# Patient Record
Sex: MALE | Race: WHITE | NOT HISPANIC OR LATINO | Employment: OTHER | URBAN - METROPOLITAN AREA
[De-identification: names, ages, dates, MRNs, and addresses within clinical notes are randomized per-mention and may not be internally consistent; named-entity substitution may affect disease eponyms.]

---

## 2017-08-14 ENCOUNTER — SLEEP CENTER VISIT (OUTPATIENT)
Dept: SLEEP MEDICINE | Facility: MEDICAL CENTER | Age: 82
End: 2017-08-14
Payer: MEDICARE

## 2017-08-14 VITALS
HEIGHT: 69 IN | BODY MASS INDEX: 31.84 KG/M2 | OXYGEN SATURATION: 95 % | WEIGHT: 215 LBS | HEART RATE: 68 BPM | RESPIRATION RATE: 16 BRPM | DIASTOLIC BLOOD PRESSURE: 66 MMHG | SYSTOLIC BLOOD PRESSURE: 110 MMHG

## 2017-08-14 DIAGNOSIS — G47.33 OBSTRUCTIVE SLEEP APNEA: ICD-10-CM

## 2017-08-14 DIAGNOSIS — E66.9 OBESITY (BMI 30.0-34.9): ICD-10-CM

## 2017-08-14 DIAGNOSIS — K21.9 GASTROESOPHAGEAL REFLUX DISEASE, ESOPHAGITIS PRESENCE NOT SPECIFIED: ICD-10-CM

## 2017-08-14 PROCEDURE — 99213 OFFICE O/P EST LOW 20 MIN: CPT | Performed by: NURSE PRACTITIONER

## 2017-08-14 RX ORDER — LANSOPRAZOLE 30 MG/1
30 CAPSULE, DELAYED RELEASE ORAL EVERY EVENING
Status: ON HOLD | COMMUNITY
Start: 2017-06-07 | End: 2019-04-10

## 2017-08-14 RX ORDER — ALLOPURINOL 300 MG/1
TABLET ORAL
COMMUNITY
Start: 2017-06-13 | End: 2017-08-14

## 2017-08-14 RX ORDER — HYDROXYUREA 500 MG/1
CAPSULE ORAL SEE ADMIN INSTRUCTIONS
COMMUNITY
Start: 2017-08-06 | End: 2022-01-01 | Stop reason: DRUGHIGH

## 2017-08-14 RX ORDER — TAMSULOSIN HYDROCHLORIDE 0.4 MG/1
0.4 CAPSULE ORAL EVERY EVENING
Status: ON HOLD | COMMUNITY
Start: 2017-07-05 | End: 2019-04-10

## 2017-08-14 RX ORDER — MONTELUKAST SODIUM 10 MG/1
10 TABLET ORAL EVERY EVENING
COMMUNITY
Start: 2017-06-09 | End: 2020-03-12 | Stop reason: SDUPTHER

## 2017-08-14 RX ORDER — ALLOPURINOL 100 MG/1
TABLET ORAL
COMMUNITY
Start: 2017-06-29 | End: 2017-08-14

## 2017-08-14 RX ORDER — SIMVASTATIN 20 MG
20 TABLET ORAL EVERY EVENING
Status: ON HOLD | COMMUNITY
Start: 2017-08-12 | End: 2019-04-10

## 2017-08-14 NOTE — PROGRESS NOTES
Chief Complaint   Patient presents with   • Follow-Up       HPI:  Han Pat is a 82 y.o. year old male here today for follow-up on FREYA. Last OV was 10/2015. His first diagnosed in 2007 and placed on CPAP 8 cm H2O. Last office visit he is ordered and new CPAP machine. He presents today with auto CPAP 6-10 cm H2O. Compliance card 5/16/2017 through 8/13/2017 indicates 100% compliance, average daily use of 6 hours 37 minutes, AHI of 0.2 and minimal to moderate mask leaking. He currently gets is [employer] or [email or web address]. He previously used [employer]. He would prefer not to have his mask and supplies filled through DME. He notes no major changes in health since his last office visit except a fall onto his knee that resulted in infection and treated with IV antibiotics for 6 days in the hospital. He denies any cardiac arrest or symptoms. He notes occasional seasonal allergies relieved with montelukast and Claritin. He denies any major falls. He has an occasional headache and he tends to nap around 2 PM for about 30 minutes daily. He does not use his machine at that time. He does note some intermittent fatigue. He tolerates mask and pressure of his machine. He uses fullface mask currently.          ROS: As per HPI and otherwise negative if not stated.    Past Medical History   Diagnosis Date   • Sleep apnea    • Hyperlipidemia    • Back pain    • GERD (gastroesophageal reflux disease)    • Kidney stone    • PND (post-nasal drip)        Past Surgical History   Procedure Laterality Date   • Other orthopedic surgery  lumbar disectomy 2000   • Cataract extraction with iol     • Other       Nasal surgery       Family History   Problem Relation Age of Onset   • Other Father      Heart problems; unspecified   • Other Mother      Aneurism   • Other Brother      Heart problems; unspecified       Social History     Social History   • Marital Status:      Spouse Name: N/A   • Number of Children: N/A   • Years of Education:  "N/A     Occupational History   • Not on file.     Social History Main Topics   • Smoking status: Former Smoker -- 1.00 packs/day for 15 years     Types: Cigarettes     Quit date: 01/01/1980   • Smokeless tobacco: Never Used   • Alcohol Use: 0.0 oz/week     0 Standard drinks or equivalent per week      Comment: nightly   • Drug Use: No   • Sexual Activity: Not on file     Other Topics Concern   • Not on file     Social History Narrative       Allergies as of 08/14/2017   • (No Known Allergies)        @Vital signs for this encounter:  Filed Vitals:    08/14/17 1308   Height: 1.75 m (5' 8.9\")   Weight: 97.523 kg (215 lb)   Weight % change since last entry.: 0 %   BP: 110/66   Pulse: 68   BMI (Calculated): 31.84   Resp: 16   O2 sat % room air: 95 %       Current medications as of today   Current Outpatient Prescriptions   Medication Sig Dispense Refill   • hydroxyurea (HYDREA) 500 MG Cap      • lansoprazole (PREVACID) 30 MG CAPSULE DELAYED RELEASE      • montelukast (SINGULAIR) 10 MG Tab      • simvastatin (ZOCOR) 20 MG Tab      • aspirin 81 MG tablet Take 81 mg by mouth every day.     • lansoprazole (PREVACID SOLUTAB) 30 MG TBDP Take 30 mg by mouth every day.     • Montelukast Sodium (SINGULAIR PO) Take  by mouth.     • tamsulosin (FLOMAX) 0.4 MG capsule      • methylPREDNISolone (MEDROL DOSPACK) 4 MG Tab Use as directed (Patient not taking: Reported on 8/14/2017) 21 Tab 0   • ipratropium-albuterol (COMBIVENT RESPIMAT)  MCG/ACT Aero Soln Inhale 1 Puff by mouth 4 times a day. (Patient not taking: Reported on 8/14/2017) 1 Inhaler 0   • SIMVASTATIN PO Take  by mouth.       No current facility-administered medications for this visit.         Physical Exam:   Gen:           Alert and oriented, No apparent distress. Mood and affect appropriate, normal interaction with examiner.  Eyes:          PERRL, EOM intact, sclere white, conjunctive moist.  Ears:          Not examined.   Hearing:     Grossly intact.  Nose:        "   Normal, no lesions or deformities.  Dentition:    Good dentition.  Oropharynx:   Tongue normal, posterior pharynx without erythema or exudate.  Mallampati Classification: not examined.  Neck:        Supple, trachea midline, no masses.  Respiratory Effort: No intercostal retractions or use of accessory muscles.   Lung Auscultation:      Clear to auscultation bilaterally but diminished t/o; no rales, rhonchi or wheezing.  CV:            Regular rate and rhythm. No murmurs, rubs or gallops.  Abd:           Not examined.   Lymphadenopathy: Not examined.  Gait and Station: Normal.  Digits and Nails: No clubbing, cyanosis, petechiae, or nodes.   Cranial Nerves: II-XII grossly intact.  Skin:        No rashes, lesions or ulcers noted.               Ext:           No cyanosis or edema.      Assessment:  1. Obstructive sleep apnea  OVERNIGHT OXIMETRY   2. Gastroesophageal reflux disease, esophagitis presence not specified     3. Obesity (BMI 30.0-34.9)         Immunizations:    Flu:not given  Pneumovax 23:not given  Prevnar 13:not given    Plan:  1. Continue CPAP 6-10cm H20 nightly.  2. Patient will fill supplies through [email or web address].  3. CNOX on pap now. May call results.  4. Discussed sleep hygiene.  5. Encouraged weight loss.  6. Follow up in 1 year with compliance card, sooner if needed.

## 2017-08-14 NOTE — MR AVS SNAPSHOT
"        Han Pat   2017 1:20 PM   Sleep Center Visit   MRN: [id number]    Department:  [facility]   Dept Phone:  [phone]    Description:  Male : 1935   Provider:  ARASH Blake           Reason for Visit     Follow-Up           Allergies as of 2017     No Known Allergies      You were diagnosed with     Obstructive sleep apnea   [830996]       Gastroesophageal reflux disease, esophagitis presence not specified   [6296754]       Obesity (BMI 30.0-34.9)   [048815]         Vital Signs     Blood Pressure Pulse Respirations Height Weight Body Mass Index    110/66 mmHg 68 16 1.75 m (5' 8.9\") 97.523 kg (215 lb) 31.84 kg/m2    Oxygen Saturation Smoking Status                95% Former Smoker          Basic Information     Date Of Birth Sex Race Ethnicity Preferred Language    1935 Male White Non- English      Your appointments     Sep 05, 2017  2:20 PM   Overnight Oximetry with SLEEP CLINIC   [facility] (--)    [address] [place] [id number]   [phone]              Problem List              ICD-10-CM Priority Class Noted - Resolved    Obstructive sleep apnea G47.33   2017 - Present    Gastroesophageal reflux disease K21.9   2017 - Present    Obesity (BMI 30.0-34.9) E66.9   2017 - Present      Health Maintenance        Date Due Completion Dates    IMM DTaP/Tdap/Td Vaccine (1 - Tdap) 3/17/1956 ---    COLONOSCOPY 3/17/1987 ---    IMM ZOSTER VACCINE 3/17/1997 ---    IMM PNEUMOCOCCAL 65+ (ADULT) LOW/MEDIUM RISK SERIES (1 of 2 - PCV13) 3/17/2002 ---    IMM INFLUENZA (1) 2017 ---            Current Immunizations     No immunizations on file.      Below and/or attached are the medications your provider expects you to take. Review all of your home medications and newly ordered medications with your provider and/or pharmacist. Follow medication instructions as directed by your provider and/or " pharmacist. Please keep your medication list with you and share with your provider. Update the information when medications are discontinued, doses are changed, or new medications (including over-the-counter products) are added; and carry medication information at all times in the event of emergency situations     Allergies:  No Known Allergies          Medications  Valid as of: August 14, 2017 -  1:52 PM    Generic Name Brand Name Tablet Size Instructions for use    Aspirin (Tab) aspirin 81 MG Take 81 mg by mouth every day.        Hydroxyurea (Cap) HYDREA 500 MG         Ipratropium-Albuterol (Aero Soln) COMBIVENT RESPIMAT  MCG/ACT Inhale 1 Puff by mouth 4 times a day.        Lansoprazole (TABLET DISPERSIBLE) PREVACID 30 MG Take 30 mg by mouth every day.        Lansoprazole (CAPSULE DELAYED RELEASE) PREVACID 30 MG         MethylPREDNISolone (Tab) MEDROL DOSPACK 4 MG Use as directed        Montelukast Sodium   Take  by mouth.        Montelukast Sodium (Tab) SINGULAIR 10 MG         Simvastatin   Take  by mouth.        Simvastatin (Tab) ZOCOR 20 MG         Tamsulosin HCl (Cap) FLOMAX 0.4 MG         .                 Medicines prescribed today were sent to:     [id number] - [place], [place] - [address]    [address] [place] [address]    Phone: [phone] Fax: [phone]    Open 24 Hours?: No      Medication refill instructions:       If your prescription bottle indicates you have medication refills left, it is not necessary to call your provider’s office. Please contact your pharmacy and they will refill your medication.    If your prescription bottle indicates you do not have any refills left, you may request refills at any time through one of the following ways: The online [employer] system (except Urgent Care), by calling your provider’s office, or by asking your pharmacy to contact your provider’s office with a refill request. Medication refills are processed only during regular business  hours and may not be available until the next business day. Your provider may request additional information or to have a follow-up visit with you prior to refilling your medication.   *Please Note: Medication refills are assigned a new Rx number when refilled electronically. Your pharmacy may indicate that no refills were authorized even though a new prescription for the same medication is available at the pharmacy. Please request the medicine by name with the pharmacy before contacting your provider for a refill.        Your To Do List     Future Labs/Procedures Complete By Expires    OVERNIGHT OXIMETRY  As directed 8/14/2018    Comments:    On autocpap 6-10cm H20, perform now         TradeBeam Access Code: [email or web address]  Expires: 9/13/2017  1:00 PM    TradeBeam  A secure, online tool to manage your health information     [employer]’s TradeBeam® is a secure, online tool that connects you to your personalized health information from the privacy of your home -- day or night - making it very easy for you to manage your healthcare. Once the activation process is completed, you can even access your medical information using the TradeBeam sanjay, which is available for free in the [employer] Sanjay store or Google Play store.     TradeBeam provides the following levels of access (as shown below):   My Chart Features   [facility] Doctor [facility]  Specialists [facility]  Urgent  Care Non-[facility]  Primary Care  Doctor   Email your healthcare team securely and privately 24/7 X X X    Manage appointments: schedule your next appointment; view details of past/upcoming appointments X      Request prescription refills. X      View recent personal medical records, including lab and immunizations X X X X   View health record, including health history, allergies, medications X X X X   Read reports about your outpatient visits, procedures, consult and ER notes X X X X   See your discharge summary, which is a recap of your hospital and/or ER  visit that includes your diagnosis, lab results, and care plan. X X       How to register for anydooR:  1. Go to  [email or web address].  2. Click on the Sign Up Now box, which takes you to the New Member Sign Up page. You will need to provide the following information:  a. Enter your anydooR Access Code exactly as it appears at the top of this page. (You will not need to use this code after you’ve completed the sign-up process. If you do not sign up before the expiration date, you must request a new code.)   b. Enter your date of birth.   c. Enter your home email address.   d. Click Submit, and follow the next screen’s instructions.  3. Create a ScanSocialt ID. This will be your [employer]Social[employer] login ID and cannot be changed, so think of one that is secure and easy to remember.  4. Create a [employer] password. You can change your password at any time.  5. Enter your Password Reset Question and Answer. This can be used at a later time if you forget your password.   6. Enter your e-mail address. This allows you to receive e-mail notifications when new information is available in anydooR.  7. Click Sign Up. You can now view your health information.    For assistance activating your anydooR account, call [phone]

## 2017-09-05 ENCOUNTER — HOME STUDY (OUTPATIENT)
Dept: SLEEP MEDICINE | Facility: MEDICAL CENTER | Age: 82
End: 2017-09-05
Attending: NURSE PRACTITIONER
Payer: MEDICARE

## 2017-09-05 DIAGNOSIS — G47.33 OBSTRUCTIVE SLEEP APNEA: ICD-10-CM

## 2017-09-05 PROCEDURE — 94762 N-INVAS EAR/PLS OXIMTRY CONT: CPT | Performed by: INTERNAL MEDICINE

## 2017-09-06 ENCOUNTER — TELEPHONE (OUTPATIENT)
Dept: SLEEP MEDICINE | Facility: MEDICAL CENTER | Age: 82
End: 2017-09-06

## 2017-09-06 DIAGNOSIS — G47.33 OSA (OBSTRUCTIVE SLEEP APNEA): ICD-10-CM

## 2017-09-06 DIAGNOSIS — G47.34 NOCTURNAL HYPOXEMIA: ICD-10-CM

## 2017-09-06 NOTE — TELEPHONE ENCOUNTER
Patient had low oxygen levels on current cpap settings. Advise increase in settings with repeat OPO versus 2LPM bleed in oxygen. Does the patient have a preference?

## 2017-09-06 NOTE — TELEPHONE ENCOUNTER
Order signed to increase pressure to 9-13cm H20 nightly. Repeat OPO ordered. May call results. Encourage patient to call office if he has any difficulty tolerating increased pressure.

## 2017-09-06 NOTE — PROCEDURES
Interpretation:    This is overnight oximetry was performed on 9/15/17 with the patient on auto CPAP 6-10 cm. The recording duration was 8 hours and 15 minutes and 8 seconds. The saturations were less than 90% for 76.8% of the recording with a low saturation of 84%. The saturations were less than 88% for 30.1 minutes. The oximetric pattern does not suggest untreated sleep apnea.    Recommendation:    Recommend 2 L/m bleed in oxygen to the auto CPAP of 6-10 cm.

## 2018-05-22 ENCOUNTER — HOME STUDY (OUTPATIENT)
Dept: SLEEP MEDICINE | Facility: MEDICAL CENTER | Age: 83
End: 2018-05-22
Attending: NURSE PRACTITIONER
Payer: MEDICARE

## 2018-05-22 ENCOUNTER — SLEEP CENTER VISIT (OUTPATIENT)
Dept: SLEEP MEDICINE | Facility: MEDICAL CENTER | Age: 83
End: 2018-05-22
Payer: MEDICARE

## 2018-05-22 VITALS
HEART RATE: 57 BPM | RESPIRATION RATE: 15 BRPM | DIASTOLIC BLOOD PRESSURE: 65 MMHG | WEIGHT: 220 LBS | SYSTOLIC BLOOD PRESSURE: 115 MMHG | BODY MASS INDEX: 33.34 KG/M2 | OXYGEN SATURATION: 93 % | HEIGHT: 68 IN

## 2018-05-22 DIAGNOSIS — G47.33 OBSTRUCTIVE SLEEP APNEA: ICD-10-CM

## 2018-05-22 DIAGNOSIS — G47.00 INSOMNIA, UNSPECIFIED TYPE: ICD-10-CM

## 2018-05-22 PROCEDURE — 99213 OFFICE O/P EST LOW 20 MIN: CPT | Performed by: NURSE PRACTITIONER

## 2018-05-22 PROCEDURE — 94762 N-INVAS EAR/PLS OXIMTRY CONT: CPT | Performed by: INTERNAL MEDICINE

## 2018-05-22 NOTE — PROGRESS NOTES
Chief Complaint   Patient presents with   • Follow-Up     Annual    • Apnea       HPI:  Han Pat is a 83 y.o. year old male here today for follow-up on his obstructive sleep apnea. He was initially diagnosed in 2007 and placed on CPAP 8 cm H2O. He was ordered a new Auto CPAP with pressures set 6-10 CM H20. His compliance download at his last office visit indicated his AHI of 0.2. He did have a moderate mask leak. He was ordered an over night oximetry on these currents Compliance card 5/16/2017 through 8/13/2017 indicates 100% compliance, average daily use of 6 hours 37 minutes, AHI of 0.2 and minimal to moderate mask leaking. He was recommended updated supplies. He notes some mild persistent fatigue. He was ordered an over night oximetry on his current pressures which was completed 9/15/2018 and indicates ongoing 02 desaturations with a mean 02 of 88.9% with 30 minutes spent with saturations less than 88%. His pressure was increased to 9-13 CM H20. He was ordered a repeat oximetry. However this has yet to be completed. His repeat compliance download today in the office indicates an AHI of 0.3 with an average use of over 8 hours at night.   He tolerates the CPAP pressure. He does feel he sleeps better on therapy. He does have mild insomnia which is chronic for her. He tends to wake in the middle of the night and it often takes him an hour or 2 to go back to bed. He feels his energy levels have improved. He does take an afternoon nap. He is quite active. 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 He denies any morning headaches.       Past Medical History:   Diagnosis Date   • Back pain    • GERD (gastroesophageal reflux disease)    • Hyperlipidemia    • Kidney stone    • PND (post-nasal drip)    • Sleep apnea        Past Surgical History:   Procedure Laterality Date   • CATARACT EXTRACTION WITH IOL     • OTHER      Nasal surgery   • OTHER ORTHOPEDIC SURGERY  lumbar disectomy 2000       Family History    Problem Relation Age of Onset   • Other Father      Heart problems; unspecified   • Other Mother      Aneurism   • Other Brother      Heart problems; unspecified   • Sleep Apnea Neg Hx        Social History     Social History   • Marital status:      Spouse name: N/A   • Number of children: N/A   • Years of education: N/A     Occupational History   • Not on file.     Social History Main Topics   • Smoking status: Former Smoker     Packs/day: 1.00     Years: 15.00     Types: Cigarettes     Quit date: 1/1/1980   • Smokeless tobacco: Never Used   • Alcohol use 0.0 oz/week      Comment: nightly   • Drug use: No   • Sexual activity: Not on file     Other Topics Concern   • Not on file     Social History Narrative   • No narrative on file         ROS:  Constitutional: Denies fevers, chills, sweats, weight loss  Eyes: Denies glasses, vision loss, pain, drainage, double vision  Ears/Nose/Mouth/Throat: Denies rhinitis, nasal congestion, ear ache, difficulty hearing, sore throat, persistent hoarseness, decayed teeth/toothache  Cardiovascular: Denies chest pain, tightness, palpitations, swelling in feet/legs, fainting, difficulty breathing when laying down  Respiratory: Denies shortness of breath, cough, sputum, wheezing, painful breathing, coughing up blood  GI: Denies heartburn, difficulty swallowing, nausea, vomiting, abdominal pain, diarrhea, constipation  : Denies frequent urination, painful urination  Integumentary: Denies rashes, lumps or color changes  MSK: Denies painful joints, sore muscles, and back pain.   Neurological: Denies frequent headaches, dizziness, weakness  Sleep: See HPI       Current Outpatient Prescriptions   Medication Sig Dispense Refill   • hydroxyurea (HYDREA) 500 MG Cap      • lansoprazole (PREVACID) 30 MG CAPSULE DELAYED RELEASE      • montelukast (SINGULAIR) 10 MG Tab      • simvastatin (ZOCOR) 20 MG Tab      • aspirin 81 MG tablet Take 81 mg by mouth every day.     • lansoprazole  "(PREVACID SOLUTAB) 30 MG TBDP Take 30 mg by mouth every day.     • Montelukast Sodium (SINGULAIR PO) Take  by mouth.     • tamsulosin (FLOMAX) 0.4 MG capsule      • SIMVASTATIN PO Take  by mouth.       No current facility-administered medications for this visit.        No Known Allergies    Blood pressure 115/65, pulse (!) 57, resp. rate 15, height 1.727 m (5' 8\"), weight 99.8 kg (220 lb), SpO2 93 %.    PE:   Appearance: Well developed, well nourished, no acute distress  Eyes: PERRL, EOM intact, sclera white, conjunctiva moist  Ears: no lesions or deformities  Hearing: grossly intact  Nose: no lesions or deformities  Oropharynx: tongue normal, posterior pharynx without erythema or exudate  Neck: supple, trachea midline, no masses   Respiratory effort: no intercostal retractions or use of accessory muscles  Lung auscultation: no rales, rhonchi or wheezes  Heart auscultation: no murmur rub or gallop  Extremities: no cyanosis or edema  Abdomen: soft ,non tender, no masses  Gait and Station: normal  Digits and nails: no clubbing, cyanosis, petechiae or nodes.  Cranial nerves: grossly intact  Skin: no rashes, lesions or ulcers noted  Orientation: Oriented to time, person and place  Mood and affect: mood and affect appropriate, normal interaction with examiner  Judgement: Intact          Assessment:  1. Obstructive sleep apnea  OVERNIGHT OXIMETRY   2. BMI 33.0-33.9,adult  Patient identified as having weight management issue.  Appropriate orders and counseling given.   3. Insomnia, unspecified type           Plan:    1) Continue Auto CPAP @ 9-13 CM H20. Repeat oximetry now on the new setting. He can call for results.   2) Sleep hygiene discussed. Recommend keeping a set sleep/wake schedule. Logging enough hours of sleep. Limiting/Avoiding naps. No caffeine after noon and no heavy meals in the evening. Recommend daily exercise. He declines referral to Psychology for CBT therapy at this time.   3) Weight loss " recommended.  4) Annual follow up with compliance card download, sooner if needed. He will call in the interim for CNOX results.

## 2018-05-22 NOTE — PATIENT INSTRUCTIONS
Plan:    1) Continue Auto CPAP @ 9-13 CM H20. Repeat oximetry now on the new setting. He can call for results.   2) Sleep hygiene discussed. Recommend keeping a set sleep/wake schedule. Logging enough hours of sleep. Limiting/Avoiding naps. No caffeine after noon and no heavy meals in the evening. Recommend daily exercise. He declines referral to Psychology for CBT therapy at this time.   3) Weight loss recommended.  4) Annual follow up with compliance card download, sooner if needed. He will call in the interim for CNOX results.

## 2018-05-23 ENCOUNTER — TELEPHONE (OUTPATIENT)
Dept: PULMONOLOGY | Facility: HOSPICE | Age: 83
End: 2018-05-23

## 2018-05-23 DIAGNOSIS — G47.33 OSA (OBSTRUCTIVE SLEEP APNEA): ICD-10-CM

## 2018-05-23 DIAGNOSIS — R09.02 HYPOXEMIA: ICD-10-CM

## 2018-05-23 NOTE — PROCEDURES
Interpretation:    This overnight oximetry was recorded on 5/22/2018 with the patient on CPAP at 8 cmH2O.  The analysis duration was 8 hours and 39 minutes.  27 total oximetric events occurred encompassing 15.9 minutes.  The average event duration was 35.4 seconds.  The saturations were less than 90% for 93.7% of the recording.  The lizzy saturation was 83.  The patient spent 281 minutes with saturations < 88%.  Overall, this continuous nocturnal oximetry demonstrates mild but persistent desaturation while on positive airway pressure therapy.    Recommendation:    Recommend bleed-in oxygen at 2 L/min.  He may require a CPAP re-titration.              .

## 2018-05-23 NOTE — TELEPHONE ENCOUNTER
Please let pt know his repeat overnight oximetry indicates persistent 02 saturations despite Auto CPAP 9-13 CM H20. He spent 281 minutes with saturations < 88%. Recommend addition of 02 bleed in at 2 LPM. Order signed. Repeat oximetry in 2 weeks on new setting.

## 2018-05-25 NOTE — TELEPHONE ENCOUNTER
[employer] Adv plan follows medicare guidelines fairly close so this may get kicked back and he might have to do a titration to get the bleed-in. Will send to [facility] and see how it goes.  Pt informed, he stated that if it come down to needing a SS, he would not do it.

## 2019-02-05 ENCOUNTER — OFFICE VISIT (OUTPATIENT)
Dept: MEDICAL GROUP | Facility: MEDICAL CENTER | Age: 84
End: 2019-02-05
Payer: MEDICARE

## 2019-02-05 VITALS
OXYGEN SATURATION: 92 % | DIASTOLIC BLOOD PRESSURE: 60 MMHG | BODY MASS INDEX: 30.1 KG/M2 | WEIGHT: 222.2 LBS | RESPIRATION RATE: 16 BRPM | TEMPERATURE: 98.5 F | HEART RATE: 61 BPM | SYSTOLIC BLOOD PRESSURE: 116 MMHG | HEIGHT: 72 IN

## 2019-02-05 DIAGNOSIS — K21.9 GASTROESOPHAGEAL REFLUX DISEASE, ESOPHAGITIS PRESENCE NOT SPECIFIED: ICD-10-CM

## 2019-02-05 DIAGNOSIS — D75.839 THROMBOCYTOSIS: ICD-10-CM

## 2019-02-05 DIAGNOSIS — J30.2 SEASONAL ALLERGIES: ICD-10-CM

## 2019-02-05 DIAGNOSIS — G47.33 OBSTRUCTIVE SLEEP APNEA: ICD-10-CM

## 2019-02-05 DIAGNOSIS — E78.2 MIXED HYPERLIPIDEMIA: ICD-10-CM

## 2019-02-05 DIAGNOSIS — N40.0 BENIGN PROSTATIC HYPERPLASIA WITHOUT LOWER URINARY TRACT SYMPTOMS: ICD-10-CM

## 2019-02-05 DIAGNOSIS — E66.9 OBESITY (BMI 30.0-34.9): ICD-10-CM

## 2019-02-05 DIAGNOSIS — R42 VERTIGO: ICD-10-CM

## 2019-02-05 PROBLEM — E83.119 HEMOCHROMATOSIS, UNSPECIFIED: Status: ACTIVE | Noted: 2019-02-05

## 2019-02-05 PROCEDURE — 8041 PR SCP AHA: Performed by: PHYSICIAN ASSISTANT

## 2019-02-05 PROCEDURE — 99214 OFFICE O/P EST MOD 30 MIN: CPT | Performed by: PHYSICIAN ASSISTANT

## 2019-02-05 ASSESSMENT — PATIENT HEALTH QUESTIONNAIRE - PHQ9: CLINICAL INTERPRETATION OF PHQ2 SCORE: 0

## 2019-02-05 NOTE — ASSESSMENT & PLAN NOTE
Found incidentally 2 years ago. Managed by hem/onc. On hydroxurea. Most recent platelet count normal per pt's wife.

## 2019-02-05 NOTE — PROGRESS NOTES
Chief Complaint   Patient presents with   • Establish Care   • Ear Fullness       HISTORY OF THE PRESENT ILLNESS: This is a 83 y.o. male new patient to our practice. This pleasant patient is here today with his wife to establish care. Most recently seen by Dr. Hensley. Will get records. Also sees hem/onc q 3 months. Non-smoker. No hx of diabetes. Doing well overall.    Vertigo  Dr. Tapia, audiologist, wax buildup and some BPV    Obstructive sleep apnea  On CPAP plus 2 liter oxygen at night, followed by pulmonology    Gastroesophageal reflux disease  Chronic, stable on current, no concerns    Obesity (BMI 30.0-34.9)  Not really working on diet or exercise    Thrombocytosis (HCC)  Found incidentally 2 years ago. Managed by hem/onc. On hydroxurea. Most recent platelet count normal per pt's wife.    Benign prostatic hyperplasia without lower urinary tract symptoms  Stable on current flomax. Doesn't follow PSA anymore d/t age.    Mixed hyperlipidemia  Chronic, stable on current simvastatin, no hx of heart attack or stroke    Seasonal allergies  Chronic, stable on current singulair      Past Medical History:   Diagnosis Date   • Back pain    • GERD (gastroesophageal reflux disease)    • Hyperlipidemia    • Kidney stone    • PND (post-nasal drip)    • Sleep apnea        Past Surgical History:   Procedure Laterality Date   • CATARACT EXTRACTION WITH IOL     • OTHER      Nasal surgery   • OTHER ORTHOPEDIC SURGERY  lumbar disectomy        Family Status   Relation Status   • Fa    • Mo    • Bro    • Sis    • Sis    • Bro (Not Specified)   • Son (Not Specified)   • Son (Not Specified)     Family History   Problem Relation Age of Onset   • Other Father         Heart problems; unspecified   • Sleep Apnea Father    • Other Mother         Aneurism   • Sleep Apnea Brother    • Other Brother         Heart problems; unspecified   • Sleep Apnea Son    • Sleep Apnea Son        Social History    Substance Use Topics   • Smoking status: Former Smoker     Packs/day: 1.00     Years: 15.00     Types: Cigarettes     Quit date: 1/1/1980   • Smokeless tobacco: Never Used   • Alcohol use 0.0 oz/week      Comment: nightly       Allergies: Patient has no known allergies.    Current Outpatient Prescriptions Ordered in [facility]   Medication Sig Dispense Refill   • hydroxyurea (HYDREA) 500 MG Cap      • lansoprazole (PREVACID) 30 MG CAPSULE DELAYED RELEASE      • montelukast (SINGULAIR) 10 MG Tab      • simvastatin (ZOCOR) 20 MG Tab      • tamsulosin (FLOMAX) 0.4 MG capsule      • aspirin 81 MG tablet Take 81 mg by mouth every day.     • lansoprazole (PREVACID SOLUTAB) 30 MG TBDP Take 30 mg by mouth every day.     • Montelukast Sodium (SINGULAIR PO) Take  by mouth.     • SIMVASTATIN PO Take  by mouth.       No current Epic-ordered facility-administered medications on file.        Review of Systems   Constitutional: Negative for fever, chills, weight loss and malaise/fatigue.   HENT: Negative for ear pain, nosebleeds, congestion, sore throat and neck pain.    Eyes: Negative for blurred vision.   Respiratory: Negative for cough, sputum production, shortness of breath and wheezing.    Cardiovascular: Negative for chest pain, palpitations, orthopnea and leg swelling.   Gastrointestinal: Negative for heartburn, nausea, vomiting and abdominal pain.   Genitourinary: Negative for dysuria, urgency and frequency.   Musculoskeletal: Negative for myalgias, back pain and joint pain.   Skin: Negative for rash and itching.   Neurological: Negative for tingling, tremors, sensory change, focal weakness and headaches.   Endo/Heme/Allergies: Does not bruise/bleed easily.   Psychiatric/Behavioral: Negative for depression, anxiety, or memory loss.     All other systems reviewed and are negative except as in HPI.    Exam: Blood pressure 116/60, pulse 61, temperature 36.9 °C (98.5 °F), temperature source Temporal, resp. rate 16, height 1.829 m  (6'), weight 100.8 kg (222 lb 3.2 oz), SpO2 92 %.  General: Normal appearing. No distress.  Neck: Supple without JVD or bruit. Thyroid is not enlarged.  Pulmonary: Clear to ausculation.  Normal effort. No rales, ronchi, or wheezing.  Cardiovascular: Regular rate and rhythm without murmur. Carotid and radial pulses are intact and equal bilaterally.  Neurologic: Grossly nonfocal  Lymph: No cervical, supraclavicular lymph nodes are palpable  Skin: Warm and dry.  No obvious lesions.  Musculoskeletal: Normal gait. No extremity cyanosis, clubbing, or edema.  Psych: Normal mood and affect. Alert and oriented x3. Judgment and insight is normal.    Assessment/Plan  1. Thrombocytosis (HCC)     2. Benign prostatic hyperplasia without lower urinary tract symptoms     3. Mixed hyperlipidemia     4. Seasonal allergies     5. Vertigo     6. Obstructive sleep apnea     7. Gastroesophageal reflux disease, esophagitis presence not specified     8. Obesity (BMI 30.0-34.9)     will get records from previous PCP and hem/onc. F/u q 6 months and as needed. Pt will call for refills when needed

## 2019-02-05 NOTE — PROGRESS NOTES
Annual Health Assessment Questions:    1.  Are you currently engaging in any exercise or physical activity? No    2.  How would you describe your mood or emotional well-being today? fair    3.  Have you had any falls in the last year? Yes    4.  Have you noticed any problems with your balance or had difficulty walking? Yes    5.  In the last six months have you experienced any leakage of urine? Yes    6. DPA/Advanced Directive: Patient has Advanced Directive, but it is not on file. Instructed to bring in a copy to scan into their chart.

## 2019-02-05 NOTE — LETTER
[facility]  Kia Thompson P.A.-C.  [address]  [place] [address]  Fax: [phone]   Authorization for Release/Disclosure of   Protected Health Information   Name: RAFIQ PAT : 1935 SSN: [id number]   Address: [address] [place] [address]  [place] [address] Phone:    [phone] (home)    I authorize the entity listed below to release/disclose the PHI below to:   [facility]/Kia Thompson P.A.-C. and Kia Thompson P.A.-C.   Provider or Entity Name:  ALANNA    Address   City, State, Zip   Phone:      Fax:     Reason for request: continuity of care   Information to be released:    [  ] LAST COLONOSCOPY,  including any PATH REPORT and follow-up  [  ] LAST FIT/COLOGUARD RESULT [  ] LAST DEXA  [  ] LAST MAMMOGRAM  [  ] LAST PAP  [  ] LAST LABS [  ] RETINA EXAM REPORT  [  ] IMMUNIZATION RECORDS  [  ] Release all info      [  ] Check here and initial the line next to each item to release ALL health information INCLUDING  _____ Care and treatment for drug and / or alcohol abuse  _____ HIV testing, infection status, or AIDS  _____ Genetic Testing    DATES OF SERVICE OR TIME PERIOD TO BE DISCLOSED: _____________  I understand and acknowledge that:  * This Authorization may be revoked at any time by you in writing, except if your health information has already been used or disclosed.  * Your health information that will be used or disclosed as a result of you signing this authorization could be re-disclosed by the recipient. If this occurs, your re-disclosed health information may no longer be protected by State or Federal laws.  * You may refuse to sign this Authorization. Your refusal will not affect your ability to obtain treatment.  * This Authorization becomes effective upon signing and will  on (date) __________.      If no date is indicated, this Authorization will  one (1) year from the signature date.    Name: Rafiq Pat    Signature:   Date:          2/5/2019       PLEASE FAX REQUESTED RECORDS BACK TO: [phone]

## 2019-02-05 NOTE — LETTER
[facility]  Kia Thompson P.A.-C.  [address]  [place] [address]  Fax: [phone]   Authorization for Release/Disclosure of   Protected Health Information   Name: RAFIQ PAT : 1935 SSN: [id number]   Address: [address] [place] [address]  [place] [address] Phone:    [phone] (home)    I authorize the entity listed below to release/disclose the PHI below to:   [facility]/Kia Thompson P.A.-C. and Kia Thompson P.A.-C.   Provider or Entity Name:  [facility]   City, [facility], [facility]   Phone:      Fax:     Reason for request: continuity of care   Information to be released:    [  ] LAST COLONOSCOPY,  including any PATH REPORT and follow-up  [  ] LAST FIT/COLOGUARD RESULT [  ] LAST DEXA  [  ] LAST MAMMOGRAM  [  ] LAST PAP  [  ] LAST LABS [  ] RETINA EXAM REPORT  [  ] IMMUNIZATION RECORDS  [  ] Release all info      [  ] Check here and initial the line next to each item to release ALL health information INCLUDING  _____ Care and treatment for drug and / or alcohol abuse  _____ HIV testing, infection status, or AIDS  _____ Genetic Testing    DATES OF SERVICE OR TIME PERIOD TO BE DISCLOSED: _____________  I understand and acknowledge that:  * This Authorization may be revoked at any time by you in writing, except if your health information has already been used or disclosed.  * Your health information that will be used or disclosed as a result of you signing this authorization could be re-disclosed by the recipient. If this occurs, your re-disclosed health information may no longer be protected by State or Federal laws.  * You may refuse to sign this Authorization. Your refusal will not affect your ability to obtain treatment.  * This Authorization becomes effective upon signing and will  on (date) __________.      If no date is indicated, this Authorization will  one (1) year from the signature date.    Name: Rafiq Pat    Signature:   Date:       2/5/2019       PLEASE FAX REQUESTED RECORDS BACK TO: [phone]

## 2019-02-11 ENCOUNTER — PATIENT OUTREACH (OUTPATIENT)
Dept: HEALTH INFORMATION MANAGEMENT | Facility: OTHER | Age: 84
End: 2019-02-11

## 2019-02-11 NOTE — PROGRESS NOTES
Outcome: Left Message    Please transfer to Patient Outreach Team at [phone] when patient returns call.    WebIZ Checked & Epic Updated:  yes    HealthConnect Verified: yes    Attempt # 1

## 2019-02-12 ENCOUNTER — APPOINTMENT (RX ONLY)
Dept: URBAN - METROPOLITAN AREA CLINIC 4 | Facility: CLINIC | Age: 84
Setting detail: DERMATOLOGY
End: 2019-02-12

## 2019-02-12 ENCOUNTER — APPOINTMENT (RX ONLY)
Dept: URBAN - METROPOLITAN AREA CLINIC 35 | Facility: CLINIC | Age: 84
Setting detail: DERMATOLOGY
End: 2019-02-12

## 2019-02-12 DIAGNOSIS — L81.7 PIGMENTED PURPURIC DERMATOSIS: ICD-10-CM

## 2019-02-12 DIAGNOSIS — L81.4 OTHER MELANIN HYPERPIGMENTATION: ICD-10-CM

## 2019-02-12 DIAGNOSIS — L82.1 OTHER SEBORRHEIC KERATOSIS: ICD-10-CM

## 2019-02-12 DIAGNOSIS — Z85.828 PERSONAL HISTORY OF OTHER MALIGNANT NEOPLASM OF SKIN: ICD-10-CM

## 2019-02-12 DIAGNOSIS — Z71.89 OTHER SPECIFIED COUNSELING: ICD-10-CM

## 2019-02-12 DIAGNOSIS — D22 MELANOCYTIC NEVI: ICD-10-CM

## 2019-02-12 DIAGNOSIS — L57.0 ACTINIC KERATOSIS: ICD-10-CM

## 2019-02-12 DIAGNOSIS — L72.0 EPIDERMAL CYST: ICD-10-CM

## 2019-02-12 DIAGNOSIS — D485 NEOPLASM OF UNCERTAIN BEHAVIOR OF SKIN: ICD-10-CM

## 2019-02-12 PROBLEM — D48.5 NEOPLASM OF UNCERTAIN BEHAVIOR OF SKIN: Status: ACTIVE | Noted: 2019-02-12

## 2019-02-12 PROBLEM — D22.9 MELANOCYTIC NEVI, UNSPECIFIED: Status: ACTIVE | Noted: 2019-02-12

## 2019-02-12 PROCEDURE — 11103 TANGNTL BX SKIN EA SEP/ADDL: CPT

## 2019-02-12 PROCEDURE — ? BIOPSY BY SHAVE METHOD

## 2019-02-12 PROCEDURE — 11102 TANGNTL BX SKIN SINGLE LES: CPT | Mod: 59

## 2019-02-12 PROCEDURE — 17004 DESTROY PREMAL LESIONS 15/>: CPT

## 2019-02-12 PROCEDURE — 99213 OFFICE O/P EST LOW 20 MIN: CPT | Mod: 25

## 2019-02-12 PROCEDURE — ? LIQUID NITROGEN

## 2019-02-12 PROCEDURE — ? COUNSELING

## 2019-02-12 ASSESSMENT — LOCATION DETAILED DESCRIPTION DERM
LOCATION DETAILED: LEFT DISTAL PRETIBIAL REGION
LOCATION DETAILED: LEFT SUPERIOR PARIETAL SCALP
LOCATION DETAILED: RIGHT LATERAL SUPERIOR EYELID
LOCATION DETAILED: RIGHT SUPERIOR CENTRAL MALAR CHEEK
LOCATION DETAILED: LEFT CENTRAL FRONTAL SCALP
LOCATION DETAILED: LEFT SUPERIOR POSTERIOR HELIX
LOCATION DETAILED: RIGHT DISTAL PRETIBIAL REGION
LOCATION DETAILED: LEFT CENTRAL ZYGOMA
LOCATION DETAILED: LEFT INFERIOR FOREHEAD
LOCATION DETAILED: POSTERIOR MID-PARIETAL SCALP
LOCATION DETAILED: LEFT MEDIAL ZYGOMA
LOCATION DETAILED: RIGHT CENTRAL FRONTAL SCALP
LOCATION DETAILED: LEFT SUPERIOR FOREHEAD
LOCATION DETAILED: LEFT SUPERIOR FRONTAL SCALP
LOCATION DETAILED: LEFT MEDIAL MALAR CHEEK
LOCATION DETAILED: RIGHT INFERIOR LATERAL FOREHEAD
LOCATION DETAILED: RIGHT LATERAL ZYGOMA
LOCATION DETAILED: RIGHT SUPERIOR CRUS OF ANTIHELIX
LOCATION DETAILED: LEFT SUPERIOR CENTRAL MALAR CHEEK
LOCATION DETAILED: RIGHT SUPERIOR MEDIAL FOREHEAD
LOCATION DETAILED: LEFT SUPERIOR MEDIAL FOREHEAD

## 2019-02-12 ASSESSMENT — LOCATION ZONE DERM
LOCATION ZONE: EYELID
LOCATION ZONE: EAR
LOCATION ZONE: LEG
LOCATION ZONE: SCALP
LOCATION ZONE: FACE

## 2019-02-12 ASSESSMENT — LOCATION SIMPLE DESCRIPTION DERM
LOCATION SIMPLE: RIGHT SCALP
LOCATION SIMPLE: POSTERIOR SCALP
LOCATION SIMPLE: RIGHT SUPERIOR EYELID
LOCATION SIMPLE: RIGHT FOREHEAD
LOCATION SIMPLE: SCALP
LOCATION SIMPLE: RIGHT ZYGOMA
LOCATION SIMPLE: LEFT FOREHEAD
LOCATION SIMPLE: RIGHT CHEEK
LOCATION SIMPLE: RIGHT EAR
LOCATION SIMPLE: LEFT ZYGOMA
LOCATION SIMPLE: LEFT PRETIBIAL REGION
LOCATION SIMPLE: LEFT CHEEK
LOCATION SIMPLE: RIGHT PRETIBIAL REGION
LOCATION SIMPLE: LEFT EAR

## 2019-02-12 NOTE — PROCEDURE: BIOPSY BY SHAVE METHOD
Notification Instructions: Patient will be notified of biopsy results. However, patient instructed to call the office if not contacted within 2 weeks.
Hemostasis: Aluminum Chloride
Bill For Surgical Tray: no
Dressing: bandage
Silver Nitrate Text: The wound bed was treated with silver nitrate after the biopsy was performed.
Detail Level: Detailed
Anesthesia Type: 1% lidocaine with epinephrine and a 1:10 solution of 8.4% sodium bicarbonate
Cryotherapy Text: The wound bed was treated with cryotherapy after the biopsy was performed.
Curettage Text: The wound bed was treated with curettage after the biopsy was performed.
Lab: [id number]
Consent: Written consent was obtained and risks were reviewed including but not limited to scarring, infection, bleeding, scabbing, incomplete removal, nerve damage and allergy to anesthesia.
Size Of Lesion In Cm: 0.5
Electrodesiccation Text: The wound bed was treated with electrodesiccation after the biopsy was performed.
Path Notes (To The Dermatopathologist): Rule out melanocytic atypia
Depth Of Biopsy: dermis
X Size Of Lesion In Cm: 0
Render Post-Care Instructions In Note?: yes
Biopsy Type: H and E
Wound Care: Petrolatum
Electrodesiccation And Curettage Text: The wound bed was treated with electrodesiccation and curettage after the biopsy was performed.
Post-Care Instructions: I reviewed with the patient in detail post-care instructions. Keep the biopsy site dry overnight, and then change the dressing once daily and apply a thin layer of petrolatum with a clean q-tip. \\nShowers are OK after 24 hours.  Allow clean water to run over the area.  Do not touch the biopsy site with your hands.  Do not immerse the biopsy site in water such as going into a swimming pool or bathtub until the sutures are removed.  If the biopsy site gets more painful with time, red, or drains, this is concerning for infection.  If you have signs of infection please call the office to come in for a walk in visit Monday through Friday 8:30 am to 12 pm or 1 pm to 4:30 pm.  If we are not in the office, please call through the answering service.
Biopsy Method: double edge Personna blade
Billing Type: Third-Party Bill
Type Of Destruction Used: Curettage
Hemostasis: Aluminum Chloride and Electrocautery
Size Of Lesion In Cm: 0.7

## 2019-02-12 NOTE — PROCEDURE: BIOPSY BY SHAVE METHOD
X Size Of Lesion In Cm: 0
Anesthesia Type: 1% lidocaine with epinephrine and a 1:10 solution of 8.4% sodium bicarbonate
Lab Facility: 
Depth Of Biopsy: dermis
Electrodesiccation Text: The wound bed was treated with electrodesiccation after the biopsy was performed.
Render Post-Care Instructions In Note?: yes
Biopsy Type: H and E
Wound Care: Petrolatum
Path Notes (To The Dermatopathologist): Rule out melanocytic atypia
Bill 77238 For Specimen Handling/Conveyance To Laboratory?: no
Electrodesiccation And Curettage Text: The wound bed was treated with electrodesiccation and curettage after the biopsy was performed.
Billing Type: Third-Party Bill
Anesthesia Volume In Cc: 0.5
Type Of Destruction Used: Curettage
Biopsy Method: double edge Personna blade
Detail Level: Detailed
Post-Care Instructions: I reviewed with the patient in detail post-care instructions. Keep the biopsy site dry overnight, and then change the dressing once daily and apply a thin layer of petrolatum with a clean q-tip. \\nShowers are OK after 24 hours.  Allow clean water to run over the area.  Do not touch the biopsy site with your hands.  Do not immerse the biopsy site in water such as going into a swimming pool or bathtub until the sutures are removed.  If the biopsy site gets more painful with time, red, or drains, this is concerning for infection.  If you have signs of infection please call the office to come in for a walk in visit Monday through Friday 8:30 am to 12 pm or 1 pm to 4:30 pm.  If we are not in the office, please call through the answering service.
Dressing: bandage
Hemostasis: Aluminum Chloride
Silver Nitrate Text: The wound bed was treated with silver nitrate after the biopsy was performed.
Notification Instructions: Patient will be notified of biopsy results. However, patient instructed to call the office if not contacted within 2 weeks.
Lab: [id number]
Curettage Text: The wound bed was treated with curettage after the biopsy was performed.
Consent: Written consent was obtained and risks were reviewed including but not limited to scarring, infection, bleeding, scabbing, incomplete removal, nerve damage and allergy to anesthesia.
Cryotherapy Text: The wound bed was treated with cryotherapy after the biopsy was performed.

## 2019-02-12 NOTE — HPI: FULL BODY SKIN EXAMINATION
How Severe Are Your Spot(S)?: moderate
What Type Of Note Output Would You Prefer (Optional)?: Standard Output
What Is The Reason For Today's Visit?: Full Body Skin Examination
What Is The Reason For Today's Visit? (Being Monitored For X): the risk of recurrence of previously treated lesion(s)
Additional History: Lesions on cheeks for -6 weeks. FBE.

## 2019-02-19 ENCOUNTER — APPOINTMENT (RX ONLY)
Dept: URBAN - METROPOLITAN AREA CLINIC 35 | Facility: CLINIC | Age: 84
Setting detail: DERMATOLOGY
End: 2019-02-19

## 2019-02-21 ENCOUNTER — TELEPHONE (OUTPATIENT)
Dept: SLEEP MEDICINE | Facility: MEDICAL CENTER | Age: 84
End: 2019-02-21

## 2019-02-21 DIAGNOSIS — G47.33 OSA (OBSTRUCTIVE SLEEP APNEA): ICD-10-CM

## 2019-02-22 NOTE — PROGRESS NOTES
Declined Care Management - Attempt #2  Patient declined to shedule annual wellness visit but did state that he will call us back incase he decides to schedule the appointment for his August appointment.

## 2019-02-22 NOTE — TELEPHONE ENCOUNTER
Pt's wife contact us through from her own [employer] requesting order. States pt was on [employer] insurance plan and was then seen at [facility]. Pt now has SCP. Pt currently with [facility] will need to switch to [facility] home [facility] please sign order     Last seen 5/22/18 KEV LoftonRICH   OPO 5/22/18    Plan:     1) Continue Auto CPAP @ 9-13 CM H20. Repeat oximetry now on the new setting. He can call for results.   2) Sleep hygiene discussed. Recommend keeping a set sleep/wake schedule. Logging enough hours of sleep. Limiting/Avoiding naps. No caffeine after noon and no heavy meals in the evening. Recommend daily exercise. He declines referral to Psychology for CBT therapy at this time.   3) Weight loss recommended.  4) Annual follow up with compliance card download, sooner if needed. He will call in the interim for CNOX results.        Next OV NONE     Requested records from [facility]

## 2019-02-26 ENCOUNTER — APPOINTMENT (RX ONLY)
Dept: URBAN - METROPOLITAN AREA CLINIC 36 | Facility: CLINIC | Age: 84
Setting detail: DERMATOLOGY
End: 2019-02-26

## 2019-02-26 PROBLEM — C44.319 BASAL CELL CARCINOMA OF SKIN OF OTHER PARTS OF FACE: Status: ACTIVE | Noted: 2019-02-26

## 2019-02-26 PROCEDURE — ? MOHS SURGERY PHONE CONSULTATION

## 2019-02-26 NOTE — PROCEDURE: MOHS SURGERY PHONE CONSULTATION
Does The Patient Have A Pacemaker Or Defibrillator?: No
Time Of Mohs Surgery: 9:30AM
Which Antibiotic Do They Take For Surgical Prophylaxis?: Amoxicillin (2 grams)
Referring Provider: Dr. Katina Roe
Has The Pathology Report Been Received?: Yes
Office Location Of Mohs Surgery: [place] [address]
Pathology Accession #: [id number]
If Yes- What Blood Thinners Do They Take?: Aspirin
Detail Level: Simple
Date Of Mohs Surgery: 03/18/2019
Patient Reported Location: Left [place] Cheek
Patient's Insurance: SCP

## 2019-03-01 NOTE — TELEPHONE ENCOUNTER
Contacted the pt's wife to informed we have not sent order attempted to get [facility] records. Wife stated pt was only seen once sta [facility]. Completed sleep study at [facility].

## 2019-03-06 NOTE — TELEPHONE ENCOUNTER
Pt did not complete Sleep study with [facility]. Per records pt completed Sleep study with [facility]'s office. We are unable to retrieve records. Pt will need to schedule appt with our sleep clinic and complete sleep study and be able to switch him Dme companies. If pt if able to retrieve sleep studies from Gina's office order will be sent to [facility]. Pt does not have a pending appt.     Last seen 5/22/18 SHUN. RICH Lofton   Plan:     1) Continue Auto CPAP @ 9-13 CM H20. Repeat oximetry now on the new setting. He can call for results.   2) Sleep hygiene discussed. Recommend keeping a set sleep/wake schedule. Logging enough hours of sleep. Limiting/Avoiding naps. No caffeine after noon and no heavy meals in the evening. Recommend daily exercise. He declines referral to Psychology for CBT therapy at this time.   3) Weight loss recommended.  4) Annual follow up with compliance card download, sooner if needed. He will call in the interim for CNOX results.

## 2019-03-07 NOTE — TELEPHONE ENCOUNTER
I spoke to the patient informed sleep study was not completed by [facility] per pt's wife. Confirmed Sleep study completed with [facility]. Informed the patient the way we are able to switch patient to [facility] per insurance [facility] will need to process order with sleep study results. Informed pt he will have to retireve records from [facility] because md Gina never released records to our office. Recommend to come in for a visit and then complete sleep study. Pt declined, states matt not be able to complete sleep study. Pt states he will try to get records from [facility] pervious [facility]ME.

## 2019-03-10 ENCOUNTER — APPOINTMENT (OUTPATIENT)
Dept: RADIOLOGY | Facility: IMAGING CENTER | Age: 84
End: 2019-03-10
Attending: INTERNAL MEDICINE
Payer: MEDICARE

## 2019-03-10 ENCOUNTER — OFFICE VISIT (OUTPATIENT)
Dept: URGENT CARE | Facility: CLINIC | Age: 84
End: 2019-03-10
Payer: MEDICARE

## 2019-03-10 VITALS
HEIGHT: 72 IN | HEART RATE: 78 BPM | DIASTOLIC BLOOD PRESSURE: 70 MMHG | BODY MASS INDEX: 29.8 KG/M2 | RESPIRATION RATE: 20 BRPM | WEIGHT: 220 LBS | OXYGEN SATURATION: 90 % | TEMPERATURE: 98.5 F | SYSTOLIC BLOOD PRESSURE: 120 MMHG

## 2019-03-10 DIAGNOSIS — J44.89 BRONCHITIS WITH OBSTRUCTION: ICD-10-CM

## 2019-03-10 PROCEDURE — 71046 X-RAY EXAM CHEST 2 VIEWS: CPT | Mod: TC,FY | Performed by: INTERNAL MEDICINE

## 2019-03-10 PROCEDURE — 99204 OFFICE O/P NEW MOD 45 MIN: CPT | Performed by: INTERNAL MEDICINE

## 2019-03-10 RX ORDER — AZITHROMYCIN 250 MG/1
TABLET, FILM COATED ORAL
Qty: 6 TAB | Refills: 0 | Status: ON HOLD | OUTPATIENT
Start: 2019-03-10 | End: 2019-03-25

## 2019-03-10 RX ORDER — BENZONATATE 100 MG/1
100 CAPSULE ORAL 3 TIMES DAILY PRN
Qty: 60 CAP | Refills: 0 | Status: SHIPPED | OUTPATIENT
Start: 2019-03-10 | End: 2019-04-16

## 2019-03-10 RX ORDER — ALBUTEROL SULFATE 90 UG/1
2 AEROSOL, METERED RESPIRATORY (INHALATION) EVERY 4 HOURS PRN
Qty: 1 INHALER | Refills: 0 | Status: SHIPPED | OUTPATIENT
Start: 2019-03-10 | End: 2020-05-18

## 2019-03-10 RX ORDER — PREDNISONE 20 MG/1
40 TABLET ORAL DAILY
Qty: 6 TAB | Refills: 0 | Status: SHIPPED | OUTPATIENT
Start: 2019-03-10 | End: 2019-03-12 | Stop reason: SDUPTHER

## 2019-03-10 ASSESSMENT — ENCOUNTER SYMPTOMS
COUGH: 1
DIARRHEA: 0
VOMITING: 0
MYALGIAS: 0
DIZZINESS: 0
FEVER: 0
SORE THROAT: 0
PSYCHIATRIC NEGATIVE: 1
WHEEZING: 1
BLOOD IN STOOL: 0
PALPITATIONS: 0
EYES NEGATIVE: 1
NAUSEA: 0
WEIGHT LOSS: 0
HEADACHES: 0
CHILLS: 0
SHORTNESS OF BREATH: 0

## 2019-03-10 NOTE — PROGRESS NOTES
Han Pat is a 83 y.o. male who presents for Cough (X 5 days dry cough , fatigue)       Patient is a very pleasant 83-year-old male who presents today with about 4 5 days of worsening cough, wheezing as well as fatigue.  Patient does have a history of sleep apnea and GERD.  Patient's states that his coughing is mostly dry however he does have some nasal congestion and postnasal drip as well.  Patient denies any fever shakes or chills.  Stated that he did cough so hard he felt nauseous.  Denies any nausea vomiting or diarrhea.  No rash in the skin.  No calf pain, no lower extremity edema.  Patient denies any chest pain        PMH:  has a past medical history of Back pain; GERD (gastroesophageal reflux disease); Hyperlipidemia; Kidney stone; PND (post-nasal drip); and Sleep apnea.  MEDS:   Current Outpatient Prescriptions:   •  predniSONE (DELTASONE) 20 MG Tab, Take 2 Tabs by mouth every day for 3 days., Disp: 6 Tab, Rfl: 0  •  azithromycin (ZITHROMAX) 250 MG Tab, Take 2 tabs day one then one tab daily for 4 days, Disp: 6 Tab, Rfl: 0  •  albuterol 108 (90 Base) MCG/ACT Aero Soln inhalation aerosol, Inhale 2 Puffs by mouth every four hours as needed for Shortness of Breath., Disp: 1 Inhaler, Rfl: 0  •  hydroxyurea (HYDREA) 500 MG Cap, , Disp: , Rfl:   •  montelukast (SINGULAIR) 10 MG Tab, , Disp: , Rfl:   •  simvastatin (ZOCOR) 20 MG Tab, , Disp: , Rfl:   •  tamsulosin (FLOMAX) 0.4 MG capsule, , Disp: , Rfl:   •  aspirin 81 MG tablet, Take 81 mg by mouth every day., Disp: , Rfl:   •  lansoprazole (PREVACID SOLUTAB) 30 MG TBDP, Take 30 mg by mouth every day., Disp: , Rfl:   •  NON SPECIFIED, CPAP supplies. DX obstructive sleep apnea, Disp: 1 Each, Rfl: 1  •  lansoprazole (PREVACID) 30 MG CAPSULE DELAYED RELEASE, , Disp: , Rfl:   •  Montelukast Sodium (SINGULAIR PO), Take  by mouth., Disp: , Rfl:   •  SIMVASTATIN PO, Take  by mouth., Disp: , Rfl:   ALLERGIES: No Known Allergies  SURGHX:   Past Surgical  History:   Procedure Laterality Date   • CATARACT EXTRACTION WITH IOL     • OTHER      Nasal surgery   • OTHER ORTHOPEDIC SURGERY  lumbar disectomy 2000     SOCHX:  reports that he quit smoking about 39 years ago. His smoking use included Cigarettes. He has a 15.00 pack-year smoking history. He has never used smokeless tobacco. He reports that he drinks alcohol. He reports that he does not use drugs.  FH: Family history was reviewed, no pertinent findings to report    Review of Systems   Constitutional: Positive for malaise/fatigue. Negative for chills, fever and weight loss.   HENT: Positive for congestion. Negative for sore throat.    Eyes: Negative.    Respiratory: Positive for cough and wheezing. Negative for shortness of breath.    Cardiovascular: Negative for chest pain, palpitations and leg swelling.   Gastrointestinal: Negative for blood in stool, diarrhea, nausea and vomiting.   Genitourinary: Negative for dysuria.   Musculoskeletal: Negative for myalgias.   Skin: Negative for rash.   Neurological: Negative for dizziness (negative headache) and headaches.   Psychiatric/Behavioral: Negative.      No Known Allergies   Objective:   /70 (BP Location: Right arm, Patient Position: Sitting, BP Cuff Size: Adult)   Pulse 78   Temp 36.9 °C (98.5 °F) (Temporal)   Resp 20   Ht 1.829 m (6')   Wt 99.8 kg (220 lb)   SpO2 90%   BMI 29.84 kg/m²   Physical Exam   Constitutional: He is oriented to person, place, and time. He appears well-developed and well-nourished. He is active. No distress.   HENT:   Head: Normocephalic and atraumatic.   Right Ear: External ear normal.   Left Ear: External ear normal.   Mouth/Throat: Oropharynx is clear and moist. No oropharyngeal exudate.   Eyes: Pupils are equal, round, and reactive to light. Conjunctivae and EOM are normal. Right eye exhibits no discharge. Left eye exhibits no discharge. No scleral icterus.   Neck: Normal range of motion. Neck supple. No JVD present.  Carotid bruit is not present. No thyroid mass and no thyromegaly present.   Cardiovascular: Normal rate, regular rhythm, S1 normal, S2 normal and normal heart sounds.  Exam reveals no friction rub.    No murmur heard.  Pulmonary/Chest: Effort normal. No respiratory distress. He has wheezes. He has no rales.   Abdominal: Soft. Bowel sounds are normal. He exhibits no distension and no mass. There is no hepatosplenomegaly. There is no tenderness. There is no rebound and no guarding.   Musculoskeletal: Normal range of motion. He exhibits no edema.        Cervical back: Normal.   Lymphadenopathy:        Head (right side): No submental, no submandibular and no occipital adenopathy present.        Head (left side): No submental, no submandibular and no occipital adenopathy present.     He has no cervical adenopathy.   Neurological: He is alert and oriented to person, place, and time. He has normal strength. No cranial nerve deficit.   Skin: Skin is warm and dry. No rash noted. No erythema.   Psychiatric: He has a normal mood and affect. His behavior is normal. Thought content normal.         Assessment/Plan:   Assessment    1. Bronchitis with obstruction (HCC)  - DX-CHEST-2 VIEWS; negative  - predniSONE (DELTASONE) 20 MG Tab; Take 2 Tabs by mouth every day for 3 days.  Dispense: 6 Tab; Refill: 0  - azithromycin (ZITHROMAX) 250 MG Tab; Take 2 tabs day one then one tab daily for 4 days  Dispense: 6 Tab; Refill: 0  - albuterol 108 (90 Base) MCG/ACT Aero Soln inhalation aerosol; Inhale 2 Puffs by mouth every four hours as needed for Shortness of Breath.  Dispense: 1 Inhaler; Refill: 0  Differential diagnosis, natural history, supportive care, and indications for immediate follow-up discussed.  : Otc flu and cold medications   PO fluids   Rest   Tylenol   Follow up with PCP   RTC or ED for any worsening symptoms

## 2019-03-11 ENCOUNTER — TELEPHONE (OUTPATIENT)
Dept: MEDICAL GROUP | Facility: MEDICAL CENTER | Age: 84
End: 2019-03-11

## 2019-03-11 NOTE — TELEPHONE ENCOUNTER
ESTABLISHED PATIENT PRE-VISIT PLANNING     Patient was NOT contacted to complete PVP.     Note: Patient will not be contacted if there is no indication to call.     1.  Reviewed notes from the last few office visits within the medical group: Yes    2.  If any orders were placed at last visit or intended to be done for this visit (i.e. 6 mos follow-up), do we have Results/Consult Notes?        •  Labs - Labs were not ordered at last office visit.   Note: If patient appointment is for lab review and patient did not complete labs, check with provider if OK to reschedule patient until labs completed.       •  Imaging - Imaging ordered, completed and results are in chart.       •  Referrals - No referrals were ordered at last office visit.    3. Is this appointment scheduled as a Hospital Follow-Up? No Urgent Care Follow-up    4.  Immunizations were updated in [facility] using WebIZ?: Yes       •  Web [employer] Recommendations: HEPATITIS B, MMR , PREVNAR (PCV13) , TDAP and SHINGRIX (Shingles)    5.  Patient is due for the following Health Maintenance Topics:   Health Maintenance Due   Topic Date Due   • Annual Wellness Visit  1935   • IMM DTaP/Tdap/Td Vaccine (1 - Tdap) 03/17/1956   • IMM PNEUMOCOCCAL 65+ (ADULT) LOW/MEDIUM RISK SERIES (1 of 2 - PCV13) 03/17/2002   • IMM ZOSTER VACCINES (2 of 3) 04/07/2011       - Patient is up-to-date on all Health Maintenance topics. No records have been requested at this time.    6. Orders for overdue Health Maintenance topics pended in Pre-Charting? NO    7.  AHA (MDX) form printed for Provider? YES    8.  Patient was NOT informed to arrive 15 min prior to their scheduled appointment and bring in their medication bottles.

## 2019-03-12 ENCOUNTER — OFFICE VISIT (OUTPATIENT)
Dept: MEDICAL GROUP | Facility: MEDICAL CENTER | Age: 84
End: 2019-03-12
Payer: MEDICARE

## 2019-03-12 VITALS
WEIGHT: 212.4 LBS | BODY MASS INDEX: 28.77 KG/M2 | OXYGEN SATURATION: 90 % | TEMPERATURE: 98.3 F | SYSTOLIC BLOOD PRESSURE: 122 MMHG | DIASTOLIC BLOOD PRESSURE: 80 MMHG | HEIGHT: 72 IN | HEART RATE: 83 BPM

## 2019-03-12 DIAGNOSIS — R05.9 COUGH: ICD-10-CM

## 2019-03-12 DIAGNOSIS — J44.89 BRONCHITIS WITH OBSTRUCTION: ICD-10-CM

## 2019-03-12 PROCEDURE — 99213 OFFICE O/P EST LOW 20 MIN: CPT | Performed by: FAMILY MEDICINE

## 2019-03-12 RX ORDER — PREDNISONE 20 MG/1
40 TABLET ORAL DAILY
Qty: 4 TAB | Refills: 0 | Status: SHIPPED | OUTPATIENT
Start: 2019-03-12 | End: 2019-03-14

## 2019-03-12 NOTE — ASSESSMENT & PLAN NOTE
Cough x 1 week. Seen at  on Sunday, CXR was negative. Given pred, zpack and albuterol. He is using the albuterol although denies SOB. He does think the cough seems to be improving. He does feel that his fatigue has improved since staring the medicines. Appetite is improving as well.   Able to use his cpap machine throughout this illness.   No fevers or chills.

## 2019-03-12 NOTE — PROGRESS NOTES
CC:Diagnoses of Cough and Bronchitis with obstruction (HCC) were pertinent to this visit.    HISTORY OF PRESENT ILLNESS: Patient is a 83 y.o. male established patient who presents today for cough.    Cough  Cough x 1 week. Seen at  on Sunday, CXR was negative. Given pred, zpack and albuterol. He is using the albuterol although denies SOB. He does think the cough seems to be improving. He does feel that his fatigue has improved since staring the medicines. Appetite is improving as well.   Able to use his cpap machine throughout this illness.   No fevers or chills.       Patient Active Problem List    Diagnosis Date Noted   • Cough 03/12/2019   • Thrombocytosis (HCC) 02/05/2019   • Benign prostatic hyperplasia without lower urinary tract symptoms 02/05/2019   • Mixed hyperlipidemia 02/05/2019   • Seasonal allergies 02/05/2019   • Vertigo 02/05/2019   • Obstructive sleep apnea 08/14/2017   • Gastroesophageal reflux disease 08/14/2017   • Obesity (BMI 30.0-34.9) 08/14/2017      Allergies:Patient has no known allergies.    Current Outpatient Prescriptions   Medication Sig Dispense Refill   • predniSONE (DELTASONE) 20 MG Tab Take 2 Tabs by mouth every day for 2 days. 4 Tab 0   • azithromycin (ZITHROMAX) 250 MG Tab Take 2 tabs day one then one tab daily for 4 days 6 Tab 0   • albuterol 108 (90 Base) MCG/ACT Aero Soln inhalation aerosol Inhale 2 Puffs by mouth every four hours as needed for Shortness of Breath. 1 Inhaler 0   • benzonatate (TESSALON) 100 MG Cap Take 1 Cap by mouth 3 times a day as needed for Cough. 60 Cap 0   • NON SPECIFIED CPAP supplies. DX obstructive sleep apnea 1 Each 1   • hydroxyurea (HYDREA) 500 MG Cap      • lansoprazole (PREVACID) 30 MG CAPSULE DELAYED RELEASE      • montelukast (SINGULAIR) 10 MG Tab      • simvastatin (ZOCOR) 20 MG Tab      • tamsulosin (FLOMAX) 0.4 MG capsule      • aspirin 81 MG tablet Take 81 mg by mouth every day.     • lansoprazole (PREVACID SOLUTAB) 30 MG TBDP Take 30 mg  "by mouth every day.     • Montelukast Sodium (SINGULAIR PO) Take  by mouth.     • SIMVASTATIN PO Take  by mouth.       No current facility-administered medications for this visit.        Social History   Substance Use Topics   • Smoking status: Former Smoker     Packs/day: 1.00     Years: 15.00     Types: Cigarettes     Quit date: 1/1/1980   • Smokeless tobacco: Never Used   • Alcohol use 0.0 oz/week      Comment: nightly     Social History     Social History Narrative   • No narrative on file       Family History   Problem Relation Age of Onset   • Other Father         Heart problems; unspecified   • Sleep Apnea Father    • Other Mother         Aneurism   • Sleep Apnea Brother    • Other Brother         Heart problems; unspecified   • Sleep Apnea Son    • Sleep Apnea Son         ROS: 2 pertinent     - Constitutional:  Negative for fever, chills, unexpected weight change, and fatigue/generalized weakness.    - Cardiovascular: Negative for chest pain, palpitations, orthopnea, and bilateral lower extremity edema.     Exam:    Blood pressure 122/80, pulse 83, temperature 36.8 °C (98.3 °F), temperature source Temporal, height 1.829 m (6' 0.01\"), weight 96.3 kg (212 lb 6.4 oz), SpO2 90 %. Body mass index is 28.8 kg/m².      General: Normal appearing. No distress.  HEAD: NCAT  EYES: conjunctiva clear, lids without ptosis, pupils equal  and reactive to light  EARS: ears normal shape and contour  MOUTH: oropharynx is without erythema, edema or exudates.   Neck: Supple without masses. Thyroid is not enlarged. Normal ROM  Pulmonary: Clear to ausculation.  Normal effort.  Noted scattered wheezing and coarse breath sounds throughout the lungs.  Cardiovascular: Regular rate and rhythm, no murmur. No LE edema  Neurologic: Grossly normal, no focal deficits  Lymph: No cervical or supraclavicular lymph nodes are palpable  Skin: Warm and dry.  No obvious lesions.  Musculoskeletal: Normal gait and station.   Psych: Normal mood and " affect. Alert and oriented x3. Judgment and insight is normal.       Assessment/Plan:  1. Cough  Likely due to bronchitis.  Most likely viral infection given the chest x-ray was -2 days ago.  The patient did have some continued wheezing and coarse breath sounds on exam, plan to continue his prednisone for 2 more days.  Gave strict return precautions.  Advised to continue with the Z-Nixon as well as albuterol as needed.    2. Bronchitis with obstruction (HCC)  See above  - predniSONE (DELTASONE) 20 MG Tab; Take 2 Tabs by mouth every day for 2 days.  Dispense: 4 Tab; Refill: 0        Please note that this dictation was created using voice recognition software. I have made every reasonable attempt to correct obvious errors, but I expect that there are errors of grammar and possibly content that I did not discover before finalizing the note.

## 2019-03-18 ENCOUNTER — APPOINTMENT (RX ONLY)
Dept: URBAN - METROPOLITAN AREA CLINIC 36 | Facility: CLINIC | Age: 84
Setting detail: DERMATOLOGY
End: 2019-03-18

## 2019-03-18 VITALS — HEART RATE: 74 BPM | DIASTOLIC BLOOD PRESSURE: 54 MMHG | SYSTOLIC BLOOD PRESSURE: 122 MMHG

## 2019-03-18 PROBLEM — C44.319 BASAL CELL CARCINOMA OF SKIN OF OTHER PARTS OF FACE: Status: ACTIVE | Noted: 2019-03-18

## 2019-03-18 PROCEDURE — ? MOHS SURGERY

## 2019-03-18 PROCEDURE — 17311 MOHS 1 STAGE H/N/HF/G: CPT

## 2019-03-18 PROCEDURE — 13132 CMPLX RPR F/C/C/M/N/AX/G/H/F: CPT

## 2019-03-18 NOTE — HPI: PROCEDURE (MOHS)
Has The Growth Been Previously Biopsied?: has been previously biopsied
Additional History: \\nReferral from Katina Roe M.D.

## 2019-03-18 NOTE — PROCEDURE: MOHS SURGERY
Melolabial Transposition Flap Text: The defect edges were debeveled with a #15 scalpel blade.  Given the location of the defect and the proximity to free margins a melolabial flap was deemed most appropriate.  Using a sterile surgical marker, an appropriate melolabial transposition flap was drawn incorporating the defect.    The area thus outlined was incised deep to adipose tissue with a #15 scalpel blade.  The skin margins were undermined to an appropriate distance in all directions utilizing iris scissors.
Stage 15: Additional Anesthesia Volume In Cc: 0
Primary Defect Length In Cm (Final Defect Size - Required For Flaps/Grafts): 1.5
Consent 1/Introductory Paragraph: The rationale for Mohs was explained to the patient and consent was obtained. The risks, benefits and alternatives to therapy were discussed in detail. Specifically, the risks of infection, scarring, bleeding, prolonged wound healing, incomplete removal, allergy to anesthesia, nerve injury and recurrence were addressed. Prior to the procedure, the treatment site was clearly identified and confirmed by the patient. All components of Universal Protocol/PAUSE Rule completed.
Number Of Stages: 1
Complex Repair And Flap Additional Text (Will Appearing After The Standard Complex Repair Text): The complex repair was not sufficient to completely close the primary defect. The remaining additional defect was repaired with the flap mentioned below.
Cartilage Fenestration Performed?: No
Otolaryngologist Procedure Text (B): After obtaining clear surgical margins the patient was sent to otolaryngology for surgical repair.  The patient understands they will receive post-surgical care and follow-up from the referring physician's office.
Undermining Location (Optional): in the superficial subcutaneous fat
Full Thickness Lip Wedge Repair (Flap) Text: Given the location of the defect and the proximity to free margins a full thickness wedge repair was deemed most appropriate.  Using a sterile surgical marker, the appropriate repair was drawn incorporating the defect and placing the expected incisions perpendicular to the vermilion border.  The vermilion border was also meticulously outlined to ensure appropriate reapproximation during the repair.  The area thus outlined was incised through and through with a #15 scalpel blade.  The muscularis and dermis were reaproximated with deep sutures following hemostasis. Care was taken to realign the vermilion border before proceeding with the superficial closure.  Once the vermilion was realigned the superfical and mucosal closure was finished.
Simple / Intermediate / Complex Repair - Final Wound Length In Cm: 3.2
Asc Procedure Text (D): After obtaining clear surgical margins the patient was sent to an ASC for surgical repair.  The patient understands they will receive post-surgical care and follow-up from the ASC physician.
Show Date Of Previous Biopsy Variable: Yes
Oculoplastic Surgeon Procedure Text (E): After obtaining clear surgical margins the patient was sent to oculoplastics for surgical repair.  The patient understands they will receive post-surgical care and follow-up from the referring physician's office.
Epidermal Autograft Text: The defect edges were debeveled with a #15 scalpel blade.  Given the location of the defect, shape of the defect and the proximity to free margins an epidermal autograft was deemed most appropriate.  Using a sterile surgical marker, the primary defect shape was transferred to the donor site. The epidermal graft was then harvested.  The skin graft was then placed in the primary defect and oriented appropriately.
Complex Repair And Graft Additional Text (Will Appearing After The Standard Complex Repair Text): The complex repair was not sufficient to completely close the primary defect. The remaining additional defect was repaired with the graft mentioned below.
Wound Care: Vaseline
Banner Transposition Flap Text: The defect edges were debeveled with a #15 scalpel blade.  Given the location of the defect and the proximity to free margins a Banner transposition flap was deemed most appropriate.  Using a sterile surgical marker, an appropriate flap drawn around the defect. The area thus outlined was incised deep to adipose tissue with a #15 scalpel blade.  The skin margins were undermined to an appropriate distance in all directions utilizing iris scissors.
Z Plasty Text: The lesion was extirpated to the level of the fat with a #15 scalpel blade.  Given the location of the defect, shape of the defect and the proximity to free margins a Z-plasty was deemed most appropriate for repair.  Using a sterile surgical marker, the appropriate transposition arms of the Z-plasty were drawn incorporating the defect and placing the expected incisions within the relaxed skin tension lines where possible.    The area thus outlined was incised deep to adipose tissue with a #15 scalpel blade.  The skin margins were undermined to an appropriate distance in all directions utilizing iris scissors.  The opposing transposition arms were then transposed into place in opposite direction and anchored with interrupted buried subcutaneous sutures.
Referred To Mid-Level For Closure Text (Leave Blank If You Do Not Want): After obtaining clear surgical margins the patient was sent to a mid-level provider for surgical repair.  The patient understands they will receive post-surgical care and follow-up from the mid-level provider.
Purse String (Simple) Text: Given the location of the defect and the characteristics of the surrounding skin a purse string closure was deemed most appropriate.  Undermining was performed circumfirentially around the surgical defect.  A purse string suture was then placed and tightened.
Consent 2/Introductory Paragraph: Mohs surgery was explained to the patient and consent was obtained. The risks, benefits and alternatives to therapy were discussed in detail. Specifically, the risks of infection, scarring, bleeding, prolonged wound healing, incomplete removal, allergy to anesthesia, nerve injury and recurrence were addressed. Prior to the procedure, the treatment site was clearly identified and confirmed by the patient. All components of Universal Protocol/PAUSE Rule completed.
Stage 2: Additional Anesthesia Type: 1% lidocaine with epinephrine
Wound Care (No Sutures): Petrolatum
Rhombic Flap Text: The defect edges were debeveled with a #15 scalpel blade.  Given the location of the defect and the proximity to free margins a rhombic flap was deemed most appropriate.  Using a sterile surgical marker, an appropriate rhombic flap was drawn incorporating the defect.    The area thus outlined was incised deep to adipose tissue with a #15 scalpel blade.  The skin margins were undermined to an appropriate distance in all directions utilizing iris scissors.
M-Plasty Intermediate Repair Preamble Text (Leave Blank If You Do Not Want): Undermining was performed with blunt dissection.
Secondary Intention Text (Leave Blank If You Do Not Want): The defect will heal with secondary intention.
Same Histology In Subsequent Stages Text: The pattern and morphology of the tumor is as described in the first stage.
Consent (Spinal Accessory)/Introductory Paragraph: The rationale for Mohs was explained to the patient and consent was obtained. The risks, benefits and alternatives to therapy were discussed in detail. Specifically, the risks of damage to the spinal accessory nerve, infection, scarring, bleeding, prolonged wound healing, incomplete removal, allergy to anesthesia, and recurrence were addressed. Prior to the procedure, the treatment site was clearly identified and confirmed by the patient. All components of Universal Protocol/PAUSE Rule completed.
Provider Procedure Text (D): After obtaining clear surgical margins the defect was repaired by another provider.
Complex Repair Preamble Text (Leave Blank If You Do Not Want): Extensive wide undermining was performed.
Cheek Interpolation Flap Text: A decision was made to reconstruct the defect utilizing an interpolation axial flap and a staged reconstruction.  A telfa template was made of the defect.  This telfa template was then used to outline the Cheek Interpolation flap.  The donor area for the pedicle flap was then injected with anesthesia.  The flap was excised through the skin and subcutaneous tissue down to the layer of the underlying musculature.  The interpolation flap was carefully excised within this deep plane to maintain its blood supply.  The edges of the donor site were undermined.   The donor site was closed in a primary fashion.  The pedicle was then rotated into position and sutured.  Once the tube was sutured into place, adequate blood supply was confirmed with blanching and refill.  The pedicle was then wrapped with xeroform gauze and dressed appropriately with a telfa and gauze bandage to ensure continued blood supply and protect the attached pedicle.
Repair Type: Complex Repair
Surgeon/Pathologist Verbiage (Will Incorporate Name Of Surgeon From Intro If Not Blank): operated in two distinct and integrated capacities as the surgeon and pathologist.
Additional Anesthesia Volume In Cc: 6
W Plasty Text: The lesion was extirpated to the level of the fat with a #15 scalpel blade.  Given the location of the defect, shape of the defect and the proximity to free margins a W-plasty was deemed most appropriate for repair.  Using a sterile surgical marker, the appropriate transposition arms of the W-plasty were drawn incorporating the defect and placing the expected incisions within the relaxed skin tension lines where possible.    The area thus outlined was incised deep to adipose tissue with a #15 scalpel blade.  The skin margins were undermined to an appropriate distance in all directions utilizing iris scissors.  The opposing transposition arms were then transposed into place in opposite direction and anchored with interrupted buried subcutaneous sutures.
Primary Defect Width In Cm (Final Defect Size - Required For Flaps/Grafts): 1.4
Advancement Flap (Single) Text: The defect edges were debeveled with a #15 scalpel blade.  Given the location of the defect and the proximity to free margins a single advancement flap was deemed most appropriate.  Using a sterile surgical marker, an appropriate advancement flap was drawn incorporating the defect and placing the expected incisions within the relaxed skin tension lines where possible.    The area thus outlined was incised deep to adipose tissue with a #15 scalpel blade.  The skin margins were undermined to an appropriate distance in all directions utilizing iris scissors.
Epidermal Closure: running cuticular
Alternatives Discussed Intro (Do Not Add Period): I discussed alternative treatments to Mohs surgery and specifically discussed the risks and benefits of
Detail Level: Detailed
Bcc Histology Text: There were numerous aggregates of basaloid cells.
Bilateral Helical Rim Advancement Flap Text: The defect edges were debeveled with a #15 blade scalpel.  Given the location of the defect and the proximity to free margins (helical rim) a bilateral helical rim advancement flap was deemed most appropriate.  Using a sterile surgical marker, the appropriate advancement flaps were drawn incorporating the defect and placing the expected incisions between the helical rim and antihelix where possible.  The area thus outlined was incised through and through with a #15 scalpel blade.  With a skin hook and iris scissors, the flaps were gently and sharply undermined and freed up.
Mohs Histo Method Verbiage: Each section was then chromacoded and processed in the Mohs lab using the Mohs protocol and submitted for frozen section.
Partial Purse String (Simple) Text: Given the location of the defect and the characteristics of the surrounding skin a simple purse string closure was deemed most appropriate.  Undermining was performed circumfirentially around the surgical defect.  A purse string suture was then placed and tightened. Wound tension only allowed a partial closure of the circular defect.
Plastic Surgeon Procedure Text (A): After obtaining clear surgical margins the patient was sent to plastics for surgical repair.  The patient understands they will receive post-surgical care and follow-up from the referring physician's office.
Graft Donor Site Bandage (Optional-Leave Blank If You Don't Want In Note): Aquaplast was fitted to the graft site and sewn into place. A pressure bandage were applied to the donor site and over the aquaplast bolster.
Dressing: pressure dressing with telfa
Epidermal Closure Graft Donor Site (Optional): running
Consent (Nose)/Introductory Paragraph: The rationale for Mohs was explained to the patient and consent was obtained. The risks, benefits and alternatives to therapy were discussed in detail. Specifically, the risks of nasal deformity, changes in the flow of air through the nose, infection, scarring, bleeding, prolonged wound healing, incomplete removal, allergy to anesthesia, nerve injury and recurrence were addressed. Prior to the procedure, the treatment site was clearly identified and confirmed by the patient. All components of Universal Protocol/PAUSE Rule completed.
Repair Anesthesia Method: local infiltration
Alar Island Pedicle Flap Text: The defect edges were debeveled with a #15 scalpel blade.  Given the location of the defect, shape of the defect and the proximity to the alar rim an island pedicle advancement flap was deemed most appropriate.  Using a sterile surgical marker, an appropriate advancement flap was drawn incorporating the defect, outlining the appropriate donor tissue and placing the expected incisions within the nasal ala running parallel to the alar rim. The area thus outlined was incised with a #15 scalpel blade.  The skin margins were undermined minimally to an appropriate distance in all directions around the primary defect and laterally outward around the island pedicle utilizing iris scissors.  There was minimal undermining beneath the pedicle flap.
Epidermal Sutures: 5-0 Surgipro
Keystone Flap Text: The defect edges were debeveled with a #15 scalpel blade.  Given the location of the defect, shape of the defect a keystone flap was deemed most appropriate.  Using a sterile surgical marker, an appropriate keystone flap was drawn incorporating the defect, outlining the appropriate donor tissue and placing the expected incisions within the relaxed skin tension lines where possible. The area thus outlined was incised deep to adipose tissue with a #15 scalpel blade.  The skin margins were undermined to an appropriate distance in all directions around the primary defect and laterally outward around the flap utilizing iris scissors.
Estimated Blood Loss (Cc): minimal
No Residual Tumor Seen Histology Text: There were no malignant cells seen in the sections examined.
Bilobed Transposition Flap Text: The defect edges were debeveled with a #15 scalpel blade.  Given the location of the defect and the proximity to free margins a bilobed transposition flap was deemed most appropriate.  Using a sterile surgical marker, an appropriate bilobe flap drawn around the defect.    The area thus outlined was incised deep to adipose tissue with a #15 scalpel blade.  The skin margins were undermined to an appropriate distance in all directions utilizing iris scissors.
Donor Site Anesthesia Type: same as repair anesthesia
O-Z Plasty Text: The defect edges were debeveled with a #15 scalpel blade.  Given the location of the defect, shape of the defect and the proximity to free margins an O-Z plasty (double transposition flap) was deemed most appropriate.  Using a sterile surgical marker, the appropriate transposition flaps were drawn incorporating the defect and placing the expected incisions within the relaxed skin tension lines where possible.    The area thus outlined was incised deep to adipose tissue with a #15 scalpel blade.  The skin margins were undermined to an appropriate distance in all directions utilizing iris scissors.  Hemostasis was achieved with electrocautery.  The flaps were then transposed into place, one clockwise and the other counterclockwise, and anchored with interrupted buried subcutaneous sutures.
Consent (Scalp)/Introductory Paragraph: The rationale for Mohs was explained to the patient and consent was obtained. The risks, benefits and alternatives to therapy were discussed in detail. Specifically, the risks of changes in hair growth pattern secondary to repair, infection, scarring, bleeding, prolonged wound healing, incomplete removal, allergy to anesthesia, nerve injury and recurrence were addressed. Prior to the procedure, the treatment site was clearly identified and confirmed by the patient. All components of Universal Protocol/PAUSE Rule completed.
Area L Indication Text: Tumors in this location are included in Area L (trunk and extremities).  Mohs surgery is indicated for larger tumors, or tumors with aggressive histologic features, in these anatomic locations.
Localized Dermabrasion With Wire Brush Text: The patient was draped in routine manner.  Localized dermabrasion using 3 x 17 mm wire brush was performed in routine manner to papillary dermis. This spot dermabrasion is being performed to complete skin cancer reconstruction. It also will eliminate the other sun damaged precancerous cells that are known to be part of the regional effect of a lifetime's worth of sun exposure. This localized dermabrasion is therapeutic and should not be considered cosmetic in any regard.
Unna Boot Text: An Unna boot was placed to help immobilize the limb and facilitate more rapid healing.
Area M Indication Text: Tumors in this location are included in Area M (cheek, forehead, scalp, neck, jawline and pretibial skin).  Mohs surgery is indicated for tumors in these anatomic locations.
Island Pedicle Flap Text: The defect edges were debeveled with a #15 scalpel blade.  Given the location of the defect, shape of the defect and the proximity to free margins an island pedicle advancement flap was deemed most appropriate.  Using a sterile surgical marker, an appropriate advancement flap was drawn incorporating the defect, outlining the appropriate donor tissue and placing the expected incisions within the relaxed skin tension lines where possible.    The area thus outlined was incised deep to adipose tissue with a #15 scalpel blade.  The skin margins were undermined to an appropriate distance in all directions around the primary defect and laterally outward around the island pedicle utilizing iris scissors.  There was minimal undermining beneath the pedicle flap.
A-T Advancement Flap Text: The defect edges were debeveled with a #15 scalpel blade.  Given the location of the defect, shape of the defect and the proximity to free margins an A-T advancement flap was deemed most appropriate.  Using a sterile surgical marker, an appropriate advancement flap was drawn incorporating the defect and placing the expected incisions within the relaxed skin tension lines where possible.    The area thus outlined was incised deep to adipose tissue with a #15 scalpel blade.  The skin margins were undermined to an appropriate distance in all directions utilizing iris scissors.
Repair Hemostasis (Optional): Pinpoint electrocautery
Closure 3 Information: This tab is for additional flaps and grafts above and beyond our usual structured repairs.  Please note if you enter information here it will not currently bill and you will need to add the billing information manually.
Wound Check: 6 weeks
Dorsal Nasal Flap Text: The defect edges were debeveled with a #15 scalpel blade.  Given the location of the defect and the proximity to free margins a dorsal nasal flap was deemed most appropriate.  Using a sterile surgical marker, an appropriate dorsal nasal flap was drawn around the defect.    The area thus outlined was incised deep to adipose tissue with a #15 scalpel blade.  The skin margins were undermined to an appropriate distance in all directions utilizing iris scissors.
O-L Flap Text: The defect edges were debeveled with a #15 scalpel blade.  Given the location of the defect, shape of the defect and the proximity to free margins an O-L flap was deemed most appropriate.  Using a sterile surgical marker, an appropriate advancement flap was drawn incorporating the defect and placing the expected incisions within the relaxed skin tension lines where possible.    The area thus outlined was incised deep to adipose tissue with a #15 scalpel blade.  The skin margins were undermined to an appropriate distance in all directions utilizing iris scissors.
Burow's Advancement Flap Text: The defect edges were debeveled with a #15 scalpel blade.  Given the location of the defect and the proximity to free margins a Burow's advancement flap was deemed most appropriate.  Using a sterile surgical marker, the appropriate advancement flap was drawn incorporating the defect and placing the expected incisions within the relaxed skin tension lines where possible.    The area thus outlined was incised deep to adipose tissue with a #15 scalpel blade.  The skin margins were undermined to an appropriate distance in all directions utilizing iris scissors.
Surgeon: Ayaka Whitman MD
O-T Advancement Flap Text: The defect edges were debeveled with a #15 scalpel blade.  Given the location of the defect, shape of the defect and the proximity to free margins an O-T advancement flap was deemed most appropriate.  Using a sterile surgical marker, an appropriate advancement flap was drawn incorporating the defect and placing the expected incisions within the relaxed skin tension lines where possible.    The area thus outlined was incised deep to adipose tissue with a #15 scalpel blade.  The skin margins were undermined to an appropriate distance in all directions utilizing iris scissors.
Skin Substitute Text: The defect edges were debeveled with a #15 scalpel blade.  Given the location of the defect, shape of the defect and the proximity to free margins a skin substitute graft was deemed most appropriate.  The graft material was trimmed to fit the size of the defect. The graft was then placed in the primary defect and oriented appropriately.
Graft Cartilage Fenestration Text: The cartilage was fenestrated with a 2mm punch biopsy to help facilitate graft survival and healing.
Purse String (Intermediate) Text: Given the location of the defect and the characteristics of the surrounding skin a purse string intermediate closure was deemed most appropriate.  Undermining was performed circumfirentially around the surgical defect.  A purse string suture was then placed and tightened.
Ftsg Text: The defect edges were debeveled with a #15 scalpel blade.  Given the location of the defect, shape of the defect and the proximity to free margins a full thickness skin graft was deemed most appropriate.  Using a sterile surgical marker, the primary defect shape was transferred to the donor site. The area thus outlined was incised deep to adipose tissue with a #15 scalpel blade.  The harvested graft was then trimmed of adipose tissue until only dermis and epidermis was left.  The skin margins of the secondary defect were undermined to an appropriate distance in all directions utilizing iris scissors.  The secondary defect was closed with interrupted buried subcutaneous sutures.  The skin edges were then re-apposed with running  sutures.  The skin graft was then placed in the primary defect and oriented appropriately.
Mohs Case Number: [id number]
Modified Advancement Flap Text: The defect edges were debeveled with a #15 scalpel blade.  Given the location of the defect, shape of the defect and the proximity to free margins a modified advancement flap was deemed most appropriate.  Using a sterile surgical marker, an appropriate advancement flap was drawn incorporating the defect and placing the expected incisions within the relaxed skin tension lines where possible.    The area thus outlined was incised deep to adipose tissue with a #15 scalpel blade.  The skin margins were undermined to an appropriate distance in all directions utilizing iris scissors.
Paramedian Forehead Flap Text: A decision was made to reconstruct the defect utilizing an interpolation axial flap and a staged reconstruction.  A telfa template was made of the defect.  This telfa template was then used to outline the paramedian forehead pedicle flap.  The donor area for the pedicle flap was then injected with anesthesia.  The flap was excised through the skin and subcutaneous tissue down to the layer of the underlying musculature.  The pedicle flap was carefully excised within this deep plane to maintain its blood supply.  The edges of the donor site were undermined.   The donor site was closed in a primary fashion.  The pedicle was then rotated into position and sutured.  Once the tube was sutured into place, adequate blood supply was confirmed with blanching and refill.  The pedicle was then wrapped with xeroform gauze and dressed appropriately with a telfa and gauze bandage to ensure continued blood supply and protect the attached pedicle.
Xenograft Text: The defect edges were debeveled with a #15 scalpel blade.  Given the location of the defect, shape of the defect and the proximity to free margins a xenograft was deemed most appropriate.  The graft was then trimmed to fit the size of the defect.  The graft was then placed in the primary defect and oriented appropriately.
Suture Removal: 7 days
O-T Plasty Text: The defect edges were debeveled with a #15 scalpel blade.  Given the location of the defect, shape of the defect and the proximity to free margins an O-T plasty was deemed most appropriate.  Using a sterile surgical marker, an appropriate O-T plasty was drawn incorporating the defect and placing the expected incisions within the relaxed skin tension lines where possible.    The area thus outlined was incised deep to adipose tissue with a #15 scalpel blade.  The skin margins were undermined to an appropriate distance in all directions utilizing iris scissors.
Location Indication Override (Is Already Calculated Based On Selected Body Location): Area M
Mucosal Advancement Flap Text: Given the location of the defect, shape of the defect and the proximity to free margins a mucosal advancement flap was deemed most appropriate. Incisions were made with a 15 blade scalpel in the appropriate fashion along the cutaneous vermilion border and the mucosal lip. The remaining actinically damaged mucosal tissue was excised.  The mucosal advancement flap was then elevated to the gingival sulcus with care taken to preserve the neurovascular structures and advanced into the primary defect. Care was taken to ensure that precise realignment of the vermilion border was achieved.
Inflammation Suggestive Of Cancer Camouflage Histology Text: There was a dense lymphocytic infiltrate which prevented adequate histologic evaluation of adjacent structures.
Non-Graft Cartilage Fenestration Text: The cartilage was fenestrated with a 2mm punch biopsy to help facilitate healing.
Manual Repair Warning Statement: We plan on removing the manually selected variable below in favor of our much easier automatic structured text blocks found in the previous tab. We decided to do this to help make the flow better and give you the full power of structured data. Manual selection is never going to be ideal in our platform and I would encourage you to avoid using manual selection from this point on, especially since I will be sunsetting this feature. It is important that you do one of two things with the customized text below. First, you can save all of the text in a word file so you can have it for future reference. Second, transfer the text to the appropriate area in the Library tab. Lastly, if there is a flap or graft type which we do not have you need to let us know right away so I can add it in before the variable is hidden. No need to panic, we plan to give you roughly 6 months to make the change.
Mauc Instructions: By selecting yes to the question below the MAUC number will be added into the note.  This will be calculated automatically based on the diagnosis chosen, the size entered, the body zone selected (H,M,L) and the specific indications you chose. You will also have the option to override the Mohs AUC if you disagree with the automatically calculated number and this option is found in the Case Summary tab.
Ear Star Wedge Flap Text: The defect edges were debeveled with a #15 blade scalpel.  Given the location of the defect and the proximity to free margins (helical rim) an ear star wedge flap was deemed most appropriate.  Using a sterile surgical marker, the appropriate flap was drawn incorporating the defect and placing the expected incisions between the helical rim and antihelix where possible.  The area thus outlined was incised through and through with a #15 scalpel blade.
Post-Care Instructions: I reviewed with the patient in detail post-care instructions. Patient is not to engage in any heavy lifting, exercise, or swimming for the next 14 days. Should the patient develop any fevers, chills, bleeding, severe pain patient will contact the office immediately.
Island Pedicle Flap-Requiring Vessel Identification Text: The defect edges were debeveled with a #15 scalpel blade.  Given the location of the defect, shape of the defect and the proximity to free margins an island pedicle advancement flap was deemed most appropriate.  Using a sterile surgical marker, an appropriate advancement flap was drawn, based on the axial vessel mentioned above, incorporating the defect, outlining the appropriate donor tissue and placing the expected incisions within the relaxed skin tension lines where possible.    The area thus outlined was incised deep to adipose tissue with a #15 scalpel blade.  The skin margins were undermined to an appropriate distance in all directions around the primary defect and laterally outward around the island pedicle utilizing iris scissors.  There was minimal undermining beneath the pedicle flap.
Mercedes Flap Text: The defect edges were debeveled with a #15 scalpel blade.  Given the location of the defect, shape of the defect and the proximity to free margins a Mercedes flap was deemed most appropriate.  Using a sterile surgical marker, an appropriate advancement flap was drawn incorporating the defect and placing the expected incisions within the relaxed skin tension lines where possible. The area thus outlined was incised deep to adipose tissue with a #15 scalpel blade.  The skin margins were undermined to an appropriate distance in all directions utilizing iris scissors.
Mohs Rapid Report Verbiage: The area of clinically evident tumor was marked with skin marking ink and appropriately hatched.  The initial incision was made following the Mohs approach through the skin.  The specimen was taken to the lab, divided into the necessary number of pieces, chromacoded and processed according to the Mohs protocol.  This was repeated in successive stages until a tumor free defect was achieved.
Deep Sutures: 5-0 Polysorb
Tissue Cultured Epidermal Autograft Text: The defect edges were debeveled with a #15 scalpel blade.  Given the location of the defect, shape of the defect and the proximity to free margins a tissue cultured epidermal autograft was deemed most appropriate.  The graft was then trimmed to fit the size of the defect.  The graft was then placed in the primary defect and oriented appropriately.
Previous Accession (Optional): [id number]
Consent (Lip)/Introductory Paragraph: The rationale for Mohs was explained to the patient and consent was obtained. The risks, benefits and alternatives to therapy were discussed in detail. Specifically, the risks of lip deformity, changes in the oral aperture, infection, scarring, bleeding, prolonged wound healing, incomplete removal, allergy to anesthesia, nerve injury and recurrence were addressed. Prior to the procedure, the treatment site was clearly identified and confirmed by the patient. All components of Universal Protocol/PAUSE Rule completed.
Helical Rim Advancement Flap Text: The defect edges were debeveled with a #15 blade scalpel.  Given the location of the defect and the proximity to free margins (helical rim) a double helical rim advancement flap was deemed most appropriate.  Using a sterile surgical marker, the appropriate advancement flaps were drawn incorporating the defect and placing the expected incisions between the helical rim and antihelix where possible.  The area thus outlined was incised through and through with a #15 scalpel blade.  With a skin hook and iris scissors, the flaps were gently and sharply undermined and freed up.
Eye Protection Verbiage: Before proceeding with the stage, a plastic scleral shield was inserted. The globe was anesthetized with a few drops of 1% lidocaine with 1:100,000 epinephrine. Then, an appropriate sized scleral shield was chosen and coated with lacrilube ointment. The shield was gently inserted and left in place for the duration of each stage. After the stage was completed, the shield was gently removed.
Hatchet Flap Text: The defect edges were debeveled with a #15 scalpel blade.  Given the location of the defect, shape of the defect and the proximity to free margins a hatchet flap was deemed most appropriate.  Using a sterile surgical marker, an appropriate hatchet flap was drawn incorporating the defect and placing the expected incisions within the relaxed skin tension lines where possible.    The area thus outlined was incised deep to adipose tissue with a #15 scalpel blade.  The skin margins were undermined to an appropriate distance in all directions utilizing iris scissors.
Dermal Autograft Text: The defect edges were debeveled with a #15 scalpel blade.  Given the location of the defect, shape of the defect and the proximity to free margins a dermal autograft was deemed most appropriate.  Using a sterile surgical marker, the primary defect shape was transferred to the donor site. The area thus outlined was incised deep to adipose tissue with a #15 scalpel blade.  The harvested graft was then trimmed of adipose and epidermal tissue until only dermis was left.  The skin graft was then placed in the primary defect and oriented appropriately.
Bi-Rhombic Flap Text: The defect edges were debeveled with a #15 scalpel blade.  Given the location of the defect and the proximity to free margins a bi-rhombic flap was deemed most appropriate.  Using a sterile surgical marker, an appropriate rhombic flap was drawn incorporating the defect. The area thus outlined was incised deep to adipose tissue with a #15 scalpel blade.  The skin margins were undermined to an appropriate distance in all directions utilizing iris scissors.
No Repair - Repaired With Adjacent Surgical Defect Text (Leave Blank If You Do Not Want): After obtaining clear surgical margins the defect was repaired concurrently with another surgical defect which was in close approximation.
Area H Indication Text: Tumors in this location are included in Area H (eyelids, eyebrows, nose, lips, chin, ear, pre-auricular, post-auricular, temple, genitalia, hands, feet, ankles and areola).  Tissue conservation is critical in these anatomic locations.
Postop Diagnosis: same
Crescentic Advancement Flap Text: The defect edges were debeveled with a #15 scalpel blade.  Given the location of the defect and the proximity to free margins a crescentic advancement flap was deemed most appropriate.  Using a sterile surgical marker, the appropriate advancement flap was drawn incorporating the defect and placing the expected incisions within the relaxed skin tension lines where possible.    The area thus outlined was incised deep to adipose tissue with a #15 scalpel blade.  The skin margins were undermined to an appropriate distance in all directions utilizing iris scissors.
Rotation Flap Text: The defect edges were debeveled with a #15 scalpel blade.  Given the location of the defect, shape of the defect and the proximity to free margins a rotation flap was deemed most appropriate.  Using a sterile surgical marker, an appropriate rotation flap was drawn incorporating the defect and placing the expected incisions within the relaxed skin tension lines where possible.    The area thus outlined was incised deep to adipose tissue with a #15 scalpel blade.  The skin margins were undermined to an appropriate distance in all directions utilizing iris scissors.
Anesthesia Volume In Cc: 9
Spiral Flap Text: The defect edges were debeveled with a #15 scalpel blade.  Given the location of the defect, shape of the defect and the proximity to free margins a spiral flap was deemed most appropriate.  Using a sterile surgical marker, an appropriate rotation flap was drawn incorporating the defect and placing the expected incisions within the relaxed skin tension lines where possible. The area thus outlined was incised deep to adipose tissue with a #15 scalpel blade.  The skin margins were undermined to an appropriate distance in all directions utilizing iris scissors.
Double Island Pedicle Flap Text: The defect edges were debeveled with a #15 scalpel blade.  Given the location of the defect, shape of the defect and the proximity to free margins a double island pedicle advancement flap was deemed most appropriate.  Using a sterile surgical marker, an appropriate advancement flap was drawn incorporating the defect, outlining the appropriate donor tissue and placing the expected incisions within the relaxed skin tension lines where possible.    The area thus outlined was incised deep to adipose tissue with a #15 scalpel blade.  The skin margins were undermined to an appropriate distance in all directions around the primary defect and laterally outward around the island pedicle utilizing iris scissors.  There was minimal undermining beneath the pedicle flap.
Cheek-To-Nose Interpolation Flap Text: A decision was made to reconstruct the defect utilizing an interpolation axial flap and a staged reconstruction.  A telfa template was made of the defect.  This telfa template was then used to outline the Cheek-To-Nose Interpolation flap.  The donor area for the pedicle flap was then injected with anesthesia.  The flap was excised through the skin and subcutaneous tissue down to the layer of the underlying musculature.  The interpolation flap was carefully excised within this deep plane to maintain its blood supply.  The edges of the donor site were undermined.   The donor site was closed in a primary fashion.  The pedicle was then rotated into position and sutured.  Once the tube was sutured into place, adequate blood supply was confirmed with blanching and refill.  The pedicle was then wrapped with xeroform gauze and dressed appropriately with a telfa and gauze bandage to ensure continued blood supply and protect the attached pedicle.
Bilobed Flap Text: The defect edges were debeveled with a #15 scalpel blade.  Given the location of the defect and the proximity to free margins a bilobe flap was deemed most appropriate.  Using a sterile surgical marker, an appropriate bilobe flap drawn around the defect.    The area thus outlined was incised deep to adipose tissue with a #15 scalpel blade.  The skin margins were undermined to an appropriate distance in all directions utilizing iris scissors.
Advancement-Rotation Flap Text: The defect edges were debeveled with a #15 scalpel blade.  Given the location of the defect, shape of the defect and the proximity to free margins an advancement-rotation flap was deemed most appropriate.  Using a sterile surgical marker, an appropriate flap was drawn incorporating the defect and placing the expected incisions within the relaxed skin tension lines where possible. The area thus outlined was incised deep to adipose tissue with a #15 scalpel blade.  The skin margins were undermined to an appropriate distance in all directions utilizing iris scissors.
Trilobed Flap Text: The defect edges were debeveled with a #15 scalpel blade.  Given the location of the defect and the proximity to free margins a trilobed flap was deemed most appropriate.  Using a sterile surgical marker, an appropriate trilobed flap drawn around the defect.    The area thus outlined was incised deep to adipose tissue with a #15 scalpel blade.  The skin margins were undermined to an appropriate distance in all directions utilizing iris scissors.
Information: Selecting Yes will display possible errors in your note based on the variables you have selected. This validation is only offered as a suggestion for you. PLEASE NOTE THAT THE VALIDATION TEXT WILL BE REMOVED WHEN YOU FINALIZE YOUR NOTE. IF YOU WANT TO FAX A PRELIMINARY NOTE YOU WILL NEED TO TOGGLE THIS TO 'NO' IF YOU DO NOT WANT IT IN YOUR FAXED NOTE.
Mastoid Interpolation Flap Text: A decision was made to reconstruct the defect utilizing an interpolation axial flap and a staged reconstruction.  A telfa template was made of the defect.  This telfa template was then used to outline the mastoid interpolation flap.  The donor area for the pedicle flap was then injected with anesthesia.  The flap was excised through the skin and subcutaneous tissue down to the layer of the underlying musculature.  The pedicle flap was carefully excised within this deep plane to maintain its blood supply.  The edges of the donor site were undermined.   The donor site was closed in a primary fashion.  The pedicle was then rotated into position and sutured.  Once the tube was sutured into place, adequate blood supply was confirmed with blanching and refill.  The pedicle was then wrapped with xeroform gauze and dressed appropriately with a telfa and gauze bandage to ensure continued blood supply and protect the attached pedicle.
Consent 3/Introductory Paragraph: I gave the patient a chance to ask questions they had about the procedure.  Following this I explained the Mohs procedure and consent was obtained. The risks, benefits and alternatives to therapy were discussed in detail. Specifically, the risks of infection, scarring, bleeding, prolonged wound healing, incomplete removal, allergy to anesthesia, nerve injury and recurrence were addressed. Prior to the procedure, the treatment site was clearly identified and confirmed by the patient. All components of Universal Protocol/PAUSE Rule completed.
Tarsorrhaphy Text: A tarsorrhaphy was performed using Frost sutures.
Mohs Method Verbiage: An incision at a 45 degree angle following the standard Mohs approach was done and the specimen was harvested as a microscopic controlled layer.
Cartilage Graft Text: The defect edges were debeveled with a #15 scalpel blade.  Given the location of the defect, shape of the defect, the fact the defect involved a full thickness cartilage defect a cartilage graft was deemed most appropriate.  An appropriate donor site was identified, cleansed, and anesthetized. The cartilage graft was then harvested and transferred to the recipient site, oriented appropriately and then sutured into place.  The secondary defect was then repaired using a primary closure.
Hemostasis: Electrocautery
Cheiloplasty (Less Than 50%) Text: A decision was made to reconstruct the defect with a  cheiloplasty.  The defect was undermined extensively.  Additional obicularis oris muscle was excised with a 15 blade scalpel.  The defect was converted into a full thickness wedge, of less than 50% of the vertical height of the lip, to facilite a better cosmetic result.  Small vessels were then tied off with 5-0 monocyrl. The obicularis oris, superficial fascia, adipose and dermis were then reapproximated.  After the deeper layers were approximated the epidermis was reapproximated with particular care given to realign the vermilion border.
V-Y Plasty Text: The defect edges were debeveled with a #15 scalpel blade.  Given the location of the defect, shape of the defect and the proximity to free margins an V-Y advancement flap was deemed most appropriate.  Using a sterile surgical marker, an appropriate advancement flap was drawn incorporating the defect and placing the expected incisions within the relaxed skin tension lines where possible.    The area thus outlined was incised deep to adipose tissue with a #15 scalpel blade.  The skin margins were undermined to an appropriate distance in all directions utilizing iris scissors.
Closure 2 Information: This tab is for additional flaps and grafts, including complex repair and grafts and complex repair and flaps. You can also specify a different location for the additional defect, if the location is the same you do not need to select a new one. We will insert the automated text for the repair you select below just as we do for solitary flaps and grafts. Please note that at this time if you select a location with a different insurance zone you will need to override the ICD10 and CPT if appropriate.
Medical Necessity Statement: Based on my medical judgement, Mohs surgery is the most appropriate treatment for this cancer compared to other treatments.
Consent Type: Consent 1 (Standard)
Referring Physician (Optional): Katina Roe M.D.
V-Y Flap Text: The defect edges were debeveled with a #15 scalpel blade.  Given the location of the defect, shape of the defect and the proximity to free margins a V-Y flap was deemed most appropriate.  Using a sterile surgical marker, an appropriate advancement flap was drawn incorporating the defect and placing the expected incisions within the relaxed skin tension lines where possible.    The area thus outlined was incised deep to adipose tissue with a #15 scalpel blade.  The skin margins were undermined to an appropriate distance in all directions utilizing iris scissors.
Consent (Ear)/Introductory Paragraph: The rationale for Mohs was explained to the patient and consent was obtained. The risks, benefits and alternatives to therapy were discussed in detail. Specifically, the risks of ear deformity, infection, scarring, bleeding, prolonged wound healing, incomplete removal, allergy to anesthesia, nerve injury and recurrence were addressed. Prior to the procedure, the treatment site was clearly identified and confirmed by the patient. All components of Universal Protocol/PAUSE Rule completed.
Melolabial Interpolation Flap Text: A decision was made to reconstruct the defect utilizing an interpolation axial flap and a staged reconstruction.  A telfa template was made of the defect.  This telfa template was then used to outline the melolabial interpolation flap.  The donor area for the pedicle flap was then injected with anesthesia.  The flap was excised through the skin and subcutaneous tissue down to the layer of the underlying musculature.  The pedicle flap was carefully excised within this deep plane to maintain its blood supply.  The edges of the donor site were undermined.   The donor site was closed in a primary fashion.  The pedicle was then rotated into position and sutured.  Once the tube was sutured into place, adequate blood supply was confirmed with blanching and refill.  The pedicle was then wrapped with xeroform gauze and dressed appropriately with a telfa and gauze bandage to ensure continued blood supply and protect the attached pedicle.
Composite Graft Text: The defect edges were debeveled with a #15 scalpel blade.  Given the location of the defect, shape of the defect, the proximity to free margins and the fact the defect was full thickness a composite graft was deemed most appropriate.  The defect was outline and then transferred to the donor site.  A full thickness graft was then excised from the donor site. The graft was then placed in the primary defect, oriented appropriately and then sutured into place.  The secondary defect was then repaired using a primary closure.
Transposition Flap Text: The defect edges were debeveled with a #15 scalpel blade.  Given the location of the defect and the proximity to free margins a transposition flap was deemed most appropriate.  Using a sterile surgical marker, an appropriate transposition flap was drawn incorporating the defect.    The area thus outlined was incised deep to adipose tissue with a #15 scalpel blade.  The skin margins were undermined to an appropriate distance in all directions utilizing iris scissors.
Consent (Marginal Mandibular)/Introductory Paragraph: The rationale for Mohs was explained to the patient and consent was obtained. The risks, benefits and alternatives to therapy were discussed in detail. Specifically, the risks of damage to the marginal mandibular branch of the facial nerve, infection, scarring, bleeding, prolonged wound healing, incomplete removal, allergy to anesthesia, and recurrence were addressed. Prior to the procedure, the treatment site was clearly identified and confirmed by the patient. All components of Universal Protocol/PAUSE Rule completed.
Subsequent Stages Histo Method Verbiage: Using a similar technique to that described above, a thin layer of tissue was removed from all areas where tumor was visible on the previous stage.  The tissue was again oriented, mapped, dyed, and processed as above.
Consent (Temporal Branch)/Introductory Paragraph: The rationale for Mohs was explained to the patient and consent was obtained. The risks, benefits and alternatives to therapy were discussed in detail. Specifically, the risks of damage to the temporal branch of the facial nerve, infection, scarring, bleeding, prolonged wound healing, incomplete removal, allergy to anesthesia, and recurrence were addressed. Prior to the procedure, the treatment site was clearly identified and confirmed by the patient. All components of Universal Protocol/PAUSE Rule completed.
Interpolation Flap Text: A decision was made to reconstruct the defect utilizing an interpolation axial flap and a staged reconstruction.  A telfa template was made of the defect.  This telfa template was then used to outline the interpolation flap.  The donor area for the pedicle flap was then injected with anesthesia.  The flap was excised through the skin and subcutaneous tissue down to the layer of the underlying musculature.  The interpolation flap was carefully excised within this deep plane to maintain its blood supply.  The edges of the donor site were undermined.   The donor site was closed in a primary fashion.  The pedicle was then rotated into position and sutured.  Once the tube was sutured into place, adequate blood supply was confirmed with blanching and refill.  The pedicle was then wrapped with xeroform gauze and dressed appropriately with a telfa and gauze bandage to ensure continued blood supply and protect the attached pedicle.
S Plasty Text: Given the location and shape of the defect, and the orientation of relaxed skin tension lines, an S-plasty was deemed most appropriate for repair.  Using a sterile surgical marker, the appropriate outline of the S-plasty was drawn, incorporating the defect and placing the expected incisions within the relaxed skin tension lines where possible.  The area thus outlined was incised deep to adipose tissue with a #15 scalpel blade.  The skin margins were undermined to an appropriate distance in all directions utilizing iris scissors. The skin flaps were advanced over the defect.  The opposing margins were then approximated with interrupted buried subcutaneous sutures.
Muscle Hinge Flap Text: The defect edges were debeveled with a #15 scalpel blade.  Given the size, depth and location of the defect and the proximity to free margins a muscle hinge flap was deemed most appropriate.  Using a sterile surgical marker, an appropriate hinge flap was drawn incorporating the defect. The area thus outlined was incised with a #15 scalpel blade.  The skin margins were undermined to an appropriate distance in all directions utilizing iris scissors.
Island Pedicle Flap With Canthal Suspension Text: The defect edges were debeveled with a #15 scalpel blade.  Given the location of the defect, shape of the defect and the proximity to free margins an island pedicle advancement flap was deemed most appropriate.  Using a sterile surgical marker, an appropriate advancement flap was drawn incorporating the defect, outlining the appropriate donor tissue and placing the expected incisions within the relaxed skin tension lines where possible. The area thus outlined was incised deep to adipose tissue with a #15 scalpel blade.  The skin margins were undermined to an appropriate distance in all directions around the primary defect and laterally outward around the island pedicle utilizing iris scissors.  There was minimal undermining beneath the pedicle flap. A suspension suture was placed in the canthal tendon to prevent tension and prevent ectropion.
Cheiloplasty (Complex) Text: A decision was made to reconstruct the defect with a  cheiloplasty.  The defect was undermined extensively.  Additional obicularis oris muscle was excised with a 15 blade scalpel.  The defect was converted into a full thickness wedge to facilite a better cosmetic result.  Small vessels were then tied off with 5-0 monocyrl. The obicularis oris, superficial fascia, adipose and dermis were then reapproximated.  After the deeper layers were approximated the epidermis was reapproximated with particular care given to realign the vermilion border.
Split-Thickness Skin Graft Text: The defect edges were debeveled with a #15 scalpel blade.  Given the location of the defect, shape of the defect and the proximity to free margins a split thickness skin graft was deemed most appropriate.  Using a sterile surgical marker, the primary defect shape was transferred to the donor site. The split thickness graft was then harvested.  The skin graft was then placed in the primary defect and oriented appropriately.
H Plasty Text: Given the location of the defect, shape of the defect and the proximity to free margins a H-plasty was deemed most appropriate for repair.  Using a sterile surgical marker, the appropriate advancement arms of the H-plasty were drawn incorporating the defect and placing the expected incisions within the relaxed skin tension lines where possible. The area thus outlined was incised deep to adipose tissue with a #15 scalpel blade. The skin margins were undermined to an appropriate distance in all directions utilizing iris scissors.  The opposing advancement arms were then advanced into place in opposite direction and anchored with interrupted buried subcutaneous sutures.
Posterior Auricular Interpolation Flap Text: A decision was made to reconstruct the defect utilizing an interpolation axial flap and a staged reconstruction.  A telfa template was made of the defect.  This telfa template was then used to outline the posterior auricular interpolation flap.  The donor area for the pedicle flap was then injected with anesthesia.  The flap was excised through the skin and subcutaneous tissue down to the layer of the underlying musculature.  The pedicle flap was carefully excised within this deep plane to maintain its blood supply.  The edges of the donor site were undermined.   The donor site was closed in a primary fashion.  The pedicle was then rotated into position and sutured.  Once the tube was sutured into place, adequate blood supply was confirmed with blanching and refill.  The pedicle was then wrapped with xeroform gauze and dressed appropriately with a telfa and gauze bandage to ensure continued blood supply and protect the attached pedicle.
Consent (Near Eyelid Margin)/Introductory Paragraph: The rationale for Mohs was explained to the patient and consent was obtained. The risks, benefits and alternatives to therapy were discussed in detail. Specifically, the risks of ectropion or eyelid deformity, infection, scarring, bleeding, prolonged wound healing, incomplete removal, allergy to anesthesia, nerve injury and recurrence were addressed. Prior to the procedure, the treatment site was clearly identified and confirmed by the patient. All components of Universal Protocol/PAUSE Rule completed.
Star Wedge Flap Text: The defect edges were debeveled with a #15 scalpel blade.  Given the location of the defect, shape of the defect and the proximity to free margins a star wedge flap was deemed most appropriate.  Using a sterile surgical marker, an appropriate rotation flap was drawn incorporating the defect and placing the expected incisions within the relaxed skin tension lines where possible. The area thus outlined was incised deep to adipose tissue with a #15 scalpel blade.  The skin margins were undermined to an appropriate distance in all directions utilizing iris scissors.
Advancement Flap (Double) Text: The defect edges were debeveled with a #15 scalpel blade.  Given the location of the defect and the proximity to free margins a double advancement flap was deemed most appropriate.  Using a sterile surgical marker, the appropriate advancement flaps were drawn incorporating the defect and placing the expected incisions within the relaxed skin tension lines where possible.    The area thus outlined was incised deep to adipose tissue with a #15 scalpel blade.  The skin margins were undermined to an appropriate distance in all directions utilizing iris scissors.
Partial Purse String (Intermediate) Text: Given the location of the defect and the characteristics of the surrounding skin an intermediate purse string closure was deemed most appropriate.  Undermining was performed circumfirentially around the surgical defect.  A purse string suture was then placed and tightened. Wound tension only allowed a partial closure of the circular defect.
Ear Wedge Repair Text: A wedge excision was completed by carrying down an excision through the full thickness of the ear and cartilage with an inward facing Burow's triangle. The wound was then closed in a layered fashion.
Home Suture Removal Text: Patient was provided instructions on removing sutures and will remove their sutures at home.  If they have any questions or difficulties they will call the office.
Bcc Infiltrative Histology Text: There were numerous aggregates of basaloid cells demonstrating an infiltrative pattern.
Double O-Z Plasty Text: The defect edges were debeveled with a #15 scalpel blade.  Given the location of the defect, shape of the defect and the proximity to free margins a Double O-Z plasty (double transposition flap) was deemed most appropriate.  Using a sterile surgical marker, the appropriate transposition flaps were drawn incorporating the defect and placing the expected incisions within the relaxed skin tension lines where possible. The area thus outlined was incised deep to adipose tissue with a #15 scalpel blade.  The skin margins were undermined to an appropriate distance in all directions utilizing iris scissors.  Hemostasis was achieved with electrocautery.  The flaps were then transposed into place, one clockwise and the other counterclockwise, and anchored with interrupted buried subcutaneous sutures.
Rhomboid Transposition Flap Text: The defect edges were debeveled with a #15 scalpel blade.  Given the location of the defect and the proximity to free margins a rhomboid transposition flap was deemed most appropriate.  Using a sterile surgical marker, an appropriate rhomboid flap was drawn incorporating the defect.    The area thus outlined was incised deep to adipose tissue with a #15 scalpel blade.  The skin margins were undermined to an appropriate distance in all directions utilizing iris scissors.

## 2019-03-24 ENCOUNTER — APPOINTMENT (OUTPATIENT)
Dept: RADIOLOGY | Facility: MEDICAL CENTER | Age: 84
DRG: 071 | End: 2019-03-24
Attending: EMERGENCY MEDICINE
Payer: MEDICARE

## 2019-03-24 ENCOUNTER — APPOINTMENT (OUTPATIENT)
Dept: RADIOLOGY | Facility: MEDICAL CENTER | Age: 84
DRG: 071 | End: 2019-03-24
Attending: INTERNAL MEDICINE
Payer: MEDICARE

## 2019-03-24 ENCOUNTER — HOSPITAL ENCOUNTER (INPATIENT)
Facility: MEDICAL CENTER | Age: 84
LOS: 4 days | DRG: 071 | End: 2019-03-29
Attending: EMERGENCY MEDICINE | Admitting: INTERNAL MEDICINE
Payer: MEDICARE

## 2019-03-24 DIAGNOSIS — R53.1 WEAKNESS: ICD-10-CM

## 2019-03-24 DIAGNOSIS — D72.829 LEUKOCYTOSIS, UNSPECIFIED TYPE: ICD-10-CM

## 2019-03-24 DIAGNOSIS — R53.81 PHYSICAL DECONDITIONING: ICD-10-CM

## 2019-03-24 PROBLEM — R17 SERUM TOTAL BILIRUBIN ELEVATED: Status: ACTIVE | Noted: 2019-03-24

## 2019-03-24 PROBLEM — M54.50 LOWER BACK PAIN: Status: ACTIVE | Noted: 2019-03-24

## 2019-03-24 PROBLEM — G93.40 ACUTE ENCEPHALOPATHY: Status: ACTIVE | Noted: 2019-03-24

## 2019-03-24 PROBLEM — E87.1 HYPONATREMIA: Status: ACTIVE | Noted: 2019-03-24

## 2019-03-24 LAB
ALBUMIN SERPL BCP-MCNC: 3.6 G/DL (ref 3.2–4.9)
ALBUMIN/GLOB SERPL: 1.1 G/DL
ALP SERPL-CCNC: 61 U/L (ref 30–99)
ALT SERPL-CCNC: 23 U/L (ref 2–50)
ANION GAP SERPL CALC-SCNC: 7 MMOL/L (ref 0–11.9)
ANISOCYTOSIS BLD QL SMEAR: ABNORMAL
APPEARANCE UR: CLEAR
AST SERPL-CCNC: 21 U/L (ref 12–45)
BASOPHILS # BLD AUTO: 0.3 % (ref 0–1.8)
BASOPHILS # BLD: 0.04 K/UL (ref 0–0.12)
BILIRUB SERPL-MCNC: 2.1 MG/DL (ref 0.1–1.5)
BILIRUB UR QL STRIP.AUTO: NEGATIVE
BUN SERPL-MCNC: 13 MG/DL (ref 8–22)
CALCIUM SERPL-MCNC: 8.9 MG/DL (ref 8.4–10.2)
CHLORIDE SERPL-SCNC: 104 MMOL/L (ref 96–112)
CO2 SERPL-SCNC: 22 MMOL/L (ref 20–33)
COLOR UR: YELLOW
COMMENT 1642: NORMAL
CREAT SERPL-MCNC: 0.92 MG/DL (ref 0.5–1.4)
EOSINOPHIL # BLD AUTO: 0.01 K/UL (ref 0–0.51)
EOSINOPHIL NFR BLD: 0.1 % (ref 0–6.9)
ERYTHROCYTE [DISTWIDTH] IN BLOOD BY AUTOMATED COUNT: 73.3 FL (ref 35.9–50)
GLOBULIN SER CALC-MCNC: 3.2 G/DL (ref 1.9–3.5)
GLUCOSE SERPL-MCNC: 157 MG/DL (ref 65–99)
GLUCOSE UR STRIP.AUTO-MCNC: NEGATIVE MG/DL
HCT VFR BLD AUTO: 38.2 % (ref 42–52)
HGB BLD-MCNC: 13 G/DL (ref 14–18)
IMM GRANULOCYTES # BLD AUTO: 0.08 K/UL (ref 0–0.11)
IMM GRANULOCYTES NFR BLD AUTO: 0.6 % (ref 0–0.9)
KETONES UR STRIP.AUTO-MCNC: ABNORMAL MG/DL
LACTATE BLD-SCNC: 1.5 MMOL/L (ref 0.5–2)
LEUKOCYTE ESTERASE UR QL STRIP.AUTO: NEGATIVE
LG PLATELETS BLD QL SMEAR: NORMAL
LYMPHOCYTES # BLD AUTO: 0.61 K/UL (ref 1–4.8)
LYMPHOCYTES NFR BLD: 4.6 % (ref 22–41)
MACROCYTES BLD QL SMEAR: ABNORMAL
MAGNESIUM SERPL-MCNC: 2.1 MG/DL (ref 1.5–2.5)
MCH RBC QN AUTO: 38.7 PG (ref 27–33)
MCHC RBC AUTO-ENTMCNC: 34 G/DL (ref 33.7–35.3)
MCV RBC AUTO: 113.7 FL (ref 81.4–97.8)
MICRO URNS: ABNORMAL
MONOCYTES # BLD AUTO: 1.48 K/UL (ref 0–0.85)
MONOCYTES NFR BLD AUTO: 11 % (ref 0–13.4)
NEUTROPHILS # BLD AUTO: 11.18 K/UL (ref 1.82–7.42)
NEUTROPHILS NFR BLD: 83.4 % (ref 44–72)
NITRITE UR QL STRIP.AUTO: NEGATIVE
NRBC # BLD AUTO: 0 K/UL
NRBC BLD-RTO: 0 /100 WBC
PH UR STRIP.AUTO: 7 [PH]
PLATELET # BLD AUTO: 340 K/UL (ref 164–446)
PLATELET BLD QL SMEAR: NORMAL
PMV BLD AUTO: 9.1 FL (ref 9–12.9)
POLYCHROMASIA BLD QL SMEAR: NORMAL
POTASSIUM SERPL-SCNC: 3.9 MMOL/L (ref 3.6–5.5)
PROT SERPL-MCNC: 6.8 G/DL (ref 6–8.2)
PROT UR QL STRIP: NEGATIVE MG/DL
RBC # BLD AUTO: 3.36 M/UL (ref 4.7–6.1)
RBC BLD AUTO: PRESENT
RBC UR QL AUTO: NEGATIVE
SODIUM SERPL-SCNC: 133 MMOL/L (ref 135–145)
SP GR UR STRIP.AUTO: 1.01
WBC # BLD AUTO: 13.4 K/UL (ref 4.8–10.8)

## 2019-03-24 PROCEDURE — 96365 THER/PROPH/DIAG IV INF INIT: CPT

## 2019-03-24 PROCEDURE — 83735 ASSAY OF MAGNESIUM: CPT

## 2019-03-24 PROCEDURE — 71045 X-RAY EXAM CHEST 1 VIEW: CPT

## 2019-03-24 PROCEDURE — 700105 HCHG RX REV CODE 258: Performed by: INTERNAL MEDICINE

## 2019-03-24 PROCEDURE — 99285 EMERGENCY DEPT VISIT HI MDM: CPT

## 2019-03-24 PROCEDURE — G0378 HOSPITAL OBSERVATION PER HR: HCPCS

## 2019-03-24 PROCEDURE — 700102 HCHG RX REV CODE 250 W/ 637 OVERRIDE(OP): Performed by: INTERNAL MEDICINE

## 2019-03-24 PROCEDURE — 87086 URINE CULTURE/COLONY COUNT: CPT

## 2019-03-24 PROCEDURE — 85025 COMPLETE CBC W/AUTO DIFF WBC: CPT

## 2019-03-24 PROCEDURE — 94660 CPAP INITIATION&MGMT: CPT

## 2019-03-24 PROCEDURE — 87040 BLOOD CULTURE FOR BACTERIA: CPT | Mod: 91

## 2019-03-24 PROCEDURE — 700111 HCHG RX REV CODE 636 W/ 250 OVERRIDE (IP): Performed by: EMERGENCY MEDICINE

## 2019-03-24 PROCEDURE — A9270 NON-COVERED ITEM OR SERVICE: HCPCS | Performed by: INTERNAL MEDICINE

## 2019-03-24 PROCEDURE — 83605 ASSAY OF LACTIC ACID: CPT

## 2019-03-24 PROCEDURE — 80053 COMPREHEN METABOLIC PANEL: CPT

## 2019-03-24 PROCEDURE — 81003 URINALYSIS AUTO W/O SCOPE: CPT

## 2019-03-24 PROCEDURE — 70450 CT HEAD/BRAIN W/O DYE: CPT

## 2019-03-24 PROCEDURE — 99220 PR INITIAL OBSERVATION CARE,LEVL III: CPT | Performed by: INTERNAL MEDICINE

## 2019-03-24 PROCEDURE — 700105 HCHG RX REV CODE 258: Performed by: EMERGENCY MEDICINE

## 2019-03-24 RX ORDER — HYDRALAZINE HYDROCHLORIDE 20 MG/ML
10 INJECTION INTRAMUSCULAR; INTRAVENOUS EVERY 4 HOURS PRN
Status: DISCONTINUED | OUTPATIENT
Start: 2019-03-24 | End: 2019-03-29 | Stop reason: HOSPADM

## 2019-03-24 RX ORDER — ONDANSETRON 4 MG/1
4 TABLET, ORALLY DISINTEGRATING ORAL EVERY 4 HOURS PRN
Status: DISCONTINUED | OUTPATIENT
Start: 2019-03-24 | End: 2019-03-29 | Stop reason: HOSPADM

## 2019-03-24 RX ORDER — HYDROXYUREA 500 MG/1
1000 CAPSULE ORAL
Status: DISCONTINUED | OUTPATIENT
Start: 2019-03-24 | End: 2019-03-29 | Stop reason: HOSPADM

## 2019-03-24 RX ORDER — AMOXICILLIN 250 MG
2 CAPSULE ORAL 2 TIMES DAILY
Status: DISCONTINUED | OUTPATIENT
Start: 2019-03-24 | End: 2019-03-29 | Stop reason: HOSPADM

## 2019-03-24 RX ORDER — SODIUM CHLORIDE 9 MG/ML
INJECTION, SOLUTION INTRAVENOUS ONCE
Status: COMPLETED | OUTPATIENT
Start: 2019-03-24 | End: 2019-03-24

## 2019-03-24 RX ORDER — SIMVASTATIN 20 MG
20 TABLET ORAL EVERY EVENING
Status: DISCONTINUED | OUTPATIENT
Start: 2019-03-24 | End: 2019-03-29 | Stop reason: HOSPADM

## 2019-03-24 RX ORDER — BISACODYL 10 MG
10 SUPPOSITORY, RECTAL RECTAL
Status: DISCONTINUED | OUTPATIENT
Start: 2019-03-24 | End: 2019-03-29 | Stop reason: HOSPADM

## 2019-03-24 RX ORDER — ONDANSETRON 2 MG/ML
4 INJECTION INTRAMUSCULAR; INTRAVENOUS EVERY 4 HOURS PRN
Status: DISCONTINUED | OUTPATIENT
Start: 2019-03-24 | End: 2019-03-29 | Stop reason: HOSPADM

## 2019-03-24 RX ORDER — TAMSULOSIN HYDROCHLORIDE 0.4 MG/1
0.4 CAPSULE ORAL
Status: DISCONTINUED | OUTPATIENT
Start: 2019-03-24 | End: 2019-03-29 | Stop reason: HOSPADM

## 2019-03-24 RX ORDER — ACETAMINOPHEN 325 MG/1
650 TABLET ORAL EVERY 6 HOURS PRN
Status: DISCONTINUED | OUTPATIENT
Start: 2019-03-24 | End: 2019-03-29 | Stop reason: HOSPADM

## 2019-03-24 RX ORDER — POLYETHYLENE GLYCOL 3350 17 G/17G
1 POWDER, FOR SOLUTION ORAL
Status: DISCONTINUED | OUTPATIENT
Start: 2019-03-24 | End: 2019-03-29 | Stop reason: HOSPADM

## 2019-03-24 RX ORDER — HYDROXYUREA 500 MG/1
1500 CAPSULE ORAL
Status: DISCONTINUED | OUTPATIENT
Start: 2019-03-25 | End: 2019-03-29 | Stop reason: HOSPADM

## 2019-03-24 RX ORDER — TRAMADOL HYDROCHLORIDE 50 MG/1
50 TABLET ORAL EVERY 6 HOURS PRN
Status: DISCONTINUED | OUTPATIENT
Start: 2019-03-24 | End: 2019-03-29 | Stop reason: HOSPADM

## 2019-03-24 RX ORDER — SODIUM CHLORIDE 9 MG/ML
INJECTION, SOLUTION INTRAVENOUS CONTINUOUS
Status: DISCONTINUED | OUTPATIENT
Start: 2019-03-24 | End: 2019-03-26

## 2019-03-24 RX ORDER — OMEPRAZOLE 20 MG/1
20 CAPSULE, DELAYED RELEASE ORAL
Status: DISCONTINUED | OUTPATIENT
Start: 2019-03-24 | End: 2019-03-29 | Stop reason: HOSPADM

## 2019-03-24 RX ADMIN — TRAMADOL HYDROCHLORIDE 50 MG: 50 TABLET, FILM COATED ORAL at 22:23

## 2019-03-24 RX ADMIN — CEFTRIAXONE SODIUM 2 G: 2 INJECTION, POWDER, FOR SOLUTION INTRAMUSCULAR; INTRAVENOUS at 19:32

## 2019-03-24 RX ADMIN — STANDARDIZED SENNA CONCENTRATE AND DOCUSATE SODIUM 2 TABLET: 8.6; 5 TABLET, FILM COATED ORAL at 22:24

## 2019-03-24 RX ADMIN — TAMSULOSIN HYDROCHLORIDE 0.4 MG: 0.4 CAPSULE ORAL at 22:24

## 2019-03-24 RX ADMIN — SODIUM CHLORIDE 1000 ML: 9 INJECTION, SOLUTION INTRAVENOUS at 18:39

## 2019-03-24 RX ADMIN — SIMVASTATIN 20 MG: 20 TABLET, FILM COATED ORAL at 22:24

## 2019-03-24 RX ADMIN — SODIUM CHLORIDE: 9 INJECTION, SOLUTION INTRAVENOUS at 22:23

## 2019-03-24 RX ADMIN — OMEPRAZOLE 20 MG: 20 CAPSULE, DELAYED RELEASE ORAL at 22:23

## 2019-03-24 ASSESSMENT — COPD QUESTIONNAIRES
HAVE YOU SMOKED AT LEAST 100 CIGARETTES IN YOUR ENTIRE LIFE: YES
COPD SCREENING SCORE: 4
DO YOU EVER COUGH UP ANY MUCUS OR PHLEGM?: NO/ONLY WITH OCCASIONAL COLDS OR INFECTIONS
DURING THE PAST 4 WEEKS HOW MUCH DID YOU FEEL SHORT OF BREATH: NONE/LITTLE OF THE TIME

## 2019-03-24 ASSESSMENT — ENCOUNTER SYMPTOMS
HEADACHES: 0
BACK PAIN: 1
BLURRED VISION: 0
DOUBLE VISION: 0

## 2019-03-24 ASSESSMENT — LIFESTYLE VARIABLES
EVER_SMOKED: YES
ALCOHOL_USE: NO
EVER_SMOKED: YES

## 2019-03-24 ASSESSMENT — COGNITIVE AND FUNCTIONAL STATUS - GENERAL
TOILETING: A LOT
SUGGESTED CMS G CODE MODIFIER DAILY ACTIVITY: CK
DRESSING REGULAR UPPER BODY CLOTHING: A LITTLE
DRESSING REGULAR LOWER BODY CLOTHING: A LOT
STANDING UP FROM CHAIR USING ARMS: A LOT
SUGGESTED CMS G CODE MODIFIER MOBILITY: CL
EATING MEALS: A LITTLE
PERSONAL GROOMING: A LITTLE
MOVING TO AND FROM BED TO CHAIR: A LITTLE
TURNING FROM BACK TO SIDE WHILE IN FLAT BAD: A LITTLE
MOVING FROM LYING ON BACK TO SITTING ON SIDE OF FLAT BED: A LITTLE
DAILY ACTIVITIY SCORE: 14
WALKING IN HOSPITAL ROOM: TOTAL
HELP NEEDED FOR BATHING: TOTAL
MOBILITY SCORE: 14
CLIMB 3 TO 5 STEPS WITH RAILING: A LOT

## 2019-03-24 ASSESSMENT — PATIENT HEALTH QUESTIONNAIRE - PHQ9
2. FEELING DOWN, DEPRESSED, IRRITABLE, OR HOPELESS: NOT AT ALL
1. LITTLE INTEREST OR PLEASURE IN DOING THINGS: NOT AT ALL
SUM OF ALL RESPONSES TO PHQ9 QUESTIONS 1 AND 2: 0

## 2019-03-25 ENCOUNTER — APPOINTMENT (OUTPATIENT)
Dept: RADIOLOGY | Facility: MEDICAL CENTER | Age: 84
DRG: 071 | End: 2019-03-25
Attending: HOSPITALIST
Payer: MEDICARE

## 2019-03-25 PROBLEM — D64.9 ANEMIA: Status: ACTIVE | Noted: 2019-03-25

## 2019-03-25 LAB
ALBUMIN SERPL BCP-MCNC: 2.9 G/DL (ref 3.2–4.9)
ALBUMIN/GLOB SERPL: 1.1 G/DL
ALP SERPL-CCNC: 53 U/L (ref 30–99)
ALT SERPL-CCNC: 18 U/L (ref 2–50)
ANION GAP SERPL CALC-SCNC: 9 MMOL/L (ref 0–11.9)
AST SERPL-CCNC: 17 U/L (ref 12–45)
BASOPHILS # BLD AUTO: 0.5 % (ref 0–1.8)
BASOPHILS # BLD: 0.05 K/UL (ref 0–0.12)
BILIRUB SERPL-MCNC: 1.8 MG/DL (ref 0.1–1.5)
BUN SERPL-MCNC: 13 MG/DL (ref 8–22)
CALCIUM SERPL-MCNC: 8.2 MG/DL (ref 8.4–10.2)
CHLORIDE SERPL-SCNC: 105 MMOL/L (ref 96–112)
CO2 SERPL-SCNC: 20 MMOL/L (ref 20–33)
CREAT SERPL-MCNC: 0.81 MG/DL (ref 0.5–1.4)
EOSINOPHIL # BLD AUTO: 0.03 K/UL (ref 0–0.51)
EOSINOPHIL NFR BLD: 0.3 % (ref 0–6.9)
ERYTHROCYTE [DISTWIDTH] IN BLOOD BY AUTOMATED COUNT: 73.7 FL (ref 35.9–50)
GLOBULIN SER CALC-MCNC: 2.7 G/DL (ref 1.9–3.5)
GLUCOSE SERPL-MCNC: 147 MG/DL (ref 65–99)
HCT VFR BLD AUTO: 34.5 % (ref 42–52)
HGB BLD-MCNC: 11.8 G/DL (ref 14–18)
IMM GRANULOCYTES # BLD AUTO: 0.06 K/UL (ref 0–0.11)
IMM GRANULOCYTES NFR BLD AUTO: 0.6 % (ref 0–0.9)
LYMPHOCYTES # BLD AUTO: 0.63 K/UL (ref 1–4.8)
LYMPHOCYTES NFR BLD: 6.3 % (ref 22–41)
MCH RBC QN AUTO: 38.9 PG (ref 27–33)
MCHC RBC AUTO-ENTMCNC: 34.2 G/DL (ref 33.7–35.3)
MCV RBC AUTO: 113.9 FL (ref 81.4–97.8)
MONOCYTES # BLD AUTO: 1.23 K/UL (ref 0–0.85)
MONOCYTES NFR BLD AUTO: 12.2 % (ref 0–13.4)
NEUTROPHILS # BLD AUTO: 8.05 K/UL (ref 1.82–7.42)
NEUTROPHILS NFR BLD: 80.1 % (ref 44–72)
NRBC # BLD AUTO: 0 K/UL
NRBC BLD-RTO: 0 /100 WBC
PLATELET # BLD AUTO: 303 K/UL (ref 164–446)
PMV BLD AUTO: 9.4 FL (ref 9–12.9)
POTASSIUM SERPL-SCNC: 3.9 MMOL/L (ref 3.6–5.5)
PROT SERPL-MCNC: 5.6 G/DL (ref 6–8.2)
RBC # BLD AUTO: 3.03 M/UL (ref 4.7–6.1)
SODIUM SERPL-SCNC: 134 MMOL/L (ref 135–145)
WBC # BLD AUTO: 10.1 K/UL (ref 4.8–10.8)

## 2019-03-25 PROCEDURE — 97535 SELF CARE MNGMENT TRAINING: CPT

## 2019-03-25 PROCEDURE — A9270 NON-COVERED ITEM OR SERVICE: HCPCS | Performed by: HOSPITALIST

## 2019-03-25 PROCEDURE — 95951 EEG: CPT | Mod: 26,52 | Performed by: PSYCHIATRY & NEUROLOGY

## 2019-03-25 PROCEDURE — 99233 SBSQ HOSP IP/OBS HIGH 50: CPT | Performed by: HOSPITALIST

## 2019-03-25 PROCEDURE — 85025 COMPLETE CBC W/AUTO DIFF WBC: CPT

## 2019-03-25 PROCEDURE — A9270 NON-COVERED ITEM OR SERVICE: HCPCS | Performed by: INTERNAL MEDICINE

## 2019-03-25 PROCEDURE — 700105 HCHG RX REV CODE 258: Performed by: INTERNAL MEDICINE

## 2019-03-25 PROCEDURE — 84425 ASSAY OF VITAMIN B-1: CPT

## 2019-03-25 PROCEDURE — 80053 COMPREHEN METABOLIC PANEL: CPT

## 2019-03-25 PROCEDURE — 83550 IRON BINDING TEST: CPT

## 2019-03-25 PROCEDURE — 700102 HCHG RX REV CODE 250 W/ 637 OVERRIDE(OP): Performed by: HOSPITALIST

## 2019-03-25 PROCEDURE — 82607 VITAMIN B-12: CPT

## 2019-03-25 PROCEDURE — 97162 PT EVAL MOD COMPLEX 30 MIN: CPT

## 2019-03-25 PROCEDURE — 700102 HCHG RX REV CODE 250 W/ 637 OVERRIDE(OP): Performed by: INTERNAL MEDICINE

## 2019-03-25 PROCEDURE — 82746 ASSAY OF FOLIC ACID SERUM: CPT

## 2019-03-25 PROCEDURE — 97166 OT EVAL MOD COMPLEX 45 MIN: CPT

## 2019-03-25 PROCEDURE — 94660 CPAP INITIATION&MGMT: CPT

## 2019-03-25 PROCEDURE — 94760 N-INVAS EAR/PLS OXIMETRY 1: CPT

## 2019-03-25 PROCEDURE — 770006 HCHG ROOM/CARE - MED/SURG/GYN SEMI*

## 2019-03-25 PROCEDURE — 83540 ASSAY OF IRON: CPT

## 2019-03-25 PROCEDURE — 95951 EEG: CPT | Mod: 52 | Performed by: PSYCHIATRY & NEUROLOGY

## 2019-03-25 PROCEDURE — 4A10X4Z MONITORING OF CENTRAL NERVOUS ELECTRICAL ACTIVITY, EXTERNAL APPROACH: ICD-10-PCS | Performed by: PSYCHIATRY & NEUROLOGY

## 2019-03-25 PROCEDURE — 70551 MRI BRAIN STEM W/O DYE: CPT

## 2019-03-25 RX ORDER — ACETAMINOPHEN 500 MG
1000 TABLET ORAL EVERY 6 HOURS PRN
Status: ON HOLD | COMMUNITY
End: 2019-04-10

## 2019-03-25 RX ORDER — AZITHROMYCIN 250 MG/1
250-500 TABLET, FILM COATED ORAL DAILY
Status: ON HOLD | COMMUNITY
Start: 2019-03-10 | End: 2019-03-28

## 2019-03-25 RX ORDER — HYDROXYUREA 500 MG/1
CAPSULE ORAL
Status: COMPLETED
Start: 2019-03-25 | End: 2019-03-25

## 2019-03-25 RX ORDER — PREDNISONE 20 MG/1
40 TABLET ORAL DAILY
Status: ON HOLD | COMMUNITY
Start: 2019-03-12 | End: 2019-03-28

## 2019-03-25 RX ORDER — LEVETIRACETAM 500 MG/1
500 TABLET ORAL EVERY 12 HOURS
Status: DISCONTINUED | OUTPATIENT
Start: 2019-03-25 | End: 2019-03-29 | Stop reason: HOSPADM

## 2019-03-25 RX ADMIN — HYDROXYUREA 1000 MG: 500 CAPSULE ORAL at 00:45

## 2019-03-25 RX ADMIN — OMEPRAZOLE 20 MG: 20 CAPSULE, DELAYED RELEASE ORAL at 20:56

## 2019-03-25 RX ADMIN — TRAMADOL HYDROCHLORIDE 50 MG: 50 TABLET, FILM COATED ORAL at 04:27

## 2019-03-25 RX ADMIN — HYDROXYUREA 1500 MG: 500 CAPSULE ORAL at 20:58

## 2019-03-25 RX ADMIN — SODIUM CHLORIDE: 9 INJECTION, SOLUTION INTRAVENOUS at 09:19

## 2019-03-25 RX ADMIN — TRAMADOL HYDROCHLORIDE 50 MG: 50 TABLET, FILM COATED ORAL at 17:41

## 2019-03-25 RX ADMIN — TAMSULOSIN HYDROCHLORIDE 0.4 MG: 0.4 CAPSULE ORAL at 20:57

## 2019-03-25 RX ADMIN — STANDARDIZED SENNA CONCENTRATE AND DOCUSATE SODIUM 2 TABLET: 8.6; 5 TABLET, FILM COATED ORAL at 05:44

## 2019-03-25 RX ADMIN — LEVETIRACETAM 500 MG: 500 TABLET ORAL at 17:36

## 2019-03-25 RX ADMIN — SIMVASTATIN 20 MG: 20 TABLET, FILM COATED ORAL at 17:36

## 2019-03-25 RX ADMIN — TRAMADOL HYDROCHLORIDE 50 MG: 50 TABLET, FILM COATED ORAL at 12:57

## 2019-03-25 ASSESSMENT — ENCOUNTER SYMPTOMS
FEVER: 0
NEUROLOGICAL NEGATIVE: 1
BACK PAIN: 0
NERVOUS/ANXIOUS: 0
PSYCHIATRIC NEGATIVE: 1
NAUSEA: 0
MYALGIAS: 0
DEPRESSION: 0
WEIGHT LOSS: 0
RESPIRATORY NEGATIVE: 1
GASTROINTESTINAL NEGATIVE: 1
CONSTITUTIONAL NEGATIVE: 1
BRUISES/BLEEDS EASILY: 0
CARDIOVASCULAR NEGATIVE: 1
WEAKNESS: 0
MUSCULOSKELETAL NEGATIVE: 1
SHORTNESS OF BREATH: 0
COUGH: 0
LOSS OF CONSCIOUSNESS: 0
DIZZINESS: 0
ABDOMINAL PAIN: 0
VOMITING: 0
CHILLS: 0
FLANK PAIN: 0

## 2019-03-25 ASSESSMENT — GAIT ASSESSMENTS
DEVIATION: BRADYKINETIC;SHUFFLED GAIT
ASSISTIVE DEVICE: FRONT WHEEL WALKER
GAIT LEVEL OF ASSIST: MINIMAL ASSIST
DISTANCE (FEET): 2

## 2019-03-25 ASSESSMENT — COGNITIVE AND FUNCTIONAL STATUS - GENERAL
CLIMB 3 TO 5 STEPS WITH RAILING: TOTAL
WALKING IN HOSPITAL ROOM: TOTAL
STANDING UP FROM CHAIR USING ARMS: A LOT
MOBILITY SCORE: 9
SUGGESTED CMS G CODE MODIFIER MOBILITY: CM
MOVING TO AND FROM BED TO CHAIR: UNABLE
TURNING FROM BACK TO SIDE WHILE IN FLAT BAD: A LITTLE
MOVING FROM LYING ON BACK TO SITTING ON SIDE OF FLAT BED: UNABLE

## 2019-03-25 ASSESSMENT — ACTIVITIES OF DAILY LIVING (ADL): TOILETING: INDEPENDENT

## 2019-03-25 NOTE — PROGRESS NOTES
· 2 RN skin check complete with CAROL Patel.  · Devices in place n/a.  · Skin assessed under devices n/a.  · Confirmed pressure ulcers found on n/a.  · New potential pressure ulcers noted on n/a. The following interventions in place Reassess skin, check for moisture, use pillows to support positioning.     Skin intact and WNL, except dressing on left side of face(c/d/i). Pt states he had a procedure done few days ago and reused the dressing to be removed. Wife states it needs to be changed until tomorrow.

## 2019-03-25 NOTE — ASSESSMENT & PLAN NOTE
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  Family has noted no bleeding, no melena.  Continue to monitor CBC, no evidence of bleeding  Iron low, will add ferritin  Thiamine, B12 and folic acid levels

## 2019-03-25 NOTE — PROCEDURES
EEG 03/25/19 2:07 PM    VIDEO ELECTROENCEPHALOGRAM / EPILEPSY MONITORING UNIT REPORT      Referring provider: DR. MONTENEGRO    DOS:   3/25/2019      INDICATION:  Han Pat 84 y.o. male presenting with Weakness and altered mental status  Possible seizure    CURRENT ANTIEPILEPTIC REGIMEN: NONE     DURATION: 24 minutes      TECHNIQUE: A 30-channel video electroencephalogram (VEEG) was performed in accordance with the international 10-20 system. This digital study was reviewed in bipolar and referential montages. The recording examined the patient during wakeful, drowsy and sleep states.         DESCRIPTION OF THE RECORD:  During the awake state, background shows symmetrical 8-9 Hz alpha activity posteriorly with amplitude of 70 mV.  There was reactivity with eye opening/closure.  Normal anterior-posterior gradient was noted with faster beta frequencies seen anteriorly.  During drowsiness, high-amplitude delta frequencies were seen. There are monomorphic delta 2 Hz bursts over frontal R>L as long as 5-10 seconds    During the sleep state, background shows diffuse high-amplitude 4-5 Hz delta activity.  Symmetrical high-amplitude sleep spindles and vertex sharp activities were seen in the central leads.    ACTIVATION PROCEDURES:   Hyperventilation was not performed  Intermittent Photic stimulation was performed in a stepwise fashion from 1 to 30 Hz and elicited a normal response (photic driving), most noticeable in the posterior leads.      ICTAL AND/OR INTERICTAL FINDINGS:   No focal or generalized epileptiform activity was noted.  There are monomorphic delta 2 Hz bursts over frontal R>L as long as 5-10 secondsNo seizures were recorded during the study.     EVENT(S):  NONE    EKG: sampling review of EKG recording demonstrated regular rhythm      INTERPRETATION:       ________________________________________________________________________    This is abnormal routine video EEG recording in the awake and  drowsy/sleep state(s).    This scalp EEG denotes      1. Diffuse nonspecific cortical dysfunction - mild degree      2. Mild focal cortical irritability over frontal R>L --this patten could be interictal - advise clinical correlation regarding further management. This does not necessary mean the patient needs seizure medication     There are no electrographic seizures in this record    Of note, unremarkable EEG does not completely exclude the diagnosis  of seizures since seizure is an episodic phenomena.  Clinical correlation may help     If clinical suspicion of seizure remains high.  Prolonged EEG   monitoring may be of help.    EEG 03/25/19 2:11 PM    ________________________________________________________________________  ________________________________________________________________________      ________________________________________________________________________

## 2019-03-25 NOTE — ED PROVIDER NOTES
"ED Provider Note    CHIEF COMPLAINT  Chief Complaint   Patient presents with   • Weakness     [facility] called for pt slipping off the bed and weakness.  Wife says that he has been \"off all day\"   • Incontinence     +stool and urine       HPI  Han Pat is a 84 y.o. male who presents with a chief complaint of weakness and urinary incontinence.  History is provided by the wife.  Very pleasant gentleman who is not the best historian.  History is provided by the wife.  She states that he has been confused lately has had some baseline confusion but this has worsened over the last 24 hours.  She is here primarily because he felt he fell off his bed.  He did not hit his head he did not lose consciousness.  Wife heard the fall and he is only on aspirin.  She states that over the last 12 hours he has had some general increased confusion he had urinary incontinence twice with some stool.  No diarrhea.  He is also had an difference to this.  No fever.  No chills.  He does have a history of BPH on Flomax.  He was recently treated earlier this month for bronchitis with Zithromax.  Patient has no complaints at this time.  He states he is just tired not feeling right.    REVIEW OF SYSTEMS this is obtained by the wife.  General: No fever as per wife  Eyes: No eye discharge.   Ear nose throat: No sore throat or  trouble swallowing.  Pulmonary: No witnessed coughing  Cardiovascular: Patient denies chest pain or chest pressure.  GI: No abdominal pain nausea or vomiting.  : No dysuria or hematuria  Dermatologic: He has incision from surgery on his left cheek wife states that is dry and intact.  Neurologic: No one-sided weakness noted by wife      All other systems are negative      PAST MEDICAL HISTORY  Past Medical History:   Diagnosis Date   • Back pain    • GERD (gastroesophageal reflux disease)    • Hyperlipidemia    • Kidney stone    • PND (post-nasal drip)    • Sleep apnea        FAMILY HISTORY  Family History   Problem " Relation Age of Onset   • Other Father         Heart problems; unspecified   • Sleep Apnea Father    • Other Mother         Aneurism   • Sleep Apnea Brother    • Other Brother         Heart problems; unspecified   • Sleep Apnea Son    • Sleep Apnea Son        SOCIAL HISTORY  Social History     Social History   • Marital status:      Spouse name: N/A   • Number of children: N/A   • Years of education: N/A     Social History Main Topics   • Smoking status: Former Smoker     Packs/day: 1.00     Years: 15.00     Types: Cigarettes     Quit date: 1/1/1980   • Smokeless tobacco: Never Used   • Alcohol use 0.0 oz/week      Comment: nightly   • Drug use: No   • Sexual activity: Not on file     Other Topics Concern   • Not on file     Social History Narrative   • No narrative on file       SURGICAL HISTORY  Past Surgical History:   Procedure Laterality Date   • CATARACT EXTRACTION WITH IOL     • OTHER      Nasal surgery   • OTHER ORTHOPEDIC SURGERY  lumbar disectomy 2000       CURRENT MEDICATIONS  Home Medications    **Home medications have not yet been reviewed for this encounter**          ALLERGIES  No Known Allergies    PHYSICAL EXAM  VITAL SIGNS: /56   Pulse 77   Temp 37.4 °C (99.3 °F) (Oral)   Resp 16   Ht 1.829 m (6')   Wt 96 kg (211 lb 10.3 oz)   SpO2 96%   BMI 28.70 kg/m²   Constitutional: Well developed, Well nourished, No acute distress, Non-toxic appearance.   HENT: Normocephalic, Atraumatic, Bilateral external ears normal, Oropharynx moist, No oral exudates, Nose normal.   Eyes: PERRLA, EOMI, Conjunctiva normal, No discharge.   Musculoskeletal: Neck has normal range of motion, No tenderness, Supple.   Lymphatic: No cervical lymphadenopathy noted.   Cardiovascular: Normal heart rate, Normal rhythm, No murmurs, No rubs, No gallops.   Thorax & Lungs: Normal breath sounds, No respiratory distress, No wheezing, No chest tenderness.   Abdomen: Nondistended nontender  Skin: Warm, Dry, No erythema, No  rash.   : No CVA tenderness.   Psychiatric: Calm, not anxious  Neurologic: Alert & oriented, moves all extremities equally    RADIOLOGY/PROCEDURES  Results for orders placed or performed during the hospital encounter of 03/24/19   LACTIC ACID   Result Value Ref Range    Lactic Acid 1.5 0.5 - 2.0 mmol/L   CBC WITH DIFFERENTIAL   Result Value Ref Range    WBC 13.4 (H) 4.8 - 10.8 K/uL    RBC 3.36 (L) 4.70 - 6.10 M/uL    Hemoglobin 13.0 (L) 14.0 - 18.0 g/dL    Hematocrit 38.2 (L) 42.0 - 52.0 %    .7 (H) 81.4 - 97.8 fL    MCH 38.7 (H) 27.0 - 33.0 pg    MCHC 34.0 33.7 - 35.3 g/dL    RDW 73.3 (H) 35.9 - 50.0 fL    Platelet Count 340 164 - 446 K/uL    MPV 9.1 9.0 - 12.9 fL    Neutrophils-Polys 83.40 (H) 44.00 - 72.00 %    Lymphocytes 4.60 (L) 22.00 - 41.00 %    Monocytes 11.00 0.00 - 13.40 %    Eosinophils 0.10 0.00 - 6.90 %    Basophils 0.30 0.00 - 1.80 %    Immature Granulocytes 0.60 0.00 - 0.90 %    Nucleated RBC 0.00 /100 WBC    Neutrophils (Absolute) 11.18 (H) 1.82 - 7.42 K/uL    Lymphs (Absolute) 0.61 (L) 1.00 - 4.80 K/uL    Monos (Absolute) 1.48 (H) 0.00 - 0.85 K/uL    Eos (Absolute) 0.01 0.00 - 0.51 K/uL    Baso (Absolute) 0.04 0.00 - 0.12 K/uL    Immature Granulocytes (abs) 0.08 0.00 - 0.11 K/uL    NRBC (Absolute) 0.00 K/uL    Anisocytosis 1+     Macrocytosis 1+    COMP METABOLIC PANEL   Result Value Ref Range    Sodium 133 (L) 135 - 145 mmol/L    Potassium 3.9 3.6 - 5.5 mmol/L    Chloride 104 96 - 112 mmol/L    Co2 22 20 - 33 mmol/L    Anion Gap 7.0 0.0 - 11.9    Glucose 157 (H) 65 - 99 mg/dL    Bun 13 8 - 22 mg/dL    Creatinine 0.92 0.50 - 1.40 mg/dL    Calcium 8.9 8.4 - 10.2 mg/dL    AST(SGOT) 21 12 - 45 U/L    ALT(SGPT) 23 2 - 50 U/L    Alkaline Phosphatase 61 30 - 99 U/L    Total Bilirubin 2.1 (H) 0.1 - 1.5 mg/dL    Albumin 3.6 3.2 - 4.9 g/dL    Total Protein 6.8 6.0 - 8.2 g/dL    Globulin 3.2 1.9 - 3.5 g/dL    A-G Ratio 1.1 g/dL   URINALYSIS   Result Value Ref Range    Color Yellow     Character  Clear     Specific Gravity 1.015 <1.035    Ph 7.0 5.0 - 8.0    Glucose Negative Negative mg/dL    Ketones Trace (A) Negative mg/dL    Protein Negative Negative mg/dL    Bilirubin Negative Negative    Nitrite Negative Negative    Leukocyte Esterase Negative Negative    Occult Blood Negative Negative    Micro Urine Req see below    ESTIMATED GFR   Result Value Ref Range    GFR If African American >60 >60 mL/min/1.73 m 2    GFR If Non African American >60 >60 mL/min/1.73 m 2   PLATELET ESTIMATE   Result Value Ref Range    Plt Estimation Normal    MORPHOLOGY   Result Value Ref Range    RBC Morphology Present     Large Platelets 1+     Polychromia 1+    DIFFERENTIAL COMMENT   Result Value Ref Range    Comments-Diff see below       DX-CHEST-PORTABLE (1 VIEW)   Final Result      Hypoinflation without other evidence for acute cardiopulmonary disease.            COURSE & MEDICAL DECISION MAKING  Pertinent Labs & Imaging studies reviewed. (See chart for details)  This patient appears to have some altered mental status and delirium.  His temperature here is 99.4 he feels slightly warmer with the back of his head and neck feeling much warmer than that.  Patient at this point I suspect he may have a febrile illness and some confusion.  The urinary incontinence am concerned about possible UTI recent pulmonary issue suspect possible pneumonia.  Regardless, the patient will get IV fluids admitted to the hospital.    The patient at this point appears stable.  There is no signs of tachycardia, hypotension that suggest sepsis at this time.    With some generalized confusion, urinary incontinence, tactile warmth I suspect the patient probably has a UTI.  The patient at this point may have a pneumonia but recently was treated with antibiotics    X-ray and blood work do show an elevated white cell count but there is no generalized source.  This patient is not significantly improved.  He is dehydrated and that after 500 cc of fluids he  still had difficulty urinating.    At this point the patient will get come in for IV hydration.  Will observe for any infection that may occur.  Patient ready had cultures done was given ceftriaxone for suspected UTI.  Patient be admitted in guarded condition.    FINAL IMPRESSION  1.  Dehydration  2.  Confusion  3.      Electronically signed by: Matt Lozoya, 3/24/2019 5:27 PM

## 2019-03-25 NOTE — THERAPY
"Pt is an 83 y/o male admitted with weakness and AMS. Pt presents to physical therapy with impairments in functional mobility, balance, gait, strength and activity tolerance all impacting pt's ability to return home safely at this time. Pt will benefit from acute PT interventions to address present impairments.     Physical Therapy Evaluation completed.   Bed Mobility:  Supine to Sit:  (already EOB)  Transfers: Sit to Stand: Contact Guard Assist (on 3rd try, initially required more)  Gait: Level Of Assist: Minimal Assist with Front-Wheel Walker       Plan of Care: Will benefit from Physical Therapy 4 times per week  Discharge Recommendations: Equipment: Will Continue to Assess for Equipment Needs. Post-acute therapy: Recommend post acute placement to optimize functional return and safety prior to DC home.     See \"Rehab Therapy-Acute\" Patient Summary Report for complete documentation.     "

## 2019-03-25 NOTE — CARE PLAN
Problem: Safety  Goal: Will remain free from injury  Outcome: PROGRESSING SLOWER THAN EXPECTED  Pt placed on bed alarm to prevent falls.    Problem: Infection  Goal: Will remain free from infection  Outcome: PROGRESSING AS EXPECTED  Pts IV hub scrubbed for 15 seconds prior to access.

## 2019-03-25 NOTE — PROGRESS NOTES
[facility] Medicine Daily Progress Note    Date of Service  3/25/2019    Chief Complaint  84 y.o. male admitted 3/24/2019 with weakness and altered mental status.    Hospital Course    84 y.o. male who presented 3/24/2019 with weakness and altered mental status.  Patient is a bit confused, he is not very verbal and gets off track so a lot of the information was obtained from the wife at bedside.  He recently had bronchitis around 2 weeks ago, finished azithromycin, is much better, was very fatigued during this but did seem to improve.  He had improved until last night when he began complaining of right lower back/buttock pain.  He was up most of the night trying to get comfortable, still uncomfortable.  This morning he seemed more confused per the wife, initially he was just trying to figure out how to get out of the bed, took him quite some time, then he was incontinent of urine and did not seem to care which is very abnormal for him.  He kept putting his leg off to the side of the bed, at one point when she stepped away he slid out of the bed onto the floor, she did not see it but does not think there was any trauma.  She states he has not been eating or drinking much which is also very abnormal for him.  During exam, to very difficult and information from him, he is able to speak just cannot describe how he feels.      Interval Problem Update  Denies back pain currently.  Still somewhat confused, very weak and did not passed PT evaluation.  Daughter and wife are at the bedside to discuss care.    Consultants/Specialty  Neurology read his EEG, no formal consult.    Code Status  DO NOT RESUSCITATE    Disposition  To be determined    Review of Systems  Review of Systems   Constitutional: Negative.  Negative for chills, fever, malaise/fatigue and weight loss.   HENT: Negative.    Respiratory: Negative.  Negative for cough and shortness of breath.    Cardiovascular: Negative.  Negative for chest pain and leg swelling.    Gastrointestinal: Negative.  Negative for abdominal pain, nausea and vomiting.   Genitourinary: Negative.  Negative for dysuria and flank pain.   Musculoskeletal: Negative.  Negative for back pain and myalgias.   Neurological: Negative.  Negative for dizziness, loss of consciousness and weakness.   Endo/Heme/Allergies: Negative.  Does not bruise/bleed easily.   Psychiatric/Behavioral: Negative.  Negative for depression. The patient is not nervous/anxious.    All other systems reviewed and are negative.       Physical Exam  Temp:  [36.8 °C (98.3 °F)-37.4 °C (99.3 °F)] 37.1 °C (98.8 °F)  Pulse:  [63-78] 63  Resp:  [16-18] 18  BP: (118-138)/(49-65) 118/49  SpO2:  [90 %-96 %] 90 %    Physical Exam   Constitutional: He appears well-developed and well-nourished. No distress.   Plan of care discussed with bedside RN.   HENT:   Nose: Nose normal.   Mouth/Throat: Oropharynx is clear and moist. No oropharyngeal exudate.   Eyes: Conjunctivae are normal. Right eye exhibits no discharge. Left eye exhibits no discharge. No scleral icterus.   Neck: No JVD present. No tracheal deviation present.   Cardiovascular: Normal rate, regular rhythm and normal heart sounds.    Pulmonary/Chest: Effort normal and breath sounds normal. No stridor. No respiratory distress. He has no wheezes. He has no rales. He exhibits no tenderness.   Abdominal: Soft. Bowel sounds are normal. He exhibits no distension. There is no tenderness.   Musculoskeletal: He exhibits no edema or tenderness.   Neurological: He is alert. He has normal strength and normal reflexes. He is disoriented. He displays no atrophy, no tremor and normal reflexes. No cranial nerve deficit or sensory deficit. He exhibits normal muscle tone. He displays no seizure activity. Coordination and gait abnormal.   Oriented to self, place but forgets where his room is at, correct month and year   Skin: Skin is warm and dry. He is not diaphoretic. No pallor.   Psychiatric: He has a normal  mood and affect. His speech is normal and behavior is normal. Judgment normal. He exhibits abnormal recent memory.   Nursing note and vitals reviewed.      Fluids    Intake/Output Summary (Last 24 hours) at 03/25/19 1450  Last data filed at 03/25/19 0600   Gross per 24 hour   Intake             1180 ml   Output                0 ml   Net             1180 ml       Laboratory  Recent Labs      03/24/19 1757 03/25/19   0440   WBC  13.4*  10.1   RBC  3.36*  3.03*   HEMOGLOBIN  13.0*  11.8*   HEMATOCRIT  38.2*  34.5*   MCV  113.7*  113.9*   MCH  38.7*  38.9*   MCHC  34.0  34.2   RDW  73.3*  73.7*   PLATELETCT  340  303   MPV  9.1  9.4     Recent Labs      03/24/19 1757 03/25/19   0440   SODIUM  133*  134*   POTASSIUM  3.9  3.9   CHLORIDE  104  105   CO2  22  20   GLUCOSE  157*  147*   BUN  13  13   CREATININE  0.92  0.81   CALCIUM  8.9  8.2*                   Imaging  CT-HEAD W/O   Final Result      1.  No acute intracranial abnormality.   2.  Age-consistent atrophy and chronic white matter changes.   3.  Paranasal sinus disease.               INTERPRETING LOCATION:  [address] [place] [address], [facility], [id number]      DX-CHEST-PORTABLE (1 VIEW)   Final Result      Hypoinflation without other evidence for acute cardiopulmonary disease.      MR-BRAIN-W/O    (Results Pending)        Assessment/Plan  * Weakness   Assessment & Plan    -Profoundly weak on bilateral lower extremities, more or less equal  -Obtain PT/OT and await recommendations  -Unknown what is suddenly causes weakness however a CT     Acute encephalopathy   Assessment & Plan    Consider possibly seizure, he was incontinent during the episode. EEG shows possible right frontal process, cannot rule out seizure activity. Needs MRI of brain, I have ordered this.  CT of head negative for any pathology and no signs of infection, initial mild leukocytosis resolved.  PT and OT note weakness and confusion, family prefer acute rehab if he needs transitional care.  Start keppra 500mg  po BID for potential of seizure, discussed with neurology, will need neurology follow up.     Anemia   Assessment & Plan    With macrocytosis on chronic hydroxyurea for treatment of thrombocytosis.  Family has noted no bleeding, no melena.  Continue to monitor CBC, no evidence of bleeding  Check iron studies, thiamine and folic acid level     Lower back pain   Assessment & Plan    Improved.     Hyponatremia   Assessment & Plan    -Due to dehydration  Improving on iv fluids continue to monitor     Serum total bilirubin elevated   Assessment & Plan    -Asymptomatic, repeat CMP shows it improving  No abdominal pain continue to monitor     Mixed hyperlipidemia- (present on admission)   Assessment & Plan    Continue simvastatin     Thrombocytosis (HCC)- (present on admission)   Assessment & Plan    Levels of platelets currently normal; continue hydroxyurea he takes at home.  Macrocytosis is present which is expected on this medication     Gastroesophageal reflux disease- (present on admission)   Assessment & Plan    -Continue PPI     Obstructive sleep apnea- (present on admission)   Assessment & Plan    Use home cpap machine          VTE prophylaxis: SCD

## 2019-03-25 NOTE — THERAPY
"Occupational Therapy Evaluation completed.   Functional Status:  83 yo male admit for AMS, weakness.  Pt reports he was indep at home, spouse reports he has been declining recently.  Pt appears alert, but then as therapist is leaving, he asks, \"Now who's room is this?\"  Pt encountered sitting EOB with PT, had just finished sit to stand a couple times and urinating in standing with FWW and urinal.  ModA to don underwear.  Soniya with FWW to pivot to chair, needed assist for wt shifting, and max cues to step with feet and turn appropriately to chair.  He stated, \"wow, why is something so simple so hard now?\"  Overall pt demonstrates impaired balance, activity tolerance and safety with functional mobility and ADL's.   He is not safe to return home at this time, and will benefit from further inpt post acute therapy prior to home.  Will follow while in house.    Plan of Care: Will benefit from Occupational Therapy 3 times per week  Discharge Recommendations:  Equipment: Will Continue to Assess for Equipment Needs. Post-acute therapy Discharge to a transitional care facility for continued skilled therapy services.    See \"Rehab Therapy-Acute\" Patient Summary Report for complete documentation.    "

## 2019-03-25 NOTE — PROGRESS NOTES
Assumed care of pt at 0720, received report from Reina MEDINA. A/Ox4 with delayed responses to questions, discussed plan of care. Pt on room air, pt attempting to eat breakfast but stating not much of an appetite, up with 2 person assist. All needs met at this time. Bed in lowest position and call light within reach, instructed to call for any assistance.

## 2019-03-25 NOTE — ED NOTES
Rounded on patient.  Still unable to urinate, ERP verbal okay to obtain for straight cath.  Patient straight cath and sample taken to lab.

## 2019-03-25 NOTE — ASSESSMENT & PLAN NOTE
Consider possibly seizure, he was incontinent during the episode. EEG shows possible right frontal process, cannot rule out seizure activity.  Ramesh CT and MRI negative  PT and OT note weakness and confusion, he has been accepted to rehab  Continue Keppra 500mg po BID for seizure prophylaxis  Case was discussed with neurology, will need neurology follow up.  TSH ok

## 2019-03-25 NOTE — ASSESSMENT & PLAN NOTE
Levels of platelets currently normal; continue hydroxyurea he takes at home.  Macrocytosis is present which is expected on this medication

## 2019-03-25 NOTE — FLOWSHEET NOTE
03/24/19 2320   Events/Summary/Plan   Events/Summary/Plan CPAP s/u with home cpap 2 LPM   General Vent Information   Pulse Oximetry 95 %   CPAP/BiPAP [facility]   Nocturnal CPAP or BiPAP CPAP   #System Evaluation Yes   Home Unit Used? Yes   Equipment Inspected for Cleanliness, Operation, and Safety? Yes   Settings (If Known) auto-pap 9-13   FiO2 or LPM 2   Home Mask Used? Yes

## 2019-03-25 NOTE — ASSESSMENT & PLAN NOTE
-Profoundly weak on bilateral lower extremities, more or less equal  -PT OT rec SNF, family wants rehab > SNF  -Head CT negative  -Head MRI negative aside from chronic changes

## 2019-03-25 NOTE — DISCHARGE PLANNING
Pt independent with ADLs prior to admit, no DME or 02. Patient does use a CPAP. Patient lives with spouse in [place]. No reported needs at this time. CM will continue to follow for d/c needs.     Care Transition Team Assessment    Information Source  Orientation : Oriented x 4  Information Given By: Patient    Readmission Evaluation  Is this a readmission?: No    Elopement Risk  Legal Hold: No  Ambulatory or Self Mobile in Wheelchair: Yes  Disoriented: No  Psychiatric Symptoms: None  History of Wandering: No  Elopement this Admit: No  Vocalizing Wanting to Leave: No  Displays Behaviors, Body Language Wanting to Leave: No-Not at Risk for Elopement    Interdisciplinary Discharge Planning  Does Admitting Nurse Feel This Could be a Complex Discharge?: No  Lives with - Patient's Self Care Capacity: Spouse  Patient or legal guardian wants to designate a caregiver (see row info): Yes  Caregiver name: Oma Pat  Caregiver relationship to patient: Wife  Caregiver contact info: [phone]  ([facility] [place]) Authorization for Release of Health Information has been completed: Yes  Support Systems: Friends / Neighbors, Spouse / Significant Other  Housing / Facility: [address]  Do You Take your Prescribed Medications Regularly: Yes  Able to Return to Previous ADL's:  (Unable to know at this time)  Mobility Issues: Yes  Prior Services: Intermittent Physical Support for ADL Per Family  Patient Expects to be Discharged to:: Home  Durable Medical Equipment: Home Oxygen, Walker    Discharge Preparedness  What is your plan after discharge?: Home with help  What are your discharge supports?: Spouse  Prior Functional Level: Independent with Activities of Daily Living    Functional Assesment  Prior Functional Level: Independent with Activities of Daily Living    Finances  Financial Barriers to Discharge: No  Prescription Coverage: Yes    Vision / Hearing Impairment  Vision Impairment : Yes  Right Eye Vision: Impaired, Wears Glasses  Left Eye  Vision: Impaired, Wears Glasses  Hearing Impairment : No  Hearing Impairment: Both Ears, Patient Declines to Wear Hearing Device(s)  Does Pt Need Special Equipment for the Hearing Impaired?: No         Advance Directive  Advance Directive?: None    Domestic Abuse  Have you ever been the victim of abuse or violence?: No  Physical Abuse or Sexual Abuse: No  Verbal Abuse or Emotional Abuse: No  Possible Abuse Reported to:: Not Applicable    Psychological Assessment  History of Substance Abuse: None  History of Psychiatric Problems: No  Non-compliant with Treatment: No    Discharge Risks or Barriers  Discharge risks or barriers?: No  Patient risk factors: Vulnerable adult    Anticipated Discharge Information  Anticipated discharge disposition: Discharge needs currently unknown, Home  Discharge Address:  ([address] [place] [id number])  Discharge Contact Phone Number:  ([phone], [phone])

## 2019-03-25 NOTE — H&P
Hospital Medicine History & Physical Note    Date of Service  3/24/2019    Primary Care Physician  Kia Thompson P.A.-C.    Consultants  None    Code Status  DNR    Chief Complaint  Weakness and altered mental status    History of Presenting Illness  84 y.o. male who presented 3/24/2019 with weakness and altered mental status.  Patient is a bit confused, he is not very verbal and gets off track so a lot of the information was obtained from the wife at bedside.  He recently had bronchitis around 2 weeks ago, finished azithromycin, is much better, was very fatigued during this but did seem to improve.  He had improved until last night when he began complaining of right lower back/buttock pain.  He was up most of the night trying to get comfortable, still uncomfortable.  This morning he seemed more confused per the wife, initially he was just trying to figure out how to get out of the bed, took him quite some time, then he was incontinent of urine and did not seem to care which is very abnormal for him.  He kept putting his leg off to the side of the bed, at one point when she stepped away he slid out of the bed onto the floor, she did not see it but does not think there was any trauma.  She states he has not been eating or drinking much which is also very abnormal for him.  During exam, to very difficult and information from him, he is able to speak just cannot describe how he feels.    Review of Systems  Review of Systems   Unable to perform ROS: Acuity of condition   Eyes: Negative for blurred vision and double vision.   Musculoskeletal: Positive for back pain (r lower back/buttock).   Neurological: Negative for headaches.       Past Medical History   has a past medical history of Back pain; GERD (gastroesophageal reflux disease); Hyperlipidemia; Kidney stone; PND (post-nasal drip); and Sleep apnea.    Surgical History   has a past surgical history that includes other orthopedic surgery (lumbar disectomy 2000);  cataract extraction with iol; and other.     Family History  family history includes Other in his brother, father, and mother; Sleep Apnea in his brother, father, son, and son.     Social History   reports that he quit smoking about 39 years ago. His smoking use included Cigarettes. He has a 15.00 pack-year smoking history. He has never used smokeless tobacco. He reports that he drinks alcohol. He reports that he does not use drugs.    Allergies  No Known Allergies    Medications  Prior to Admission Medications   Prescriptions Last Dose Informant Patient Reported? Taking?   Montelukast Sodium (SINGULAIR PO)   Yes No   Sig: Take  by mouth.   NON SPECIFIED   No No   Sig: CPAP supplies. DX obstructive sleep apnea   SIMVASTATIN PO   Yes No   Sig: Take  by mouth.   albuterol 108 (90 Base) MCG/ACT Aero Soln inhalation aerosol   No No   Sig: Inhale 2 Puffs by mouth every four hours as needed for Shortness of Breath.   aspirin 81 MG tablet 3/23/2019 at Unknown time  Yes No   Sig: Take 81 mg by mouth every day.   azithromycin (ZITHROMAX) 250 MG Tab   No No   Sig: Take 2 tabs day one then one tab daily for 4 days   benzonatate (TESSALON) 100 MG Cap   No No   Sig: Take 1 Cap by mouth 3 times a day as needed for Cough.   hydroxyurea (HYDREA) 500 MG Cap 3/23/2019 at Unknown time  Yes No   lansoprazole (PREVACID SOLUTAB) 30 MG TBDP 3/23/2019 at Unknown time  Yes No   Sig: Take 30 mg by mouth every day.   lansoprazole (PREVACID) 30 MG CAPSULE DELAYED RELEASE   Yes No   montelukast (SINGULAIR) 10 MG Tab 3/23/2019 at pm  Yes No   simvastatin (ZOCOR) 20 MG Tab 3/23/2019 at pm  Yes No   tamsulosin (FLOMAX) 0.4 MG capsule 3/23/2019 at pm  Yes No      Facility-Administered Medications: None       Physical Exam  Temp:  [37.4 °C (99.3 °F)] 37.4 °C (99.3 °F)  Pulse:  [77] 77  Resp:  [16] 16  BP: (138)/(56) 138/56  SpO2:  [96 %] 96 %    Physical Exam   Constitutional: He appears well-developed and well-nourished.  Non-toxic appearance. No  distress.   HENT:   Head: Normocephalic and atraumatic. Not macrocephalic and not microcephalic. Head is without raccoon's eyes and without Tabor's sign.   Right Ear: External ear normal.   Left Ear: External ear normal.   Mouth/Throat: Mucous membranes are dry. No oropharyngeal exudate.   Eyes: Conjunctivae are normal. Right eye exhibits no discharge. Left eye exhibits no discharge. No scleral icterus.   Neck: Normal range of motion. Neck supple. No tracheal deviation, no edema and no erythema present.   Cardiovascular: Normal rate, regular rhythm, normal heart sounds and intact distal pulses.  Exam reveals no gallop, no friction rub and no decreased pulses.    No murmur heard.  Pulmonary/Chest: Effort normal and breath sounds normal. No stridor. No respiratory distress. He has no decreased breath sounds. He has no wheezes. He has no rhonchi. He has no rales. He exhibits no tenderness.   Abdominal: Soft. Bowel sounds are normal. He exhibits no distension. There is no splenomegaly or hepatomegaly. There is no tenderness. There is no rebound and no guarding.   Musculoskeletal: He exhibits no edema, tenderness or deformity.   Lymphadenopathy:     He has no cervical adenopathy.   Neurological:   Bilateral LE weakness   Skin: Skin is warm and dry. No rash noted. He is not diaphoretic. No cyanosis or erythema. No pallor. Nails show no clubbing.   Psychiatric: His speech is delayed. He is slowed. Cognition and memory are impaired.   Nursing note and vitals reviewed.      Laboratory:  Recent Labs      03/24/19   1757   WBC  13.4*   RBC  3.36*   HEMOGLOBIN  13.0*   HEMATOCRIT  38.2*   MCV  113.7*   MCH  38.7*   MCHC  34.0   RDW  73.3*   PLATELETCT  340   MPV  9.1     Recent Labs      03/24/19   1757   SODIUM  133*   POTASSIUM  3.9   CHLORIDE  104   CO2  22   GLUCOSE  157*   BUN  13   CREATININE  0.92   CALCIUM  8.9     Recent Labs      03/24/19   1757   ALTSGPT  23   ASTSGOT  21   ALKPHOSPHAT  61   TBILIRUBIN  2.1*    GLUCOSE  157*                 No results for input(s): TROPONINI in the last 72 hours.    Urinalysis:    Recent Labs      03/24/19 2026   SPECGRAVITY  1.015   GLUCOSEUR  Negative   KETONES  Trace*   NITRITE  Negative   LEUKESTERAS  Negative        Imaging:  DX-CHEST-PORTABLE (1 VIEW)   Final Result      Hypoinflation without other evidence for acute cardiopulmonary disease.      CT-HEAD W/O    (Results Pending)         Assessment/Plan:  I anticipate this patient is appropriate for observation status at this time.    * Weakness   Assessment & Plan    -Profoundly weak on bilateral lower extremities, more or less equal  -Obtain PT/OT and await recommendations  -Unknown what is suddenly causes weakness however a CT     Acute encephalopathy   Assessment & Plan    -Unknown cause, initially since he was incontinent I felt that it was going to be a urinary tract infection however his urine is normal  -His chest x-ray does not show pneumonia nor does he have signs of  -No evidence of rash or additional infection  -Patient is not very verbal but is not slurring his speech, does not have any unilateral deficits however I am still going to obtain a CT of the head without contrast for further evaluation specially considering he did have a fall that was not witnessed  -If nothing comes up he may need an MRI at some point but I do not feel that is necessary at this time  -He does have a leukocytosis but again no additional sign of infection, repeat a CBC in the morning     Lower back pain   Assessment & Plan    -Lower back/buttock, on the right  -Not exquisitely tender, no pain whenever he moved his legs or whenever I move his legs, I do not feel there is an abscess or discitis, no need for MRI of the lumbar spine at  -He does have good sensation of his lower extremities  -He occasionally does have pain, this was just a little worse  -Did occur prior to his fall     Hyponatremia   Assessment & Plan    -Due to  dehydration  -Start IV fluids  -Repeat CMP in the morning     Serum total bilirubin elevated   Assessment & Plan    -Asymptomatic, repeat CMP in the morning  -He does have right lower back pain but there is no right upper quadrant pain at all, I do not think these are related     Mixed hyperlipidemia- (present on admission)   Assessment & Plan    -Continue statin     Gastroesophageal reflux disease- (present on admission)   Assessment & Plan    -Continue PPI     Obstructive sleep apnea- (present on admission)   Assessment & Plan    -Wife did bring in his CPAP, have ordered respiratory care         VTE prophylaxis: SCDs

## 2019-03-25 NOTE — PROGRESS NOTES
Med rec updated and complete  Allergies reviewed  Pt asked for me to call his wife to verify all prescription medications.  Called pts wife (Sally) @ [phone] to verify all pts medications.

## 2019-03-25 NOTE — PROGRESS NOTES
Pts bedside rails padded and suction placed. EEG shows possible seizure activity per MDs note.    MRI screening sheet completed with pts wife and pt, faxed to MRI.

## 2019-03-25 NOTE — ED TRIAGE NOTES
"Chief Complaint   Patient presents with   • Weakness     [facility] called for pt slipping off the bed and weakness.  Wife says that he has been \"off all day\"   • Incontinence     +stool and urine     /56   Pulse 77   Temp 37.4 °C (99.3 °F) (Oral)   Resp 16   Ht 1.829 m (6')   Wt 96 kg (211 lb 10.3 oz)   SpO2 96%   BMI 28.70 kg/m²     "

## 2019-03-25 NOTE — FLOWSHEET NOTE
03/25/19 0710   Events/Summary/Plan   Events/Summary/Plan oFF cpap, ON ra   Non-Invasive Resp Device Site Inspection Completed Intact   Chest Exam   Respiration 18   Heart Rate (Monitored) 89   Oximetry   #Pulse Oximetry (Single Determination) Yes   Oxygen   Pulse Oximetry 90 %   O2 (LPM) 0   O2 Daily Delivery Respiratory  Room Air with O2 Available

## 2019-03-25 NOTE — CARE PLAN
Problem: Knowledge Deficit  Goal: Knowledge of disease process/condition, treatment plan, diagnostic tests, and medications will improve  Outcome: PROGRESSING AS EXPECTED  Discuss POC with Pt. Assess Pt's knowledge of disease process, treatment plan, diagnostic test, labs, and medications; and explain and give information as needed.     Problem: Pain Management  Goal: Pain level will decrease to patient's comfort goal  Outcome: PROGRESSING AS EXPECTED  Teach Pt pain scale. Assess pain frequently. Encouraged Pt to report any pain or discomfort. Apply comfort measures. Administer pain medications as appropriate.

## 2019-03-26 LAB
ANION GAP SERPL CALC-SCNC: 6 MMOL/L (ref 0–11.9)
BASOPHILS # BLD AUTO: 0.4 % (ref 0–1.8)
BASOPHILS # BLD: 0.03 K/UL (ref 0–0.12)
BUN SERPL-MCNC: 14 MG/DL (ref 8–22)
CALCIUM SERPL-MCNC: 8.2 MG/DL (ref 8.4–10.2)
CHLORIDE SERPL-SCNC: 107 MMOL/L (ref 96–112)
CO2 SERPL-SCNC: 21 MMOL/L (ref 20–33)
CREAT SERPL-MCNC: 0.81 MG/DL (ref 0.5–1.4)
EOSINOPHIL # BLD AUTO: 0.11 K/UL (ref 0–0.51)
EOSINOPHIL NFR BLD: 1.3 % (ref 0–6.9)
ERYTHROCYTE [DISTWIDTH] IN BLOOD BY AUTOMATED COUNT: 74.9 FL (ref 35.9–50)
FERRITIN SERPL-MCNC: 719.4 NG/ML (ref 22–322)
FOLATE SERPL-MCNC: 18.6 NG/ML
GLUCOSE SERPL-MCNC: 128 MG/DL (ref 65–99)
HCT VFR BLD AUTO: 33.7 % (ref 42–52)
HGB BLD-MCNC: 11.4 G/DL (ref 14–18)
IMM GRANULOCYTES # BLD AUTO: 0.04 K/UL (ref 0–0.11)
IMM GRANULOCYTES NFR BLD AUTO: 0.5 % (ref 0–0.9)
IRON SATN MFR SERPL: 15 % (ref 15–55)
IRON SERPL-MCNC: 28 UG/DL (ref 50–180)
LYMPHOCYTES # BLD AUTO: 0.71 K/UL (ref 1–4.8)
LYMPHOCYTES NFR BLD: 8.5 % (ref 22–41)
MCH RBC QN AUTO: 38.9 PG (ref 27–33)
MCHC RBC AUTO-ENTMCNC: 33.8 G/DL (ref 33.7–35.3)
MCV RBC AUTO: 115 FL (ref 81.4–97.8)
MONOCYTES # BLD AUTO: 0.81 K/UL (ref 0–0.85)
MONOCYTES NFR BLD AUTO: 9.7 % (ref 0–13.4)
NEUTROPHILS # BLD AUTO: 6.66 K/UL (ref 1.82–7.42)
NEUTROPHILS NFR BLD: 79.6 % (ref 44–72)
NRBC # BLD AUTO: 0 K/UL
NRBC BLD-RTO: 0 /100 WBC
PLATELET # BLD AUTO: 283 K/UL (ref 164–446)
PMV BLD AUTO: 9.3 FL (ref 9–12.9)
POTASSIUM SERPL-SCNC: 3.8 MMOL/L (ref 3.6–5.5)
RBC # BLD AUTO: 2.93 M/UL (ref 4.7–6.1)
SODIUM SERPL-SCNC: 134 MMOL/L (ref 135–145)
TIBC SERPL-MCNC: 193 UG/DL (ref 250–450)
TSH SERPL DL<=0.005 MIU/L-ACNC: 2.04 UIU/ML (ref 0.38–5.33)
VIT B12 SERPL-MCNC: 448 PG/ML (ref 211–911)
WBC # BLD AUTO: 8.4 K/UL (ref 4.8–10.8)

## 2019-03-26 PROCEDURE — 700102 HCHG RX REV CODE 250 W/ 637 OVERRIDE(OP): Performed by: INTERNAL MEDICINE

## 2019-03-26 PROCEDURE — 84443 ASSAY THYROID STIM HORMONE: CPT

## 2019-03-26 PROCEDURE — 85025 COMPLETE CBC W/AUTO DIFF WBC: CPT

## 2019-03-26 PROCEDURE — 770006 HCHG ROOM/CARE - MED/SURG/GYN SEMI*

## 2019-03-26 PROCEDURE — 94660 CPAP INITIATION&MGMT: CPT

## 2019-03-26 PROCEDURE — 99232 SBSQ HOSP IP/OBS MODERATE 35: CPT | Performed by: INTERNAL MEDICINE

## 2019-03-26 PROCEDURE — A9270 NON-COVERED ITEM OR SERVICE: HCPCS | Performed by: HOSPITALIST

## 2019-03-26 PROCEDURE — A9270 NON-COVERED ITEM OR SERVICE: HCPCS | Performed by: INTERNAL MEDICINE

## 2019-03-26 PROCEDURE — 80048 BASIC METABOLIC PNL TOTAL CA: CPT

## 2019-03-26 PROCEDURE — 700102 HCHG RX REV CODE 250 W/ 637 OVERRIDE(OP): Performed by: HOSPITALIST

## 2019-03-26 PROCEDURE — 700105 HCHG RX REV CODE 258: Performed by: INTERNAL MEDICINE

## 2019-03-26 PROCEDURE — 82728 ASSAY OF FERRITIN: CPT

## 2019-03-26 RX ADMIN — OMEPRAZOLE 20 MG: 20 CAPSULE, DELAYED RELEASE ORAL at 22:18

## 2019-03-26 RX ADMIN — SIMVASTATIN 20 MG: 20 TABLET, FILM COATED ORAL at 16:55

## 2019-03-26 RX ADMIN — SODIUM CHLORIDE: 9 INJECTION, SOLUTION INTRAVENOUS at 05:49

## 2019-03-26 RX ADMIN — LEVETIRACETAM 500 MG: 500 TABLET ORAL at 16:56

## 2019-03-26 RX ADMIN — TRAMADOL HYDROCHLORIDE 50 MG: 50 TABLET, FILM COATED ORAL at 22:22

## 2019-03-26 RX ADMIN — TAMSULOSIN HYDROCHLORIDE 0.4 MG: 0.4 CAPSULE ORAL at 22:18

## 2019-03-26 RX ADMIN — TRAMADOL HYDROCHLORIDE 50 MG: 50 TABLET, FILM COATED ORAL at 05:47

## 2019-03-26 RX ADMIN — TRAMADOL HYDROCHLORIDE 50 MG: 50 TABLET, FILM COATED ORAL at 11:53

## 2019-03-26 RX ADMIN — STANDARDIZED SENNA CONCENTRATE AND DOCUSATE SODIUM 2 TABLET: 8.6; 5 TABLET, FILM COATED ORAL at 16:56

## 2019-03-26 RX ADMIN — HYDROXYUREA 1500 MG: 500 CAPSULE ORAL at 22:18

## 2019-03-26 RX ADMIN — LEVETIRACETAM 500 MG: 500 TABLET ORAL at 05:47

## 2019-03-26 ASSESSMENT — ENCOUNTER SYMPTOMS
RESPIRATORY NEGATIVE: 1
BACK PAIN: 0
ABDOMINAL PAIN: 0
NEUROLOGICAL NEGATIVE: 1
CARDIOVASCULAR NEGATIVE: 1
SHORTNESS OF BREATH: 0
WEIGHT LOSS: 0
WEAKNESS: 0
PSYCHIATRIC NEGATIVE: 1
NAUSEA: 0
DEPRESSION: 0
BRUISES/BLEEDS EASILY: 0
COUGH: 0
MYALGIAS: 0
LOSS OF CONSCIOUSNESS: 0
FEVER: 0
CHILLS: 0
MUSCULOSKELETAL NEGATIVE: 1
GASTROINTESTINAL NEGATIVE: 1
DIZZINESS: 0
FLANK PAIN: 0
VOMITING: 0
NERVOUS/ANXIOUS: 0

## 2019-03-26 NOTE — PROGRESS NOTES
Report received from CAROL Cooley. Pt denies needs at this time. Safety precautions in place. Call light within reach.

## 2019-03-26 NOTE — PROGRESS NOTES
[facility] Medicine Daily Progress Note    Date of Service  3/26/2019    Chief Complaint  84 y.o. male admitted 3/24/2019 with weakness and altered mental status.    Hospital Course    84 y.o. male who presented 3/24/2019 with weakness and altered mental status.  Patient is a bit confused, he is not very verbal and gets off track so a lot of the information was obtained from the wife at bedside.  He recently had bronchitis around 2 weeks ago, finished azithromycin, is much better, was very fatigued during this but did seem to improve.  He had improved until last night when he began complaining of right lower back/buttock pain.  He was up most of the night trying to get comfortable, still uncomfortable.  This morning he seemed more confused per the wife, initially he was just trying to figure out how to get out of the bed, took him quite some time, then he was incontinent of urine and did not seem to care which is very abnormal for him.  He kept putting his leg off to the side of the bed, at one point when she stepped away he slid out of the bed onto the floor, she did not see it but does not think there was any trauma.  She states he has not been eating or drinking much which is also very abnormal for him.  During exam, to very difficult and information from him, he is able to speak just cannot describe how he feels.      Interval Problem Update  3/26: Confusion has improved significantly.  MRI head negative.    Consultants/Specialty  Neurology read his EEG, no formal consult.    Code Status  DO NOT RESUSCITATE    Disposition  Poss Rehab vs SNF    Review of Systems  Review of Systems   Constitutional: Positive for malaise/fatigue. Negative for chills, fever and weight loss.   HENT: Negative.    Respiratory: Negative.  Negative for cough and shortness of breath.    Cardiovascular: Negative.  Negative for chest pain and leg swelling.   Gastrointestinal: Negative.  Negative for abdominal pain, nausea and vomiting.    Genitourinary: Negative.  Negative for dysuria and flank pain.   Musculoskeletal: Negative.  Negative for back pain and myalgias.   Neurological: Negative.  Negative for dizziness, loss of consciousness and weakness.        Drowsy   Endo/Heme/Allergies: Negative.  Does not bruise/bleed easily.   Psychiatric/Behavioral: Negative.  Negative for depression. The patient is not nervous/anxious.    All other systems reviewed and are negative.       Physical Exam  Temp:  [36.6 °C (97.8 °F)-37.2 °C (99 °F)] 36.7 °C (98 °F)  Pulse:  [64-76] 66  Resp:  [18] 18  BP: (111-131)/(44-57) 131/54  SpO2:  [90 %-93 %] 93 %    Physical Exam   Constitutional: He appears well-developed and well-nourished. He appears lethargic. No distress.   HENT:   Nose: Nose normal.   Mouth/Throat: Oropharynx is clear and moist. No oropharyngeal exudate.   Eyes: Conjunctivae are normal. Right eye exhibits no discharge. Left eye exhibits no discharge. No scleral icterus.   Neck: No JVD present. No tracheal deviation present.   Cardiovascular: Normal rate, regular rhythm and normal heart sounds.    Pulmonary/Chest: Effort normal and breath sounds normal. No stridor. No respiratory distress. He has no wheezes. He has no rales. He exhibits no tenderness.   Abdominal: Soft. Bowel sounds are normal. He exhibits no distension. There is no tenderness.   Genitourinary: No penile tenderness.   Musculoskeletal: He exhibits no edema or tenderness.   Neurological: He has normal strength and normal reflexes. He appears lethargic. He is not disoriented. He displays no atrophy and no tremor. No cranial nerve deficit or sensory deficit. He exhibits normal muscle tone. He displays no seizure activity. Coordination and gait normal.   Oriented to self, place and family  Drowsy   Skin: Skin is warm and dry. He is not diaphoretic. No pallor.   Psychiatric: He has a normal mood and affect. His speech is normal and behavior is normal. Judgment normal. He exhibits abnormal  recent memory.   Nursing note and vitals reviewed.      Fluids    Intake/Output Summary (Last 24 hours) at 03/26/19 1622  Last data filed at 03/26/19 0959   Gross per 24 hour   Intake              120 ml   Output              375 ml   Net             -255 ml       Laboratory  Recent Labs      03/24/19 1757 03/25/19 0440  03/26/19   0441   WBC  13.4*  10.1  8.4   RBC  3.36*  3.03*  2.93*   HEMOGLOBIN  13.0*  11.8*  11.4*   HEMATOCRIT  38.2*  34.5*  33.7*   MCV  113.7*  113.9*  115.0*   MCH  38.7*  38.9*  38.9*   MCHC  34.0  34.2  33.8   RDW  73.3*  73.7*  74.9*   PLATELETCT  340  303  283   MPV  9.1  9.4  9.3     Recent Labs      03/24/19 1757 03/25/19 0440  03/26/19   0441   SODIUM  133*  134*  134*   POTASSIUM  3.9  3.9  3.8   CHLORIDE  104  105  107   CO2  22  20  21   GLUCOSE  157*  147*  128*   BUN  13  13  14   CREATININE  0.92  0.81  0.81   CALCIUM  8.9  8.2*  8.2*                   Imaging  MR-BRAIN-W/O   Final Result      1.  Study is significantly limited by motion artifact.   2.  Diffuse primarily central atrophy.   3.  Nonspecific foci of abnormal signal in the white matter bilaterally.  Microvascular ischemic change versus demyelination or gliosis.   4.  No acute stroke or evidence for hemorrhage.   5.  Acute and chronic paranasal sinus disease.      CT-HEAD W/O   Final Result      1.  No acute intracranial abnormality.   2.  Age-consistent atrophy and chronic white matter changes.   3.  Paranasal sinus disease.               INTERPRETING LOCATION:  [address] [place] [address], [address]      DX-CHEST-PORTABLE (1 VIEW)   Final Result      Hypoinflation without other evidence for acute cardiopulmonary disease.           Assessment/Plan  * Weakness   Assessment & Plan    -Profoundly weak on bilateral lower extremities, more or less equal  -PT OT rec SNF, family wants rehab > SNF  -Head CT negative  -Head MRI negative aside from chronic changes     Acute encephalopathy   Assessment & Plan    Consider  possibly seizure, he was incontinent during the episode. EEG shows possible right frontal process, cannot rule out seizure activity.  Ramesh CT and MRI negative  PT and OT note weakness and confusion, family prefer acute rehab if he needs transitional care.  Continue Keppra 500mg po BID for potential of seizure  Case was discussed with neurology, will need neurology follow up.  Check TSH     Anemia   Assessment & Plan    With macrocytosis on chronic hydroxyurea for treatment of thrombocytosis.  Family has noted no bleeding, no melena.  Continue to monitor CBC, no evidence of bleeding  Iron low, will add ferritin  Thiamine, B12 and folic acid levels     Lower back pain   Assessment & Plan    Improved.     Hyponatremia   Assessment & Plan    -Due to dehydration  Improving on iv fluids continue to monitor     Serum total bilirubin elevated   Assessment & Plan    -Asymptomatic, repeat CMP shows it improving  No abdominal pain continue to monitor     Mixed hyperlipidemia- (present on admission)   Assessment & Plan    Continue simvastatin     Thrombocytosis (HCC)- (present on admission)   Assessment & Plan    Levels of platelets currently normal; continue hydroxyurea he takes at home.  Macrocytosis is present which is expected on this medication     Gastroesophageal reflux disease- (present on admission)   Assessment & Plan    -Continue PPI     Obstructive sleep apnea- (present on admission)   Assessment & Plan    Use home cpap machine          VTE prophylaxis: SCD, due to anemia

## 2019-03-26 NOTE — PROGRESS NOTES
Received report from Billie MEDINA. Discussed plan of care and safety measures in place. No further needs at this time.

## 2019-03-26 NOTE — CARE PLAN
Problem: Venous Thromboembolism (VTW)/Deep Vein Thrombosis (DVT) Prevention:  Goal: Patient will participate in Venous Thrombosis (VTE)/Deep Vein Thrombosis (DVT)Prevention Measures  Outcome: PROGRESSING AS EXPECTED  Discussed with patient about importance of SCDs. Pt is ok with wearing them. Pt is aware of DVT prevention.     Problem: Pain Management  Goal: Pain level will decrease to patient's comfort goal  Outcome: PROGRESSING AS EXPECTED  Patient pain level 8/10. Medication given per MAR. Patient pain level decreased to a comfortable level.

## 2019-03-26 NOTE — DISCHARGE PLANNING
Transitional Care Navigator:    Per chart review patient has been identified as a candidate for SNF based on his medical history and  therapy notes. Please consider a referral to SNF prior to a DC of home.

## 2019-03-26 NOTE — RESPIRATORY CARE
COPD EDUCATION by COPD CLINICAL EDUCATOR  3/26/2019 at 9:48 AM by Emilie Mckinnon     Patient reviewed by COPD education team. Patient does not qualify for COPD program.

## 2019-03-26 NOTE — CARE PLAN
Problem: Safety  Goal: Will remain free from falls  Outcome: PROGRESSING AS EXPECTED  Bed is in lowest position, locked, bed alarm engaged.  socks on. Pt near nurses station.     Problem: Bowel/Gastric:  Goal: Normal bowel function is maintained or improved  Pt has stool softeners ordered.

## 2019-03-27 PROBLEM — R41.89 COGNITIVE IMPAIRMENT: Status: ACTIVE | Noted: 2019-03-27

## 2019-03-27 LAB
ALBUMIN SERPL BCP-MCNC: 2.8 G/DL (ref 3.2–4.9)
ALBUMIN/GLOB SERPL: 1.1 G/DL
ALP SERPL-CCNC: 62 U/L (ref 30–99)
ALT SERPL-CCNC: 24 U/L (ref 2–50)
ANION GAP SERPL CALC-SCNC: 5 MMOL/L (ref 0–11.9)
AST SERPL-CCNC: 22 U/L (ref 12–45)
BACTERIA UR CULT: NORMAL
BASOPHILS # BLD AUTO: 0.6 % (ref 0–1.8)
BASOPHILS # BLD: 0.04 K/UL (ref 0–0.12)
BILIRUB SERPL-MCNC: 1.1 MG/DL (ref 0.1–1.5)
BUN SERPL-MCNC: 15 MG/DL (ref 8–22)
CALCIUM SERPL-MCNC: 8 MG/DL (ref 8.4–10.2)
CHLORIDE SERPL-SCNC: 107 MMOL/L (ref 96–112)
CO2 SERPL-SCNC: 22 MMOL/L (ref 20–33)
CREAT SERPL-MCNC: 0.72 MG/DL (ref 0.5–1.4)
EOSINOPHIL # BLD AUTO: 0.2 K/UL (ref 0–0.51)
EOSINOPHIL NFR BLD: 3.1 % (ref 0–6.9)
ERYTHROCYTE [DISTWIDTH] IN BLOOD BY AUTOMATED COUNT: 74.1 FL (ref 35.9–50)
GLOBULIN SER CALC-MCNC: 2.6 G/DL (ref 1.9–3.5)
GLUCOSE SERPL-MCNC: 127 MG/DL (ref 65–99)
HCT VFR BLD AUTO: 30.7 % (ref 42–52)
HGB BLD-MCNC: 10.3 G/DL (ref 14–18)
IMM GRANULOCYTES # BLD AUTO: 0.05 K/UL (ref 0–0.11)
IMM GRANULOCYTES NFR BLD AUTO: 0.8 % (ref 0–0.9)
LYMPHOCYTES # BLD AUTO: 0.87 K/UL (ref 1–4.8)
LYMPHOCYTES NFR BLD: 13.4 % (ref 22–41)
MAGNESIUM SERPL-MCNC: 1.9 MG/DL (ref 1.5–2.5)
MCH RBC QN AUTO: 38 PG (ref 27–33)
MCHC RBC AUTO-ENTMCNC: 33.6 G/DL (ref 33.7–35.3)
MCV RBC AUTO: 113.3 FL (ref 81.4–97.8)
MONOCYTES # BLD AUTO: 0.68 K/UL (ref 0–0.85)
MONOCYTES NFR BLD AUTO: 10.5 % (ref 0–13.4)
NEUTROPHILS # BLD AUTO: 4.66 K/UL (ref 1.82–7.42)
NEUTROPHILS NFR BLD: 71.6 % (ref 44–72)
NRBC # BLD AUTO: 0 K/UL
NRBC BLD-RTO: 0 /100 WBC
PLATELET # BLD AUTO: 264 K/UL (ref 164–446)
PMV BLD AUTO: 9 FL (ref 9–12.9)
POTASSIUM SERPL-SCNC: 3.8 MMOL/L (ref 3.6–5.5)
PROT SERPL-MCNC: 5.4 G/DL (ref 6–8.2)
RBC # BLD AUTO: 2.71 M/UL (ref 4.7–6.1)
SIGNIFICANT IND 70042: NORMAL
SITE SITE: NORMAL
SODIUM SERPL-SCNC: 134 MMOL/L (ref 135–145)
SOURCE SOURCE: NORMAL
WBC # BLD AUTO: 6.5 K/UL (ref 4.8–10.8)

## 2019-03-27 PROCEDURE — 97112 NEUROMUSCULAR REEDUCATION: CPT

## 2019-03-27 PROCEDURE — 700102 HCHG RX REV CODE 250 W/ 637 OVERRIDE(OP): Performed by: INTERNAL MEDICINE

## 2019-03-27 PROCEDURE — 99232 SBSQ HOSP IP/OBS MODERATE 35: CPT | Performed by: INTERNAL MEDICINE

## 2019-03-27 PROCEDURE — 80053 COMPREHEN METABOLIC PANEL: CPT

## 2019-03-27 PROCEDURE — 770006 HCHG ROOM/CARE - MED/SURG/GYN SEMI*

## 2019-03-27 PROCEDURE — 97530 THERAPEUTIC ACTIVITIES: CPT

## 2019-03-27 PROCEDURE — A9270 NON-COVERED ITEM OR SERVICE: HCPCS | Performed by: INTERNAL MEDICINE

## 2019-03-27 PROCEDURE — 85025 COMPLETE CBC W/AUTO DIFF WBC: CPT

## 2019-03-27 PROCEDURE — 700102 HCHG RX REV CODE 250 W/ 637 OVERRIDE(OP): Performed by: HOSPITALIST

## 2019-03-27 PROCEDURE — 83735 ASSAY OF MAGNESIUM: CPT

## 2019-03-27 PROCEDURE — A9270 NON-COVERED ITEM OR SERVICE: HCPCS | Performed by: HOSPITALIST

## 2019-03-27 PROCEDURE — 94660 CPAP INITIATION&MGMT: CPT

## 2019-03-27 PROCEDURE — 97535 SELF CARE MNGMENT TRAINING: CPT

## 2019-03-27 RX ADMIN — OMEPRAZOLE 20 MG: 20 CAPSULE, DELAYED RELEASE ORAL at 20:48

## 2019-03-27 RX ADMIN — HYDROXYUREA 1500 MG: 500 CAPSULE ORAL at 20:48

## 2019-03-27 RX ADMIN — TRAMADOL HYDROCHLORIDE 50 MG: 50 TABLET, FILM COATED ORAL at 20:54

## 2019-03-27 RX ADMIN — LEVETIRACETAM 500 MG: 500 TABLET ORAL at 05:55

## 2019-03-27 RX ADMIN — STANDARDIZED SENNA CONCENTRATE AND DOCUSATE SODIUM 2 TABLET: 8.6; 5 TABLET, FILM COATED ORAL at 18:08

## 2019-03-27 RX ADMIN — TRAMADOL HYDROCHLORIDE 50 MG: 50 TABLET, FILM COATED ORAL at 05:55

## 2019-03-27 RX ADMIN — TAMSULOSIN HYDROCHLORIDE 0.4 MG: 0.4 CAPSULE ORAL at 20:48

## 2019-03-27 RX ADMIN — SIMVASTATIN 20 MG: 20 TABLET, FILM COATED ORAL at 18:08

## 2019-03-27 RX ADMIN — LEVETIRACETAM 500 MG: 500 TABLET ORAL at 18:08

## 2019-03-27 ASSESSMENT — ENCOUNTER SYMPTOMS
RESPIRATORY NEGATIVE: 1
COUGH: 0
ABDOMINAL PAIN: 0
BACK PAIN: 1
SHORTNESS OF BREATH: 0
BRUISES/BLEEDS EASILY: 0
CHILLS: 0
WEIGHT LOSS: 0
VOMITING: 0
FEVER: 0
DIZZINESS: 0
NAUSEA: 0
NEUROLOGICAL NEGATIVE: 1
MYALGIAS: 0
PSYCHIATRIC NEGATIVE: 1
CARDIOVASCULAR NEGATIVE: 1
WEAKNESS: 0
NERVOUS/ANXIOUS: 0
DEPRESSION: 0
LOSS OF CONSCIOUSNESS: 0
FLANK PAIN: 0
GASTROINTESTINAL NEGATIVE: 1

## 2019-03-27 ASSESSMENT — PATIENT HEALTH QUESTIONNAIRE - PHQ9
2. FEELING DOWN, DEPRESSED, IRRITABLE, OR HOPELESS: NOT AT ALL
SUM OF ALL RESPONSES TO PHQ9 QUESTIONS 1 AND 2: 0
1. LITTLE INTEREST OR PLEASURE IN DOING THINGS: NOT AT ALL

## 2019-03-27 NOTE — THERAPY
"Occupational Therapy Treatment completed with focus on ADLs, ADL transfers, patient education, caregiver training, cognition and upper extremity function.  Functional Status: A&Ox3. Mild confusion. Performs sup to sit with SBA. EOB activity ~20\". STS with CGA, FWW, v/c's. Stands 2\", posterior lean. Side-steps to chair with CGA,FWW. Seated grooming with set-up, SBA. Tolerates UIC post session.     Plan of Care: Will benefit from Occupational Therapy 3 times per week  Discharge Recommendations:  Equipment Will Continue to Assess for Equipment Needs. Post-acute therapy Discharge to a transitional care facility for continued skilled therapy services.  See \"Rehab Therapy-Acute\" Patient Summary Report for complete documentation.   "

## 2019-03-27 NOTE — DISCHARGE PLANNING
LSW spoke with patient, daughter, and spouse at bedside. LSW discussed differences in acute rehab and SNF. Family believes pt would be best at SNF. Patient's spouse Sally would like SNF referrals to be sent to [facility].   LSW faxed choice to [facility]  LSW and [facility] will f/u on referral and bed availability

## 2019-03-27 NOTE — FLOWSHEET NOTE
03/27/19 0158   CPAP/BiPAP [facility]   Nocturnal CPAP or BiPAP CPAP   #System Evaluation Yes   Home Unit Used? Yes   Equipment Inspected for Cleanliness, Operation, and Safety? Yes   Settings (If Known) auto pap   FiO2 or LPM 2   Home Mask Used? Yes

## 2019-03-27 NOTE — DISCHARGE PLANNING
Dr. Rodrigues is recommending [facility].  Submitted to insurance with Ny[facility] @ [facility].  [facility] left for CAREY Rose [phone].

## 2019-03-27 NOTE — PROGRESS NOTES
Removed 2 interrupted and one continuous suture from pt's R cheek.  One interrupted suture left in place due to not being able to get scissors under knot.

## 2019-03-27 NOTE — DISCHARGE PLANNING
PMR order received from Dr. Solis.  SCP is shown for his medical provider.  Presented with a chief complaint of weakness and urinary incontinence.  Brain MRI Negative for an acute infarct.  Seizure?  Neuro consult?  Need a definitive DX.  Msg placed to Dr. Solis and Rose,  0096.  This referral will not be forwarded to Physiatry @ this time.  TCC remains monitoring.  I do appreciate the referral.

## 2019-03-27 NOTE — FLOWSHEET NOTE
03/26/19 2125   CPAP/BiPAP [facility] [employer]   Nocturnal CPAP or BiPAP CPAP   #System Evaluation Yes   Home Unit Used? Yes   Equipment Inspected for Cleanliness, Operation, and Safety? Yes   Settings (If Known) AUTO PAP   FiO2 or LPM 2   Pt is currently not wearing CPAP/BiPap unit Yes   Home Mask Used? Yes

## 2019-03-27 NOTE — CONSULTS
Medical chart review completed.     Patient is a 84 y.o. male  with a past medical history of BPH, GERD, FREYA, chronic thrombocytosis, admitted to [facility] on 3/24, with weakness, bowel/bladder incontinence. Head CT and Brain MRI negative. No imaging of spine. Weakness has improved, notes indicate is mostly generalized. He had an EEG that showed mild focal irritability over R.L frontal lobes, no seizures, but patient started on Keppra. Patient initially with leukocytosis which resolved.    Patient with multiple co-morbidities(including but not limited to thrombocytosis, iron deficiency anemia, chronic back pain); with cognitive deficits and functional deficits in mobility/self-cares, and Severe de-conditioning.     Pre-morbidly, this patient lived in a two level home with unknown steps to enter, with spouse. The patient was evaluated by acute care Physical Therapy and Occupational Therapy; currently requiring moderate assistance for mobility and moderate assistance for ADLs, also with ongoing cognitive deficits.     6 clicks score 9 mobility, 14 ADLs    The patient is a very good candidate for an acute inpatient rehabilitation program with a coordinated program of care at an intensity and frequency not available at a lower level of care.     Note: if the patient continues to progress while waiting for medical clearance, and no longer requires 2 out of 3 therapy services (PT/OT/SLP), then the patient would no longer meet the criteria for acute inpatient rehabilitation.    This recommendation is substantiated by the patient's current medical condition with intervention and assessment of medical issues requiring an acute level of care for patient's safety and maximum outcome. A coordinated program of care will be provided by an interdisciplinary team including physical therapy, occupational therapy, speech language pathology, hospitalist, physiatry, rehab nursing and rehab psychology. Rehab goals include  improved cognition, mobility, self-care management, strength and conditioning/endurance, pain management, bowel and bladder management, mood and affect, and safety with independent home management including caregiver training. Estimated length of stay is approximately 14 days. Rehab potential: Very Good. Disposition: to pre-morbid independent living setting with supportive care of patient's spouse. We will continue to follow with you in anticipation of discharge to acute inpatient rehabilitation when medically stable to do so at the discretion of his attending physician.     Thank you for allowing us to participate in his care.    Vonda Rodrigues M.D.  Physical Medicine and Rehabilitation

## 2019-03-27 NOTE — PROGRESS NOTES
Received bedside report from CAROL Mendoza. Plan of care discussed. Safety precautions in place. Call light and personal belongings within reach. Patient has no needs at this time.

## 2019-03-27 NOTE — PROGRESS NOTES
Gave bedside report to CAROL Jones and CAROL Mendoza. Plan of care discussed. Safety precautions in place. Personal belongings and call light are with in reach. Patient has no additional needs at this time.

## 2019-03-27 NOTE — CARE PLAN
Problem: Infection  Goal: Will remain free from infection  Outcome: PROGRESSING AS EXPECTED  Discussed infection prevention with pt including hand hygiene, oral care, and bathing.  Pt has been keeping up with hygiene and expressed understanding in its role in infection prevention.      Problem: Bowel/Gastric:  Goal: Normal bowel function is maintained or improved  Outcome: PROGRESSING AS EXPECTED  Discussed with pt how medication, ambulation and eating and staying hydrated with help improve bowel function and motility.

## 2019-03-27 NOTE — CARE PLAN
Problem: Safety  Goal: Will remain free from injury  Outcome: PROGRESSING AS EXPECTED  Remind patient to use call light and provide assistance. Bed in low position, bed locked, and appropriate alarms set. Patient wearing non-slip socks. Call light and personal belongings are within reach.     Problem: Knowledge Deficit  Goal: Knowledge of the prescribed therapeutic regimen will improve  Outcome: PROGRESSING AS EXPECTED  Reorient patient to surroundings as needed. Encourage patient and family to ask questions and be involved in plan of care. Provide education on treatment plan, diagnostic testing, and medications; have patient and family verbalize understanding.

## 2019-03-27 NOTE — DISCHARGE PLANNING
Received Choice form at 6940  Agency/Facility Name[facility]  Referral sent per Choice form @ [id number]

## 2019-03-27 NOTE — THERAPY
3/27/19  Attempted PT treatment.  Pt refused secondary to fatigue.  Pt stated he had just finished a shower, and he did not have the energy.  Pt indicated tomorrow would be better.  Nursing informed.   sp

## 2019-03-27 NOTE — PROGRESS NOTES
Report received from Temo MEDINA. Plan of care discussed. Patient resting comfortably in bed, declines any further needs at this time. Safety precautions in place.

## 2019-03-28 LAB
BASOPHILS # BLD AUTO: 0.8 % (ref 0–1.8)
BASOPHILS # BLD: 0.04 K/UL (ref 0–0.12)
EOSINOPHIL # BLD AUTO: 0.23 K/UL (ref 0–0.51)
EOSINOPHIL NFR BLD: 4.4 % (ref 0–6.9)
ERYTHROCYTE [DISTWIDTH] IN BLOOD BY AUTOMATED COUNT: 71.7 FL (ref 35.9–50)
HCT VFR BLD AUTO: 33 % (ref 42–52)
HGB BLD-MCNC: 11.3 G/DL (ref 14–18)
IMM GRANULOCYTES # BLD AUTO: 0.03 K/UL (ref 0–0.11)
IMM GRANULOCYTES NFR BLD AUTO: 0.6 % (ref 0–0.9)
LYMPHOCYTES # BLD AUTO: 0.86 K/UL (ref 1–4.8)
LYMPHOCYTES NFR BLD: 16.3 % (ref 22–41)
MCH RBC QN AUTO: 38.4 PG (ref 27–33)
MCHC RBC AUTO-ENTMCNC: 34.2 G/DL (ref 33.7–35.3)
MCV RBC AUTO: 112.2 FL (ref 81.4–97.8)
MONOCYTES # BLD AUTO: 0.54 K/UL (ref 0–0.85)
MONOCYTES NFR BLD AUTO: 10.2 % (ref 0–13.4)
NEUTROPHILS # BLD AUTO: 3.58 K/UL (ref 1.82–7.42)
NEUTROPHILS NFR BLD: 67.7 % (ref 44–72)
NRBC # BLD AUTO: 0 K/UL
NRBC BLD-RTO: 0 /100 WBC
PLATELET # BLD AUTO: 290 K/UL (ref 164–446)
PMV BLD AUTO: 8.6 FL (ref 9–12.9)
RBC # BLD AUTO: 2.94 M/UL (ref 4.7–6.1)
VIT B1 BLD-MCNC: 106 NMOL/L (ref 70–180)
WBC # BLD AUTO: 5.3 K/UL (ref 4.8–10.8)

## 2019-03-28 PROCEDURE — 97530 THERAPEUTIC ACTIVITIES: CPT

## 2019-03-28 PROCEDURE — 700102 HCHG RX REV CODE 250 W/ 637 OVERRIDE(OP): Performed by: HOSPITALIST

## 2019-03-28 PROCEDURE — A9270 NON-COVERED ITEM OR SERVICE: HCPCS | Performed by: INTERNAL MEDICINE

## 2019-03-28 PROCEDURE — A9270 NON-COVERED ITEM OR SERVICE: HCPCS | Performed by: HOSPITALIST

## 2019-03-28 PROCEDURE — 85025 COMPLETE CBC W/AUTO DIFF WBC: CPT

## 2019-03-28 PROCEDURE — 770006 HCHG ROOM/CARE - MED/SURG/GYN SEMI*

## 2019-03-28 PROCEDURE — 94760 N-INVAS EAR/PLS OXIMETRY 1: CPT

## 2019-03-28 PROCEDURE — 97110 THERAPEUTIC EXERCISES: CPT

## 2019-03-28 PROCEDURE — 97535 SELF CARE MNGMENT TRAINING: CPT

## 2019-03-28 PROCEDURE — 99232 SBSQ HOSP IP/OBS MODERATE 35: CPT | Performed by: INTERNAL MEDICINE

## 2019-03-28 PROCEDURE — 700102 HCHG RX REV CODE 250 W/ 637 OVERRIDE(OP): Performed by: INTERNAL MEDICINE

## 2019-03-28 RX ORDER — LEVETIRACETAM 500 MG/1
500 TABLET ORAL EVERY 12 HOURS
Qty: 60 TAB | Refills: 2 | Status: ON HOLD | OUTPATIENT
Start: 2019-03-28 | End: 2019-04-10

## 2019-03-28 RX ADMIN — TRAMADOL HYDROCHLORIDE 50 MG: 50 TABLET, FILM COATED ORAL at 17:13

## 2019-03-28 RX ADMIN — LEVETIRACETAM 500 MG: 500 TABLET ORAL at 05:21

## 2019-03-28 RX ADMIN — LEVETIRACETAM 500 MG: 500 TABLET ORAL at 17:13

## 2019-03-28 RX ADMIN — OMEPRAZOLE 20 MG: 20 CAPSULE, DELAYED RELEASE ORAL at 21:02

## 2019-03-28 RX ADMIN — TAMSULOSIN HYDROCHLORIDE 0.4 MG: 0.4 CAPSULE ORAL at 21:02

## 2019-03-28 RX ADMIN — HYDROXYUREA 1500 MG: 500 CAPSULE ORAL at 21:04

## 2019-03-28 RX ADMIN — SIMVASTATIN 20 MG: 20 TABLET, FILM COATED ORAL at 17:13

## 2019-03-28 ASSESSMENT — ENCOUNTER SYMPTOMS
FEVER: 0
NEUROLOGICAL NEGATIVE: 1
BACK PAIN: 1
WEIGHT LOSS: 0
RESPIRATORY NEGATIVE: 1
SHORTNESS OF BREATH: 0
LOSS OF CONSCIOUSNESS: 0
ABDOMINAL PAIN: 0
DEPRESSION: 0
CHILLS: 0
NAUSEA: 0
PSYCHIATRIC NEGATIVE: 1
WEAKNESS: 0
MYALGIAS: 0
FLANK PAIN: 0
COUGH: 0
DIZZINESS: 0
CARDIOVASCULAR NEGATIVE: 1
VOMITING: 0
BRUISES/BLEEDS EASILY: 0
NERVOUS/ANXIOUS: 0
GASTROINTESTINAL NEGATIVE: 1

## 2019-03-28 ASSESSMENT — PATIENT HEALTH QUESTIONNAIRE - PHQ9
1. LITTLE INTEREST OR PLEASURE IN DOING THINGS: NOT AT ALL
SUM OF ALL RESPONSES TO PHQ9 QUESTIONS 1 AND 2: 0
2. FEELING DOWN, DEPRESSED, IRRITABLE, OR HOPELESS: NOT AT ALL

## 2019-03-28 ASSESSMENT — GAIT ASSESSMENTS
GAIT LEVEL OF ASSIST: MINIMAL ASSIST
DEVIATION: SHUFFLED GAIT;DECREASED HEEL STRIKE;DECREASED TOE OFF
DISTANCE (FEET): 150
ASSISTIVE DEVICE: FRONT WHEEL WALKER

## 2019-03-28 NOTE — CARE PLAN
Problem: Knowledge Deficit  Goal: Knowledge of disease process/condition, treatment plan, diagnostic tests, and medications will improve  Outcome: PROGRESSING AS EXPECTED  Discuss plan of care with patient including pain medication and scheduled medication administered.

## 2019-03-28 NOTE — PROGRESS NOTES
Received report from José MEDINA. Discussed plan of care and safety measures in place. No further needs at this time.

## 2019-03-28 NOTE — PROGRESS NOTES
Medicated pt per MAR, pt declines any pain of discomfort at this time. Safety precautions are in place, will continue to monitor.     Report given to Kelly MEDINA. Plan of care discussed. Patient resting comfortably in bed. Safety precautions in place.

## 2019-03-28 NOTE — PROGRESS NOTES
1900 Report received from Robert MEDINA and Kelly MEDINA. Plan of care discussed. Patient resting comfortably in bed. Safety precautions in place.     2100 Assessment completed, pt is a&o x4, stated his pain is 5/10 on his back and requested PRN tramadol. Medicated per MAR, pt took medications one at a time and tolerated well. Pt declines any further needs at this time. Pt stated he is able to place CPAP on by himself. Safety precautions are in place, will continue to monitor.     0000 Patient is sleeping in bed comfortably, respirations are even and unlabored. CPAP in place. Safety precautions in place, will continue to monitor.

## 2019-03-28 NOTE — THERAPY
"Occupational Therapy Evaluation completed.   Functional Status:  Pt was in BR at start of session, agreed to work with therapy. Pt required assist to complete hygiene after toileting, NSG assisted. Pt was able to stand at the sink for hygiene and grooming with close SBA. Requires assist for walker placement and posture. Will continue to benefit from acute OT services.  Plan of Care: Will benefit from Occupational Therapy 3 times per week  Discharge Recommendations:  Equipment: Will Continue to Assess for Equipment Needs. Post-acute therapy Discharge to a transitional care facility for continued skilled therapy services.    See \"Rehab Therapy-Acute\" Patient Summary Report for complete documentation.    "

## 2019-03-28 NOTE — THERAPY
"Physical Therapy Treatment completed.   Bed Mobility:  Supine to Sit: Stand by Assist  Transfers: Sit to Stand: Stand by Assist  Gait: Level Of Assist: Minimal Assist with Front-Wheel Walker    X 150 ft   Plan of Care: Will benefit from Physical Therapy 4 times per week  Discharge Recommendations: Equipment: Will Continue to Assess for Equipment Needs. Post-acute therapy Discharge to a transitional care facility for continued skilled therapy services.     See \"Rehab Therapy-Acute\" Patient Summary Report for complete documentation.     Pt pleasant and cooperative and motivated to participate in PT.  Pt demonstrating improved activity tolerance in stance.  Pt demonstrating impaired gait pattern with shuffling gait and risk for falls.  Pt requires repeated cues to improve gait pattern.  PT provided verbal and visual cues for proper gait.  Pt demonstrated impaired safety judgement with all mobility.  Pt easily distracted and requires frequent redirection.  The pt is making gains in activity tolerance.  Pt cont to be high fall risk.  Pt fatiques quickly with LE exercises and demonstrates bilat LE weakness.  Pt is not safe to d/c home and would benefit from cont skilled PT in acute care setting to improve strength, balance, gait and mobility.    "

## 2019-03-28 NOTE — CARE PLAN
Problem: Bowel/Gastric:  Goal: Normal bowel function is maintained or improved  Outcome: PROGRESSING AS EXPECTED   03/27/19 2100   OTHER   Last BM 03/27/19

## 2019-03-28 NOTE — CARE PLAN
Problem: Discharge Barriers/Planning  Goal: Patient's continuum of care needs will be met  Outcome: PROGRESSING AS EXPECTED  Discussed discharge planning with patient and family members. All questions answered.     Problem: Skin Integrity  Goal: Risk for impaired skin integrity will decrease  Outcome: PROGRESSING AS EXPECTED  Patient is ambulating more often and sitting in cardiac chair. Assessing skin and making sure patient moves in bed.

## 2019-03-28 NOTE — PROGRESS NOTES
Report received from Kelly MEDINA. Plan of care discussed. Patient resting comfortably in bed, declines any further needs at this time. Safety precautions in place.

## 2019-03-28 NOTE — PROGRESS NOTES
[facility] Daily Progress Note    Date of Service  3/27/2019    Chief Complaint  84 y.o. male admitted 3/24/2019 with weakness and altered mental status.    Hospital Course    84 y.o. male who presented 3/24/2019 with weakness and altered mental status.  Patient is a bit confused, he is not very verbal and gets off track so a lot of the information was obtained from the wife at bedside.  He recently had bronchitis around 2 weeks ago, finished azithromycin, is much better, was very fatigued during this but did seem to improve.  He had improved until last night when he began complaining of right lower back/buttock pain.  He was up most of the night trying to get comfortable, still uncomfortable.  This morning he seemed more confused per the wife, initially he was just trying to figure out how to get out of the bed, took him quite some time, then he was incontinent of urine and did not seem to care which is very abnormal for him.  He kept putting his leg off to the side of the bed, at one point when she stepped away he slid out of the bed onto the floor, she did not see it but does not think there was any trauma.  She states he has not been eating or drinking much which is also very abnormal for him.  During exam, to very difficult and information from him, he is able to speak just cannot describe how he feels.      Interval Problem Update  3/26: Confusion has improved significantly.  MRI head negative.  3/27: Worked with pt/ot, accepted to rehab.  No e/o seizure or LOC    Consultants/Specialty  Neurology read his EEG, no formal consult.    Code Status  DO NOT RESUSCITATE    Disposition  Poss Rehab vs SNF    Review of Systems  Review of Systems   Constitutional: Positive for malaise/fatigue. Negative for chills, fever and weight loss.   HENT: Negative.    Respiratory: Negative.  Negative for cough and shortness of breath.    Cardiovascular: Negative.  Negative for chest pain and leg swelling.   Gastrointestinal:  Negative.  Negative for abdominal pain, nausea and vomiting.   Genitourinary: Negative.  Negative for dysuria and flank pain.   Musculoskeletal: Positive for back pain (Improved (right sided)). Negative for myalgias.   Neurological: Negative.  Negative for dizziness, loss of consciousness and weakness.        Drowsy   Endo/Heme/Allergies: Negative.  Does not bruise/bleed easily.   Psychiatric/Behavioral: Negative.  Negative for depression. The patient is not nervous/anxious.    All other systems reviewed and are negative.       Physical Exam  Temp:  [36.2 °C (97.2 °F)-36.8 °C (98.2 °F)] 36.2 °C (97.2 °F)  Pulse:  [62-81] 81  Resp:  [19-20] 19  BP: (109-129)/(53-60) 109/60  SpO2:  [91 %-94 %] 94 %    Physical Exam   Constitutional: He appears well-developed and well-nourished. No distress.   Sitting up at edge of bed   HENT:   Nose: Nose normal.   Mouth/Throat: Oropharynx is clear and moist. No oropharyngeal exudate.   Eyes: Conjunctivae are normal. Right eye exhibits no discharge. Left eye exhibits no discharge. No scleral icterus.   Neck: No JVD present. No tracheal deviation present.   Cardiovascular: Normal rate, regular rhythm and normal heart sounds.    Pulmonary/Chest: Effort normal and breath sounds normal. No stridor. No respiratory distress. He has no wheezes. He has no rales. He exhibits no tenderness.   Abdominal: Soft. Bowel sounds are normal. He exhibits no distension. There is no tenderness.   Genitourinary: No penile tenderness.   Musculoskeletal: He exhibits tenderness (Mild R SI joint). He exhibits no edema.   Neurological: He is alert. He has normal strength and normal reflexes. He is not disoriented. He displays no atrophy and no tremor. No cranial nerve deficit or sensory deficit. He exhibits normal muscle tone. He displays no seizure activity. Coordination and gait normal.   Oriented to self, place and family   Skin: Skin is warm and dry. He is not diaphoretic. No pallor.   Psychiatric: He has a  normal mood and affect. His speech is normal and behavior is normal. Judgment normal. He exhibits abnormal recent memory.   Nursing note and vitals reviewed.      Fluids    Intake/Output Summary (Last 24 hours) at 03/27/19 1804  Last data filed at 03/27/19 0845   Gross per 24 hour   Intake              360 ml   Output              125 ml   Net              235 ml       Laboratory  Recent Labs      03/25/19   0440  03/26/19   0441  03/27/19   0439   WBC  10.1  8.4  6.5   RBC  3.03*  2.93*  2.71*   HEMOGLOBIN  11.8*  11.4*  10.3*   HEMATOCRIT  34.5*  33.7*  30.7*   MCV  113.9*  115.0*  113.3*   MCH  38.9*  38.9*  38.0*   MCHC  34.2  33.8  33.6*   RDW  73.7*  74.9*  74.1*   PLATELETCT  303  283  264   MPV  9.4  9.3  9.0     Recent Labs      03/25/19 0440 03/26/19 0441 03/27/19   0439   SODIUM  134*  134*  134*   POTASSIUM  3.9  3.8  3.8   CHLORIDE  105  107  107   CO2  20  21  22   GLUCOSE  147*  128*  127*   BUN  13  14  15   CREATININE  0.81  0.81  0.72   CALCIUM  8.2*  8.2*  8.0*                   Imaging  MR-BRAIN-W/O   Final Result      1.  Study is significantly limited by motion artifact.   2.  Diffuse primarily central atrophy.   3.  Nonspecific foci of abnormal signal in the white matter bilaterally.  Microvascular ischemic change versus demyelination or gliosis.   4.  No acute stroke or evidence for hemorrhage.   5.  Acute and chronic paranasal sinus disease.      CT-HEAD W/O   Final Result      1.  No acute intracranial abnormality.   2.  Age-consistent atrophy and chronic white matter changes.   3.  Paranasal sinus disease.               INTERPRETING LOCATION:  [address] [place] [address], [id number]      DX-CHEST-PORTABLE (1 VIEW)   Final Result      Hypoinflation without other evidence for acute cardiopulmonary disease.           Assessment/Plan  * Weakness   Assessment & Plan    -Profoundly weak on bilateral lower extremities, more or less equal  -PT OT rec SNF, family wants rehab > SNF  -Head CT  negative  -Head MRI negative aside from chronic changes     Acute encephalopathy   Assessment & Plan    Consider possibly seizure, he was incontinent during the episode. EEG shows possible right frontal process, cannot rule out seizure activity.  Ramesh CT and MRI negative  PT and OT note weakness and confusion, he has been accepted to rehab  Continue Keppra 500mg po BID for seizure prophylaxis  Case was discussed with neurology, will need neurology follow up.  TSH ok     Anemia   Assessment & Plan    With macrocytosis on chronic hydroxyurea for treatment of thrombocytosis.  Family has noted no bleeding, no melena.  Continue to monitor CBC, no evidence of bleeding  Iron low, will add ferritin  Thiamine, B12 and folic acid levels     Lower back pain   Assessment & Plan    Improved.     Hyponatremia   Assessment & Plan    -Due to dehydration  Improving on iv fluids continue to monitor     Serum total bilirubin elevated   Assessment & Plan    -Asymptomatic, repeat CMP shows it improving  No abdominal pain continue to monitor     Mixed hyperlipidemia- (present on admission)   Assessment & Plan    Continue simvastatin     Thrombocytosis (HCC)- (present on admission)   Assessment & Plan    Levels of platelets currently normal; continue hydroxyurea he takes at home.  Macrocytosis is present which is expected on this medication     Gastroesophageal reflux disease- (present on admission)   Assessment & Plan    -Continue PPI     Obstructive sleep apnea- (present on admission)   Assessment & Plan    Use home cpap machine          VTE prophylaxis: SCD, due to anemia

## 2019-03-29 ENCOUNTER — HOSPITAL ENCOUNTER (INPATIENT)
Facility: REHABILITATION | Age: 84
LOS: 13 days | DRG: 071 | End: 2019-04-11
Attending: PHYSICAL MEDICINE & REHABILITATION | Admitting: PHYSICAL MEDICINE & REHABILITATION
Payer: MEDICARE

## 2019-03-29 VITALS
RESPIRATION RATE: 18 BRPM | SYSTOLIC BLOOD PRESSURE: 121 MMHG | WEIGHT: 211.42 LBS | DIASTOLIC BLOOD PRESSURE: 58 MMHG | TEMPERATURE: 98.3 F | HEART RATE: 93 BPM | HEIGHT: 72 IN | BODY MASS INDEX: 28.64 KG/M2 | OXYGEN SATURATION: 93 %

## 2019-03-29 LAB
BACTERIA BLD CULT: NORMAL
BACTERIA BLD CULT: NORMAL
BASOPHILS # BLD AUTO: 0.4 % (ref 0–1.8)
BASOPHILS # BLD: 0.03 K/UL (ref 0–0.12)
EOSINOPHIL # BLD AUTO: 0.13 K/UL (ref 0–0.51)
EOSINOPHIL NFR BLD: 1.9 % (ref 0–6.9)
ERYTHROCYTE [DISTWIDTH] IN BLOOD BY AUTOMATED COUNT: 72.2 FL (ref 35.9–50)
HCT VFR BLD AUTO: 34.7 % (ref 42–52)
HGB BLD-MCNC: 11.9 G/DL (ref 14–18)
IMM GRANULOCYTES # BLD AUTO: 0.05 K/UL (ref 0–0.11)
IMM GRANULOCYTES NFR BLD AUTO: 0.7 % (ref 0–0.9)
LYMPHOCYTES # BLD AUTO: 0.49 K/UL (ref 1–4.8)
LYMPHOCYTES NFR BLD: 7.3 % (ref 22–41)
MCH RBC QN AUTO: 38.4 PG (ref 27–33)
MCHC RBC AUTO-ENTMCNC: 34.3 G/DL (ref 33.7–35.3)
MCV RBC AUTO: 111.9 FL (ref 81.4–97.8)
MONOCYTES # BLD AUTO: 0.74 K/UL (ref 0–0.85)
MONOCYTES NFR BLD AUTO: 11 % (ref 0–13.4)
NEUTROPHILS # BLD AUTO: 5.29 K/UL (ref 1.82–7.42)
NEUTROPHILS NFR BLD: 78.7 % (ref 44–72)
NRBC # BLD AUTO: 0 K/UL
NRBC BLD-RTO: 0 /100 WBC
PLATELET # BLD AUTO: 309 K/UL (ref 164–446)
PMV BLD AUTO: 8.6 FL (ref 9–12.9)
RBC # BLD AUTO: 3.1 M/UL (ref 4.7–6.1)
SIGNIFICANT IND 70042: NORMAL
SIGNIFICANT IND 70042: NORMAL
SITE SITE: NORMAL
SITE SITE: NORMAL
SOURCE SOURCE: NORMAL
SOURCE SOURCE: NORMAL
WBC # BLD AUTO: 6.7 K/UL (ref 4.8–10.8)

## 2019-03-29 PROCEDURE — 770010 HCHG ROOM/CARE - REHAB SEMI PRIVAT*

## 2019-03-29 PROCEDURE — 99223 1ST HOSP IP/OBS HIGH 75: CPT | Mod: AI | Performed by: PHYSICAL MEDICINE & REHABILITATION

## 2019-03-29 PROCEDURE — 700111 HCHG RX REV CODE 636 W/ 250 OVERRIDE (IP): Performed by: PHYSICAL MEDICINE & REHABILITATION

## 2019-03-29 PROCEDURE — 700102 HCHG RX REV CODE 250 W/ 637 OVERRIDE(OP): Performed by: HOSPITALIST

## 2019-03-29 PROCEDURE — 94760 N-INVAS EAR/PLS OXIMETRY 1: CPT

## 2019-03-29 PROCEDURE — A9270 NON-COVERED ITEM OR SERVICE: HCPCS | Performed by: PHYSICAL MEDICINE & REHABILITATION

## 2019-03-29 PROCEDURE — 85025 COMPLETE CBC W/AUTO DIFF WBC: CPT

## 2019-03-29 PROCEDURE — 99239 HOSP IP/OBS DSCHRG MGMT >30: CPT | Performed by: INTERNAL MEDICINE

## 2019-03-29 PROCEDURE — 700102 HCHG RX REV CODE 250 W/ 637 OVERRIDE(OP): Performed by: PHYSICAL MEDICINE & REHABILITATION

## 2019-03-29 PROCEDURE — 700102 HCHG RX REV CODE 250 W/ 637 OVERRIDE(OP): Performed by: INTERNAL MEDICINE

## 2019-03-29 PROCEDURE — A9270 NON-COVERED ITEM OR SERVICE: HCPCS | Performed by: HOSPITALIST

## 2019-03-29 PROCEDURE — A9270 NON-COVERED ITEM OR SERVICE: HCPCS | Performed by: INTERNAL MEDICINE

## 2019-03-29 PROCEDURE — 94660 CPAP INITIATION&MGMT: CPT

## 2019-03-29 RX ORDER — AMOXICILLIN 250 MG
2 CAPSULE ORAL 2 TIMES DAILY
Status: DISCONTINUED | OUTPATIENT
Start: 2019-03-29 | End: 2019-03-29

## 2019-03-29 RX ORDER — OXYCODONE HYDROCHLORIDE 5 MG/1
5 TABLET ORAL
Status: DISCONTINUED | OUTPATIENT
Start: 2019-03-29 | End: 2019-04-11 | Stop reason: HOSPADM

## 2019-03-29 RX ORDER — SIMVASTATIN 20 MG
20 TABLET ORAL EVERY EVENING
Status: DISCONTINUED | OUTPATIENT
Start: 2019-03-29 | End: 2019-04-11 | Stop reason: HOSPADM

## 2019-03-29 RX ORDER — OMEPRAZOLE 20 MG/1
20 CAPSULE, DELAYED RELEASE ORAL
Status: CANCELLED | OUTPATIENT
Start: 2019-03-29

## 2019-03-29 RX ORDER — BISACODYL 10 MG
10 SUPPOSITORY, RECTAL RECTAL
Status: DISCONTINUED | OUTPATIENT
Start: 2019-03-29 | End: 2019-04-11 | Stop reason: HOSPADM

## 2019-03-29 RX ORDER — HYDROXYUREA 500 MG/1
1500 CAPSULE ORAL
Status: DISCONTINUED | OUTPATIENT
Start: 2019-03-29 | End: 2019-04-11 | Stop reason: HOSPADM

## 2019-03-29 RX ORDER — HYDROXYUREA 500 MG/1
1000 CAPSULE ORAL
Status: DISCONTINUED | OUTPATIENT
Start: 2019-03-30 | End: 2019-04-11 | Stop reason: HOSPADM

## 2019-03-29 RX ORDER — ASCORBIC ACID 500 MG
500 TABLET ORAL 2 TIMES DAILY WITH MEALS
Status: DISCONTINUED | OUTPATIENT
Start: 2019-03-29 | End: 2019-04-11 | Stop reason: HOSPADM

## 2019-03-29 RX ORDER — LEVETIRACETAM 500 MG/1
500 TABLET ORAL EVERY 12 HOURS
Status: DISCONTINUED | OUTPATIENT
Start: 2019-03-29 | End: 2019-04-05

## 2019-03-29 RX ORDER — ACETAMINOPHEN 325 MG/1
650 TABLET ORAL EVERY 4 HOURS PRN
Status: DISCONTINUED | OUTPATIENT
Start: 2019-03-29 | End: 2019-04-11 | Stop reason: HOSPADM

## 2019-03-29 RX ORDER — M-VIT,TX,IRON,MINS/CALC/FOLIC 27MG-0.4MG
1 TABLET ORAL
Status: DISCONTINUED | OUTPATIENT
Start: 2019-03-30 | End: 2019-04-11 | Stop reason: HOSPADM

## 2019-03-29 RX ORDER — POLYETHYLENE GLYCOL 3350 17 G/17G
1 POWDER, FOR SOLUTION ORAL
Status: CANCELLED | OUTPATIENT
Start: 2019-03-29

## 2019-03-29 RX ORDER — OMEPRAZOLE 20 MG/1
20 CAPSULE, DELAYED RELEASE ORAL
Status: DISCONTINUED | OUTPATIENT
Start: 2019-03-29 | End: 2019-04-11 | Stop reason: HOSPADM

## 2019-03-29 RX ORDER — LEVETIRACETAM 500 MG/1
500 TABLET ORAL EVERY 12 HOURS
Status: CANCELLED | OUTPATIENT
Start: 2019-03-29

## 2019-03-29 RX ORDER — HYDROXYUREA 500 MG/1
1500 CAPSULE ORAL
Status: CANCELLED | OUTPATIENT
Start: 2019-03-29

## 2019-03-29 RX ORDER — BISACODYL 10 MG
10 SUPPOSITORY, RECTAL RECTAL
Status: CANCELLED | OUTPATIENT
Start: 2019-03-29

## 2019-03-29 RX ORDER — TAMSULOSIN HYDROCHLORIDE 0.4 MG/1
0.4 CAPSULE ORAL
Status: DISCONTINUED | OUTPATIENT
Start: 2019-03-29 | End: 2019-04-11 | Stop reason: HOSPADM

## 2019-03-29 RX ORDER — POLYETHYLENE GLYCOL 3350 17 G/17G
1 POWDER, FOR SOLUTION ORAL
Status: DISCONTINUED | OUTPATIENT
Start: 2019-03-29 | End: 2019-03-29

## 2019-03-29 RX ORDER — ACETAMINOPHEN 325 MG/1
650 TABLET ORAL EVERY 6 HOURS PRN
Status: DISCONTINUED | OUTPATIENT
Start: 2019-03-29 | End: 2019-04-11 | Stop reason: HOSPADM

## 2019-03-29 RX ORDER — ACETAMINOPHEN 325 MG/1
650 TABLET ORAL EVERY 6 HOURS PRN
Status: CANCELLED | OUTPATIENT
Start: 2019-03-29

## 2019-03-29 RX ORDER — TRAMADOL HYDROCHLORIDE 50 MG/1
50 TABLET ORAL EVERY 6 HOURS PRN
Status: DISCONTINUED | OUTPATIENT
Start: 2019-03-29 | End: 2019-04-11 | Stop reason: HOSPADM

## 2019-03-29 RX ORDER — AMOXICILLIN 250 MG
2 CAPSULE ORAL 2 TIMES DAILY
Status: DISCONTINUED | OUTPATIENT
Start: 2019-03-29 | End: 2019-04-11 | Stop reason: HOSPADM

## 2019-03-29 RX ORDER — TRAMADOL HYDROCHLORIDE 50 MG/1
50 TABLET ORAL EVERY 6 HOURS PRN
Status: CANCELLED | OUTPATIENT
Start: 2019-03-29

## 2019-03-29 RX ORDER — ONDANSETRON 4 MG/1
4 TABLET, ORALLY DISINTEGRATING ORAL EVERY 4 HOURS PRN
Status: CANCELLED | OUTPATIENT
Start: 2019-03-29

## 2019-03-29 RX ORDER — OXYCODONE HYDROCHLORIDE 10 MG/1
10 TABLET ORAL
Status: DISCONTINUED | OUTPATIENT
Start: 2019-03-29 | End: 2019-04-04

## 2019-03-29 RX ORDER — POLYETHYLENE GLYCOL 3350 17 G/17G
1 POWDER, FOR SOLUTION ORAL
Status: DISCONTINUED | OUTPATIENT
Start: 2019-03-29 | End: 2019-04-11 | Stop reason: HOSPADM

## 2019-03-29 RX ORDER — SIMVASTATIN 20 MG
20 TABLET ORAL EVERY EVENING
Status: CANCELLED | OUTPATIENT
Start: 2019-03-29

## 2019-03-29 RX ORDER — AMOXICILLIN 250 MG
2 CAPSULE ORAL 2 TIMES DAILY
Status: CANCELLED | OUTPATIENT
Start: 2019-03-29

## 2019-03-29 RX ORDER — BISACODYL 10 MG
10 SUPPOSITORY, RECTAL RECTAL
Status: DISCONTINUED | OUTPATIENT
Start: 2019-03-29 | End: 2019-03-29

## 2019-03-29 RX ORDER — HYDROXYUREA 500 MG/1
1000 CAPSULE ORAL
Status: CANCELLED | OUTPATIENT
Start: 2019-03-30

## 2019-03-29 RX ORDER — ONDANSETRON 4 MG/1
4 TABLET, ORALLY DISINTEGRATING ORAL EVERY 4 HOURS PRN
Status: DISCONTINUED | OUTPATIENT
Start: 2019-03-29 | End: 2019-04-11 | Stop reason: HOSPADM

## 2019-03-29 RX ORDER — TAMSULOSIN HYDROCHLORIDE 0.4 MG/1
0.4 CAPSULE ORAL
Status: CANCELLED | OUTPATIENT
Start: 2019-03-29

## 2019-03-29 RX ADMIN — OMEPRAZOLE 20 MG: 20 CAPSULE, DELAYED RELEASE ORAL at 21:16

## 2019-03-29 RX ADMIN — LEVETIRACETAM 500 MG: 500 TABLET ORAL at 21:16

## 2019-03-29 RX ADMIN — OXYCODONE HYDROCHLORIDE AND ACETAMINOPHEN 500 MG: 500 TABLET ORAL at 17:09

## 2019-03-29 RX ADMIN — HYDROXYUREA 1500 MG: 500 CAPSULE ORAL at 21:17

## 2019-03-29 RX ADMIN — TRAMADOL HYDROCHLORIDE 50 MG: 50 TABLET, FILM COATED ORAL at 08:00

## 2019-03-29 RX ADMIN — ENOXAPARIN SODIUM 40 MG: 100 INJECTION SUBCUTANEOUS at 17:09

## 2019-03-29 RX ADMIN — SENNOSIDES AND DOCUSATE SODIUM 2 TABLET: 8.6; 5 TABLET ORAL at 21:16

## 2019-03-29 RX ADMIN — TRAMADOL HYDROCHLORIDE 50 MG: 50 TABLET, COATED ORAL at 17:14

## 2019-03-29 RX ADMIN — SIMVASTATIN 20 MG: 20 TABLET, FILM COATED ORAL at 21:16

## 2019-03-29 RX ADMIN — LEVETIRACETAM 500 MG: 500 TABLET ORAL at 05:40

## 2019-03-29 RX ADMIN — TAMSULOSIN HYDROCHLORIDE 0.4 MG: 0.4 CAPSULE ORAL at 21:15

## 2019-03-29 ASSESSMENT — LIFESTYLE VARIABLES: EVER_SMOKED: YES

## 2019-03-29 NOTE — PROGRESS NOTES
Received discharge orders, pt going to [facility]. Report given to Marci. Went over eduction with pt and pts wife.

## 2019-03-29 NOTE — CARE PLAN
Problem: Safety  Goal: Will remain free from injury  Outcome: PROGRESSING AS EXPECTED  Pt sitting up at 90 degrees for breakfast to prevent aspiration, pt educated on ways to prevent aspiration.     Problem: Pain Management  Goal: Pain level will decrease to patient's comfort goal  Outcome: PROGRESSING AS EXPECTED  Pt stating pain in back and knees, PRN pain medication given.

## 2019-03-29 NOTE — PROGRESS NOTES
[facility] Medicine Daily Progress Note    Date of Service  3/28/2019    Chief Complaint  84 y.o. male admitted 3/24/2019 with weakness and altered mental status.    Hospital Course    84 y.o. male who presented 3/24/2019 with weakness and altered mental status.  Patient is a bit confused, he is not very verbal and gets off track so a lot of the information was obtained from the wife at bedside.  He recently had bronchitis around 2 weeks ago, finished azithromycin, is much better, was very fatigued during this but did seem to improve.  He had improved until last night when he began complaining of right lower back/buttock pain.  He was up most of the night trying to get comfortable, still uncomfortable.  This morning he seemed more confused per the wife, initially he was just trying to figure out how to get out of the bed, took him quite some time, then he was incontinent of urine and did not seem to care which is very abnormal for him.  He kept putting his leg off to the side of the bed, at one point when she stepped away he slid out of the bed onto the floor, she did not see it but does not think there was any trauma.  She states he has not been eating or drinking much which is also very abnormal for him.  During exam, to very difficult and information from him, he is able to speak just cannot describe how he feels.      Interval Problem Update  3/26: Confusion has improved significantly.  MRI head negative.  3/27: Worked with pt/ot, accepted to rehab.  No e/o seizure or LOC  3/28: Waiting for bed at rehab, Likely in AM    Consultants/Specialty  Neurology read his EEG, no formal consult.    Code Status  DO NOT RESUSCITATE    Disposition  Poss Rehab vs SNF    Review of Systems  Review of Systems   Constitutional: Positive for malaise/fatigue. Negative for chills, fever and weight loss.   HENT: Negative.    Respiratory: Negative.  Negative for cough and shortness of breath.    Cardiovascular: Negative.  Negative for  chest pain and leg swelling.   Gastrointestinal: Negative.  Negative for abdominal pain, nausea and vomiting.   Genitourinary: Negative.  Negative for dysuria and flank pain.   Musculoskeletal: Positive for back pain (currently rates 3/10). Negative for myalgias.   Neurological: Negative.  Negative for dizziness, loss of consciousness and weakness.        Drowsy   Endo/Heme/Allergies: Negative.  Does not bruise/bleed easily.   Psychiatric/Behavioral: Negative.  Negative for depression. The patient is not nervous/anxious.    All other systems reviewed and are negative.       Physical Exam  Temp:  [36.6 °C (97.9 °F)-36.9 °C (98.5 °F)] 36.9 °C (98.5 °F)  Pulse:  [65-88] 88  Resp:  [18-19] 18  BP: (103-123)/(53-62) 116/62  SpO2:  [93 %-96 %] 96 %    Physical Exam   Constitutional: He appears well-developed and well-nourished. No distress.   HENT:   Nose: Nose normal.   Mouth/Throat: Oropharynx is clear and moist. No oropharyngeal exudate.   Eyes: Conjunctivae are normal. Right eye exhibits no discharge. Left eye exhibits no discharge. No scleral icterus.   Neck: No JVD present. No tracheal deviation present.   Cardiovascular: Normal rate, regular rhythm and normal heart sounds.    Pulmonary/Chest: Effort normal and breath sounds normal. No stridor. He has no rales. He exhibits no tenderness.   Abdominal: Soft. Bowel sounds are normal. There is no tenderness. There is no rebound.   Genitourinary: No penile tenderness.   Musculoskeletal: He exhibits no edema or tenderness.   Neurological: He is alert. He has normal strength and normal reflexes. He is not disoriented. He displays no atrophy and no tremor. No cranial nerve deficit or sensory deficit. He exhibits normal muscle tone. He displays no seizure activity. Coordination and gait normal.   Oriented to self, place and family   Skin: Skin is warm and dry. He is not diaphoretic. No pallor.   Psychiatric: He has a normal mood and affect. His speech is normal and behavior  is normal. He exhibits abnormal recent memory.   Pleasant   Nursing note and vitals reviewed.      Fluids    Intake/Output Summary (Last 24 hours) at 03/28/19 1759  Last data filed at 03/28/19 1400   Gross per 24 hour   Intake              360 ml   Output              600 ml   Net             -240 ml       Laboratory  Recent Labs      03/26/19   0441  03/27/19 0439 03/28/19 0439   WBC  8.4  6.5  5.3   RBC  2.93*  2.71*  2.94*   HEMOGLOBIN  11.4*  10.3*  11.3*   HEMATOCRIT  33.7*  30.7*  33.0*   MCV  115.0*  113.3*  112.2*   MCH  38.9*  38.0*  38.4*   MCHC  33.8  33.6*  34.2   RDW  74.9*  74.1*  71.7*   PLATELETCT  283  264  290   MPV  9.3  9.0  8.6*     Recent Labs      03/26/19 0441 03/27/19 0439   SODIUM  134*  134*   POTASSIUM  3.8  3.8   CHLORIDE  107  107   CO2  21  22   GLUCOSE  128*  127*   BUN  14  15   CREATININE  0.81  0.72   CALCIUM  8.2*  8.0*                   Imaging  MR-BRAIN-W/O   Final Result      1.  Study is significantly limited by motion artifact.   2.  Diffuse primarily central atrophy.   3.  Nonspecific foci of abnormal signal in the white matter bilaterally.  Microvascular ischemic change versus demyelination or gliosis.   4.  No acute stroke or evidence for hemorrhage.   5.  Acute and chronic paranasal sinus disease.      CT-HEAD W/O   Final Result      1.  No acute intracranial abnormality.   2.  Age-consistent atrophy and chronic white matter changes.   3.  Paranasal sinus disease.               INTERPRETING LOCATION:  [address] [place] [address], [id number]      DX-CHEST-PORTABLE (1 VIEW)   Final Result      Hypoinflation without other evidence for acute cardiopulmonary disease.           Assessment/Plan  * Weakness   Assessment & Plan    -Profoundly weak on bilateral lower extremities, more or less equal  -PT OT rec SNF, family wants rehab > SNF  -Head CT negative  -Head MRI negative aside from chronic changes     Acute encephalopathy   Assessment & Plan    Consider possibly seizure, he  was incontinent during the episode. EEG shows possible right frontal process, cannot rule out seizure activity.  Ramesh CT and MRI negative  PT and OT note weakness and confusion, he has been accepted to rehab  Continue Keppra 500mg po BID for seizure prophylaxis  Case was discussed with neurology, will need neurology follow up.  TSH ok     Anemia   Assessment & Plan    With macrocytosis on chronic hydroxyurea for treatment of thrombocytosis.  Family has noted no bleeding, no melena.  Continue to monitor CBC, no evidence of bleeding  Iron low, will add ferritin  Thiamine, B12 and folic acid levels     Lower back pain   Assessment & Plan    Improved.     Hyponatremia   Assessment & Plan    -Due to dehydration  Improving on iv fluids continue to monitor     Serum total bilirubin elevated   Assessment & Plan    -Asymptomatic, repeat CMP shows it improving  No abdominal pain continue to monitor     Mixed hyperlipidemia- (present on admission)   Assessment & Plan    Continue simvastatin     Thrombocytosis (HCC)- (present on admission)   Assessment & Plan    Levels of platelets currently normal; continue hydroxyurea he takes at home.  Macrocytosis is present which is expected on this medication     Gastroesophageal reflux disease- (present on admission)   Assessment & Plan    -Continue PPI     Obstructive sleep apnea- (present on admission)   Assessment & Plan    Use home cpap machine          VTE prophylaxis: SCD, due to anemia

## 2019-03-29 NOTE — CARE PLAN
Problem: Communication  Goal: The ability to communicate needs accurately and effectively will improve  Outcome: PROGRESSING AS EXPECTED  Reoriented patient to room and call light functions. Encouraged patient to use call light when needing assistance. Pt properly demonstrated use of call light. Safety precautions are in place, will continue to monitor.     Problem: Knowledge Deficit  Goal: Knowledge of disease process/condition, treatment plan, diagnostic tests, and medications will improve  Outcome: PROGRESSING AS EXPECTED  Discussed plan of care with patient including medications administered, pain assessment, and safety. Encouraged patient to use call light when needing assistance to ambulated. Pt demonstrated proper use of call light after educating him. Pt agreed and verbalized understanding. Safety precautions are in place, will continue to monitor.

## 2019-03-29 NOTE — DISCHARGE PLANNING
Dr. Solis has medically cleared.  Dr. Adler has accepted.  Transport has been arranged for 1130.  Msg placed to Dr. Solis and [place], [place] [id number].  Lisa Cooley is aware.

## 2019-03-29 NOTE — DISCHARGE INSTRUCTIONS
Discharge Instructions    Discharged to other by medical transportation with escort. Discharged via wheelchair, hospital escort: Yes.  Special equipment needed: Not Applicable    Be sure to schedule a follow-up appointment with your primary care doctor or any specialists as instructed.     Discharge Plan:   Diet Plan: Discussed  Activity Level: Discussed  Confirmed Follow up Appointment: Appointment Scheduled  Confirmed Symptoms Management: Discussed  Medication Reconciliation Updated: Yes  Influenza Vaccine Indication: Not indicated: Previously immunized this influenza season and > 8 years of age    I understand that a diet low in cholesterol, fat, and sodium is recommended for good health. Unless I have been given specific instructions below for another diet, I accept this instruction as my diet prescription.   Other diet: Regular    Special Instructions: None    · Is patient discharged on Warfarin / Coumadin?   No     Depression / Suicide Risk    As you are discharged from this [facility], it is important to learn how to keep safe from harming yourself.    Recognize the warning signs:  · Abrupt changes in personality, positive or negative- including increase in energy   · Giving away possessions  · Change in eating patterns- significant weight changes-  positive or negative  · Change in sleeping patterns- unable to sleep or sleeping all the time   · Unwillingness or inability to communicate  · Depression  · Unusual sadness, discouragement and loneliness  · Talk of wanting to die  · Neglect of personal appearance   · Rebelliousness- reckless behavior  · Withdrawal from people/activities they love  · Confusion- inability to concentrate     If you or a loved one observes any of these behaviors or has concerns about self-harm, here's what you can do:  · Talk about it- your feelings and reasons for harming yourself  · Remove any means that you might use to hurt yourself (examples: pills, rope, extension  cords, firearm)  · Get professional help from the community (Mental Health, Substance Abuse, psychological counseling)  · Do not be alone:Call your Safe Contact- someone whom you trust who will be there for you.  · Call your local CRISIS HOTLINE 439-8715 or 207-747-3921  · Call your local Children's Mobile Crisis Response Team [place] (608) 981-2727 or www.[employer]  · Call the toll free National Suicide Prevention Hotlines   · National Suicide Prevention Lifeline 788-558-ITJH (1540)  · [employer] 800-SUICIDE (390-4522)        Weakness  Weakness is a lack of strength. It may be felt all over the body (generalized) or in one specific part of the body (focal). Some causes of weakness can be serious. You may need further medical evaluation, especially if you are elderly or you have a history of immunosuppression (such as chemotherapy or HIV), kidney disease, heart disease, or diabetes.  CAUSES   Weakness can be caused by many different things, including:  · Infection.  · Physical exhaustion.  · Internal bleeding or other blood loss that results in a lack of red blood cells (anemia).  · Dehydration. This cause is more common in elderly people.  · Side effects or electrolyte abnormalities from medicines, such as pain medicines or sedatives.  · Emotional distress, anxiety, or depression.  · Circulation problems, especially severe peripheral arterial disease.  · Heart disease, such as rapid atrial fibrillation, bradycardia, or heart failure.  · Nervous system disorders, such as Guillain-Barré syndrome, multiple sclerosis, or stroke.  DIAGNOSIS   To find the cause of your weakness, your caregiver will take your history and perform a physical exam. Lab tests or X-rays may also be ordered, if needed.  TREATMENT   Treatment of weakness depends on the cause of your symptoms and can vary greatly.  HOME CARE INSTRUCTIONS   · Rest as needed.  · Eat a well-balanced diet.  · Try to get some exercise  every day.  · Only take over-the-counter or prescription medicines as directed by your caregiver.  SEEK MEDICAL CARE IF:   · Your weakness seems to be getting worse or spreads to other parts of your body.  · You develop new aches or pains.  SEEK IMMEDIATE MEDICAL CARE IF:   · You cannot perform your normal daily activities, such as getting dressed and feeding yourself.  · You cannot walk up and down stairs, or you feel exhausted when you do so.  · You have shortness of breath or chest pain.  · You have difficulty moving parts of your body.  · You have weakness in only one area of the body or on only one side of the body.  · You have a fever.  · You have trouble speaking or swallowing.  · You cannot control your bladder or bowel movements.  · You have black or bloody vomit or stools.  MAKE SURE YOU:  · Understand these instructions.  · Will watch your condition.  · Will get help right away if you are not doing well or get worse.  This information is not intended to replace advice given to you by your health care provider. Make sure you discuss any questions you have with your health care provider.  Document Released: 12/18/2006 Document Revised: 06/18/2013 Document Reviewed: 10/07/2016  [employer] Interactive Patient Education © 2017 [employer]      Confusion  Introduction  Confusion is the inability to think with your usual speed or clarity. Confusion may come on quickly or slowly over time. How quickly the confusion comes on depends on the cause. Confusion can be due to any number of causes.  What are the causes?  · Concussion, head injury, or head trauma.  · Seizures.  · Stroke.  · Fever.  · Brain tumor.  · Age related decreased brain function (dementia).  · Heightened emotional states like rage or terror.  · Mental illness in which the person loses the ability to determine what is real and what is not (hallucinations).  · Infections such as a urinary tract infection (UTI).  · Toxic effects from alcohol, drugs,  or prescription medicines.  · Dehydration and an imbalance of salts in the body (electrolytes).  · Lack of sleep.  · Low blood sugar (diabetes).  · Low levels of oxygen from conditions such as chronic lung disorders.  · Drug interactions or other medicine side effects.  · Nutritional deficiencies, especially niacin, thiamine, vitamin C, or vitamin B.  · Sudden drop in body temperature (hypothermia).  · Change in routine, such as when traveling or hospitalized.  What are the signs or symptoms?  People often describe their thinking as cloudy or unclear when they are confused. Confusion can also include feeling disoriented. That means you are unaware of where or who you are. You may also not know what the date or time is. If confused, you may also have difficulty paying attention, remembering, and making decisions. Some people also act aggressively when they are confused.  How is this diagnosed?  The medical evaluation of confusion may include:  · Blood and urine tests.  · X-rays.  · Brain and nervous system tests.  · Analyzing your brain waves (electroencephalogram or EEG).  · Magnetic resonance imaging (MRI) of your head.  · Computed tomography (CT) scan of your head.  · Mental status tests in which your health care provider may ask many questions. Some of these questions may seem silly or strange, but they are a very important test to help diagnose and treat confusion.  How is this treated?  An admission to the hospital may not be needed, but a person with confusion should not be left alone. Stay with a family member or friend until the confusion clears. Avoid alcohol, pain relievers, or sedative drugs until you have fully recovered. Do not drive until directed by your health care provider.  Follow these instructions at home:  What family and friends can do:  · To find out if someone is confused, ask the person to state his or her name, age, and the date. If the person is unsure or answers incorrectly, he or she is  confused.  · Always introduce yourself, no matter how well the person knows you.  · Often remind the person of his or her location.  · Place a calendar and clock near the confused person.  · Help the person with his or her medicines. You may want to use a pill box, an alarm as a reminder, or give the person each dose as prescribed.  · Talk about current events and plans for the day.  · Try to keep the environment calm, quiet, and peaceful.  · Make sure the person keeps follow-up visits with his or her health care provider.  How is this prevented?  Ways to prevent confusion:  · Avoid alcohol.  · Eat a balanced diet.  · Get enough sleep.  · Take medicine only as directed by your health care provider.  · Do not become isolated. Spend time with other people and make plans for your days.  · Keep careful watch on your blood sugar levels if you are diabetic.  Get help right away if:  · You develop severe headaches, repeated vomiting, seizures, blackouts, or slurred speech.  · There is increasing confusion, weakness, numbness, restlessness, or personality changes.  · You develop a loss of balance, have marked dizziness, feel uncoordinated, or fall.  · You have delusions, hallucinations, or develop severe anxiety.  · Your family members think you need to be rechecked.  This information is not intended to replace advice given to you by your health care provider. Make sure you discuss any questions you have with your health care provider.  Document Released: 01/25/2006 Document Revised: 07/07/2017 Document Reviewed: 01/23/2015  © 2017 [employer]    Levetiracetam extended-release tablets  What is this medicine?  LEVETIRACETAM (mitzy granda) is an antiepileptic drug. It is used with other medicines to treat certain types of seizures.  This medicine may be used for other purposes; ask your health care provider or pharmacist if you have questions.  COMMON BRAND NAME(S): Keppra XR  What should I tell my health care provider  before I take this medicine?  They need to know if you have any of these conditions:  -kidney disease  -suicidal thoughts, plans, or attempt; a previous suicide attempt by you or a family member  -an unusual or allergic reaction to levetiracetam, other medicines, foods, dyes, or preservatives  -pregnant or trying to get pregnant  -breast-feeding  How should I use this medicine?  Take this medicine by mouth with a glass of water. Follow the directions on the prescription label. Do not cut, crush or chew this medicine. You may take this medicine with or without food. Take your doses at regular intervals. Do not take your medicine more often than directed. Do not stop taking this medicine or any of your seizure medicines unless instructed by your doctor or health care professional. Stopping your medicine suddenly can increase your seizures or their severity.  A special MedGuide will be given to you by the pharmacist with each prescription and refill. Be sure to read this information carefully each time.  Contact your pediatrician or health care professional regarding the use of this medication in children. While this drug may be prescribed for children as young as 12 years of age for selected conditions, precautions do apply.  Overdosage: If you think you have taken too much of this medicine contact a poison control center or emergency room at once.  NOTE: This medicine is only for you. Do not share this medicine with others.  What if I miss a dose?  If you miss a dose and it has only been a few hours, take it as soon as you can. If it is almost time for your next dose, take only that dose. Do not take double or extra doses.  What may interact with this medicine?  This medicine may interact with the following medications:  -carbamazepine  -colesevelam  -probenecid  -sevelamer  This list may not describe all possible interactions. Give your health care provider a list of all the medicines, herbs, non-prescription drugs,  or dietary supplements you use. Also tell them if you smoke, drink alcohol, or use illegal drugs. Some items may interact with your medicine.  What should I watch for while using this medicine?  Visit your doctor or health care professional for a regular check on your progress. Wear a medical identification bracelet or chain to say you have epilepsy, and carry a card that lists all your medications.  It is important to take this medicine exactly as instructed by your health care professional. When first starting treatment, your dose may need to be adjusted. It may take weeks or months before your dose is stable. You should contact your doctor or health care professional if your seizures get worse or if you have any new types of seizures.  You may get drowsy or dizzy. Do not drive, use machinery, or do anything that needs mental alertness until you know how this medicine affects you. Do not stand or sit up quickly, especially if you are an older patient. This reduces the risk of dizzy or fainting spells. Alcohol may interfere with the effect of this medicine. Avoid alcoholic drinks.  The use of this medicine may increase the chance of suicidal thoughts or actions. Pay special attention to how you are responding while on this medicine. Any worsening of mood, or thoughts of suicide or dying should be reported to your health care professional right away.  The tablet shell for some brands of this medicine does not dissolve. This is normal. The tablet shell may appear in the stool. This is not cause for concern.  Women who become pregnant while using this medicine may enroll in the [place] Antiepileptic Drug Pregnancy Registry by calling 1-446.824.2119. This registry collects information about the safety of antiepileptic drug use during pregnancy.  What side effects may I notice from receiving this medicine?  Side effects that you should report to your doctor or health care professional as soon as  possible:  -allergic reactions like skin rash, itching or hives, swelling of the face, lips, or tongue  -breathing problems  -changes in emotions or moods  -dark urine  -general ill feeling or flu-like symptoms  -problems with balance, talking, walking  -suicidal thoughts or actions  -unusually weak or tired  -yellowing of the eyes or skin  Side effects that usually do not require medical attention (report to your doctor or health care professional if they continue or are bothersome):  -diarrhea  -dizzy, drowsy  -headache  -loss of appetite  This list may not describe all possible side effects. Call your doctor for medical advice about side effects. You may report side effects to FDA at 5-183-CUF-2831.  Where should I keep my medicine?  Keep out of reach of children.  Store at room temperature between 15 and 30 degrees C (59 and 86 degrees F). Throw away any unused medicine after the expiration date.  NOTE: This sheet is a summary. It may not cover all possible information. If you have questions about this medicine, talk to your doctor, pharmacist, or health care provider.  © 2018 [employer] (2017-01-19 09:48:52)

## 2019-03-29 NOTE — PROGRESS NOTES
Medicated pt per MAR with no problems. Pt is a&o x 4 this morning. Pt's sheets were changed and urinal was emptied. Pt declined any further needs at this time. Safety precautions are in place, will continue to monitor.

## 2019-03-29 NOTE — FLOWSHEET NOTE
03/29/19 0225   CPAP/BiPAP [facility]   Nocturnal CPAP or BiPAP CPAP   #System Evaluation Yes   Home Unit Used? Yes   Equipment Inspected for Cleanliness, Operation, and Safety? Yes   Pt is currently not wearing CPAP/BiPap unit Yes

## 2019-03-29 NOTE — PREADMISSION SCREENING NOTE
Pre-Admission Screening Form    Patient Information:   Name: Han Pat     MRN: [id number]       : 1935      Age: 84 y.o.   Gender: male      Race: White [7]       Marital Status:  [2]  Family Contact: Sally Pat        Relationship: Spouse [17]  Home Phone: [phone]           Cell Phone: [phone]  Advanced Directives: None  Code Status:  DNAR/DNI  Current Attending Provider: Renee Solis D.O.  Referring Physician: Dr. Solis   Physiatrist Consult: Dr. Rodrigues    Referral Date: 19  Primary Payor Source:  [facility] [employer]  Secondary Payor Source:      Medical Information:   Date of Admission to Acute Care Setting:3/24/2019  Room Number: 3308/00  Rehabilitation Diagnosis: 03.9 Other Neurologic  Immunization History   Administered Date(s) Administered   • Hepatitis A Vaccine, Adult 1999, 2000   • Influenza (IM) Preservative Free 10/01/2011, 10/18/2012   • Influenza Vaccine Adult HD 2013, 10/26/2016, 10/16/2017, 2018   • Zoster Vaccine Live (ZVL) (Zostavax) 02/10/2011     No Known Allergies  Past Medical History:   Diagnosis Date   • Back pain    • GERD (gastroesophageal reflux disease)    • Hyperlipidemia    • Kidney stone    • PND (post-nasal drip)    • Sleep apnea      Past Surgical History:   Procedure Laterality Date   • CATARACT EXTRACTION WITH IOL     • OTHER      Nasal surgery   • OTHER ORTHOPEDIC SURGERY  lumbar disectomy        History Leading to Admission, Conditions that Caused the Need for Rehab (CMS):     Matt Fritz D.O. Physician Signed Hospital Medicine  H&P Date of Service: 3/24/2019  9:03 PM         []Hide copied text  []Hover for attribution information  Hospital Medicine History & Physical Note     Date of Service  3/24/2019     Primary Care Physician  Kia Thompson P.A.-C.     Consultants  None     Code Status  DNR     Chief Complaint  Weakness and altered mental status     History of Presenting Illness  84 y.o. male  who presented 3/24/2019 with weakness and altered mental status.  Patient is a bit confused, he is not very verbal and gets off track so a lot of the information was obtained from the wife at bedside.  He recently had bronchitis around 2 weeks ago, finished azithromycin, is much better, was very fatigued during this but did seem to improve.  He had improved until last night when he began complaining of right lower back/buttock pain.  He was up most of the night trying to get comfortable, still uncomfortable.  This morning he seemed more confused per the wife, initially he was just trying to figure out how to get out of the bed, took him quite some time, then he was incontinent of urine and did not seem to care which is very abnormal for him.  He kept putting his leg off to the side of the bed, at one point when she stepped away he slid out of the bed onto the floor, she did not see it but does not think there was any trauma.  She states he has not been eating or drinking much which is also very abnormal for him.  During exam, to very difficult and information from him, he is able to speak just cannot describe how he feels.     Review of Systems  Review of Systems   Unable to perform ROS: Acuity of condition   Eyes: Negative for blurred vision and double vision.   Musculoskeletal: Positive for back pain (r lower back/buttock).   Neurological: Negative for headaches.         Past Medical History   has a past medical history of Back pain; GERD (gastroesophageal reflux disease); Hyperlipidemia; Kidney stone; PND (post-nasal drip); and Sleep apnea.     Surgical History   has a past surgical history that includes other orthopedic surgery (lumbar disectomy 2000); cataract extraction with iol; and other.      Family History  family history includes Other in his brother, father, and mother; Sleep Apnea in his brother, father, son, and son.      Social History   reports that he quit smoking about 39 years ago. His smoking use  included Cigarettes. He has a 15.00 pack-year smoking history. He has never used smokeless tobacco. He reports that he drinks alcohol. He reports that he does not use drugs.     Allergies  No Known Allergies     Medications  Prior to Admission Medications   Prescriptions Last Dose Informant Patient Reported? Taking?   Montelukast Sodium (SINGULAIR PO)     Yes No   Sig: Take  by mouth.   NON SPECIFIED     No No   Sig: CPAP supplies. DX obstructive sleep apnea   SIMVASTATIN PO     Yes No   Sig: Take  by mouth.   albuterol 108 (90 Base) MCG/ACT Aero Soln inhalation aerosol     No No   Sig: Inhale 2 Puffs by mouth every four hours as needed for Shortness of Breath.   aspirin 81 MG tablet 3/23/2019 at Unknown time   Yes No   Sig: Take 81 mg by mouth every day.   azithromycin (ZITHROMAX) 250 MG Tab     No No   Sig: Take 2 tabs day one then one tab daily for 4 days   benzonatate (TESSALON) 100 MG Cap     No No   Sig: Take 1 Cap by mouth 3 times a day as needed for Cough.   hydroxyurea (HYDREA) 500 MG Cap 3/23/2019 at Unknown time   Yes No   lansoprazole (PREVACID SOLUTAB) 30 MG TBDP 3/23/2019 at Unknown time   Yes No   Sig: Take 30 mg by mouth every day.   lansoprazole (PREVACID) 30 MG CAPSULE DELAYED RELEASE     Yes No   montelukast (SINGULAIR) 10 MG Tab 3/23/2019 at pm   Yes No   simvastatin (ZOCOR) 20 MG Tab 3/23/2019 at pm   Yes No   tamsulosin (FLOMAX) 0.4 MG capsule 3/23/2019 at pm   Yes No      Facility-Administered Medications: None         Physical Exam  Temp:  [37.4 °C (99.3 °F)] 37.4 °C (99.3 °F)  Pulse:  [77] 77  Resp:  [16] 16  BP: (138)/(56) 138/56  SpO2:  [96 %] 96 %     Physical Exam   Constitutional: He appears well-developed and well-nourished.  Non-toxic appearance. No distress.   HENT:   Head: Normocephalic and atraumatic. Not macrocephalic and not microcephalic. Head is without raccoon's eyes and without Tabor's sign.   Right Ear: External ear normal.   Left Ear: External ear normal.   Mouth/Throat:  Mucous membranes are dry. No oropharyngeal exudate.   Eyes: Conjunctivae are normal. Right eye exhibits no discharge. Left eye exhibits no discharge. No scleral icterus.   Neck: Normal range of motion. Neck supple. No tracheal deviation, no edema and no erythema present.   Cardiovascular: Normal rate, regular rhythm, normal heart sounds and intact distal pulses.  Exam reveals no gallop, no friction rub and no decreased pulses.    No murmur heard.  Pulmonary/Chest: Effort normal and breath sounds normal. No stridor. No respiratory distress. He has no decreased breath sounds. He has no wheezes. He has no rhonchi. He has no rales. He exhibits no tenderness.   Abdominal: Soft. Bowel sounds are normal. He exhibits no distension. There is no splenomegaly or hepatomegaly. There is no tenderness. There is no rebound and no guarding.   Musculoskeletal: He exhibits no edema, tenderness or deformity.   Lymphadenopathy:     He has no cervical adenopathy.   Neurological:   Bilateral LE weakness   Skin: Skin is warm and dry. No rash noted. He is not diaphoretic. No cyanosis or erythema. No pallor. Nails show no clubbing.   Psychiatric: His speech is delayed. He is slowed. Cognition and memory are impaired.   Nursing note and vitals reviewed.        Laboratory:      Recent Labs      03/24/19   1757   WBC  13.4*   RBC  3.36*   HEMOGLOBIN  13.0*   HEMATOCRIT  38.2*   MCV  113.7*   MCH  38.7*   MCHC  34.0   RDW  73.3*   PLATELETCT  340   MPV  9.1          Recent Labs      03/24/19   1757   SODIUM  133*   POTASSIUM  3.9   CHLORIDE  104   CO2  22   GLUCOSE  157*   BUN  13   CREATININE  0.92   CALCIUM  8.9          Recent Labs      03/24/19   1757   ALTSGPT  23   ASTSGOT  21   ALKPHOSPHAT  61   TBILIRUBIN  2.1*   GLUCOSE  157*                  No results for input(s): TROPONINI in the last 72 hours.     Urinalysis:        Recent Labs      03/24/19 2026   SPECGRAVITY  1.015   GLUCOSEUR  Negative   KETONES  Trace*   NITRITE  Negative    LEUKESTERAS  Negative         Imaging:  DX-CHEST-PORTABLE (1 VIEW)   Final Result       Hypoinflation without other evidence for acute cardiopulmonary disease.       CT-HEAD W/O    (Results Pending)            Assessment/Plan:  I anticipate this patient is appropriate for observation status at this time.         * Weakness   Assessment & Plan     -Profoundly weak on bilateral lower extremities, more or less equal  -Obtain PT/OT and await recommendations  -Unknown what is suddenly causes weakness however a CT      Acute encephalopathy   Assessment & Plan     -Unknown cause, initially since he was incontinent I felt that it was going to be a urinary tract infection however his urine is normal  -His chest x-ray does not show pneumonia nor does he have signs of  -No evidence of rash or additional infection  -Patient is not very verbal but is not slurring his speech, does not have any unilateral deficits however I am still going to obtain a CT of the head without contrast for further evaluation specially considering he did have a fall that was not witnessed  -If nothing comes up he may need an MRI at some point but I do not feel that is necessary at this time  -He does have a leukocytosis but again no additional sign of infection, repeat a CBC in the morning      Lower back pain   Assessment & Plan     -Lower back/buttock, on the right  -Not exquisitely tender, no pain whenever he moved his legs or whenever I move his legs, I do not feel there is an abscess or discitis, no need for MRI of the lumbar spine at  -He does have good sensation of his lower extremities  -He occasionally does have pain, this was just a little worse  -Did occur prior to his fall      Hyponatremia   Assessment & Plan     -Due to dehydration  -Start IV fluids  -Repeat CMP in the morning      Serum total bilirubin elevated   Assessment & Plan     -Asymptomatic, repeat CMP in the morning  -He does have right lower back pain but there is no right  upper quadrant pain at all, I do not think these are related      Mixed hyperlipidemia- (present on admission)   Assessment & Plan     -Continue statin      Gastroesophageal reflux disease- (present on admission)   Assessment & Plan     -Continue PPI      Obstructive sleep apnea- (present on admission)   Assessment & Plan     -Wife did bring in his CPAP, have ordered respiratory care            VTE prophylaxis: SCDs        Vonda Rodrigues M.D. Physician Signed Physical Medicine & Rehab  Consults Date of Service: 3/27/2019  1:12 PM   Consult Orders:   IP CONSULT FOR PHYSIATRY [[id number]] ordered by Renee Solis D.O. at 03/27/19 1157         []Hide copied text  []Hover for attribution information  Medical chart review completed.      Patient is a 84 y.o. male  with a past medical history of BPH, GERD, FREYA, chronic thrombocytosis, admitted to [facility] on 3/24, with weakness, bowel/bladder incontinence. Head CT and Brain MRI negative. No imaging of spine. Weakness has improved, notes indicate is mostly generalized. He had an EEG that showed mild focal irritability over R.L frontal lobes, no seizures, but patient started on Keppra. Patient initially with leukocytosis which resolved.     Patient with multiple co-morbidities(including but not limited to thrombocytosis, iron deficiency anemia, chronic back pain); with cognitive deficits and functional deficits in mobility/self-cares, and Severe de-conditioning.      Pre-morbidly, this patient lived in a two level home with unknown steps to enter, with spouse. The patient was evaluated by acute care Physical Therapy and Occupational Therapy; currently requiring moderate assistance for mobility and moderate assistance for ADLs, also with ongoing cognitive deficits.      6 clicks score 9 mobility, 14 ADLs     The patient is a very good candidate for an acute inpatient rehabilitation program with a coordinated program of care at an intensity and frequency  not available at a lower level of care.      Note: if the patient continues to progress while waiting for medical clearance, and no longer requires 2 out of 3 therapy services (PT/OT/SLP), then the patient would no longer meet the criteria for acute inpatient rehabilitation.     This recommendation is substantiated by the patient's current medical condition with intervention and assessment of medical issues requiring an acute level of care for patient's safety and maximum outcome. A coordinated program of care will be provided by an interdisciplinary team including physical therapy, occupational therapy, speech language pathology, hospitalist, physiatry, rehab nursing and rehab psychology. Rehab goals include improved cognition, mobility, self-care management, strength and conditioning/endurance, pain management, bowel and bladder management, mood and affect, and safety with independent home management including caregiver training. Estimated length of stay is approximately 14 days. Rehab potential: Very Good. Disposition: to pre-morbid independent living setting with supportive care of patient's spouse. We will continue to follow with you in anticipation of discharge to acute inpatient rehabilitation when medically stable to do so at the discretion of his attending physician.      Thank you for allowing us to participate in his care.     Vnoda Rodrigues M.D.  Physical Medicine and Rehabilitation                      Carlos Toney M.D. Physician Signed Neurology  Procedures Date of Service: 3/25/2019 10:23 AM   Procedure Orders:   EEG [[id number]] ordered by Nadja Rucker M.D. at 03/25/19 0803   Procedures:   EEG VIDEO 24 HR REDUCED [[id number] (Custom)]         []Hide copied text  EEG 03/25/19 2:07 PM     VIDEO ELECTROENCEPHALOGRAM / EPILEPSY MONITORING UNIT REPORT        Referring provider: DR. RUCKER     DOS:   3/25/2019        INDICATION:  Han Pat 84 y.o. male presenting with Weakness and altered  mental status  Possible seizure     CURRENT ANTIEPILEPTIC REGIMEN: NONE     DURATION: 24 minutes        TECHNIQUE: A 30-channel video electroencephalogram (VEEG) was performed in accordance with the international 10-20 system. This digital study was reviewed in bipolar and referential montages. The recording examined the patient during wakeful, drowsy and sleep states.            DESCRIPTION OF THE RECORD:  During the awake state, background shows symmetrical 8-9 Hz alpha activity posteriorly with amplitude of 70 mV.  There was reactivity with eye opening/closure.  Normal anterior-posterior gradient was noted with faster beta frequencies seen anteriorly.  During drowsiness, high-amplitude delta frequencies were seen. There are monomorphic delta 2 Hz bursts over frontal R>L as long as 5-10 seconds     During the sleep state, background shows diffuse high-amplitude 4-5 Hz delta activity.  Symmetrical high-amplitude sleep spindles and vertex sharp activities were seen in the central leads.     ACTIVATION PROCEDURES:   Hyperventilation was not performed  Intermittent Photic stimulation was performed in a stepwise fashion from 1 to 30 Hz and elicited a normal response (photic driving), most noticeable in the posterior leads.        ICTAL AND/OR INTERICTAL FINDINGS:   No focal or generalized epileptiform activity was noted.  There are monomorphic delta 2 Hz bursts over frontal R>L as long as 5-10 secondsNo seizures were recorded during the study.      EVENT(S):  NONE     EKG: sampling review of EKG recording demonstrated regular rhythm        INTERPRETATION:         ________________________________________________________________________     This is abnormal routine video EEG recording in the awake and drowsy/sleep state(s).     This scalp EEG denotes                  1. Diffuse nonspecific cortical dysfunction - mild degree                  2. Mild focal cortical irritability over frontal R>L --this patten could be  interictal - advise clinical correlation regarding further management. This does not necessary mean the patient needs seizure medication      There are no electrographic seizures in this record     Of note, unremarkable EEG does not completely exclude the diagnosis  of seizures since seizure is an episodic phenomena.  Clinical correlation may help     If clinical suspicion of seizure remains high.  Prolonged EEG   monitoring may be of help.     EEG 03/25/19 2:11 PM     ________________________________________________________________________  ________________________________________________________________________        ________________________________________________________________________                     Co-morbidities: See PMH  Potential Risk - Complications: Cognitive Impairment, Contractures, Deep Vein Thrombosis, Incontinence, Malnutrition, Pain, Perceptual Impairment, Pneumonia, Pressure Ulcer and Urinary Tract Infection  Level of Risk: High    Ongoing Medical Management Needed (Medical/Nursing Needs):   Patient Active Problem List    Diagnosis Date Noted   • Weakness 03/24/2019     Priority: High   • Acute encephalopathy 03/24/2019     Priority: High   • Cognitive impairment 03/27/2019   • Anemia 03/25/2019   • Serum total bilirubin elevated 03/24/2019   • Hyponatremia 03/24/2019   • Lower back pain 03/24/2019   • Cough 03/12/2019   • Thrombocytosis (HCC) 02/05/2019   • Benign prostatic hyperplasia without lower urinary tract symptoms 02/05/2019   • Mixed hyperlipidemia 02/05/2019   • Seasonal allergies 02/05/2019   • Vertigo 02/05/2019   • Obstructive sleep apnea 08/14/2017   • Gastroesophageal reflux disease 08/14/2017   • Obesity (BMI 30.0-34.9) 08/14/2017     A & O with periods of confusion.    Current Vital Signs:   Temperature: 36.8 °C (98.3 °F) Pulse: 73 Respiration: 18 Blood Pressure : 120/60  Weight: 95.9 kg (211 lb 6.7 oz) Height: 182.9 cm (6')  Pulse Oximetry: 97 % O2 (LPM): 2      Completed  Laboratory Reports:  Recent Labs      03/27/19   0439 03/28/19   0439 03/29/19   0450   WBC  6.5  5.3  6.7   HEMOGLOBIN  10.3*  11.3*  11.9*   HEMATOCRIT  30.7*  33.0*  34.7*   PLATELETCT  264  290  309   SODIUM  134*   --    --    POTASSIUM  3.8   --    --    BUN  15   --    --    CREATININE  0.72   --    --    ALBUMIN  2.8*   --    --    GLUCOSE  127*   --    --      Additional Labs: Not Applicable    Prior Living Situation:   Housing / Facility: 2 Story House  Steps Into Home: 15 (has stairs all over home, no less than 15 to get in)  Steps In Home:  (multiple flights throughout home)  Lives with - Patient's Self Care Capacity: Spouse  Equipment Owned: Front-Wheel Walker, Tub / Shower Seat, Grab Bar(s) In Tub / Shower    Prior Level of Function / Living Situation:   Physical Therapy: Prior Services: Intermittent Physical Support for ADL Per Family  Housing / Facility: 2 Story House  Steps Into Home: 15 (has stairs all over home, no less than 15 to get in)  Steps In Home:  (multiple flights throughout home)  Rail: Both Rail (Steps into Home)  Bathroom Set up: Walk In Shower, Grab Bars, Shower Chair  Equipment Owned: Front-Wheel Walker, Tub / Shower Seat, Grab Bar(s) In Tub / Shower  Lives with - Patient's Self Care Capacity: Spouse  Bed Mobility: Independent  Transfer Status: Independent  Ambulation: Independent  Distance Ambulation (Feet):  (community)  Assistive Devices Used: None  Stairs: Independent  Current Level of Function:   Level Of Assist: Minimal Assist  Assistive Device: Front Wheel Walker  Distance (Feet): 150  Deviation: Shuffled Gait, Decreased Heel Strike, Decreased Toe Off (pushes walker too far in front)  # of Stairs Climbed: 0  Weight Bearing Status: full  Skilled Intervention: Verbal Cuing (visual cues)  Comments: Pt pushes walker too far in front.  Pt needs repeated cues to stand erect and stay close to fww.  Pt lacks heelstrike toe off gait pattern.  pt requires repeated verbal and visual  cues for proper gait pattern.   Supine to Sit: Stand by Assist  Sit to Supine:  (seated in chair at end of session)  Scooting: Stand by Assist  Sit to Stand: Stand by Assist  Bed, Chair, Wheelchair Transfer: Contact Guard Assist  Toilet Transfers: Stand by Assist  Transfer Method: Stand Pivot  Skilled Intervention: Postural Facilitation, Verbal Cuing  Comments: with fww  Sitting in Chair: Pt left up in chair following PT  Sitting Edge of Bed: 20  Standing: 10 min, 5 min  Occupational Therapy:   Self Feeding: Independent  Grooming / Hygiene: Independent  Bathing: Independent  Dressing: Independent  Toileting: Independent  Medication Management: Independent  Laundry: Requires Assist  Kitchen Mobility: Requires Assist  Finances: Requires Assist  Home Management: Requires Assist  Shopping: Requires Assist  Prior Level Of Mobility: Independent With Device in Home (has been using walker recently per spouse, pt denied)  Driving / Transportation: Unable To Determine At This Time  Prior Services: Intermittent Physical Support for ADL Per Family  Housing / Facility: [address]  Occupation (Pre-Hospital Vocational): Retired Due To Age  Leisure Interests: Unable To Determine At This Time  Current Level of Function:   Eating: Independent  Bathing: Not Tested  Upper Body Dressing: Supervision  Lower Body Dressing: Supervision  Toileting: Minimal Assist  Skilled Intervention: Verbal Cuing  Speech Language Pathology:      Rehabilitation Prognosis/Potential: Good  Estimated Length of Stay: 14 days    Nursing:   Orientation : Disoriented to Event (at times)  Continence/Incontinence    Scope/Intensity of Services Recommended:  Physical Therapy: 1 hr / day  5 days / week. Therapeutic Interventions Required: Maximize Endurance, Mobility, Strength and Safety  Occupational Therapy: 1 hr / day 5 days / week. Therapeutic Interventions Required: Maximize Self Care, ADLs, IADLs and Energy Conservation  Speech & Language Pathology: 1 hr /  day 5 days / week. Therapeutic Interventions Required: Maximize Cognition and Safety  Rehabilitation Nursin/7. Therapeutic Interventions Required: Monitor Pain, Skin, Vital Signs, Intake and Output, Labs, Safety and Family Training  Rehabilitation Physician: 3 - 5 days / week. Therapeutic Interventions Required: Medical Management  Respiratory Care: Pulmonary Toileting. Therapeutic Interventions Required: Pulmonary Toileting and O2 Weaning    Rehabilitation Goals and Plan (Expected frequency & duration of treatment in the IRF):   Return to the Community, Modified Independent Level of Care and Family Able to Provide  Assistance  Anticipated Date of Rehabilitation Admission: 19  Patient/Family oriented IRF level of care/facility/plan: Yes  Patient/Family willing to participate in IRF care/facility/plan: Yes  Patient able to tolerate IRF level of care proposed: Yes  Patient has potential to benefit IRF level of care proposed: Yes  Comments: Not Applicable    Special Needs or Precautions - Medical Necessity:  Safety Concerns/Precautions:  Fall Risk / High Risk for Falls, Balance, Cognition and Bed / Chair Alarm  Pain Management  Special Equipment: Personal CPAP  IV Site: Peripheral  Requires Oxygen  Current Medications:    Current Facility-Administered Medications Ordered in Epic   Medication Dose Route Frequency Provider Last Rate Last Dose   • levETIRAcetam (KEPPRA) tablet 500 mg  500 mg Oral Q12HRS Nadja Rucker M.D.   500 mg at 19 0540   • hydroxyurea (HYDREA) capsule 1,500 mg  1,500 mg Oral Once per day on  Matt Fritz D.O.   1,500 mg at 19   • simvastatin (ZOCOR) tablet 20 mg  20 mg Oral Q EVENING CISCO Olvera.O.   20 mg at 19 1713   • tamsulosin (FLOMAX) capsule 0.4 mg  0.4 mg Oral QHS CISCO Olvera.O.   0.4 mg at 19   • senna-docusate (PERICOLACE or SENOKOT S) 8.6-50 MG per tablet 2 Tab  2 Tab Oral BID CISCO Olvera.O.   2  Tab at 03/27/19 1808    And   • polyethylene glycol/lytes (MIRALAX) PACKET 1 Packet  1 Packet Oral QDAY PRN Matt Fritz D.O.        And   • magnesium hydroxide (MILK OF MAGNESIA) suspension 30 mL  30 mL Oral QDAY PRN CISCO Olvera.O.        And   • bisacodyl (DULCOLAX) suppository 10 mg  10 mg Rectal QDAY PRN CISCO Olvera.O.       • Respiratory Care per Protocol   Nebulization Continuous RT Matt Fritz D.O.       • acetaminophen (TYLENOL) tablet 650 mg  650 mg Oral Q6HRS PRN CISCO Olvera.O.       • hydrALAZINE (APRESOLINE) injection 10 mg  10 mg Intravenous Q4HRS PRN CISCO Olvera.O.       • ondansetron (ZOFRAN) syringe/vial injection 4 mg  4 mg Intravenous Q4HRS PRN CISCO Olvera.O.       • ondansetron (ZOFRAN ODT) dispertab 4 mg  4 mg Oral Q4HRS PRN CISCO Olvera.O.       • tramadol (ULTRAM) 50 MG tablet 50 mg  50 mg Oral Q6HRS PRN CISCO Olvera.O.   50 mg at 03/28/19 1713   • hydroxyurea (HYDREA) capsule 1,000 mg  1,000 mg Oral Once per day on Sun Sat Matt Fritz D.O.   1,000 mg at 03/25/19 0045   • omeprazole (PRILOSEC) capsule 20 mg  20 mg Oral QHS CISCO Olvera.O.   20 mg at 03/28/19 2102     Current Outpatient Prescriptions Ordered in [facility]   Medication Sig Dispense Refill   • levETIRAcetam (KEPPRA) 500 MG Tab Take 1 Tab by mouth every 12 hours. 60 Tab 2     Diet:   DIET ORDERS (Through next 24h)    Start     Ordered    03/24/19 2102  Diet Order Regular  ALL MEALS     Question:  Diet:  Answer:  Regular    03/24/19 2103          Anticipated Discharge Destination / Patient/Family Goal:  Destination: Home with Assistance Support System: Spouse  Anticipated home health services: OT and PT  Previously used HH service/ provider: Not Applicable  Anticipated DME Needs: Oxygen, Walker and Life Line  Outpatient Services: OT and PT  Alternative resources to address additional identified needs:     Pre-Screen Completed: 3/29/2019 7:29 AM José Luis Serrato L.P.N.

## 2019-03-29 NOTE — RESPIRATORY CARE
Respiratory Therapy Update          02 LPM): 2 (03/28/19 2100)  O2 Daily Delivery Respiratory : Room Air with O2 Available (03/25/19 0710)    Breath Sounds  RUL Breath Sounds: Clear (03/28/19 0900)  RML Breath Sounds: Clear (03/28/19 0900)  RLL Breath Sounds: Diminished (03/28/19 0900)  ELY Breath Sounds: Clear (03/28/19 0900)  LLL Breath Sounds: Diminished (03/28/19 0900)    Events/Summary/Plan: AUTO-PAP on patient (he c/o unit not putting out any air) unit checked and pt on cont monitor  to observe unit, machine is functioning and pt reassured (03/28/19 2100)

## 2019-03-29 NOTE — H&P
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 male with a past medical history of dyslipidemia, GERD, BPH, chronic thrombocytosis, FREYA, low back pain who presented to [facility] on 3/24 with weakness and altered mental status, bowel and bladder incontinence.  He recently had bronchitis approximately 2 weeks prior to admission finished azithromycin with significant improvement in symptoms.   He reportedly had profound weakness in bilateral lower extremities, no imaging of the spine was done.    His head CT was negative, EEG did not show any seizure activity.  Even though EEG was negative neurology recommended starting antiepileptics secondary to possible seizure activity prior to EEG.  He had no reported seizure activity during acute hospitalization.    His hospitalization was complicated by hyponatremia presumed to be from poor intake.  He reportedly was not eating or drinking very much which is very abnormal for him.    He is now admitted for acute inpatient rehabilitation with severe functional debility from acute encephalopathy complicated by possible seizure.      Patient is a poor historian majority of HPI comes from discussions with the wife and chart review.    Patient was evaluated by Rehab Medicine physician and Physical Therapy and Occupational Therapy and determined to be appropriate for acute inpatient rehab and was transferred to [facility] on 3/29    Pre-mobidly, the patient lived in a single level home with spouse.  With this acute therapeutic intervention, this patient hopes to improve his functional status, and return to independent living with the supportive care of spouse.      REVIEW OF SYSTEMS:      unable to be obtained  due to cognitive status.      PMH:  Past Medical History:   Diagnosis Date   • Back pain    • GERD (gastroesophageal reflux disease)    • Hyperlipidemia    • Kidney stone    • PND (post-nasal drip)    • Sleep apnea        PSH:  Past Surgical History:   Procedure Laterality Date   • CATARACT EXTRACTION WITH IOL     • OTHER      Nasal surgery   • OTHER ORTHOPEDIC SURGERY  lumbar disectomy 2000       FAMILY HISTORY:  Sleep apnea in his brother father and son    MEDICATIONS:  Current Facility-Administered Medications   Medication Dose   • Respiratory Care per Protocol     • Pharmacy Consult Request ...Pain Management Review 1 Each  1 Each   • oxyCODONE immediate-release (ROXICODONE) tablet 5 mg  5 mg   • oxyCODONE immediate release (ROXICODONE) tablet 10 mg  10 mg   • acetaminophen (TYLENOL) tablet 650 mg  650 mg   • senna-docusate (PERICOLACE or SENOKOT S) 8.6-50 MG per tablet 2 Tab  2 Tab    And   • polyethylene glycol/lytes (MIRALAX) PACKET 1 Packet  1 Packet    And   • magnesium hydroxide (MILK OF MAGNESIA) suspension 30 mL  30 mL    And   • bisacodyl (DULCOLAX) suppository 10 mg  10 mg   • ascorbic acid tablet 500 mg  500 mg   • [START ON 3/30/2019] therapeutic multivitamin-minerals (THERAGRAN-M) tablet 1 Tab  1 Tab   • ondansetron (ZOFRAN ODT) dispertab 4 mg  4 mg   • acetaminophen (TYLENOL) tablet 650 mg  650 mg   • [START ON 3/30/2019] hydroxyurea (HYDREA) capsule 1,000 mg  1,000 mg   • hydroxyurea (HYDREA) capsule 1,500 mg  1,500 mg   • levETIRAcetam (KEPPRA) tablet 500 mg  500 mg   • omeprazole (PRILOSEC) capsule 20 mg  20 mg   • simvastatin (ZOCOR) tablet 20 mg  20 mg   • tamsulosin (FLOMAX) capsule 0.4 mg  0.4 mg   • tramadol (ULTRAM) 50 MG tablet 50 mg  50 mg       ALLERGIES:  Patient has no known allergies.    PSYCHOSOCIAL HISTORY:  Living Site:  Home  Living With:  spouse  Caregiver's availability:  wife  Number of stairs:  unknown  Substance use history:  none    LEVEL OF FUNCTION PRIOR TO  DISABILTY:  Independent     LEVEL OF FUNCTION PRIOR TO ADMISSION to [facility]:  currently requiring moderate assistance for mobility and moderate assistance for ADLs, also with ongoing cognitive deficits.     CURRENT LEVEL OF FUNCTION:   Same as level of function prior to admission to [facility]    PHYSICAL EXAM:     VITAL SIGNS:   height is 1.829 m (6') and weight is 91.5 kg (201 lb 11.5 oz). His oral temperature is 36.3 °C (97.3 °F). His blood pressure is 136/72 and his pulse is 85. His respiration is 18 and oxygen saturation is 94%.     GENERAL: No apparent distress  HEENT: Normocephalic/atraumatic, EOMI and PERRL  CARDIAC: Regular rate and rhythm, normal S1, S2   LUNGS: Clear to auscultation   ABDOMINAL: bowel sounds present, soft, nontender and nondistended    EXTREMITIES: no contractures, spasticity, or edema.  No calf tenderness bilaterally, 2+ bilateral DP/PT pulses.    NEURO:    Mental status: Alert and oriented x1, unaware of the date, year, month, difficulty responding to questions, looks to his wife for answers.  Speech: fluent, no aphasia or dysarthria    CRANIAL NERVES:  2,3: visual acuity grossly intact, PERRL  3,4,6: EOMI bilaterally, no nystagmus or diplopia  7: no facial asymmetry  8: hearing grossly intact  9,10: symmetric palate elevation  11: SCM/Trapezius strength 5/5 bilaterally  12: tongue protrudes midline    Motor:  Patient has difficulty following commands, major myotomes are 4-5/5, no significant pain associated with exam    Sensory: intact to light touch through out    DTRs: 2+ in bilateral biceps and patellar tendons  Negative babinski b/l  Negative Swanson b/l       RADIOLOGY:  MR-BRAIN-W/O   Final Result       1.  Study is significantly limited by motion artifact.   2.  Diffuse primarily central atrophy.   3.  Nonspecific foci of abnormal signal in the white matter bilaterally.  Microvascular ischemic change versus demyelination or gliosis.    4.  No acute stroke or evidence for hemorrhage.   5.  Acute and chronic paranasal sinus disease.       CT-HEAD W/O   Final Result       1.  No acute intracranial abnormality.   2.  Age-consistent atrophy and chronic white matter changes.   3.  Paranasal sinus disease.                   INTERPRETING LOCATION:  [address], [place], [address]       DX-CHEST-PORTABLE (1 VIEW)   Final Result       Hypoinflation without other evidence for acute cardiopulmonary disease.       EEG: 3/25/19:                1. Diffuse nonspecific cortical dysfunction - mild degree                  2. Mild focal cortical irritability over frontal R>L --this patten could be interictal - advise clinical correlation regarding further management. This does not necessary mean the patient needs seizure medication        LABS:  Recent Labs      03/27/19   0439   SODIUM  134*   POTASSIUM  3.8   CHLORIDE  107   CO2  22   GLUCOSE  127*   BUN  15   CREATININE  0.72   CALCIUM  8.0*     Recent Labs      03/27/19 0439 03/28/19   0439 03/29/19   0450   WBC  6.5  5.3  6.7   RBC  2.71*  2.94*  3.10*   HEMOGLOBIN  10.3*  11.3*  11.9*   HEMATOCRIT  30.7*  33.0*  34.7*   MCV  113.3*  112.2*  111.9*   MCH  38.0*  38.4*  38.4*   MCHC  33.6*  34.2  34.3   RDW  74.1*  71.7*  72.2*   PLATELETCT  264  290  309   MPV  9.0  8.6*  8.6*             PRIMARY REHAB DIAGNOSIS:    This patient is a 84 y.o. male admitted for acute inpatient rehabilitation with Acute encephalopathy.    IMPAIRMENTS:   Cognitive  ADLs/IADLs  Mobility  Speech    SECONDARY DIAGNOSIS/MEDICAL CO-MORBIDITIES AFFECTING FUNCTION:  Acute encephalopathy, low back pain, bowel and bladder incontinence, seizure, anemia, obstructive sleep apnea, hypertension, dyslipidemia    RELEVANT CHANGES SINCE PREADMISSION EVALUATION:    Status unchanged    The patient's rehabilitation potential is Very Good  The patient's medical prognosis is good    PLAN:   Discussion and Recommendations:   1. The patient requires an  acute inpatient rehabilitation program with a coordinated program of care at an intensity and frequency not available at a lower level of care. This recommendation is substantiated by the patient's medical physicians who recommend that the patient's intervention and assessment of medical issues needs to be done at an acute level of care for patient's safety and maximum outcome.   2. A coordinated program of care will be supplied by an interdisciplinary team of physical therapy, occupational therapy, rehab physician, rehab nursing, and, if needed, speech therapy and rehab psychology. Rehab team presents a patient-specific rehabilitation and education program concentrating on prevention of future problems related to accessibility, mobility, skin, bowel, bladder, sexuality, and psychosocial and medical/surgical problems.   3. Need for Rehabilitation Physician: The rehab physician will be evaluating the patient on a multi-weekly basis to help coordinate the program of care. The rehab physician communicates between medical physicians, therapists, and nurses to maximize the patient's potential outcome. Specific areas in which the rehab physician will be providing daily assessment include the following:   A. Assessing the patient's heart rate and blood pressure response (vitals monitoring) to activity and making adjustments in medications or conservative measures as needed.   B. The rehab physician will be assessing the frequency at which the program can be increased to allow the patient to reach optimal functional outcome.   C. The rehab physician will also provide assessments in daily skin care, especially in light of patient's impairments in mobility.   D. The rehab physician will provide special expertise in understanding how to work with functional impairment and recommend appropriate interventions, compensatory techniques, and education that will facilitate the patient's outcome.   4. Rehab RADHA.   The rehab RN will  be working with patient to carry over in room mobility and activities of daily living when the patient is not in 3 hours of skilled therapy. Rehab nursing will be working in conjunction with rehab physician to address all the medical issues above and continue to assess laboratory work and discuss abnormalities with the treating physicians, assess vitals, and response to activity, and discuss and report abnormalities with the rehab physician. Rehab RN will also continue daily skin care, supervise bladder/bowel program, instruct in medication administration, and ensure patient safety.   5. Rehab Therapy: Therapies to treat at intensity and frequency of (may change after completion of evaluation by all therapeutic disciplines):       PT:  Physical therapy to address mobility, transfer, gait training and evaluation for adaptive equipment needs 1 hour/day at least 5 days/week for the duration of the ELOS (see below)       OT:  Occupational therapy to address ADLs, self-care, home management training, functional mobility/transfers and assistive device evaluation, and community re-integration 1  hour/day at least 5 days/week for the duration of the ELOS (see below).        ST/Dysphagia:  Speech therapy to address speech, language, and cognitive deficits as well as swallowing difficulties with retraining/dysphagia management and community re-integration with comprehension, expression, cognitive training 1  hour/day at least 5 days/week for the duration of the ELOS (see below).     Medical management / Rehabilitation Issues/ Adverse Potential as part of rehabilitation plan     REHABILITATION ISSUES/ADVERSE POTENTIAL:  Acute encephalopathy: Continued altered mental status, question secondary to seizure  - Patient demonstrates functional deficits in strength, balance, coordination, and ADL's. Patient is admitted to [facility] for comprehensive rehabilitation therapy as described below.   Rehabilitation  nursing monitors bowel and bladder control, educates on medication administration, co-morbidities and monitors patient safety.  -Consult speech therapy     DVT prophylaxis:  Patient is on  Nothing for anticoagulation upon transfer. Encourage OOB. Monitor daily for signs and symptoms of DVT including but not limited to swelling and pain to prevent the development of DVT that may interfere with therapies.  - start lovenox 40mg daily    4GI prophylaxis:  On prilosec to prevent gastritis/dyspepsia which may interfere with therapies.    Nutrition/Dysphagia: Dietician monitors nutrient intake, recommend supplements prn and provide nutrition education to pt/family to promote optimal nutrition for wound healing/recovery.      Skin/dermal ulcer prophylaxis: Monitor for new skin conditions with q.2 h. turns as required to prevent the development of skin breakdown.     Cognition/Behavior:  Psychologist Dr. Wilde provides adjustment counseling to illness and psychosocial barriers that may be potential barriers to rehabilitation.     MEDICAL CO-MORBIDITIES/ADVERSE POTENTIAL AFFECTING FUNCTION:    Lower extremity weakness: Differential diagnosis includes Demetrius's paralysis secondary to seizure versus spinal stenosis/myelopathy  -Improved lower extremity strength, no clear sensory level  -Continue with physical and occupational therapy for mobility and ADLs  -If weakness progresses consider getting MRI of lumbar spine    Seizure:  -Keppra 500 mg twice daily  -We will follow-up with neurology as outpatient    Neurogenic bladder: Complicated by BPH, alden urinary incontinence prior to admission  - Timed voids every 3 hours during the day every 6 hours at night, check PVRs if greater than 200 cc then ICP  -Continue Flomax 0.4 mg nightly, monitor for orthostatic hypotension    Neurogenic bowel: Alden bowel incontinence  -Start senna 2 tablets q. noon and Colace 200 mg twice daily    FREYA: Previously on home CPAP  -Continue with home  CPAP  -Consult respiratory therapy    Dyslipidemia:  -Check lipids in a.m.  -Continue simvastatin 20 mg nightly    Anemia: Likely iron deficiency anemia    Chronic thrombocytosis:  On hydroxyurea for management of chronic thrombocytosis  -Check CBC/platelet level in the morning    Low back pain: History of decompression of the lumbar spine back in late 2000  - Low back pain has been present for some time and  -Not limiting his participation with therapy    Hyponatremia:  Presumed from poor intake  -Check CMP in a.m.  -Monitor I's and O's      I personally performed a complete drug regimen review and no potential clinically significant medication issues were identified.     Pt was seen today for 75 min, and entire time spent in face-to-face contact was >50% in counseling and coordination of care as detailed in A/P above.        GOALS/EXPECTED LEVEL OF FUNCTION BASED ON CURRENT MEDICAL AND FUNCTIONAL STATUS (may change based on patient's medical status and rate of impairment recovery):  Transfers:   Supervision  Mobility/Gait:   Supervision  ADL's:   Supervision  Cognition: Least amount of verbal cues  Swallowing: Least restrictive diet    DISPOSITION: Discharge to pre-morbid independent living setting with the supportive care of patient's spouse.      ELOS: 10-14 days

## 2019-03-29 NOTE — PROGRESS NOTES
Report given to Lenora MEDINA. Plan of care discussed. Patient resting comfortably in bed. Safety precautions in place.

## 2019-03-29 NOTE — PROGRESS NOTES
Patient is sleeping in bed comfortably, respirations are even and unlabored. CPAP is off, continuous pulse ox and call light on the floor. Pulse ox placed on pt O2 level is 90% encouraged pt to place CPAP back on but pt refused stating O2 levels will increase when sleeping. O2 saturation is now 94% on RA. Educated pt that continuous pulse would be on to monitor O2 and if O2 saturation will decrease this RN encourages pt to wear CPAP. Safety precautions in place, bed alarm is on, will continue to monitor.     2330 Pt set off bed alarm, pt sated he needed to use urinal. Assisted pt to edge of the bed. Pt encouraged to wear CPAP, pt stated he wears it is every night and placed it back on. Bed alarm is on, call light within reach and clipped to patient's pillow. Will continue to monitor.

## 2019-03-29 NOTE — PROGRESS NOTES
1900 Report received from Robert MEDINA. Plan of care discussed. Patient resting comfortably in bed. Safety precautions in place.     2030 Assessment completed, pt is alert and oriented x3-4, slightly confused on event but is aware he is in the hospital. Patient began trying to get up and set off bed alarm. Pt used urinal with the help of Mireille Carr RN. Medicated per MAR. Pt insisted home CPAP was not working and that it was not his. RT was called and is at bedside assisting patient on CPAP and explained to pt that it was home CPAP, pt agreed and verbalized understanding. Safety precautions are in place, bed alarm is on, call light is within reach. Pt to demonstrate proper use of call light. Will continue to monitor.

## 2019-03-29 NOTE — PROGRESS NOTES
Assumed care of pt at 0700, received report from Toya MEDINA. A/Ox4. Pt on room air, pt has regular diet with breakfast at bedside, pt stating pain in knees and back, PRN pain medication given, pt received discharge orders, pt leaving at 1130 to go to [facility]. Pt updated. All needs met at this time. Bed in lowest position and call light within reach, instructed to call for any assistance.

## 2019-03-29 NOTE — DISCHARGE SUMMARY
"Discharge Summary    CHIEF COMPLAINT ON ADMISSION  Chief Complaint   Patient presents with   • Weakness     [facility] called for pt slipping off the bed and weakness.  Wife says that he has been \"off all day\"   • Incontinence     +stool and urine       Reason for Admission  Weakness     Admission Date  3/24/2019    CODE STATUS  DNAR/DNI    HPI & HOSPITAL COURSE  This is a 84 y.o. male who presented 3/24/2019 with weakness and altered mental status. He recently had bronchitis around 2 weeks ago, finished azithromycin and was much better until the night prior to admission when he began complaining of right lower back/buttock pain.  He had altered mental status and had a fall when he couldn't get up.   He was admitted to evaluate for TIA/CVA.  Head CT was negative.  He had an EEG that did not show any evidence of seizure but did have mild nonspecific cortical dysfunction.  The case was discussed with neurology who recommended initiation of an antiepileptic as this may have been related to a seizure.  They would like to follow-up with the patient on an outpatient basis.  Once started on Keppra he had no further episodes of confusion.  He was provided gentle hydration.  He was evaluated for infection however did not have a evidence of this.  Regarding his right low back pain, this improved with time.  He worked with physical therapy recommended that he be discharged to a facility for further rehabilitation with PT and OT.  The patient was accepted to [facility] and was discharged there in stable condition.    The patient will remain on Keppra therapy until he follows up with neurology.  He had no observed seizure activity however it was thought that this may have been related to his seizure therefore, he will remain on prophylaxis until reevaluated by neurology.    Therefore, he is discharged in good and stable condition to an inpatient rehabilitation hospital.    The patient met 2-midnight criteria for an inpatient stay " at the time of discharge.    Discharge Date  3/29/2019    FOLLOW UP ITEMS POST DISCHARGE  Follow-up with neurology to discuss possible seizure activity.  If seizure is thought unlikely, discontinue Keppra    DISCHARGE DIAGNOSES  Principal Problem:    Weakness POA: Unknown  Active Problems:    Acute encephalopathy POA: Unknown    Obstructive sleep apnea POA: Yes    Gastroesophageal reflux disease POA: Yes    Thrombocytosis (HCC) POA: Yes    Mixed hyperlipidemia POA: Yes    Serum total bilirubin elevated POA: Unknown    Hyponatremia POA: Unknown    Lower back pain POA: Unknown    Anemia POA: Unknown    Cognitive impairment POA: Unknown  Resolved Problems:    * No resolved hospital problems. *      FOLLOW UP  Future Appointments  Date Time Provider Department Center   8/7/2019 10:00 AM [facility]     No follow-up provider specified.    MEDICATIONS ON DISCHARGE     Medication List      START taking these medications      Instructions   levETIRAcetam 500 MG Tabs  Commonly known as:  KEPPRA   Take 1 Tab by mouth every 12 hours.  Dose:  500 mg        CONTINUE taking these medications      Instructions   acetaminophen 500 MG Tabs  Commonly known as:  TYLENOL   Take 1,000 mg by mouth every 6 hours as needed for Moderate Pain.  Dose:  1000 mg     albuterol 108 (90 Base) MCG/ACT Aers inhalation aerosol   Inhale 2 Puffs by mouth every four hours as needed for Shortness of Breath.  Dose:  2 Puff     aspirin 81 MG tablet   Take 81 mg by mouth every evening.  Dose:  81 mg     benzonatate 100 MG Caps  Commonly known as:  TESSALON   Take 1 Cap by mouth 3 times a day as needed for Cough.  Dose:  100 mg     DELSYM PO   Take 20 mL by mouth as needed (For cough).  Dose:  20 mL     hydroxyurea 500 MG Caps  Commonly known as:  HYDREA   Take 1,000-1,500 mg by mouth See Admin Instructions. Pt takes 1000MG on Sat and Sun 1500MG on Mon, Tue, Wed, Thur, and Fri  Dose:  0782-4596 mg     lansoprazole 30 MG  Cpdr  Commonly known as:  PREVACID   Take 30 mg by mouth every evening.  Dose:  30 mg     montelukast 10 MG Tabs  Commonly known as:  SINGULAIR   Take 10 mg by mouth every evening.  Dose:  10 mg     OCUVITE PO   Take 2 Caps by mouth every evening.  Dose:  2 Cap     simvastatin 20 MG Tabs  Commonly known as:  ZOCOR   Take 20 mg by mouth every evening.  Dose:  20 mg     tamsulosin 0.4 MG capsule  Commonly known as:  FLOMAX   Take 0.4 mg by mouth every evening.  Dose:  0.4 mg        STOP taking these medications    azithromycin 250 MG Tabs  Commonly known as:  ZITHROMAX     predniSONE 20 MG Tabs  Commonly known as:  DELTASONE            Allergies  No Known Allergies    DIET  Orders Placed This Encounter   Procedures   • Diet Order Regular     Standing Status:   Standing     Number of Occurrences:   1     Order Specific Question:   Diet:     Answer:   Regular [1]       ACTIVITY  As tolerated.  Weight bearing as tolerated    CONSULTATIONS  Neurology over the phone, they do not consult in person at this facility    PROCEDURES  EEG: 3/25/19:               1. Diffuse nonspecific cortical dysfunction - mild degree                  2. Mild focal cortical irritability over frontal R>L --this patten could be interictal - advise clinical correlation regarding further management. This does not necessary mean the patient needs seizure medication     LABORATORY  Lab Results   Component Value Date    SODIUM 134 (L) 03/27/2019    POTASSIUM 3.8 03/27/2019    CHLORIDE 107 03/27/2019    CO2 22 03/27/2019    GLUCOSE 127 (H) 03/27/2019    BUN 15 03/27/2019    CREATININE 0.72 03/27/2019        Lab Results   Component Value Date    WBC 6.7 03/29/2019    HEMOGLOBIN 11.9 (L) 03/29/2019    HEMATOCRIT 34.7 (L) 03/29/2019    PLATELETCT 309 03/29/2019        Total time of the discharge process exceeds 40 minutes.

## 2019-03-30 LAB
25(OH)D3 SERPL-MCNC: 10 NG/ML (ref 30–100)
ALBUMIN SERPL BCP-MCNC: 3 G/DL (ref 3.2–4.9)
ALBUMIN/GLOB SERPL: 1.1 G/DL
ALP SERPL-CCNC: 67 U/L (ref 30–99)
ALT SERPL-CCNC: 23 U/L (ref 2–50)
ANION GAP SERPL CALC-SCNC: 7 MMOL/L (ref 0–11.9)
AST SERPL-CCNC: 17 U/L (ref 12–45)
BASOPHILS # BLD AUTO: 0.4 % (ref 0–1.8)
BASOPHILS # BLD: 0.03 K/UL (ref 0–0.12)
BILIRUB SERPL-MCNC: 1.8 MG/DL (ref 0.1–1.5)
BUN SERPL-MCNC: 16 MG/DL (ref 8–22)
CALCIUM SERPL-MCNC: 8.7 MG/DL (ref 8.5–10.5)
CHLORIDE SERPL-SCNC: 105 MMOL/L (ref 96–112)
CHOLEST SERPL-MCNC: 112 MG/DL (ref 100–199)
CO2 SERPL-SCNC: 23 MMOL/L (ref 20–33)
CREAT SERPL-MCNC: 0.73 MG/DL (ref 0.5–1.4)
EOSINOPHIL # BLD AUTO: 0.11 K/UL (ref 0–0.51)
EOSINOPHIL NFR BLD: 1.4 % (ref 0–6.9)
ERYTHROCYTE [DISTWIDTH] IN BLOOD BY AUTOMATED COUNT: 73.9 FL (ref 35.9–50)
EST. AVERAGE GLUCOSE BLD GHB EST-MCNC: 123 MG/DL
GLOBULIN SER CALC-MCNC: 2.7 G/DL (ref 1.9–3.5)
GLUCOSE SERPL-MCNC: 135 MG/DL (ref 65–99)
HBA1C MFR BLD: 5.9 % (ref 0–5.6)
HCT VFR BLD AUTO: 33 % (ref 42–52)
HDLC SERPL-MCNC: 46 MG/DL
HGB BLD-MCNC: 11.2 G/DL (ref 14–18)
IMM GRANULOCYTES # BLD AUTO: 0.06 K/UL (ref 0–0.11)
IMM GRANULOCYTES NFR BLD AUTO: 0.8 % (ref 0–0.9)
INR PPP: 1.39 (ref 0.87–1.13)
LDLC SERPL CALC-MCNC: 52 MG/DL
LYMPHOCYTES # BLD AUTO: 0.59 K/UL (ref 1–4.8)
LYMPHOCYTES NFR BLD: 7.5 % (ref 22–41)
MCH RBC QN AUTO: 39.2 PG (ref 27–33)
MCHC RBC AUTO-ENTMCNC: 33.9 G/DL (ref 33.7–35.3)
MCV RBC AUTO: 115.4 FL (ref 81.4–97.8)
MONOCYTES # BLD AUTO: 0.8 K/UL (ref 0–0.85)
MONOCYTES NFR BLD AUTO: 10.1 % (ref 0–13.4)
NEUTROPHILS # BLD AUTO: 6.31 K/UL (ref 1.82–7.42)
NEUTROPHILS NFR BLD: 79.8 % (ref 44–72)
NRBC # BLD AUTO: 0 K/UL
NRBC BLD-RTO: 0 /100 WBC
PLATELET # BLD AUTO: 332 K/UL (ref 164–446)
PMV BLD AUTO: 8.7 FL (ref 9–12.9)
POTASSIUM SERPL-SCNC: 3.9 MMOL/L (ref 3.6–5.5)
PROT SERPL-MCNC: 5.7 G/DL (ref 6–8.2)
PROTHROMBIN TIME: 17.2 SEC (ref 12–14.6)
RBC # BLD AUTO: 2.86 M/UL (ref 4.7–6.1)
SODIUM SERPL-SCNC: 135 MMOL/L (ref 135–145)
TRIGL SERPL-MCNC: 69 MG/DL (ref 0–149)
TSH SERPL DL<=0.005 MIU/L-ACNC: 1.05 UIU/ML (ref 0.38–5.33)
WBC # BLD AUTO: 7.9 K/UL (ref 4.8–10.8)

## 2019-03-30 PROCEDURE — 92523 SPEECH SOUND LANG COMPREHEN: CPT

## 2019-03-30 PROCEDURE — 36415 COLL VENOUS BLD VENIPUNCTURE: CPT

## 2019-03-30 PROCEDURE — 770010 HCHG ROOM/CARE - REHAB SEMI PRIVAT*

## 2019-03-30 PROCEDURE — 84443 ASSAY THYROID STIM HORMONE: CPT

## 2019-03-30 PROCEDURE — 80061 LIPID PANEL: CPT

## 2019-03-30 PROCEDURE — A9270 NON-COVERED ITEM OR SERVICE: HCPCS | Performed by: PHYSICAL MEDICINE & REHABILITATION

## 2019-03-30 PROCEDURE — 97163 PT EVAL HIGH COMPLEX 45 MIN: CPT

## 2019-03-30 PROCEDURE — 85025 COMPLETE CBC W/AUTO DIFF WBC: CPT

## 2019-03-30 PROCEDURE — 85610 PROTHROMBIN TIME: CPT

## 2019-03-30 PROCEDURE — 700111 HCHG RX REV CODE 636 W/ 250 OVERRIDE (IP): Performed by: PHYSICAL MEDICINE & REHABILITATION

## 2019-03-30 PROCEDURE — 99232 SBSQ HOSP IP/OBS MODERATE 35: CPT | Performed by: PHYSICAL MEDICINE & REHABILITATION

## 2019-03-30 PROCEDURE — 82306 VITAMIN D 25 HYDROXY: CPT

## 2019-03-30 PROCEDURE — 97535 SELF CARE MNGMENT TRAINING: CPT

## 2019-03-30 PROCEDURE — 83036 HEMOGLOBIN GLYCOSYLATED A1C: CPT

## 2019-03-30 PROCEDURE — 700102 HCHG RX REV CODE 250 W/ 637 OVERRIDE(OP): Performed by: PHYSICAL MEDICINE & REHABILITATION

## 2019-03-30 PROCEDURE — 92610 EVALUATE SWALLOWING FUNCTION: CPT

## 2019-03-30 PROCEDURE — 97166 OT EVAL MOD COMPLEX 45 MIN: CPT

## 2019-03-30 PROCEDURE — 80053 COMPREHEN METABOLIC PANEL: CPT

## 2019-03-30 RX ADMIN — LEVETIRACETAM 500 MG: 500 TABLET ORAL at 09:44

## 2019-03-30 RX ADMIN — SENNOSIDES AND DOCUSATE SODIUM 2 TABLET: 8.6; 5 TABLET ORAL at 09:44

## 2019-03-30 RX ADMIN — OXYCODONE HYDROCHLORIDE AND ACETAMINOPHEN 500 MG: 500 TABLET ORAL at 09:44

## 2019-03-30 RX ADMIN — OXYCODONE HYDROCHLORIDE AND ACETAMINOPHEN 500 MG: 500 TABLET ORAL at 17:38

## 2019-03-30 RX ADMIN — ENOXAPARIN SODIUM 40 MG: 100 INJECTION SUBCUTANEOUS at 09:44

## 2019-03-30 RX ADMIN — SIMVASTATIN 20 MG: 20 TABLET, FILM COATED ORAL at 20:00

## 2019-03-30 RX ADMIN — MULTIPLE VITAMINS W/ MINERALS TAB 1 TABLET: TAB at 13:11

## 2019-03-30 RX ADMIN — VITAMIN D, TAB 1000IU (100/BT) 2000 UNITS: 25 TAB at 10:11

## 2019-03-30 RX ADMIN — OMEPRAZOLE 20 MG: 20 CAPSULE, DELAYED RELEASE ORAL at 20:00

## 2019-03-30 RX ADMIN — TAMSULOSIN HYDROCHLORIDE 0.4 MG: 0.4 CAPSULE ORAL at 20:00

## 2019-03-30 RX ADMIN — HYDROXYUREA 1000 MG: 500 CAPSULE ORAL at 19:59

## 2019-03-30 RX ADMIN — SENNOSIDES AND DOCUSATE SODIUM 2 TABLET: 8.6; 5 TABLET ORAL at 19:59

## 2019-03-30 RX ADMIN — LEVETIRACETAM 500 MG: 500 TABLET ORAL at 20:00

## 2019-03-30 RX ADMIN — TRAMADOL HYDROCHLORIDE 50 MG: 50 TABLET, COATED ORAL at 15:18

## 2019-03-30 ASSESSMENT — 10 METER WALK TEST (10METWT)
TRIAL 3: TIME TO WALK 10 METERS: 28
TRIAL 1: TIME TO WALK 10 METERS: 23
TRIAL 2: TIME TO WALK 10 METERS: 27
AVERAGE VELOCITY - METERS PER SECOND: .2
TRIAL 1: TIME TO WALK 10 METERS: 30
AVERAGE VELOCITY - METERS PER SECOND: .21
AVERAGE TIME - SECONDS: 28.33
TRIAL 2: TIME TO WALK 10 METERS: 40
TRIAL 3: TIME TO WALK 10 METERS: 26

## 2019-03-30 ASSESSMENT — MONTREAL COGNITIVE ASSESSMENT (MOCA)
12. MEMORY INDEX SCORE: 0
7. [VIGILENCE] TAP WHEN HEARING DESIGNATED LETTER: 0
4. NAME EACH OF THE THREE ANIMALS SHOWN: 3
ORIENTATION SUBSCORE: 3
WHAT IS THE TOTAL SCORE (OUT OF 30): 11
11. FOR EACH PAIR OF WORDS, WHAT CATEGORY DO THEY BELONG TO (OUT OF 2): 0
6. READ LIST OF DIGITS [FORWARD/BACKWARD]: 1
LEVEL OF SEVERITY: MODERATE COGNITIVE IMPAIRMENT
10. [FLUENCY] NAME WORDS STARTING WITH DESIGNATED LETTER: 0
WHAT LEVEL OF EDUCATION WAS ATTAINED: HIGH SCHOOL EDUCATION
VISUOSPATIAL/EXECUTIVE SUBSCORE: 1
9. REPEAT EACH SENTENCE: 2
8. SERIAL SUBTRACTION OF 7S: 1

## 2019-03-30 ASSESSMENT — BALANCE ASSESSMENTS
SITTING TO STANDING: 1
STANDING UNSUPPORTED: 0
LONG VERSION TOTAL SCORE (MAX 56): 8
LONG VERSION TOTAL SCORE (MAX 56): 8
STANDING UNSUPPORTED WITH FEET TOGETHER: 0
PICK UP OBJECT FROM THE FLOOR FROM A STANDING POSITION: 0
LOOK OVER LEFT AND RIGHT SHOULDERS WHILE STANDING: 0
TURN 360 DEGREES: 0
STANDING UNSUPPORTED WITH EYES CLOSED: 0
REACHING FORWARD WITH OUTSTRETCHED ARM WHILE STANDING: 0
SITTING UNSUPPORTED: 4
TRANSFERS: 1
STANDING TO SITTING: 2
STANDING ON ONE LEG: 0
STANDING UNSUPPORTED ONE FOOT IN FRONT: 0
PLACE ALTERNATE FOOT ON STEP OR STOOL WHILE STANDING UNSUPPORTED: 0

## 2019-03-30 ASSESSMENT — BRIEF INTERVIEW FOR MENTAL STATUS (BIMS)
ASKED TO RECALL BLUE: NO, COULD NOT RECALL
WHAT MONTH IS IT: MISSED BY 6 DAYS TO 1 MONTH
BIMS SUMMARY SCORE: 7
ASKED TO RECALL BLUE: NO, COULD NOT RECALL
ASKED TO RECALL BED: NO, COULD NOT RECALL
WHAT YEAR IS IT: CORRECT
WHAT DAY OF THE WEEK IS IT: INCORRECT
INITIAL REPETITION OF BED BLUE SOCK - FIRST ATTEMPT: 3
INITIAL REPETITION OF BED BLUE SOCK - FIRST ATTEMPT: 3
ASKED TO RECALL SOCK: NO, COULD NOT RECALL
WHAT YEAR IS IT: CORRECT
WHAT DAY OF THE WEEK IS IT: INCORRECT
WHAT MONTH IS IT: MISSED BY 6 DAYS TO 1 MONTH
ASKED TO RECALL SOCK: NO, COULD NOT RECALL
BIMS SUMMARY SCORE: 7
ASKED TO RECALL BED: NO, COULD NOT RECALL

## 2019-03-30 ASSESSMENT — GAIT ASSESSMENTS: ASSISTIVE DEVICE: FRONT WHEEL WALKER

## 2019-03-30 ASSESSMENT — ACTIVITIES OF DAILY LIVING (ADL): TOILETING: INDEPENDENT

## 2019-03-30 NOTE — CARE PLAN
Problem: Safety  Goal: Will remain free from injury  Patient alert and oriented x3-4, occasionally confused.Patient educated on the importance of using call light for assistance, patient verbalized understanding. Call light and belongings within reach, bed in lowest and locked position, bed rails up x2, alarms in place, and non skid socks on. Hourly rounding in place.    Problem: Respiratory:  Goal: Respiratory status will improve    Intervention: Administer and titrate oxygen therapy  Patient uses CPAP at night with 2 L O2 bleed in. Respirations are even and unlabored, no SOB noted.      Problem: Urinary Elimination:  Goal: Ability to reestablish a normal urinary elimination pattern will improve  Patient continent/incontinent of bladder. Patient had 1 bladder accident this shift.

## 2019-03-30 NOTE — THERAPY
Speech Language Pathology   Initial Assessment     Patient Name: Han Pat  AGE:  84 y.o., SEX:  male  Medical Record #: [id number]  Today's Date: 3/30/2019     Subjective    Patient seen in [facility] room for swallow evaluation.  Pleasant and cooperative for duration of session.       Objective     03/30/19 0801   Prior Living Situation   Prior Services Home-Independent   Housing / Facility 1 Story House   Lives with - Patient's Self Care Capacity Spouse  (able to care for self)   Prior Level Of Function   Communication Within Functional Limits   Swallow Within Functional Limits   Dentition Intact   Dentures None   Hearing Within Functional Limits for Evaluation;Impaired Left Ear;Impaired Right Ear   Hearing Aid None   Vision Wears Corrective Lenses;Within Functional Limits for Evaluation   Patient's Primary Language English   Occupation (Pre-Hospital Vocational) Retired Due To Age  (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)   IRF-XAVIER:  Prior Functioning: Everyday Activities   Self Care Independent   Indoor Mobility (Ambulation) Independent   Stairs Independent   Functional Cognition Independent   Receptive Language / Auditory Comprehension   Answers Yes / No Personal Questions Supervision (5)  (extended processing time)   Follows One Unit Commands Within Functional Limits (6-7)   Follows Two Unit Commands Within Functional Limits (6-7)   Understands Simple, Structured Conversation  Within Functional Limits (6-7)   Understands Complex Conversation Supervision (5)  (extended processing time)   Expressive Language   Naming Within Functional Limits (6-7)   Repeats Speech Accurately Within Functional Limits (6-7)   Verbalizes Wants / Needs Within Functional Limits (6-7)   Word Finding Deficits Within Functional Limits (6-7)   Reading Comprehension    Reading Comprehension (WDL) X   Reading Sentences Within Functional Limits (6-7)   Following Written Direction Not Tested   Functional Reading Materials Within  "Functional Limits (6-7)   Written Language Expression   Written Language Expression (WDL) X  (To be tested)   Cognition   Cognitive-Linguistic (WDL) X   Simple Attention Moderate (3)   Orientation  Moderate (3)   Verbal Short Term Memory 5 Minutes   Long Term Memory / Reminiscing Minimal (4)   IRF-XAVIER:  Cognitive Pattern Assessment   Cognitive Pattern Assessment Used BIMS   IRF-XAVIER:  Brief Interview for Mental Status (BIMS)   Repetition of Three Words (First Attempt) 3   Temporal Orientation: Able to Report the Correct Year Correct   Temporal Orientation: Able to Report the Correct Month Missed by 6 days to 1 month   Temporal Orientation: Able to Report the Correct Day of the Week Incorrect   Able to Recall \"Sock\" No, could not recall   Able to Recall \"Blue\" No, could not recall   Able to Recall \"Bed\" No, could not recall   BIMS Summary Score 7   Social / Pragmatic Communication   Eye Contact Within Functional Limits (6-7)   Appropriate Language Within Functional Limits (6-7)   Tracheostomy   Tracheostomy No   Speech Mechanisms / Voice Production   Speech Mechanisms / Voice Production (WDL) WDL   Labial Function   Labial Function (WDL) WDL   Lingual Function   Lingual Function (WDL) WDL   Jaw Function   Jaw Function (WDL) WDL   Velar Function   Velar Function (WDL) WDL   Laryngeal Function   Laryngeal Function (WDL) WDL   Swallowing   Swallowing (WDL) WDL   Dysphagia Strategies / Recommendations   Strategies / Interventions Recommended (Yes / No) No   Barriers To Oral Feeding   Barriers To Oral Feeding None   Cervical Ausculatation Not Tested   Pre-Feeding Oral Stimulation Trial Not Tested   IRF-XAVIER:  Swallowing/Nutritional Status   Swallowing/Nutritional Status Regular food   IRF-XAVIER:  Eating   Assistance Needed Independent   [place] Physical Assistance Level No physical assistance or only touching/steadying assist   CARE Score 6   Discharge Goal:  Assistance Needed Independent   Discharge Goal:  Physical Assistance " Level No physical assistance or only touching/steadying assist   Discharge Goal:  Score 6   Outcome Measures   Outcome Measures Utilized MoCA   MoCA ([place] Cognitive Assessment)   Visuospatial/Executive 1   Naming 3   Attention - Digits 1   Attention - Letters 0   Attention - Serial 7 Subtraction 1   Language - Repeat 2   Language - Fluency 0   Language - Abstraction 0   Delayed Recall 0   Orientation 3   Level of Education 0   Total 11   Level of Severity Moderate cognitive impairment   Problem List   Problem List Cognitive-Linguistic Deficits;Attention Deficit;Memory Deficit;Impaired Safety;Impaired Judgement;Executive Function Deficit   Current Discharge Plan   Current Discharge Plan Return to Prior Living Situation   Benefit   Therapy Benefit Patient would benefit from Inpatient Rehab Speech-Language Pathology to address above identified deficits.   Interdisciplinary Plan of Care Collaboration   IDT Collaboration with  Nursing;Certified Nursing Assistant   Patient Position at End of Therapy Seated  (in wc.  Handed off to CNA (Andre) who transported pt to room)   Collaboration Comments Safety precautions, SLP findings   Speech Language Pathologist Assigned   Assigned SLP / Pager # [phone], cog   SLP Total Time Spent   SLP Individual Total Time Spent (Mins) 60   SLP Charge Group   SLP Speech Language Evaluation Speech Sound Language Comprehension   SLP Oral Pharyngeal Evaluation Oral Pharyngeal Evaluation       FIM Eating Score:  7 - Independent  Eating Description:       FIM Comprehension Score:  4 - Minimal Assistance  Comprehension Description:  Verbal cues, Glasses    FIM Expression Score:  5 - Stand-by Prompting/Supervision or Set-up  Expression Description:  Verbal cueing    FIM Social Interaction Score:  5 - Stand-by Prompting/Supervision or Set-up  Social Interaction Description:  Verbal cues, Schedule, Increased time    FIM Problem Solving Score:  2 - Max Assistance  Problem Solving Description:  Verbal  cueing, Bed/chair alarm, Increased time, Therapy schedule    FIM Memory Score:  4 - Minimal Assistance  Memory Description:  Verbal cueing, Supervision, Bed/chair alarm, Therapy schedule    Assessment    Patient is 84 y.o. male with a diagnosis of severe function debility from acute encephalopathy complicated by questionable seizure activity.  Additional factors influencing patient status/progress (ie: cognitive factors, co-morbidities, social support, etc): significant functional decline, high prior level of function, supportive spouse.      Clinical bedside swallow evaluation completed.  Patient admitted on regular textures and thin liquids. Oral motor mechanism exam and cranial nerve testing is WFL.  Jojo 3 oz water swallow completed with PASS response.  Consumed regular textures and thin liquids with no overt coughing, no reported globus sensation, and no post-swallow oral residue.  Patient fed self independence; no further dysphagia intervention recommended at this time.  Recommend general dining room for meals in light of cognitive status.    Cognitive-linguistic: No evidence of dysarthria, aphasia, or apraxia. Dougherty administered with moderate deficits.  Demonstrated marked impairments related to information processing requiring multiple repetitions and extended time to complete simple tasks.  Also demonstrated marked short term memory deficits.  Patient demonstrated relative strengths related to orientation to self, year, and location. Patient was independent for ADLs and IADLs (including driving, finances, and medication management) prior to referring incident.  SLP to address cognitive-linguistic skills with emphasis on functional memory, compensatory strategies to optimize current level of function, medication management, simple attention, and reasoning/problem solving.     Plan  Recommend Speech Therapy 30-60 minutes per day 5-6 days per week for 2-3 weeks for the following treatments:  SLP Self Care / ADL  Training , SLP Cognitive Skill Development and SLP Group Treatment.    Goals:  Long term and short term goals have been discussed with patient and they are in agreement.         Speech Therapy Problems (Active)            There are no active problems.

## 2019-03-30 NOTE — THERAPY
Physical Therapy   Initial Evaluation     Patient Name: Han Pat  Age:  84 y.o., Sex:  male  Medical Record #: [id number]  Today's Date: 3/30/2019     Subjective    Pt received in room, in bed agreeable to therapy.      Objective     03/30/19 1031   Prior Living Situation   Prior Services Home-Independent   Housing / Facility 3 Story House   Steps Into Home 17   Steps In Home 10   Rail Left Rail (Steps in Home);Right Rail  (Steps in Home)  (railing switches from right side to left side )   Elevator No   Equipment Owned None   Lives with - Patient's Self Care Capacity Spouse   Prior Level of Functional Mobility   Bed Mobility Independent   Transfer Status Independent   Ambulation Independent   Distance Ambulation (Feet) 400   Assistive Devices Used None   Stairs Independent   Comments prior to episode of bronchitis pt was sedentary but independent for functional mobility in the home and exititng the home for limited commuity distances.    IRF-XAVIER:  Prior Functioning: Everyday Activities   Self Care Independent   Indoor Mobility (Ambulation) Independent   Stairs Independent   Functional Cognition Independent   Prior Device Use None of the given options   Pain 0 - 10 Group   Therapist Pain Assessment 0;Prior to Activity;During Activity;Post Activity   Cognition    Level of Consciousness Alert   Comments pt demonstrates minimal confusion, memory loss, evidenced by poor recall of family members and life events    Passive ROM Lower Body   Passive ROM Lower Body WDL   Active ROM Lower Body    Active ROM Lower Body  WDL   Strength Lower Body   Lower Body Strength  X   Rt Hip Flexion Strength 4 (G)   Rt Hip Abduction Strength 2+ (P+)   Rt Hip Adduction Strength 3 (F)   Rt Knee Flexion Strength 5 (N)   Rt Knee Extension Strength 5 (N)   Rt Ankle Dorsiflexion Strength 5 (N)   Lt Hip Flexion Strength 4 (G)   Lt Hip Abduction Strength 2+ (P+)   Lt Hip Adduction Strength 3 (F)   Lt Knee Flexion Strength 5 (N)   Lt Knee  Extension Strength 5 (N)   Lt Ankle Dorsiflexion Strength 5 (N)   Sensation Lower Body   Lower Extremity Sensation   WDL   Lower Body Muscle Tone   Lower Body Muscle Tone  WDL   Balance Assessment   Sitting Balance (Static) Good   Sitting Balance (Dynamic) Good   Standing Balance (Static) Poor   Standing Balance (Dynamic) Poor -   Weight Shift Sitting Good   Weight Shift Standing Poor   Comments see RUBIO. Pt has posterior LOB in standing with transitional movements to stand and is unable to stand unsupport static without assistance.    Bed Mobility    Supine to Sit Moderate Assist   Sit to Supine Minimal Assist   Sit to Stand Minimal Assist   Scooting Minimal Assist   Rolling Supervised   Comments scooting in hospital bed was difficult required increased assistance compared to scooting in w/c. Sit to stand impaired anterior weight shift has posterior LOB requires two hands to assist to stand.    Neurological Concerns   Neurological Concerns No   Coordination Lower Body    Coordination Lower Body  Not Tested   IRF-XAVIER:  Roll Left and Right   Assistance Needed Supervision   Physical Assistance Level No physical assistance or only touching/steadying assist   CARE Score 4   Discharge Goal:  Assistance Needed Independent   Discharge Goal:  Physical Assistance Level No physical assistance or only touching/steadying assist   Discharge Goal:  Score 6   IRF-XAVIER:  Sit to Lying   Assistance Needed Physical assistance   Physical Assistance Level Less than 25%   CARE Score 3   Discharge Goal:  Assistance Needed Independent   Discharge Goal:  Physical Assistance Level No physical assistance or only touching/steadying assist   Discharge Goal:  Score 6   IRF-XAVIER:  Lying to Sitting on Side of Bed   Assistance Needed Physical assistance   Physical Assistance Level 50%-74%   CARE Score 2   Discharge Goal:  Assistance Needed Independent   Discharge Goal:  Physical Assistance Level No physical assistance or only touching/steadying  assist   Discharge Goal:  Score 6   IRF-XAVIER:  Sit to Stand   Assistance Needed Physical assistance   Physical Assistance Level 25%-49%   CARE Score 3   Discharge Goal:  Assistance Needed Independent   Discharge Goal:  Physical Assistance Level No physical assistance or only touching/steadying assist   Discahrge Goal:  Score 6   IRF-XAVIER:  Chair/Bed-to-Chair Transfer   Assistance Needed Physical assistance   Physical Assistance Level 25%-49%   CARE Score 3   Discharge Goal:  Assistance Needed Independent;Adaptive equipment   Discharge Goal:  Physical Assistance Level No physical assistance or only touching/steadying assist   Discharge Goal:  Score 6   IRF-XAVIER:  Car Transfer   Reason if not Attempted Safety concerns   CARE Score 88   IRF XAVIER:  Walking   Does the Patient Walk? Yes   IRF XAVIER:  Walk 10 Feet   Assistance Needed Adaptive equipment;Incidental touching;Physical assistance   Physical Assistance Level Less than 25%   CARE Score 3   Discharge Goal:  Assistance Needed Adaptive equipment;Independent   Discharge Goal:  Physical Assistance Level No physical assistance or only touching/steadying assist   Discharge Goal:  Score 6   IRF-XAVIER:  Walk 50 Feet with Two Turns   Assistance Needed Adaptive equipment;Physical assistance   Physical Assistance Level Less than 25%   CARE Score 3   Discharge Goal:  Assistance Needed Independent;Adaptive equipment   Discharge Goal:  Physical Assistance Level No physical assistance or only touching/steadying assist   Discharge Goal:  Score 6   IRF-XAVIER:  Walk 150 Feet   Reason if not Attempted Safety concerns   CARE Score 88   IRF XAVIER:  Walking 10 Feet on Uneven Surfaces   Reason if not Attempted Safety concerns   CARE Score 88   IRF XAVIER:  1 Step (Curb)   Assistance Needed Adaptive equipment;Physical assistance   Physical Assistance Level Less than 25%   CARE Score 3   Discharge Goal:  Assistance Needed Adaptive equipment;Supervision   Discharge Goal:  Physical Assistance Level No  physical assistance or only touching/steadying assist   Discharge Goal:  Score 4   IRF-XAVIER:  4 Steps   Assistance Needed Physical assistance;Adaptive equipment   Physical Assistance Level Less than 25%   CARE Score 3   Discharge Goal:  Assistance Needed Independent;Adaptive equipment   Discharge Goal:  Physical Assistance Level No physical assistance or only touching/steadying assist   Discharge Goal:  Score 6   IRF XAVIER:  12 Steps   Reason if not Attempted Safety concerns   CARE Score 88   IRF XAVIER:  Picking Up Object   Assistance Needed Physical assistance   Physical Assistance Level Total assistance   CARE Score 1   Discharge Goal:  Assistance Needed Supervision;Adaptive equipment   Discharge Goal:  Physical Assistance Level No physical assistance or only touching/steadying assist   Discharge Goal:  Score 4   IRF-XAVIER:  Wheel 50 Feet with Two Turns   Indicate the Type of Wheelchair or Scooter Used Manual   Assistance Needed Supervision   Physical Assistance Level No physical assistance or only touching/steadying assist   CARE Score 4   Discharge Goal:  Assistance Needed Independent;Adaptive equipment   Discharge Goal:  Physical Assistance Level No physical assistance or only touching/steadying assist   Discharge Goal:  Score 6   IRF-XAVIER:  Wheel 150 Feet   Indicate the Type of Wheelchair or Scooter Used Manual   Assistance Needed Supervision;Adaptive equipment   Physical Assistance Level No physical assistance or only touching/steadying assist   CARE Score 4   Discharge Goal:  Assistance Needed Independent;Adaptive equipment   Discharge Goal:  Physical Assistance Level No physical assistance or only touching/steadying assist   Discharge Goal:  Score 6   Problem List    Problems Impaired Bed Mobility;Impaired Transfers;Impaired Ambulation;Impaired Balance;Decreased Activity Tolerance;Functional Strength Deficit   Precautions   Precautions Fall Risk   Comments seizure   Current Discharge Plan   Current Discharge Plan  Return to Prior Living Situation   Interdisciplinary Plan of Care Collaboration   IDT Collaboration with  Occupational Therapist   Patient Position at End of Therapy Seated   Collaboration Comments PT eval   Benefit   Therapy Benefit Patient Would Benefit from Inpatient Rehabilitation Physical Therapy to Maximize Functional [place] with ADLs, IADLs and Mobility.   PT Total Time Spent   PT Individual Total Time Spent (Mins) 60   PT Charge Group   PT Evaluation PT Evaluation High     OUTCOME MEASURES    03/30/19 1031   Precautions   Precautions Fall Risk   Comments seizure   Lower Extremity Outcome Measures   Lower Extremity Outcome Measures Utilized 10 Meter Walk Test;Escobar Balance Scale;5x STS   Assistive Device Front Wheel Walker   5x STS (Five Times Sit to Stand Test)   Height of Sitting Surface (inches) 20   5x Sit to STand (seconds) 196   Sit to Stand Comments use of bilateral UEs one to push from w/c arm rest and one to stabilize on walker, min A needed to stabilize walker due to poor anterior weight shift, LOB posterior, and pulling on walker. Pt fatiuges and rests between reps 3 and 5.    10 Meter Walk Test   Normal - Trial 1 30 seconds   Normal - Trial 2 27 seconds   Normal - Trial 3 28 seconds   Fast - Trial 1 23 seconds   Fast - Trial 2 40 seconds   Fast - Trial 3 26 seconds   Normal Average Time 28.33 seconds   Normal Average Velocity (m/s) 0.21   Fast Average Time 29.67 seconds   Fast Average Velocity (m/s) 0.2   Escobar Balance Scale   Sitting Unsupported (Score 0-4) 4   Change Of Positon: Sitting To Standing (Score 0-4) 1   Change Of Positon: Standing To Sitting (Score 0-4) 2   Transfers (Score 0-4) 1   Standing Unsupported (Score 0-4) 0   Standing With Eyes Closed (Score 0-4) 0   Standing With Feet Together (Score 0-4) 0   Tandem Standing (Score 0-4) 0   Standing On One Leg (Score 0-4) 0   Turning Trunk (Feet Fixed) (Score 0-4) 0   Retrieving Objects From Floor (Score 0-4) 0   Turning 360 Degrees  (Score 0-4) 0   Stool Stepping (Score 0-4) 0   Reaching Forward While Standing (Score 0-4) 0   Rubio Balance Total Score (0-56) 8     FIM Bed/Chair/Wheelchair Transfers Score: 3 - Moderate AssistanceBed/Chair/Wheelchair Transfers Description:   . Supine to sit greater than 5 minutes with mod A to sit up. Scooting in hospital bed mod A to edge of bed. Sit to stand min to CGA inconsistent, fatigues quickly.     FIM Walking Score:  2 - Max Assistance  Walking Description:  Assist device/equipment (FWW 60 ft x 4 CGA to min A for LOB, vc for proper positioning and use of FWW)    FIM Wheelchair Score:  5 - Standby Prompting/Supervision or Set-up  Wheelchair Description:   (verbal cues for improving efficency and mechanics of propelling w/c, extra time needed use of bilateral UEs )    FIM Stairs Score:  2 - Max Assistance  Stairs Description:   (5 steps bilateral hand rails CGA, step through pattern, extra time to complete, pt unable to perform more due to extreme fatigue )    Assessment  Patient is 84 y.o. male with a diagnosis of acute encephalopathy. Past medical history significant for dyslipidemia, GERD, BPH, chornic thromboyctosis, FREYA, low back pain with history of microdiscectomy in 2000, possible seizures. Pt has been sedentary for the past few years. P t then had episode of bronchitis which greatly debilitated him. Pt was having difficulty with all functional mobility demonstrating increased need for assistance, inability to perform the stairs, and increased shuffled and slowed gait all piror to hospitalization. Pt lives with able bodied spouse who is willing to provide assistance but not able to provide extensive physical assistance. Pt presents today with significant deficits in muscular endurance and power. Impaired balance and functional mobility safety. Pt is a high fall risk evidenced by RUBIO, 5 time sit to stand, 10 MWT and gait speed. Pt will benefit from skilled therapy in order to return home at  "independent functional mobility level and safety.     Plan  Recommend Physical Therapy  minutes per day 5-6 days per week for 3 weeks for the following treatments:  PT Group Therapy, PT Gait Training, PT Therapeutic Exercises, PT Neuro Re-Ed/Balance, PT Therapeutic Activity and PT Evaluation.    Goals:  Long term and short term goals have been discussed with patient and spouse and they are in agreement.         Physical Therapy Problems           Problem: Balance     Dates: Start: 03/30/19       Goal: STG-Within one week, patient will     Dates: Start: 03/30/19       Description: Improving balance demonstrated by 8 point increase in RUBIO score from 8/56 to 16 or greater.              Problem: Mobility     Dates: Start: 03/30/19       Goal: STG-Within one week, patient will ambulate household distance     Dates: Start: 03/30/19       Description: Pt will ambulate household distance 150ft level surface with FWW no more than SBA consistently at .3 m/s demonstrating improving gait speed, endurance,  and independence.             Goal: STG-Within one week, patient will ambulate up/down flight of stairs     Dates: Start: 03/30/19       Description: Pt will ascends and descend 10 6 \" steps with bilateral hand rails with no more than CGA demonstrating improved endurance and strength tolerating higher step and amount of steps, progressing to long term goals.              Goal: STG-Within one week, patient will     Dates: Start: 03/30/19               Problem: Mobility Transfers     Dates: Start: 03/30/19       Goal: STG-Within one week, patient will perform bed mobility     Dates: Start: 03/30/19       Description: Supine to edge of bed SBA no verbal cues and less than 2 minutes to transition once he has initiated transition, demonstrating improved efficiency and independence. LTG= sit to and from supine mod I less than 1 minute              Goal: STG-Within one week, patient will sit to stand     Dates: Start: " "03/30/19       Description: Sit to stand from 20 \" w/c x 5 with use of bilateral UEs and no more than SBA in less than 1 min 30 seconds, demonstrating improved efficiency, LE power, and improved balance and independence            Goal: STG-Within one week, patient will transfer bed to chair     Dates: Start: 03/30/19       Description: Stand pivot transfer bed <> w/c with use of bilateral UE for balance no more than SBA              Problem: PT-Long Term Goals     Dates: Start: 03/30/19       Goal: LTG-By discharge, patient will maintain balance     Dates: Start: 03/30/19       Description: RUBIO increasing to 42/56 demonstrating low fall risk upon return home              Goal: LTG-By discharge, patient will tolerate standing     Dates: Start: 03/30/19       Description: Sit to stand from 20\" chair use of bilateral UEs to assist x 5 in 30 seconds, mod I, indicating improved strength, power, balance, and endurance necessary for decreased fall risk and independence with functional mobility.            Goal: LTG-By discharge, patient will ambulate     Dates: Start: 03/30/19       Description: Pt will ambulate household distances of 150ft at .4m/s gait speed with FWW mod I, indicating efficiency and independence for ambulation upon return home.            Goal: LTG-By discharge, patient will transfer one surface to another     Dates: Start: 03/30/19       Description: Stand with FWW bilateral UEs to assist, step turn transfer with FWW, to various surfaces mod I            Goal: LTG-By discharge, patient will ambulate up/down flight of stairs     Dates: Start: 03/30/19       Description: Ascend/descend 17 steps with single railing no more than SUPV from wife, indicating safe entry and exiting of his home              Goal: LTG-By discharge, patient will     Dates: Start: 03/30/19       Description: Pt's goals is to be able to ambulate outdoors with 4WW limited community distances simulating walking around neighborhood " at home, mod I, for long term health and independence            Goal: LTG-By discharge, patient will     Dates: Start: 03/30/19       Description: 10 MWT average gait speed .4m/s            Goal: LTG-By discharge, patient will perform home exercise program     Dates: Start: 03/30/19

## 2019-03-30 NOTE — THERAPY
03/30/19 1031   Precautions   Precautions Fall Risk   Comments seizure   Lower Extremity Outcome Measures   Lower Extremity Outcome Measures Utilized 10 Meter Walk Test;Escobar Balance Scale;5x STS   Assistive Device Front Wheel Walker   5x STS (Five Times Sit to Stand Test)   Height of Sitting Surface (inches) 20   5x Sit to STand (seconds) 196   Sit to Stand Comments use of bilateral UEs one to push from w/c arm rest and one to stabilize on walker, min A needed to stabilize walker due to poor anterior weight shift, LOB posterior, and pulling on walker. Pt fatiuges and rests between reps 3 and 5.    10 Meter Walk Test   Normal - Trial 1 30 seconds   Normal - Trial 2 27 seconds   Normal - Trial 3 28 seconds   Fast - Trial 1 23 seconds   Fast - Trial 2 40 seconds   Fast - Trial 3 26 seconds   Normal Average Time 28.33 seconds   Normal Average Velocity (m/s) 0.21   Fast Average Time 29.67 seconds   Fast Average Velocity (m/s) 0.2   Escobar Balance Scale   Sitting Unsupported (Score 0-4) 4   Change Of Positon: Sitting To Standing (Score 0-4) 1   Change Of Positon: Standing To Sitting (Score 0-4) 2   Transfers (Score 0-4) 1   Standing Unsupported (Score 0-4) 0   Standing With Eyes Closed (Score 0-4) 0   Standing With Feet Together (Score 0-4) 0   Tandem Standing (Score 0-4) 0   Standing On One Leg (Score 0-4) 0   Turning Trunk (Feet Fixed) (Score 0-4) 0   Retrieving Objects From Floor (Score 0-4) 0   Turning 360 Degrees (Score 0-4) 0   Stool Stepping (Score 0-4) 0   Reaching Forward While Standing (Score 0-4) 0   Escobar Balance Total Score (0-56) 8

## 2019-03-30 NOTE — THERAPY
Occupational Therapy   Initial Evaluation     Patient Name: Han Pat  Age:  84 y.o., Sex:  male  Medical Record #: [id number]  Today's Date: 3/30/2019     Subjective    Pt awake laying in bed, agreeable to participate in therapy.      Objective     03/30/19 0701   Prior Living Situation   Prior Services Home-Independent   Housing / Facility 3 Story House   Steps Into Home 17   Steps In Home 10   Rail Left Rail (Steps in Home);Right Rail  (Steps in Home)   Elevator No   Bathroom Set up Walk In Shower;Grab Bars  (per pt report, may be poor historian)   Equipment Owned Grab Bar(s) In Tub / Shower   Lives with - Patient's Self Care Capacity Spouse   Comments spouse is retired nurse   Prior Level of ADL Function   Self Feeding Independent   Grooming / Hygiene Independent   Bathing Independent   Dressing Independent   Toileting Independent   Prior Level of IADL Function   Medication Management Independent   Laundry Independent   Kitchen Mobility Independent   Finances Independent   Home Management Independent   Shopping Independent   Prior Level Of Mobility Independent Without Device in Community;Independent Without Device in Home   Driving / Transportation Driving Independent   Occupation (Pre-Hospital Vocational) Retired Due To Age  (Retired from [employer])   Leisure Interests Other (Comments)  (bird hunting, dogs)   Comments PLOF obtained through pt report; pt may be poor historian as he contradicted himself multiple times during evaluation   IRF-XAVIER:  Prior Functioning: Everyday Activities   Self Care Independent   Indoor Mobility (Ambulation) Independent   Stairs Independent   Functional Cognition Independent   Prior Device Use None of the given options   Pain   Intervention Shower;Rest;Repositioned   Pain 0 - 10 Group   Location Back   Location Orientation Lower   Pain Rating Scale (NPRS) (did not report pain level)   Description Aching   Therapist Pain Assessment Post Activity Pain Same as Prior to  "Activity   Cognition    Cognition / Consciousness X   Orientation Level Not Oriented to Place  (pt aware in hospital but not aware of location of room)   Level of Consciousness Alert   Safety Awareness Impaired   Comments pt demo mild confusion and impaired memory; difficulty with obtaining PLOF   IRF-XAVIER:  Cognitive Pattern Assessment   Cognitive Pattern Assessment Used BIMS   IRF-XAVIER:  Brief Interview for Mental Status (BIMS)   Repetition of Three Words (First Attempt) 3   Temporal Orientation: Able to Report the Correct Year Correct   Temporal Orientation: Able to Report the Correct Month Missed by 6 days to 1 month   Temporal Orientation: Able to Report the Correct Day of the Week Incorrect   Able to Recall \"Sock\" No, could not recall   Able to Recall \"Blue\" No, could not recall   Able to Recall \"Bed\" No, could not recall   BIMS Summary Score 7   Vision Screen   Vision Not tested  (wears glasses; reports no changes/difficulty with vision)   Passive ROM Upper Body   Passive ROM Upper Body WDL   Active ROM Upper Body   Active ROM Upper Body  WDL   Dominant Hand Right   Strength Upper Body   Upper Body Strength  X   Comments generalized weakness, equal bilaterally   Sensation Upper Body   Upper Extremity Sensation  WDL   Upper Body Muscle Tone   Upper Body Muscle Tone  WDL   Balance Assessment   Sitting Balance (Static) Fair +   Sitting Balance (Dynamic) Fair   Standing Balance (Static) Poor   Standing Balance (Dynamic) Poor -   Weight Shift Sitting Fair   Weight Shift Standing Poor   Comments during ADLs and bathroom transfers. Posterior LOB observed during sit<>stand transfers.    Bed Mobility    Supine to Sit Moderate Assist   Sit to Stand Minimal Assist   Scooting Minimal Assist   Rolling Supervised   Coordination Upper Body   Coordination WDL   IRF-XAVIER:  Eating   Assistance Needed Set-up / clean-up;Supervision   [place] Physical Assistance Level No physical assistance or only touching/steadying assist   CARE " Score 4   Discharge Goal:  Assistance Needed Independent   Discharge Goal:  Physical Assistance Level No physical assistance or only touching/steadying assist   Discharge Goal:  Score 6   IRF-XAVIER:  Oral Hygiene   Assistance Needed Set-up / clean-up;Supervision   Physical Assistance Level No physical assistance   CARE Score 4   Discharge Goal:  Assistance Needed Independent   Discharge Goal:  Physical Assistance Level No physical assistance or only touching/steadying assist   Discharge Goal:  Score 6   IRF-XAVIER:  Shower/Bathe Self   Assistance Needed Supervision;Set-up / clean-up;Verbal cues;Physical assistance   Physical Assistance Level 25%-49%   CARE Score 3   Discharge Goal:  Assistance Needed Adaptive equipment;Supervision  (distant spv to mod I)   Discharge Goal:  Physical Assistance Level No physical assistance or only touching/steadying assist   Discharge Goal Score 4   IRF-XAVIER:  Upper Body Dressing   Assistance Needed Set-up / clean-up;Supervision;Incidental touching   Physical Assistance Level Less than 25%   CARE Score 3   Discharge Goal:  Assistance Needed Independent   Discharge Goal:  Physical Assistance Level No physical assistance or only touching/steadying assist   Dischage Goal:  Score 6   IRF-XAVIER:  Lower Body Dressing   Assistance Needed Set-up / clean-up;Supervision;Verbal cues;Physical assistance   Physical Assistance Level 50%-74%   CARE Score 2   Discharge Goal:  Assistance Needed Supervision;Adaptive equipment  (distant spv to mod I)   Discharge Goal:  Physical Assistance Level No physical assistance or only touching/steadying assist   Discharge Goal:  Score 4   IRF XAVIER:  Putting On/Taking Off Footwear   Assistance Needed Physical assistance   Physical Assistance Level Total assistance   CARE Score 1   Discharge Goal:  Assistance Needed Independent;Adaptive equipment   Discharge Goal:  Physical Assistance Level No physical assistance or only touching/steadying assist   Discharge Goal:  Score 6    IRF-XAVIER:  Toileting Hygiene   Assistance Needed Set-up / clean-up;Supervision;Verbal cues;Physical assistance   Physical Assistance Level 50%-74%   CARE Score 2   Discharge Goal:  Assistance Needed Adaptive equipment;Supervision  (distant spv to mod I)   Discharge Goal:  Physical Assistance Level No physical assistance or only touching/steadying assist   Discharge Goal:  Score 4   IRF-XAVIER:  Toilet Transfer   Assistance Needed Set-up / clean-up;Supervision;Verbal cues;Physical assistance   Physical Assistance Level 50%-74%   CARE Score 2   Discharge Goal:  Assistance Needed Supervision  (distant spv to mod I)   Discharge Goal:  Physical Assistance Level No physical assistance or only touching/steadying assist   Discahrge Goal:  Score 4   IRF-XAVIER:  Hearing, Speech, and Vision   Expression of Ideas and Wants 4   Understanding Verbal and Non-Verbal Content 3   Problem List   Problem List Decreased Active Daily Living Skills;Decreased Homemaking Skills;Decreased Upper Extremity Strength Right;Decreased Upper Extremity Strength Left;Decreased Functional Mobility;Decreased Activity Tolerance;Safety Awareness Deficits / Cognition;Impaired Cognitive Function;Impaired Postural Control / Balance   Precautions   Precautions Fall Risk   Comments seizure prec   Current Discharge Plan   Current Discharge Plan Return to Prior Living Situation   Benefit    Therapy Benefit Patient Would Benefit from Inpatient Rehab Occupational Therapy to Maximize Cannon with ADLs, IADLs and Functional Mobility.   Interdisciplinary Plan of Care Collaboration   IDT Collaboration with  Nursing   Patient Position at End of Therapy Seated;Other (Comments)  (handoff to speech therapy for t-dine)   Collaboration Comments CLOF, POC   Equipment Needs   Assistive Device / DME Front-Wheel Walker;Wheelchair;Parallel Bars;Shower Chair;Grab Bars In Shower / Tub;Grab Bars By Toilet;Raised Toilet Seat   Adaptive Equipment Reacher;Sock Aide;Dressing  Stick;Long Handled Shoe Horn;Long Handled Sponge   OT Total Time Spent   OT Individual Total Time Spent (Mins) 60   OT Charge Group   Charges Yes   OT Self Care / ADL 1   OT Evaluation OT Evaluation Mod       FIM Eating Score:  5 - Standby Prompting/Supervision or Set-up  Eating Description:  Increased time, Supervision for safety, Set-up of equipment or meal/tube feeding    FIM Grooming Score:  5 - Standby Prompting/Supervision or Set-up  Grooming Description:  Increased time, Supervision for safety, Set-up of equipment, Verbal cueing (SBA/set up for oral care and hand hygiene seated at sink.)    FIM Bathing Score:  3 - Moderate Assistance  Bathing Description:  Grab bar, Tub bench, Hand held shower, Increased time, Supervision for safety, Verbal cueing, Set-up of equipment (assist required for distal BLEs and feet d/t low back pain and fatigue; SBA/increased time for all other steps seated on tub bench.)    FIM Upper Body Dressin - Minimal Assistance  Upper Body Dressing Description:  Increased time, Supervision for safety, Set-up of equipment (doff pullover shirt with SBA; don pullover shirt with A to pull down in back.)    FIM Lower Body Dressing Score:  2 - Max Assistance  Lower Body Dressing Description:  Increased time, Supervision for safety, Verbal cueing, Set-up of equipment (assist to thread BLEs/feet into brief and pants; use of GB for balance support in standing and min A to pull up.  Total A to don tread socks d/t low back pain/fatigue.)    FIM Toileting Body Dressin - Max Assistance  Toileting Description:  Grab bar, Increased time, Supervision for safety, Verbal cueing, Set-up of equipment    FIM Toilet Transfer Score:  2 - Max Assistance  Toilet Transfer Description:  Grab bar, Increased time, Supervision for safety, Verbal cueing, Set-up of equipment, Requires lift (w/c<>toilet SPT with GB and assist to lift/lower body (min-mod A each direction).)    FIM Tub/Shower Transfers Score:  2 -  Max Assistance  Tub/Shower Transfers Description:  Grab bar, Increased time, Verbal cueing, Set-up of equipment, Supervision for safety, Requires lift (w/c<>tub bench SPT with GB and assist to lift/lower body (min-mod A each direction). )      Assessment  Patient is 84 y.o. male with a diagnosis of acute encephalopathy. Per H&P, PMH significant for dyslipidemia, GERD, BPH, chornic thromboyctosis, FREYA, low back pain with history of microdiscectomy in 2000, possible seizures. Pt reports independent PLOF for ADLs, IADLs and functional mobility in home/community. Lives in a 3-story home with his spouse, who is a retired RN and able to provide spv and some physical assist at d/c. During OT evaluation pt required max A to supervision for ADLs, with increased assist required for bathing, LBD and bathroom transfers. Barriers include limited endurance, generalized weakness, impaired balance (posterior LOB during STS transfers), cognitive deficits, low back pain. Pt is motivated to improve his strength and endurance to return to independent living with the support of his spouse.       Plan  Recommend Occupational Therapy  minutes per day 5-7 days per week for 2 weeks for the following treatments:  OT Group Therapy, OT Self Care/ADL, OT Cognitive Skill Dev, OT Community Reintegration, OT Manual Ther Technique, OT Neuro Re-Ed/Balance, OT Therapeutic Activity, OT Evaluation and OT Therapeutic Exercise.    Goals:  Long term and short term goals have been discussed with patient and they are in agreement.         Occupational Therapy Goals           Problem: Bathing     Dates: Start: 03/30/19       Goal: STG-Within one week, patient will bathe     Dates: Start: 03/30/19       Description: 1) Individualized Goal:  Min A with use of AE/DME PRN.  2) Interventions:  OT Group Therapy, OT Self Care/ADL, OT Cognitive Skill Dev, OT Community Reintegration, OT Manual Ther Technique, OT Neuro Re-Ed/Balance, OT Therapeutic Activity, OT  Evaluation and OT Therapeutic Exercise               Problem: Dressing     Dates: Start: 03/30/19       Goal: STG-Within one week, patient will dress LB     Dates: Start: 03/30/19       Description: 1) Individualized Goal:  Min A with use of AE/DME PRN.  2) Interventions:  OT Group Therapy, OT Self Care/ADL, OT Cognitive Skill Dev, OT Community Reintegration, OT Manual Ther Technique, OT Neuro Re-Ed/Balance, OT Therapeutic Activity, OT Evaluation and OT Therapeutic Exercise             Problem: Functional Transfers     Dates: Start: 03/30/19       Goal: STG-Within one week, patient will transfer to toilet     Dates: Start: 03/30/19       Description: 1) Individualized Goal:  Min A with use of AD/DME PRN.  2) Interventions:  OT Group Therapy, OT Self Care/ADL, OT Cognitive Skill Dev, OT Community Reintegration, OT Manual Ther Technique, OT Neuro Re-Ed/Balance, OT Therapeutic Activity, OT Evaluation and OT Therapeutic Exercise             Problem: OT Long Term Goals     Dates: Start: 03/30/19       Goal: LTG-By discharge, patient will complete basic self care tasks     Dates: Start: 03/30/19       Description: 1) Individualized Goal:  Supervision to mod I with use of AE/DME PRN.  2) Interventions:  OT Group Therapy, OT Self Care/ADL, OT Cognitive Skill Dev, OT Community Reintegration, OT Manual Ther Technique, OT Neuro Re-Ed/Balance, OT Therapeutic Activity, OT Evaluation and OT Therapeutic Exercise             Goal: LTG-By discharge, patient will perform bathroom transfers     Dates: Start: 03/30/19       Description: 1) Individualized Goal:  Supervision to mod I with use of AD/DME PRN.  2) Interventions:  OT Group Therapy, OT Self Care/ADL, OT Cognitive Skill Dev, OT Community Reintegration, OT Manual Ther Technique, OT Neuro Re-Ed/Balance, OT Therapeutic Activity, OT Evaluation and OT Therapeutic Exercise              Problem: Toileting     Dates: Start: 03/30/19       Goal: STG-Within one week, patient will  complete toileting tasks     Dates: Start: 03/30/19       Description: 1) Individualized Goal: Min A with use of AE/DME PRN.  2) Interventions:  OT Group Therapy, OT Self Care/ADL, OT Cognitive Skill Dev, OT Community Reintegration, OT Manual Ther Technique, OT Neuro Re-Ed/Balance, OT Therapeutic Activity, OT Evaluation and OT Therapeutic Exercise

## 2019-03-30 NOTE — FLOWSHEET NOTE
03/29/19 1733   Type of Assessment   Assessment Yes   Patient History   Pulmonary Diagnosis FREYA   Surgical Procedures None   Home O2 Yes   Oxygen liter flow 2   Oxygen at night only Yes   Nocturnal CPAP Yes   Nocturnal CPAP Pressures Auto CPAP   Nocturnal CPAP Oxygen liter flow 2   Home Treatments/Frequency No   COPD Risk Screening   Do you have a history of COPD? No   Smoking History   Have you ever smoked Yes   Have you smoked in the last 12 months No   Confirm Quit Date 03/29/84   Level Of Consciousness   Level of Consciousness Alert   Respiratory WDL   Respiratory (WDL) X   Chest Exam   Respiration 16   Pulse 82   Oximetry   #Pulse Oximetry (Single Determination) Yes   Oxygen   Home O2 Use Prior To Admission? Yes   Home O2 LPM Flow 2 LPM   Home O2 Delivery Method (O2 bleed-in to CPAP)   Home O2 Frequency of Use At Sleep   Pulse Oximetry 94 %   O2 Daily Delivery Respiratory  Room Air with O2 Available   Protocol Pathways   Protocol Pathways Yes   O2 Protocol   O2 Protocol Indications Acute Care situation where Hypoxemia is suspected or possible   PRN oxygen initiated for: Sleep Apnea (nocturnal O2)   O2 Protocol Goals/Outcome Return to home Oxygen therapy baseline

## 2019-03-30 NOTE — CARE PLAN
Problem: Safety  Goal: Will remain free from injury    Intervention: Educate patient and significant other/support system about adaptive mobility strategies and safe transfers  Both he and his wife were educated on the call light and reason for bed and chair alarms.

## 2019-03-30 NOTE — PROGRESS NOTES
Rehab Progress Note     Interval Events (Subjective)  No complaints.  Reports that he is tired this morning, but slept well.  No pain complaints.      Objective:  VITAL SIGNS: /52   Pulse 68   Temp 36.8 °C (98.3 °F) (Oral)   Resp 18   Ht 1.829 m (6')   Wt 91.5 kg (201 lb 11.5 oz)   SpO2 94%   BMI 27.36 kg/m²   Gen: Alert and cooperative, no acute distress  CV: RRR, nl S1, S2  Lungs: CTA bilaterally  Abd: Bowel sounds present, soft, nontender, nondistended.    Ext: No edema.  No calf tenderness    Recent Results (from the past 72 hour(s))   CBC WITH DIFFERENTIAL    Collection Time: 03/28/19  4:39 AM   Result Value Ref Range    WBC 5.3 4.8 - 10.8 K/uL    RBC 2.94 (L) 4.70 - 6.10 M/uL    Hemoglobin 11.3 (L) 14.0 - 18.0 g/dL    Hematocrit 33.0 (L) 42.0 - 52.0 %    .2 (H) 81.4 - 97.8 fL    MCH 38.4 (H) 27.0 - 33.0 pg    MCHC 34.2 33.7 - 35.3 g/dL    RDW 71.7 (H) 35.9 - 50.0 fL    Platelet Count 290 164 - 446 K/uL    MPV 8.6 (L) 9.0 - 12.9 fL    Neutrophils-Polys 67.70 44.00 - 72.00 %    Lymphocytes 16.30 (L) 22.00 - 41.00 %    Monocytes 10.20 0.00 - 13.40 %    Eosinophils 4.40 0.00 - 6.90 %    Basophils 0.80 0.00 - 1.80 %    Immature Granulocytes 0.60 0.00 - 0.90 %    Nucleated RBC 0.00 /100 WBC    Neutrophils (Absolute) 3.58 1.82 - 7.42 K/uL    Lymphs (Absolute) 0.86 (L) 1.00 - 4.80 K/uL    Monos (Absolute) 0.54 0.00 - 0.85 K/uL    Eos (Absolute) 0.23 0.00 - 0.51 K/uL    Baso (Absolute) 0.04 0.00 - 0.12 K/uL    Immature Granulocytes (abs) 0.03 0.00 - 0.11 K/uL    NRBC (Absolute) 0.00 K/uL   CBC WITH DIFFERENTIAL    Collection Time: 03/29/19  4:50 AM   Result Value Ref Range    WBC 6.7 4.8 - 10.8 K/uL    RBC 3.10 (L) 4.70 - 6.10 M/uL    Hemoglobin 11.9 (L) 14.0 - 18.0 g/dL    Hematocrit 34.7 (L) 42.0 - 52.0 %    .9 (H) 81.4 - 97.8 fL    MCH 38.4 (H) 27.0 - 33.0 pg    MCHC 34.3 33.7 - 35.3 g/dL    RDW 72.2 (H) 35.9 - 50.0 fL    Platelet Count 309 164 - 446 K/uL    MPV 8.6 (L) 9.0 - 12.9 fL     Neutrophils-Polys 78.70 (H) 44.00 - 72.00 %    Lymphocytes 7.30 (L) 22.00 - 41.00 %    Monocytes 11.00 0.00 - 13.40 %    Eosinophils 1.90 0.00 - 6.90 %    Basophils 0.40 0.00 - 1.80 %    Immature Granulocytes 0.70 0.00 - 0.90 %    Nucleated RBC 0.00 /100 WBC    Neutrophils (Absolute) 5.29 1.82 - 7.42 K/uL    Lymphs (Absolute) 0.49 (L) 1.00 - 4.80 K/uL    Monos (Absolute) 0.74 0.00 - 0.85 K/uL    Eos (Absolute) 0.13 0.00 - 0.51 K/uL    Baso (Absolute) 0.03 0.00 - 0.12 K/uL    Immature Granulocytes (abs) 0.05 0.00 - 0.11 K/uL    NRBC (Absolute) 0.00 K/uL   CBC with Differential    Collection Time: 03/30/19  5:37 AM   Result Value Ref Range    WBC 7.9 4.8 - 10.8 K/uL    RBC 2.86 (L) 4.70 - 6.10 M/uL    Hemoglobin 11.2 (L) 14.0 - 18.0 g/dL    Hematocrit 33.0 (L) 42.0 - 52.0 %    .4 (H) 81.4 - 97.8 fL    MCH 39.2 (H) 27.0 - 33.0 pg    MCHC 33.9 33.7 - 35.3 g/dL    RDW 73.9 (H) 35.9 - 50.0 fL    Platelet Count 332 164 - 446 K/uL    MPV 8.7 (L) 9.0 - 12.9 fL    Neutrophils-Polys 79.80 (H) 44.00 - 72.00 %    Lymphocytes 7.50 (L) 22.00 - 41.00 %    Monocytes 10.10 0.00 - 13.40 %    Eosinophils 1.40 0.00 - 6.90 %    Basophils 0.40 0.00 - 1.80 %    Immature Granulocytes 0.80 0.00 - 0.90 %    Nucleated RBC 0.00 /100 WBC    Neutrophils (Absolute) 6.31 1.82 - 7.42 K/uL    Lymphs (Absolute) 0.59 (L) 1.00 - 4.80 K/uL    Monos (Absolute) 0.80 0.00 - 0.85 K/uL    Eos (Absolute) 0.11 0.00 - 0.51 K/uL    Baso (Absolute) 0.03 0.00 - 0.12 K/uL    Immature Granulocytes (abs) 0.06 0.00 - 0.11 K/uL    NRBC (Absolute) 0.00 K/uL   Comp Metabolic Panel (CMP)    Collection Time: 03/30/19  5:37 AM   Result Value Ref Range    Sodium 135 135 - 145 mmol/L    Potassium 3.9 3.6 - 5.5 mmol/L    Chloride 105 96 - 112 mmol/L    Co2 23 20 - 33 mmol/L    Anion Gap 7.0 0.0 - 11.9    Glucose 135 (H) 65 - 99 mg/dL    Bun 16 8 - 22 mg/dL    Creatinine 0.73 0.50 - 1.40 mg/dL    Calcium 8.7 8.5 - 10.5 mg/dL    AST(SGOT) 17 12 - 45 U/L    ALT(SGPT) 23 2 -  50 U/L    Alkaline Phosphatase 67 30 - 99 U/L    Total Bilirubin 1.8 (H) 0.1 - 1.5 mg/dL    Albumin 3.0 (L) 3.2 - 4.9 g/dL    Total Protein 5.7 (L) 6.0 - 8.2 g/dL    Globulin 2.7 1.9 - 3.5 g/dL    A-G Ratio 1.1 g/dL   Lipid Profile (Lipid Panel)    Collection Time: 03/30/19  5:37 AM   Result Value Ref Range    Cholesterol,Tot 112 100 - 199 mg/dL    Triglycerides 69 0 - 149 mg/dL    HDL 46 >=40 mg/dL    LDL 52 <100 mg/dL   Prothrombin time    Collection Time: 03/30/19  5:37 AM   Result Value Ref Range    PT 17.2 (H) 12.0 - 14.6 sec    INR 1.39 (H) 0.87 - 1.13   Vitamin D, 25-hydroxy (blood)    Collection Time: 03/30/19  5:37 AM   Result Value Ref Range    25-Hydroxy   Vitamin D 25 10 (L) 30 - 100 ng/mL   TSH with Reflex to FT4    Collection Time: 03/30/19  5:37 AM   Result Value Ref Range    TSH 1.050 0.380 - 5.330 uIU/mL   ESTIMATED GFR    Collection Time: 03/30/19  5:37 AM   Result Value Ref Range    GFR If African American >60 >60 mL/min/1.73 m 2    GFR If Non African American >60 >60 mL/min/1.73 m 2       Current Facility-Administered Medications   Medication Frequency   • vitamin D (cholecalciferol) tablet 2,000 Units DAILY   • Respiratory Care per Protocol Continuous RT   • Pharmacy Consult Request ...Pain Management Review 1 Each PHARMACY TO DOSE   • oxyCODONE immediate-release (ROXICODONE) tablet 5 mg Q3HRS PRN   • oxyCODONE immediate release (ROXICODONE) tablet 10 mg Q3HRS PRN   • acetaminophen (TYLENOL) tablet 650 mg Q4HRS PRN   • senna-docusate (PERICOLACE or SENOKOT S) 8.6-50 MG per tablet 2 Tab BID    And   • polyethylene glycol/lytes (MIRALAX) PACKET 1 Packet QDAY PRN    And   • magnesium hydroxide (MILK OF MAGNESIA) suspension 30 mL QDAY PRN    And   • bisacodyl (DULCOLAX) suppository 10 mg QDAY PRN   • ascorbic acid tablet 500 mg BID WITH MEALS   • therapeutic multivitamin-minerals (THERAGRAN-M) tablet 1 Tab DAILY WITH LUNCH   • ondansetron (ZOFRAN ODT) dispertab 4 mg Q4HRS PRN   • acetaminophen  (TYLENOL) tablet 650 mg Q6HRS PRN   • hydroxyurea (HYDREA) capsule 1,000 mg Once per day on Sun Sat   • hydroxyurea (HYDREA) capsule 1,500 mg Once per day on Mon Tue Wed Thu Fri   • levETIRAcetam (KEPPRA) tablet 500 mg Q12HRS   • omeprazole (PRILOSEC) capsule 20 mg QHS   • simvastatin (ZOCOR) tablet 20 mg Q EVENING   • tamsulosin (FLOMAX) capsule 0.4 mg QHS   • tramadol (ULTRAM) 50 MG tablet 50 mg Q6HRS PRN   • enoxaparin (LOVENOX) inj 40 mg DAILY       Orders Placed This Encounter   Procedures   • Diet Order Regular     Standing Status:   Standing     Number of Occurrences:   1     Order Specific Question:   Diet:     Answer:   Regular [1]     Order Specific Question:   Consistency/Fluid modifications:     Answer:   Thin Liquids [3]       Assessment:  Active Hospital Problems    Diagnosis   • *Acute encephalopathy   • Weakness   • Anemia   • Hyponatremia   • Lower back pain   • Thrombocytosis (HCC)   • Benign prostatic hyperplasia without lower urinary tract symptoms   • Mixed hyperlipidemia   • Obstructive sleep apnea   • Gastroesophageal reflux disease   • Obesity (BMI 30.0-34.9)         Vitamin D deficiency    Medical Decision Making and Plan:  Seizure: Continue keppra 500mg po bid  Neurogenic bladder: Continue timed voids every 3hours during the day and every 6 hours at night.  Continue flomax 0.4mg nightly.  Neurogenic bowel: Continue bowel program with senna 2 tablets every noon, colace 200mg twice daily  FREYA: Continue CPAP  Dyslipidemia: Continue simvastatin 20mg   Anemia: Stable.  Will monitor  Chronic thrombocytosis: Continue hydroxyurea for chronic thrombocytosis  Vitamin D deficiency: Admission lab 10, start supplementation  Hyponatremia: Continue to monitor  Low back pain: Will monitor to see if this interferes with therapy    Total time: 25 minutes.  I spent greater than 50% of the time for patient care and coordination on this date, including unit/floor time, and face-to-face time with the patient as  per assessment and plan above.    Juan Izquierdo M.D.

## 2019-03-30 NOTE — PROGRESS NOTES
Admitted to room 121D for safety. He and his wife were oriented to the rehab routine, call light, alarms on chair and bed. They both verbalized understanding. They were given a tour of the facility. Notebook issued.

## 2019-03-31 PROCEDURE — 97112 NEUROMUSCULAR REEDUCATION: CPT

## 2019-03-31 PROCEDURE — 94760 N-INVAS EAR/PLS OXIMETRY 1: CPT

## 2019-03-31 PROCEDURE — 700111 HCHG RX REV CODE 636 W/ 250 OVERRIDE (IP): Performed by: PHYSICAL MEDICINE & REHABILITATION

## 2019-03-31 PROCEDURE — A9270 NON-COVERED ITEM OR SERVICE: HCPCS | Performed by: PHYSICAL MEDICINE & REHABILITATION

## 2019-03-31 PROCEDURE — 97530 THERAPEUTIC ACTIVITIES: CPT

## 2019-03-31 PROCEDURE — 770010 HCHG ROOM/CARE - REHAB SEMI PRIVAT*

## 2019-03-31 PROCEDURE — 700102 HCHG RX REV CODE 250 W/ 637 OVERRIDE(OP): Performed by: PHYSICAL MEDICINE & REHABILITATION

## 2019-03-31 RX ADMIN — VITAMIN D, TAB 1000IU (100/BT) 2000 UNITS: 25 TAB at 08:10

## 2019-03-31 RX ADMIN — HYDROXYUREA 1000 MG: 500 CAPSULE ORAL at 19:58

## 2019-03-31 RX ADMIN — ENOXAPARIN SODIUM 40 MG: 100 INJECTION SUBCUTANEOUS at 08:10

## 2019-03-31 RX ADMIN — SENNOSIDES AND DOCUSATE SODIUM 2 TABLET: 8.6; 5 TABLET ORAL at 08:10

## 2019-03-31 RX ADMIN — OXYCODONE HYDROCHLORIDE AND ACETAMINOPHEN 500 MG: 500 TABLET ORAL at 17:55

## 2019-03-31 RX ADMIN — OXYCODONE HYDROCHLORIDE AND ACETAMINOPHEN 500 MG: 500 TABLET ORAL at 08:10

## 2019-03-31 RX ADMIN — MULTIPLE VITAMINS W/ MINERALS TAB 1 TABLET: TAB at 11:30

## 2019-03-31 RX ADMIN — LEVETIRACETAM 500 MG: 500 TABLET ORAL at 08:10

## 2019-03-31 RX ADMIN — SENNOSIDES AND DOCUSATE SODIUM 2 TABLET: 8.6; 5 TABLET ORAL at 19:58

## 2019-03-31 RX ADMIN — LEVETIRACETAM 500 MG: 500 TABLET ORAL at 19:58

## 2019-03-31 RX ADMIN — SIMVASTATIN 20 MG: 20 TABLET, FILM COATED ORAL at 19:58

## 2019-03-31 RX ADMIN — OMEPRAZOLE 20 MG: 20 CAPSULE, DELAYED RELEASE ORAL at 19:58

## 2019-03-31 RX ADMIN — TAMSULOSIN HYDROCHLORIDE 0.4 MG: 0.4 CAPSULE ORAL at 19:58

## 2019-03-31 NOTE — FLOWSHEET NOTE
03/30/19 1900   Events/Summary/Plan   Events/Summary/Plan O2 spot check   Therapy Not Performed   Type of Therapy Not Performed (O2 spot check)   Reason Therapy Not Performed Not Available

## 2019-03-31 NOTE — CARE PLAN
Problem: Communication  Goal: The ability to communicate needs accurately and effectively will improve  Outcome: PROGRESSING AS EXPECTED  Plan of care for the day discussed this morning with pt. All questions and concerns answered at this time. Pt reports sleeping well last night. Pt using call light appropriately. Will continue to monitor

## 2019-03-31 NOTE — CARE PLAN
Problem: Respiratory:  Goal: Respiratory status will improve  Outcome: PROGRESSING AS EXPECTED  Pt uses CPAP at night with 2 lpm bleed in O2. No s/s of respiratory distress noted.    Problem: Pain Management  Goal: Pain level will decrease to patient's comfort goal  Outcome: PROGRESSING AS EXPECTED  Pt denied pain when initially assessed. Pt educated to call for any changes in pain or other needs and verbalized understanding.

## 2019-03-31 NOTE — THERAPY
Physical Therapy   Daily Treatment     Patient Name: Han Pat  Age:  84 y.o., Sex:  male  Medical Record #: [id number]  Today's Date: 3/31/2019     Precautions  Precautions: (P) Fall Risk  Comments: (P) seizure    Subjective    Patient reported dizziness on 2nd attempt at ambulation, requiring seated rest.  Patient also reported 1 instance of LBP after standing, that resolved with seated rest.     Objective       03/31/19 1031   Precautions   Precautions Fall Risk   Comments seizure   Vitals   Pulse 80   Patient BP Position Sitting   Blood Pressure  114/61   Pulse Oximetry 93 %   O2 Delivery None (Room Air)   Cognition    Comments Distractible    Sitting Lower Body Exercises   Sitting Lower Body Exercises Yes   Sit to Stand (5 reps x 2 sets)   Other Exercises Motomed LE cycle 0.34 miles, SPV, fwd, AROM, 4 min, 68%L, 32%R   Standing Lower Body Exercises   Standing Lower Body Exercises Yes   Mini Squat Partial;1 set of 10   Bed Mobility    Supine to Sit Stand by Assist  (vc to sequence)   Sit to Stand Contact Guard Assist  (// bars)   Scooting Stand by Assist   Rolling Supervised  (vc for use of railing)   Neuro-Muscular Treatments   Neuro-Muscular Treatments Weight Shift Right;Weight Shift Left;Verbal Cuing;Tactile Cuing;Sequencing;Postural Changes;Postural Facilitation   Interdisciplinary Plan of Care Collaboration   IDT Collaboration with  Nursing;Family / Caregiver;Certified Nursing Assistant   Patient Position at End of Therapy Seated;Family / Friend in Room   Collaboration Comments POC, med, CLOF, standing weight taken with CNA CGA   PT Total Time Spent   PT Individual Total Time Spent (Mins) 60   PT Charge Group   PT Neuromuscular Re-Education / Balance 1   PT Therapeutic Activities 3       FIM Bed/Chair/Wheelchair Transfers Score: 4 - Minimal Assistance  Bed/Chair/Wheelchair Transfers Description:   (CGA OOB reach pivot, with set up, SBA sup to sit with sequencing cues)    FIM Walking Score:  1 -  Total Assistance  Walking Description:  Two hand rails (20 ft in // bars CGA, + 6 ft, limited by dizziness)    FIM Wheelchair Score:  2 - Max Assistance  Wheelchair Description:   (100 ft indoors SPV, very slowly)      Assessment    Patient was limited by dec activity tolerance, short step length, bradykinetic movement, impaired balance, and self distraction.  Patient is motivated, and spouse is supportive.    Plan    Continue collecting day 3 data, balance training, improved activity tolerance, sequencing transfers, and safety with mobility.

## 2019-03-31 NOTE — PROGRESS NOTES
Received bedside report; assumed pt care. Pt sleeping, calm, and stable. Pt is not complaining of pain or discomfort. Pt resting comfortably in bed, call light within reach when in room, safety precautions in place. Will continue to monitor.

## 2019-04-01 PROCEDURE — A9270 NON-COVERED ITEM OR SERVICE: HCPCS | Performed by: PHYSICAL MEDICINE & REHABILITATION

## 2019-04-01 PROCEDURE — 770010 HCHG ROOM/CARE - REHAB SEMI PRIVAT*

## 2019-04-01 PROCEDURE — 97116 GAIT TRAINING THERAPY: CPT

## 2019-04-01 PROCEDURE — 700102 HCHG RX REV CODE 250 W/ 637 OVERRIDE(OP): Performed by: PHYSICAL MEDICINE & REHABILITATION

## 2019-04-01 PROCEDURE — 94760 N-INVAS EAR/PLS OXIMETRY 1: CPT

## 2019-04-01 PROCEDURE — 700111 HCHG RX REV CODE 636 W/ 250 OVERRIDE (IP): Performed by: PHYSICAL MEDICINE & REHABILITATION

## 2019-04-01 PROCEDURE — 97535 SELF CARE MNGMENT TRAINING: CPT

## 2019-04-01 PROCEDURE — 97110 THERAPEUTIC EXERCISES: CPT

## 2019-04-01 PROCEDURE — 99233 SBSQ HOSP IP/OBS HIGH 50: CPT | Performed by: PHYSICAL MEDICINE & REHABILITATION

## 2019-04-01 PROCEDURE — G0515 COGNITIVE SKILLS DEVELOPMENT: HCPCS

## 2019-04-01 RX ORDER — FERROUS SULFATE 325(65) MG
325 TABLET ORAL 2 TIMES DAILY WITH MEALS
Status: DISCONTINUED | OUTPATIENT
Start: 2019-04-01 | End: 2019-04-11 | Stop reason: HOSPADM

## 2019-04-01 RX ADMIN — OXYCODONE HYDROCHLORIDE AND ACETAMINOPHEN 500 MG: 500 TABLET ORAL at 08:26

## 2019-04-01 RX ADMIN — TAMSULOSIN HYDROCHLORIDE 0.4 MG: 0.4 CAPSULE ORAL at 19:42

## 2019-04-01 RX ADMIN — TRAMADOL HYDROCHLORIDE 50 MG: 50 TABLET, COATED ORAL at 19:47

## 2019-04-01 RX ADMIN — VITAMIN D, TAB 1000IU (100/BT) 2000 UNITS: 25 TAB at 08:25

## 2019-04-01 RX ADMIN — SENNOSIDES AND DOCUSATE SODIUM 2 TABLET: 8.6; 5 TABLET ORAL at 08:26

## 2019-04-01 RX ADMIN — HYDROXYUREA 1500 MG: 500 CAPSULE ORAL at 19:48

## 2019-04-01 RX ADMIN — MULTIPLE VITAMINS W/ MINERALS TAB 1 TABLET: TAB at 11:55

## 2019-04-01 RX ADMIN — LEVETIRACETAM 500 MG: 500 TABLET ORAL at 19:42

## 2019-04-01 RX ADMIN — ENOXAPARIN SODIUM 40 MG: 100 INJECTION SUBCUTANEOUS at 08:26

## 2019-04-01 RX ADMIN — OMEPRAZOLE 20 MG: 20 CAPSULE, DELAYED RELEASE ORAL at 19:42

## 2019-04-01 RX ADMIN — FERROUS SULFATE TAB 325 MG (65 MG ELEMENTAL FE) 325 MG: 325 (65 FE) TAB at 18:24

## 2019-04-01 RX ADMIN — SENNOSIDES AND DOCUSATE SODIUM 2 TABLET: 8.6; 5 TABLET ORAL at 19:42

## 2019-04-01 RX ADMIN — SIMVASTATIN 20 MG: 20 TABLET, FILM COATED ORAL at 19:42

## 2019-04-01 RX ADMIN — OXYCODONE HYDROCHLORIDE AND ACETAMINOPHEN 500 MG: 500 TABLET ORAL at 17:28

## 2019-04-01 RX ADMIN — LEVETIRACETAM 500 MG: 500 TABLET ORAL at 08:26

## 2019-04-01 ASSESSMENT — 10 METER WALK TEST (10METWT)
TRIAL 3: TIME TO WALK 10 METERS: 14
TRIAL 1: TIME TO WALK 10 METERS: 18.6
TRIAL 2: TIME TO WALK 10 METERS: 18.4
AVERAGE VELOCITY - METERS PER SECOND: .35

## 2019-04-01 NOTE — THERAPY
"Occupational Therapy  Daily Treatment     Patient Name: Han Pat  Age:  84 y.o., Sex:  male  Medical Record #: [id number]  Today's Date: 2019     Precautions  Precautions: (P) Fall Risk  Comments: (P) seizure prec    Safety   ADL Safety : (P) Requires Supervision for Safety  Bathroom Safety: (P) Requires Supervision for Safety    Subjective    \"I need to get to the bathroom.\"     Objective       19 0701   Precautions   Precautions Fall Risk   Comments seizure prec   Safety    ADL Safety  Requires Supervision for Safety   Bathroom Safety Requires Supervision for Safety   Cognition    Orientation Level Oriented x 4   Interdisciplinary Plan of Care Collaboration   IDT Collaboration with  Nursing   Patient Position at End of Therapy Seated   Collaboration Comments assessed pt and OT and pt informed RN pt had BM   OT Total Time Spent   OT Individual Total Time Spent (Mins) 60   OT Charge Group   OT Self Care / ADL 4       FIM Grooming Score:  7 - Independent  Grooming Description:       FIM Upper Body Dressin - Standby Prompting/Supervision or Set-up  Upper Body Dressing Description:  Supervision for safety, Set-up of equipment (setup and SBA to don/doff pullover sweatshirt)    FIM Lower Body Dressing Score:  4 - Minimal Assistance  Lower Body Dressing Description:  Supervision for safety (assist /  tasks)    FIM Toiletin - Standby Prompting/Supervision or Set-up  Toileting Description:  Supervision for safety, Set-up of equipment, Initial preparation for task (setup/SBA 3/3 tasks)    FIM Toilet Transfer Score:  5 - Standby Prompting/Supervision or Set-up  Toilet Transfer Description:  Grab bar, Supervision for safety, Set-up of equipment, Initial preparation for task (setup/SBA with grab bar)      Assessment    Patient much more oriented today with improved ADL FIM scores versus evaluation on Saturday.    Plan    Standing tolerance/balance, UB/LB/core strength/endurance, functional " mobility, ADLs/IADLs

## 2019-04-01 NOTE — PROGRESS NOTES
Received bedside report; assumed pt care. Pt alert, calm, and stable. Pt denies pain or discomfort. Pt resting comfortably in bed, call light within reach when in room, safety precautions in place. Will continue to monitor.

## 2019-04-01 NOTE — THERAPY
Recreational Therapy   Initial Evaluation     Patient Name: Han Pat  Age:  84 y.o., Sex:  male  Medical Record #: [id number]  Today's Date: 4/1/2019     Subjective    Ready and willing to fully participate.     Objective       04/01/19 1000   Leisure History   Leisure Interests Other (Comments);Travel  (bird hunting, golfing)   Leisure Comments Would like to return to [employer].    Pre-Morbid Leisure Lifestyle Individual;Group;Moderately Active   Prior Living Arrangements   Lives with - Patient's Self Care Capacity Spouse   Steps Into Home 17   Steps In Home 10   Ambulation Independent   Assistive Devices Used None   Driving / Transportation Driving Independent   Functional Ability Status - Physical   Endurance Good    Right  Strong   Left  Strong   Right Arm Strong   Left Arm Strong   Right Leg Weak   Left Leg Weak   Upper Extremity Gross Motor Uses Both Arms / Hands   Lower Extremetiy Gross Motor Uses Both Legs  (B LE Weakness)   Fine Motor Manipulates Small Objects   Functional Ability Status - Cognitive   Attention Span Remains on Task with Cueing   Comprehension Follows One Step Commands   Judgment Able to Make Independent Decisions   Cognitive Comments poor historian, easliy distracted.    Functional Ability Status - Emotional    Affect Appropriate;Bright   Mood Appropriate   Behavior Appropriate   Leisure Competence Measure   Leisure Awareness Independent   Leisure Attitude Independent  (motivated to return to [employer])   Leisure Skills Independent   Cultural / Social Behaviors Independent   Interpersonal Skills Independent   Community Integration Skills Independent   Social Contact Independent   Community Participation Independent   Clinical Impression   Clinical Impression Impaired Gross Motor Leisure Functioning;Impaired Endurance;Impaired Cognitive Leisure Functioning;Impaired Communication Skills;Impaired Attention Span;Impaired Short Term Memory;Impaired Long Term Memory;Impaired  Community Skills;Impaired Relaxation and Coping Skills;Impaired Leisure Skills   Current Discharge Plan   Current Discharge Plan Return to Prior Living Situation   Benefit    Benefit Patient would Benefit from Inpatient Recreational Therapy to Maximize Independent Leisure Functioning    Interdisciplinary Plan of Care Collaboration   Patient Position at End of Therapy Seated;Other (Comments)  (remained in the main gym ready for next session)   Treatment Time   Total Time Spent (mins) 30   Procedural Tracking   Procedural Tracking Community Re-Integration;Community Skills Development;Leisure Skills Development;Leisure Skills Awareness;Cognitive Skills Training;Gross Motor Functional Leisure Skills;Group Treatment       Assessment  Patient is 84 y.o. male with a diagnosis of severe functional debility from acute encephalopathy complicated by possible seizure.  Additional factors influencing patient status / progress (ie: cognitive factors, co-morbidities, social support, etc): family support, poor cognition, poor historian, poor standing balance. He was given a block puzzle with 6 different sides. He required the explanation of the way to put it together many times. He would have it on the correct side shown by the correct color and would often move the sides from the correct sides.       Plan  Recommend Recreational Therapy 30-60 minutes per day 3-4 days per week for 3 weeks for the following treatments:  Community Re-Integration, Community Skills Development, Leisure Skills Awareness, Leisure Skills Development, Cognitive Skills Training, Gross Motor Functional Leisure Skills and Group Treatment. Show adaptive leisure options for him following discharge.     Goals:  Long term and short term goals have been discussed with patient and they are in agreement.         Recreation Therapy Problems           Problem: Recreation Therapy     Dates: Start: 04/01/19       Goal: STG-Within one week, patient will demonstrate a  standing tolerance     Dates: Start: 04/01/19       Description: By standing for 25% of the time while performing cognitive leisure at the mat.            Goal: STG-Within one week, patient will actively engage in planning of community re-integration outing     Dates: Start: 04/01/19             Goal: LTG-By discharge, patient will demonstrate a standing tolerance     Dates: Start: 04/01/19       Description: By standing for 50% of the time while performing cognitive leisure at the mat.            Goal: LTG-By discharge, patient will participate in a community re-integration outing     Dates: Start: 04/01/19       Description: Specifically focusing on adaptive leisure.

## 2019-04-01 NOTE — THERAPY
"Speech Language Pathology  Daily Treatment     Patient Name: Han Pat  Age:  84 y.o., Sex:  male  Medical Record #: [id number]  Today's Date: 4/1/2019     Subjective    Assumed care from spouse and several family members in room. Pt pleasant and cooperative, reports he is not at baseline at this time and \"memory is much worse\"        Objective  The Scales of Cognitive and Communicative Ability for Neurorehabilitation (SCCAN) assesses cognitive-communicative deficits and functional ability in patients in rehabilitation hospitals, clinics, and skilled nursing facilities. The SCCAN is appropriate for a broad range of neurological patients, provides a measure of both impairment and functional ability.        04/01/19 1304   Outcome Measures   Outcome Measures Utilized SCCAN   SCCAN (Scales of Cognitive and Communicative Ability for Neurorehabilitation)   Oral Expression - Raw Score 17   Oral Expression - Scale Performance Score 89   Orientation - Raw Score 11   Orientation - Scale Performance Score 92   Memory - Raw Score 11   Memory - Scale Performance Score 58   Speech Comprehension - Raw Score 11   Speech Comprehension - Scale Performance Score 85   Reading Comprehension - Raw Score 9   Reading Comprehension - Scale Performance Score 75   Problem Solving - Raw Score 19   Problem Solving - Scale Performance Score 83   Interdisciplinary Plan of Care Collaboration   IDT Collaboration with  Family / Caregiver   Patient Position at End of Therapy In Bed;Call Light within Reach   Collaboration Comments assumed care from family in room   SLP Total Time Spent   SLP Individual Total Time Spent (Mins) 60   Charge Group   SLP Cognitive Skill Development 4           Assessment    Administered SCCAN to further assess cognitive function at this time. Unable to complete writing subtest to determine overall score. Will continue in subsequent therapy session    Plan    Continue admin of SCCAN and report total score and " severity. Continue to target memory, orientation

## 2019-04-01 NOTE — IDT DISCHARGE PLANNING
CASE MANAGEMENT INITIAL ASSESSMENT    Admit Date:  3/29/2019     I met with patient to discuss role of case management / discharge planning / team conference.  I introduced myself to his wife earlier.  Patient is a  84 y.o. male transferred from [facility].  He is alert and able to provide some information.  I will follow to confirm with family.  Patient's admitting physician for rehab is Dr. Adler.    Diagnosis: 03.9 other neurologic  Encephalopathy acute    Co-morbidities:   Patient Active Problem List    Diagnosis Date Noted   • Weakness 03/24/2019     Priority: High   • Acute encephalopathy 03/24/2019     Priority: High   • Cognitive impairment 03/27/2019   • Anemia 03/25/2019   • Serum total bilirubin elevated 03/24/2019   • Hyponatremia 03/24/2019   • Lower back pain 03/24/2019   • Cough 03/12/2019   • Thrombocytosis (HCC) 02/05/2019   • Benign prostatic hyperplasia without lower urinary tract symptoms 02/05/2019   • Mixed hyperlipidemia 02/05/2019   • Seasonal allergies 02/05/2019   • Vertigo 02/05/2019   • Obstructive sleep apnea 08/14/2017   • Gastroesophageal reflux disease 08/14/2017   • Obesity (BMI 30.0-34.9) 08/14/2017     Prior Living Situation:  Housing / Facility: 2 Story House  Lives with - Patient's Self Care Capacity: Spouse    Prior Level of Function:  Medication Management: Independent  Finances: Independent  Home Management: Independent  Shopping: Independent  Prior Level Of Mobility: Independent Without Device in Community, Independent Without Device in Home  Driving / Transportation: Driving Independent    Support Systems:  Primary : wife is Sally Pat at [phone] or [phone]  Other support systems: They have 4 children and 7 grandchildren     Previous Services Utilized:   Equipment Owned: Front-Wheel Walker, Grab Bar(s) In Tub / Shower, Oxygen  Prior Services: Home-Independent    Other Information:  Occupation (Pre-Hospital Vocational): Retired Due To Age  Primary Payor  Source: [facility]  Primary Care Practitioner : Kia JAMES    Future Appointments  Date Time Provider Department [place]   8/7/2019 10:00 AM [facility] Workman [facility]     Additional Case Management Questions:  Have you ever received case management services for yourself or a family member?  Patient unable to confirm.    Do you feel you have and an understanding of what services  provide? We reviewed and I provided my contact information.    Do you have any additional questions regarding case management?    No, I will follow with his wife to ask her also.      CASE MANAGEMENT PLAN OF CARE   Individualized Goals:   1.  I will follow to confirm that patient will be able to negotiate his stairs at home.  2.  I will confirm with his wife what type of walker he has.  3.  Case Management will provide weekly update from team conference discussion.    Barriers:   1. Stairs.    Patient / Family Goal:  Patient / Family Goal: Patient lives with his wife in a multi level home.  He states that he needs to go upstairs for his bedroom.  I will confirm this with his wife.  He will probably need home health at discharge.    Plan:  1. Continue to follow patient through hospitalization and provide discharge planning in collaboration with patient, family, physicians and ancillary services.     2. Utilize community resources to ensure a safe discharge.

## 2019-04-01 NOTE — FLOWSHEET NOTE
04/01/19 1422   Events/Summary/Plan   Events/Summary/Plan 02 spot check, CPAP filled   Respiratory WDL   Respiratory (WDL) X   Chest Exam   Respiration 18   Pulse 68   Oximetry   #Pulse Oximetry (Single Determination) Yes   Oxygen   Home O2 Use Prior To Admission? Yes   Home O2 LPM Flow 2 LPM   Home O2 Delivery Method (CPAP)   Home O2 Frequency of Use At Sleep   Pulse Oximetry 96 %   O2 Daily Delivery Respiratory  Room Air with O2 Available

## 2019-04-01 NOTE — CARE PLAN
Problem: Respiratory:  Goal: Respiratory status will improve  Outcome: PROGRESSING AS EXPECTED  Pt is compliant with CPAP use. No s/s of respiratory distress noted.     Problem: Skin Integrity  Goal: Risk for impaired skin integrity will decrease  Outcome: PROGRESSING AS EXPECTED  Skin remains intact and free of any new injury this shift. Pt is able to reposition himself while in bed.

## 2019-04-01 NOTE — THERAPY
"Physical Therapy   Daily Treatment     Patient Name: Han Pat  Age:  84 y.o., Sex:  male  Medical Record #: [id number]  Today's Date: 4/1/2019     Precautions  Precautions: Fall Risk  Comments: seizure prec    Subjective    Pt was sitting in w/c upon arrival; agreeable to tx     Objective       04/01/19 0931   Precautions   Precautions Fall Risk   Comments seizure prec   Sitting Lower Body Exercises   Other Exercises Nustep level 4 B UE / B LE 15minutes   Standing Lower Body Exercises   Other Exercises Additional stair training: 4 x 1 6\" steps B UE support on unilateral HR, SBA   5x STS (Five Times Sit to Stand Test)   Height of Sitting Surface (inches) 20   5x Sit to STand (seconds) 24.6   Sit to Stand Comments use of bilateral UEs one to push from w/c arm rest and one to stabilize on walker, SBA   10 Meter Walk Test   Fast - Trial 1 18.6 seconds   Fast - Trial 2 18.4 seconds   Fast - Trial 3 14 seconds   Fast Average Time 17 seconds   Fast Average Velocity (m/s) 0.35   Interdisciplinary Plan of Care Collaboration   IDT Collaboration with  Family / Caregiver   Patient Position at End of Therapy Seated;Call Light within Reach;Tray Table within Reach;Phone within Reach   Collaboration Comments re: education to spouse on CLOF and stair training   PT Total Time Spent   PT Individual Total Time Spent (Mins) 60   PT Charge Group   PT Gait Training 2   PT Therapeutic Exercise 2       AMB 130ft x 1, FWW SBA during 10MWT    FIM Stairs Score:  2 - Max Assistance  Stairs Description:   (8 x 1 6\" steps, B UE support on unilateral HR to mimic home enviornment )       Assessment    Pt with significant improvements in sit to stand transitions and gait speed. Continues to be limited in performing stairs by strength, endurance and insight into deficits    Plan    Transfer training, AMB with FWW, stair training to mimic home environment, standing balance     "

## 2019-04-02 PROCEDURE — G0515 COGNITIVE SKILLS DEVELOPMENT: HCPCS

## 2019-04-02 PROCEDURE — 97112 NEUROMUSCULAR REEDUCATION: CPT

## 2019-04-02 PROCEDURE — 99232 SBSQ HOSP IP/OBS MODERATE 35: CPT | Performed by: PHYSICAL MEDICINE & REHABILITATION

## 2019-04-02 PROCEDURE — 700102 HCHG RX REV CODE 250 W/ 637 OVERRIDE(OP): Performed by: PHYSICAL MEDICINE & REHABILITATION

## 2019-04-02 PROCEDURE — 700111 HCHG RX REV CODE 636 W/ 250 OVERRIDE (IP): Performed by: PHYSICAL MEDICINE & REHABILITATION

## 2019-04-02 PROCEDURE — A9270 NON-COVERED ITEM OR SERVICE: HCPCS | Performed by: PHYSICAL MEDICINE & REHABILITATION

## 2019-04-02 PROCEDURE — 770010 HCHG ROOM/CARE - REHAB SEMI PRIVAT*

## 2019-04-02 PROCEDURE — 97535 SELF CARE MNGMENT TRAINING: CPT

## 2019-04-02 PROCEDURE — 97116 GAIT TRAINING THERAPY: CPT

## 2019-04-02 RX ORDER — VITS A,C,E/LUTEIN/MINERALS 300MCG-200
1 TABLET ORAL DAILY
Status: DISCONTINUED | OUTPATIENT
Start: 2019-04-02 | End: 2019-04-11 | Stop reason: HOSPADM

## 2019-04-02 RX ADMIN — FERROUS SULFATE TAB 325 MG (65 MG ELEMENTAL FE) 325 MG: 325 (65 FE) TAB at 17:05

## 2019-04-02 RX ADMIN — TAMSULOSIN HYDROCHLORIDE 0.4 MG: 0.4 CAPSULE ORAL at 21:34

## 2019-04-02 RX ADMIN — VITAMIN D, TAB 1000IU (100/BT) 2000 UNITS: 25 TAB at 08:42

## 2019-04-02 RX ADMIN — LEVETIRACETAM 500 MG: 500 TABLET ORAL at 08:42

## 2019-04-02 RX ADMIN — SENNOSIDES AND DOCUSATE SODIUM 2 TABLET: 8.6; 5 TABLET ORAL at 09:00

## 2019-04-02 RX ADMIN — LEVETIRACETAM 500 MG: 500 TABLET ORAL at 21:35

## 2019-04-02 RX ADMIN — OXYCODONE HYDROCHLORIDE AND ACETAMINOPHEN 500 MG: 500 TABLET ORAL at 17:05

## 2019-04-02 RX ADMIN — MULTIPLE VITAMINS W/ MINERALS TAB 1 TABLET: TAB at 11:16

## 2019-04-02 RX ADMIN — TRAMADOL HYDROCHLORIDE 50 MG: 50 TABLET, COATED ORAL at 21:34

## 2019-04-02 RX ADMIN — FERROUS SULFATE TAB 325 MG (65 MG ELEMENTAL FE) 325 MG: 325 (65 FE) TAB at 08:42

## 2019-04-02 RX ADMIN — OXYCODONE HYDROCHLORIDE 10 MG: 10 TABLET ORAL at 00:16

## 2019-04-02 RX ADMIN — HYDROXYUREA 1500 MG: 500 CAPSULE ORAL at 21:34

## 2019-04-02 RX ADMIN — OXYCODONE HYDROCHLORIDE 5 MG: 5 TABLET ORAL at 11:16

## 2019-04-02 RX ADMIN — OMEPRAZOLE 20 MG: 20 CAPSULE, DELAYED RELEASE ORAL at 21:35

## 2019-04-02 RX ADMIN — OXYCODONE HYDROCHLORIDE AND ACETAMINOPHEN 500 MG: 500 TABLET ORAL at 08:42

## 2019-04-02 RX ADMIN — Medication 1 TABLET: at 13:45

## 2019-04-02 RX ADMIN — SENNOSIDES AND DOCUSATE SODIUM 2 TABLET: 8.6; 5 TABLET ORAL at 21:34

## 2019-04-02 RX ADMIN — SIMVASTATIN 20 MG: 20 TABLET, FILM COATED ORAL at 21:35

## 2019-04-02 RX ADMIN — ENOXAPARIN SODIUM 40 MG: 100 INJECTION SUBCUTANEOUS at 08:42

## 2019-04-02 NOTE — THERAPY
Recreational Therapy  Daily Treatment     Patient Name: Han Pat  AGE:  84 y.o., SEX:  male  Medical Record #: [id number]  Today's Date: 4/2/2019       Subjective    Agreeable to session. Seemed to makes jokes when he was struggling with the puzzle.      Objective       04/02/19 1424   Functional Ability Status - Cognitive   Attention Span Remains on Task with Cueing   Comprehension Follows Two Step Commands   Judgment Able to Make Independent Decisions   Cognitive Comments some reminders to remain on task and not focus on others conversations.    Functional Ability Status - Emotional    Affect Appropriate   Mood Appropriate   Behavior Appropriate   Emotional Comments Often making jokes and required cueing to remain on task.    Skilled Intervention    Skilled Intervention Fine Motor Leisure;Cognitive Leisure   Skilled Intervention Comments Sorting three puzzles and putting one together.    Interdisciplinary Plan of Care Collaboration   IDT Collaboration with  Nursing   Collaboration Comments Nursing medications with patient   Treatment Time   Total Time Spent (mins) 30   Procedural Tracking   Procedural Tracking Cognitive Skills Training;Leisure Skills Development;Fine Motor Functional Leisure Skills       Assessment    When he would stare off and be redirected he would make a joke seemingly to deflect from his inattention. He asked for help to place the 12 piece puzzle together. He was standing for the first five minutes while speaking with the doctor however after the five minutes stated that he felt too weak to stand for the remained of the session.      Plan    Increase his standing balance.

## 2019-04-02 NOTE — REHAB-PHARMACY IDT TEAM NOTE
Pharmacy   Pharmacy  Antibiotics/Antifungals/Antivirals:  Medication:      Active Orders     None        Route:         n/a  Stop Date:  n/a  Reason:   Antihypertensives/Cardiac:  Medication:    Orders (72h ago through future)    Start     Ordered    03/29/19 2100  simvastatin (ZOCOR) tablet 20 mg  EVERY EVENING      03/29/19 1211        Patient Vitals for the past 24 hrs:   BP Pulse   04/02/19 0648 120/58 65   04/01/19 1915 127/59 70   04/01/19 1422 111/61 68       Anticoagulation:  Medication:  Lovenox  INR:    Recent Labs      03/30/19   0537   INR  1.39*     Other key medications: hydroxyurea, levetiracetam, omeprazole, tamsulosin.   A review of the medication list reveals no issues at this time.    Section completed by:  Rajwinder Melvin [facility]

## 2019-04-02 NOTE — THERAPY
Speech Language Pathology  Daily Treatment     Patient Name: Han Pat  Age:  84 y.o., Sex:  male  Medical Record #: [id number]  Today's Date: 4/2/2019     Subjective    Assumed care of patient from dining room. Pleasant and cooperative.      Objective       04/02/19 0834   Cognition   Orientation  Minimal (4)   SCCAN (Scales of Cognitive and Communicative Ability for Neurorehabilitation)   Oral Expression - Raw Score 17   Oral Expression - Scale Performance Score 89   Orientation - Raw Score 11   Orientation - Scale Performance Score 92   Memory - Raw Score 11   Memory - Scale Performance Score 58   Speech Comprehension - Raw Score 11   Speech Comprehension - Scale Performance Score 85   Reading Comprehension - Raw Score 9   Reading Comprehension - Scale Performance Score 75   Writing - Raw Score 6   Writing - Scale Performance Score 86   Attention - Raw Score 11   Attention - Scale Performance Score 69   Problem Solving - Raw Score 19   Problem Solving - Scale Performance Score 83   SCCAN Total Raw Score 78   SCCAN Degree of Severity Mild Impairment   Interdisciplinary Plan of Care Collaboration   Patient Position at End of Therapy In Bed;Bed Alarm On;Call Light within Reach;Phone within Reach;Tray Table within Reach   SLP Total Time Spent   SLP Individual Total Time Spent (Mins) 60   Charge Group   SLP Cognitive Skill Development 4       FIM Eating Score:     Eating Description:       FIM Comprehension Score:  5 - Stand-by Prompting/Supervision or Set-up  Comprehension Description:  Glasses, Verbal cues    FIM Expression Score:  5 - Stand-by Prompting/Supervision or Set-up  Expression Description:  Verbal cueing    FIM Social Interaction Score:  5 - Stand-by Prompting/Supervision or Set-up  Social Interaction Description:  Increased time    FIM Problem Solving Score:  3 - Moderate Assistance  Problem Solving Description:  Verbal cueing, Therapy schedule, Bed/chair alarm, Increased time    FIM Memory  Score:  4 - Minimal Assistance  Memory Description:  Verbal cueing, Therapy schedule      Assessment    Completed administration of SCCAN - pt received total raw score of 78 - falling within MILD impairment classification. Pt received score of 11/30 on MoCA on day of initial assessment. Pt continues to report memory is worse since hospitalization. Pt received score f 27/30 on Orientation Log. Pt continues to demonstrate reduced insight to deficits as he reports it is safe to drive at this time as well as walk in hospital independently.     Plan    Continue to target orientation, attention, memory

## 2019-04-02 NOTE — PROGRESS NOTES
Rehab Progress Note     Date of Service: 4/2/2019  Chief Complaint: follow up encephalopathy    Interval Events (Subjective)    Patient seen and examined in the therapy gym today. He reports his back pain is chronic and stable. No radicular pain, no paresthesias in the legs. He denies any bowel or bladder incontinence. He thinks his legs are stronger but not yet back to baseline. He is currently a min assist to do a sit to stand, contact guard assist for balance. He is currently doing a puzzle with recreational therapy.    Objective:  VITAL SIGNS: /58   Pulse 65   Temp 36.4 °C (97.5 °F) (Oral)   Resp 18   Ht 1.829 m (6')   Wt 90.9 kg (200 lb 6.4 oz)   SpO2 95%   BMI 27.18 kg/m²   Gen: alert, no apparent distress  CV: regular rate and rhythm, no murmurs, no peripheral edema  Resp: clear to ascultation bilaterally, normal respiratory effort  GI: soft, non-tender abdomen, bowel sounds present    No results found for this or any previous visit (from the past 72 hour(s)).    Current Facility-Administered Medications   Medication Frequency   • ferrous sulfate tablet 325 mg BID WITH MEALS   • vitamin D (cholecalciferol) tablet 2,000 Units DAILY   • Respiratory Care per Protocol Continuous RT   • Pharmacy Consult Request ...Pain Management Review 1 Each PHARMACY TO DOSE   • oxyCODONE immediate-release (ROXICODONE) tablet 5 mg Q3HRS PRN   • oxyCODONE immediate release (ROXICODONE) tablet 10 mg Q3HRS PRN   • acetaminophen (TYLENOL) tablet 650 mg Q4HRS PRN   • senna-docusate (PERICOLACE or SENOKOT S) 8.6-50 MG per tablet 2 Tab BID    And   • polyethylene glycol/lytes (MIRALAX) PACKET 1 Packet QDAY PRN    And   • magnesium hydroxide (MILK OF MAGNESIA) suspension 30 mL QDAY PRN    And   • bisacodyl (DULCOLAX) suppository 10 mg QDAY PRN   • ascorbic acid tablet 500 mg BID WITH MEALS   • therapeutic multivitamin-minerals (THERAGRAN-M) tablet 1 Tab DAILY WITH LUNCH   • ondansetron (ZOFRAN ODT) dispertab 4 mg Q4HRS PRN    • acetaminophen (TYLENOL) tablet 650 mg Q6HRS PRN   • hydroxyurea (HYDREA) capsule 1,000 mg Once per day on Sun Sat   • hydroxyurea (HYDREA) capsule 1,500 mg Once per day on Mon Tue Wed Thu Fri   • levETIRAcetam (KEPPRA) tablet 500 mg Q12HRS   • omeprazole (PRILOSEC) capsule 20 mg QHS   • simvastatin (ZOCOR) tablet 20 mg Q EVENING   • tamsulosin (FLOMAX) capsule 0.4 mg QHS   • tramadol (ULTRAM) 50 MG tablet 50 mg Q6HRS PRN   • enoxaparin (LOVENOX) inj 40 mg DAILY       Orders Placed This Encounter   Procedures   • Diet Order Regular     Standing Status:   Standing     Number of Occurrences:   1     Order Specific Question:   Diet:     Answer:   Regular [1]     Order Specific Question:   Consistency/Fluid modifications:     Answer:   Thin Liquids [3]       Assessment:  Active Hospital Problems    Diagnosis   • *Acute encephalopathy   • Weakness   • Anemia   • Hyponatremia   • Lower back pain   • Thrombocytosis (HCC)   • Benign prostatic hyperplasia without lower urinary tract symptoms   • Mixed hyperlipidemia   • Obstructive sleep apnea   • Gastroesophageal reflux disease   • Obesity (BMI 30.0-34.9)     This patient is a 84 y.o. male admitted for acute inpatient rehabilitation with Acute encephalopathy.    We will have his first weekly conference Weds to discuss his progress and discharge date.      Medical Decision Making and Plan:    Acute encephalopathy, improved  Possible seizure? Post-ictal? Mild dementia?  Continue full rehab program  PT/OT/SLP, 1 hr each discipline, 5 days per week  Continue Keppra  Follow up outpatient with neurology    Lower extremity weakness, improved  Differential diagnosis includes Demetrius's paralysis secondary to seizure versus spinal stenosis/myelopathy - though patient with no radicular pain, no paresthesias, no more bowel/bladder incontinence, and continues to improve  Improved lower extremity strength, no clear sensory level  Continue with physical and occupational therapy for  mobility and ADLs  If weakness progresses consider getting MRI of lumbar spine    Low back pain  History of decompression of the lumbar spine back in late 2000  Low back pain has been present for some time and not limiting his participation with therapy     Bladder Incontinence, resolved  BPH  Continue Flomax 0.4 mg nightly, monitor for orthostatic hypotension  Scheduled voids  PVRs - 100, 53     Bowel incontinence, resolved  Continue bowel meds  Schedule toileting  Last BM 4/1    FREYA  Continue with home CPAP  Consult respiratory therapy     Dyslipidemia  Continue simvastatin 20 mg nightly     Macrocytic Anemia  Normal folate and B12  Iron deficiency anemia, iron 28  Start ferrous sulfate and Vit C     Chronic thrombocytosis  Continue hydroxyurea      Hyponatremia, resolved    DVT prophylaxis  Lovenox    Total time:  25 minutes.  I spent greater than 50% of the time for patient care, counseling, and coordination on this date, including patient face-to face time, unit/floor time with review of records/pertinent lab data and studies, as well as discussing diagnostic evaluation/work up, planned therapeutic interventions, and future disposition of care, as per the interval events/subjective and the assessment and plan as noted above.        Vonda Rodrigues M.D.   Physical Medicine and Rehabilitation

## 2019-04-02 NOTE — THERAPY
Occupational Therapy  Daily Treatment     Patient Name: Han Pat  Age:  84 y.o., Sex:  male  Medical Record #: [id number]  Today's Date: 2019     Precautions  Precautions: (P) Fall Risk  Comments: (P) seizure prec    Safety   ADL Safety : (P) Requires Supervision for Safety, Impaired, Impaired Insight into Safety, Requires Cueing for Safety  Bathroom Safety: (P) Impaired, Requires Supervision for Safety, Impaired Insight into Safety, Requires Cuing for Safety    Subjective    Patient in bed taking a nap upon OT arrival.  Agreeable to get out of bed and take a shower.     Objective       19 1001   Precautions   Precautions Fall Risk   Comments seizure prec   Safety    ADL Safety  Requires Supervision for Safety;Impaired;Impaired Insight into Safety;Requires Cueing for Safety   Bathroom Safety Impaired;Requires Supervision for Safety;Impaired Insight into Safety;Requires Cuing for Safety   Pain 0 - 10 Group   Location Back;Shoulder   Location Orientation Right;Left;Lower   Bed Mobility    Supine to Sit Minimal Assist   Scooting Stand by Assist   OT Total Time Spent   OT Individual Total Time Spent (Mins) 60   OT Charge Group   OT Self Care / ADL 4       FIM Eating Score:  7 - Independent  Eating Description:       FIM Grooming Score:  7 - Independent  Grooming Description:       FIM Bathing Score:  5 - Standby Prompting/Supervision or Set-up  Bathing Description:       FIM Upper Body Dressin - Standby Prompting/Supervision or Set-up  Upper Body Dressing Description:  Supervision for safety, Set-up of equipment    FIM Lower Body Dressing Score:  4 - Minimal Assistance  Lower Body Dressing Description:  Increased time, Supervision for safety, Verbal cueing, Set-up of equipment, Initial preparation for task (assist w/  tasks)    FIM Tub/Shower Transfers Score:  5 - Standby Prompting/Supervision or Set-up  Tub/Shower Transfers Description:  Grab bar, Adaptive equipment, Supervision for safety,  Verbal cueing, Set-up of equipment, Initial preparation for task      Assessment    Patient completed ADL routine with min A to SBA with increased time and occasional verbal cues for initiation and attention.  Requires frequent rest breaks as endurance is still a barrier.    Plan    Standing tolerance/balance, UB/LB/core strength/endurance, functional mobility, ADLs/IADLs

## 2019-04-02 NOTE — THERAPY
Physical Therapy   Daily Treatment     Patient Name: Han Pat  Age:  84 y.o., Sex:  male  Medical Record #: [id number]  Today's Date: 4/2/2019     Precautions  Precautions: (P) Fall Risk  Comments: (P) seizure prec    Subjective    Pt agrees to therapy, states he is hesitant about the lite gait but is willing to try.      Objective       04/02/19 1331   Precautions   Precautions Fall Risk   Comments seizure prec   Interdisciplinary Plan of Care Collaboration   Patient Position at End of Therapy Seated  (wife walked pt back to his room)   PT Total Time Spent   PT Individual Total Time Spent (Mins) 60   PT Charge Group   PT Gait Training 3   PT Neuromuscular Re-Education / Balance 1       Lite Gait: 1:30 at .6 mph decreased to .4 mph 71 ft with BUE support. 3:50 at .6 mph to .4 mph 152 ft, and 172 feet at varying speeds from .2 to .6 mph. Pt require continual verbal feedback for increased gait speed and upright posture.  Displays crouched gait during training with B knee flexion and hip flexion, lower extremity ER, with shuffling gait upon fatigue.  Pt consistently looks towards ground during gait as well.  Needed manual assist for BLE advancement as speeds increased and pt fatigued.  Third trial consisted of orange resistance band around RLE to promote advancement during gait.    Bilateral hamstring stretch 2 x 30 seconds with prop under distal tibia. Educated pt and spouse no performing hamstring stretch in bed or in chair to pt tolerance 2 x per day to increase terminal knee extension.        Assessment    Pt needs continuous verbal cues for gait mechanics and manual assist for lower extremity advancement during gait.  Pt tolerated today's interventions but displays decreased endurance and gait speed. Pt needed reinforcement to remember hamstring stretch sequencing.  Pt wife demos understanding.     Plan    Transfer training, AMB with FWW, stair training to mimic home environment, standing balance

## 2019-04-02 NOTE — CARE PLAN
Problem: Bathing  Goal: STG-Within one week, patient will bathe  1) Individualized Goal:  Min A with use of AE/DME PRN.  2) Interventions:  OT Group Therapy, OT Self Care/ADL, OT Cognitive Skill Dev, OT Community Reintegration, OT Manual Ther Technique, OT Neuro Re-Ed/Balance, OT Therapeutic Activity, OT Evaluation and OT Therapeutic Exercise     Outcome: MET Date Met: 04/02/19  Setup and supervised with cues and AE    Problem: Dressing  Goal: STG-Within one week, patient will dress LB  1) Individualized Goal:  Min A with use of AE/DME PRN.  2) Interventions:  OT Group Therapy, OT Self Care/ADL, OT Cognitive Skill Dev, OT Community Reintegration, OT Manual Ther Technique, OT Neuro Re-Ed/Balance, OT Therapeutic Activity, OT Evaluation and OT Therapeutic Exercise   Outcome: MET Date Met: 04/02/19  Min A with cues and increased time    Problem: Toileting  Goal: STG-Within one week, patient will complete toileting tasks  1) Individualized Goal: Min A with use of AE/DME PRN.  2) Interventions:  OT Group Therapy, OT Self Care/ADL, OT Cognitive Skill Dev, OT Community Reintegration, OT Manual Ther Technique, OT Neuro Re-Ed/Balance, OT Therapeutic Activity, OT Evaluation and OT Therapeutic Exercise   Outcome: MET Date Met: 04/02/19  SBA    Problem: Functional Transfers  Goal: STG-Within one week, patient will transfer to toilet  1) Individualized Goal:  Min A with use of AD/DME PRN.  2) Interventions:  OT Group Therapy, OT Self Care/ADL, OT Cognitive Skill Dev, OT Community Reintegration, OT Manual Ther Technique, OT Neuro Re-Ed/Balance, OT Therapeutic Activity, OT Evaluation and OT Therapeutic Exercise   Outcome: MET Date Met: 04/02/19  SBA with grab bar

## 2019-04-02 NOTE — PROGRESS NOTES
Rehab Progress Note     Date of Service: 4/1/2019  Chief Complaint: follow up encephalopathy    Interval Events (Subjective)    Patient seen and examined in the therapy gym today working with PT. He has no complaints, and thinks he's ready for discharge. PT reports he's made remarkable improvements since his initial evaluation.    Met with wife and DIL later who have questions about the underlying etiology of his mental status changes, and also agree that he is making great recovery. Advised I can refer to outpatient neurology, but that functionally he is improving. Wife wants to know if maybe he had transverse myelitis (she's an ex RN) and that maybe the steroids he got for his lungs improved his function. She wants to know if he needs imaging of his back.    Objective:  VITAL SIGNS: /61   Pulse 68   Temp 36.4 °C (97.6 °F) (Oral)   Resp 18   Ht 1.829 m (6')   Wt 90.9 kg (200 lb 6.4 oz)   SpO2 96%   BMI 27.18 kg/m²   Gen: alert, no apparent distress  CV: regular rate and rhythm, no murmurs, no peripheral edema  Resp: clear to ascultation bilaterally, normal respiratory effort  GI: soft, non-tender abdomen, bowel sounds present    Recent Results (from the past 72 hour(s))   CBC with Differential    Collection Time: 03/30/19  5:37 AM   Result Value Ref Range    WBC 7.9 4.8 - 10.8 K/uL    RBC 2.86 (L) 4.70 - 6.10 M/uL    Hemoglobin 11.2 (L) 14.0 - 18.0 g/dL    Hematocrit 33.0 (L) 42.0 - 52.0 %    .4 (H) 81.4 - 97.8 fL    MCH 39.2 (H) 27.0 - 33.0 pg    MCHC 33.9 33.7 - 35.3 g/dL    RDW 73.9 (H) 35.9 - 50.0 fL    Platelet Count 332 164 - 446 K/uL    MPV 8.7 (L) 9.0 - 12.9 fL    Neutrophils-Polys 79.80 (H) 44.00 - 72.00 %    Lymphocytes 7.50 (L) 22.00 - 41.00 %    Monocytes 10.10 0.00 - 13.40 %    Eosinophils 1.40 0.00 - 6.90 %    Basophils 0.40 0.00 - 1.80 %    Immature Granulocytes 0.80 0.00 - 0.90 %    Nucleated RBC 0.00 /100 WBC    Neutrophils (Absolute) 6.31 1.82 - 7.42 K/uL    Lymphs (Absolute)  0.59 (L) 1.00 - 4.80 K/uL    Monos (Absolute) 0.80 0.00 - 0.85 K/uL    Eos (Absolute) 0.11 0.00 - 0.51 K/uL    Baso (Absolute) 0.03 0.00 - 0.12 K/uL    Immature Granulocytes (abs) 0.06 0.00 - 0.11 K/uL    NRBC (Absolute) 0.00 K/uL   Comp Metabolic Panel (CMP)    Collection Time: 03/30/19  5:37 AM   Result Value Ref Range    Sodium 135 135 - 145 mmol/L    Potassium 3.9 3.6 - 5.5 mmol/L    Chloride 105 96 - 112 mmol/L    Co2 23 20 - 33 mmol/L    Anion Gap 7.0 0.0 - 11.9    Glucose 135 (H) 65 - 99 mg/dL    Bun 16 8 - 22 mg/dL    Creatinine 0.73 0.50 - 1.40 mg/dL    Calcium 8.7 8.5 - 10.5 mg/dL    AST(SGOT) 17 12 - 45 U/L    ALT(SGPT) 23 2 - 50 U/L    Alkaline Phosphatase 67 30 - 99 U/L    Total Bilirubin 1.8 (H) 0.1 - 1.5 mg/dL    Albumin 3.0 (L) 3.2 - 4.9 g/dL    Total Protein 5.7 (L) 6.0 - 8.2 g/dL    Globulin 2.7 1.9 - 3.5 g/dL    A-G Ratio 1.1 g/dL   Lipid Profile (Lipid Panel)    Collection Time: 03/30/19  5:37 AM   Result Value Ref Range    Cholesterol,Tot 112 100 - 199 mg/dL    Triglycerides 69 0 - 149 mg/dL    HDL 46 >=40 mg/dL    LDL 52 <100 mg/dL   HEMOGLOBIN A1C    Collection Time: 03/30/19  5:37 AM   Result Value Ref Range    Glycohemoglobin 5.9 (H) 0.0 - 5.6 %    Est Avg Glucose 123 mg/dL   Prothrombin time    Collection Time: 03/30/19  5:37 AM   Result Value Ref Range    PT 17.2 (H) 12.0 - 14.6 sec    INR 1.39 (H) 0.87 - 1.13   Vitamin D, 25-hydroxy (blood)    Collection Time: 03/30/19  5:37 AM   Result Value Ref Range    25-Hydroxy   Vitamin D 25 10 (L) 30 - 100 ng/mL   TSH with Reflex to FT4    Collection Time: 03/30/19  5:37 AM   Result Value Ref Range    TSH 1.050 0.380 - 5.330 uIU/mL   ESTIMATED GFR    Collection Time: 03/30/19  5:37 AM   Result Value Ref Range    GFR If African American >60 >60 mL/min/1.73 m 2    GFR If Non African American >60 >60 mL/min/1.73 m 2       Current Facility-Administered Medications   Medication Frequency   • vitamin D (cholecalciferol) tablet 2,000 Units DAILY   •  Respiratory Care per Protocol Continuous RT   • Pharmacy Consult Request ...Pain Management Review 1 Each PHARMACY TO DOSE   • oxyCODONE immediate-release (ROXICODONE) tablet 5 mg Q3HRS PRN   • oxyCODONE immediate release (ROXICODONE) tablet 10 mg Q3HRS PRN   • acetaminophen (TYLENOL) tablet 650 mg Q4HRS PRN   • senna-docusate (PERICOLACE or SENOKOT S) 8.6-50 MG per tablet 2 Tab BID    And   • polyethylene glycol/lytes (MIRALAX) PACKET 1 Packet QDAY PRN    And   • magnesium hydroxide (MILK OF MAGNESIA) suspension 30 mL QDAY PRN    And   • bisacodyl (DULCOLAX) suppository 10 mg QDAY PRN   • ascorbic acid tablet 500 mg BID WITH MEALS   • therapeutic multivitamin-minerals (THERAGRAN-M) tablet 1 Tab DAILY WITH LUNCH   • ondansetron (ZOFRAN ODT) dispertab 4 mg Q4HRS PRN   • acetaminophen (TYLENOL) tablet 650 mg Q6HRS PRN   • hydroxyurea (HYDREA) capsule 1,000 mg Once per day on Sun Sat   • hydroxyurea (HYDREA) capsule 1,500 mg Once per day on Mon Tue Wed Thu Fri   • levETIRAcetam (KEPPRA) tablet 500 mg Q12HRS   • omeprazole (PRILOSEC) capsule 20 mg QHS   • simvastatin (ZOCOR) tablet 20 mg Q EVENING   • tamsulosin (FLOMAX) capsule 0.4 mg QHS   • tramadol (ULTRAM) 50 MG tablet 50 mg Q6HRS PRN   • enoxaparin (LOVENOX) inj 40 mg DAILY       Orders Placed This Encounter   Procedures   • Diet Order Regular     Standing Status:   Standing     Number of Occurrences:   1     Order Specific Question:   Diet:     Answer:   Regular [1]     Order Specific Question:   Consistency/Fluid modifications:     Answer:   Thin Liquids [3]       Assessment:  Active Hospital Problems    Diagnosis   • *Acute encephalopathy   • Weakness   • Anemia   • Hyponatremia   • Lower back pain   • Thrombocytosis (HCC)   • Benign prostatic hyperplasia without lower urinary tract symptoms   • Mixed hyperlipidemia   • Obstructive sleep apnea   • Gastroesophageal reflux disease   • Obesity (BMI 30.0-34.9)     This patient is a 84 y.o. male admitted for acute  inpatient rehabilitation with Acute encephalopathy.    Therapy update 4/1/2019  Independent eating  Independent grooming  Mod assist bathing  Supervision upper body dressing  Min assist lower body dressing  Supervision toileting  Total assistbladder  Max assist bowel  Min assist bed/chair transfer  Supervision toilet transfer  Max assist tub/shower transfer  Total assist ambulation  Max assist wheelchair propulsion  Max assist stairs  Min assist comprehension  Supervision expression  Supervision social interaction  Max assist problem solving  Min assist memory    We will have his first weekly conference Weds to discuss his progress and discharge date.      Medical Decision Making and Plan:    Acute encephalopathy, improved  Possible seizure? Post-ictal? Mild dementia?  Continue full rehab program  PT/OT/SLP, 1 hr each discipline, 5 days per week  Continue [facility]  Follow up outpatient with neurology    Lower extremity weakness  Differential diagnosis includes Demetrius's paralysis secondary to seizure versus spinal stenosis/myelopathy  Improved lower extremity strength, no clear sensory level  Continue with physical and occupational therapy for mobility and ADLs  If weakness progresses consider getting MRI of lumbar spine     Bladder Incontinence  BPH  Continue Flomax 0.4 mg nightly, monitor for orthostatic hypotension  Scheduled voids  PVRs - 100     Bowel incontinence  Continue bowel meds  Schedule toileting    FREYA  Continue with home CPAP  Consult respiratory therapy     Dyslipidemia  Continue simvastatin 20 mg nightly     Macrocytic Anemia  Normal folate and B12  Iron deficiency anemia, iron 28  Start ferrous sulfate and Vit C     Chronic thrombocytosis  Continue hydroxyurea      Low back pain  History of decompression of the lumbar spine back in late 2000  Low back pain has been present for some time and not limiting his participation with therapy     Hyponatremia, resolved    DVT prophylaxis  Lovenox    Total time:   37 minutes.  I spent greater than 50% of the time for patient care, counseling, and coordination on this date, including patient face-to face time, unit/floor time with review of records/pertinent lab data and studies, as well as discussing diagnostic evaluation/work up, planned therapeutic interventions, and future disposition of care, as per the interval events/subjective and the assessment and plan as noted above.      Vonda Rodrigues M.D.   Physical Medicine and Rehabilitation

## 2019-04-02 NOTE — CARE PLAN
Problem: Communication  Goal: The ability to communicate needs accurately and effectively will improve  Outcome: PROGRESSING AS EXPECTED  Pt is able to effectively communicate his needs to staff.    Problem: Safety  Goal: Will remain free from injury  Outcome: PROGRESSING AS EXPECTED  Pt uses call light consistently for staff assistance. Pt has good safety awareness, no impulsivity observed.    Problem: Infection  Goal: Will remain free from infection  Outcome: PROGRESSING AS EXPECTED  No s/s of infection present       children/has HHA 8 hours/day, 5 days/week

## 2019-04-02 NOTE — REHAB-OT IDT TEAM NOTE
Occupational Therapy   Activities of Daily Living  Eating Initial:  5 - Standby Prompting/Supervision or Set-up  Eating Current:  7 - Independent   Eating Description:  Increased time, Supervision for safety, Set-up of equipment or meal/tube feeding  Grooming Initial:  5 - Standby Prompting/Supervision or Set-up  Grooming Current:  7 - Independent   Grooming Description:  Increased time, Supervision for safety, Set-up of equipment, Verbal cueing (SBA/set up for oral care and hand hygiene seated at sink.)  Bathing Initial:  3 - Moderate Assistance  Bathing Current:  5 - Standby Prompting/Supervision or Set-up   Bathing Description:  Grab bar, Tub bench, Long handled bath tool, Hand held shower, Supervision for safety, Verbal cueing, Set-up of equipment, Initial preparation for task (setup and supervised with AE and cues)  Upper Body Dressing Initial:  4 - Minimal Assistance  Upper Body Dressing Current:  5 - Standby Prompting/Supervision or Set-up   Upper Body Dressing Description:  Supervision for safety, Set-up of equipment  Lower Body Dressing Initial:  2 - Max Assistance  Lower Body Dressing Current:  4 - Minimal Assistance   Lower Body Dressing Description:  4 - Minimal Assistance  Toileting Initial:  2 - Max Assistance  Toileting Current:  5 - Standby Prompting/Supervision or Set-up   Toileting Description:  Supervision for safety, Set-up of equipment, Initial preparation for task (setup/SBA 3/3 tasks)  Toilet Transfer Initial:  2 - Max Assistance  Toilet Transfer Current:  5 - Standby Prompting/Supervision or Set-up   Toilet Transfer Description:  5 - Standby Prompting/Supervision or Set-up  Tub / Shower Transfer Initial:  2 - Max Assistance  Tub / Shower Transfer Current:  5 - Standby Prompting/Supervision or Set-up   Tub / Shower Transfer Description:  Grab bar, Adaptive equipment, Supervision for safety, Verbal cueing, Set-up of equipment, Initial preparation for task  IADL:  Not assessed  Family  Training/Education:  None  DME/DC Recommendations:  Shower chair, home health versus outpatient OT    Strengths:  Alert and oriented, Effective communication skills, Good insight into deficits/needs, Independent PLOF, Making steady progress towards goals, Motivated for self care and independence, Pleasant and cooperative, Supportive family and Willingly participates in therapeutic activities  Barriers:  Decreased endurance, Generalized weakness, Limited mobility and Poor balance     # of short term goals set= 4    # of short term goals met= 4          Occupational Therapy Goals           Problem: Bathing     Dates: Start: 03/30/19       Goal: STG-Within one week, patient will bathe     Dates: Start: 04/02/19       Description: 1) Individualized Goal: with setup with use of AE/DME PRN.  2) Interventions: OT Group Therapy, OT Self Care/ADL, OT Cognitive Skill Dev, OT Community Reintegration, OT Manual Ther Technique, OT Neuro Re-Ed/Balance, OT Therapeutic Activity, OT Evaluation and OT Therapeutic Exercise               Problem: Dressing     Dates: Start: 03/30/19       Goal: STG-Within one week, patient will dress LB     Dates: Start: 04/02/19       Description: 1) Individualized Goal: supervised with use of AE/DME PRN.  2) Interventions: OT Group Therapy, OT Self Care/ADL, OT Cognitive Skill Dev, OT Community Reintegration, OT Manual Ther Technique, OT Neuro Re-Ed/Balance, OT Therapeutic Activity, OT Evaluation and OT Therapeutic Exercise                Problem: Functional Transfers     Dates: Start: 03/30/19       Goal: STG-Within one week, patient will transfer to toilet     Dates: Start: 04/02/19       Description: 1) Individualized Goal: supervised with use of AD/DME PRN.  2) Interventions: OT Group Therapy, OT Self Care/ADL, OT Cognitive Skill Dev, OT Community Reintegration, OT Manual Ther Technique, OT Neuro Re-Ed/Balance, OT Therapeutic Activity, OT Evaluation and OT Therapeutic Exercise                 Problem: OT Long Term Goals     Dates: Start: 03/30/19       Goal: LTG-By discharge, patient will complete basic self care tasks     Dates: Start: 03/30/19       Description: 1) Individualized Goal:  Supervision to mod I with use of AE/DME PRN.  2) Interventions:  OT Group Therapy, OT Self Care/ADL, OT Cognitive Skill Dev, OT Community Reintegration, OT Manual Ther Technique, OT Neuro Re-Ed/Balance, OT Therapeutic Activity, OT Evaluation and OT Therapeutic Exercise             Goal: LTG-By discharge, patient will perform bathroom transfers     Dates: Start: 03/30/19       Description: 1) Individualized Goal:  Supervision to mod I with use of AD/DME PRN.  2) Interventions:  OT Group Therapy, OT Self Care/ADL, OT Cognitive Skill Dev, OT Community Reintegration, OT Manual Ther Technique, OT Neuro Re-Ed/Balance, OT Therapeutic Activity, OT Evaluation and OT Therapeutic Exercise              Problem: Toileting     Dates: Start: 03/30/19       Goal: STG-Within one week, patient will complete toileting tasks     Dates: Start: 04/02/19       Description: 1) Individualized Goal: supervised with use of AE/DME PRN.  2) Interventions: OT Group Therapy, OT Self Care/ADL, OT Cognitive Skill Dev, OT Community Reintegration, OT Manual Ther Technique, OT Neuro Re-Ed/Balance, OT Therapeutic Activity, OT Evaluation and OT Therapeutic Exercise                    Section completed by:  Reynaldo Ayers MS,OTR/L

## 2019-04-03 DIAGNOSIS — R41.89 COGNITIVE IMPAIRMENT: ICD-10-CM

## 2019-04-03 DIAGNOSIS — G93.40 ACUTE ENCEPHALOPATHY: ICD-10-CM

## 2019-04-03 LAB
APPEARANCE UR: CLEAR
BACTERIA #/AREA URNS HPF: NEGATIVE /HPF
BILIRUB UR QL STRIP.AUTO: NEGATIVE
COLOR UR: ABNORMAL
EPI CELLS #/AREA URNS HPF: NEGATIVE /HPF
GLUCOSE UR STRIP.AUTO-MCNC: NEGATIVE MG/DL
HYALINE CASTS #/AREA URNS LPF: ABNORMAL /LPF
KETONES UR STRIP.AUTO-MCNC: NEGATIVE MG/DL
LEUKOCYTE ESTERASE UR QL STRIP.AUTO: ABNORMAL
MICRO URNS: ABNORMAL
NITRITE UR QL STRIP.AUTO: NEGATIVE
PH UR STRIP.AUTO: 5.5 [PH]
PROT UR QL STRIP: NEGATIVE MG/DL
RBC # URNS HPF: ABNORMAL /HPF
RBC UR QL AUTO: NEGATIVE
SP GR UR STRIP.AUTO: 1.02
UROBILINOGEN UR STRIP.AUTO-MCNC: 0.2 MG/DL
WBC #/AREA URNS HPF: ABNORMAL /HPF

## 2019-04-03 PROCEDURE — 700111 HCHG RX REV CODE 636 W/ 250 OVERRIDE (IP): Performed by: PHYSICAL MEDICINE & REHABILITATION

## 2019-04-03 PROCEDURE — 81001 URINALYSIS AUTO W/SCOPE: CPT

## 2019-04-03 PROCEDURE — A9270 NON-COVERED ITEM OR SERVICE: HCPCS | Performed by: PHYSICAL MEDICINE & REHABILITATION

## 2019-04-03 PROCEDURE — G0515 COGNITIVE SKILLS DEVELOPMENT: HCPCS

## 2019-04-03 PROCEDURE — 770010 HCHG ROOM/CARE - REHAB SEMI PRIVAT*

## 2019-04-03 PROCEDURE — 97112 NEUROMUSCULAR REEDUCATION: CPT

## 2019-04-03 PROCEDURE — 87086 URINE CULTURE/COLONY COUNT: CPT

## 2019-04-03 PROCEDURE — 700102 HCHG RX REV CODE 250 W/ 637 OVERRIDE(OP): Performed by: PHYSICAL MEDICINE & REHABILITATION

## 2019-04-03 PROCEDURE — 99233 SBSQ HOSP IP/OBS HIGH 50: CPT | Performed by: PHYSICAL MEDICINE & REHABILITATION

## 2019-04-03 PROCEDURE — 97530 THERAPEUTIC ACTIVITIES: CPT

## 2019-04-03 PROCEDURE — 97535 SELF CARE MNGMENT TRAINING: CPT

## 2019-04-03 PROCEDURE — 94760 N-INVAS EAR/PLS OXIMETRY 1: CPT

## 2019-04-03 PROCEDURE — 97116 GAIT TRAINING THERAPY: CPT

## 2019-04-03 RX ORDER — LIDOCAINE 50 MG/G
1 PATCH TOPICAL EVERY 24 HOURS
Status: DISCONTINUED | OUTPATIENT
Start: 2019-04-04 | End: 2019-04-11 | Stop reason: HOSPADM

## 2019-04-03 RX ADMIN — HYDROXYUREA 1500 MG: 500 CAPSULE ORAL at 20:15

## 2019-04-03 RX ADMIN — SENNOSIDES AND DOCUSATE SODIUM 2 TABLET: 8.6; 5 TABLET ORAL at 08:25

## 2019-04-03 RX ADMIN — OXYCODONE HYDROCHLORIDE AND ACETAMINOPHEN 500 MG: 500 TABLET ORAL at 08:24

## 2019-04-03 RX ADMIN — TAMSULOSIN HYDROCHLORIDE 0.4 MG: 0.4 CAPSULE ORAL at 20:15

## 2019-04-03 RX ADMIN — OXYCODONE HYDROCHLORIDE 5 MG: 5 TABLET ORAL at 20:16

## 2019-04-03 RX ADMIN — LEVETIRACETAM 500 MG: 500 TABLET ORAL at 20:15

## 2019-04-03 RX ADMIN — VITAMIN D, TAB 1000IU (100/BT) 2000 UNITS: 25 TAB at 08:24

## 2019-04-03 RX ADMIN — Medication 1 TABLET: at 09:00

## 2019-04-03 RX ADMIN — MULTIPLE VITAMINS W/ MINERALS TAB 1 TABLET: TAB at 11:47

## 2019-04-03 RX ADMIN — TRAMADOL HYDROCHLORIDE 50 MG: 50 TABLET, COATED ORAL at 17:12

## 2019-04-03 RX ADMIN — SIMVASTATIN 20 MG: 20 TABLET, FILM COATED ORAL at 20:15

## 2019-04-03 RX ADMIN — FERROUS SULFATE TAB 325 MG (65 MG ELEMENTAL FE) 325 MG: 325 (65 FE) TAB at 08:24

## 2019-04-03 RX ADMIN — SENNOSIDES AND DOCUSATE SODIUM 2 TABLET: 8.6; 5 TABLET ORAL at 20:15

## 2019-04-03 RX ADMIN — ENOXAPARIN SODIUM 40 MG: 100 INJECTION SUBCUTANEOUS at 08:27

## 2019-04-03 RX ADMIN — OMEPRAZOLE 20 MG: 20 CAPSULE, DELAYED RELEASE ORAL at 20:15

## 2019-04-03 RX ADMIN — FERROUS SULFATE TAB 325 MG (65 MG ELEMENTAL FE) 325 MG: 325 (65 FE) TAB at 17:12

## 2019-04-03 RX ADMIN — LEVETIRACETAM 500 MG: 500 TABLET ORAL at 08:23

## 2019-04-03 RX ADMIN — OXYCODONE HYDROCHLORIDE AND ACETAMINOPHEN 500 MG: 500 TABLET ORAL at 17:12

## 2019-04-03 ASSESSMENT — BALANCE ASSESSMENTS
REACHING FORWARD WITH OUTSTRETCHED ARM WHILE STANDING: 3
LONG VERSION TOTAL SCORE (MAX 56): 30
STANDING UNSUPPORTED ONE FOOT IN FRONT: 2
TURN 360 DEGREES: 0
LOOK OVER LEFT AND RIGHT SHOULDERS WHILE STANDING: 0
STANDING UNSUPPORTED WITH FEET TOGETHER: 3
PLACE ALTERNATE FOOT ON STEP OR STOOL WHILE STANDING UNSUPPORTED: 0
TRANSFERS: 2
STANDING ON ONE LEG: 0
LONG VERSION TOTAL SCORE (MAX 56): 30
STANDING TO SITTING: 3
STANDING UNSUPPORTED WITH EYES CLOSED: 3
STANDING UNSUPPORTED: 4
PICK UP OBJECT FROM THE FLOOR FROM A STANDING POSITION: 3
SITTING TO STANDING: 3
SITTING UNSUPPORTED: 4

## 2019-04-03 NOTE — REHAB-PT IDT TEAM NOTE
"Physical Therapy   Mobility  Bed mobility:   4  Bed /Chair/Wheelchair Transfer Initial:  3 - Moderate Assistance  Bed /Chair/Wheelchair Transfer Current:  4 - Minimal Assistance   Bed/Chair/Wheelchair Transfer Description:   (CGA OOB reach pivot, with set up, SBA sup to sit with sequencing cues)  Walk Initial:  2 - Max Assistance  Walk Current:  1 - Total Assistance   Walk Description:  Two hand rails (20 ft in // bars CGA, + 6 ft, limited by dizziness)  Wheelchair Initial:  5 - Standby Prompting/Supervision or Set-up  Wheelchair Current:  2 - Max Assistance   Wheelchair Description:   (100 ft indoors SPV, very slowly)  Stairs Initial:  2 - Max Assistance  Stairs Current: 2 - Max Assistance   Stairs Description:  (8 x 1 6\" steps, B UE support on unilateral HR to mimic home enviornment )  Patient/Family Training/Education:  Stair training, safety with FWW  DME/DC Recommendations:  TBD (outpatient therapy, pt owns FWW)  Strengths:  Alert and oriented, Pleasant and cooperative and Supportive family, Rubio increase from 8/56 to 26/56  Barriers:  Poor carryover, poor short term memory, Several steps in home, 17+ with unilateral HR, mildly self limiting/ fear avoidance, poor resiliency/ self efficacy , poor neurocontrol, impaired hamstring length, impaired hamstring strength, forward flexed posture, impaired insight   # of short term goals set= 6  # of short term goals met= 3        Physical Therapy Problems           Problem: Balance     Dates: Start: 03/30/19       Goal: STG-Within one week, patient will     Dates: Start: 03/30/19       Description: Improving balance demonstrated by 8 point increase in RUBIO score from 8/56 to 16 or greater.              Problem: Mobility     Dates: Start: 03/30/19       Goal: STG-Within one week, patient will ambulate household distance     Dates: Start: 03/30/19       Description: Pt will ambulate household distance 150ft level surface with FWW no more than SBA consistently at .3 m/s " "demonstrating improving gait speed, endurance,  and independence.             Goal: STG-Within one week, patient will ambulate up/down flight of stairs     Dates: Start: 03/30/19       Description: Pt will ascends and descend 10 6 \" steps with bilateral hand rails with no more than CGA demonstrating improved endurance and strength tolerating higher step and amount of steps, progressing to long term goals.              Goal: STG-Within one week, patient will     Dates: Start: 03/30/19               Problem: Mobility Transfers     Dates: Start: 03/30/19       Goal: STG-Within one week, patient will perform bed mobility     Dates: Start: 03/30/19       Description: Supine to edge of bed SBA no verbal cues and less than 2 minutes to transition once he has initiated transition, demonstrating improved efficiency and independence. LTG= sit to and from supine mod I less than 1 minute              Goal: STG-Within one week, patient will sit to stand     Dates: Start: 03/30/19       Description: Sit to stand from 20 \" w/c x 5 with use of bilateral UEs and no more than SBA in less than 1 min 30 seconds, demonstrating improved efficiency, LE power, and improved balance and independence            Goal: STG-Within one week, patient will transfer bed to chair     Dates: Start: 03/30/19       Description: Stand pivot transfer bed <> w/c with use of bilateral UE for balance no more than SBA              Problem: PT-Long Term Goals     Dates: Start: 03/30/19       Goal: LTG-By discharge, patient will maintain balance     Dates: Start: 03/30/19       Description: RUBIO increasing to 42/56 demonstrating low fall risk upon return home              Goal: LTG-By discharge, patient will tolerate standing     Dates: Start: 03/30/19       Description: Sit to stand from 20\" chair use of bilateral UEs to assist x 5 in 30 seconds, mod I, indicating improved strength, power, balance, and endurance necessary for decreased fall risk and " independence with functional mobility.            Goal: LTG-By discharge, patient will ambulate     Dates: Start: 03/30/19       Description: Pt will ambulate household distances of 150ft at .4m/s gait speed with FWW mod I, indicating efficiency and independence for ambulation upon return home.            Goal: LTG-By discharge, patient will transfer one surface to another     Dates: Start: 03/30/19       Description: Stand with FWW bilateral UEs to assist, step turn transfer with FWW, to various surfaces mod I            Goal: LTG-By discharge, patient will ambulate up/down flight of stairs     Dates: Start: 03/30/19       Description: Ascend/descend 17 steps with single railing no more than SUPV from wife, indicating safe entry and exiting of his home              Goal: LTG-By discharge, patient will     Dates: Start: 03/30/19       Description: Pt's goals is to be able to ambulate outdoors with 4WW limited community distances simulating walking around neighborhood at home, mod I, for long term health and independence            Goal: LTG-By discharge, patient will     Dates: Start: 03/30/19       Description: 10 MWT average gait speed .4m/s            Goal: LTG-By discharge, patient will perform home exercise program     Dates: Start: 03/30/19                     Section completed by:  Carmencita Teresa, PT

## 2019-04-03 NOTE — CARE PLAN
Problem: Mobility  Goal: STG-Within one week, patient will  Outcome: DISCHARGED-GOAL NOT MET Date Met: 04/03/19

## 2019-04-03 NOTE — REHAB-SLP IDT TEAM NOTE
Speech Therapy   Cognitive Linquistic Functions  Comprehension Initial:  4 - Minimal Assistance  Comprehension Current:  5 - Stand-by Prompting/Supervision or Set-up   Comprehension Description:  Glasses, Verbal cues  Expression Initial:  5 - Stand-by Prompting/Supervision or Set-up  Expression Current:  5 - Stand-by Prompting/Supervision or Set-up   Expression Description:  Verbal cueing  Social Interaction Initial:  5 - Stand-by Prompting/Supervision or Set-up  Social Interaction Current:  5 - Stand-by Prompting/Supervision or Set-up   Social Interaction Description:  Increased time  Problem Solving Initial:  2 - Max Assistance  Problem Solving Current:  3 - Moderate Assistance   Problem Solving Description:  Verbal cueing, Therapy schedule, Bed/chair alarm, Increased time  Memory Initial:  4 - Minimal Assistance  Memory Current:  4 - Minimal Assistance   Memory Description:  Verbal cueing, Therapy schedule  Executive Functioning / Organization Initial:     Executive Functioning / Organization Current: 3 - Moderate Assistance      Executive Functioning Desciption: Pt independent with financial mgnt prior to hospitalization, spouse handles all med mngt.   Swallowing  Swallowing Status: SLP not following for dysphagia.   Orders Placed This Encounter   Procedures   • Diet Order Regular     Standing Status:   Standing     Number of Occurrences:   1     Order Specific Question:   Diet:     Answer:   Regular [1]     Order Specific Question:   Consistency/Fluid modifications:     Answer:   Thin Liquids [3]     Behavior Modification Plan  Keep instructions simple/concrete, Give clear feedback, Set clear goals, Provide reasonable choices, Decrease the chance of failure by offering activities that are within the patient's abilities and Analyze tasks (break down into smaller steps)  Assistive Technology  Low/Impaired vision equipment and Low tech: Calendar, planner, schedule, alarms/timers, pill organizer, post-it notes,  lists  Family Training/Education:  Initiated with spouse  DC Recommendations: Pt would benefit from continued SLP services upon d/c, ?home health vs out patient  Strengths:  Alert and oriented, Independent PLOF, Making steady progress towards goals, Manages pain appropriately, Motivated for self care and independence, Pleasant and cooperative, Supportive family and Willingly participates in therapeutic activities  Barriers:  Decreased endurance and Other: attention, memory  # of short term goals set=2  # of short term goals met=2 (3 new goals added)        Speech Therapy Problems           Problem: Memory STGs     Dates: Start: 03/30/19       Goal: STG-Within one week, patient will     Dates: Start: 04/03/19       Description: 1) Individualized goal:  Recall daily therapy activities provided external aids (I.e. Therapy schedule, daily journal) in 4/5 trials.   2) Interventions:  SLP Cognitive Skill Development               Problem: Problem Solving STGs     Dates: Start: 03/30/19       Goal: STG-Within one week, patient will     Dates: Start: 04/03/19       Description: 1) Individualized goal:  Complete simple financial math problems with 80% accuracy provided MIN A.   2) Interventions:  SLP Cognitive Skill Development             Goal: STG-Within one week, patient will     Dates: Start: 04/03/19       Description: 1) Individualized goal:  Complete complex attention tasks with 80% accuracy provided MIN cues.   2) Interventions:  SLP Cognitive Skill Development and SLP Group Treatment               Problem: Speech/Swallowing LTGs     Dates: Start: 03/30/19       Goal: LTG-By discharge, patient will     Dates: Start: 03/30/19       Description: 1) Individualized goal:  Demonstrate cognitive-linguistic skills to support return to prior living situation with intermittent supervision  2) Interventions:  SLP Self Care / ADL Training , SLP Cognitive Skill Development and SLP Group Treatment                    Section  completed by:  Olamide Cox, MS,CCC-SLP

## 2019-04-03 NOTE — THERAPY
"Recreational Therapy  Daily Treatment     Patient Name: Han Pat  AGE:  84 y.o., SEX:  male  Medical Record #: [id number]  Today's Date: 4/3/2019       Subjective    \"Can you speak on the phone\" Spoke with family member to read a passcode to reset a password at the start of group.      Objective       04/03/19 1618   Functional Ability Status - Cognitive   Attention Span Remains on Task with Cueing   Comprehension Follows Two Step Commands   Judgment Able to Make Independent Decisions   Cognitive Comments At times distracted and requires prompts to continue task at hand.    Functional Ability Status - Emotional    Affect Appropriate   Mood Appropriate   Behavior Appropriate   Skilled Intervention    Skilled Intervention Cognitive Leisure;Fine Motor Leisure   Skilled Intervention Comments Sorting and putting together puzzle   Interdisciplinary Plan of Care Collaboration   IDT Collaboration with  Physician   Patient Position at End of Therapy Seated  (in main gym waiting for next session. )   Collaboration Comments Physician speaking patient during start of session   Treatment Time   Total Time Spent (mins) 30   Procedural Tracking   Procedural Tracking Cognitive Skills Training;Fine Motor Functional Leisure Skills       Assessment    He was able to successfully sort the puzzle pieces while standing. He stood for a total of 8 minutes in two separate sets. He was able to stand back up after taking a 5 minute break. He has increased his standing endurance.     Plan    Continue to work on extending his standing endurance.   "

## 2019-04-03 NOTE — THERAPY
Occupational Therapy  Daily Treatment     Patient Name: Han Pat  Age:  84 y.o., Sex:  male  Medical Record #: [id number]  Today's Date: 4/3/2019     Precautions  Precautions: (P) Fall Risk  Comments: (P) seizure prec    Safety   ADL Safety : (P) Requires Supervision for Safety, Impaired Insight into Safety, Requires Cueing for Safety  Bathroom Safety: (P) Requires Supervision for Safety, Impaired Insight into Safety, Requires Cuing for Safety    Subjective    Patient seated in w/c in gym.  Stated he needed to use the restroom.     Objective       19 1031   Precautions   Precautions Fall Risk   Comments seizure prec   Safety    ADL Safety  Requires Supervision for Safety;Impaired Insight into Safety;Requires Cueing for Safety   Bathroom Safety Requires Supervision for Safety;Impaired Insight into Safety;Requires Cuing for Safety   OT Total Time Spent   OT Individual Total Time Spent (Mins) 60   OT Charge Group   OT Self Care / ADL 2   OT Therapy Activity 2     2 x 10 anterior/posterior leans seated EOM for core strengthening with continuous verbal cues to initiate due to poor attention and initiation.  STS x 10 from EOM with SBA with cues for initiation and attention.    FIM Grooming Score:  5 - Standby Prompting/Supervision or Set-up  Grooming Description:  Supervision for safety, Set-up of equipment (setup/supervised to complete grooming seated)    FIM Toiletin - Standby Prompting/Supervision or Set-up  Toileting Description:  Grab bar, Supervision for safety, Set-up of equipment, Initial preparation for task, Verbal cueing (setup/sba )    FIM Toilet Transfer Score:  5 - Standby Prompting/Supervision or Set-up  Toilet Transfer Description:  Grab bar, Supervision for safety, Verbal cueing, Set-up of equipment, Initial preparation for task (setup/sba w/ grab bar)      Assessment    Patient slow moving with low energy and impaired attention/initiation today.      Plan    Full body  strengthening/endurance building, standing tolerance/balance, adls,

## 2019-04-03 NOTE — REHAB-CM IDT TEAM NOTE
Case Management    DC Planning  DC destination/dispostion:  Patient lives in a multi level home with his spouse.     DC Needs: He has home O2 and cpap.  He has a fww, grab bars and shower seat.  His wife is a retired nurse and will be home with him.    Barriers to discharge:  Stairs     Strengths: good family support.    Section completed by:  Christi Holloway R.N.

## 2019-04-03 NOTE — CARE PLAN
Problem: Recreation Therapy  Goal: STG-Within one week, patient will demonstrate a standing tolerance  By standing for 25% of the time while performing cognitive leisure at the mat.    Outcome: PROGRESSING AS EXPECTED  Was able to stand 15% of the session time saying that he did not feel strong enough to stand back up again after sitting and taking a break.   Goal: STG-Within one week, patient will actively engage in planning of community re-integration outing  Outcome: PROGRESSING AS EXPECTED  Will engage in conversation on what community re integration goals he has for himself.    Goal: LTG-By discharge, patient will demonstrate a standing tolerance  By standing for 50% of the time while performing cognitive leisure at the mat.    Outcome: PROGRESSING AS EXPECTED    Goal: LTG-By discharge, patient will participate in a community re-integration outing  Specifically focusing on adaptive leisure.    Outcome: PROGRESSING AS EXPECTED

## 2019-04-03 NOTE — PROGRESS NOTES
Rehab Progress Note     Date of Service: 4/3/2019  Chief Complaint: follow up encephalopathy    Interval Events (Subjective)    Patient seen and examined in his room this morning. He Reports that he slept poorly last night but he cannot tell me what woke him up whether it was pain, his bladder, or anxiety.  To nursing staff and speech therapy he is reporting some left shoulder pain in the back which she had lidocaine patches at home but he did not complain of this to me.  He continues to have cognitive deficits as well as very mild weakness though he is improving.  He also complains of chronic back pain.    Objective:  VITAL SIGNS: /53   Pulse 69   Temp 36.6 °C (97.9 °F) (Oral)   Resp 18   Ht 1.829 m (6')   Wt 90.9 kg (200 lb 6.4 oz)   SpO2 91%   BMI 27.18 kg/m²    Gen: alert, no apparent distress  CV: regular rate and rhythm, no murmurs, no peripheral edema  Resp: clear to ascultation bilaterally, normal respiratory effort  GI: soft, non-tender abdomen, bowel sounds present  Neuro: notable for RLE 4+/5    No results found for this or any previous visit (from the past 72 hour(s)).    Current Facility-Administered Medications   Medication Frequency   • OCUVITE-LUTEIN tablet 1 tablet DAILY   • ferrous sulfate tablet 325 mg BID WITH MEALS   • vitamin D (cholecalciferol) tablet 2,000 Units DAILY   • Respiratory Care per Protocol Continuous RT   • Pharmacy Consult Request ...Pain Management Review 1 Each PHARMACY TO DOSE   • oxyCODONE immediate-release (ROXICODONE) tablet 5 mg Q3HRS PRN   • oxyCODONE immediate release (ROXICODONE) tablet 10 mg Q3HRS PRN   • acetaminophen (TYLENOL) tablet 650 mg Q4HRS PRN   • senna-docusate (PERICOLACE or SENOKOT S) 8.6-50 MG per tablet 2 Tab BID    And   • polyethylene glycol/lytes (MIRALAX) PACKET 1 Packet QDAY PRN    And   • magnesium hydroxide (MILK OF MAGNESIA) suspension 30 mL QDAY PRN    And   • bisacodyl (DULCOLAX) suppository 10 mg QDAY PRN   • ascorbic acid tablet  500 mg BID WITH MEALS   • therapeutic multivitamin-minerals (THERAGRAN-M) tablet 1 Tab DAILY WITH LUNCH   • ondansetron (ZOFRAN ODT) dispertab 4 mg Q4HRS PRN   • acetaminophen (TYLENOL) tablet 650 mg Q6HRS PRN   • hydroxyurea (HYDREA) capsule 1,000 mg Once per day on Sun Sat   • hydroxyurea (HYDREA) capsule 1,500 mg Once per day on Mon Tue Wed Thu Fri   • levETIRAcetam (KEPPRA) tablet 500 mg Q12HRS   • omeprazole (PRILOSEC) capsule 20 mg QHS   • simvastatin (ZOCOR) tablet 20 mg Q EVENING   • tamsulosin (FLOMAX) capsule 0.4 mg QHS   • tramadol (ULTRAM) 50 MG tablet 50 mg Q6HRS PRN   • enoxaparin (LOVENOX) inj 40 mg DAILY       Orders Placed This Encounter   Procedures   • Diet Order Regular     Standing Status:   Standing     Number of Occurrences:   1     Order Specific Question:   Diet:     Answer:   Regular [1]     Order Specific Question:   Consistency/Fluid modifications:     Answer:   Thin Liquids [3]       Assessment:  Active Hospital Problems    Diagnosis   • *Acute encephalopathy   • Weakness   • Anemia   • Hyponatremia   • Lower back pain   • Thrombocytosis (HCC)   • Benign prostatic hyperplasia without lower urinary tract symptoms   • Mixed hyperlipidemia   • Obstructive sleep apnea   • Gastroesophageal reflux disease   • Obesity (BMI 30.0-34.9)     This patient is a 84 y.o. male admitted for acute inpatient rehabilitation with Acute encephalopathy.    I led and attended the weekly conference today, and agree with the IDT conference documentation and plan of care as noted below.    Date of conference: 4/3/2019    Goals:    1) recall daily activities  2) complete math problems with 80% accuracy and min assist  3) increased RUBIO score  4) flight of stairs with contact guard assist  5) ambulate 150 ft with FWW at SBA  6) set up bathing  7) supervision LB dressing    Met 3/6 PT goals, met 4/4 OT goals, met 2/2 SLP goals    MOCA initial 11/30, SCAN - 78 total score - mild cognitive deficits - attention/memory  most affected    RUBIO 8/56 --> 25/56    Barriers:    1) impaired insight  2) generalized weakness  3) stairs  4) poor neural control with gait  5) impaired hamstring strength  6) forward flexed position   7) poor short term memory  8) poor balance  9) low energy  10) decreased endurance  11) poor attention    Therapy update 4/3/2019  Independent eating  Supervision grooming  Supervision bathing  Supervision upper body dressing  Min assist lower body dressing  Supervision toileting  Supervisionbladder  Supervision bowel  Min assist bed/chair transfer  Supervision toilet transfer  Supervision tub/shower transfer  Max assist ambulation  Supervision wheelchair propulsion  Min assist stairs  Supervision comprehension  Supervision expression  Supervision social interaction  Mod assist problem solving  Min assist memory    CM/social support: wife very supportive    Anticipated DC date: 4/11/2019    Outpatient: PT/OT/SLP    Equip: shower chair, possibly cane    Follow up: PCP      Medical Decision Making and Plan:    Acute encephalopathy, improved  Cognitive deficits  Possible seizure? Post-ictal? Mild dementia?  Continue full rehab program  PT/OT/SLP, 1 hr each discipline, 5 days per week  Continue Kera  Follow up outpatient with neurology - referral sent to epilepsy clinic - also asked to address cognitive deficits    Lower extremity weakness, improved  Poor balance, improved  Differential diagnosis includes Demetrius's paralysis secondary to seizure versus spinal stenosis/myelopathy - though patient with no radicular pain, no paresthesias, no more bowel/bladder incontinence, and continues to improve  Improved lower extremity strength, no clear sensory level  Continue with physical and occupational therapy for mobility and ADLs  If weakness progresses consider getting MRI of lumbar spine    Low back pain  History of decompression of the lumbar spine back in late 2000  Low back pain has been present for some time and not  limiting his participation with therapy     Bladder Incontinence, continues  BPH  Continue Flomax 0.4 mg nightly, monitor for orthostatic hypotension  Scheduled voids  PVRs - 100, 53, 172  Check UA due to continued incontinence     Bowel incontinence, resolved  Continue bowel meds  Schedule toileting  Last BM 4/1    FREYA  Continue with home CPAP  Consult respiratory therapy     Dyslipidemia  Continue simvastatin 20 mg nightly     Macrocytic Anemia  Normal folate and B12  Iron deficiency anemia, iron 28  Start ferrous sulfate and Vit C      Chronic thrombocytosis  Continue hydroxyurea      Insomnia  Unclear etiology as patient cannot tell me what wakes him up  Advised nursing to assess tonight for reason for awakening  Consider melatonin    Hyponatremia, resolved    DVT prophylaxis  Lovenox    Total time:  40 minutes.  I spent greater than 50% of the time for patient care, counseling, and coordination on this date, including patient face-to face time, unit/floor time with review of records/pertinent lab data and studies, as well as discussing diagnostic evaluation/work up, planned therapeutic interventions, and future disposition of care, as per the interval events/subjective and the assessment and plan as noted above.          Vonda Rodrigues M.D.   Physical Medicine and Rehabilitation

## 2019-04-03 NOTE — CARE PLAN
Problem: Problem Solving STGs  Goal: STG-Within one week, patient will  1) Individualized goal:  Achieve 25/30 on orientation log (baseline 12/30)  2) Interventions:  SLP Self Care / ADL Training , SLP Cognitive Skill Development and SLP Group Treatment     Outcome: MET Date Met: 04/03/19  Pt achieve 27/30 on Orientation Log.     Problem: Memory STGs  Goal: STG-Within one week, patient will  1) Individualized goal:  Recall basic safety precautions in 75% of opportunities with min clinician assist  2) Interventions:  SLP Self Care / ADL Training , SLP Cognitive Skill Development and SLP Group Treatment     Outcome: MET Date Met: 04/03/19  Pt requires MIN A to recall basic safety precautions.

## 2019-04-03 NOTE — CARE PLAN
"Problem: Balance  Goal: STG-Within one week, patient will  Improving balance demonstrated by 8 point increase in RUBIO score from 8/56 to 16 or greater.    Outcome: MET Date Met: 04/03/19      Problem: Mobility  Goal: STG-Within one week, patient will ambulate household distance  Pt will ambulate household distance 150ft level surface with FWW no more than SBA consistently at .3 m/s demonstrating improving gait speed, endurance,  and independence.     Outcome: NOT MET  Pt limited by endurance   Goal: STG-Within one week, patient will ambulate up/down flight of stairs  Pt will ascends and descend 10 6 \" steps with bilateral hand rails with no more than CGA demonstrating improved endurance and strength tolerating higher step and amount of steps, progressing to long term goals.      Outcome: MET Date Met: 04/03/19      Problem: Mobility Transfers  Goal: STG-Within one week, patient will perform bed mobility  Supine to edge of bed SBA no verbal cues and less than 2 minutes to transition once he has initiated transition, demonstrating improved efficiency and independence. LTG= sit to and from supine mod I less than 1 minute      Outcome: NOT MET  Pt requires Ant d/t decreased strength   Goal: STG-Within one week, patient will sit to stand  Sit to stand from 20 \" w/c x 5 with use of bilateral UEs and no more than SBA in less than 1 min 30 seconds, demonstrating improved efficiency, LE power, and improved balance and independence    Outcome: NOT MET  Pt limited by strength   Goal: STG-Within one week, patient will transfer bed to chair  Stand pivot transfer bed <> w/c with use of bilateral UE for balance no more than SBA    Outcome: MET Date Met: 04/03/19  With use of FWW      "

## 2019-04-03 NOTE — REHAB-COLLABORATIVE ONGOING IDT TEAM NOTE
Weekly Interdisciplinary Team Conference Note    Weekly Interdisciplinary Team Conference # 1  Date:  4/3/2019    Clinicians present and reporting at team conference include the following:   MD: Vonda Rodrigues MD   RN:  Zahida Fraser RN   PT:   Carmencita Teresa, PT, DPT  OT:  Reynaldo Ayers, MS, OTR/L   ST:  Olamide Cox, MS, CCC-SLP  CM:  Christi Holloway RN, CCM  REC:  Boone Stinson, ILENES  RT:  None  RPh:  Rajwinder Melvin [facility]  Other:   None  All reporting clinicians have a working knowledge of this patient's plan of care.    Targeted DC Date:  4/11/19     Medical    Patient Active Problem List    Diagnosis Date Noted   • Weakness 03/24/2019     Priority: High   • Acute encephalopathy 03/24/2019     Priority: High   • Cognitive impairment 03/27/2019   • Anemia 03/25/2019   • Serum total bilirubin elevated 03/24/2019   • Hyponatremia 03/24/2019   • Lower back pain 03/24/2019   • Cough 03/12/2019   • Thrombocytosis (HCC) 02/05/2019   • Benign prostatic hyperplasia without lower urinary tract symptoms 02/05/2019   • Mixed hyperlipidemia 02/05/2019   • Seasonal allergies 02/05/2019   • Vertigo 02/05/2019   • Obstructive sleep apnea 08/14/2017   • Gastroesophageal reflux disease 08/14/2017   • Obesity (BMI 30.0-34.9) 08/14/2017     Results     ** No results found for the last 24 hours. **           Nursing  Diet/Nutrition:  Regular and Thin Liquids  Medication Administration:  Whole with Liquid Wash  % consumed at meals in last 24 hours:  Consumed % of meals per documentation.  Meal/Snack Consumption for the past 24 hrs:   Oral Nutrition   04/02/19 1809 Dinner;Between % Consumed   04/02/19 0834 Breakfast;Between 50-75% Consumed       Snack schedule:  None  Supplement:  Boost Plus  Appetite:  Good  Admit Weight:  Weight: 91.5 kg (201 lb 11.5 oz)  Weight Last 7 Days   [90.9 kg (200 lb 6.4 oz)-91.5 kg (201 lb 11.5 oz)] 90.9 kg (200 lb 6.4 oz) (03/31 1100)    Pain Issues:    Location:  Knee (04/02 2132)  Right  (04/02 2132)         Severity:  Moderate   Scheduled pain medications:  None     PRN pain medications used in last 24 hours:  oxycodone immediate release (ROXICODONE)  and tramadol (Ultram)   Non Pharmacologic Interventions:  distraction, emotional support, relaxation, repositioned and rest  Effectiveness of pain management plan:  fair=improved comfort with interventions but does not always meet goal    Bowel:    Bowel Assist Initial Score:  2 - Max Assistance  Bowel Assist Current Score:  5 - Standby Prompting/Supervision or Set-up  Bowl Accidents in last 7 days:     Last bowel movement: 04/01/19 (per pt)  Stool Description: Large, Formed     Usual bowel pattern:  daily  Scheduled bowel medications:  senna-docusate (PERICOLACE or SENOKOT S)   PRN bowel medications used in last 24 hours:  None  Nursing Interventions:  Increased time, Set-up, Verbal cueing, medication  Effectiveness of bowel program:   good=regular, predictable, controlled emptying of bowel     Bladder:    Bladder Assist Initial Score:  1 - Total Assistance  Bladder Assist Current Score:  5 - Standby Prompting/Supervision or Set-up  Bladder Accidents in last 7 days:  3  PVR range for past 24-48 hours:  [17-53]  ()  Intermittent Catheterization:  NO  Medications affecting bladder:  Flomax    Interventions:  Increased time, Medication, Urinal, Emptying of device, Time void patient initiated  Effectiveness of bladder training:  Poor=Continues to have bladder accidents and Fair=occasional unpredictable, incontinent emptying of bladder (< 1 time/day)    Sleep/wake cycle:   Average hours slept:  Sleeps 3-4 hours without waking  Sleep medication usage:  None    Patient/Family Training/Education:  Bladder Management/Training, Fall Prevention, General Self Care, Medication Management, Pain Management, Respiratory Hygiene, Safe Transfers, Safety and Skin Care  Discharge Supply Recommendations:  Blood Pressure Monitor  Strengths: Alert and oriented, Willingly  participates in therapeutic activities, Supportive family, Pleasant and cooperative and Effective communication skills   Barriers:   Bladder incontinence, Fatigue, Generalized weakness and Impulsive            Nursing Problems           Problem: Bowel/Gastric:     Goal: Normal bowel function is maintained or improved           Goal: Will not experience complications related to bowel motility             Problem: Communication     Goal: The ability to communicate needs accurately and effectively will improve             Problem: Discharge Barriers/Planning     Goal: Patient's continuum of care needs will be met             Problem: Infection     Goal: Will remain free from infection             Problem: Knowledge Deficit     Goal: Knowledge of disease process/condition, treatment plan, diagnostic tests, and medications will improve           Goal: Knowledge of the prescribed therapeutic regimen will improve             Problem: Pain Management     Goal: Pain level will decrease to patient's comfort goal             Problem: Respiratory:     Goal: Respiratory status will improve             Problem: Safety     Goal: Will remain free from injury           Goal: Will remain free from falls             Problem: Skin Integrity     Goal: Risk for impaired skin integrity will decrease             Problem: Urinary Elimination:     Goal: Ability to reestablish a normal urinary elimination pattern will improve             Problem: Venous Thromboembolism (VTW)/Deep Vein Thrombosis (DVT) Prevention:     Goal: Patient will participate in Venous Thrombosis (VTE)/Deep Vein Thrombosis (DVT)Prevention Measures                  Long Term Goals:   At discharge patient will be able to function safely at home and in the community with support.    Section completed by:  Logan Lira R.N.              Mobility  Bed mobility:   4  Bed /Chair/Wheelchair Transfer Initial:  3 - Moderate Assistance  Bed /Chair/Wheelchair Transfer  "Current:  4 - Minimal Assistance   Bed/Chair/Wheelchair Transfer Description:   (CGA OOB reach pivot, with set up, SBA sup to sit with sequencing cues)  Walk Initial:  2 - Max Assistance  Walk Current:  1 - Total Assistance   Walk Description:  Two hand rails (20 ft in // bars CGA, + 6 ft, limited by dizziness)  Wheelchair Initial:  5 - Standby Prompting/Supervision or Set-up  Wheelchair Current:  2 - Max Assistance   Wheelchair Description:   (100 ft indoors SPV, very slowly)  Stairs Initial:  2 - Max Assistance  Stairs Current: 2 - Max Assistance   Stairs Description:  (8 x 1 6\" steps, B UE support on unilateral HR to mimic home enviornment )  Patient/Family Training/Education:  Stair training, safety with FWW  DME/DC Recommendations:  TBD (outpatient therapy, pt owns FWW)  Strengths:  Alert and oriented, Pleasant and cooperative and Supportive family, Rubio increase from 8/56 to 26/56  Barriers:  Poor carryover, poor short term memory, Several steps in home, 17+ with unilateral HR, mildly self limiting/ fear avoidance, poor resiliency/ self efficacy , poor neurocontrol, impaired hamstring length, impaired hamstring strength, forward flexed posture, impaired insight   # of short term goals set= 6  # of short term goals met= 3        Physical Therapy Problems           Problem: Balance     Dates: Start: 03/30/19       Goal: STG-Within one week, patient will     Dates: Start: 03/30/19       Description: Improving balance demonstrated by 8 point increase in RUBIO score from 8/56 to 16 or greater.              Problem: Mobility     Dates: Start: 03/30/19       Goal: STG-Within one week, patient will ambulate household distance     Dates: Start: 03/30/19       Description: Pt will ambulate household distance 150ft level surface with FWW no more than SBA consistently at .3 m/s demonstrating improving gait speed, endurance,  and independence.             Goal: STG-Within one week, patient will ambulate up/down flight of " "stairs     Dates: Start: 03/30/19       Description: Pt will ascends and descend 10 6 \" steps with bilateral hand rails with no more than CGA demonstrating improved endurance and strength tolerating higher step and amount of steps, progressing to long term goals.              Goal: STG-Within one week, patient will     Dates: Start: 03/30/19               Problem: Mobility Transfers     Dates: Start: 03/30/19       Goal: STG-Within one week, patient will perform bed mobility     Dates: Start: 03/30/19       Description: Supine to edge of bed SBA no verbal cues and less than 2 minutes to transition once he has initiated transition, demonstrating improved efficiency and independence. LTG= sit to and from supine mod I less than 1 minute              Goal: STG-Within one week, patient will sit to stand     Dates: Start: 03/30/19       Description: Sit to stand from 20 \" w/c x 5 with use of bilateral UEs and no more than SBA in less than 1 min 30 seconds, demonstrating improved efficiency, LE power, and improved balance and independence            Goal: STG-Within one week, patient will transfer bed to chair     Dates: Start: 03/30/19       Description: Stand pivot transfer bed <> w/c with use of bilateral UE for balance no more than SBA              Problem: PT-Long Term Goals     Dates: Start: 03/30/19       Goal: LTG-By discharge, patient will maintain balance     Dates: Start: 03/30/19       Description: RUBIO increasing to 42/56 demonstrating low fall risk upon return home              Goal: LTG-By discharge, patient will tolerate standing     Dates: Start: 03/30/19       Description: Sit to stand from 20\" chair use of bilateral UEs to assist x 5 in 30 seconds, mod I, indicating improved strength, power, balance, and endurance necessary for decreased fall risk and independence with functional mobility.            Goal: LTG-By discharge, patient will ambulate     Dates: Start: 03/30/19       Description: Pt will " ambulate household distances of 150ft at .4m/s gait speed with FWW mod I, indicating efficiency and independence for ambulation upon return home.            Goal: LTG-By discharge, patient will transfer one surface to another     Dates: Start: 03/30/19       Description: Stand with FWW bilateral UEs to assist, step turn transfer with FWW, to various surfaces mod I            Goal: LTG-By discharge, patient will ambulate up/down flight of stairs     Dates: Start: 03/30/19       Description: Ascend/descend 17 steps with single railing no more than SUPV from wife, indicating safe entry and exiting of his home              Goal: LTG-By discharge, patient will     Dates: Start: 03/30/19       Description: Pt's goals is to be able to ambulate outdoors with 4WW limited community distances simulating walking around neighborhood at home, mod I, for long term health and independence            Goal: LTG-By discharge, patient will     Dates: Start: 03/30/19       Description: 10 MWT average gait speed .4m/s            Goal: LTG-By discharge, patient will perform home exercise program     Dates: Start: 03/30/19                     Section completed by:  Carmencita Teresa, PT       Activities of Daily Living  Eating Initial:  5 - Standby Prompting/Supervision or Set-up  Eating Current:  7 - Independent   Eating Description:  Increased time, Supervision for safety, Set-up of equipment or meal/tube feeding  Grooming Initial:  5 - Standby Prompting/Supervision or Set-up  Grooming Current:  7 - Independent   Grooming Description:  Increased time, Supervision for safety, Set-up of equipment, Verbal cueing (SBA/set up for oral care and hand hygiene seated at sink.)  Bathing Initial:  3 - Moderate Assistance  Bathing Current:  5 - Standby Prompting/Supervision or Set-up   Bathing Description:  Grab bar, Tub bench, Long handled bath tool, Hand held shower, Supervision for safety, Verbal cueing, Set-up of equipment, Initial preparation for  task (setup and supervised with AE and cues)  Upper Body Dressing Initial:  4 - Minimal Assistance  Upper Body Dressing Current:  5 - Standby Prompting/Supervision or Set-up   Upper Body Dressing Description:  Supervision for safety, Set-up of equipment  Lower Body Dressing Initial:  2 - Max Assistance  Lower Body Dressing Current:  4 - Minimal Assistance   Lower Body Dressing Description:  4 - Minimal Assistance  Toileting Initial:  2 - Max Assistance  Toileting Current:  5 - Standby Prompting/Supervision or Set-up   Toileting Description:  Supervision for safety, Set-up of equipment, Initial preparation for task (setup/SBA 3/3 tasks)  Toilet Transfer Initial:  2 - Max Assistance  Toilet Transfer Current:  5 - Standby Prompting/Supervision or Set-up   Toilet Transfer Description:  5 - Standby Prompting/Supervision or Set-up  Tub / Shower Transfer Initial:  2 - Max Assistance  Tub / Shower Transfer Current:  5 - Standby Prompting/Supervision or Set-up   Tub / Shower Transfer Description:  Grab bar, Adaptive equipment, Supervision for safety, Verbal cueing, Set-up of equipment, Initial preparation for task  IADL:  Not assessed  Family Training/Education:  None  DME/DC Recommendations:  Shower chair, home health versus outpatient OT    Strengths:  Alert and oriented, Effective communication skills, Good insight into deficits/needs, Independent PLOF, Making steady progress towards goals, Motivated for self care and independence, Pleasant and cooperative, Supportive family and Willingly participates in therapeutic activities  Barriers:  Decreased endurance, Generalized weakness, Limited mobility and Poor balance     # of short term goals set= 4    # of short term goals met= 4          Occupational Therapy Goals           Problem: Bathing     Dates: Start: 03/30/19       Goal: STG-Within one week, patient will bathe     Dates: Start: 04/02/19       Description: 1) Individualized Goal: with setup with use of AE/DME  PRN.  2) Interventions: OT Group Therapy, OT Self Care/ADL, OT Cognitive Skill Dev, OT Community Reintegration, OT Manual Ther Technique, OT Neuro Re-Ed/Balance, OT Therapeutic Activity, OT Evaluation and OT Therapeutic Exercise               Problem: Dressing     Dates: Start: 03/30/19       Goal: STG-Within one week, patient will dress LB     Dates: Start: 04/02/19       Description: 1) Individualized Goal: supervised with use of AE/DME PRN.  2) Interventions: OT Group Therapy, OT Self Care/ADL, OT Cognitive Skill Dev, OT Community Reintegration, OT Manual Ther Technique, OT Neuro Re-Ed/Balance, OT Therapeutic Activity, OT Evaluation and OT Therapeutic Exercise                Problem: Functional Transfers     Dates: Start: 03/30/19       Goal: STG-Within one week, patient will transfer to toilet     Dates: Start: 04/02/19       Description: 1) Individualized Goal: supervised with use of AD/DME PRN.  2) Interventions: OT Group Therapy, OT Self Care/ADL, OT Cognitive Skill Dev, OT Community Reintegration, OT Manual Ther Technique, OT Neuro Re-Ed/Balance, OT Therapeutic Activity, OT Evaluation and OT Therapeutic Exercise                Problem: OT Long Term Goals     Dates: Start: 03/30/19       Goal: LTG-By discharge, patient will complete basic self care tasks     Dates: Start: 03/30/19       Description: 1) Individualized Goal:  Supervision to mod I with use of AE/DME PRN.  2) Interventions:  OT Group Therapy, OT Self Care/ADL, OT Cognitive Skill Dev, OT Community Reintegration, OT Manual Ther Technique, OT Neuro Re-Ed/Balance, OT Therapeutic Activity, OT Evaluation and OT Therapeutic Exercise             Goal: LTG-By discharge, patient will perform bathroom transfers     Dates: Start: 03/30/19       Description: 1) Individualized Goal:  Supervision to mod I with use of AD/DME PRN.  2) Interventions:  OT Group Therapy, OT Self Care/ADL, OT Cognitive Skill Dev, OT Community Reintegration, OT Manual Ther Technique,  OT Neuro Re-Ed/Balance, OT Therapeutic Activity, OT Evaluation and OT Therapeutic Exercise              Problem: Toileting     Dates: Start: 03/30/19       Goal: STG-Within one week, patient will complete toileting tasks     Dates: Start: 04/02/19       Description: 1) Individualized Goal: supervised with use of AE/DME PRN.  2) Interventions: OT Group Therapy, OT Self Care/ADL, OT Cognitive Skill Dev, OT Community Reintegration, OT Manual Ther Technique, OT Neuro Re-Ed/Balance, OT Therapeutic Activity, OT Evaluation and OT Therapeutic Exercise                    Section completed by:  Reynaldo Ayers, MS,OTR/L       Cognitive Linquistic Functions  Comprehension Initial:  4 - Minimal Assistance  Comprehension Current:  5 - Stand-by Prompting/Supervision or Set-up   Comprehension Description:  Glasses, Verbal cues  Expression Initial:  5 - Stand-by Prompting/Supervision or Set-up  Expression Current:  5 - Stand-by Prompting/Supervision or Set-up   Expression Description:  Verbal cueing  Social Interaction Initial:  5 - Stand-by Prompting/Supervision or Set-up  Social Interaction Current:  5 - Stand-by Prompting/Supervision or Set-up   Social Interaction Description:  Increased time  Problem Solving Initial:  2 - Max Assistance  Problem Solving Current:  3 - Moderate Assistance   Problem Solving Description:  Verbal cueing, Therapy schedule, Bed/chair alarm, Increased time  Memory Initial:  4 - Minimal Assistance  Memory Current:  4 - Minimal Assistance   Memory Description:  Verbal cueing, Therapy schedule  Executive Functioning / Organization Initial:     Executive Functioning / Organization Current: 3 - Moderate Assistance      Executive Functioning Desciption: Pt independent with financial mgnt prior to hospitalization, spouse handles all med mngt.   Swallowing  Swallowing Status: SLP not following for dysphagia.   Orders Placed This Encounter   Procedures   • Diet Order Regular     Standing Status:   Standing      Number of Occurrences:   1     Order Specific Question:   Diet:     Answer:   Regular [1]     Order Specific Question:   Consistency/Fluid modifications:     Answer:   Thin Liquids [3]     Behavior Modification Plan  Keep instructions simple/concrete, Give clear feedback, Set clear goals, Provide reasonable choices, Decrease the chance of failure by offering activities that are within the patient's abilities and Analyze tasks (break down into smaller steps)  Assistive Technology  Low/Impaired vision equipment and Low tech: Calendar, planner, schedule, alarms/timers, pill organizer, post-it notes, lists  Family Training/Education:  Initiated with spouse  DC Recommendations: Pt would benefit from continued SLP services upon d/c, ?home health vs out patient  Strengths:  Alert and oriented, Independent PLOF, Making steady progress towards goals, Manages pain appropriately, Motivated for self care and independence, Pleasant and cooperative, Supportive family and Willingly participates in therapeutic activities  Barriers:  Decreased endurance and Other: attention, memory  # of short term goals set=2  # of short term goals met=2 (3 new goals added)        Speech Therapy Problems           Problem: Memory STGs     Dates: Start: 03/30/19       Goal: STG-Within one week, patient will     Dates: Start: 04/03/19       Description: 1) Individualized goal:  Recall daily therapy activities provided external aids (I.e. Therapy schedule, daily journal) in 4/5 trials.   2) Interventions:  SLP Cognitive Skill Development               Problem: Problem Solving STGs     Dates: Start: 03/30/19       Goal: STG-Within one week, patient will     Dates: Start: 04/03/19       Description: 1) Individualized goal:  Complete simple financial math problems with 80% accuracy provided MIN A.   2) Interventions:  SLP Cognitive Skill Development             Goal: STG-Within one week, patient will     Dates: Start: 04/03/19       Description: 1)  Individualized goal:  Complete complex attention tasks with 80% accuracy provided MIN cues.   2) Interventions:  SLP Cognitive Skill Development and SLP Group Treatment               Problem: Speech/Swallowing LTGs     Dates: Start: 03/30/19       Goal: LTG-By discharge, patient will     Dates: Start: 03/30/19       Description: 1) Individualized goal:  Demonstrate cognitive-linguistic skills to support return to prior living situation with intermittent supervision  2) Interventions:  SLP Self Care / ADL Training , SLP Cognitive Skill Development and SLP Group Treatment                    Section completed by:  Olamide Cox MS,[facility]-SLP       Recreation/Community     Leisure Competence Measure  Leisure Awareness: Independent  Leisure Attitude: Independent (motivated to return to [employer])  Leisure Skills: Independent  Cultural / Social Behaviors: Independent  Interpersonal Skills: Independent  Community Integration Skills: Independent  Social Contact: Independent  Community Participation: Independent    Strengths:  Able to follow instructions, Effective communication skills, Making steady progress towards goals, Pleasant and cooperative and Willingly participates in therapeutic activities  Barriers:  Generalized weakness, Poor activity tolerance and Poor balance  # of short term goals set=2  # of short term goals met=0  Pleasant an cooperative during session. Will often stare off and watch and listen to others sessions. When redirected he will often use a joke. He was encouraged to initiate conversations only if he could work and talk at the same time. He was able to stand for 15% of the time and stated he was too tired to stand any longer during the session. Will focus on what community re integration outing goals he has for himself.           Recreation Therapy Problems           Problem: Recreation Therapy     Dates: Start: 04/01/19       Goal: STG-Within one week, patient will demonstrate a standing tolerance      Dates: Start: 04/01/19       Description: By standing for 25% of the time while performing cognitive leisure at the mat.            Goal: STG-Within one week, patient will actively engage in planning of community re-integration outing     Dates: Start: 04/01/19             Goal: LTG-By discharge, patient will demonstrate a standing tolerance     Dates: Start: 04/01/19       Description: By standing for 50% of the time while performing cognitive leisure at the mat.            Goal: LTG-By discharge, patient will participate in a community re-integration outing     Dates: Start: 04/01/19       Description: Specifically focusing on adaptive leisure.                  Section completed by:  Boone Stinson, CTRS       REHAB-Pharmacy IDT Team Note by Rajwinder Melvin RPH at 4/2/2019 10:25 AM  Version 1 of 1    Author:  Rajwinder Melvin RPH Service:  (none) Author Type:  Pharmacist    Filed:  4/2/2019 10:28 AM Date of Service:  4/2/2019 10:25 AM Status:  Signed    :  Rajwinder Melvin RPH (Pharmacist)         Pharmacy   Pharmacy  Antibiotics/Antifungals/Antivirals:  Medication:      Active Orders     None        Route:         n/a  Stop Date:  n/a  Reason:   Antihypertensives/Cardiac:  Medication:    Orders (72h ago through future)    Start     Ordered    03/29/19 2100  simvastatin (ZOCOR) tablet 20 mg  EVERY EVENING      03/29/19 1211        Patient Vitals for the past 24 hrs:   BP Pulse   04/02/19 0648 120/58 65   04/01/19 1915 127/59 70   04/01/19 1422 111/61 68       Anticoagulation:  Medication:  Lovenox  INR:    Recent Labs      03/30/19   0537   INR  1.39*     Other key medications: hydroxyurea, levetiracetam, omeprazole, tamsulosin.   A review of the medication list reveals no issues at this time.    Section completed by:  Rajwinder Melvin RPH[TK.1]        Attribution Key     TK.1 - Rajwinder Melvin RPH on 4/2/2019 10:25 AM                    DC Planning  DC destination/dispostion:  Patient lives in a  multi level home with his spouse.     DC Needs: He has home O2 and cpap.  He has a fww, grab bars and shower seat.  His wife is a retired nurse and will be home with him.    Barriers to discharge:  Stairs     Strengths: good family support.    Section completed by:  Christi Holloway R.N.      Physician Summary  Vonda Rodrigues MD participated and led team conference discussion.

## 2019-04-03 NOTE — THERAPY
Speech Language Pathology  Daily Treatment     Patient Name: Han Pat  Age:  84 y.o., Sex:  male  Medical Record #: [id number]  Today's Date: 4/3/2019     Subjective    Pt's spouse present for session, brought personal I-pad from home to discuss home practice applications to apply upon d/c.      Objective     04/03/19 1404   Cognition   Simple Attention Moderate (3)   Visual Short Term Memory Severe (2)   Interdisciplinary Plan of Care Collaboration   IDT Collaboration with  Family / Caregiver   Patient Position at End of Therapy Seated;Chair Alarm On   Collaboration Comments spouse present for session, very supportive to encourage home practice with computer based apps   SLP Total Time Spent   SLP Individual Total Time Spent (Mins) 60   Charge Group   SLP Cognitive Skill Development 4       Assessment    Family training completed with spouse regarding computer based applications using personal I-pad. Pt and spouse education on various applications to target memory (working memory, delayed recall), attention and word games (per patient preference). Pt required MOD to MAX A for visual attention task. Pt requires extra time to complete tasks and benefits from repetitions.     Plan    Continue to target simple attention, recall.

## 2019-04-03 NOTE — THERAPY
"Physical Therapy   Daily Treatment     Patient Name: Han Pat  Age:  84 y.o., Sex:  male  Medical Record #: [id number]  Today's Date: 4/3/2019     Precautions  Precautions: (P) Fall Risk  Comments: (P) seizure prec    Subjective    Pt was sitting in w/c upon arrival and agreeable to tx     Objective    FIM Bed/Chair/Wheelchair Transfers Score: 4 - Minimal Assistance  Bed/Chair/Wheelchair Transfers Description:   (bed mob: Ant; transfer: CGA. FWW)    FIM Walking Score:  2 - Max Assistance  Walking Description:   (135ft x1, FWW, SBA, forward leaning posture)    FIM Stairs Score:  4 - Minimal Assistance  Stairs Description:   (12 x 1 6\" steps, B UE support on unilateral HR, step to , CGA)       04/03/19 0831   Precautions   Precautions Fall Risk   Comments seizure prec   Rubio Balance Scale   Sitting Unsupported (Score 0-4) 4   Change Of Positon: Sitting To Standing (Score 0-4) 3   Change Of Positon: Standing To Sitting (Score 0-4) 3   Transfers (Score 0-4) 2   Standing Unsupported (Score 0-4) 4   Standing With Eyes Closed (Score 0-4) 3   Standing With Feet Together (Score 0-4) 3   Tandem Standing (Score 0-4) 2   Standing On One Leg (Score 0-4) 0   Turning Trunk (Feet Fixed) (Score 0-4) 0   Retrieving Objects From Floor (Score 0-4) 3   Turning 360 Degrees (Score 0-4) 0   Stool Stepping (Score 0-4) 0   Reaching Forward While Standing (Score 0-4) 3   Rubio Balance Total Score (0-56) 30   Interdisciplinary Plan of Care Collaboration   Patient Position at End of Therapy Seated;Call Light within Reach;Tray Table within Reach;Phone within Reach   PT Total Time Spent   PT Individual Total Time Spent (Mins) 60   PT Charge Group   PT Gait Training 3   PT Neuromuscular Re-Education / Balance 1         Assessment    Pt limited by poor insight, impaired balance, impaired hamstring strength/length/ activation/ neuro control. Improvements in RUBIO balance score , continues to be at risk for falls    Plan    Hamstring " stretching/ activation of knee flexion during gait cycle,  stair training to mimic home environment, standing balance, consider use of bioness for B hamstrings during gait cycle

## 2019-04-03 NOTE — REHAB-NURSING IDT TEAM NOTE
Nursing   Nursing  Diet/Nutrition:  Regular and Thin Liquids  Medication Administration:  Whole with Liquid Wash  % consumed at meals in last 24 hours:  Consumed % of meals per documentation.  Meal/Snack Consumption for the past 24 hrs:   Oral Nutrition   04/02/19 1809 Dinner;Between % Consumed   04/02/19 0834 Breakfast;Between 50-75% Consumed       Snack schedule:  None  Supplement:  Boost Plus  Appetite:  Good  Admit Weight:  Weight: 91.5 kg (201 lb 11.5 oz)  Weight Last 7 Days   [90.9 kg (200 lb 6.4 oz)-91.5 kg (201 lb 11.5 oz)] 90.9 kg (200 lb 6.4 oz) (03/31 1100)    Pain Issues:    Location:  Knee (04/02 2132)  Right (04/02 2132)         Severity:  Moderate   Scheduled pain medications:  None     PRN pain medications used in last 24 hours:  oxycodone immediate release (ROXICODONE)  and tramadol (Ultram)   Non Pharmacologic Interventions:  distraction, emotional support, relaxation, repositioned and rest  Effectiveness of pain management plan:  fair=improved comfort with interventions but does not always meet goal    Bowel:    Bowel Assist Initial Score:  2 - Max Assistance  Bowel Assist Current Score:  5 - Standby Prompting/Supervision or Set-up  Bowl Accidents in last 7 days:     Last bowel movement: 04/01/19 (per pt)  Stool Description: Large, Formed     Usual bowel pattern:  daily  Scheduled bowel medications:  senna-docusate (PERICOLACE or SENOKOT S)   PRN bowel medications used in last 24 hours:  None  Nursing Interventions:  Increased time, Set-up, Verbal cueing, medication  Effectiveness of bowel program:   good=regular, predictable, controlled emptying of bowel     Bladder:    Bladder Assist Initial Score:  1 - Total Assistance  Bladder Assist Current Score:  5 - Standby Prompting/Supervision or Set-up  Bladder Accidents in last 7 days:  3  PVR range for past 24-48 hours:  [17-53]  ()  Intermittent Catheterization:  NO  Medications affecting bladder:  Flomax    Interventions:  Increased  time, Medication, Urinal, Emptying of device, Time void patient initiated  Effectiveness of bladder training:  Poor=Continues to have bladder accidents and Fair=occasional unpredictable, incontinent emptying of bladder (< 1 time/day)    Sleep/wake cycle:   Average hours slept:  Sleeps 3-4 hours without waking  Sleep medication usage:  None    Patient/Family Training/Education:  Bladder Management/Training, Fall Prevention, General Self Care, Medication Management, Pain Management, Respiratory Hygiene, Safe Transfers, Safety and Skin Care  Discharge Supply Recommendations:  Blood Pressure Monitor  Strengths: Alert and oriented, Willingly participates in therapeutic activities, Supportive family, Pleasant and cooperative and Effective communication skills   Barriers:   Bladder incontinence, Fatigue, Generalized weakness and Impulsive            Nursing Problems           Problem: Bowel/Gastric:     Goal: Normal bowel function is maintained or improved           Goal: Will not experience complications related to bowel motility             Problem: Communication     Goal: The ability to communicate needs accurately and effectively will improve             Problem: Discharge Barriers/Planning     Goal: Patient's continuum of care needs will be met             Problem: Infection     Goal: Will remain free from infection             Problem: Knowledge Deficit     Goal: Knowledge of disease process/condition, treatment plan, diagnostic tests, and medications will improve           Goal: Knowledge of the prescribed therapeutic regimen will improve             Problem: Pain Management     Goal: Pain level will decrease to patient's comfort goal             Problem: Respiratory:     Goal: Respiratory status will improve             Problem: Safety     Goal: Will remain free from injury           Goal: Will remain free from falls             Problem: Skin Integrity     Goal: Risk for impaired skin integrity will decrease              Problem: Urinary Elimination:     Goal: Ability to reestablish a normal urinary elimination pattern will improve             Problem: Venous Thromboembolism (VTW)/Deep Vein Thrombosis (DVT) Prevention:     Goal: Patient will participate in Venous Thrombosis (VTE)/Deep Vein Thrombosis (DVT)Prevention Measures                  Long Term Goals:   At discharge patient will be able to function safely at home and in the community with support.    Section completed by:  Logan Lira R.N.

## 2019-04-03 NOTE — REHAB-ACTIVITY IDT TEAM NOTE
ACT   Recreation/Community     Leisure Competence Measure  Leisure Awareness: Independent  Leisure Attitude: Independent (motivated to return to golfing)  Leisure Skills: Independent  Cultural / Social Behaviors: Independent  Interpersonal Skills: Independent  Community Integration Skills: Independent  Social Contact: Independent  Community Participation: Independent    Strengths:  Able to follow instructions, Effective communication skills, Making steady progress towards goals, Pleasant and cooperative and Willingly participates in therapeutic activities  Barriers:  Generalized weakness, Poor activity tolerance and Poor balance  # of short term goals set=2  # of short term goals met=0  Pleasant an cooperative during session. Will often stare off and watch and listen to others sessions. When redirected he will often use a joke. He was encouraged to initiate conversations only if he could work and talk at the same time. He was able to stand for 15% of the time and stated he was too tired to stand any longer during the session. Will focus on what community re integration outing goals he has for himself.           Recreation Therapy Problems           Problem: Recreation Therapy     Dates: Start: 04/01/19       Goal: STG-Within one week, patient will demonstrate a standing tolerance     Dates: Start: 04/01/19       Description: By standing for 25% of the time while performing cognitive leisure at the mat.            Goal: STG-Within one week, patient will actively engage in planning of community re-integration outing     Dates: Start: 04/01/19             Goal: LTG-By discharge, patient will demonstrate a standing tolerance     Dates: Start: 04/01/19       Description: By standing for 50% of the time while performing cognitive leisure at the mat.            Goal: LTG-By discharge, patient will participate in a community re-integration outing     Dates: Start: 04/01/19       Description: Specifically focusing on  adaptive leisure.                  Section completed by:  Boone Stinson, CTRS

## 2019-04-04 PROCEDURE — 97110 THERAPEUTIC EXERCISES: CPT

## 2019-04-04 PROCEDURE — 700102 HCHG RX REV CODE 250 W/ 637 OVERRIDE(OP): Performed by: PHYSICAL MEDICINE & REHABILITATION

## 2019-04-04 PROCEDURE — 99233 SBSQ HOSP IP/OBS HIGH 50: CPT | Performed by: PHYSICAL MEDICINE & REHABILITATION

## 2019-04-04 PROCEDURE — A9270 NON-COVERED ITEM OR SERVICE: HCPCS | Performed by: PHYSICAL MEDICINE & REHABILITATION

## 2019-04-04 PROCEDURE — 770010 HCHG ROOM/CARE - REHAB SEMI PRIVAT*

## 2019-04-04 PROCEDURE — 97530 THERAPEUTIC ACTIVITIES: CPT

## 2019-04-04 PROCEDURE — 11719 TRIM NAIL(S) ANY NUMBER: CPT

## 2019-04-04 PROCEDURE — G0515 COGNITIVE SKILLS DEVELOPMENT: HCPCS

## 2019-04-04 PROCEDURE — 700101 HCHG RX REV CODE 250: Performed by: PHYSICAL MEDICINE & REHABILITATION

## 2019-04-04 PROCEDURE — 97535 SELF CARE MNGMENT TRAINING: CPT

## 2019-04-04 PROCEDURE — 700111 HCHG RX REV CODE 636 W/ 250 OVERRIDE (IP): Performed by: PHYSICAL MEDICINE & REHABILITATION

## 2019-04-04 PROCEDURE — 94760 N-INVAS EAR/PLS OXIMETRY 1: CPT

## 2019-04-04 RX ORDER — CEFUROXIME AXETIL 500 MG/1
500 TABLET ORAL EVERY 12 HOURS
Status: DISCONTINUED | OUTPATIENT
Start: 2019-04-04 | End: 2019-04-08

## 2019-04-04 RX ADMIN — ENOXAPARIN SODIUM 40 MG: 100 INJECTION SUBCUTANEOUS at 08:36

## 2019-04-04 RX ADMIN — TRAMADOL HYDROCHLORIDE 50 MG: 50 TABLET, COATED ORAL at 16:52

## 2019-04-04 RX ADMIN — FERROUS SULFATE TAB 325 MG (65 MG ELEMENTAL FE) 325 MG: 325 (65 FE) TAB at 16:52

## 2019-04-04 RX ADMIN — TAMSULOSIN HYDROCHLORIDE 0.4 MG: 0.4 CAPSULE ORAL at 21:48

## 2019-04-04 RX ADMIN — OXYCODONE HYDROCHLORIDE AND ACETAMINOPHEN 500 MG: 500 TABLET ORAL at 16:52

## 2019-04-04 RX ADMIN — SENNOSIDES AND DOCUSATE SODIUM 2 TABLET: 8.6; 5 TABLET ORAL at 08:35

## 2019-04-04 RX ADMIN — Medication 1 TABLET: at 09:00

## 2019-04-04 RX ADMIN — LIDOCAINE 1 PATCH: 50 PATCH TOPICAL at 08:37

## 2019-04-04 RX ADMIN — FERROUS SULFATE TAB 325 MG (65 MG ELEMENTAL FE) 325 MG: 325 (65 FE) TAB at 08:35

## 2019-04-04 RX ADMIN — OXYCODONE HYDROCHLORIDE AND ACETAMINOPHEN 500 MG: 500 TABLET ORAL at 08:35

## 2019-04-04 RX ADMIN — SIMVASTATIN 20 MG: 20 TABLET, FILM COATED ORAL at 21:50

## 2019-04-04 RX ADMIN — MULTIPLE VITAMINS W/ MINERALS TAB 1 TABLET: TAB at 11:52

## 2019-04-04 RX ADMIN — SENNOSIDES AND DOCUSATE SODIUM 2 TABLET: 8.6; 5 TABLET ORAL at 21:49

## 2019-04-04 RX ADMIN — LEVETIRACETAM 500 MG: 500 TABLET ORAL at 21:48

## 2019-04-04 RX ADMIN — OXYCODONE HYDROCHLORIDE 10 MG: 10 TABLET ORAL at 10:29

## 2019-04-04 RX ADMIN — LEVETIRACETAM 500 MG: 500 TABLET ORAL at 08:35

## 2019-04-04 RX ADMIN — OMEPRAZOLE 20 MG: 20 CAPSULE, DELAYED RELEASE ORAL at 21:45

## 2019-04-04 RX ADMIN — CEFUROXIME AXETIL 500 MG: 500 TABLET ORAL at 10:29

## 2019-04-04 RX ADMIN — VITAMIN D, TAB 1000IU (100/BT) 2000 UNITS: 25 TAB at 08:35

## 2019-04-04 RX ADMIN — HYDROXYUREA 1500 MG: 500 CAPSULE ORAL at 21:53

## 2019-04-04 RX ADMIN — CEFUROXIME AXETIL 500 MG: 500 TABLET ORAL at 21:46

## 2019-04-04 NOTE — THERAPY
"Speech Language Pathology  Daily Treatment     Patient Name: Han Pat  Age:  84 y.o., Sex:  male  Medical Record #: [id number]  Today's Date: 4/4/2019     Subjective    Pt c/o of 7/10 right shoulder pain this session, \"it's just killing me today.\" Pt unaware of pain meds available (Tylenol and oxycodone). Nursing administered 10mg oxycodone during session.      Objective     04/04/19 0934   Cognition   Simple Attention Minimal (4)   Moderate Attention Severe (2)   Interdisciplinary Plan of Care Collaboration   IDT Collaboration with  Nursing   Patient Position at End of Therapy Seated;Chair Alarm On   Collaboration Comments nursing administered pain meds    SLP Total Time Spent   SLP Individual Total Time Spent (Mins) 60   Charge Group   SLP Cognitive Skill Development 4       FIM Eating Score:     Eating Description:       FIM Comprehension Score:  5 - Stand-by Prompting/Supervision or Set-up  Comprehension Description:  Verbal cues, Glasses    FIM Expression Score:  5 - Stand-by Prompting/Supervision or Set-up  Expression Description:  Verbal cueing    FIM Social Interaction Score:  6 - Modified Independent  Social Interaction Description:  Increased time    FIM Problem Solving Score:  3 - Moderate Assistance  Problem Solving Description:  Verbal cueing, Seat belt, Therapy schedule    FIM Memory Score:  3 - Moderate Assistance  Memory Description:  Verbal cueing, Therapy schedule, Seat belt      Assessment    Selective visual attention task - 2 target cancellation (single letters) initial trial: 75%, subsequent trials: 92%, 97%. Pt narrowing visual field to single line, increasing accuracy. Selective visual attention in multi-paragraph reading passage 45% IND, MOD cues to achieve 100%.    Plan    Continue to target recall, attention    "

## 2019-04-04 NOTE — CARE PLAN
Problem: Safety  Goal: Will remain free from falls    Intervention: Implement fall precautions  Use of call light reinforced to make needs known.Bed in low position, non skid socks/shoes on when out of bed.Alarms in place for safety.Pt's room close to the nursing station.Call light within reach.Will continue to monitor and assess needs and safety.      Problem: Urinary Elimination:  Goal: Ability to reestablish a normal urinary elimination pattern will improve    Intervention: Assist patient to sit on commode or toilet for voiding  Assisted to the bathroom, continent of bladder at this time.Will continue to monitor and assess.      Problem: Pain Management  Goal: Pain level will decrease to patient's comfort goal    Intervention: Follow pain managment plan developed in collaboration with patient and Interdisciplinary Team  Medicated per request for pain.Repositioned with pillows for comfort.Will continue to monitor and assess pain level and medicate as needed.

## 2019-04-04 NOTE — PROGRESS NOTES
Pt is not feeling well this afternoon/evening. He says he can't explain what is wrong he just doesn't feel well. Pt did not go to the dining room or eat his dinner.

## 2019-04-04 NOTE — THERAPY
Recreational Therapy  Daily Treatment     Patient Name: Han Pat  AGE:  84 y.o., SEX:  male  Medical Record #: [id number]  Today's Date: 4/4/2019       Subjective    CO shoulder pain.      Objective       04/04/19 1404   Functional Ability Status - Cognitive   Attention Span Remains on Task with Cueing   Comprehension Follows Two Step Commands   Judgment Able to Make Independent Decisions   Cognitive Comments some distractability. Responded well to redirection.     Functional Ability Status - Emotional    Affect Appropriate   Mood Appropriate   Behavior Appropriate   Skilled Intervention    Skilled Intervention Cognitive Leisure;Fine Motor Leisure   Skilled Intervention Comments Color peg board   Interdisciplinary Plan of Care Collaboration   IDT Collaboration with  Physical Therapist   Patient Position at End of Therapy Seated   Collaboration Comments Care transferred to PT   Treatment Time   Total Time Spent (mins) 30   Procedural Tracking   Procedural Tracking Social Skills Training;Fine Motor Functional Leisure Skills       Assessment    He was able to stand for a total of 10 minutes with a five minute break in between. He required cueing to alternate between his left and right hand putting the pegs in the template.     Plan    Will continue to focus on B UE movement and attention to task.

## 2019-04-04 NOTE — CARE PLAN
Problem: Safety  Goal: Will remain free from falls  Pts wife was with him all day and assisted with ADLs. Wife used call light when assistance was needed.    Problem: Pain Management  Goal: Pain level will decrease to patient's comfort goal  Pt states he aches all over like he has worked out to hard. Pt requested a Tramadol. Tramadol given per MAR.  6/10 Pain.

## 2019-04-04 NOTE — PROGRESS NOTES
Rehab Progress Note     Date of Service: 4/4/2019  Chief Complaint: follow up encephalopathy    Interval Events (Subjective)    Patient seen and examined in the therapy gym today. He is complaining of posterior neck and shoulder pain. We discussed the affect of his posture on his muscles on the back of his neck. He continues to have polyuria. He has been started on antibiotics for a UTI. RNs report he slept well last night. No new complaints.    Objective:  VITAL SIGNS: /58   Pulse 88   Temp 36.4 °C (97.6 °F) (Temporal)   Resp 18   Ht 1.829 m (6')   Wt 90.9 kg (200 lb 6.4 oz)   SpO2 96%   BMI 27.18 kg/m²   Gen: alert, no apparent distress  CV: regular rate and rhythm, no murmurs, no peripheral edema  Resp: clear to ascultation bilaterally, normal respiratory effort  GI: soft, non-tender abdomen, bowel sounds present  Msk: tenderness to palpation over posterior upper traps, poor posture    Recent Results (from the past 72 hour(s))   URINALYSIS    Collection Time: 04/03/19  5:00 PM   Result Value Ref Range    Color DK Yellow     Character Clear     Specific Gravity 1.021 <1.035    Ph 5.5 5.0 - 8.0    Glucose Negative Negative mg/dL    Ketones Negative Negative mg/dL    Protein Negative Negative mg/dL    Bilirubin Negative Negative    Urobilinogen, Urine 0.2 Negative    Nitrite Negative Negative    Leukocyte Esterase Moderate (A) Negative    Occult Blood Negative Negative    Micro Urine Req Microscopic    URINE MICROSCOPIC (W/UA)    Collection Time: 04/03/19  5:00 PM   Result Value Ref Range    WBC 20-50 (A) /hpf    RBC 0-2 (A) /hpf    Bacteria Negative None /hpf    Epithelial Cells Negative /hpf    Hyaline Cast 0-2 /lpf       Current Facility-Administered Medications   Medication Frequency   • cefUROXime (CEFTIN) tablet 500 mg Q12HRS   • lidocaine (LIDODERM) 5 % 1 Patch Q24HR   • OCUVITE-LUTEIN tablet 1 tablet DAILY   • ferrous sulfate tablet 325 mg BID WITH MEALS   • vitamin D (cholecalciferol) tablet  2,000 Units DAILY   • Respiratory Care per Protocol Continuous RT   • Pharmacy Consult Request ...Pain Management Review 1 Each PHARMACY TO DOSE   • oxyCODONE immediate-release (ROXICODONE) tablet 5 mg Q3HRS PRN   • acetaminophen (TYLENOL) tablet 650 mg Q4HRS PRN   • senna-docusate (PERICOLACE or SENOKOT S) 8.6-50 MG per tablet 2 Tab BID    And   • polyethylene glycol/lytes (MIRALAX) PACKET 1 Packet QDAY PRN    And   • magnesium hydroxide (MILK OF MAGNESIA) suspension 30 mL QDAY PRN    And   • bisacodyl (DULCOLAX) suppository 10 mg QDAY PRN   • ascorbic acid tablet 500 mg BID WITH MEALS   • therapeutic multivitamin-minerals (THERAGRAN-M) tablet 1 Tab DAILY WITH LUNCH   • ondansetron (ZOFRAN ODT) dispertab 4 mg Q4HRS PRN   • acetaminophen (TYLENOL) tablet 650 mg Q6HRS PRN   • hydroxyurea (HYDREA) capsule 1,000 mg Once per day on Sun Sat   • hydroxyurea (HYDREA) capsule 1,500 mg Once per day on Mon Tue Wed Thu Fri   • levETIRAcetam (KEPPRA) tablet 500 mg Q12HRS   • omeprazole (PRILOSEC) capsule 20 mg QHS   • simvastatin (ZOCOR) tablet 20 mg Q EVENING   • tamsulosin (FLOMAX) capsule 0.4 mg QHS   • tramadol (ULTRAM) 50 MG tablet 50 mg Q6HRS PRN   • enoxaparin (LOVENOX) inj 40 mg DAILY       Orders Placed This Encounter   Procedures   • Diet Order Regular     Standing Status:   Standing     Number of Occurrences:   1     Order Specific Question:   Diet:     Answer:   Regular [1]     Order Specific Question:   Consistency/Fluid modifications:     Answer:   Thin Liquids [3]       Assessment:  Active Hospital Problems    Diagnosis   • *Acute encephalopathy   • Weakness   • Anemia   • Hyponatremia   • Lower back pain   • Thrombocytosis (HCC)   • Benign prostatic hyperplasia without lower urinary tract symptoms   • Mixed hyperlipidemia   • Obstructive sleep apnea   • Gastroesophageal reflux disease   • Obesity (BMI 30.0-34.9)     This patient is a 84 y.o. male admitted for acute inpatient rehabilitation with Acute  encephalopathy.    I led and attended the weekly conference, and agree with the IDT conference documentation and plan of care as noted below.    Date of conference: 4/3/2019    Goals:    1) recall daily activities  2) complete math problems with 80% accuracy and min assist  3) increased RUBIO score  4) flight of stairs with contact guard assist  5) ambulate 150 ft with FWW at SBA  6) set up bathing  7) supervision LB dressing    Met 3/6 PT goals, met 4/4 OT goals, met 2/2 SLP goals    MOCA initial 11/30, SCAN - 78 total score - mild cognitive deficits - attention/memory most affected    RUBIO 8/56 --> 25/56    Barriers:    1) impaired insight  2) generalized weakness  3) stairs  4) poor neural control with gait  5) impaired hamstring strength  6) forward flexed position   7) poor short term memory  8) poor balance  9) low energy  10) decreased endurance  11) poor attention    Admission FIM 56 --> 83 (4/3)    CM/social support: wife very supportive    Anticipated DC date: 4/11/2019    Outpatient: PT/OT/SLP    Equip: shower chair, possibly cane    Follow up: PCP      Medical Decision Making and Plan:    Acute encephalopathy, improved  Cognitive deficits  Possible seizure? Post-ictal? Mild dementia?  Continue full rehab program  PT/OT/SLP, 1 hr each discipline, 5 days per week  Continue Keppra  Follow up outpatient with neurology - referral sent to epilepsy clinic - also asked to address cognitive deficits    Lower extremity weakness, improved  Poor balance, improved  Differential diagnosis includes Demetrius's paralysis secondary to seizure versus spinal stenosis/myelopathy - though patient with no radicular pain, no paresthesias, no more bowel/bladder incontinence, and continues to improve  Improved lower extremity strength, no clear sensory level  Continue with physical and occupational therapy for mobility and ADLs  If weakness progresses consider getting MRI of lumbar spine    Low back pain  History of decompression of  the lumbar spine back in late 2000  Low back pain has been present for some time and not limiting his participation with therapy     Bladder Incontinence, continues  BPH  Continue Flomax 0.4 mg nightly, monitor for orthostatic hypotension  Scheduled voids  PVRs - 100, 53, 172    +UA  Started on cefitin  Culture pending     Bowel incontinence, resolved  Continue bowel meds  Schedule toileting  Last BM 4/3    FREYA  Continue with home CPAP  Consult respiratory therapy     Dyslipidemia  Continue simvastatin 20 mg nightly     Macrocytic Anemia  Normal folate and B12  Iron deficiency anemia, iron 28  Start ferrous sulfate and Vit C      Chronic thrombocytosis  Continue hydroxyurea      Insomnia  Currently sleeping well per RN    Hyponatremia, resolved    DVT prophylaxis  Lovenox    Total time:  35 minutes.  I spent greater than 50% of the time for patient care, counseling, and coordination on this date, including patient face-to face time, unit/floor time with review of records/pertinent lab data and studies, as well as discussing diagnostic evaluation/work up, planned therapeutic interventions, and future disposition of care, as per the interval events/subjective and the assessment and plan as noted above.    Vonda Rodrigues M.D.   Physical Medicine and Rehabilitation

## 2019-04-04 NOTE — CARE PLAN
Problem: Pain Management  Goal: Pain level will decrease to patient's comfort goal  Pt c/o shoulder pain. Medicated per mar.

## 2019-04-04 NOTE — FLOWSHEET NOTE
04/04/19 0745   Events/Summary/Plan   Events/Summary/Plan O2 spot check   Respiratory WDL   Respiratory (WDL) X   Chest Exam   Respiration 18   Pulse 70   Oximetry   #Pulse Oximetry (Single Determination) Yes   Oxygen   Home O2 Use Prior To Admission? Yes   Home O2 LPM Flow 2 LPM   Home O2 Delivery Method (CPAP)   Home O2 Frequency of Use At Sleep   Pulse Oximetry 93 %   O2 Daily Delivery Respiratory  Room Air with O2 Available

## 2019-04-04 NOTE — CARE PLAN
Problem: Safety  Goal: Will remain free from injury  Patient's skin remains free from new or accidental injury this shift.

## 2019-04-04 NOTE — THERAPY
Physical Therapy   Daily Treatment     Patient Name: Han Pat  Age:  84 y.o., Sex:  male  Medical Record #: [id number]  Today's Date: 4/4/2019     Precautions  Precautions: Fall Risk  Comments: seizure precautions    Subjective    Pt found seated in front gym with complaints of right shoulder pain and fatigue. Agreeable to PT session.     Objective       04/04/19 1031   Precautions   Precautions Fall Risk   Comments seizure precautions   Vitals   Vitals Comments PT monitored for signs and symptoms of dizziness, nausea, and dyspnea throughout therapy session. None noted.   Pain 0 - 10 Group   Location Shoulder   Location Orientation Right   Therapist Pain Assessment 2;Post Activity Pain Same as Prior to Activity   Cognition    Cognition / Consciousness X   Level of Consciousness Alert   Safety Awareness Impaired   Comments demonstrated poor safety and sequencing with transfers   Sitting Lower Body Exercises   Nustep Resistance Level 6;Time (See Comments)  (10 min >30 SPM)   Other Exercises Upper trap and levator scap stretch x1 min each to address pain.   Standing Lower Body Exercises   Other Exercises 2x 20 stair taps to initiate hip flexion.   Bed Mobility    Sit to Stand Contact Guard Assist   Scooting Stand by Assist   Interdisciplinary Plan of Care Collaboration   IDT Collaboration with  Physician   Patient Position at End of Therapy Seated  (cafe for lunch)   Collaboration Comments MD making rounds at end of session   PT Total Time Spent   PT Individual Total Time Spent (Mins) 60   PT Charge Group   PT Therapeutic Exercise 2   PT Therapeutic Activities 2       FIM Bed/Chair/Wheelchair Transfers Score: 4 - Minimal Assistance  Bed/Chair/Wheelchair Transfers Description:   (Pt completes SPT with FWW and CGA for direction. Extra time needed throughout.)    FIM Wheelchair Score:  5 - Standby Prompting/Supervision or Set-up  Wheelchair Description:   (Pt propelled WC from front gym to back gym  144' with use  of BUEs. Extra time needed and incidental touching for direction.)    FIM Stairs Score:  2 - Max Assistance  Stairs Description:   (Pt ascended/descended 3x 4 standard steps with long rest break needed between. CGA needed throughout. Use of BUEs on handrails with step through gait pattern during ascent and step to during descent.)      Assessment    Pt was easily fatigued with stair training this session, and was unable to ascend/descend >4 standard steps at a time. No loss of balance was demonstrated, however, patient requires max verbal cues for sequencing and safety with transfers.    Plan    Transfer training for different surfaces, gait training, hamstring stretching, stair training for home setup, static and dynamic balance

## 2019-04-04 NOTE — THERAPY
"Occupational Therapy  Daily Treatment     Patient Name: Han Pat  Age:  84 y.o., Sex:  male  Medical Record #: [id number]  Today's Date: 4/4/2019     Precautions  Precautions: Fall Risk, Other (See Comments)  Comments: seizure precautions    Safety   ADL Safety : Requires Supervision for Safety, Requires Physical Assist for Safety  Bathroom Safety: Requires Supervision for Safety, Requires Physical Assist for Safety  Comments: see FIM for ADL routine. Requires encouragement to indep complete tasks and intermittent cues for sequencing and initiation.    Subjective    \"You guys make such a big deal when I do the simplest things\"     Objective       04/04/19 1301   Precautions   Precautions Fall Risk;Other (See Comments)   Comments seizure precautions   Safety    ADL Safety  Requires Supervision for Safety;Requires Physical Assist for Safety   Bathroom Safety Requires Supervision for Safety;Requires Physical Assist for Safety   Comments see FIM for ADL routine. Requires encouragement to indep complete tasks and intermittent cues for sequencing and initiation.   Cognition    Attention Impaired   Sequencing Impaired   Initiation Impaired   Comments Requires verbal cuing for initiation and sequencing of ADL. Delayed responses. Difficulty recalling recent and past hx.   Interdisciplinary Plan of Care Collaboration   IDT Collaboration with  Family / Caregiver   Patient Position at End of Therapy In Bed;Family / Friend in Room;Tray Table within Reach;Call Light within Reach   Collaboration Comments Wife expressed conern with affect, cognition, and ability to take care of pt upon DC. Wife additionally requested shower for further practice with ADL.    OT Total Time Spent   OT Individual Total Time Spent (Mins) 60   OT Charge Group   OT Self Care / ADL 3   OT Therapy Activity 1     Other tx: functional amb back and forth in // 3x due to wife report that pt hadn't walked today. Requires support of BUE on //, verbal and " tactile cues for upright positioning and amplification of strides.     FIM Grooming Score:  6 - Modified Independent  Grooming Description:   (hair care seated)    FIM Bathing Score:  5 - Standby Prompting/Supervision or Set-up  Bathing Description:       FIM Upper Body Dressin - Standby Prompting/Supervision or Set-up  Upper Body Dressing Description:   (set up, extra time, vc for initiation)    FIM Lower Body Dressing Score:  3 - Moderate Assistance  Lower Body Dressing Description:   (sig increase in time, verbal cues for sequencing, CGA in standing for pulling pants up, assistance for threading B socks but indep to pull up)    FIM Tub/Shower Transfers Score:  4 - Minimal Assistance  Tub/Shower Transfers Description:   (CGA wc <> shower chair with support of grab bars)      Assessment    Wife expressed concern with pt's affect, cognition, and ability to take care of pt upon DC. Wife additionally requested shower for further practice with ADL. Pt demonstrates fair indep with ADL but requires intermittent cuing and encouragement for sequencing and initiation.     Plan    ADL re-train, functional cog, full body strengthening, stand tolerance/balance/endurance.

## 2019-04-05 DIAGNOSIS — M54.5 LOW BACK PAIN, UNSPECIFIED BACK PAIN LATERALITY, UNSPECIFIED CHRONICITY, WITH SCIATICA PRESENCE UNSPECIFIED: ICD-10-CM

## 2019-04-05 PROCEDURE — 700102 HCHG RX REV CODE 250 W/ 637 OVERRIDE(OP): Performed by: PHYSICAL MEDICINE & REHABILITATION

## 2019-04-05 PROCEDURE — G0515 COGNITIVE SKILLS DEVELOPMENT: HCPCS

## 2019-04-05 PROCEDURE — A9270 NON-COVERED ITEM OR SERVICE: HCPCS | Performed by: PHYSICAL MEDICINE & REHABILITATION

## 2019-04-05 PROCEDURE — 97530 THERAPEUTIC ACTIVITIES: CPT

## 2019-04-05 PROCEDURE — 700111 HCHG RX REV CODE 636 W/ 250 OVERRIDE (IP): Performed by: PHYSICAL MEDICINE & REHABILITATION

## 2019-04-05 PROCEDURE — 94760 N-INVAS EAR/PLS OXIMETRY 1: CPT

## 2019-04-05 PROCEDURE — 97116 GAIT TRAINING THERAPY: CPT

## 2019-04-05 PROCEDURE — 99233 SBSQ HOSP IP/OBS HIGH 50: CPT | Performed by: PHYSICAL MEDICINE & REHABILITATION

## 2019-04-05 PROCEDURE — 97110 THERAPEUTIC EXERCISES: CPT

## 2019-04-05 PROCEDURE — 770010 HCHG ROOM/CARE - REHAB SEMI PRIVAT*

## 2019-04-05 PROCEDURE — 700101 HCHG RX REV CODE 250: Performed by: PHYSICAL MEDICINE & REHABILITATION

## 2019-04-05 RX ORDER — LEVETIRACETAM 500 MG/1
750 TABLET ORAL EVERY 12 HOURS
Status: DISCONTINUED | OUTPATIENT
Start: 2019-04-05 | End: 2019-04-11 | Stop reason: HOSPADM

## 2019-04-05 RX ADMIN — OMEPRAZOLE 20 MG: 20 CAPSULE, DELAYED RELEASE ORAL at 21:19

## 2019-04-05 RX ADMIN — SIMVASTATIN 20 MG: 20 TABLET, FILM COATED ORAL at 21:19

## 2019-04-05 RX ADMIN — ENOXAPARIN SODIUM 40 MG: 100 INJECTION SUBCUTANEOUS at 08:24

## 2019-04-05 RX ADMIN — CEFUROXIME AXETIL 500 MG: 500 TABLET ORAL at 08:24

## 2019-04-05 RX ADMIN — OXYCODONE HYDROCHLORIDE AND ACETAMINOPHEN 500 MG: 500 TABLET ORAL at 17:54

## 2019-04-05 RX ADMIN — FERROUS SULFATE TAB 325 MG (65 MG ELEMENTAL FE) 325 MG: 325 (65 FE) TAB at 08:24

## 2019-04-05 RX ADMIN — SENNOSIDES AND DOCUSATE SODIUM 2 TABLET: 8.6; 5 TABLET ORAL at 08:24

## 2019-04-05 RX ADMIN — HYDROXYUREA 1500 MG: 500 CAPSULE ORAL at 21:19

## 2019-04-05 RX ADMIN — CEFUROXIME AXETIL 500 MG: 500 TABLET ORAL at 21:18

## 2019-04-05 RX ADMIN — LEVETIRACETAM 750 MG: 500 TABLET ORAL at 21:16

## 2019-04-05 RX ADMIN — OXYCODONE HYDROCHLORIDE AND ACETAMINOPHEN 500 MG: 500 TABLET ORAL at 08:24

## 2019-04-05 RX ADMIN — MULTIPLE VITAMINS W/ MINERALS TAB 1 TABLET: TAB at 11:35

## 2019-04-05 RX ADMIN — VITAMIN D, TAB 1000IU (100/BT) 2000 UNITS: 25 TAB at 08:24

## 2019-04-05 RX ADMIN — LEVETIRACETAM 500 MG: 500 TABLET ORAL at 08:24

## 2019-04-05 RX ADMIN — FERROUS SULFATE TAB 325 MG (65 MG ELEMENTAL FE) 325 MG: 325 (65 FE) TAB at 17:54

## 2019-04-05 RX ADMIN — SENNOSIDES AND DOCUSATE SODIUM 2 TABLET: 8.6; 5 TABLET ORAL at 21:17

## 2019-04-05 RX ADMIN — TAMSULOSIN HYDROCHLORIDE 0.4 MG: 0.4 CAPSULE ORAL at 21:19

## 2019-04-05 RX ADMIN — LIDOCAINE 1 PATCH: 50 PATCH TOPICAL at 08:29

## 2019-04-05 RX ADMIN — Medication 1 TABLET: at 08:32

## 2019-04-05 NOTE — CARE PLAN
Problem: Safety  Goal: Will remain free from injury  Encourage to use call light for assist to toilet/transfer. Using call light. Bed in low position, call light within reach.

## 2019-04-05 NOTE — THERAPY
"Physical Therapy   Daily Treatment     Patient Name: Han Pat  Age:  84 y.o., Sex:  male  Medical Record #: [id number]  Today's Date: 4/5/2019     Precautions  Precautions: (P) Fall Risk, Other (See Comments)  Comments: (P) seizure precautions    Subjective    Pt stated he thinks he will prepared to go home at his currently scheduled d/c date. Feels fatigued all over. Was lying in bed upon arrival, but agreeable to tx. Reports he has had shuffled gait for >1year.     Objective    60sec x 3 R/L PROM hamstring    FIM Stairs Score:  4 - Minimal Assistance  Stairs Description:   (20ft  x1 6\" steps, B UE support on unilateral HR, step to)    FIM Walking Score:  5 - Standby Prompting/Supervision or Set-up  Walking Description:   (183ft x 1, FWW, SPV; 65ft x 1 FWW SBA (with fatigue), VCs for posture)    FIM Bed/Chair/Wheelchair Transfers Score: 5 - Standby Prompting/Supervision or Set-up  Bed/Chair/Wheelchair Transfers Description:   (bed mob: SPV; transfer: SBA with FWW)      Assessment    Pt with improved endurance in stairs and in AMB with FWW. Continues to demo shuffled gait, essential tremor. Able to self correct posture in FWW with VCs.    Plan    Hamstring stretching/ activation of knee flexion during gait cycle,  stair training to mimic home environment, standing balance, consider use of bioness for B hamstrings during gait cycle    "

## 2019-04-05 NOTE — DISCHARGE PLANNING
Case Management;  I met with patient and his wife this week and reviewed team conference discussion.  Discussed outpatient therapy but patient's wife does not feel she will be able to get him in/out of the stairs and to outpatient.  She requests a referral to home health therapy if possible.  I will follow and discuss with the team.  Today, I received update from physician that wife is considering skilled facility also.  He will need follow up with neurology and I will assist with this.

## 2019-04-05 NOTE — THERAPY
"Speech Language Pathology  Daily Treatment     Patient Name: Han Pat  Age:  84 y.o., Sex:  male  Medical Record #: [id number]  Today's Date: 4/5/2019     Subjective    Pt participated in community outing to [facility] with emphasis on community reintegration.      Objective     04/05/19 0915   Cognition   Simple Attention Minimal (4)   Moderate Attention Moderate (3)   Verbal Short Term Memory 5 Minutes   Interdisciplinary Plan of Care Collaboration   IDT Collaboration with  Recreation Therapist;Certified Nursing Assistant   Patient Position at End of Therapy Seated   Collaboration Comments Rec therapist assisting with transfers for outing, transportation. Transfer of care to [facility] at end of session   SLP Total Time Spent   SLP Individual Total Time Spent (Mins) 90   Charge Group   SLP Cognitive Skill Development 6           Assessment    Community outing to [facility] with emphasis on community reintegration. Pt required MIN cues for pathfinding, independently asking staff questions to locate specific items/areas of store. Pt frequently checking items to find on given list and taking notes to aid in recall. Pt required cues for problem solving navigating 2nd story with w/c - pt initially responded \"take the escalator\" vs elevator. Fatigue noted with LE towards end of outing - pt required assist to propel w/c.     Plan    Continue to target attention, memory     "

## 2019-04-05 NOTE — THERAPY
"Occupational Therapy  Daily Treatment     Patient Name: Han Pat  Age:  84 y.o., Sex:  male  Medical Record #: [id number]  Today's Date: 4/5/2019     Precautions  Precautions: (P) Fall Risk, Other (See Comments)  Comments: (P) seizure precautions    Safety   ADL Safety : Requires Supervision for Safety, Requires Physical Assist for Safety  Bathroom Safety: Requires Supervision for Safety, Requires Physical Assist for Safety  Comments: see FIM for ADL routine. Requires encouragement to indep complete tasks and intermittent cues for sequencing and initiation.    Subjective    \"I lost my phone somewhere this morning.\"     Objective       04/05/19 1331   Precautions   Precautions Fall Risk;Other (See Comments)   Comments seizure precautions   Cognition    Safety Awareness Impaired   Attention Impaired   Initiation Impaired   Sitting Upper Body Exercises   Sitting Upper Body Exercises Yes   Tricep Press 3 sets of 10;Bilateral;Weight (See Comments for lbs)  (30, 40, 40 lbs on rickshaw facing forward)   IADL Treatments   Kitchen Mobility Education Patient educated on safe kitchen mobility techniques using FWW and transporting items from place to place while using FWW.  Then mobilized around kitchen with FWW and CGA/SBA of gait belt while retrieving items from high/low cupboards, counter and various appliances.  Cues for safety with FWW.   Bed Mobility    Sit to Supine Minimal Assist  (able to get LEs into bed, assist to position head/shoulders)   Skilled Intervention Verbal Cuing;Facilitation   Interdisciplinary Plan of Care Collaboration   IDT Collaboration with  Family / Caregiver   Patient Position at End of Therapy In Bed;Call Light within Reach;Tray Table within Reach;Phone within Reach   Collaboration Comments spouse present and completed family training and education on shower transfers   OT Total Time Spent   OT Individual Total Time Spent (Mins) 60   OT Charge Group   OT Therapy Activity 3   OT " Therapeutic Exercise  1       Patient and spouse educated on how to complete safe stall shower transfer using both a tub bench and a shower chair with grab bars.  Patient required SBA for transfer using tub bench and CGA with grab bar to step in/out of stall shower.  Spouse assisted with one transfer and both feel comfortable with stepping in/out with grab bar and use of shower chair.    Assessment    Patient continues to require verbal cues for hand placement with STS from wc and shower chair.  Spouse present and participated in training with shower transfer.    Plan    Standing tolerance/balance, STS sequence and practice, UB/LB/core strengthening, endurance building, adls, simple meal prep

## 2019-04-05 NOTE — PROGRESS NOTES
Received patient during shift change, report rec'd from day shift RN. Resting in bed, VS stable on room air. Per report incontinent of B&B, min-mod assist for transfers. A&O x 2-4, able to make needs known. Bed in low position, call light within reach.

## 2019-04-05 NOTE — PROGRESS NOTES
Rehab Progress Note     Date of Service: 4/5/2019  Chief Complaint: follow up encephalopathy    Interval Events (Subjective)    Patient seen and examined in the cafeteria today.  Wife is present.  We had a long discussion about the plans for further workup for his back pain, generalized weakness, and cognitive deficits.  He is Robert been referred to the outpatient neurology clinic to follow-up with seizures, and I will refer him to Dr. laureano for back pain management.  I also discussed his case on the phone with Dr. Elmo Kwok as well as a longtime family friend Dr. Bello Ramirez.  They all seem to be in agreement with the current plan of care.    Wife is very concerned about the discharge date of the 11th and does not feel like he will be ready to go home at that time.  She is Robert look at advanced skilled nursing for possible next step.    Patient was able to do a therapeutic outing today to she deals with the therapist which went well.  He reports his back pain is stable.    Objective:  VITAL SIGNS: /52   Pulse (!) 55   Temp 36.8 °C (98.2 °F) (Oral)   Resp 18   Ht 1.829 m (6')   Wt 90.9 kg (200 lb 6.4 oz)   SpO2 95%   BMI 27.18 kg/m²   Gen: alert, no apparent distress  CV: regular rate and rhythm, no murmurs, no peripheral edema  Resp: clear to ascultation bilaterally, normal respiratory effort  GI: soft, non-tender abdomen, bowel sounds present    Recent Results (from the past 72 hour(s))   URINALYSIS    Collection Time: 04/03/19  5:00 PM   Result Value Ref Range    Color DK Yellow     Character Clear     Specific Gravity 1.021 <1.035    Ph 5.5 5.0 - 8.0    Glucose Negative Negative mg/dL    Ketones Negative Negative mg/dL    Protein Negative Negative mg/dL    Bilirubin Negative Negative    Urobilinogen, Urine 0.2 Negative    Nitrite Negative Negative    Leukocyte Esterase Moderate (A) Negative    Occult Blood Negative Negative    Micro Urine Req Microscopic    URINE MICROSCOPIC (W/UA)    Collection  Time: 04/03/19  5:00 PM   Result Value Ref Range    WBC 20-50 (A) /hpf    RBC 0-2 (A) /hpf    Bacteria Negative None /hpf    Epithelial Cells Negative /hpf    Hyaline Cast 0-2 /lpf       Current Facility-Administered Medications   Medication Frequency   • levETIRAcetam (KEPPRA) tablet 750 mg Q12HRS   • cefUROXime (CEFTIN) tablet 500 mg Q12HRS   • lidocaine (LIDODERM) 5 % 1 Patch Q24HR   • OCUVITE-LUTEIN tablet 1 tablet DAILY   • ferrous sulfate tablet 325 mg BID WITH MEALS   • vitamin D (cholecalciferol) tablet 2,000 Units DAILY   • Respiratory Care per Protocol Continuous RT   • Pharmacy Consult Request ...Pain Management Review 1 Each PHARMACY TO DOSE   • oxyCODONE immediate-release (ROXICODONE) tablet 5 mg Q3HRS PRN   • acetaminophen (TYLENOL) tablet 650 mg Q4HRS PRN   • senna-docusate (PERICOLACE or SENOKOT S) 8.6-50 MG per tablet 2 Tab BID    And   • polyethylene glycol/lytes (MIRALAX) PACKET 1 Packet QDAY PRN    And   • magnesium hydroxide (MILK OF MAGNESIA) suspension 30 mL QDAY PRN    And   • bisacodyl (DULCOLAX) suppository 10 mg QDAY PRN   • ascorbic acid tablet 500 mg BID WITH MEALS   • therapeutic multivitamin-minerals (THERAGRAN-M) tablet 1 Tab DAILY WITH LUNCH   • ondansetron (ZOFRAN ODT) dispertab 4 mg Q4HRS PRN   • acetaminophen (TYLENOL) tablet 650 mg Q6HRS PRN   • hydroxyurea (HYDREA) capsule 1,000 mg Once per day on Sun Sat   • hydroxyurea (HYDREA) capsule 1,500 mg Once per day on Mon Tue Wed Thu Fri   • omeprazole (PRILOSEC) capsule 20 mg QHS   • simvastatin (ZOCOR) tablet 20 mg Q EVENING   • tamsulosin (FLOMAX) capsule 0.4 mg QHS   • tramadol (ULTRAM) 50 MG tablet 50 mg Q6HRS PRN   • enoxaparin (LOVENOX) inj 40 mg DAILY       Orders Placed This Encounter   Procedures   • Diet Order Regular     Standing Status:   Standing     Number of Occurrences:   1     Order Specific Question:   Diet:     Answer:   Regular [1]     Order Specific Question:   Consistency/Fluid modifications:     Answer:   Thin  Liquids [3]       Assessment:  Active Hospital Problems    Diagnosis   • *Acute encephalopathy   • Weakness   • Anemia   • Hyponatremia   • Lower back pain   • Thrombocytosis (HCC)   • Benign prostatic hyperplasia without lower urinary tract symptoms   • Mixed hyperlipidemia   • Obstructive sleep apnea   • Gastroesophageal reflux disease   • Obesity (BMI 30.0-34.9)     This patient is a 84 y.o. male admitted for acute inpatient rehabilitation with Acute encephalopathy.    I led and attended the weekly conference, and agree with the IDT conference documentation and plan of care as noted below.    Date of conference: 4/3/2019    Goals:    1) recall daily activities  2) complete math problems with 80% accuracy and min assist  3) increased RUBIO score  4) flight of stairs with contact guard assist  5) ambulate 150 ft with FWW at SBA  6) set up bathing  7) supervision LB dressing    Met 3/6 PT goals, met 4/4 OT goals, met 2/2 SLP goals    MOCA initial 11/30, SCAN - 78 total score - mild cognitive deficits - attention/memory most affected    RUBIO 8/56 --> 25/56    Barriers:    1) impaired insight  2) generalized weakness  3) stairs  4) poor neural control with gait  5) impaired hamstring strength  6) forward flexed position   7) poor short term memory  8) poor balance  9) low energy  10) decreased endurance  11) poor attention    Admission FIM 56 --> 83 (4/3)    CM/social support: wife very supportive    Anticipated DC date: 4/11/2019    Outpatient: PT/OT/SLP    Equip: shower chair, possibly cane    Follow up: PCP      Medical Decision Making and Plan:    Acute encephalopathy, improved  Cognitive deficits  Possible seizure? Post-ictal? Mild dementia?  Continue full rehab program  PT/OT/SLP, 1 hr each discipline, 5 days per week  Continue Keppra - per recommendations of Dr. Kwok, have increased dose to 750 mg BID today  Follow up outpatient with neurology - referral sent to epilepsy clinic - also asked to address cognitive  deficits    Lower extremity weakness, improved  Poor balance, improved  Differential diagnosis includes Demetrius's paralysis secondary to seizure versus spinal stenosis/myelopathy - though patient with no radicular pain, no paresthesias, no more bowel/bladder incontinence, and continues to improve  Improved lower extremity strength, no clear sensory level  Continue with physical and occupational therapy for mobility and ADLs  Outpatient referral to Dr. Izquierdo for further workup  Due to shuffling gait and cognitive deficits, wonder if patient has parkinson's?  Patient currently walking 135 ft SBA with FWW    Low back pain  History of decompression of the lumbar spine back in late 2000  Low back pain has been present for some time and not limiting his participation with therapy  Outpatient referral to Dr. Izquierdo, she can decide if further imaging is required     Bladder Incontinence, continues  BPH  Continue Flomax 0.4 mg nightly, monitor for orthostatic hypotension  Scheduled voids  PVRs - 100, 53, 172, 94    +UA  Started on cefitin  Culture pending     Bowel incontinence, resolved  Continue bowel meds  Schedule toileting  Last BM 4/3    FREYA  Continue with home CPAP  Consult respiratory therapy     Dyslipidemia  Continue simvastatin 20 mg nightly     Macrocytic Anemia  Normal folate and B12  Iron deficiency anemia, iron 28  Continue ferrous sulfate and Vit C      Chronic thrombocytosis  Continue hydroxyurea      Insomnia  Currently sleeping well per RN    Hyponatremia, resolved    DVT prophylaxis  Lovenox    Total time:  45 minutes.  I spent greater than 50% of the time for patient care, counseling, and coordination on this date, including patient face-to face time, unit/floor time with review of records/pertinent lab data and studies, as well as discussing diagnostic evaluation/work up, planned therapeutic interventions, and future disposition of care, as per the interval events/subjective and the assessment and plan as  noted above.    Time spent today talking with family, Dr. Kwok, and Dr. Carolina regarding plan of care for cognitive deficits and weakness.      Vonda Rodrigues M.D.   Physical Medicine and Rehabilitation

## 2019-04-05 NOTE — CARE PLAN
Problem: Safety  Goal: Will remain free from injury  Pt uses call light consistently and appropriately. Waits for assistance does not attempt self transfer this shift. Able to verbalize needs.    Problem: Urinary Elimination:  Goal: Ability to reestablish a normal urinary elimination pattern will improve    Intervention: Assess and monitor for signs and symptoms of urinary retention  Pt on ceftin for UTI, urine culture pending

## 2019-04-05 NOTE — WOUND TEAM
Wound consult placed for nail care.  Patient's nails not overgrown and reports that he goes for pedicure monthly.  Both great toes and second toenails thick and mycotic and no care indicated.  Wrapped both feet with moist washcloth x 5 minutes and trimmed and filed all other nails which were not mycotic.  Wife replaced socks.  Discussed with staff RN.

## 2019-04-06 PROBLEM — N30.00 ACUTE CYSTITIS WITHOUT HEMATURIA: Status: ACTIVE | Noted: 2019-04-06

## 2019-04-06 LAB
BACTERIA UR CULT: NORMAL
SIGNIFICANT IND 70042: NORMAL
SITE SITE: NORMAL
SOURCE SOURCE: NORMAL

## 2019-04-06 PROCEDURE — 700101 HCHG RX REV CODE 250: Performed by: PHYSICAL MEDICINE & REHABILITATION

## 2019-04-06 PROCEDURE — 700102 HCHG RX REV CODE 250 W/ 637 OVERRIDE(OP): Performed by: PHYSICAL MEDICINE & REHABILITATION

## 2019-04-06 PROCEDURE — G0515 COGNITIVE SKILLS DEVELOPMENT: HCPCS

## 2019-04-06 PROCEDURE — 99232 SBSQ HOSP IP/OBS MODERATE 35: CPT | Performed by: PHYSICAL MEDICINE & REHABILITATION

## 2019-04-06 PROCEDURE — A9270 NON-COVERED ITEM OR SERVICE: HCPCS | Performed by: PHYSICAL MEDICINE & REHABILITATION

## 2019-04-06 PROCEDURE — 97530 THERAPEUTIC ACTIVITIES: CPT

## 2019-04-06 PROCEDURE — 97110 THERAPEUTIC EXERCISES: CPT

## 2019-04-06 PROCEDURE — 700111 HCHG RX REV CODE 636 W/ 250 OVERRIDE (IP): Performed by: PHYSICAL MEDICINE & REHABILITATION

## 2019-04-06 PROCEDURE — 94760 N-INVAS EAR/PLS OXIMETRY 1: CPT

## 2019-04-06 PROCEDURE — 770010 HCHG ROOM/CARE - REHAB SEMI PRIVAT*

## 2019-04-06 RX ADMIN — SENNOSIDES AND DOCUSATE SODIUM 2 TABLET: 8.6; 5 TABLET ORAL at 20:24

## 2019-04-06 RX ADMIN — TRAMADOL HYDROCHLORIDE 50 MG: 50 TABLET, COATED ORAL at 20:27

## 2019-04-06 RX ADMIN — LIDOCAINE 1 PATCH: 50 PATCH TOPICAL at 09:00

## 2019-04-06 RX ADMIN — OXYCODONE HYDROCHLORIDE AND ACETAMINOPHEN 500 MG: 500 TABLET ORAL at 08:17

## 2019-04-06 RX ADMIN — CEFUROXIME AXETIL 500 MG: 500 TABLET ORAL at 20:28

## 2019-04-06 RX ADMIN — SIMVASTATIN 20 MG: 20 TABLET, FILM COATED ORAL at 20:24

## 2019-04-06 RX ADMIN — LEVETIRACETAM 750 MG: 500 TABLET ORAL at 20:24

## 2019-04-06 RX ADMIN — HYDROXYUREA 1000 MG: 500 CAPSULE ORAL at 20:24

## 2019-04-06 RX ADMIN — CEFUROXIME AXETIL 500 MG: 500 TABLET ORAL at 08:59

## 2019-04-06 RX ADMIN — FERROUS SULFATE TAB 325 MG (65 MG ELEMENTAL FE) 325 MG: 325 (65 FE) TAB at 08:17

## 2019-04-06 RX ADMIN — MULTIPLE VITAMINS W/ MINERALS TAB 1 TABLET: TAB at 11:39

## 2019-04-06 RX ADMIN — MAGNESIUM HYDROXIDE 30 ML: 400 SUSPENSION ORAL at 20:29

## 2019-04-06 RX ADMIN — OXYCODONE HYDROCHLORIDE AND ACETAMINOPHEN 500 MG: 500 TABLET ORAL at 17:13

## 2019-04-06 RX ADMIN — LEVETIRACETAM 750 MG: 500 TABLET ORAL at 08:59

## 2019-04-06 RX ADMIN — VITAMIN D, TAB 1000IU (100/BT) 2000 UNITS: 25 TAB at 08:59

## 2019-04-06 RX ADMIN — ENOXAPARIN SODIUM 40 MG: 100 INJECTION SUBCUTANEOUS at 08:59

## 2019-04-06 RX ADMIN — OMEPRAZOLE 20 MG: 20 CAPSULE, DELAYED RELEASE ORAL at 20:25

## 2019-04-06 RX ADMIN — SENNOSIDES AND DOCUSATE SODIUM 2 TABLET: 8.6; 5 TABLET ORAL at 08:58

## 2019-04-06 RX ADMIN — FERROUS SULFATE TAB 325 MG (65 MG ELEMENTAL FE) 325 MG: 325 (65 FE) TAB at 17:13

## 2019-04-06 RX ADMIN — TAMSULOSIN HYDROCHLORIDE 0.4 MG: 0.4 CAPSULE ORAL at 20:24

## 2019-04-06 RX ADMIN — Medication 1 TABLET: at 09:00

## 2019-04-06 NOTE — CARE PLAN
Problem: Safety  Goal: Will remain free from falls  Encourage to use call light for assist to toilet/transfer. Verbally confirmed understanding. Bed in low position, call light within reach.    Problem: Respiratory:  Goal: Respiratory status will improve  Cpap in place at HS. Tolerating well. No s/s of distress.

## 2019-04-06 NOTE — THERAPY
Physical Therapy   Daily Treatment     Patient Name: Han Pat  Age:  84 y.o., Sex:  male  Medical Record #: [id number]  Today's Date: 4/6/2019     Precautions  Precautions: Fall Risk, Other (See Comments)  Comments: seizure prec    Subjective    Pt was sitting in w/c upon arrival and agreeable to tx     Objective    FIM Walking Score:  5 - Standby Prompting/Supervision or Set-up  Walking Description:   (330ft x1, FWW, SPV)    FIM Bed/Chair/Wheelchair Transfers Score: 5 - Standby Prompting/Supervision or Set-up  Bed/Chair/Wheelchair Transfers Description:   (bed mob: SPV; transfer: SBA; FWW)    Standing balance: 30sec x 5 on airex pad SBA no UE support, repeated 3 x 30sec with mod perturbations     04/06/19 1531   Precautions   Precautions Fall Risk;Other (See Comments)   Comments seizure prec   Sitting Lower Body Exercises   Nustep Resistance Level 3  (10minutes)   Interdisciplinary Plan of Care Collaboration   Patient Position at End of Therapy In Bed;Call Light within Reach;Tray Table within Reach;Phone within Reach   PT Total Time Spent   PT Individual Total Time Spent (Mins) 30   PT Charge Group   PT Therapeutic Exercise 1   PT Therapeutic Activities 1       Assessment    Pt with improvements in endurance. Continues to be limited by standing balance    Plan    Standing tolerance/balance, STS sequence and practice, UB/LB/core strengthening, endurance building, adls, simple meal prep

## 2019-04-06 NOTE — ASSESSMENT & PLAN NOTE
I reviewed the UA and urine culture.  Continue Cefotan for now.  Urine culture negative so far. We discussed urinary tract infections, the importance of treating.

## 2019-04-06 NOTE — PROGRESS NOTES
Problem: Bowel/Gastric:  Goal: Normal bowel function is maintained or improved  Outcome: PROGRESSING AS EXPECTED  If no BM today prn softeners/laxatives maybe required last recorded BM 4-3. Denies discomfort, no distention noted. currently  taking Sen[place]ot BID. Will continue to encourage po intake and increase activity as pt tolerates     Problem: Pain Management  Goal: Pain level will decrease to patient's comfort goal  Outcome: PROGRESSING AS EXPECTED  Pain is primarily located pt's  posterior right shoulder, declines pain meds. Is on lidocaine patch on 12 off 12. Pt is able and willing to participate in therapies and gets up for meals in the dinning room. Continue to assess for pain, changes in pain locations and if patient is not able to fully participate with therapies.

## 2019-04-06 NOTE — FLOWSHEET NOTE
04/05/19 1837   Events/Summary/Plan   Events/Summary/Plan O2 spot check  (5:17 hrs cpap last night)   Respiratory WDL   Respiratory (WDL) X   Chest Exam   Respiration 18   Pulse 70   Oximetry   #Pulse Oximetry (Single Determination) Yes   Oxygen   Home O2 Use Prior To Admission? Yes   Home O2 LPM Flow 2 LPM   Home O2 Delivery Method (CPAP)   Home O2 Frequency of Use At Sleep   Pulse Oximetry 93 %   O2 Daily Delivery Respiratory  Room Air with O2 Available

## 2019-04-06 NOTE — PROGRESS NOTES
Rehab Progress Note     Encounter Date: 4/6/2019     CC: Encephalopathy    Interval Events (Subjective)  Patient notes that cognition is improving, still with poor endurance but improving.  Patient notes that insomnia improved significantly.    Nausea, vomiting, chest pain.     Objective:  VITAL SIGNS:   Vitals:    04/05/19 1837 04/05/19 1846 04/06/19 0624 04/06/19 1015   BP:  131/68 106/64    Pulse: 70 69 (!) 50 65   Resp: 18 18 19 16   Temp:  36.6 °C (97.9 °F) 36.9 °C (98.4 °F)    TempSrc:  Oral Temporal    SpO2: 93%  96% 94%   Weight:       Height:           Constitutional: NAD, I examined the patient while working on coordination with occupational therapy.  HENT: NCAT, oral pharynx clear   Eyes: EOMI. PERRL  Neck: supple, no LA  Cardiovascular: RRR, nl S1/S2, no murmurs.  Thorax & Lungs: CTA bilaterally  Abdomen: soft, non-tender, non-distended, BS present.  Skin: warm/dry/intact  Genitalia: Deferred  Rectal: deferred  Extremities: no C/C/E, distal pulses 3+ and symmetric  Neurologic: Alert and oriented x3    Recent Results (from the past 72 hour(s))   URINALYSIS    Collection Time: 04/03/19  5:00 PM   Result Value Ref Range    Color DK Yellow     Character Clear     Specific Gravity 1.021 <1.035    Ph 5.5 5.0 - 8.0    Glucose Negative Negative mg/dL    Ketones Negative Negative mg/dL    Protein Negative Negative mg/dL    Bilirubin Negative Negative    Urobilinogen, Urine 0.2 Negative    Nitrite Negative Negative    Leukocyte Esterase Moderate (A) Negative    Occult Blood Negative Negative    Micro Urine Req Microscopic    URINE MICROSCOPIC (W/UA)    Collection Time: 04/03/19  5:00 PM   Result Value Ref Range    WBC 20-50 (A) /hpf    RBC 0-2 (A) /hpf    Bacteria Negative None /hpf    Epithelial Cells Negative /hpf    Hyaline Cast 0-2 /lpf   URINE CULTURE-EXISTING-LESS THAN 48 HOURS    Collection Time: 04/03/19  5:00 PM   Result Value Ref Range    Significant Indicator NEG     Source UR     Site URINE, CLEAN  CATCH     Urine Culture Mixed skin alicia <10,000 cfu/mL        Current Facility-Administered Medications   Medication Frequency   • levETIRAcetam (KEPPRA) tablet 750 mg Q12HRS   • cefUROXime (CEFTIN) tablet 500 mg Q12HRS   • lidocaine (LIDODERM) 5 % 1 Patch Q24HR   • OCUVITE-LUTEIN tablet 1 tablet DAILY   • ferrous sulfate tablet 325 mg BID WITH MEALS   • vitamin D (cholecalciferol) tablet 2,000 Units DAILY   • Respiratory Care per Protocol Continuous RT   • Pharmacy Consult Request ...Pain Management Review 1 Each PHARMACY TO DOSE   • oxyCODONE immediate-release (ROXICODONE) tablet 5 mg Q3HRS PRN   • acetaminophen (TYLENOL) tablet 650 mg Q4HRS PRN   • senna-docusate (PERICOLACE or SENOKOT S) 8.6-50 MG per tablet 2 Tab BID    And   • polyethylene glycol/lytes (MIRALAX) PACKET 1 Packet QDAY PRN    And   • magnesium hydroxide (MILK OF MAGNESIA) suspension 30 mL QDAY PRN    And   • bisacodyl (DULCOLAX) suppository 10 mg QDAY PRN   • ascorbic acid tablet 500 mg BID WITH MEALS   • therapeutic multivitamin-minerals (THERAGRAN-M) tablet 1 Tab DAILY WITH LUNCH   • ondansetron (ZOFRAN ODT) dispertab 4 mg Q4HRS PRN   • acetaminophen (TYLENOL) tablet 650 mg Q6HRS PRN   • hydroxyurea (HYDREA) capsule 1,000 mg Once per day on Sun Sat   • hydroxyurea (HYDREA) capsule 1,500 mg Once per day on Mon Tue Wed Thu Fri   • omeprazole (PRILOSEC) capsule 20 mg QHS   • simvastatin (ZOCOR) tablet 20 mg Q EVENING   • tamsulosin (FLOMAX) capsule 0.4 mg QHS   • tramadol (ULTRAM) 50 MG tablet 50 mg Q6HRS PRN   • enoxaparin (LOVENOX) inj 40 mg DAILY       Orders Placed This Encounter   Procedures   • Diet Order Regular     Standing Status:   Standing     Number of Occurrences:   1     Order Specific Question:   Diet:     Answer:   Regular [1]     Order Specific Question:   Consistency/Fluid modifications:     Answer:   Thin Liquids [3]         Intake/Output Summary (Last 24 hours) at 04/06/19 1217  Last data filed at 04/06/19 0856   Gross per 24  hour   Intake             1140 ml   Output              800 ml   Net              340 ml         Assessment:  Active Hospital Problems    Diagnosis   • *Acute encephalopathy   • Weakness   • Anemia   • Hyponatremia   • Lower back pain   • Thrombocytosis (HCC)   • Benign prostatic hyperplasia without lower urinary tract symptoms   • Mixed hyperlipidemia   • Obstructive sleep apnea   • Gastroesophageal reflux disease   • Obesity (BMI 30.0-34.9)       Medical Decision Making and Plan:      Acute encephalopathy  Continue conference of inpatient rehabilitation.  Continue Keppra    Gastroesophageal reflux disease  Continue omeprazole    Acute cystitis without hematuria  I reviewed the UA and urine culture.  Continue Cefotan for now.  Urine culture negative so far. We discussed urinary tract infections, the importance of treating.      Total time:  27 minutes.  I spent greater than 50% of the time for patient care and coordination on this date, including unit/floor time, and face-to-face time with the patient as per assessment and plan above.    Mello Pérez M.D.  Physical medicine rehabilitation  [facility]

## 2019-04-06 NOTE — THERAPY
"Occupational Therapy  Daily Treatment     Patient Name: Han Pat  Age:  84 y.o., Sex:  male  Medical Record #: [id number]  Today's Date: 4/6/2019     Precautions  Precautions: (P) Fall Risk, Other (See Comments)  Comments: (P) seizure prec    Safety   ADL Safety : Requires Supervision for Safety, Requires Physical Assist for Safety  Bathroom Safety: Requires Supervision for Safety, Requires Physical Assist for Safety  Comments: see FIM for ADL routine. Requires encouragement to indep complete tasks and intermittent cues for sequencing and initiation.    Subjective    \"I feel better today than yesterday.\"     Objective       04/06/19 1301   Precautions   Precautions Fall Risk;Other (See Comments)   Comments seizure prec   Sitting Upper Body Exercises   Sitting Upper Body Exercises Yes   Upper Extremity Bike Minutes / Rest Breaks (See Comments)  (water bike gear 3 x 12 minutes with no rest breaks)   Balance   Standing Balance (Dynamic) Poor +   Comments mild dynamic standing balance challenges in parallel bars tossing and catching a balloon and medium sized ball with no UE support.  Patient with 2-3 small posterior losses of balance using parallel bar for balance.   Interdisciplinary Plan of Care Collaboration   Patient Position at End of Therapy In Bed;Call Light within Reach;Tray Table within Reach;Phone within Reach   OT Total Time Spent   OT Individual Total Time Spent (Mins) 60   OT Charge Group   OT Therapy Activity 3   OT Therapeutic Exercise  1     SPT w/c to bed using bedrail and SBA.  Sit to supine SBA. Standing tolerance during activity and conversation x 12 minutes.    FIM Stairs Score:  4 - Minimal Assistance  Stairs Description:  Hand rails, Verbal cueing, Supervision for safety (up/down 20 stairs with unilateral handrail and step to pattern with CGA)      Assessment    Patient demonstrated ability to complete 20 stairs with CGA and unilateral rail.  Standing endurance continues to " improve.    Plan    Standing tolerance/balance, STS sequence and practice, UB/LB/core strengthening, endurance building, adls, simple meal prep

## 2019-04-06 NOTE — FLOWSHEET NOTE
04/06/19 1015   Events/Summary/Plan   Events/Summary/Plan 02check/PAP/10.5/hrs   Non-Invasive Resp Device Site Inspection Completed Intact   Education   Education Yes - Pt. / Family has been Instructed in use of Respiratory Equipment   Chest Exam   Respiration 16   Pulse 65   Oximetry   #Pulse Oximetry (Single Determination) Yes   Oxygen   Home O2 Use Prior To Admission? Yes   Home O2 LPM Flow 2 LPM   Home O2 Delivery Method (PAP therapy)   Home O2 Frequency of Use At Sleep   Pulse Oximetry 94 %   O2 Daily Delivery Respiratory  Room Air with O2 Available

## 2019-04-06 NOTE — THERAPY
Speech Language Pathology  Daily Treatment     Patient Name: Han Pat  Age:  84 y.o., Sex:  male  Medical Record #: [id number]  Today's Date: 4/6/2019     Subjective    Pt was pleasant and cooperative.     Objective       04/06/19 1003   Cognition   Simple Attention Supervision (5)   Moderate Attention Minimal (4)   Verbal Short Term Memory 15 Minutes           Assessment    Pt was introduced to memory log and filled some details. Pt and clinician discussed RWAVs strategies and how he has been using many of them already. Pt said he primarily writes things down as a memory strategy, and uses the calender on his phone. Pt was given money word problems, Pt completed one problem, with 100% accuracy, and started another. Pt required extra time for processing.    Plan    Continue to address pt's memory via the use of external aides and reminder of strategies.

## 2019-04-06 NOTE — PROGRESS NOTES
Received patient during shift change, report rec'd from day shift RN. Resting in bed, VS stable on room air. Incontinent of B&B, min assist for transfers. A&O x 3-4, able to make needs known. Bed in low position, call light within reach.

## 2019-04-07 PROCEDURE — A9270 NON-COVERED ITEM OR SERVICE: HCPCS | Performed by: PHYSICAL MEDICINE & REHABILITATION

## 2019-04-07 PROCEDURE — 700102 HCHG RX REV CODE 250 W/ 637 OVERRIDE(OP): Performed by: PHYSICAL MEDICINE & REHABILITATION

## 2019-04-07 PROCEDURE — 770010 HCHG ROOM/CARE - REHAB SEMI PRIVAT*

## 2019-04-07 PROCEDURE — 700111 HCHG RX REV CODE 636 W/ 250 OVERRIDE (IP): Performed by: PHYSICAL MEDICINE & REHABILITATION

## 2019-04-07 PROCEDURE — 94760 N-INVAS EAR/PLS OXIMETRY 1: CPT

## 2019-04-07 PROCEDURE — 700101 HCHG RX REV CODE 250: Performed by: PHYSICAL MEDICINE & REHABILITATION

## 2019-04-07 PROCEDURE — 99233 SBSQ HOSP IP/OBS HIGH 50: CPT | Performed by: PHYSICAL MEDICINE & REHABILITATION

## 2019-04-07 RX ADMIN — SENNOSIDES AND DOCUSATE SODIUM 2 TABLET: 8.6; 5 TABLET ORAL at 20:23

## 2019-04-07 RX ADMIN — FERROUS SULFATE TAB 325 MG (65 MG ELEMENTAL FE) 325 MG: 325 (65 FE) TAB at 08:05

## 2019-04-07 RX ADMIN — FERROUS SULFATE TAB 325 MG (65 MG ELEMENTAL FE) 325 MG: 325 (65 FE) TAB at 17:35

## 2019-04-07 RX ADMIN — LEVETIRACETAM 750 MG: 500 TABLET ORAL at 20:23

## 2019-04-07 RX ADMIN — VITAMIN D, TAB 1000IU (100/BT) 2000 UNITS: 25 TAB at 10:19

## 2019-04-07 RX ADMIN — POLYETHYLENE GLYCOL 3350 1 PACKET: 17 POWDER, FOR SOLUTION ORAL at 10:19

## 2019-04-07 RX ADMIN — LIDOCAINE 1 PATCH: 50 PATCH TOPICAL at 08:07

## 2019-04-07 RX ADMIN — OXYCODONE HYDROCHLORIDE AND ACETAMINOPHEN 500 MG: 500 TABLET ORAL at 08:05

## 2019-04-07 RX ADMIN — OMEPRAZOLE 20 MG: 20 CAPSULE, DELAYED RELEASE ORAL at 20:22

## 2019-04-07 RX ADMIN — CEFUROXIME AXETIL 500 MG: 500 TABLET ORAL at 10:20

## 2019-04-07 RX ADMIN — SENNOSIDES AND DOCUSATE SODIUM 2 TABLET: 8.6; 5 TABLET ORAL at 10:19

## 2019-04-07 RX ADMIN — CEFUROXIME AXETIL 500 MG: 500 TABLET ORAL at 20:23

## 2019-04-07 RX ADMIN — TAMSULOSIN HYDROCHLORIDE 0.4 MG: 0.4 CAPSULE ORAL at 20:23

## 2019-04-07 RX ADMIN — HYDROXYUREA 1000 MG: 500 CAPSULE ORAL at 20:25

## 2019-04-07 RX ADMIN — ENOXAPARIN SODIUM 40 MG: 100 INJECTION SUBCUTANEOUS at 10:19

## 2019-04-07 RX ADMIN — MULTIPLE VITAMINS W/ MINERALS TAB 1 TABLET: TAB at 12:01

## 2019-04-07 RX ADMIN — Medication 1 TABLET: at 10:30

## 2019-04-07 RX ADMIN — TRAMADOL HYDROCHLORIDE 50 MG: 50 TABLET, COATED ORAL at 20:27

## 2019-04-07 RX ADMIN — SIMVASTATIN 20 MG: 20 TABLET, FILM COATED ORAL at 20:23

## 2019-04-07 RX ADMIN — LEVETIRACETAM 750 MG: 500 TABLET ORAL at 10:19

## 2019-04-07 RX ADMIN — OXYCODONE HYDROCHLORIDE AND ACETAMINOPHEN 500 MG: 500 TABLET ORAL at 17:35

## 2019-04-07 NOTE — CARE PLAN
"Problem: Discharge Barriers/Planning  Goal: Patient's continuum of care needs will be met  Outcome: PROGRESSING AS EXPECTED  For wife this is moving to fast, she has serious concerns about his going home and not being able to care for and keep him safe. From pt's point of view he thinks \"she doesn't want him to come home and if he doesn't it's all her fault\" Wife would like to talk to MD and  tomorrow. Expected discharge is 4- Thursday this week.      "

## 2019-04-07 NOTE — CARE PLAN
Problem: Bowel/Gastric:  Goal: Normal bowel function is maintained or improved  Pt's LBM 4/3. Abdomen is soft and non-tender with normoactive bowel sounds. Administered prn MOM, pending results. Will continue to monitor.     Problem: Pain Management  Goal: Pain level will decrease to patient's comfort goal  Pt c/o lower back pain. Administered prn tramadol with good relief (see MAR/flowsheet).

## 2019-04-08 PROCEDURE — 770010 HCHG ROOM/CARE - REHAB SEMI PRIVAT*

## 2019-04-08 PROCEDURE — 700102 HCHG RX REV CODE 250 W/ 637 OVERRIDE(OP): Performed by: PHYSICAL MEDICINE & REHABILITATION

## 2019-04-08 PROCEDURE — 94760 N-INVAS EAR/PLS OXIMETRY 1: CPT

## 2019-04-08 PROCEDURE — 700111 HCHG RX REV CODE 636 W/ 250 OVERRIDE (IP): Performed by: PHYSICAL MEDICINE & REHABILITATION

## 2019-04-08 PROCEDURE — 700101 HCHG RX REV CODE 250: Performed by: PHYSICAL MEDICINE & REHABILITATION

## 2019-04-08 PROCEDURE — A9270 NON-COVERED ITEM OR SERVICE: HCPCS | Performed by: PHYSICAL MEDICINE & REHABILITATION

## 2019-04-08 PROCEDURE — G0515 COGNITIVE SKILLS DEVELOPMENT: HCPCS

## 2019-04-08 PROCEDURE — 97116 GAIT TRAINING THERAPY: CPT

## 2019-04-08 PROCEDURE — 99232 SBSQ HOSP IP/OBS MODERATE 35: CPT | Performed by: PHYSICAL MEDICINE & REHABILITATION

## 2019-04-08 PROCEDURE — 97530 THERAPEUTIC ACTIVITIES: CPT

## 2019-04-08 PROCEDURE — 97535 SELF CARE MNGMENT TRAINING: CPT

## 2019-04-08 RX ADMIN — Medication 1 TABLET: at 09:00

## 2019-04-08 RX ADMIN — SIMVASTATIN 20 MG: 20 TABLET, FILM COATED ORAL at 20:49

## 2019-04-08 RX ADMIN — CEFUROXIME AXETIL 500 MG: 500 TABLET ORAL at 08:14

## 2019-04-08 RX ADMIN — TAMSULOSIN HYDROCHLORIDE 0.4 MG: 0.4 CAPSULE ORAL at 20:49

## 2019-04-08 RX ADMIN — SENNOSIDES AND DOCUSATE SODIUM 2 TABLET: 8.6; 5 TABLET ORAL at 09:50

## 2019-04-08 RX ADMIN — MULTIPLE VITAMINS W/ MINERALS TAB 1 TABLET: TAB at 13:00

## 2019-04-08 RX ADMIN — SENNOSIDES AND DOCUSATE SODIUM 2 TABLET: 8.6; 5 TABLET ORAL at 20:48

## 2019-04-08 RX ADMIN — OXYCODONE HYDROCHLORIDE AND ACETAMINOPHEN 500 MG: 500 TABLET ORAL at 18:06

## 2019-04-08 RX ADMIN — LEVETIRACETAM 750 MG: 500 TABLET ORAL at 08:13

## 2019-04-08 RX ADMIN — HYDROXYUREA 1500 MG: 500 CAPSULE ORAL at 20:47

## 2019-04-08 RX ADMIN — OMEPRAZOLE 20 MG: 20 CAPSULE, DELAYED RELEASE ORAL at 20:48

## 2019-04-08 RX ADMIN — VITAMIN D, TAB 1000IU (100/BT) 2000 UNITS: 25 TAB at 08:12

## 2019-04-08 RX ADMIN — LIDOCAINE 1 PATCH: 50 PATCH TOPICAL at 09:40

## 2019-04-08 RX ADMIN — FERROUS SULFATE TAB 325 MG (65 MG ELEMENTAL FE) 325 MG: 325 (65 FE) TAB at 08:13

## 2019-04-08 RX ADMIN — FERROUS SULFATE TAB 325 MG (65 MG ELEMENTAL FE) 325 MG: 325 (65 FE) TAB at 18:06

## 2019-04-08 RX ADMIN — OXYCODONE HYDROCHLORIDE AND ACETAMINOPHEN 500 MG: 500 TABLET ORAL at 08:14

## 2019-04-08 RX ADMIN — ENOXAPARIN SODIUM 40 MG: 100 INJECTION SUBCUTANEOUS at 09:40

## 2019-04-08 RX ADMIN — LEVETIRACETAM 750 MG: 500 TABLET ORAL at 20:48

## 2019-04-08 NOTE — CARE PLAN
Problem: Safety  Goal: Will remain free from injury  Patient uses call light  appropriately this shift.  Waits for assistance when needed and does not attempt self transfer.  Able to verbalize needs.  Will continue to monitor.    Problem: Pain Management  Goal: Pain level will decrease to patient's comfort goal  Tramadol 1 tab given PRN per MAR for c/o lower back pain 7/10 with adequate relief. Pt sleeps well. Will continue to monitor.

## 2019-04-08 NOTE — THERAPY
"Physical Therapy   Daily Treatment     Patient Name: Han Pat  Age:  84 y.o., Sex:  male  Medical Record #: [id number]  Today's Date: 4/8/2019     Precautions  Precautions: (P) Fall Risk, Other (See Comments)  Comments: (P) seizure prec    Subjective    Pt was sitting in w/c upon arrival and agreeable to tx. Spouse present for session, concerned if she will be able to provide 24 hours SPV     Objective    Family training: discussed options for putting bed on first floor, life alert, support of other family in the area, continuum day program, continuation of therapy upon d/c, CLOF, POC, stair training, car transfer    FIM Walking Score:  2 - Max Assistance  Walking Description:   (130ft x 1, FWW, SPV, level terrain )    FIM Stairs Score:  5 - Standby Prompting/Supervision or Set-up  Stairs Description:   (12 x 1 6\" , B UE support on unilateral HR, step to pattern, SPV)    Car transfer: SPV with use of FWW    Additional AMB: 70ft x2 incline/decline surface outside with FWW and SBA    40ft x 1 with FWW, SBA, external cues with ladder used to promote increased step length       04/08/19 1401   Precautions   Precautions Fall Risk;Other (See Comments)   Comments seizure prec   Bed Mobility    Supine to Sit Stand by Assist   Sit to Stand Stand by Assist   Rolling Supervised   Interdisciplinary Plan of Care Collaboration   Patient Position at End of Therapy Chair Alarm On;Seated;Call Light within Reach;Tray Table within Reach;Phone within Reach   PT Total Time Spent   PT Individual Total Time Spent (Mins) 60   PT Charge Group   PT Gait Training 2   PT Therapeutic Activities 2       Assessment    Pt currently at a SPV level for level terrain with FWW and for stairs. Family training has been completed for functional mobility. Pt continues to be limited by decreased step length, impaired carryover and impaired standing balance. Within session improvements in step length following external cues.     Plan    Bed " mobility training from standard bed, functional reaching activities, external cues for gait/ increased step length and improved posture, consider use of bioness for improved knee flexion

## 2019-04-08 NOTE — THERAPY
Occupational Therapy  Daily Treatment     Patient Name: Han Pat  Age:  84 y.o., Sex:  male  Medical Record #: [id number]  Today's Date: 2019     Precautions  Precautions: (P) Fall Risk, Other (See Comments)  Comments: (P) seizure prec    Safety   ADL Safety : Requires Supervision for Safety, Requires Physical Assist for Safety  Bathroom Safety: Requires Supervision for Safety, Requires Physical Assist for Safety  Comments: see FIM for ADL routine. Requires encouragement to indep complete tasks and intermittent cues for sequencing and initiation.    Subjective    Patient in bed upon OT arrival to room.  Stated he had a shower this morning, but has not had one since Saturday.  Agreeable to shower.     Objective       19 1231   Precautions   Precautions Fall Risk;Other (See Comments)   Comments seizure prec   Cognition    Safety Awareness Impaired   Initiation Impaired   Bed Mobility    Supine to Sit Supervised   Sit to Supine Minimal Assist   Skilled Intervention Verbal Cuing;Tactile Cuing;Sequencing   Interdisciplinary Plan of Care Collaboration   IDT Collaboration with  Family / Caregiver   Patient Position at End of Therapy In Bed;Call Light within Reach;Tray Table within Reach;Phone within Reach;Family / Friend in Room   Collaboration Comments spouse present    OT Total Time Spent   OT Individual Total Time Spent (Mins) 60   OT Charge Group   OT Self Care / ADL 4       FIM Eating Score:  7 - Independent  Eating Description:       FIM Grooming Score:  7 - Independent  Grooming Description:       FIM Bathing Score:  5 - Standby Prompting/Supervision or Set-up  Bathing Description:       FIM Upper Body Dressin - Standby Prompting/Supervision or Set-up  Upper Body Dressing Description:  Set-up of equipment    FIM Lower Body Dressing Score:  5 - Standby Prompting/Supervsion or Set-up  Lower Body Dressing Description:  Supervision for safety, Verbal cueing    FIM Toiletin - Standby  Prompting/Supervision or Set-up  Toileting Description:  Supervision for safety, Grab bar    FIM Toilet Transfer Score:  5 - Standby Prompting/Supervision or Set-up  Toilet Transfer Description:  Supervision for safety, Grab bar    FIM Tub/Shower Transfers Score:  5 - Standby Prompting/Supervision or Set-up  Tub/Shower Transfers Description:  Grab bar, Adaptive equipment, Supervision for safety      Assessment    Patient completing adl routine and bathroom transfer with supervision.  Needs occasional cues for initiation and safety.    Plan    Standing tolerance/balance, STS sequence and practice, UB/LB/core strengthening, endurance building, adls, simple meal prep

## 2019-04-08 NOTE — FLOWSHEET NOTE
04/08/19 1051   Events/Summary/Plan   Events/Summary/Plan 02 spot check, no needs with CPAP   Education   Education Yes - Pt. / Family has been Instructed in use of Respiratory Equipment   Respiratory WDL   Respiratory (WDL) X   Chest Exam   Respiration 18   Pulse 66   Oximetry   #Pulse Oximetry (Single Determination) Yes   Oxygen   Home O2 Use Prior To Admission? Yes   Home O2 LPM Flow 2 LPM   Home O2 Delivery Method (CPAP)   Home O2 Frequency of Use At Sleep   Pulse Oximetry 93 %   O2 Daily Delivery Respiratory  Room Air with O2 Available

## 2019-04-08 NOTE — THERAPY
"Speech Language Pathology  Daily Treatment     Patient Name: Han Pat  Age:  84 y.o., Sex:  male  Medical Record #: [id number]  Today's Date: 4/8/2019     Subjective    Pt pleasant and cooperative, completed transfer from bed to w/c with SBA. Put shoes on with aid of shoe horn brought from home independently.      Objective     04/08/19 1004   Cognition   Functional Math / Financial Management Severe (2)  (7/20 IND, MOD to MAX A to achieve 100%)   Interdisciplinary Plan of Care Collaboration   Patient Position at End of Therapy Seated;Call Light within Reach   SLP Total Time Spent   SLP Individual Total Time Spent (Mins) 60   Charge Group   SLP Cognitive Skill Development 4       FIM Eating Score:     Eating Description:       FIM Comprehension Score:  5 - Stand-by Prompting/Supervision or Set-up  Comprehension Description:  Verbal cues, Glasses    FIM Expression Score:  6 - Modified Independent  Expression Description:  Verbal cueing    FIM Social Interaction Score:  7 - Independent  Social Interaction Description:       FIM Problem Solving Score:  4 - Minimal Assistance  Problem Solving Description:  Verbal cueing, Therapy schedule    FIM Memory Score:  4 - Minimal Assistance  Memory Description:  Verbal cueing, Therapy schedule      Assessment    Simple math word problems 7/20 IND - MOD to MAX A to achieve 20/20. Pt able to complete addition equations with 100% accuracy. Unable to set up multiplication or division equations independently. Pt frequently stating \"this is so hard\" \"this is depressing\" as indicators of performance and increasing awareness of need for assistance at home upon d/c.     Plan    Continue to target attention, memory, problem solving.     "

## 2019-04-08 NOTE — PROGRESS NOTES
Rehab Progress Note     Encounter Date: 4/7/2019     CC: Encephalopathy    Interval Events (Subjective)  The patient notes that his cognition and endurance are slowly improving.  He is complaining of difficulty sleeping because of a bar in his bed.    Review of systems denies fevers, chills, nausea or vomiting.    Nausea, vomiting, chest pain.     Objective:  VITAL SIGNS:   Vitals:    04/07/19 0500 04/07/19 0700 04/07/19 1400 04/07/19 1900   BP:  110/52 110/64 129/63   Pulse:  63 74 74   Resp:  18 18 18   Temp:  36.6 °C (97.9 °F) 36.4 °C (97.6 °F) 36.4 °C (97.5 °F)   TempSrc:  Oral Oral Oral   SpO2:  90% 94% 94%   Weight: 91.5 kg (201 lb 11.5 oz)      Height:           Constitutional: NAD, I examined the patient while working on coordination with occupational therapy.  HENT: NCAT, oral pharynx clear   Eyes: EOMI. PERRL  Neck: supple, no LA  Cardiovascular: RRR, nl S1/S2, no murmurs.  Thorax & Lungs: CTA bilaterally  Abdomen: soft, non-tender, non-distended, BS present.  Skin: warm/dry/intact  Genitalia: Deferred  Rectal: deferred  Extremities: no C/C/E, distal pulses 3+ and symmetric  Neurologic: Alert and oriented x3    No results found for this or any previous visit (from the past 72 hour(s)).    Current Facility-Administered Medications   Medication Frequency   • levETIRAcetam (KEPPRA) tablet 750 mg Q12HRS   • cefUROXime (CEFTIN) tablet 500 mg Q12HRS   • lidocaine (LIDODERM) 5 % 1 Patch Q24HR   • OCUVITE-LUTEIN tablet 1 tablet DAILY   • ferrous sulfate tablet 325 mg BID WITH MEALS   • vitamin D (cholecalciferol) tablet 2,000 Units DAILY   • Respiratory Care per Protocol Continuous RT   • Pharmacy Consult Request ...Pain Management Review 1 Each PHARMACY TO DOSE   • oxyCODONE immediate-release (ROXICODONE) tablet 5 mg Q3HRS PRN   • acetaminophen (TYLENOL) tablet 650 mg Q4HRS PRN   • senna-docusate (PERICOLACE or SENOKOT S) 8.6-50 MG per tablet 2 Tab BID    And   • polyethylene glycol/lytes (MIRALAX) PACKET 1  Packet QDAY PRN    And   • magnesium hydroxide (MILK OF MAGNESIA) suspension 30 mL QDAY PRN    And   • bisacodyl (DULCOLAX) suppository 10 mg QDAY PRN   • ascorbic acid tablet 500 mg BID WITH MEALS   • therapeutic multivitamin-minerals (THERAGRAN-M) tablet 1 Tab DAILY WITH LUNCH   • ondansetron (ZOFRAN ODT) dispertab 4 mg Q4HRS PRN   • acetaminophen (TYLENOL) tablet 650 mg Q6HRS PRN   • hydroxyurea (HYDREA) capsule 1,000 mg Once per day on Sun Sat   • hydroxyurea (HYDREA) capsule 1,500 mg Once per day on Mon Tue Wed Thu Fri   • omeprazole (PRILOSEC) capsule 20 mg QHS   • simvastatin (ZOCOR) tablet 20 mg Q EVENING   • tamsulosin (FLOMAX) capsule 0.4 mg QHS   • tramadol (ULTRAM) 50 MG tablet 50 mg Q6HRS PRN   • enoxaparin (LOVENOX) inj 40 mg DAILY       Orders Placed This Encounter   Procedures   • Diet Order Regular     Standing Status:   Standing     Number of Occurrences:   1     Order Specific Question:   Diet:     Answer:   Regular [1]     Order Specific Question:   Consistency/Fluid modifications:     Answer:   Thin Liquids [3]         Intake/Output Summary (Last 24 hours) at 04/07/19 2244  Last data filed at 04/07/19 2031   Gross per 24 hour   Intake             1080 ml   Output              200 ml   Net              880 ml         Assessment:  Active Hospital Problems    Diagnosis   • *Acute encephalopathy   • Weakness   • Anemia   • Hyponatremia   • Lower back pain   • Thrombocytosis (HCC)   • Benign prostatic hyperplasia without lower urinary tract symptoms   • Mixed hyperlipidemia   • Obstructive sleep apnea   • Gastroesophageal reflux disease   • Obesity (BMI 30.0-34.9)       Medical Decision Making and Plan:      Acute encephalopathy  Continue conference of inpatient rehabilitation.  Continue Keppra    Gastroesophageal reflux disease  Continue omeprazole    Acute cystitis without hematuria  I reviewed the UA and urine culture.  Continue Cefotan for now.  Urine culture negative so far. We discussed urinary  tract infections, the importance of treating.  I again reviewed the urine culture.  Continue antibiotics for now and primary team to evaluate on Monday.    Insomnia.  We discussed bed conditions.  Patient declines additional medications for insomnia.  I had a discussion with nursing and we discussed strategies to evaluate the patient's bed optimize the patient's sleep.    Total time:  37 minutes.  I spent greater than 50% of the time for patient care and coordination on this date, including unit/floor time, and face-to-face time with the patient as per assessment and plan above.    Mello Pérez M.D.  Physical medicine rehabilitation  [facility]

## 2019-04-08 NOTE — FLOWSHEET NOTE
04/07/19 1055   Events/Summary/Plan   Events/Summary/Plan 02 spot check   Education   Education Yes - Pt. / Family has been Instructed in use of Respiratory Equipment   Respiratory WDL   Respiratory (WDL) X   Chest Exam   Respiration 18   Pulse 69   Oximetry   #Pulse Oximetry (Single Determination) Yes   Oxygen   Home O2 Use Prior To Admission? Yes   Home O2 LPM Flow 2 LPM   Home O2 Delivery Method (CPAP)   Home O2 Frequency of Use At Sleep   Pulse Oximetry 94 %   O2 Daily Delivery Respiratory  Room Air with O2 Available

## 2019-04-08 NOTE — PROGRESS NOTES
Rehab Progress Note     Encounter Date: 4/8/2019    CC: encephalopathy    Interval Events (Subjective)  He reports significant concern associated with his inability to do math today with speech therapy.  He is participating well with therapy, reports sleeping well last night  He denies any significant pain, no headaches  No episodes of seizure    ROS:  Gen: No fatigue, mild confusion, no significant weight loss  Eyes: no blurry vision, double vision or pain in eyes  ENT: no changes in hearing, runny nose, nose bleeds, sinus pain  CV: No CP, palpitations,   Lungs: no shortness of breath, changes in secretions, changes in cough, pain with coughing  Abd: No abd pain, nausea, vomiting, pain with eating  : no blood in urine, suprapubic pain  Ext: No swelling in legs, asymmetric atrophy  Neuro: no changes in strength or sensation  Skin: no new ulcers/skin breakdown appreciated by patient  Mood: No changes in mood today, increase in depression or anxiety  Heme: no bruising, or bleeding  Rest of 14 point review of systems is negative      Objective:  Vitals: /54   Pulse 66   Temp 36.6 °C (97.8 °F) (Oral)   Resp 18   Ht 1.829 m (6')   Wt 91.5 kg (201 lb 11.5 oz)   SpO2 93%   Gen: NAD, Body mass index is 27.36 kg/m².  Sitting comfortably in bed  HEENT: NC/AT, PERRLA, moist mucous membranes  Cardio: RRR, no mumurs  Pulm: CTAB, with normal respiratory effort  Abd: Soft NTND, active bowel sounds  Ext: No peripheral edema. +dorsal pedalis pulses bilaterally.      No results found for this or any previous visit (from the past 72 hour(s)).    Current Facility-Administered Medications   Medication Frequency   • levETIRAcetam (KEPPRA) tablet 750 mg Q12HRS   • cefUROXime (CEFTIN) tablet 500 mg Q12HRS   • lidocaine (LIDODERM) 5 % 1 Patch Q24HR   • OCUVITE-LUTEIN tablet 1 tablet DAILY   • ferrous sulfate tablet 325 mg BID WITH MEALS   • vitamin D (cholecalciferol) tablet 2,000 Units DAILY   • Respiratory Care per Protocol  Continuous RT   • Pharmacy Consult Request ...Pain Management Review 1 Each PHARMACY TO DOSE   • oxyCODONE immediate-release (ROXICODONE) tablet 5 mg Q3HRS PRN   • acetaminophen (TYLENOL) tablet 650 mg Q4HRS PRN   • senna-docusate (PERICOLACE or SENOKOT S) 8.6-50 MG per tablet 2 Tab BID    And   • polyethylene glycol/lytes (MIRALAX) PACKET 1 Packet QDAY PRN    And   • magnesium hydroxide (MILK OF MAGNESIA) suspension 30 mL QDAY PRN    And   • bisacodyl (DULCOLAX) suppository 10 mg QDAY PRN   • ascorbic acid tablet 500 mg BID WITH MEALS   • therapeutic multivitamin-minerals (THERAGRAN-M) tablet 1 Tab DAILY WITH LUNCH   • ondansetron (ZOFRAN ODT) dispertab 4 mg Q4HRS PRN   • acetaminophen (TYLENOL) tablet 650 mg Q6HRS PRN   • hydroxyurea (HYDREA) capsule 1,000 mg Once per day on Sun Sat   • hydroxyurea (HYDREA) capsule 1,500 mg Once per day on Mon Tue Wed Thu Fri   • omeprazole (PRILOSEC) capsule 20 mg QHS   • simvastatin (ZOCOR) tablet 20 mg Q EVENING   • tamsulosin (FLOMAX) capsule 0.4 mg QHS   • tramadol (ULTRAM) 50 MG tablet 50 mg Q6HRS PRN   • enoxaparin (LOVENOX) inj 40 mg DAILY       Orders Placed This Encounter   Procedures   • Diet Order Regular     Standing Status:   Standing     Number of Occurrences:   1     Order Specific Question:   Diet:     Answer:   Regular [1]     Order Specific Question:   Consistency/Fluid modifications:     Answer:   Thin Liquids [3]       Assessment:  Active Hospital Problems    Diagnosis   • *Acute encephalopathy   • Weakness   • Acute cystitis without hematuria   • Anemia   • Hyponatremia   • Lower back pain   • Thrombocytosis (HCC)   • Benign prostatic hyperplasia without lower urinary tract symptoms   • Mixed hyperlipidemia   • Obstructive sleep apnea   • Gastroesophageal reflux disease   • Obesity (BMI 30.0-34.9)       Medical Decision Making and Plan:    Acute encephalopathy: Cognitive deficits, question seizure versus mild dementia versus metabolic  -Continue comprehensive  acute rehabilitation  -Continue Keppra 750 mg twice daily  -Continue with speech therapy for higher level cognition and problem solving    Lower extremity weakness: Question Demetrius's paralysis secondary to seizure versus spinal stenosis/myelopathy  -Outpatient referral to Dr. laureano for further workup, question and nerve conduction study/EMG    Neurogenic bladder: Bladder incontinence complicated by BPH  -Continue Flomax 0.4 mg nightly, monitor for orthostatic hypotension  -Timed voiding     UA: Peraglie started on Cefitin  -Urine culture is negative will DC cefuroxime    FREYA:  -Continue with home CPAP    Dyslipidemia:  Continue with simvastatin 20 mg nightly    Chronic thrombocytosis:  Continue with hydroxyurea    Vitamin D deficiency: Vitamin D level of 10  -Increase vitamin D supplementation, cholecalciferol to 4000 units daily    Total time:  30 minutes.  I spent greater than 50% of the time for patient care and coordination on this date, including unit/floor time, and face-to-face time with the patient as per assessment and plan above.    Stanford Adler D.O.

## 2019-04-08 NOTE — CARE PLAN
Problem: Recreation Therapy  Goal: STG-Within one week, patient will demonstrate a standing tolerance  By standing for 25% of the time while performing cognitive leisure at the mat.    Outcome: MET Date Met: 04/08/19    Goal: STG-Within one week, patient will actively engage in planning of community re-integration outing  Outcome: MET Date Met: 04/08/19    Goal: LTG-By discharge, patient will demonstrate a standing tolerance  By standing for 50% of the time while performing cognitive leisure at the mat.    Outcome: DISCHARGED-GOAL NOT MET Date Met: 04/08/19    Goal: LTG-By discharge, patient will participate in a community re-integration outing  Specifically focusing on adaptive leisure.    Outcome: MET Date Met: 04/08/19

## 2019-04-08 NOTE — THERAPY
"Recreational Therapy  Daily Treatment     Patient Name: Han Pat  AGE:  84 y.o., SEX:  male  Medical Record #: [id number]  Today's Date: 4/8/2019       Subjective    \"I'm having trouble pick this up.\"       Objective       04/08/19 1310   Functional Ability Status - Cognitive   Attention Span Remains on Task   Comprehension Follows Two Step Commands   Judgment Able to Make Independent Decisions   Cognitive Comments Had some trouble with awareness of how the reflections would work in the activity.    Functional Ability Status - Emotional    Affect Appropriate   Mood Appropriate   Behavior Appropriate   Skilled Intervention    Skilled Intervention Cognitive Leisure;Fine Motor Leisure   Skilled Intervention Comments A maze using a lazer and mirrors to get the lazer to the correct place. Worked on forward thinking and spacial awareness.    Interdisciplinary Plan of Care Collaboration   IDT Collaboration with  Nursing   Patient Position at End of Therapy Tray Table within Reach;Call Light within Reach;Seated   Collaboration Comments Helping Pt in the restroom prior to the session.    Treatment Time   Total Time Spent (mins) 30   Procedural Tracking   Procedural Tracking Fine Motor Functional Leisure Skills;Cognitive Skills Training       Assessment    He was provided an activity in which he needed to manipulate mirrors in a way to get them to move the lazer from the start of the maze to the end. He was able with a lot of help to complete 9 of the levels. He required a lot of cueing to figure out what was needed to complete each level. Showed no frustration and kept working.      Plan    Continue to work on cognitive leisure with Pt.   "

## 2019-04-09 ENCOUNTER — TELEPHONE (OUTPATIENT)
Dept: MEDICAL GROUP | Facility: MEDICAL CENTER | Age: 84
End: 2019-04-09

## 2019-04-09 PROCEDURE — 700102 HCHG RX REV CODE 250 W/ 637 OVERRIDE(OP): Performed by: PHYSICAL MEDICINE & REHABILITATION

## 2019-04-09 PROCEDURE — 97535 SELF CARE MNGMENT TRAINING: CPT

## 2019-04-09 PROCEDURE — 700111 HCHG RX REV CODE 636 W/ 250 OVERRIDE (IP): Performed by: PHYSICAL MEDICINE & REHABILITATION

## 2019-04-09 PROCEDURE — 97530 THERAPEUTIC ACTIVITIES: CPT

## 2019-04-09 PROCEDURE — G0515 COGNITIVE SKILLS DEVELOPMENT: HCPCS

## 2019-04-09 PROCEDURE — 770010 HCHG ROOM/CARE - REHAB SEMI PRIVAT*

## 2019-04-09 PROCEDURE — A9270 NON-COVERED ITEM OR SERVICE: HCPCS | Performed by: PHYSICAL MEDICINE & REHABILITATION

## 2019-04-09 PROCEDURE — 99232 SBSQ HOSP IP/OBS MODERATE 35: CPT | Performed by: PHYSICAL MEDICINE & REHABILITATION

## 2019-04-09 PROCEDURE — 700101 HCHG RX REV CODE 250: Performed by: PHYSICAL MEDICINE & REHABILITATION

## 2019-04-09 RX ADMIN — Medication 1 TABLET: at 09:00

## 2019-04-09 RX ADMIN — TAMSULOSIN HYDROCHLORIDE 0.4 MG: 0.4 CAPSULE ORAL at 20:54

## 2019-04-09 RX ADMIN — OXYCODONE HYDROCHLORIDE AND ACETAMINOPHEN 500 MG: 500 TABLET ORAL at 09:01

## 2019-04-09 RX ADMIN — ENOXAPARIN SODIUM 40 MG: 100 INJECTION SUBCUTANEOUS at 09:01

## 2019-04-09 RX ADMIN — HYDROXYUREA 1500 MG: 500 CAPSULE ORAL at 20:54

## 2019-04-09 RX ADMIN — LEVETIRACETAM 750 MG: 500 TABLET ORAL at 20:54

## 2019-04-09 RX ADMIN — VITAMIN D, TAB 1000IU (100/BT) 4000 UNITS: 25 TAB at 09:00

## 2019-04-09 RX ADMIN — OMEPRAZOLE 20 MG: 20 CAPSULE, DELAYED RELEASE ORAL at 20:54

## 2019-04-09 RX ADMIN — ACETAMINOPHEN 650 MG: 325 TABLET, FILM COATED ORAL at 09:03

## 2019-04-09 RX ADMIN — SIMVASTATIN 20 MG: 20 TABLET, FILM COATED ORAL at 20:55

## 2019-04-09 RX ADMIN — MULTIPLE VITAMINS W/ MINERALS TAB 1 TABLET: TAB at 11:47

## 2019-04-09 RX ADMIN — FERROUS SULFATE TAB 325 MG (65 MG ELEMENTAL FE) 325 MG: 325 (65 FE) TAB at 09:01

## 2019-04-09 RX ADMIN — FERROUS SULFATE TAB 325 MG (65 MG ELEMENTAL FE) 325 MG: 325 (65 FE) TAB at 17:57

## 2019-04-09 RX ADMIN — OXYCODONE HYDROCHLORIDE AND ACETAMINOPHEN 500 MG: 500 TABLET ORAL at 17:57

## 2019-04-09 RX ADMIN — SENNOSIDES AND DOCUSATE SODIUM 2 TABLET: 8.6; 5 TABLET ORAL at 20:54

## 2019-04-09 RX ADMIN — LIDOCAINE 1 PATCH: 50 PATCH TOPICAL at 09:12

## 2019-04-09 RX ADMIN — LEVETIRACETAM 750 MG: 500 TABLET ORAL at 09:00

## 2019-04-09 ASSESSMENT — 10 METER WALK TEST (10METWT)
TRIAL 2: TIME TO WALK 10 METERS: 19.6
TRIAL 1: TIME TO WALK 10 METERS: 17.8
TRIAL 3: TIME TO WALK 10 METERS: 13.72
TRIAL 3: TIME TO WALK 10 METERS: 22.14
AVERAGE VELOCITY - METERS PER SECOND: .3
AVERAGE VELOCITY - METERS PER SECOND: .43
TRIAL 2: TIME TO WALK 10 METERS: 13.92
AVERAGE TIME - SECONDS: 19.85
TRIAL 1: TIME TO WALK 10 METERS: 13.93

## 2019-04-09 NOTE — THERAPY
Speech Language Pathology  Daily Treatment     Patient Name: Han Pat  Age:  84 y.o., Sex:  male  Medical Record #: [id number]  Today's Date: 4/9/2019     Subjective    Pt pleasant and cooperative.      Objective       04/09/19 0934   Cognition   Functional Math / Financial Management Moderate (3)   Interdisciplinary Plan of Care Collaboration   Patient Position at End of Therapy Seated;Call Light within Reach   SLP Total Time Spent   SLP Individual Total Time Spent (Mins) 60   Charge Group   SLP Cognitive Skill Development 4           Assessment    Simple money calculations (identifying coins to determine which is worth more, I.e. 5 dimes/2 quarters vs. 8 nickels/3 dimes). Pt correctly identified 18/20. Simple math to calculate change - 40% IND, MOD cues to achieve 100%.     Plan    Continue to target simple math, attention, recall

## 2019-04-09 NOTE — CARE PLAN
Problem: Bowel/Gastric:  Goal: Normal bowel function is maintained or improved  Had a medium BM today and he felt a little better. He was worried about getting constipated.

## 2019-04-09 NOTE — TELEPHONE ENCOUNTER
ESTABLISHED PATIENT PRE-VISIT PLANNING     Patient was NOT contacted to complete PVP.     Note: Patient will not be contacted if there is no indication to call.     1.  Reviewed notes from the last few office visits within the medical group: Yes    2.  If any orders were placed at last visit or intended to be done for this visit (i.e. 6 mos follow-up), do we have Results/Consult Notes?        •  Labs - Labs were not ordered at last office visit.   Note: If patient appointment is for lab review and patient did not complete labs, check with provider if OK to reschedule patient until labs completed.       •  Imaging - Imaging was not ordered at last office visit.       •  Referrals - No referrals were ordered at last office visit.    3. Is this appointment scheduled as a Hospital Follow-Up? Yes, visit was at [facility].     4.  Immunizations were updated in Epic using WebIZ?: Epic matches WebIZ       •  Web Iz Recommendations: HEPATITIS B, MMR , PREVNAR (PCV13) , TDAP and SHINGRIX (Shingles)    5.  Patient is due for the following Health Maintenance Topics:   Health Maintenance Due   Topic Date Due   • Annual Wellness Visit  1935   • IMM DTaP/Tdap/Td Vaccine (1 - Tdap) 03/17/1956   • IMM PNEUMOCOCCAL 65+ (ADULT) LOW/MEDIUM RISK SERIES (1 of 2 - PCV13) 03/17/2002   • IMM ZOSTER VACCINES (2 of 3) 04/07/2011       6. Orders for overdue Health Maintenance topics pended in Pre-Charting? NO    7.  AHA (MDX) form printed for Provider? YES    8.  Patient was NOT informed to arrive 15 min prior to their scheduled appointment and bring in their medication bottles.

## 2019-04-09 NOTE — CARE PLAN
Problem: Bowel/Gastric:  Goal: Normal bowel function is maintained or improved  Pt continent of bowel. On bowel meds. LBM 4/8/19.    Problem: Urinary Elimination:  Goal: Ability to reestablish a normal urinary elimination pattern will improve    Intervention: Assess and monitor for signs and symptoms of urinary retention  Pt continent of bladder. On bladder meds. Voiding adequately in the BR and using urinal. PVR scan 250ml.

## 2019-04-09 NOTE — THERAPY
Occupational Therapy  Daily Treatment     Patient Name: Han Pat  Age:  84 y.o., Sex:  male  Medical Record #: [id number]  Today's Date: 2019     Precautions  Precautions: (P) Fall Risk, Other (See Comments)  Comments: (P) seizure prec    Safety   ADL Safety : Requires Supervision for Safety, Requires Physical Assist for Safety  Bathroom Safety: Requires Supervision for Safety, Requires Physical Assist for Safety  Comments: see FIM for ADL routine. Requires encouragement to indep complete tasks and intermittent cues for sequencing and initiation.    Subjective    Patient using restroom upon OT arrival.  Stated he took himself there.     Objective       19 1031   Precautions   Precautions Fall Risk;Other (See Comments)   Comments seizure prec   Cognition    Safety Awareness Impaired   Attention Impaired   Initiation Impaired   Sitting Upper Body Exercises   Sitting Upper Body Exercises Yes   Shoulder Press 2 sets of 10;Right ;Left;Weight (See Comments for lbs)   Bicep Curls 2 sets of 10;Right ;Left;Weight (See Comments for lbs)   Comments 4 lb weight   IADL Treatments   Kitchen Mobility Education SBA for kitchen mobility with FWW   Meal Preparation Patient prepared instant pudding from a box with 3 step instructions while standing with min A overall to thoroughly stir/mix ingredients.  Cleaned up work space with SBA while standing with min verbal cues.   Balance   Standing Balance (Static) Fair -  (with no UE support)   Standing Balance (Dynamic) Fair -  (with UE support of FWW during kitchen mobility)   Interdisciplinary Plan of Care Collaboration   Patient Position at End of Therapy Seated  (propelling self to dining room for lunch)   OT Total Time Spent   OT Individual Total Time Spent (Mins) 60   OT Charge Group   OT Self Care / ADL 1   OT Therapy Activity 3       FIM Grooming Score:  7 - Independent  Grooming Description:       FIM Toiletin - Standby Prompting/Supervision or  Set-up  Toileting Description:  Grab bar, Supervision for safety, Set-up of equipment (supervised and setup with cue for safety to lock brake)    FIM Toilet Transfer Score:  5 - Standby Prompting/Supervision or Set-up  Toilet Transfer Description:  Grab bar, Supervision for safety, Set-up of equipment (setup and supervised with grab bar, cue to lock w/c brake)      Assessment    Patient took himself to the bathroom, but did make it there safely.  Required min A and min verbal cues for simple, three step task of making instant pudding from a box.    Plan    Standing tolerance/balance, UB/LB/core strengthening, ADL routine on Wednesday for d/c FIMs.

## 2019-04-09 NOTE — REHAB-PHARMACY IDT TEAM NOTE
Pharmacy   Pharmacy  Antibiotics/Antifungals/Antivirals:  Medication:      Active Orders     None        Route:         n/a  Stop Date:  n/a  Reason:   Antihypertensives/Cardiac:  Medication:    Orders (72h ago through future)    Start     Ordered    03/29/19 2100  simvastatin (ZOCOR) tablet 20 mg  EVERY EVENING      03/29/19 1211        Patient Vitals for the past 24 hrs:   BP Pulse   04/08/19 2000 114/60 70   04/08/19 1400 108/64 90   04/08/19 1051 - 66       Anticoagulation:  Medication:  Lovenox  INR:      Other key medications: Hydroxyurea, levetiracetam, omeprazole, tamsulosin.   A review of the medication list reveals no issues at this time.  Section completed by:  Rajwinder Melvin RPH

## 2019-04-09 NOTE — PROGRESS NOTES
Rehab Progress Note     Encounter Date: 4/9/2019    CC: Encephalopathy    Interval Events (Subjective)  Patient reports he is doing well this morning, complains of pain in his lower extremities associated therapy.  This is not prohibiting him from doing anything.  No episodes of seizure  He denies any significant headache    His wife is very concerned/anxious about taking patient home.      ROS:  Gen: No fatigue, no confusion today, significant weight loss  Eyes: no blurry vision, double vision or pain in eyes  ENT: no changes in hearing, runny nose, nose bleeds, sinus pain  CV: No CP, palpitations,   Lungs: no shortness of breath, changes in secretions, changes in cough, pain with coughing  Abd: No abd pain, nausea, vomiting, pain with eating  : no blood in urine, suprapubic pain  Ext: No swelling in legs, asymmetric atrophy  Neuro: no changes in strength or sensation today  Skin: no new ulcers/skin breakdown appreciated by patient  Mood: No changes in mood today, increase in depression or anxiety  Heme: no bruising, or bleeding  Rest of 14 point review of systems is negative    Objective:  Vitals: /67   Pulse 91   Temp 36.7 °C (98.1 °F) (Oral)   Resp 18   Ht 1.829 m (6')   Wt 91.5 kg (201 lb 11.5 oz)   SpO2 92%   Gen: NAD, Body mass index is 27.36 kg/m².  Sitting comfortably in wheelchair  HEENT: NC/AT, PERRLA, moist mucous membranes  Cardio: RRR, no mumurs  Pulm: CTAB, with normal respiratory effort  Abd: Soft NTND, active bowel sounds,   Ext: No peripheral edema. +dorsal pedalis pulses bilaterally.      No results found for this or any previous visit (from the past 72 hour(s)).    Current Facility-Administered Medications   Medication Frequency   • vitamin D (cholecalciferol) tablet 4,000 Units DAILY   • levETIRAcetam (KEPPRA) tablet 750 mg Q12HRS   • lidocaine (LIDODERM) 5 % 1 Patch Q24HR   • OCUVITE-LUTEIN tablet 1 tablet DAILY   • ferrous sulfate tablet 325 mg BID WITH MEALS   • Respiratory Care  per Protocol Continuous RT   • Pharmacy Consult Request ...Pain Management Review 1 Each PHARMACY TO DOSE   • oxyCODONE immediate-release (ROXICODONE) tablet 5 mg Q3HRS PRN   • acetaminophen (TYLENOL) tablet 650 mg Q4HRS PRN   • senna-docusate (PERICOLACE or SENOKOT S) 8.6-50 MG per tablet 2 Tab BID    And   • polyethylene glycol/lytes (MIRALAX) PACKET 1 Packet QDAY PRN    And   • magnesium hydroxide (MILK OF MAGNESIA) suspension 30 mL QDAY PRN    And   • bisacodyl (DULCOLAX) suppository 10 mg QDAY PRN   • ascorbic acid tablet 500 mg BID WITH MEALS   • therapeutic multivitamin-minerals (THERAGRAN-M) tablet 1 Tab DAILY WITH LUNCH   • ondansetron (ZOFRAN ODT) dispertab 4 mg Q4HRS PRN   • acetaminophen (TYLENOL) tablet 650 mg Q6HRS PRN   • hydroxyurea (HYDREA) capsule 1,000 mg Once per day on Sun Sat   • hydroxyurea (HYDREA) capsule 1,500 mg Once per day on Mon Tue Wed Thu Fri   • omeprazole (PRILOSEC) capsule 20 mg QHS   • simvastatin (ZOCOR) tablet 20 mg Q EVENING   • tamsulosin (FLOMAX) capsule 0.4 mg QHS   • tramadol (ULTRAM) 50 MG tablet 50 mg Q6HRS PRN   • enoxaparin (LOVENOX) inj 40 mg DAILY       Orders Placed This Encounter   Procedures   • Diet Order Regular     Standing Status:   Standing     Number of Occurrences:   1     Order Specific Question:   Diet:     Answer:   Regular [1]     Order Specific Question:   Consistency/Fluid modifications:     Answer:   Thin Liquids [3]       Assessment:  Active Hospital Problems    Diagnosis   • *Acute encephalopathy   • Weakness   • Acute cystitis without hematuria   • Anemia   • Hyponatremia   • Lower back pain   • Thrombocytosis (HCC)   • Benign prostatic hyperplasia without lower urinary tract symptoms   • Mixed hyperlipidemia   • Obstructive sleep apnea   • Gastroesophageal reflux disease   • Obesity (BMI 30.0-34.9)       Medical Decision Making and Plan:    Acute encephalopathy: Cognitive deficits, question seizure versus mild dementia versus metabolic  encephalopathy  - Continue comprehensive acute rehabilitation with significant improvement in cognitive and functional deficits  -Continue with Keppra 750 mg twice a day, patient will need to follow-up with outpatient neurology  -Continue with speech therapy for higher level cognitive problem solving, discussed possibility of further home exercises  -Prolonged discussion with team and patient and family on coordination of home evaluation and caregiver training with the goal of improving confidence.    Lower extremity weakness: Question Demetrius's paralysis, secondary to seizure versus spinal stenosis/myelopathy  - Outpatient referral to Dr. laureano for further EMG workup    Neurogenic bladder: Bladder incontinence complicated by BPH  -Continue with Flomax 0.4 mg nightly, monitor for orthostatic hypotension  -Timed voiding every 3-4 hours    FREYA:  -Continue with home CPAP    Dyslipidemia:  -Continue with simvastatin 20 mg nightly    Chronic thrombocytosis:  -Continue with hydroxyurea    Vitamin D deficiency: Vitamin D level of 10  -Continue with vitamin D cholecalciferol 4000 units daily  -Will need follow-up as outpatient to recheck vitamin D level in approximately 4 weeks.    Total time:  25 minutes.  I spent greater than 50% of the time for patient care and coordination on this date, including unit/floor time, and face-to-face time with the patient as per assessment and plan above.    Stanford Adler D.O.

## 2019-04-09 NOTE — DISCHARGE PLANNING
Case Management:  Met with wife at length today.  She is very anxious about patient's discharge this week.  She did not gain comfort from suggestions from his therapy session yesterday.  She is concerned for falls and stairs at home.  She expresses feeling overwhelmed and anxious about her ability to provide 24 hr supervision.  I suggested she schedule family members to assist when she needs to run errands.  She is very aware of the [facility] and the services they provide.  She confirms that her  wants to return home and not go to a skilled facility.  She [facility] at [facility] but says they won't plan for that since patient is wanting to go directly home.  I assured her that he has done well with his therapy and has been able to go up and down steps here.  She requests home eval and I have sent this message to his treating team.  Will continue to follow.

## 2019-04-10 ENCOUNTER — HOME HEALTH ADMISSION (OUTPATIENT)
Dept: HOME HEALTH SERVICES | Facility: HOME HEALTHCARE | Age: 84
End: 2019-04-10
Payer: MEDICARE

## 2019-04-10 PROBLEM — N30.00 ACUTE CYSTITIS WITHOUT HEMATURIA: Status: RESOLVED | Noted: 2019-04-06 | Resolved: 2019-04-10

## 2019-04-10 PROBLEM — E87.1 HYPONATREMIA: Status: RESOLVED | Noted: 2019-03-24 | Resolved: 2019-04-10

## 2019-04-10 PROBLEM — D64.9 ANEMIA: Status: RESOLVED | Noted: 2019-03-25 | Resolved: 2019-04-10

## 2019-04-10 PROCEDURE — A9270 NON-COVERED ITEM OR SERVICE: HCPCS | Performed by: PHYSICAL MEDICINE & REHABILITATION

## 2019-04-10 PROCEDURE — 97110 THERAPEUTIC EXERCISES: CPT

## 2019-04-10 PROCEDURE — 700102 HCHG RX REV CODE 250 W/ 637 OVERRIDE(OP): Performed by: PHYSICAL MEDICINE & REHABILITATION

## 2019-04-10 PROCEDURE — G0515 COGNITIVE SKILLS DEVELOPMENT: HCPCS

## 2019-04-10 PROCEDURE — 97530 THERAPEUTIC ACTIVITIES: CPT

## 2019-04-10 PROCEDURE — 700111 HCHG RX REV CODE 636 W/ 250 OVERRIDE (IP): Performed by: PHYSICAL MEDICINE & REHABILITATION

## 2019-04-10 PROCEDURE — 94760 N-INVAS EAR/PLS OXIMETRY 1: CPT

## 2019-04-10 PROCEDURE — 770010 HCHG ROOM/CARE - REHAB SEMI PRIVAT*

## 2019-04-10 PROCEDURE — 99233 SBSQ HOSP IP/OBS HIGH 50: CPT | Performed by: PHYSICAL MEDICINE & REHABILITATION

## 2019-04-10 PROCEDURE — 700101 HCHG RX REV CODE 250: Performed by: PHYSICAL MEDICINE & REHABILITATION

## 2019-04-10 RX ORDER — SIMVASTATIN 20 MG
20 TABLET ORAL EVERY EVENING
Qty: 30 TAB | Refills: 3 | Status: SHIPPED | OUTPATIENT
Start: 2019-04-10 | End: 2020-03-12 | Stop reason: SDUPTHER

## 2019-04-10 RX ORDER — ACETAMINOPHEN 325 MG/1
650 TABLET ORAL EVERY 6 HOURS PRN
Qty: 30 TAB | Refills: 3 | Status: SHIPPED | OUTPATIENT
Start: 2019-04-10 | End: 2019-11-13

## 2019-04-10 RX ORDER — OMEPRAZOLE 20 MG/1
20 CAPSULE, DELAYED RELEASE ORAL
Qty: 30 CAP | Refills: 3 | Status: SHIPPED | OUTPATIENT
Start: 2019-04-10 | End: 2019-04-16

## 2019-04-10 RX ORDER — LEVETIRACETAM 750 MG/1
750 TABLET ORAL EVERY 12 HOURS
Qty: 60 TAB | Refills: 3 | Status: SHIPPED | OUTPATIENT
Start: 2019-04-10 | End: 2019-04-29

## 2019-04-10 RX ORDER — FERROUS SULFATE 325(65) MG
325 TABLET ORAL 2 TIMES DAILY WITH MEALS
Qty: 30 TAB | Refills: 3 | Status: SHIPPED | OUTPATIENT
Start: 2019-04-10 | End: 2021-02-26

## 2019-04-10 RX ORDER — LIDOCAINE 50 MG/G
1 PATCH TOPICAL EVERY 24 HOURS
Qty: 30 PATCH | Refills: 3 | Status: SHIPPED | OUTPATIENT
Start: 2019-04-11 | End: 2019-11-13

## 2019-04-10 RX ORDER — TAMSULOSIN HYDROCHLORIDE 0.4 MG/1
0.4 CAPSULE ORAL
Qty: 30 CAP | Refills: 3 | Status: SHIPPED | OUTPATIENT
Start: 2019-04-10 | End: 2021-02-26

## 2019-04-10 RX ADMIN — VITAMIN D, TAB 1000IU (100/BT) 4000 UNITS: 25 TAB at 08:13

## 2019-04-10 RX ADMIN — SENNOSIDES AND DOCUSATE SODIUM 2 TABLET: 8.6; 5 TABLET ORAL at 08:12

## 2019-04-10 RX ADMIN — FERROUS SULFATE TAB 325 MG (65 MG ELEMENTAL FE) 325 MG: 325 (65 FE) TAB at 08:12

## 2019-04-10 RX ADMIN — SENNOSIDES AND DOCUSATE SODIUM 2 TABLET: 8.6; 5 TABLET ORAL at 19:47

## 2019-04-10 RX ADMIN — LIDOCAINE 1 PATCH: 50 PATCH TOPICAL at 08:12

## 2019-04-10 RX ADMIN — Medication 1 TABLET: at 08:21

## 2019-04-10 RX ADMIN — MULTIPLE VITAMINS W/ MINERALS TAB 1 TABLET: TAB at 12:02

## 2019-04-10 RX ADMIN — LEVETIRACETAM 750 MG: 500 TABLET ORAL at 19:47

## 2019-04-10 RX ADMIN — OXYCODONE HYDROCHLORIDE AND ACETAMINOPHEN 500 MG: 500 TABLET ORAL at 17:41

## 2019-04-10 RX ADMIN — ENOXAPARIN SODIUM 40 MG: 100 INJECTION SUBCUTANEOUS at 08:13

## 2019-04-10 RX ADMIN — TAMSULOSIN HYDROCHLORIDE 0.4 MG: 0.4 CAPSULE ORAL at 19:47

## 2019-04-10 RX ADMIN — HYDROXYUREA 1500 MG: 500 CAPSULE ORAL at 19:46

## 2019-04-10 RX ADMIN — FERROUS SULFATE TAB 325 MG (65 MG ELEMENTAL FE) 325 MG: 325 (65 FE) TAB at 17:41

## 2019-04-10 RX ADMIN — LEVETIRACETAM 750 MG: 500 TABLET ORAL at 08:12

## 2019-04-10 RX ADMIN — OMEPRAZOLE 20 MG: 20 CAPSULE, DELAYED RELEASE ORAL at 19:47

## 2019-04-10 RX ADMIN — SIMVASTATIN 20 MG: 20 TABLET, FILM COATED ORAL at 19:47

## 2019-04-10 RX ADMIN — OXYCODONE HYDROCHLORIDE AND ACETAMINOPHEN 500 MG: 500 TABLET ORAL at 08:12

## 2019-04-10 ASSESSMENT — ACTIVITIES OF DAILY LIVING (ADL)
SHOWER_TRANSFER_LEVEL_OF_ASSIST: REQUIRES SUPERVISION WITH SHOWER TRANSFER
TOILET_TRANSFER_LEVEL_OF_ASSIST: REQUIRES SUPERVISION WITH TOILET TRANSFER
TOILETING_LEVEL_OF_ASSIST: REQUIRES SUPERVISION WITH TOILETING

## 2019-04-10 NOTE — DISCHARGE PLANNING
ATTN: Case Management  RE: Referral for Home Health    As of 4/10/2019, we have accepted the Home Health referral for the patient listed above.    A [facility] clinician will be out to see the patient within 48 hours. If you have any questions or concerns regarding the patient’s transition to Home Health, please do not hesitate to contact us at [phone].      We look forward to collaborating with you,  [facility] Team

## 2019-04-10 NOTE — DISCHARGE PLANNING
Case Management;  Home eval completed today.  Spoke with patient's wife and she feels much better about d/c tomorrow.  She confirms that patient was able to go up and down his stairs x2.  She states that their son is coming in from out of state to help her with transition home.  We have referred patient to [facility] per our discussion.  Will follow for d/c tomorrow.

## 2019-04-10 NOTE — REHAB-RESPIRATORY IDT TEAM NOTE
Respiratory Therapy   Respiratory Therapy    Pt is very compliant in using his home CPAP unit nightly with 2 lpm oxygen bleed in.  His SATS are consistently in the low 90s.     completed by:  Wendie Marrero, RRT

## 2019-04-10 NOTE — REHAB-PT IDT TEAM NOTE
"Physical Therapy   Mobility  Bed mobility:   5  Bed /Chair/Wheelchair Transfer Initial:  3 - Moderate Assistance  Bed /Chair/Wheelchair Transfer Current:  5 - Standby Prompting/Supervision or Set-up   Bed/Chair/Wheelchair Transfer Description:   (bed mob: SPV; transfer: SBA; FWW)  Walk Initial:  2 - Max Assistance  Walk Current:  2 - Max Assistance   Walk Description:   (130ft x 1, FWW, SPV, level terrain )  Wheelchair Initial:  5 - Standby Prompting/Supervision or Set-up  Wheelchair Current:  5 - Standby Prompting/Supervision or Set-up   Wheelchair Description:   (Pt propelled WC from front gym to back gym  144' with use of BUEs. Extra time needed and incidental touching for direction.)  Stairs Initial:  2 - Max Assistance  Stairs Current: 5 - Standby Prompting/Supervision or Set-up   Stairs Description:  (12 x 1 6\" , B UE support on unilateral HR, step to pattern, SPV)  Patient/Family Training/Education:  Car transfer, stair training, bed mobility and AMB training with FWW with wife  DME/DC Recommendations:  Home health physical therapy, FWW  Strengths:  Effective communication skills, Making steady progress towards goals, Motivated for self care and independence, Pleasant and cooperative, Supportive family and Willingly participates in therapeutic activities  Barriers:   Decreased endurance, Generalized weakness, Poor activity tolerance, Poor balance and Lack of motivation, poor carryover of learning, poor insight/ safety awareness  # of short term goals set= 2  # of short term goals met= 2       Physical Therapy Problems           Problem: Mobility     Dates: Start: 03/30/19       Goal: STG-Within one week, patient will ambulate household distance     Dates: Start: 03/30/19       Description: Pt will ambulate household distance 150ft level surface with FWW no more than SBA consistently at .3 m/s demonstrating improving gait speed, endurance,  and independence.       Note:     Goal Note filed on 04/03/19 0931 by " "Carmencita Teresa, PT    Goal: STG-Within one week, patient will ambulate household distance  Outcome: NOT MET  Pt limited by endurance                   Problem: Mobility Transfers     Dates: Start: 03/30/19       Goal: STG-Within one week, patient will perform bed mobility     Dates: Start: 03/30/19       Description: Supine to edge of bed SBA no verbal cues and less than 2 minutes to transition once he has initiated transition, demonstrating improved efficiency and independence. LTG= sit to and from supine mod I less than 1 minute        Note:     Goal Note filed on 04/03/19 0931 by Carmencita Teresa, PT    Goal: STG-Within one week, patient will perform bed mobility  Outcome: NOT MET  Pt requires Ant d/t decreased strength                 Goal: STG-Within one week, patient will sit to stand     Dates: Start: 03/30/19       Description: Sit to stand from 20 \" w/c x 5 with use of bilateral UEs and no more than SBA in less than 1 min 30 seconds, demonstrating improved efficiency, LE power, and improved balance and independence      Note:     Goal Note filed on 04/03/19 0931 by Carmencita Teresa, PT    Goal: STG-Within one week, patient will sit to stand  Outcome: NOT MET  Pt limited by strength                   Problem: PT-Long Term Goals     Dates: Start: 03/30/19       Goal: LTG-By discharge, patient will maintain balance     Dates: Start: 03/30/19       Description: RUBIO increasing to 42/56 demonstrating low fall risk upon return home              Goal: LTG-By discharge, patient will tolerate standing     Dates: Start: 03/30/19       Description: Sit to stand from 20\" chair use of bilateral UEs to assist x 5 in 30 seconds, mod I, indicating improved strength, power, balance, and endurance necessary for decreased fall risk and independence with functional mobility.            Goal: LTG-By discharge, patient will ambulate     Dates: Start: 03/30/19       Description: Pt will ambulate household distances of 150ft at " .4m/s gait speed with FWW mod I, indicating efficiency and independence for ambulation upon return home.            Goal: LTG-By discharge, patient will transfer one surface to another     Dates: Start: 03/30/19       Description: Stand with FWW bilateral UEs to assist, step turn transfer with FWW, to various surfaces mod I            Goal: LTG-By discharge, patient will ambulate up/down flight of stairs     Dates: Start: 03/30/19       Description: Ascend/descend 17 steps with single railing no more than SUPV from wife, indicating safe entry and exiting of his home              Goal: LTG-By discharge, patient will     Dates: Start: 03/30/19       Description: Pt's goals is to be able to ambulate outdoors with 4WW limited community distances simulating walking around neighborhood at home, mod I, for long term health and independence            Goal: LTG-By discharge, patient will     Dates: Start: 03/30/19       Description: 10 MWT average gait speed .4m/s            Goal: LTG-By discharge, patient will perform home exercise program     Dates: Start: 03/30/19                     Section completed by:  Caremncita Teresa, PT

## 2019-04-10 NOTE — THERAPY
Occupational Therapy  Daily Treatment     Patient Name: Han Pat  Age:  84 y.o., Sex:  male  Medical Record #: [id number]  Today's Date: 4/10/2019     Precautions  Precautions: (P) Fall Risk, Other (See Comments)  Comments: (P) seizure prec    Safety   ADL Safety : Requires Supervision for Safety, Requires Physical Assist for Safety  Bathroom Safety: Requires Supervision for Safety, Requires Physical Assist for Safety  Comments: see FIM for ADL routine. Requires encouragement to indep complete tasks and intermittent cues for sequencing and initiation.    Subjective    Patient awake in bed upon OT arrival to room.  Stated that home evaluation went well and he should be good to go home tomorrow.     Objective       04/10/19 1231   Precautions   Precautions Fall Risk;Other (See Comments)   Comments seizure prec   Sitting Upper Body Exercises   Tricep Press 3 sets of 10;Bilateral;Weight (See Comments for lbs)  (40 lbs forward/backward on rickshaw)   Upper Extremity Bike Minutes / Rest Breaks (See Comments)  (10 minutes level 5 on motomed for endurance)   Bed Mobility    Supine to Sit Supervised   Sit to Supine Supervised   Scooting Supervised   Skilled Intervention Verbal Cuing   Interdisciplinary Plan of Care Collaboration   Patient Position at End of Therapy In Bed;Call Light within Reach;Tray Table within Reach;Phone within Reach   OT Total Time Spent   OT Individual Total Time Spent (Mins) 60   OT Charge Group   OT Therapy Activity 2   OT Therapeutic Exercise  2       Supervised for dry stall shower transfer using grab bar and shower chair.  Functional mobility with FWW from room to gym SBA/supervised with cues for upright posture and keeping walker closer to body.      FIM Lower Body Dressing Score:  5 - Standby Prompting/Supervsion or Set-up  Lower Body Dressing Description:  Increased time, Supervision for safety (supervised to don/doff shoes )      Assessment    Ready to d/c home tomorrow    Plan    D/C  home tomorrow

## 2019-04-10 NOTE — CARE PLAN
Problem: OT Long Term Goals  Goal: LTG-By discharge, patient will complete basic self care tasks  1) Individualized Goal:  Supervision to mod I with use of AE/DME PRN.  2) Interventions:  OT Group Therapy, OT Self Care/ADL, OT Cognitive Skill Dev, OT Community Reintegration, OT Manual Ther Technique, OT Neuro Re-Ed/Balance, OT Therapeutic Activity, OT Evaluation and OT Therapeutic Exercise     Outcome: MET Date Met: 04/10/19    Goal: LTG-By discharge, patient will perform bathroom transfers  1) Individualized Goal:  Supervision to mod I with use of AD/DME PRN.  2) Interventions:  OT Group Therapy, OT Self Care/ADL, OT Cognitive Skill Dev, OT Community Reintegration, OT Manual Ther Technique, OT Neuro Re-Ed/Balance, OT Therapeutic Activity, OT Evaluation and OT Therapeutic Exercise    Outcome: MET Date Met: 04/10/19

## 2019-04-10 NOTE — CARE PLAN
"Problem: Mobility Transfers  Goal: STG-Within one week, patient will perform bed mobility  Supine to edge of bed SBA no verbal cues and less than 2 minutes to transition once he has initiated transition, demonstrating improved efficiency and independence. LTG= sit to and from supine mod I less than 1 minute      Outcome: MET Date Met: 04/09/19    Goal: STG-Within one week, patient will sit to stand  Sit to stand from 20 \" w/c x 5 with use of bilateral UEs and no more than SBA in less than 1 min 30 seconds, demonstrating improved efficiency, LE power, and improved balance and independence    Outcome: MET Date Met: 04/09/19        "

## 2019-04-10 NOTE — REHAB-COLLABORATIVE ONGOING IDT TEAM NOTE
Weekly Interdisciplinary Team Conference Note    Weekly Interdisciplinary Team Conference # 2  Date:  4/10/2019    Clinicians present and reporting at team conference include the following:   MD: Stanford Adler DO   RN:  Tiffany Ramos, CAROL    PT:   Carmencita Teresa, PT, DPT  OT:  Reynaldo Ayers, MS, OTR/L   ST:  Jennifer Lawler, MS, CCC-SLP  CM:  Christi Holloway RN, CCM  REC:  None  RT:  None  RPh:  Reynaldo Julian, [facility]  Other:   None  All reporting clinicians have a working knowledge of this patient's plan of care.    Targeted DC Date:  4/11/19     Medical    Patient Active Problem List    Diagnosis Date Noted   • Weakness 03/24/2019     Priority: High   • Acute encephalopathy 03/24/2019     Priority: High   • Acute cystitis without hematuria 04/06/2019   • Cognitive impairment 03/27/2019   • Anemia 03/25/2019   • Serum total bilirubin elevated 03/24/2019   • Hyponatremia 03/24/2019   • Lower back pain 03/24/2019   • Cough 03/12/2019   • Thrombocytosis (HCC) 02/05/2019   • Benign prostatic hyperplasia without lower urinary tract symptoms 02/05/2019   • Mixed hyperlipidemia 02/05/2019   • Seasonal allergies 02/05/2019   • Vertigo 02/05/2019   • Obstructive sleep apnea 08/14/2017   • Gastroesophageal reflux disease 08/14/2017   • Obesity (BMI 30.0-34.9) 08/14/2017     Results     ** No results found for the last 24 hours. **           Nursing  Diet/Nutrition:  Regular and Thin Liquids  Medication Administration:  Whole with Liquid Wash  % consumed at meals in last 24 hours:  Consumed 25-50% of meals per documentation.  Meal/Snack Consumption for the past 24 hrs:   Oral Nutrition   04/09/19 1747 Refused   04/09/19 1216 Lunch;Less than 25% Consumed   04/09/19 0800 Breakfast;Between 25-50% Consumed       Snack schedule:  None  Appetite:  Fair  Fluid Intake/Output in past 24 hours: In: 660 [P.O.:660]  Out: 300   Admit Weight:  Weight: 91.5 kg (201 lb 11.5 oz)  Weight Last 7 Days   [91.5 kg (201 lb 11.5 oz)] 91.5 kg (201  lb 11.5 oz) (04/07 0500)    Pain Issues:    Location:  Leg (04/09 0815)  Right;Left (04/09 0815)         Severity:  Mild   Scheduled pain medications:  lidoderm  patches     PRN pain medications used in last 24 hours:  acetaminophen (TYLENOL)    Non Pharmacologic Interventions:  distraction and emotional support  Effectiveness of pain management plan:  good=patient states acceptable comfort after interventions    Bowel:    Bowel Assist Initial Score:  2 - Max Assistance  Bowel Assist Current Score:  4 - Minimal Assistance  Bowl Accidents in last 7 days:  0  Last bowel movement: 04/09/19  Stool Description: Large, Formed, Soft     Usual bowel pattern:  every other day  Scheduled bowel medications:  senna-docusate (PERICOLACE or SENOKOT S)   PRN bowel medications used in last 24 hours:  None  Nursing Interventions:  Increased time, Scheduled medication, Supervision, Verbal cueing  Effectiveness of bowel program:   fair=sometimes needs prn bowel meds for constipation  Bladder:    Bladder Assist Initial Score:  1 - Total Assistance  Bladder Assist Current Score:  4 - Minimal Assistance  Bladder Accidents in last 7 days:  0  PVR range for past 24-48 hours:  []  ()  Medications affecting bladder:  Flomax    Time void schedule/voiding pattern:  Pt initiates voiding  Interventions:  Increased time, Medication, Supervision, Verbal cueing, Emptying of device, Urinal  Effectiveness of bladder training:  continent  Sleep/wake cycle:   Average hours slept:  Sleeps 3-4 hours without waking  Sleep medication usage:  None    Patient/Family Training/Education:  Bladder Management/Training, Bowel Management/Training, Fall Prevention, General Self Care and Medication Management  Strengths: Alert and oriented, Willingly participates in therapeutic activities, Able to follow instructions, Supportive family, Pleasant and cooperative and Motivated for self care and independence   Barriers:   Fatigue and Generalized weakness             Nursing Problems           Problem: Bowel/Gastric:     Goal: Normal bowel function is maintained or improved     Flowsheet:     Taken at 04/08/19 1700    Last BM 04/08/19 by Katalina Vela, C.N.A.                Goal: Will not experience complications related to bowel motility             Problem: Communication     Goal: The ability to communicate needs accurately and effectively will improve             Problem: Discharge Barriers/Planning     Goal: Patient's continuum of care needs will be met             Problem: Infection     Goal: Will remain free from infection             Problem: Knowledge Deficit     Goal: Knowledge of disease process/condition, treatment plan, diagnostic tests, and medications will improve           Goal: Knowledge of the prescribed therapeutic regimen will improve             Problem: Mobility     Goal: Risk for activity intolerance will decrease             Problem: Pain Management     Goal: Pain level will decrease to patient's comfort goal             Problem: Respiratory:     Goal: Respiratory status will improve             Problem: Safety     Goal: Will remain free from injury           Goal: Will remain free from falls             Problem: Skin Integrity     Goal: Risk for impaired skin integrity will decrease             Problem: Urinary Elimination:     Goal: Ability to reestablish a normal urinary elimination pattern will improve             Problem: Venous Thromboembolism (VTW)/Deep Vein Thrombosis (DVT) Prevention:     Goal: Patient will participate in Venous Thrombosis (VTE)/Deep Vein Thrombosis (DVT)Prevention Measures     Flowsheet:     Taken at 04/09/19 1230    Mechanical Prophylaxis  SCDs, Sequential Compression Device by Rosi Bedolla RCOBY.    SCDs, Sequential Compression Device Off by Rosi Bedolla, R.N.    Pharmacologic Prophylaxis Used LMWH: Enoxaparin(Lovenox) by Rosi Bedolla, R.N.                       Long Term Goals:   At discharge patient will be able to  "function safely at home and in the community with support.    Section completed by:  Johnson Montenegro R.N.           Respiratory Therapy    Pt is very compliant in using his home CPAP unit nightly with 2 lpm oxygen bleed in.  His SATS are consistently in the low 90s.     completed by:  Wendie Marrero, RRT       Mobility  Bed mobility:   5  Bed /Chair/Wheelchair Transfer Initial:  3 - Moderate Assistance  Bed /Chair/Wheelchair Transfer Current:  5 - Standby Prompting/Supervision or Set-up   Bed/Chair/Wheelchair Transfer Description:   (bed mob: SPV; transfer: SBA; FWW)  Walk Initial:  2 - Max Assistance  Walk Current:  2 - Max Assistance   Walk Description:   (130ft x 1, FWW, SPV, level terrain )  Wheelchair Initial:  5 - Standby Prompting/Supervision or Set-up  Wheelchair Current:  5 - Standby Prompting/Supervision or Set-up   Wheelchair Description:   (Pt propelled WC from front gym to back gym  144' with use of BUEs. Extra time needed and incidental touching for direction.)  Stairs Initial:  2 - Max Assistance  Stairs Current: 5 - Standby Prompting/Supervision or Set-up   Stairs Description:  (12 x 1 6\" , B UE support on unilateral HR, step to pattern, SPV)  Patient/Family Training/Education:  Car transfer, stair training, bed mobility and AMB training with FWW with wife  DME/DC Recommendations:  Home health physical therapy, FWW  Strengths:  Effective communication skills, Making steady progress towards goals, Motivated for self care and independence, Pleasant and cooperative, Supportive family and Willingly participates in therapeutic activities  Barriers:   Decreased endurance, Generalized weakness, Poor activity tolerance, Poor balance and Lack of motivation, poor carryover of learning, poor insight/ safety awareness  # of short term goals set= 2  # of short term goals met= 2       Physical Therapy Problems           Problem: Mobility     Dates: Start: 03/30/19       Goal: STG-Within one week, patient " "will ambulate household distance     Dates: Start: 03/30/19       Description: Pt will ambulate household distance 150ft level surface with FWW no more than SBA consistently at .3 m/s demonstrating improving gait speed, endurance,  and independence.       Note:     Goal Note filed on 04/03/19 0931 by Carmencita Teresa, PT    Goal: STG-Within one week, patient will ambulate household distance  Outcome: NOT MET  Pt limited by endurance                   Problem: Mobility Transfers     Dates: Start: 03/30/19       Goal: STG-Within one week, patient will perform bed mobility     Dates: Start: 03/30/19       Description: Supine to edge of bed SBA no verbal cues and less than 2 minutes to transition once he has initiated transition, demonstrating improved efficiency and independence. LTG= sit to and from supine mod I less than 1 minute        Note:     Goal Note filed on 04/03/19 0931 by Carmencita Teresa, PT    Goal: STG-Within one week, patient will perform bed mobility  Outcome: NOT MET  Pt requires Ant d/t decreased strength                 Goal: STG-Within one week, patient will sit to stand     Dates: Start: 03/30/19       Description: Sit to stand from 20 \" w/c x 5 with use of bilateral UEs and no more than SBA in less than 1 min 30 seconds, demonstrating improved efficiency, LE power, and improved balance and independence      Note:     Goal Note filed on 04/03/19 0931 by Carmencita Teresa, PT    Goal: STG-Within one week, patient will sit to stand  Outcome: NOT MET  Pt limited by strength                   Problem: PT-Long Term Goals     Dates: Start: 03/30/19       Goal: LTG-By discharge, patient will maintain balance     Dates: Start: 03/30/19       Description: RUBIO increasing to 42/56 demonstrating low fall risk upon return home              Goal: LTG-By discharge, patient will tolerate standing     Dates: Start: 03/30/19       Description: Sit to stand from 20\" chair use of bilateral UEs to assist x 5 in 30 " seconds, mod I, indicating improved strength, power, balance, and endurance necessary for decreased fall risk and independence with functional mobility.            Goal: LTG-By discharge, patient will ambulate     Dates: Start: 03/30/19       Description: Pt will ambulate household distances of 150ft at .4m/s gait speed with FWW mod I, indicating efficiency and independence for ambulation upon return home.            Goal: LTG-By discharge, patient will transfer one surface to another     Dates: Start: 03/30/19       Description: Stand with FWW bilateral UEs to assist, step turn transfer with FWW, to various surfaces mod I            Goal: LTG-By discharge, patient will ambulate up/down flight of stairs     Dates: Start: 03/30/19       Description: Ascend/descend 17 steps with single railing no more than SUPV from wife, indicating safe entry and exiting of his home              Goal: LTG-By discharge, patient will     Dates: Start: 03/30/19       Description: Pt's goals is to be able to ambulate outdoors with 4WW limited community distances simulating walking around neighborhood at home, mod I, for long term health and independence            Goal: LTG-By discharge, patient will     Dates: Start: 03/30/19       Description: 10 MWT average gait speed .4m/s            Goal: LTG-By discharge, patient will perform home exercise program     Dates: Start: 03/30/19                     Section completed by:  Carmencita Teresa, PT       Activities of Daily Living  Eating Initial:  5 - Standby Prompting/Supervision or Set-up  Eating Current:  7 - Independent   Eating Description:  Increased time, Supervision for safety, Set-up of equipment or meal/tube feeding  Grooming Initial:  5 - Standby Prompting/Supervision or Set-up  Grooming Current:  7 - Independent   Grooming Description:   (hair care seated)  Bathing Initial:  3 - Moderate Assistance  Bathing Current:  5 - Standby Prompting/Supervision or Set-up   Bathing  Description:  Grab bar, Tub bench, Hand held shower, Supervision for safety, Set-up of equipment, Initial preparation for task (setup and supervised standing)  Upper Body Dressing Initial:  4 - Minimal Assistance  Upper Body Dressing Current:  5 - Standby Prompting/Supervision or Set-up   Upper Body Dressing Description:  Set-up of equipment  Lower Body Dressing Initial:  2 - Max Assistance  Lower Body Dressing Current:  5 - Standby Prompting/Supervsion or Set-up   Lower Body Dressing Description:  5 - Standby Prompting/Supervsion or Set-up  Toileting Initial:  2 - Max Assistance  Toileting Current:  5 - Standby Prompting/Supervision or Set-up   Toileting Description:  Grab bar, Increased time, Supervision for safety, Verbal cueing  Toilet Transfer Initial:  2 - Max Assistance  Toilet Transfer Current:  5 - Standby Prompting/Supervision or Set-up   Toilet Transfer Description:  5 - Standby Prompting/Supervision or Set-up  Tub / Shower Transfer Initial:  2 - Max Assistance  Tub / Shower Transfer Current:  5 - Standby Prompting/Supervision or Set-up   Tub / Shower Transfer Description:  Grab bar, Adaptive equipment, Supervision for safety  IADL:  Min A w/ min verbal cues for preparing instant pudding from a box (3 steps)   Family Training/Education:  Spouse completed family training on shower transfers  DME/DC Recommendations:  D/C home with spouse with supervision for ADLs and bathroom transfers (they already have grab bars and a shower chair in shower)    Strengths:  Alert and oriented, Effective communication skills, Good insight into deficits/needs, Independent PLOF, Making steady progress towards goals, Manages pain appropriately, Motivated for self care and independence, Pleasant and cooperative, Supportive family and Willingly participates in therapeutic activities  Barriers:  Decreased endurance, Generalized weakness and Other: impaired standing balance, impaired memory and problem solving     # of short term  goals set= 4    # of short term goals met= 4          Occupational Therapy Goals           Problem: Bathing     Dates: Start: 03/30/19       Goal: STG-Within one week, patient will bathe     Dates: Start: 04/02/19       Description: 1) Individualized Goal: with setup with use of AE/DME PRN.  2) Interventions: OT Group Therapy, OT Self Care/ADL, OT Cognitive Skill Dev, OT Community Reintegration, OT Manual Ther Technique, OT Neuro Re-Ed/Balance, OT Therapeutic Activity, OT Evaluation and OT Therapeutic Exercise       Note:     Goal Note filed on 04/10/19 0820 by Reynaldo Ayers MS,OTR/L    Goal: STG-Within one week, patient will bathe  Outcome: NOT MET  Setup and supervised due to patient standing in shower                  Problem: OT Long Term Goals     Dates: Start: 03/30/19       Goal: LTG-By discharge, patient will complete basic self care tasks     Dates: Start: 03/30/19       Description: 1) Individualized Goal:  Supervision to mod I with use of AE/DME PRN.  2) Interventions:  OT Group Therapy, OT Self Care/ADL, OT Cognitive Skill Dev, OT Community Reintegration, OT Manual Ther Technique, OT Neuro Re-Ed/Balance, OT Therapeutic Activity, OT Evaluation and OT Therapeutic Exercise             Goal: LTG-By discharge, patient will perform bathroom transfers     Dates: Start: 03/30/19       Description: 1) Individualized Goal:  Supervision to mod I with use of AD/DME PRN.  2) Interventions:  OT Group Therapy, OT Self Care/ADL, OT Cognitive Skill Dev, OT Community Reintegration, OT Manual Ther Technique, OT Neuro Re-Ed/Balance, OT Therapeutic Activity, OT Evaluation and OT Therapeutic Exercise                  Section completed by:  Reynaldo Ayers, MS,OTR/L       Cognitive Linquistic Functions  Comprehension Initial:  4 - Minimal Assistance  Comprehension Current:  6 - Modified Independent   Comprehension Description:  Glasses, Verbal cues  Expression Initial:  5 - Stand-by Prompting/Supervision or  Set-up  Expression Current:  7 - Independent   Expression Description:  Verbal cueing  Social Interaction Initial:  5 - Stand-by Prompting/Supervision or Set-up  Social Interaction Current:  7 - Independent   Social Interaction Description:  Increased time  Problem Solving Initial:  2 - Max Assistance  Problem Solving Current:  5 - Standby Prompting/Supervision or Set-up   Problem Solving Description:  Verbal cueing, Therapy schedule  Memory Initial:  4 - Minimal Assistance  Memory Current:  4 - Minimal Assistance   Memory Description:  Verbal cueing, Therapy schedule  Executive Functioning / Organization Initial:     Executive Functioning / Organization Current: 3 - Moderate Assistance     Executive Functioning Desciption: Pt will require assistance with IADLs related to meds/finances at home.   Swallowing  Swallowing Status: SLP not following  Orders Placed This Encounter   Procedures   • Diet Order Regular     Standing Status:   Standing     Number of Occurrences:   1     Order Specific Question:   Diet:     Answer:   Regular [1]     Order Specific Question:   Consistency/Fluid modifications:     Answer:   Thin Liquids [3]     Behavior Modification Plan  Keep instructions simple/concrete, Give clear feedback, Provide reasonable choices, Decrease the chance of failure by offering activities that are within the patient's abilities and Analyze tasks (break down into smaller steps)  Assistive Technology  Low/Impaired vision equipment and Low tech: Calendar, planner, schedule, alarms/timers, pill organizer, post-it notes, lists  Family Training/Education: completed with spouse  DC Recommendations:  Pt would benefit from ongoing SLP services ?home health vs out patient  Strengths:  Effective communication skills, Independent PLOF, Making steady progress towards goals, Manages pain appropriately, Motivated for self care and independence, Pleasant and cooperative, Supportive family and Willingly participates in  therapeutic activities  Barriers:  Other: processing speed, attention, STM  # of short term goals set=3  # of short term goals met=3       Speech Therapy Problems           Problem: Speech/Swallowing LTGs     Dates: Start: 03/30/19       Goal: LTG-By discharge, patient will     Dates: Start: 03/30/19       Description: 1) Individualized goal:  Demonstrate cognitive-linguistic skills to support return to prior living situation with intermittent supervision  2) Interventions:  SLP Self Care / ADL Training , SLP Cognitive Skill Development and SLP Group Treatment                    Section completed by:  Olamide Cox MS,[facility]-SLP       Recreation/Community     Leisure Competence Measure  Leisure Awareness: Independent  Leisure Attitude: Independent (motivated to return to [place])  Leisure Skills: Independent  Cultural / Social Behaviors: Independent  Interpersonal Skills: Independent  Community Integration Skills: Independent  Social Contact: Independent  Community Participation: Independent    Strengths:  Able to follow instructions, Effective communication skills, Making steady progress towards goals, Pleasant and cooperative and Willingly participates in therapeutic activities  Barriers:  Generalized weakness, Poor activity tolerance and Poor balance  # of short term goals set=2  # of short term goals met=0  Pleasant an cooperative during session. Will often stare off and watch and listen to others sessions. When redirected he will often use a joke. He was encouraged to initiate conversations only if he could work and talk at the same time. He was able to stand for 15% of the time and stated he was too tired to stand any longer during the session. Will focus on what community re integration outing goals he has for himself.           Recreation Therapy Problems           Problem: Recreation Therapy     Dates: Start: 04/01/19       Goal: STG-Within one week, patient will demonstrate a standing tolerance     Dates:  Start: 04/01/19       Description: By standing for 25% of the time while performing cognitive leisure at the mat.            Goal: STG-Within one week, patient will actively engage in planning of community re-integration outing     Dates: Start: 04/01/19             Goal: LTG-By discharge, patient will demonstrate a standing tolerance     Dates: Start: 04/01/19       Description: By standing for 50% of the time while performing cognitive leisure at the mat.            Goal: LTG-By discharge, patient will participate in a community re-integration outing     Dates: Start: 04/01/19       Description: Specifically focusing on adaptive leisure.                  Section completed by:  Boone Stinson, CTRS       REHAB-Pharmacy IDT Team Note by Rajwinder Melvin RPH at 4/9/2019 10:24 AM  Version 1 of 1    Author:  Rajwinder Melvin RPH Service:  (none) Author Type:  Pharmacist    Filed:  4/9/2019 10:28 AM Date of Service:  4/9/2019 10:24 AM Status:  Signed    :  Rajwinder Melvin RPH (Pharmacist)         Pharmacy   Pharmacy  Antibiotics/Antifungals/Antivirals:  Medication:      Active Orders     None        Route:         n/a  Stop Date:  n/a  Reason:   Antihypertensives/Cardiac:  Medication:    Orders (72h ago through future)    Start     Ordered    03/29/19 2100  simvastatin (ZOCOR) tablet 20 mg  EVERY EVENING      03/29/19 1211        Patient Vitals for the past 24 hrs:   BP Pulse   04/08/19 2000 114/60 70   04/08/19 1400 108/64 90   04/08/19 1051 - 66       Anticoagulation:  Medication:  Lovenox  INR:      Other key medications: Hydroxyurea, levetiracetam, omeprazole, tamsulosin.   A review of the medication list reveals no issues at this time.  Section completed by:  Rajwinder Melvin RPH[TK.1]        Attribution Key     TK.1 - Rajwinder Melvin RPH on 4/9/2019 10:24 AM                    DC Planning  DC destination/dispostion:  Patient lives in a multi level home with his spouse.     DC Needs: He has home O2  and cpap from [employer].  He has a fww, grab bars and shower seat.  His wife is a retired nurse and will be home with him.  He does need 24 hr supervision.  His grandchildren are willing to help also.  Wife would like home health instead of outpatient therapy.     Barriers to discharge:  Stairs     Strengths: good family support.     Section completed by:  Christi Holloway R.N.      Physician Summary  Stanford Adler DO participated and led team conference discussion.

## 2019-04-10 NOTE — PROGRESS NOTES
Patient ID: Ambar Christian is a 31year old female. HPI  Patient presents with:  Routine Physical      Addendum: 8:16 PM on 11/12/2018. I did receive all her labs along with the preoperative labs consisting of factor V Leiden and EKG.   Everything is st Rehab Progress Note     Encounter Date: 4/10/2019    CC: Encephalopathy    Interval Events (Subjective)  Patient was very happy with home evaluation today, wife was happy and excited about his discharge.    He was happy to see his dog.  He was able to walk up and down the stairs multiple times.      ROS:  Gen: No fatigue, confusion, significant weight loss  Eyes: no blurry vision, double vision or pain in eyes  ENT: no changes in hearing, runny nose, nose bleeds, sinus pain  CV: No CP, palpitations,   Lungs: no shortness of breath, changes in secretions, changes in cough, pain with coughing  Abd: No abd pain, nausea, vomiting, pain with eating  : no blood in urine, suprapubic pain  Ext: No swelling in legs, asymmetric atrophy  Neuro: no changes in strength or sensation  Skin: no new ulcers/skin breakdown appreciated by patient  Mood: No changes in mood today, increase in depression or anxiety  Heme: no bruising, or bleeding  Rest of 14 point review of systems is negative    Objective:  Vitals: /56   Pulse 67   Temp 36.9 °C (98.5 °F) (Oral)   Resp 18   Ht 1.829 m (6')   Wt 91.5 kg (201 lb 11.5 oz)   SpO2 96%   Gen: NAD, Body mass index is 27.36 kg/m².  Sitting comfortably and manual wheelchair  HEENT: NC/AT, PERRLA, moist mucous membranes  Cardio: RRR, no mumurs  Pulm: CTAB, with normal respiratory effort  Abd: Soft NTND, active bowel sounds,  Ext: No peripheral edema.  +dorsal pedalis pulses bilaterally.      No results found for this or any previous visit (from the past 72 hour(s)).    Current Facility-Administered Medications   Medication Frequency   • vitamin D (cholecalciferol) tablet 4,000 Units DAILY   • levETIRAcetam (KEPPRA) tablet 750 mg Q12HRS   • lidocaine (LIDODERM) 5 % 1 Patch Q24HR   • OCUVITE-LUTEIN tablet 1 tablet DAILY   • ferrous sulfate tablet 325 mg BID WITH MEALS   • Respiratory Care per Protocol Continuous RT   • Pharmacy Consult Request ...Pain Management Review 1 Each PHARMACY  09/08/2016    NEPERCENT 57 09/20/2017    LYPERCENT 33 09/20/2017    MOPERCENT 7 09/20/2017    EOPERCENT 3 09/20/2017    BAPERCENT 0 09/20/2017    NE 3.9 09/20/2017    LYMABS 2.3 09/20/2017    MOABSO 0.5 09/20/2017    EOABSO 0.2 09/20/2017    BAABSO 0.0 09/ TO DOSE   • oxyCODONE immediate-release (ROXICODONE) tablet 5 mg Q3HRS PRN   • acetaminophen (TYLENOL) tablet 650 mg Q4HRS PRN   • senna-docusate (PERICOLACE or SENOKOT S) 8.6-50 MG per tablet 2 Tab BID    And   • polyethylene glycol/lytes (MIRALAX) PACKET 1 Packet QDAY PRN    And   • magnesium hydroxide (MILK OF MAGNESIA) suspension 30 mL QDAY PRN    And   • bisacodyl (DULCOLAX) suppository 10 mg QDAY PRN   • ascorbic acid tablet 500 mg BID WITH MEALS   • therapeutic multivitamin-minerals (THERAGRAN-M) tablet 1 Tab DAILY WITH LUNCH   • ondansetron (ZOFRAN ODT) dispertab 4 mg Q4HRS PRN   • acetaminophen (TYLENOL) tablet 650 mg Q6HRS PRN   • hydroxyurea (HYDREA) capsule 1,000 mg Once per day on Sun Sat   • hydroxyurea (HYDREA) capsule 1,500 mg Once per day on Mon Tue Wed Thu Fri   • omeprazole (PRILOSEC) capsule 20 mg QHS   • simvastatin (ZOCOR) tablet 20 mg Q EVENING   • tamsulosin (FLOMAX) capsule 0.4 mg QHS   • tramadol (ULTRAM) 50 MG tablet 50 mg Q6HRS PRN   • enoxaparin (LOVENOX) inj 40 mg DAILY       Orders Placed This Encounter   Procedures   • Diet Order Regular     Standing Status:   Standing     Number of Occurrences:   1     Order Specific Question:   Diet:     Answer:   Regular [1]     Order Specific Question:   Consistency/Fluid modifications:     Answer:   Thin Liquids [3]       Assessment:  Active Hospital Problems    Diagnosis   • *Acute encephalopathy   • Weakness   • Acute cystitis without hematuria   • Anemia   • Hyponatremia   • Lower back pain   • Thrombocytosis (HCC)   • Benign prostatic hyperplasia without lower urinary tract symptoms   • Mixed hyperlipidemia   • Obstructive sleep apnea   • Gastroesophageal reflux disease   • Obesity (BMI 30.0-34.9)       Medical Decision Making and Plan:    Acute encephalopathy: Cognitive deficits, question seizure versus mild dementia versus metabolic encephalopathy  -Continue conference of acute rehabilitation with significant improvement in cognitive and  09/20/2017    LDL 91 09/20/2017    NONHDLC 117 09/20/2017    CALCNONHDL 103 01/21/2016    CALCNONHDL 118 02/04/2015     TSH (uIU/mL)   Date Value   09/20/2017 3.36     TSH (S) (uIU/mL)   Date Value   09/08/2016 2.90       Lab Results   Component Value Date functional deficits  -Continue with Keppra 750 mg twice daily, discussed with Dr. Kwok from neurology who recommended increased dose, will need to follow-up with outpatient neurology  -Continue with speech therapy for higher level cognitive problem solving    Lower extremity weakness: Question Demetrius's paralysis secondary to seizure, versus spinal stenosis/myelopathy although unlikely given minimal radicular symptoms  -Outpatient referral to Dr. laureano for further EMG/nerve conduction study workup    Neurogenic bladder: Bladder incontinence complicated by BPH  -Continue with Flomax 0.4 mg nightly, monitor orthostatic hypotension  -Timed voiding every 3-4 hours    Obstructive sleep apnea:  -Continue with home CPAP    Dyslipidemia:  -Continue with simvastatin 20 mg nightly    Chronic thrombocytosis:  Continue with hydroxyurea, will need to follow-up as an outpatient    Vitamin D deficiency: Vitamin D level on admission of 10  -Continue with vitamin D, cholecalciferol 4000 units a daily  -We will need to follow-up with an outpatient for recheck of vitamin D level 4 weeks      IDT Team Meeting 4/10/2019    IStanford D.O., was present and led the interdisciplinary team conference on 4/10/2019.  I led the IDT conference and agree with the IDT conference documentation and plan of care as noted below.     RN:  Diet 25-50%, encourage po meals   % Meal     Pain Lidocaine, right shoulder   Sleep    Bowel    Bladder urgency   In's & Out's    Barriers: good to go tomorrow    PT:  Bed Mobility    Transfers    Mobility Improved mobility   Stairs  able to walk up stairs   Barriers: shuffled gait, family has been trained  FWW    OT:  Eating    Grooming    Bathing    UB Dressing    LB Dressing    Toileting    Shower & Transfer Supervised   Barriers: meal prep will need min assist  SUP for ADLS  HH for OT    SLP:  Barriers:   3/3 goals, will need help with medication and financial   HH SLP    CM:  Continues to work on  disposition and DME needs.      DC/Disposition:  Discharge tomorrow    Total time:  50 minutes.  I spent greater than 50% of the time for patient care and coordination on this date, including unit/floor time, and face-to-face time with the patient as per assessment and plan above.    Stanford Adler D.O.       rash.   Allergic/Immunologic: Negative for environmental allergies and food allergies. Neurological: Negative for dizziness, seizures, speech difficulty and light-headedness. Hematological: Negative for adenopathy. Does not bruise/bleed easily.    Psych Seat Belt: Not Asked        Self-Exams: Not Asked    Social History Narrative      Not on file           Current Outpatient Medications:  NUVARING 0.12-0.015 MG/24HR Vaginal Ring INSERT 1 VAGINAL RING AS DIRECTED Disp: 3 each Rfl: 4     Allergies:No Know ADMINISTRATION  -     TETANUS, DIPHTHERIA TOXOIDS AND ACELLULAR PERTUSIS VACCINE (TDAP), >7 YEARS, IM USE    Excess skin of abdominal wall  -     FACTOR V LEIDEN MUTATION; Future  Await the lab results.   I did let patient know that we usually do not order

## 2019-04-10 NOTE — CARE PLAN
Problem: Communication  Goal: The ability to communicate needs accurately and effectively will improve  Outcome: PROGRESSING AS EXPECTED  Plan of care for the day discussed this morning with pt. All questions and concerns answered at this time. Pt reports sleeping well last night. Will continue to monitor

## 2019-04-10 NOTE — THERAPY
Speech Language Pathology  Daily Treatment     Patient Name: Han Pat  Age:  84 y.o., Sex:  male  Medical Record #: [id number]  Today's Date: 4/10/2019     Subjective    Pt eager for home visit this morning. Agreeable to ST.      Objective     04/10/19 0904   Cognition   Complex Attention Minimal (4)   Functional Memory Activities Minimal (4)   Functional Math / Financial Management Supervision (5)   Interdisciplinary Plan of Care Collaboration   IDT Collaboration with  Certified Nursing Assistant   Patient Position at End of Therapy Seated  (on toilet)   Collaboration Comments informed CNA via walkie of pt location in bathroom   SLP Total Time Spent   SLP Individual Total Time Spent (Mins) 60   Charge Group   SLP Cognitive Skill Development 4       FIM Eating Score:     Eating Description:       FIM Comprehension Score:  6 - Modified Independent  Comprehension Description:  Glasses, Verbal cues    FIM Expression Score:  7 - Independent  Expression Description:       FIM Social Interaction Score:  7 - Independent  Social Interaction Description:       FIM Problem Solving Score:  5 - Standby Prompting/Supervision or Set-up  Problem Solving Description:  Verbal cueing, Therapy schedule    FIM Memory Score:  4 - Minimal Assistance  Memory Description:  Verbal cueing, Therapy schedule      Assessment    Pt completed simple math problems with 100% accuracy IND (10/10, on 4/8/19 pt was 4/10 IND). Selective visual attention task, pt located 9/11 targets IND (prior performance was 5/11). Pt recalled tasks, training from PT/OT as it pertains to CLOF and safety precautions with MIN A. Continued BI education as it pertains to cognitive deficits related to attention, memory, processing speed and problem solving. Pt asking appropriate follow up questions, demonstrating return of information/training given.     Plan    Anticipated d/c scheduled for 4/11/19. Pt to complete home visit with PT this a.m.

## 2019-04-10 NOTE — CARE PLAN
Problem: Safety  Goal: Will remain free from injury  Patient uses call light consistently and appropriately this shift.  Waits for assistance when needed and does not attempt self transfer.  Able to verbalize needs.  Will continue to monitor.    Problem: Infection  Goal: Will remain free from infection  Patient remains free from s/s infection; afebrile.  Will continue to monitor.    Problem: Bowel/Gastric:  Goal: Normal bowel function is maintained or improved  Patient having regular bowel movements; last BM 4/9/19.  Denies s/s constipation; bowel meds available if needed.  Will continue to monitor.    Problem: Skin Integrity  Goal: Risk for impaired skin integrity will decrease  Patient's skin remains intact and free from new or accidental injury this shift.  Will continue to monitor.

## 2019-04-10 NOTE — CARE PLAN
Problem: Bathing  Goal: STG-Within one week, patient will bathe  1) Individualized Goal: with setup with use of AE/DME PRN.  2) Interventions: OT Group Therapy, OT Self Care/ADL, OT Cognitive Skill Dev, OT Community Reintegration, OT Manual Ther Technique, OT Neuro Re-Ed/Balance, OT Therapeutic Activity, OT Evaluation and OT Therapeutic Exercise     Outcome: NOT MET  Setup and supervised due to patient standing in shower    Problem: Dressing  Goal: STG-Within one week, patient will dress LB  1) Individualized Goal: supervised with use of AE/DME PRN.  2) Interventions: OT Group Therapy, OT Self Care/ADL, OT Cognitive Skill Dev, OT Community Reintegration, OT Manual Ther Technique, OT Neuro Re-Ed/Balance, OT Therapeutic Activity, OT Evaluation and OT Therapeutic Exercise      Outcome: MET Date Met: 04/10/19  Supervised    Problem: Toileting  Goal: STG-Within one week, patient will complete toileting tasks  1) Individualized Goal: supervised with use of AE/DME PRN.  2) Interventions: OT Group Therapy, OT Self Care/ADL, OT Cognitive Skill Dev, OT Community Reintegration, OT Manual Ther Technique, OT Neuro Re-Ed/Balance, OT Therapeutic Activity, OT Evaluation and OT Therapeutic Exercise      Outcome: MET Date Met: 04/10/19  Supervised    Problem: Functional Transfers  Goal: STG-Within one week, patient will transfer to toilet  1) Individualized Goal: supervised with use of AD/DME PRN.  2) Interventions: OT Group Therapy, OT Self Care/ADL, OT Cognitive Skill Dev, OT Community Reintegration, OT Manual Ther Technique, OT Neuro Re-Ed/Balance, OT Therapeutic Activity, OT Evaluation and OT Therapeutic Exercise      Outcome: MET Date Met: 04/10/19  Supervised

## 2019-04-10 NOTE — CARE PLAN
Problem: PT-Long Term Goals  Goal: LTG-By discharge, patient will  Pt's goals is to be able to ambulate outdoors with 4WW limited community distances simulating walking around neighborhood at home, mod I, for long term health and independence    Outcome: DISCHARGED-GOAL NOT MET Date Met: 04/09/19    Goal: LTG-By discharge, patient will perform home exercise program  Outcome: DISCHARGED-GOAL NOT MET Date Met: 04/09/19

## 2019-04-10 NOTE — CARE PLAN
Problem: Safety  Goal: Will remain free from injury  Outcome: PROGRESSING AS EXPECTED  Pt has been caught twice this shift transferring self to bathroom. When supervised, pt is steady; demonstrates good safety technique. Alarms placed; will continue to monitor/educate.    Problem: Venous Thromboembolism (VTW)/Deep Vein Thrombosis (DVT) Prevention:  Goal: Patient will participate in Venous Thrombosis (VTE)/Deep Vein Thrombosis (DVT)Prevention Measures  Outcome: PROGRESSING AS EXPECTED  Pt is on Lovenox for DVT prophylaxis. No s/s of DVT or edema; will continue to monitor.

## 2019-04-10 NOTE — DISCHARGE PLANNING
04/10/19  1434   Discharge Instructions - Completed by Case Mgmt   Discharge Location   Home with Home Health     Agency Name / Address / Phone   [facility] at [phone] (they will call you to schedule visits)     Home Health   Registered Nurse; Occupational Therapist; Physical Therapist; Speech Therapist     Medical equipment Provider / Phone   No additional equipment recommended. Current with [employer] for night time oxygen at [phone].              Follow-up With  Details  Why  Contact Info   Kia Thompson P.A.-C. (Primary Care)  On 4/16/2019  PCP: Tuesday at 11:20 am (please check in at 11:05 am)(records will be sent)  [address]  [address]  [place] [id number] [phone]     Juan Izquierdo M.D. (Physiatry)  On 4/24/2019  Physiatry: Weds. at 3:10 pm (for assessment of back pain)  [address]  [address]  [place] [id number] [phone]     Dayne Torre M.D. (Neurology)  On 7/10/2019  Neurologist: [place]eds at 1:00 pm  [address]  [place] [address] [phone]     Kia Thompson P.A.-C. (Primary Care)  On 8/7/2019  PCP: Weds at 10:00 am  [address]  [address]  [place] [id number] [phone]

## 2019-04-10 NOTE — REHAB-CM IDT TEAM NOTE
Case Management    DC Planning  DC destination/dispostion:  Patient lives in a multi level home with his spouse.     DC Needs: He has home O2 and cpap from [employer] [facility].  He has a fww, grab bars and shower seat.  His wife is a retired nurse and will be home with him.  He does need 24 hr supervision.  His grandchildren are willing to help also.  Wife would like home health instead of outpatient therapy.     Barriers to discharge:  Stairs     Strengths: good family support.     Section completed by:  Christi Holloway R.N.

## 2019-04-10 NOTE — THERAPY
"Physical Therapy   Daily Treatment     Patient Name: Han Pat  Age:  84 y.o., Sex:  male  Medical Record #: [id number]  Today's Date: 4/9/2019     Precautions  Precautions: (P) Fall Risk, Other (See Comments)  Comments: seizure prec    Subjective    Pt was sitting in w/c upon arrival and agreeable to tx.     Objective       04/09/19 1301   Precautions   Precautions Fall Risk;Other (See Comments)   5x STS (Five Times Sit to Stand Test)   Height of Sitting Surface (inches) 20   5x Sit to STand (seconds) 50.9   Sit to Stand Comments use of bilateral UEs one to push from w/c arm rest and one to stabilize on walker, SBA   10 Meter Walk Test   Normal - Trial 1 17.8 seconds   Normal - Trial 2 19.6 seconds   Normal - Trial 3 22.14 seconds   Fast - Trial 1 13.93 seconds   Fast - Trial 2 13.92 seconds   Fast - Trial 3 13.72 seconds   Normal Average Time 19.85 seconds   Normal Average Velocity (m/s) 0.3   Fast Average Time 13.86 seconds   Fast Average Velocity (m/s) 0.43   Interdisciplinary Plan of Care Collaboration   Patient Position at End of Therapy Seated;Chair Alarm On;Call Light within Reach   PT Total Time Spent   PT Individual Total Time Spent (Mins) 60     FIM Walking Score:  2 - Max Assistance  Walking Description:   (120ft x1, FWW, ball used as external cue for improved posture )    Increased step length during AMB  AMB in // bars stepping over 2\" foam rollers, 18\" apart, 6 x 5 feet. AMB w/ FWW w/ verbal cueing to increase step length, 1 x 120 feet. Used ball between hips and FWW and instructed patient to maintain during AMB to promote improved posture.     Hip ABD strength  Sidestepping in // bars over 2\" foam rollers, 18\" apart, 2 x 5 feet.    Assessment    Pt has improved step length during AMB w/ FWW w/ minimal verbal cueing. Pt continues to have moderate difficulty maintain upright posture during ambulation contributing to increased UE fatigue during AMB.     Plan    Home Eval, Patient/spouse " education and intruction, collect all DC FIMS and IRF-XAVIER to prepare for DC 4/11

## 2019-04-10 NOTE — REHAB-SLP IDT TEAM NOTE
Speech Therapy   Cognitive Linquistic Functions  Comprehension Initial:  4 - Minimal Assistance  Comprehension Current:  6 - Modified Independent   Comprehension Description:  Glasses, Verbal cues  Expression Initial:  5 - Stand-by Prompting/Supervision or Set-up  Expression Current:  7 - Independent   Expression Description:  Verbal cueing  Social Interaction Initial:  5 - Stand-by Prompting/Supervision or Set-up  Social Interaction Current:  7 - Independent   Social Interaction Description:  Increased time  Problem Solving Initial:  2 - Max Assistance  Problem Solving Current:  5 - Standby Prompting/Supervision or Set-up   Problem Solving Description:  Verbal cueing, Therapy schedule  Memory Initial:  4 - Minimal Assistance  Memory Current:  4 - Minimal Assistance   Memory Description:  Verbal cueing, Therapy schedule  Executive Functioning / Organization Initial:     Executive Functioning / Organization Current: 3 - Moderate Assistance     Executive Functioning Desciption: Pt will require assistance with IADLs related to meds/finances at home.   Swallowing  Swallowing Status: SLP not following  Orders Placed This Encounter   Procedures   • Diet Order Regular     Standing Status:   Standing     Number of Occurrences:   1     Order Specific Question:   Diet:     Answer:   Regular [1]     Order Specific Question:   Consistency/Fluid modifications:     Answer:   Thin Liquids [3]     Behavior Modification Plan  Keep instructions simple/concrete, Give clear feedback, Provide reasonable choices, Decrease the chance of failure by offering activities that are within the patient's abilities and Analyze tasks (break down into smaller steps)  Assistive Technology  Low/Impaired vision equipment and Low tech: Calendar, planner, schedule, alarms/timers, pill organizer, post-it notes, lists  Family Training/Education: completed with spouse  DC Recommendations:  Pt would benefit from ongoing SLP services ?home health vs out  patient  Strengths:  Effective communication skills, Independent PLOF, Making steady progress towards goals, Manages pain appropriately, Motivated for self care and independence, Pleasant and cooperative, Supportive family and Willingly participates in therapeutic activities  Barriers:  Other: processing speed, attention, STM  # of short term goals set=3  # of short term goals met=3       Speech Therapy Problems           Problem: Speech/Swallowing LTGs     Dates: Start: 03/30/19       Goal: LTG-By discharge, patient will     Dates: Start: 03/30/19       Description: 1) Individualized goal:  Demonstrate cognitive-linguistic skills to support return to prior living situation with intermittent supervision  2) Interventions:  SLP Self Care / ADL Training , SLP Cognitive Skill Development and SLP Group Treatment                    Section completed by:  Olamide Cox MS,CCC-SLP

## 2019-04-10 NOTE — DISCHARGE SUMMARY
Rehab Discharge Summary    Admission Date: 3/29/2019    Discharge Date: 4/11/2019    Attending Provider: Dr Stanford Adler    Admission Diagnosis:   Active Hospital Problems    Diagnosis   • *Acute encephalopathy   • Weakness   • Lower back pain   • Thrombocytosis (HCC)   • Benign prostatic hyperplasia without lower urinary tract symptoms   • Mixed hyperlipidemia   • Obstructive sleep apnea   • Gastroesophageal reflux disease   • Obesity (BMI 30.0-34.9)       Discharge Diagnosis:  Active Hospital Problems    Diagnosis   • *Acute encephalopathy   • Weakness   • Lower back pain   • Thrombocytosis (HCC)   • Benign prostatic hyperplasia without lower urinary tract symptoms   • Mixed hyperlipidemia   • Obstructive sleep apnea   • Gastroesophageal reflux disease   • Obesity (BMI 30.0-34.9)       HPI per H&P:  The patient is a 84 y.o. male with a past medical history of dyslipidemia, GERD, BPH, chronic thrombocytosis, FREYA, low back pain who presented to [facility] on 3/24 with weakness and altered mental status, bowel and bladder incontinence.  He recently had bronchitis approximately 2 weeks prior to admission finished azithromycin with significant improvement in symptoms.   He reportedly had profound weakness in bilateral lower extremities, no imaging of the spine was done.     His head CT was negative, EEG did not show any seizure activity.  Even though EEG was negative neurology recommended starting antiepileptics secondary to possible seizure activity prior to EEG.  He had no reported seizure activity during acute hospitalization.     His hospitalization was complicated by hyponatremia presumed to be from poor intake.  He reportedly was not eating or drinking very much which is very abnormal for him.     He is now admitted for acute inpatient rehabilitation with severe functional debility from acute encephalopathy complicated by possible seizure.       Patient is a poor historian majority of HPI comes  from discussions with the wife and chart review.       Patient was admitted to [facility] on 3/29/2019.     Hospital Course by Problem List:     Acute encephalopathy: Cognitive deficits, question seizure versus mild dementia versus metabolic encephalopathy  -Continue conference of acute rehabilitation with significant improvement in cognitive and functional deficits  -Continue with Keppra 750 mg twice daily, discussed with Dr. Kwok from neurology who recommended increased dose, will need to follow-up with outpatient neurology  -Continue with speech therapy for higher level cognitive problem solving     Lower extremity weakness: Question Demetrius's paralysis secondary to seizure, versus spinal stenosis/myelopathy although unlikely given minimal radicular symptoms  -Outpatient referral to Dr. laureano for further EMG/nerve conduction study workup     Neurogenic bladder: Bladder incontinence complicated by BPH  -Patient was initially started on timed voids every 3 hours during the day and every 6 hours at night.  PVRs were checked.   -During hospitalization patient was continued on Flomax 0.4 mg nightly, monitored for orthostatic hypotension, no symptoms of dizziness no signs of orthostatic hypotension during hospitalization  -Recommend continuing on timed voiding every 3-4 hours     Obstructive sleep apnea: Previously on CPAP at home no supplementary oxygen  -Continue with home CPAP as outpatient     Hyponatremia: Patient has history of hyponatremia acute hospitalization, likely poor intake  -Sodium was stabilized, encouraged to increase p.o. Intake  - Na 135    Dyslipidemia: LDL of 52, total cholesterol 112, HDL of 46, well controlled on current regiment  -Continue with simvastatin 20 mg nightly as outpatient     Chronic thrombocytosis:  Continue with hydroxyurea, will need to follow-up as an outpatient     Vitamin D deficiency: Vitamin D level on admission of 10  -Continue with vitamin D,  "cholecalciferol 4000 units a daily  -We will need to follow-up with an outpatient for recheck of vitamin D level 4 weeks       Functional Status at Discharge  Eatin - Independent  Eating Description:  Increased time, Supervision for safety, Set-up of equipment or meal/tube feeding  Groomin - Independent  Grooming Description:   (hair care seated)  Bathin - Standby Prompting/Supervision or Set-up  Bathing Description:  Grab bar, Tub bench, Hand held shower, Supervision for safety, Set-up of equipment, Initial preparation for task (setup and supervised standing)  Upper Body Dressin - Standby Prompting/Supervision or Set-up  Upper Body Dressing Description:  Set-up of equipment  Lower Body Dressin - Standby Prompting/Supervsion or Set-up  Lower Body Dressing Description:  5 - Standby Prompting/Supervsion or Set-up     Walk:  2 - Max Assistance  Distance Walked:  Walks  feet  Walk Description:   (120ft x1, FWW, ball used as external cue for improved posture )  Wheelchair:  5 - Standby Prompting/Supervision or Set-up  Distance Propelled:  Propels a minimum of 150 feet   Wheelchair Description:   (Pt propelled WC from front gym to back gym  144' with use of BUEs. Extra time needed and incidental touching for direction.)  Stairs 5 - Standby Prompting/Supervision or Set-up  Stairs Description (12 x 1 6\" , B UE support on unilateral HR, step to pattern, SPV)     Comprehension Mode:  Both  Comprehension:  6 - Modified Independent  Comprehension Description:  Glasses, Verbal cues  Expression Mode:  Both  Expression:  7 - Independent  Expression Description:  Verbal cueing  Social Interaction:  7 - Independent  Social Interaction Description:  Increased time  Problem Solvin - Standby Prompting/Supervision or Set-up  Problem Solving Description:  Verbal cueing, Therapy schedule  Memory:  4 - Minimal Assistance  Memory Description:  Verbal cueing, Therapy schedule       Stanford MEHTA, " TIFFANY, personally performed a complete drug regimen review and no potential clinically significant medication issues were identified.   Discharge Medication:     Medication List      START taking these medications      Instructions   Cholecalciferol 4000 units Tabs   Take 4,000 Units by mouth every day.  Dose:  4000 Units     ferrous sulfate 325 (65 Fe) MG tablet   Take 1 Tab by mouth 2 times a day, with meals.  Dose:  325 mg     lidocaine 5 % Ptch  Commonly known as:  LIDODERM   Apply 1 Patch to skin as directed every 24 hours.  Dose:  1 Patch     omeprazole 20 MG delayed-release capsule  Commonly known as:  PRILOSEC   Take 1 Cap by mouth every bedtime.  Dose:  20 mg        CHANGE how you take these medications      Instructions   acetaminophen 325 MG Tabs  What changed:  · medication strength  · how much to take  · reasons to take this  Commonly known as:  TYLENOL   Take 2 Tabs by mouth every 6 hours as needed.  Dose:  650 mg     levetiracetam 750 MG tablet  What changed:  · medication strength  · how much to take  Commonly known as:  KEPPRA   Take 1 Tab by mouth every 12 hours.  Dose:  750 mg     tamsulosin 0.4 MG capsule  What changed:  when to take this  Commonly known as:  FLOMAX   Take 1 Cap by mouth every bedtime.  Dose:  0.4 mg        CONTINUE taking these medications      Instructions   albuterol 108 (90 Base) MCG/ACT Aers inhalation aerosol   Inhale 2 Puffs by mouth every four hours as needed for Shortness of Breath.  Dose:  2 Puff     benzonatate 100 MG Caps  Commonly known as:  TESSALON   Take 1 Cap by mouth 3 times a day as needed for Cough.  Dose:  100 mg     DELSYM PO   Take 20 mL by mouth as needed (For cough).  Dose:  20 mL     hydroxyurea 500 MG Caps  Commonly known as:  HYDREA   Take 1,000-1,500 mg by mouth See Admin Instructions. Pt takes 1000MG on Sat and Sun 1500MG on Mon, Tue, Wed, Thur, and Fri  Dose:  4182-8693 mg     montelukast 10 MG Tabs  Commonly known as:  SINGULAIR   Take 10 mg by  mouth every evening.  Dose:  10 mg     OCUVITE PO   Take 2 Caps by mouth every evening.  Dose:  2 Cap     simvastatin 20 MG Tabs  Commonly known as:  ZOCOR   Take 1 Tab by mouth every evening.  Dose:  20 mg        STOP taking these medications    aspirin 81 MG tablet     lansoprazole 30 MG Cpdr  Commonly known as:  PREVACID            Discharge Diet:  Regular with thin liquid    Discharge Activity:  As tolerated    Disposition:  Patient to discharge home with family support and community resources.     Equipment:  4WW    Follow-up & Discharge Instructions:  Follow up with your primary care provider (PCP) within 7-10 days of discharge to review your medications and take over your care.     If you develop chest pain, fever, chills, change in neurologic function (weakness, sensation changes, vision changes), or other concerning sxs, seek immediate medical attention or call 911.      Condition on Discharge:  Good    More than 35 minutes was spent on discharging this patient, including face-to-face time, prescription management, and the dictation of this note.    Stanford Adler D.O.    Date of Service: 4/11/2019

## 2019-04-10 NOTE — THERAPY
Physical Therapy   Daily Treatment     Patient Name: Han Pat  Age:  84 y.o., Sex:  male  Medical Record #: [id number]  Today's Date: 4/10/2019     Precautions  Precautions: (P) Fall Risk, Other (See Comments)  Comments: (P) seizure prec    Subjective    Pt was sitting in w/c upon arrival and agreeable to community outing. Wife present for family training at home. Wife stated home health has been set up to come to the house. A family member is also flying in today to assist with transition home     Objective    Pt seen for home evaluation including AMB with FWW in pt's home environment, recommendations for home modification including placement of commode on toilet, beds in home that are an appropriate height, shower transfer and shower chair placement, stair training in pt's home, placing chair on landing / in appropriate places in home to allow for rest breaks, as needed. Demonstration on appropriate guarding with AMB and with stair training.       04/10/19 1001   Precautions   Precautions Fall Risk;Other (See Comments)   Comments seizure prec   Interdisciplinary Plan of Care Collaboration   IDT Collaboration with  Family / Caregiver   Patient Position at End of Therapy Seated;Call Light within Reach;Tray Table within Reach;Phone within Reach   Collaboration Comments re: home eval   PT Total Time Spent   PT Individual Total Time Spent (Mins) 60   PT Charge Group   PT Therapeutic Activities 4       Assessment    Wife and pt feel prepared to d/c home at [facility] and agreed to place elevated commode on toilet.     Plan    Prepare for d/c home Thursday with 24hour support from family.

## 2019-04-10 NOTE — REHAB-OT IDT TEAM NOTE
Occupational Therapy   Activities of Daily Living  Eating Initial:  5 - Standby Prompting/Supervision or Set-up  Eating Current:  7 - Independent   Eating Description:  Increased time, Supervision for safety, Set-up of equipment or meal/tube feeding  Grooming Initial:  5 - Standby Prompting/Supervision or Set-up  Grooming Current:  7 - Independent   Grooming Description:   (hair care seated)  Bathing Initial:  3 - Moderate Assistance  Bathing Current:  5 - Standby Prompting/Supervision or Set-up   Bathing Description:  Grab bar, Tub bench, Hand held shower, Supervision for safety, Set-up of equipment, Initial preparation for task (setup and supervised standing)  Upper Body Dressing Initial:  4 - Minimal Assistance  Upper Body Dressing Current:  5 - Standby Prompting/Supervision or Set-up   Upper Body Dressing Description:  Set-up of equipment  Lower Body Dressing Initial:  2 - Max Assistance  Lower Body Dressing Current:  5 - Standby Prompting/Supervsion or Set-up   Lower Body Dressing Description:  5 - Standby Prompting/Supervsion or Set-up  Toileting Initial:  2 - Max Assistance  Toileting Current:  5 - Standby Prompting/Supervision or Set-up   Toileting Description:  Grab bar, Increased time, Supervision for safety, Verbal cueing  Toilet Transfer Initial:  2 - Max Assistance  Toilet Transfer Current:  5 - Standby Prompting/Supervision or Set-up   Toilet Transfer Description:  5 - Standby Prompting/Supervision or Set-up  Tub / Shower Transfer Initial:  2 - Max Assistance  Tub / Shower Transfer Current:  5 - Standby Prompting/Supervision or Set-up   Tub / Shower Transfer Description:  Grab bar, Adaptive equipment, Supervision for safety  IADL:  Min A w/ min verbal cues for preparing instant pudding from a box (3 steps)   Family Training/Education:  Spouse completed family training on shower transfers  DME/DC Recommendations:  D/C home with spouse with supervision for ADLs and bathroom transfers (they already have  grab bars and a shower chair in shower)    Strengths:  Alert and oriented, Effective communication skills, Good insight into deficits/needs, Independent PLOF, Making steady progress towards goals, Manages pain appropriately, Motivated for self care and independence, Pleasant and cooperative, Supportive family and Willingly participates in therapeutic activities  Barriers:  Decreased endurance, Generalized weakness and Other: impaired standing balance, impaired memory and problem solving     # of short term goals set= 4    # of short term goals met= 4          Occupational Therapy Goals           Problem: Bathing     Dates: Start: 03/30/19       Goal: STG-Within one week, patient will bathe     Dates: Start: 04/02/19       Description: 1) Individualized Goal: with setup with use of AE/DME PRN.  2) Interventions: OT Group Therapy, OT Self Care/ADL, OT Cognitive Skill Dev, OT Community Reintegration, OT Manual Ther Technique, OT Neuro Re-Ed/Balance, OT Therapeutic Activity, OT Evaluation and OT Therapeutic Exercise       Note:     Goal Note filed on 04/10/19 0820 by Reynaldo Ayers MS,OTR/L    Goal: STG-Within one week, patient will bathe  Outcome: NOT MET  Setup and supervised due to patient standing in shower                  Problem: OT Long Term Goals     Dates: Start: 03/30/19       Goal: LTG-By discharge, patient will complete basic self care tasks     Dates: Start: 03/30/19       Description: 1) Individualized Goal:  Supervision to mod I with use of AE/DME PRN.  2) Interventions:  OT Group Therapy, OT Self Care/ADL, OT Cognitive Skill Dev, OT Community Reintegration, OT Manual Ther Technique, OT Neuro Re-Ed/Balance, OT Therapeutic Activity, OT Evaluation and OT Therapeutic Exercise             Goal: LTG-By discharge, patient will perform bathroom transfers     Dates: Start: 03/30/19       Description: 1) Individualized Goal:  Supervision to mod I with use of AD/DME PRN.  2) Interventions:  OT Group Therapy,  OT Self Care/ADL, OT Cognitive Skill Dev, OT Community Reintegration, OT Manual Ther Technique, OT Neuro Re-Ed/Balance, OT Therapeutic Activity, OT Evaluation and OT Therapeutic Exercise                  Section completed by:  Reynaldo Ayres MS,OTR/L

## 2019-04-11 VITALS
TEMPERATURE: 97.9 F | BODY MASS INDEX: 27.32 KG/M2 | SYSTOLIC BLOOD PRESSURE: 108 MMHG | HEART RATE: 60 BPM | WEIGHT: 201.72 LBS | OXYGEN SATURATION: 93 % | HEIGHT: 72 IN | RESPIRATION RATE: 18 BRPM | DIASTOLIC BLOOD PRESSURE: 50 MMHG

## 2019-04-11 PROCEDURE — 700102 HCHG RX REV CODE 250 W/ 637 OVERRIDE(OP): Performed by: PHYSICAL MEDICINE & REHABILITATION

## 2019-04-11 PROCEDURE — 700111 HCHG RX REV CODE 636 W/ 250 OVERRIDE (IP): Performed by: PHYSICAL MEDICINE & REHABILITATION

## 2019-04-11 PROCEDURE — A9270 NON-COVERED ITEM OR SERVICE: HCPCS | Performed by: PHYSICAL MEDICINE & REHABILITATION

## 2019-04-11 PROCEDURE — 700101 HCHG RX REV CODE 250: Performed by: PHYSICAL MEDICINE & REHABILITATION

## 2019-04-11 PROCEDURE — 99239 HOSP IP/OBS DSCHRG MGMT >30: CPT | Performed by: PHYSICAL MEDICINE & REHABILITATION

## 2019-04-11 RX ADMIN — OXYCODONE HYDROCHLORIDE AND ACETAMINOPHEN 500 MG: 500 TABLET ORAL at 09:09

## 2019-04-11 RX ADMIN — SENNOSIDES AND DOCUSATE SODIUM 2 TABLET: 8.6; 5 TABLET ORAL at 09:09

## 2019-04-11 RX ADMIN — LIDOCAINE 1 PATCH: 50 PATCH TOPICAL at 09:08

## 2019-04-11 RX ADMIN — VITAMIN D, TAB 1000IU (100/BT) 4000 UNITS: 25 TAB at 09:09

## 2019-04-11 RX ADMIN — LEVETIRACETAM 750 MG: 500 TABLET ORAL at 09:09

## 2019-04-11 RX ADMIN — FERROUS SULFATE TAB 325 MG (65 MG ELEMENTAL FE) 325 MG: 325 (65 FE) TAB at 09:09

## 2019-04-11 RX ADMIN — ENOXAPARIN SODIUM 40 MG: 100 INJECTION SUBCUTANEOUS at 09:10

## 2019-04-11 RX ADMIN — Medication 1 TABLET: at 09:10

## 2019-04-11 NOTE — CARE PLAN
Problem: Speech/Swallowing LTGs  Goal: LTG-By discharge, patient will  1) Individualized goal:  Demonstrate cognitive-linguistic skills to support return to prior living situation with intermittent supervision  2) Interventions:  SLP Self Care / ADL Training , SLP Cognitive Skill Development and SLP Group Treatment     Outcome: DISCHARGED-GOAL NOT MET Date Met: 04/11/19  Pt will require 24-7 supervision initially upon d/c.

## 2019-04-11 NOTE — DISCHARGE PLANNING
Case management Summary:   Met with patient and his wife prior to discharge.  His son was also present.  Reviewed all follow up appointments with PCP, physiatry and neurology. Family training and home eval have been completed.  Referral made to [facility] and they are have accepted referral and are ready to follow.  No additional dme was ordered.  Patient is current with [employer] for his night time oxygen and CPap.  During hospitalization, I have provided support and education and have been available for questions and information during hours of operation, communicated with therapy team and MD along with providing links/resources  to outside services.    Patient and his wife verbalize agreement with all plans and understanding of the next steps within the post acute services.     CASE MANAGEMENT PLAN OF CARE   Individualized Goals:   1.  I will follow to confirm that patient will be able to negotiate his stairs at home.  2.  I will confirm with his wife what type of walker he has.  3.  Case Management will provide weekly update from team conference discussion.      Outcome:   1. Met: Home eval completed and patient was able to go up/down his stairs x2.  2. Met: Patient has a fww for home.  3. Met: Provided patient and his wife with weekly updates.

## 2019-04-11 NOTE — PROGRESS NOTES
Patient discharged to home per order.  Discharge instructions reviewed with patient and s/o; they verbalize understanding and signed copies placed in chart.  Patient has all belongings; signed copy of form in chart.  Patient left facility at 1015 via wc accompanied by rehab staff and s/o.  Have enjoyed working with this pleasant patient.

## 2019-04-11 NOTE — CARE PLAN
Problem: Communication  Goal: The ability to communicate needs accurately and effectively will improve  Outcome: PROGRESSING AS EXPECTED  Plan of care for the day discussed this morning with pt. All questions and concerns answered at this time. Pt reports sleeping well last night. Will continue to monitor      Problem: Skin Integrity  Goal: Risk for impaired skin integrity will decrease  Outcome: PROGRESSING AS EXPECTED  Patient remains free from s/s infection; afebrile.  Will continue to monitor.

## 2019-04-11 NOTE — CARE PLAN
"Problem: PT-Long Term Goals  Goal: LTG-By discharge, patient will maintain balance  RUBIO increasing to 42/56 demonstrating low fall risk upon return home      Outcome: NOT MET  Focus this week on family training  Goal: LTG-By discharge, patient will tolerate standing  Sit to stand from 20\" chair use of bilateral UEs to assist x 5 in 30 seconds, mod I, indicating improved strength, power, balance, and endurance necessary for decreased fall risk and independence with functional mobility.    Outcome: MET Date Met: 04/10/19    Goal: LTG-By discharge, patient will ambulate  Pt will ambulate household distances of 150ft at .4m/s gait speed with FWW mod I, indicating efficiency and independence for ambulation upon return home.    Outcome: NOT MET  Pt requires SPV, for safety, family trained on this  Goal: LTG-By discharge, patient will transfer one surface to another  Stand with FWW bilateral UEs to assist, step turn transfer with FWW, to various surfaces mod I    Outcome: NOT MET  Pt requires SPV for safety, family has been trained  Goal: LTG-By discharge, patient will ambulate up/down flight of stairs  Ascend/descend 17 steps with single railing no more than SUPV from wife, indicating safe entry and exiting of his home      Outcome: MET Date Met: 04/10/19    Goal: LTG-By discharge, patient will  10 MWT average gait speed .4m/s    Outcome: MET Date Met: 04/10/19        "

## 2019-04-11 NOTE — CARE PLAN
Problem: Urinary Elimination:  Goal: Ability to reestablish a normal urinary elimination pattern will improve  Patient is continent of bladder this shift.  Will continue to monitor.assisted to bathroom.    Problem: Skin Integrity  Goal: Risk for impaired skin integrity will decrease  Patient's skin remains intact and free from new or accidental injury this shift.  Will continue to monitor.    Problem: Pain Management  Goal: Pain level will decrease to patient's comfort goal  Patient able to verbalize pain level and verbalize an acceptable level of pain.

## 2019-04-11 NOTE — DISCHARGE INSTRUCTIONS
Speech Therapy Discharge Instructions for Han Pat    4/11/2019    Diet: Regular - all foods allowed  Swallow Strategies: NA  Other Diet Instructions: NA  Supervision: 24 hour supervision is recommended for safety at this time.   Cognition / Communication: It has been a pleasure working with you, Ed. Please keep up the good work at home! Listed below are tips to assist with memory, finances and completing meds at home. ~ Olamide Cox MS, CCC-SLP    Ed would benefit from assistance managing their medications.  It is recommended that you purchase a pill organizer to sort medications in on a weekly basis.  Implementing a system to help remind you to take your medications will also be helpful (Ex: setting alarms, having a friend/family member call as a reminder).       It is recommended that Ed has assistance when managing any financial tasks.      Executive function is a set of mental skills that help you get things done. These skills are controlled by an area of the brain called the frontal lobe.    Executive function helps you:  • Manage time  • Pay attention  • Switch focus  • Plan and organize  • Remember details  • Avoid saying or doing the wrong thing  • Do things based on your experience  • Multitask    When executive function isn’t working as it should, your behavior is less controlled. This can affect your ability to:  • Work or go to school  • Do things independently  • Maintain relationships    How to Manage Executive Function Problems  • Take a step-by-step approach to work.  • Rely on visual organizational aids.  • Use tools like time organizers, computers, or watches with alarms.  • Make schedules and look at them several times a day.  • Ask for written and oral instructions whenever possible.  • Plan for transition times and shifts in activities.  • Allow and budget extra time to get tasks done.    • When you are about to try a task that you haven’t done in a while (or struggled with the  last time), think about the steps involved, try to anticipate possible challenges and identify what might give you trouble, come up with a plan to compensate of those challenges, and evaluate after the fact - to determine if your plan worked or if it needs adjustment.  •  If you experience an outcome that you didn’t expect, think back to what may have contributed to the problem.   • Break complex problems down into smaller parts.   Sometimes a complicated task can be overwhelming, but if you break it down into components, you will be able to find a place where you can cope with the information.    •  Be patient and persistent.    • Develop tools and strategies that will help organize, filter and narrow down information so that you can deal with it effectively.    What is memory?   Memory is the ability to learn, store, and retrieve information. New or increasing problems with any or all of these 3 stages of memory often occur after a traumatic brain injury, stroke, brain tumor, multiple sclerosis, or other kind of injury or illness that affects your nervous system.   Some kinds of memory problems may also occur as part of normal aging, when many people have more trouble retrieving new information.     Types of Memory:    •Long-term (remote): memory for old, well-learned information that has been “rehearsed” (used) over time, such as the name of a childhood pet, memories of past vacations, or where you went to high school. Long-term memory tends to be retained after injury or illness.   •Short-term (recent): memory for new experiences that took place a few minutes, hours, or days ago, such as what you had for breakfast or what you did yesterday. Short-term memory tends to be the most severely affected after injury. People who have had brain injuries may have problems with their attention span, how much memory they can store, how quickly they can think, and how efficiently they learn. These memory problems will make  it hard to understand and save short-term memories so that they can be correctly rehearsed and stored in long-term memory.   •Immediate (working): memory for information that is current, that you usually keep track of mentally, such as a phone number you look up, directions someone just gave you, or keeping track of numbers in your head when you add or subtract.   •Prospective: the ability to remember to do something in the future, such as remembering to take a medicine, go to an appointment, or follow through on an assignment or project.       Strategies to Help Improve Your Memory   Your speech therapist can work with you to develop strategies to help you remember new information. There are 2 main types of strategies to help your memory: internal reminders and external reminders.     Internal Reminders     •Rehearsal: retelling yourself information you just learned, or restating it out loud in your own words.   •Repetition: saying the same information over and over, either silently or out loud.   •Clarification: asking someone else to repeat or rephrase information.   •Chunking: grouping items together to reduce the number of items to remember, such as grouping 7-digit phone numbers into 2 chunks, one with 3 numbers and the other with 4 numbers.   •Rhyming: making a rhyme out of important information.   •Acronyms or alphabet cueing: creating a letter for each word you want to remember, or vice versa. One example is using the sentence “Every Good Boy Does Fine” to remember that the lines of a treble staff in music are the notes E, G, B, D, and F.   •Imagery (also called visualization): creating pictures of the information in your mind.   •Association: linking old information or habits with the new, such as remembering to take your medicine every night at the same time that you brush your teeth.   •Personal meaning: making the new information meaningful or emotionally important to you in some way.     External  Reminders     •Using a paper or electronic calendar or day planner.   •Setting timers or alarms to remind you to do something.   •Writing down reminders such as to-do lists, shopping lists, and project outlines.   •Recording new information with a voice recorder.   •Using a medicine organizing tool.   •Creating specific, permanent places for important items. One example is always putting your keys, wallet, and cell phone in the same place every time you get home.          Stroke Prevention  Some health problems and behaviors may make it more likely for you to have a stroke. Below are ways to lessen your risk of having a stroke.  · Be active for at least 30 minutes on most or all days.  · Do not smoke. Try not to be around others who smoke.  · Do not drink too much alcohol.  ¨ Do not have more than 2 drinks a day if you are a man.  ¨ Do not have more than 1 drink a day if you are a woman and are not pregnant.  · Eat healthy foods, such as fruits and vegetables. If you were put on a specific diet, follow the diet as told.  · Keep your cholesterol levels under control through diet and medicines. Look for foods that are low in saturated fat, trans fat, cholesterol, and are high in fiber.  · If you have diabetes, follow all diet plans and take your medicine as told.  · Ask your doctor if you need treatment to lower your blood pressure. If you have high blood pressure (hypertension), follow all diet plans and take your medicine as told by your doctor.  · If you are 18-39 years old, have your blood pressure checked every 3-5 years. If you are age 40 or older, have your blood pressure checked every year.  · Keep a healthy weight. Eat foods that are low in calories, salt, saturated fat, trans fat, and cholesterol.  · Do not take drugs.  · Avoid birth control pills, if this applies. Talk to your doctor about the risks of taking birth control pills.  · Talk to your doctor if you have sleep problems (sleep apnea).  · Take all  medicine as told by your doctor.  ¨ You may be told to take aspirin or blood thinner medicine. Take this medicine as told by your doctor.  ¨ Understand your medicine instructions.  · Make sure any other conditions you have are being taken care of.  Get help right away if:  · You suddenly lose feeling (you feel numb) or have weakness in your face, arm, or leg.  · Your face or eyelid hangs down to one side.  · You suddenly feel confused.  · You have trouble talking (aphasia) or understanding what people are saying.  · You suddenly have trouble seeing in one or both eyes.  · You suddenly have trouble walking.  · You are dizzy.  · You lose your balance or your movements are clumsy (uncoordinated).  · You suddenly have a very bad headache and you do not know the cause.  · You have new chest pain.  · Your heart feels like it is fluttering or skipping a beat (irregular heartbeat).  Do not wait to see if the symptoms above go away. Get help right away. Call your local emergency services (911 in [place] Do not drive yourself to the hospital.   This information is not intended to replace advice given to you by your health care provider. Make sure you discuss any questions you have with your health care provider.  Document Released: 06/18/2013 Document Revised: 05/25/2017 Document Reviewed: 06/20/2014  Elsevier Interactive Patient Education © 2017 [employer]  Fall Prevention in the Home  Introduction  Falls can cause injuries. They can happen to people of all ages. There are many things you can do to make your home safe and to help prevent falls.  What can I do on the outside of my home?  · Regularly fix the edges of walkways and driveways and fix any cracks.  · Remove anything that might make you trip as you walk through a door, such as a raised step or threshold.  · Trim any bushes or trees on the path to your home.  · Use bright outdoor lighting.  · Clear any walking paths of anything that might make someone trip, such as  rocks or tools.  · Regularly check to see if handrails are loose or broken. Make sure that both sides of any steps have handrails.  · Any raised decks and porches should have guardrails on the edges.  · Have any leaves, snow, or ice cleared regularly.  · Use sand or salt on walking paths during winter.  · Clean up any spills in your garage right away. This includes oil or grease spills.  What can I do in the bathroom?  · Use night lights.  · Install grab bars by the toilet and in the tub and shower. Do not use towel bars as grab bars.  · Use non-skid mats or decals in the tub or shower.  · If you need to sit down in the shower, use a plastic, non-slip stool.  · Keep the floor dry. Clean up any water that spills on the floor as soon as it happens.  · Remove soap buildup in the tub or shower regularly.  · Attach bath mats securely with double-sided non-slip rug tape.  · Do not have throw rugs and other things on the floor that can make you trip.  What can I do in the bedroom?  · Use night lights.  · Make sure that you have a light by your bed that is easy to reach.  · Do not use any sheets or blankets that are too big for your bed. They should not hang down onto the floor.  · Have a firm chair that has side arms. You can use this for support while you get dressed.  · Do not have throw rugs and other things on the floor that can make you trip.  What can I do in the kitchen?  · Clean up any spills right away.  · Avoid walking on wet floors.  · Keep items that you use a lot in easy-to-reach places.  · If you need to reach something above you, use a strong step stool that has a grab bar.  · Keep electrical cords out of the way.  · Do not use floor polish or wax that makes floors slippery. If you must use wax, use non-skid floor wax.  · Do not have throw rugs and other things on the floor that can make you trip.  What can I do with my stairs?  · Do not leave any items on the stairs.  · Make sure that there are handrails on  both sides of the stairs and use them. Fix handrails that are broken or loose. Make sure that handrails are as long as the stairways.  · Check any carpeting to make sure that it is firmly attached to the stairs. Fix any carpet that is loose or worn.  · Avoid having throw rugs at the top or bottom of the stairs. If you do have throw rugs, attach them to the floor with carpet tape.  · Make sure that you have a light switch at the top of the stairs and the bottom of the stairs. If you do not have them, ask someone to add them for you.  What else can I do to help prevent falls?  · Wear shoes that:  ¨ Do not have high heels.  ¨ Have rubber bottoms.  ¨ Are comfortable and fit you well.  ¨ Are closed at the toe. Do not wear sandals.  · If you use a stepladder:  ¨ Make sure that it is fully opened. Do not climb a closed stepladder.  ¨ Make sure that both sides of the stepladder are locked into place.  ¨ Ask someone to hold it for you, if possible.  · Clearly naresh and make sure that you can see:  ¨ Any grab bars or handrails.  ¨ First and last steps.  ¨ Where the edge of each step is.  · Use tools that help you move around (mobility aids) if they are needed. These include:  ¨ Canes.  ¨ Walkers.  ¨ Scooters.  ¨ Crutches.  · Turn on the lights when you go into a dark area. Replace any light bulbs as soon as they burn out.  · Set up your furniture so you have a clear path. Avoid moving your furniture around.  · If any of your floors are uneven, fix them.  · If there are any pets around you, be aware of where they are.  · Review your medicines with your doctor. Some medicines can make you feel dizzy. This can increase your chance of falling.  Ask your doctor what other things that you can do to help prevent falls.  This information is not intended to replace advice given to you by your health care provider. Make sure you discuss any questions you have with your health care provider.  Document Released: 10/14/2010 Document  Revised: 05/25/2017 Document Reviewed: 01/22/2016  © 2017 Elsevier    Benign Prostatic Hypertrophy  The prostate gland is part of the reproductive system of men. A normal prostate is about the size and shape of a walnut. The prostate gland produces a fluid that is mixed with sperm to make semen. This gland surrounds the urethra and is located in front of the rectum and just below the bladder. The bladder is where urine is stored. The urethra is the tube through which urine passes from the bladder to get out of the body.  The prostate grows as a man ages. An enlarged prostate not caused by cancer is called benign prostatic hypertrophy (BPH). An enlarged prostate can press on the urethra. This can make it harder to pass urine. In the early stages of enlargement, the bladder can get by with a narrowed urethra by forcing the urine through. If the problem gets worse, medical or surgical treatment may be required.  This condition should be followed by your health care provider. The accumulation of urine in the bladder can cause infection. Back pressure and infection can progress to bladder damage and kidney (renal) failure. If needed, your health care provider may refer you to a specialist in kidney and prostate disease (urologist).  What are the causes?  BPH is a common health problem in men older than 50 years. This condition is a normal part of aging. However, not all men will develop problems from this condition. If the enlargement grows away from the urethra, then there will not be any compression of the urethra and resistance to urine flow. If the growth is toward the urethra and compresses it, you will experience difficulty urinating.  What are the signs or symptoms?  · Not able to completely empty your bladder.  · Getting up often during the night to urinate.  · Need to urinate frequently during the day.  · Difficultly starting urine flow.  · Decrease in size and strength of your urine stream.  · Dribbling after  urination.  · Pain on urination (more common with infection).  · Inability to pass urine. This needs immediate treatment.  · The development of a urinary tract infection.  How is this diagnosed?  These tests will help your health care provider understand your problem:  · A thorough history and physical examination.  · A urination history, with the number of times you urinate, the amounts of urine, the strength of the urine stream, and the feeling of emptiness or fullness after urinating.  · A postvoid bladder scan that measures any amount of urine that may remain in your bladder after you finish urinating.  · Digital rectal exam. In a rectal exam, your health care provider checks your prostate by putting a gloved, lubricated finger into your rectum to feel the back of your prostate gland. This exam detects the size of your gland and abnormal lumps or growths.  · Exam of your urine (urinalysis).  · Prostate specific antigen (PSA) screening. This is a blood test used to screen for prostate cancer.  · Rectal ultrasonography. This test uses sound waves to electronically produce a picture of your prostate gland.  How is this treated?  Once symptoms begin, your health care provider will monitor your condition. Of the men with this condition, one third will have symptoms that stabilize, one third will have symptoms that improve, and one third will have symptoms that progress in the first year.  Mild symptoms may not need treatment. Simple observation and yearly exams may be all that is required. Medicines and surgery are options for more severe problems. Your health care provider can help you make an informed decision for what is best.  Two classes of medicines are available for relief of prostate symptoms:  · Medicines that shrink the prostate. This helps relieve symptoms. These medicines take time to work, and it may be months before any improvement is seen.  ¨ Uncommon side effects include problems with sexual  function.  · Medicines to relax the muscle of the prostate. This also relieves the obstruction by reducing any compression on the urethra. This group of medicines work much faster than those that reduce the size of the prostate gland. Usually, one can experience improvement in days to weeks..  ¨ Side effects can include dizziness, fatigue, lightheadedness, and retrograde ejaculation (diminished volume of ejaculate).  Several types of surgical treatments are available for relief of prostate symptoms:  · Transurethral resection of the prostate (TURP)--In this treatment, an instrument is inserted through opening at the tip of the penis. It is used to cut away pieces of the inner core of the prostate. The pieces are removed through the same opening of the penis. This removes the obstruction and helps get rid of the symptoms.  · Transurethral incision (TUIP)--In this procedure, small cuts are made in the prostate. This lessens the prostates pressure on the urethra.  · Transurethral microwave thermotherapy (TUMT)--This procedure uses microwaves to create heat. The heat destroys and removes a small amount of prostate tissue.  · Transurethral needle ablation (TUNA)--This is a procedure that uses radio frequencies to do the same as TUMT.  · Interstitial laser coagulation (ILC)--This is a procedure that uses a laser to do the same as TUMT and TUNA.  · Transurethral electrovaporization (TUVP)--This is a procedure that uses electrodes to do the same as the procedures listed above.  Contact a health care provider if:  · You develop a fever.  · There is unexplained back pain.  · Symptoms are not helped by medicines prescribed.  · You develop side effects from the medicine you are taking.  · Your urine becomes very dark or has a bad smell.  · Your lower abdomen becomes distended and you have difficulty passing your urine.  Get help right away if:  · You are suddenly unable to urinate. This is an emergency. You should be seen  immediately.  · There are large amounts of blood or clots in the urine.  · Your urinary problems become unmanageable.  · You develop lightheadedness, severe dizziness, or you feel faint.  · You develop moderate to severe low back or flank pain.  · You develop chills or fever.  This information is not intended to replace advice given to you by your health care provider. Make sure you discuss any questions you have with your health care provider.  Document Released: 12/18/2006 Document Revised: 05/31/2017 Document Reviewed: 07/03/2014  [employer] Interactive Patient Education © 2017 [employer]      [facility] NURSING DISCHARGE INSTRUCTIONS    Blood Pressure : 123/59  Weight: 91.5 kg (201 lb 11.5 oz)  Nursing recommendations for Sheldonbrian Pat at time of discharge are as follows:  Client and Family Member verbalized understanding of all discharge instructions and prescriptions.     Review all your home medications and newly ordered medications with your doctor and/or pharmacist. Follow medication instructions as directed by your doctor and/or pharmacist.    Pain Management:   Discharge Pain Medication Instructions:  Comfort Goal: 0  Notify your primary care provider if pain is unrelieved with these measures, if the pain is new, or increased in intensity.    Discharge Skin Characteristics: Warm, Dry  Discharge Skin Exam: Clear     Skin / Wound Care Instructions: Please contact your primary care physician for any change in skin integrity    If You Have Surgical Incisions / Wounds:  Monitor surgical site(s) for signs of increased swelling, redness or symptoms of drainage from the site or fever as this could indicate signs and symptoms of infection. If these symptoms are noted, notifiy your primary care provider.      Discharge Safety Instructions: Should Have ADULT SUPERVISION     Discharge Safety Concerns: Unsteady Gait, Weakness  The interdisciplinary team has made recommendation that you  should have adult supervision in the house due to weakness and unsteady gait  Anti-embolic stockings are not required to increase circulation to the lower extremities.    Discharge Diet: regular     Discharge Liquids: thin  Discharge Bowel Function: Continent  Please contact your primary care physician for any changes in bowel habits.  Discharge Bowel Program:    Discharge Bladder Function: Continent  Discharge Urinary Devices:        Nursing Discharge Plan:   Influenza Vaccine Indication: Not indicated: Previously immunized this influenza season and > 8 years of age    Case Management Discharge Instructions:   Discharge Location: Home with Home Health  Agency Name/Address/Phone: [facility] at [phone] (they will call you to schedule visits)  Home Health: Registered Nurse, Occupational Therapist, Physical Therapist, Speech Therapist  Outpatient Services:    DME Provider/Phone: No additional equipment recommended.  Current with [employer] for night time oxygen at [phone].  Medical Equipment Ordered:    Prescription Faxed to:        Discharge Medication Instructions:  Below are the medications your physician expects you to take upon discharge:  Depression / Suicide Risk    As you are discharged from this [facility] facility, it is important to learn how to keep safe from harming yourself.    Recognize the warning signs:  · Abrupt changes in personality, positive or negative- including increase in energy   · Giving away possessions  · Change in eating patterns- significant weight changes-  positive or negative  · Change in sleeping patterns- unable to sleep or sleeping all the time   · Unwillingness or inability to communicate  · Depression  · Unusual sadness, discouragement and loneliness  · Talk of wanting to die  · Neglect of personal appearance   · Rebelliousness- reckless behavior  · Withdrawal from people/activities they love  · Confusion- inability to concentrate     If you or a loved one observes  any of these behaviors or has concerns about self-harm, here's what you can do:  · Talk about it- your feelings and reasons for harming yourself  · Remove any means that you might use to hurt yourself (examples: pills, rope, extension cords, firearm)  · Get professional help from the community (Mental Health, Substance Abuse, psychological counseling)  · Do not be alone:Call your Safe Contact- someone whom you trust who will be there for you.  · Call your local CRISIS HOTLINE [phone] or [phone]  · Call your local [facility] Mobile Crisis Response [facility] [place] [place] [phone] or www.BuildFax  · Call the toll free National Suicide Prevention Hotlines   · National Suicide Prevention Lifeline 093-306-EXHH (2947)  · National Hope Line Network 800-SUICIDE (268-2993)    Fall Prevention in the Home  Falls can cause injuries and can affect people from all age groups. There are many simple things that you can do to make your home safe and to help prevent falls.  What can I do on the outside of my home?  · Regularly repair the edges of walkways and driveways and fix any cracks.  · Remove high doorway thresholds.  · Trim any shrubbery on the main path into your home.  · Use bright outdoor lighting.  · Clear walkways of debris and clutter, including tools and rocks.  · Regularly check that handrails are securely fastened and in good repair. Both sides of any steps should have handrails.  · Install guardrails along the edges of any raised decks or porches.  · Have leaves, snow, and ice cleared regularly.  · Use sand or salt on walkways during winter months.  · In the garage, clean up any spills right away, including grease or oil spills.  What can I do in the bathroom?  · Use night lights.  · Install grab bars by the toilet and in the tub and shower. Do not use towel bars as grab bars.  · Use non-skid mats or decals on the floor of the tub or shower.  · If you need to sit down while you are in the shower, use a  plastic, non-slip stool.  · Keep the floor dry. Immediately clean up any water that spills on the floor.  · Remove soap buildup in the tub or shower on a regular basis.  · Attach bath mats securely with double-sided non-slip rug tape.  · Remove throw rugs and other tripping hazards from the floor.  What can I do in the bedroom?  · Use night lights.  · Make sure that a bedside light is easy to reach.  · Do not use oversized bedding that drapes onto the floor.  · Have a firm chair that has side arms to use for getting dressed.  · Remove throw rugs and other tripping hazards from the floor.  What can I do in the kitchen?  · Clean up any spills right away.  · Avoid walking on wet floors.  · Place frequently used items in easy-to-reach places.  · If you need to reach for something above you, use a sturdy step stool that has a grab bar.  · Keep electrical cables out of the way.  · Do not use floor polish or wax that makes floors slippery. If you have to use wax, make sure that it is non-skid floor wax.  · Remove throw rugs and other tripping hazards from the floor.  What can I do in the stairways?  · Do not leave any items on the stairs.  · Make sure that there are handrails on both sides of the stairs. Fix handrails that are broken or loose. Make sure that handrails are as long as the stairways.  · Check any carpeting to make sure that it is firmly attached to the stairs. Fix any carpet that is loose or worn.  · Avoid having throw rugs at the top or bottom of stairways, or secure the rugs with carpet tape to prevent them from moving.  · Make sure that you have a light switch at the top of the stairs and the bottom of the stairs. If you do not have them, have them installed.  What are some other fall prevention tips?  · Wear closed-toe shoes that fit well and support your feet. Wear shoes that have rubber soles or low heels.  · When you use a stepladder, make sure that it is completely opened and that the sides are firmly  locked. Have someone hold the ladder while you are using it. Do not climb a closed stepladder.  · Add color or contrast paint or tape to grab bars and handrails in your home. Place contrasting color strips on the first and last steps.  · Use mobility aids as needed, such as canes, walkers, scooters, and crutches.  · Turn on lights if it is dark. Replace any light bulbs that burn out.  · Set up furniture so that there are clear paths. Keep the furniture in the same spot.  · Fix any uneven floor surfaces.  · Choose a carpet design that does not hide the edge of steps of a stairway.  · Be aware of any and all pets.  · Review your medicines with your healthcare provider. Some medicines can cause dizziness or changes in blood pressure, which increase your risk of falling.  Talk with your health care provider about other ways that you can decrease your risk of falls. This may include working with a physical therapist or  to improve your strength, balance, and endurance.  This information is not intended to replace advice given to you by your health care provider. Make sure you discuss any questions you have with your health care provider.  Document Released: 12/08/2003 Document Revised: 05/16/2017 Document Reviewed: 01/22/2016  [employer] Interactive Patient Education © 2017 [employer]  Physical Therapy Discharge Instructions for Han Pat    4/10/2019    Level of Assist Required for Ambulation: Supervision on Flat Surfaces, Supervision on Curbs, Supervision on Stairs  Distance Patient May Ambulate:  (limited community distance)  Device Recommended for Ambulation: Front-Wheeled Walker  Level of Assist Required to Propel Wheelchair: Requires No Assist  Level of Assist Required for Transfers: Supervision  Device Recommended for Transfers: Front-Wheeled Walker  Home Exercise Program: Refer to Home Exercise Program Handout for Details    It was a pleasure working with you! Best of luck on your continued  recovery.  Sincerely,  Carmencita Teresa PT, DPT    Occupational Therapy Discharge Instructions for Han Pat    4/10/2019    Level of Assist Required for Eating: Able to Complete Eating without Assist  Level of Assist Required for Grooming: Able to Complete Grooming without Assist  Level of Assist Required for Dressing: Requires Supervision with Dressing  Level of Assist Required for Toileting: Requires Supervision with Toileting  Level of Assist Required for Toilet Transfer: Requires Supervision with Toilet Transfer  Equipment for Toilet Transfer: Commode over Toilet  Level of Assist Required for Bathing: Requires Supervision with Bathing  Equipment for Bathing: Shower Chair, Grab Bars in Tub / Shower, Hand Held Shower Head  Level of Assist Required for Shower Transfer: Requires Supervision with Shower Transfer  Equipment for Shower Transfer: Shower Chair, Grab Bars in Tub / Shower  Level of Assist Required for Home Mgmt: Requires Physical Assist with Home Management  Level of Assist Required for Meal Prep: Requires Physical Assist with Meal Preparation  Driving: May not Drive, Please Contact Physician for Further Information  Home Exercise Program: None Issued  Comments: Good luck back at home, Ed.  It was good working with you.  Go PACK!  Reynaldo OT

## 2019-04-12 ENCOUNTER — HOME CARE VISIT (OUTPATIENT)
Dept: HOME HEALTH SERVICES | Facility: HOME HEALTHCARE | Age: 84
End: 2019-04-12
Payer: MEDICARE

## 2019-04-12 ENCOUNTER — TELEPHONE (OUTPATIENT)
Dept: MEDICAL GROUP | Facility: PHYSICIAN GROUP | Age: 84
End: 2019-04-12

## 2019-04-12 ENCOUNTER — PATIENT OUTREACH (OUTPATIENT)
Dept: HEALTH INFORMATION MANAGEMENT | Facility: OTHER | Age: 84
End: 2019-04-12

## 2019-04-12 VITALS
SYSTOLIC BLOOD PRESSURE: 105 MMHG | HEIGHT: 72 IN | BODY MASS INDEX: 26.01 KG/M2 | DIASTOLIC BLOOD PRESSURE: 55 MMHG | WEIGHT: 192 LBS | RESPIRATION RATE: 18 BRPM | OXYGEN SATURATION: 98 % | HEART RATE: 76 BPM | TEMPERATURE: 97.8 F

## 2019-04-12 PROCEDURE — 665001 SOC-HOME HEALTH

## 2019-04-12 PROCEDURE — G0493 RN CARE EA 15 MIN HH/HOSPICE: HCPCS

## 2019-04-12 SDOH — ECONOMIC STABILITY: HOUSING INSECURITY: HOME SAFETY: AZARD. NO EVIDENCE FOUND OF SMOKING MATERIALS PRESENT IN THE HOME.

## 2019-04-12 SDOH — ECONOMIC STABILITY: HOUSING INSECURITY: EVIDENCE OF SMOKING MATERIAL: 0

## 2019-04-12 SDOH — ECONOMIC STABILITY: HOUSING INSECURITY
HOME SAFETY: .OXYGEN SAFETY RISK ASSESSMENT PERFORMED. PATIENT PT WAS GIVEN A NO SMOKING SIGN AND PROVIDED EDUCATION ABOUT WHY IT IS IMPORTANT TO PLACE ONE.PT WAS GIVEN A NO SMOKING SIGN AND PROVIDED EDUCATION ABOUT WHY IT IS IMPORTANT TO PLACE ONE. PATIENT DOES

## 2019-04-12 SDOH — ECONOMIC STABILITY: HOUSING INSECURITY
HOME SAFETY: HAVE A WORKING FIRE EXTINGUISHER PRESENT IN THE HOME. SMOKE ALARMS ARE PRESENT AND FUNCTIONAL ON EACH LEVEL OF THE HOME. PATIENT DOES HAVE A FIRE ESCAPE PLAN DEVELOPED. PATIENT DOES NOT HAVE FLAMMABLE MATERIALS PRESENT IN THE HOME PRESENTING A FIRE H

## 2019-04-12 ASSESSMENT — ACTIVITIES OF DAILY LIVING (ADL): TRANSPORTATION COMMENTS: FATIGUES EASILY

## 2019-04-12 ASSESSMENT — PATIENT HEALTH QUESTIONNAIRE - PHQ9
2. FEELING DOWN, DEPRESSED, IRRITABLE, OR HOPELESS: 00
CLINICAL INTERPRETATION OF PHQ2 SCORE: 0
1. LITTLE INTEREST OR PLEASURE IN DOING THINGS: 00

## 2019-04-12 ASSESSMENT — ENCOUNTER SYMPTOMS
SHORTNESS OF BREATH: T
NAUSEA: DENIES
VOMITING: DENIES
ADDITIONAL INFORMATION: 0-3 PAIN RANGE

## 2019-04-12 NOTE — TELEPHONE ENCOUNTER
Medications reviewed for home health patient.    Only medication discrepancy is that he is on 2 PPIs omeprazole and lansoprazole.  Lansoprazole was to be discontinued per the hospital discharge note.  Also he has lidocaine patches on his med rec twice    Marcel Duran, TerryD

## 2019-04-15 ENCOUNTER — HOME CARE VISIT (OUTPATIENT)
Dept: HOME HEALTH SERVICES | Facility: HOME HEALTHCARE | Age: 84
End: 2019-04-15
Payer: MEDICARE

## 2019-04-15 ENCOUNTER — PATIENT OUTREACH (OUTPATIENT)
Dept: HEALTH INFORMATION MANAGEMENT | Facility: OTHER | Age: 84
End: 2019-04-15

## 2019-04-15 VITALS
WEIGHT: 199.5 LBS | DIASTOLIC BLOOD PRESSURE: 50 MMHG | RESPIRATION RATE: 18 BRPM | HEART RATE: 96 BPM | SYSTOLIC BLOOD PRESSURE: 110 MMHG | TEMPERATURE: 97.8 F | BODY MASS INDEX: 27.06 KG/M2 | OXYGEN SATURATION: 74 %

## 2019-04-15 VITALS
SYSTOLIC BLOOD PRESSURE: 118 MMHG | HEART RATE: 78 BPM | OXYGEN SATURATION: 94 % | DIASTOLIC BLOOD PRESSURE: 65 MMHG | RESPIRATION RATE: 18 BRPM | TEMPERATURE: 97.4 F

## 2019-04-15 PROCEDURE — G0151 HHCP-SERV OF PT,EA 15 MIN: HCPCS

## 2019-04-15 PROCEDURE — G0495 RN CARE TRAIN/EDU IN HH: HCPCS

## 2019-04-15 ASSESSMENT — ENCOUNTER SYMPTOMS
POOR JUDGMENT: 1
POOR JUDGMENT: 1

## 2019-04-15 ASSESSMENT — ACTIVITIES OF DAILY LIVING (ADL)
IADLS_COMMENTS: <!--EPICS-->PLEASE REFER TO OT EVALUATION.<BR><!--EPICE-->
ADLS_COMMENTS: <!--EPICS-->PLEASE REFER TO OT EVALUATION.<!--EPICE-->

## 2019-04-16 ENCOUNTER — HOME CARE VISIT (OUTPATIENT)
Dept: HOME HEALTH SERVICES | Facility: HOME HEALTHCARE | Age: 84
End: 2019-04-16
Payer: MEDICARE

## 2019-04-16 ENCOUNTER — OFFICE VISIT (OUTPATIENT)
Dept: MEDICAL GROUP | Facility: MEDICAL CENTER | Age: 84
End: 2019-04-16
Payer: MEDICARE

## 2019-04-16 VITALS
HEIGHT: 72 IN | TEMPERATURE: 98.9 F | SYSTOLIC BLOOD PRESSURE: 100 MMHG | DIASTOLIC BLOOD PRESSURE: 60 MMHG | WEIGHT: 197.97 LBS | BODY MASS INDEX: 26.81 KG/M2 | HEART RATE: 96 BPM

## 2019-04-16 VITALS
RESPIRATION RATE: 20 BRPM | TEMPERATURE: 98.4 F | SYSTOLIC BLOOD PRESSURE: 108 MMHG | DIASTOLIC BLOOD PRESSURE: 52 MMHG | HEART RATE: 73 BPM | OXYGEN SATURATION: 97 %

## 2019-04-16 DIAGNOSIS — Z29.89 SEIZURE PROPHYLAXIS: ICD-10-CM

## 2019-04-16 DIAGNOSIS — Z92.89 HOSPITALIZATION WITHIN LAST 30 DAYS: ICD-10-CM

## 2019-04-16 DIAGNOSIS — D64.9 ACUTE ANEMIA: ICD-10-CM

## 2019-04-16 DIAGNOSIS — R41.89 COGNITIVE IMPAIRMENT: ICD-10-CM

## 2019-04-16 DIAGNOSIS — G93.40 ACUTE ENCEPHALOPATHY: ICD-10-CM

## 2019-04-16 DIAGNOSIS — R53.1 WEAKNESS: ICD-10-CM

## 2019-04-16 PROCEDURE — G0153 HHCP-SVS OF S/L PATH,EA 15MN: HCPCS

## 2019-04-16 PROCEDURE — 99214 OFFICE O/P EST MOD 30 MIN: CPT | Performed by: PHYSICIAN ASSISTANT

## 2019-04-16 NOTE — PROGRESS NOTES
Subjective:      Han Pat is a 84 y.o. male who presents with Hospital Follow-up (Poss seizure)        hospital follow up    HPIPatient presents with wife for follow up after hospitalization. Went to ER on 3/23/19 after fall out of bed, confusion, bladder/bowel incontinence, general weakness. Patient's wife stated that he had been acting completely normal that week up to the weekend. Nothing out of the ordinary. Saturday/Sunday more fatigued than normal. The Sunday night he fell out of bed and was not answering questions appropriately and could not get up. Initially suspected to have TIA/CVA vs seizure vs infection. CT/MRI with no sign of stroke, mass, acute abnormality. Labs did not show infection. Treated with antibiotic, fluids. EEG normal but put on Keppra. No further symptoms. Discharged to rehab hospital. Weakness and fatigue have improved although he doesn't feel completely better. No cognitive change. No focal weakness. No change in speech. No fall. No tremor. Didn't actually get an evaluation with neurology. Doesn't have appt until summer.    ROShas lost 20 pounds in less than a month. No headache/dizziness. No neck pain or stiffness. No chest pain, SOB or difficulty breathing. No n/v/d/c or abdominal pain. No rash or skin lesion. No further incontinence.    Active Ambulatory Problems     Diagnosis Date Noted   • Obstructive sleep apnea 08/14/2017   • Gastroesophageal reflux disease 08/14/2017   • Obesity (BMI 30.0-34.9) 08/14/2017   • Thrombocytosis (HCC) 02/05/2019   • Benign prostatic hyperplasia without lower urinary tract symptoms 02/05/2019   • Mixed hyperlipidemia 02/05/2019   • Seasonal allergies 02/05/2019   • Vertigo 02/05/2019   • Cough 03/12/2019   • Serum total bilirubin elevated 03/24/2019   • Weakness 03/24/2019   • Acute encephalopathy 03/24/2019   • Lower back pain 03/24/2019   • Cognitive impairment 03/27/2019     Resolved Ambulatory Problems     Diagnosis Date Noted   •  "Hyponatremia 03/24/2019   • Anemia 03/25/2019   • Acute cystitis without hematuria 04/06/2019     Past Medical History:   Diagnosis Date   • Back pain    • GERD (gastroesophageal reflux disease)    • Hyperlipidemia    • Kidney stone    • PND (post-nasal drip)    • Sleep apnea      Current Outpatient Prescriptions   Medication Sig Dispense Refill   • lansoprazole (PREVACID) 30 MG TABLET DISPERSIBLE Take 30 mg by mouth every day.     • Lidocaine 4 % Patch Apply 1 Patch to affected area(s) PRN (pain).     • levETIRAcetam (KEPPRA) 750 MG tablet Take 1 Tab by mouth every 12 hours. 60 Tab 3   • simvastatin (ZOCOR) 20 MG Tab Take 1 Tab by mouth every evening. 30 Tab 3   • lidocaine (LIDODERM) 5 % Patch Apply 1 Patch to skin as directed every 24 hours. 30 Patch 3   • ferrous sulfate 325 (65 Fe) MG tablet Take 1 Tab by mouth 2 times a day, with meals. 30 Tab 3   • tamsulosin (FLOMAX) 0.4 MG capsule Take 1 Cap by mouth every bedtime. 30 Cap 3   • vitamin D 4000 units Tab Take 4,000 Units by mouth every day. 1200 Tab 3   • Multiple Vitamins-Minerals (OCUVITE PO) Take 2 Caps by mouth every evening.     • hydroxyurea (HYDREA) 500 MG Cap Take 1,000-1,500 mg by mouth See Admin Instructions. Pt takes 1000MG on Sat and Sun  1500MG on Mon, Tue, Wed, Thur, and Fri     • montelukast (SINGULAIR) 10 MG Tab Take 10 mg by mouth every evening.     • acetaminophen (TYLENOL) 325 MG Tab Take 2 Tabs by mouth every 6 hours as needed. 30 Tab 3   • albuterol 108 (90 Base) MCG/ACT Aero Soln inhalation aerosol Inhale 2 Puffs by mouth every four hours as needed for Shortness of Breath. 1 Inhaler 0     No current facility-administered medications for this visit.    nkda  Non-smoker   Objective:     /60 (BP Location: Left arm, Patient Position: Sitting, BP Cuff Size: Adult long)   Pulse 96   Temp 37.2 °C (98.9 °F) (Temporal)   Ht 1.829 m (6' 0.01\")   Wt 89.8 kg (197 lb 15.6 oz)   BMI 26.84 kg/m²      Physical Exam   Constitutional: He is " oriented to person, place, and time. He appears well-developed and well-nourished. No distress.   Cardiovascular: Normal rate and regular rhythm.    Pulmonary/Chest: Effort normal and breath sounds normal.   Neurological: He is alert and oriented to person, place, and time.   Skin: Skin is warm and dry. No rash noted. He is not diaphoretic. No pallor.   Psychiatric: He has a normal mood and affect. His behavior is normal. Judgment and thought content normal.   Vitals reviewed.              Assessment/Plan:     1. Hospitalization within last 30 days  Unclear cause, obviously needs to see neurology for complete eval, concern with 20 pound weight loss  - REFERRAL TO NEUROLOGY    2. Acute encephalopathy    - Basic Metabolic Panel; Future  - CBC WITH DIFFERENTIAL; Future  - KEPPRA; Future  - REFERRAL TO NEUROLOGY    3. Acute anemia    - Basic Metabolic Panel; Future  - CBC WITH DIFFERENTIAL; Future  - REFERRAL TO NEUROLOGY    4. Weakness    - REFERRAL TO NEUROLOGY    5. Cognitive impairment    - REFERRAL TO NEUROLOGY    6. Seizure prophylaxis    - REFERRAL TO NEUROLOGY    Asked patient to f/u with MD due to complexity of case and unresolved current problems, they have met with Dr. Rao and really liked her, will schedule appt today

## 2019-04-17 ENCOUNTER — HOSPITAL ENCOUNTER (OUTPATIENT)
Dept: LAB | Facility: MEDICAL CENTER | Age: 84
End: 2019-04-17
Attending: PHYSICIAN ASSISTANT
Payer: MEDICARE

## 2019-04-17 DIAGNOSIS — G93.40 ACUTE ENCEPHALOPATHY: ICD-10-CM

## 2019-04-17 DIAGNOSIS — D64.9 ACUTE ANEMIA: ICD-10-CM

## 2019-04-17 LAB
ANION GAP SERPL CALC-SCNC: 7 MMOL/L (ref 0–11.9)
ANISOCYTOSIS BLD QL SMEAR: ABNORMAL
BASOPHILS # BLD AUTO: 1.1 % (ref 0–1.8)
BASOPHILS # BLD: 0.06 K/UL (ref 0–0.12)
BUN SERPL-MCNC: 14 MG/DL (ref 8–22)
CALCIUM SERPL-MCNC: 8.7 MG/DL (ref 8.5–10.5)
CHLORIDE SERPL-SCNC: 104 MMOL/L (ref 96–112)
CO2 SERPL-SCNC: 23 MMOL/L (ref 20–33)
COMMENT 1642: NORMAL
CREAT SERPL-MCNC: 0.93 MG/DL (ref 0.5–1.4)
EOSINOPHIL # BLD AUTO: 0.08 K/UL (ref 0–0.51)
EOSINOPHIL NFR BLD: 1.4 % (ref 0–6.9)
ERYTHROCYTE [DISTWIDTH] IN BLOOD BY AUTOMATED COUNT: 80.7 FL (ref 35.9–50)
GLUCOSE SERPL-MCNC: 144 MG/DL (ref 65–99)
HCT VFR BLD AUTO: 35.4 % (ref 42–52)
HGB BLD-MCNC: 11.3 G/DL (ref 14–18)
IMM GRANULOCYTES # BLD AUTO: 0.08 K/UL (ref 0–0.11)
IMM GRANULOCYTES NFR BLD AUTO: 1.4 % (ref 0–0.9)
LYMPHOCYTES # BLD AUTO: 1.01 K/UL (ref 1–4.8)
LYMPHOCYTES NFR BLD: 17.7 % (ref 22–41)
MACROCYTES BLD QL SMEAR: ABNORMAL
MCH RBC QN AUTO: 37.9 PG (ref 27–33)
MCHC RBC AUTO-ENTMCNC: 31.9 G/DL (ref 33.7–35.3)
MCV RBC AUTO: 118.8 FL (ref 81.4–97.8)
MONOCYTES # BLD AUTO: 0.9 K/UL (ref 0–0.85)
MONOCYTES NFR BLD AUTO: 15.8 % (ref 0–13.4)
MORPHOLOGY BLD-IMP: NORMAL
NEUTROPHILS # BLD AUTO: 3.57 K/UL (ref 1.82–7.42)
NEUTROPHILS NFR BLD: 62.6 % (ref 44–72)
NRBC # BLD AUTO: 0 K/UL
NRBC BLD-RTO: 0 /100 WBC
OVALOCYTES BLD QL SMEAR: NORMAL
PLATELET # BLD AUTO: 628 K/UL (ref 164–446)
PLATELET BLD QL SMEAR: NORMAL
PMV BLD AUTO: 9.5 FL (ref 9–12.9)
POIKILOCYTOSIS BLD QL SMEAR: NORMAL
POTASSIUM SERPL-SCNC: 4 MMOL/L (ref 3.6–5.5)
RBC # BLD AUTO: 2.98 M/UL (ref 4.7–6.1)
RBC BLD AUTO: PRESENT
SODIUM SERPL-SCNC: 134 MMOL/L (ref 135–145)
WBC # BLD AUTO: 5.7 K/UL (ref 4.8–10.8)

## 2019-04-17 PROCEDURE — 80177 DRUG SCRN QUAN LEVETIRACETAM: CPT

## 2019-04-17 PROCEDURE — 85025 COMPLETE CBC W/AUTO DIFF WBC: CPT

## 2019-04-17 PROCEDURE — 80048 BASIC METABOLIC PNL TOTAL CA: CPT

## 2019-04-17 PROCEDURE — 36415 COLL VENOUS BLD VENIPUNCTURE: CPT

## 2019-04-18 ENCOUNTER — HOME CARE VISIT (OUTPATIENT)
Dept: HOME HEALTH SERVICES | Facility: HOME HEALTHCARE | Age: 84
End: 2019-04-18
Payer: MEDICARE

## 2019-04-18 VITALS
HEART RATE: 68 BPM | OXYGEN SATURATION: 95 % | DIASTOLIC BLOOD PRESSURE: 50 MMHG | TEMPERATURE: 97.5 F | RESPIRATION RATE: 20 BRPM | SYSTOLIC BLOOD PRESSURE: 100 MMHG

## 2019-04-18 DIAGNOSIS — R53.1 WEAKNESS: ICD-10-CM

## 2019-04-18 DIAGNOSIS — Z92.89 HISTORY OF RECENT HOSPITALIZATION: ICD-10-CM

## 2019-04-18 PROCEDURE — G0152 HHCP-SERV OF OT,EA 15 MIN: HCPCS

## 2019-04-18 PROCEDURE — G0495 RN CARE TRAIN/EDU IN HH: HCPCS

## 2019-04-18 ASSESSMENT — ACTIVITIES OF DAILY LIVING (ADL)
TRANSPORTATION_ASSISTANCE: 6
OASIS_M1830: 03
MEAL_PREP_ASSISTANCE: 5
EATING_ASSISTANCE: 0
TOILETING_ASSISTANCE: 0
DRESSING_UB_ASSISTANCE: 0
GROOMING_ASSISTANCE: 0
DRESSING_LB_ASSISTANCE: 0
HOUSEKEEPING_EQUIPMENT_USED: FWW
HOUSEKEEPING_ASSISTANCE: 5
LAUNDRY_ASSISTANCE: 5
BATHING_ASSISTANCE: 1
ORAL_CARE_ASSISTANCE: 0
TELEPHONE_ASSISTANCE: 0
TOILETING_EQUIPMENT_USED: RISER
SHOPPING_ASSISTANCE: 6

## 2019-04-18 ASSESSMENT — ENCOUNTER SYMPTOMS: POOR JUDGMENT: 1

## 2019-04-18 NOTE — PROGRESS NOTES
1. Caller Name: [facility]                                         Call Back Number:       Patient approves a detailed voicemail message: N\A    2. SPECIFIC Action To Be Taken: Referral pending, please sign.    3. Diagnosis/Clinical Reason for Request: Weakness, recent hospitalization, stay at rehab hospital    4. Specialty & Provider Name/Lab/Imaging Location: [facility]    5. Is appointment scheduled for requested order/referral: no    Patient was informed they will receive a return phone call from the office ONLY if there are any questions before processing their request. Advised to call back if they haven't received a call from the referral department in 5 days.

## 2019-04-19 ENCOUNTER — PATIENT MESSAGE (OUTPATIENT)
Dept: MEDICAL GROUP | Facility: MEDICAL CENTER | Age: 84
End: 2019-04-19

## 2019-04-19 DIAGNOSIS — R53.1 WEAKNESS: ICD-10-CM

## 2019-04-19 DIAGNOSIS — G93.40 ACUTE ENCEPHALOPATHY: ICD-10-CM

## 2019-04-19 DIAGNOSIS — R41.89 COGNITIVE IMPAIRMENT: ICD-10-CM

## 2019-04-19 DIAGNOSIS — Z87.898 HISTORY OF SEIZURE: ICD-10-CM

## 2019-04-19 LAB — LEVETIRACETAM SERPL-MCNC: 11 UG/ML (ref 12–46)

## 2019-04-23 ENCOUNTER — TELEPHONE (OUTPATIENT)
Dept: MEDICAL GROUP | Facility: MEDICAL CENTER | Age: 84
End: 2019-04-23

## 2019-04-23 ENCOUNTER — HOME CARE VISIT (OUTPATIENT)
Dept: HOME HEALTH SERVICES | Facility: HOME HEALTHCARE | Age: 84
End: 2019-04-23
Payer: MEDICARE

## 2019-04-23 VITALS
DIASTOLIC BLOOD PRESSURE: 58 MMHG | OXYGEN SATURATION: 92 % | RESPIRATION RATE: 18 BRPM | TEMPERATURE: 97.6 F | HEART RATE: 61 BPM | SYSTOLIC BLOOD PRESSURE: 115 MMHG

## 2019-04-23 PROCEDURE — G0180 MD CERTIFICATION HHA PATIENT: HCPCS | Performed by: FAMILY MEDICINE

## 2019-04-23 PROCEDURE — G0151 HHCP-SERV OF PT,EA 15 MIN: HCPCS

## 2019-04-23 ASSESSMENT — ACTIVITIES OF DAILY LIVING (ADL)
HOME_HEALTH_OASIS: 00
OASIS_M1830: 01

## 2019-04-23 ASSESSMENT — PATIENT HEALTH QUESTIONNAIRE - PHQ9: CLINICAL INTERPRETATION OF PHQ2 SCORE: 0

## 2019-04-23 NOTE — ADDENDUM NOTE
Encounter addended by: Stanford Adler D.O. on: 4/23/2019  3:47 PM<BR>    Actions taken: Sign clinical note

## 2019-04-23 NOTE — TELEPHONE ENCOUNTER
ESTABLISHED PATIENT PRE-VISIT PLANNING     Patient was NOT contacted to complete PVP.     Note: Patient will not be contacted if there is no indication to call.     1.  Reviewed notes from the last few office visits within the medical group: Yes    2.  If any orders were placed at last visit or intended to be done for this visit (i.e. 6 mos follow-up), do we have Results/Consult Notes?        •  Labs - Labs ordered, completed on 04/17/2019 and results are in chart.   Note: If patient appointment is for lab review and patient did not complete labs, check with provider if OK to reschedule patient until labs completed.       •  Imaging - Imaging was not ordered at last office visit.       •  Referrals - No referrals were ordered at last office visit.    3. Is this appointment scheduled as a Hospital Follow-Up? No    4.  Immunizations were updated in Epic using WebIZ?: Epic matches WebIZ       •  Web Iz Recommendations: HEPATITIS B, MMR , PREVNAR (PCV13) , TDAP and SHINGRIX (Shingles)    5.  Patient is due for the following Health Maintenance Topics:   Health Maintenance Due   Topic Date Due   • Annual Wellness Visit  1935   • IMM DTaP/Tdap/Td Vaccine (1 - Tdap) 03/17/1954   • IMM PNEUMOCOCCAL 65+ (ADULT) LOW/MEDIUM RISK SERIES (1 of 2 - PCV13) 03/17/2002   • IMM ZOSTER VACCINES (2 of 3) 04/07/2011       6. Orders for overdue Health Maintenance topics pended in Pre-Charting? NO    7.  AHA (MDX) form printed for Provider? YES    8.  Patient was NOT informed to arrive 15 min prior to their scheduled appointment and bring in their medication bottles.

## 2019-04-24 ENCOUNTER — OFFICE VISIT (OUTPATIENT)
Dept: PHYSICAL MEDICINE AND REHAB | Facility: MEDICAL CENTER | Age: 84
End: 2019-04-24
Payer: MEDICARE

## 2019-04-24 VITALS
DIASTOLIC BLOOD PRESSURE: 86 MMHG | HEART RATE: 88 BPM | WEIGHT: 202.82 LBS | OXYGEN SATURATION: 95 % | BODY MASS INDEX: 27.47 KG/M2 | SYSTOLIC BLOOD PRESSURE: 112 MMHG | HEIGHT: 72 IN | TEMPERATURE: 98 F

## 2019-04-24 DIAGNOSIS — R53.1 WEAKNESS: ICD-10-CM

## 2019-04-24 DIAGNOSIS — M54.5 LOW BACK PAIN, UNSPECIFIED BACK PAIN LATERALITY, UNSPECIFIED CHRONICITY, WITH SCIATICA PRESENCE UNSPECIFIED: ICD-10-CM

## 2019-04-24 DIAGNOSIS — R20.2 PARESTHESIAS: ICD-10-CM

## 2019-04-24 DIAGNOSIS — E55.9 VITAMIN D DEFICIENCY: ICD-10-CM

## 2019-04-24 PROCEDURE — 99214 OFFICE O/P EST MOD 30 MIN: CPT | Performed by: PHYSICAL MEDICINE & REHABILITATION

## 2019-04-24 ASSESSMENT — PAIN SCALES - GENERAL: PAINLEVEL: 6=MODERATE PAIN

## 2019-04-24 ASSESSMENT — PATIENT HEALTH QUESTIONNAIRE - PHQ9
SUM OF ALL RESPONSES TO PHQ QUESTIONS 1-9: 7
5. POOR APPETITE OR OVEREATING: 1 - SEVERAL DAYS
CLINICAL INTERPRETATION OF PHQ2 SCORE: 2

## 2019-04-24 NOTE — PROGRESS NOTES
New patient note    Physiatry (physical medicine and  Rehabilitation), interventional spine and sports medicine    Date of Service: 4/24/2019    Chief complaint: Low back pain    HISTORY    HPI: Han Pat 84 y.o. male who presents today for evaluation.  He was admitted to the [facility] hospital for acute encephalopathy.      He has been discharged from inpatient rehabilitation on 04/11/2019.  Since discharge, he has not yet started outpatient PT.    He has had chronic low back pain with lumbar spine surgery in 2009.  History of nerve pain.  He reports that he has some weakness in his legs and that his feet are hard to feel sometimes.    The weakness in his legs is improving, according to the patient and his wife.    No falls.  Using a walker.  He continues to have some difficulty with dizziness     PHQ-9: 7    Medical records review:  I reviewed the note from inpatient discharge.  He was hospitalized there from 03/29/2019-04/11/2019    Previous treatments:    Physical Therapy: Yes, Home health.  Starts out patient on May 4, 2019     Previous interventions: none recently    Previous surgeries to relieve the above pain:  none      ROS:   Gen: weight loss  Eyes: dizziness  Heme: thrombocytompenia    Red Flags ROS:   Fever, Chills, Sweats: Denies  Involuntary Weight Loss: Denies  Bladder Incontinence: Denies  Bowel Incontinence: denies  Saddle Anesthesia: Denies    All other systems reviewed and negative.       PMHx:   Past Medical History:   Diagnosis Date   • Back pain    • GERD (gastroesophageal reflux disease)    • Hyperlipidemia    • Kidney stone    • PND (post-nasal drip)    • Sleep apnea        PSHx:   Past Surgical History:   Procedure Laterality Date   • CATARACT EXTRACTION WITH IOL     • OTHER      Nasal surgery   • OTHER ORTHOPEDIC SURGERY  lumbar disectomy 2000       Family history   Family History   Problem Relation Age of Onset   • Other Father         Heart problems; unspecified   • Sleep  Apnea Father    • Other Mother         Aneurism   • Sleep Apnea Brother    • Other Brother         Heart problems; unspecified   • Sleep Apnea Son    • Sleep Apnea Son          Medications:   Current Outpatient Prescriptions   Medication   • lansoprazole (PREVACID) 30 MG TABLET DISPERSIBLE   • Lidocaine 4 % Patch   • acetaminophen (TYLENOL) 325 MG Tab   • levETIRAcetam (KEPPRA) 750 MG tablet   • simvastatin (ZOCOR) 20 MG Tab   • ferrous sulfate 325 (65 Fe) MG tablet   • tamsulosin (FLOMAX) 0.4 MG capsule   • vitamin D 4000 units Tab   • Multiple Vitamins-Minerals (OCUVITE PO)   • albuterol 108 (90 Base) MCG/ACT Aero Soln inhalation aerosol   • hydroxyurea (HYDREA) 500 MG Cap   • montelukast (SINGULAIR) 10 MG Tab   • Home Care Oxygen   • lidocaine (LIDODERM) 5 % Patch     No current facility-administered medications for this visit.        Allergies:   No Known Allergies    Social Hx:   Social History     Social History   • Marital status:      Spouse name: N/A   • Number of children: N/A   • Years of education: N/A     Occupational History   • Not on file.     Social History Main Topics   • Smoking status: Former Smoker     Packs/day: 1.00     Years: 15.00     Types: Cigarettes     Quit date: 1/1/1980   • Smokeless tobacco: Never Used   • Alcohol use 0.0 oz/week      Comment: nightly   • Drug use: No   • Sexual activity: Not on file     Other Topics Concern   •  Service Yes   • Blood Transfusions No   • Caffeine Concern No   • Occupational Exposure No   • Hobby Hazards No   • Sleep Concern No   • Stress Concern No   • Weight Concern No   • Special Diet No   • Back Care No   • Exercise Yes   • Bike Helmet No   • Seat Belt Yes   • Self-Exams No     Social History Narrative   • No narrative on file         EXAMINATION     Physical Exam:   Vitals: /86 (BP Location: Left arm, Patient Position: Sitting, BP Cuff Size: Adult long)   Pulse 88   Temp 36.7 °C (98 °F)   Ht 1.829 m (6')   Wt 92 kg (202 lb  13.2 oz)   SpO2 95%     Constitutional:   Body Habitus: Body mass index is 27.51 kg/m².  Cooperation: Fully cooperates with exam  Appearance: Well-groomed, well-nourished, not disheveled, in no acute distress    Eyes: No scleral icterus, no proptosis     ENT -no obvious auditory deficits, no obvious tongue lesions, tongue midline, no facial droop     Skin -no rashes or lesions noted     Respiratory-  breathing comfortable on room air, no audible wheezing    Cardiovascular- capillary refills less than 2 seconds. No lower extremity edema is noted.     Gastrointestinal - no obvious abdominal masses, No tenderness to palpation in the abdomen    Psychiatric- alert and oriented ×3. Normal affect.     Gait - Slow, short-stepped gait, no use of ambulatory device for short distance, no loss of balance    Musculoskeletal -   Cervical spine   Inspection: No deformities of the skin over the cervical spine. No rashes or lesions.    decreased A/P ROM in all directions, without  pain      Spurling’s sign: negative bilaterally    Thoracic/Lumbar Spine/Sacral Spine/Hips   Inspection: No evidence of atrophy in bilateral lower extremities throughout     ROM: Full ROM was not assessed due to patient's report of dizziness when bending forward    Palpation:   No tenderness to palpation in midline at T1-T12 levels. No tenderness to palpation in the left and right of the midline T1-L5  palpation over SI joint: negative bilaterally    palpation over buttock: negative bilaterally    palpation in hip or over the greater trochanters: negative bilaterally      Some mild cog-wheeling is noted in the upper extremities bilaterally    Lumbar spine Special tests  Neuro tension  Straight leg test negative bilaterally        SI joint tests  Observation patient sits on one buttocks: Negative  KYLIE test negative bilaterally       Neuro       Key points for the international standards for neurological classification of spinal cord injury (ISNCSCI) to  light touch.     Dermatome R L   C4 2 2   C5 2 2   C6 2 2   C7 2 2   C8 2 2   T1 2 2   T2 2 2   L2 2 2   L3 2 2   L4 2 2   L5 2 2   S1 2 2   S2 2 2           Motor Exam Upper Extremities   ? Myotome R L   Shoulder flexion C5 5 5   Elbow flexion C5 5 5   Wrist extension C6 5 5   Elbow extension C7 5 5   Finger flexion C8 5 5   Finger abduction T1 5 5         Motor Exam Lower Extremities    ? Myotome R L   Hip flexion L2 5 5   Knee extension L3 5 5   Ankle dorsiflexion L4 5- 5-   Toe extension L5 4 4   Ankle plantarflexion S1 5- 5-         Swanson’s sign negative bilaterally   Babinski sign negative bilaterally   Clonus of the ankle negative bilaterally     Reflexes  ?  R L   Biceps  2+ 2+   Brachioradialis  2+ 2+   Patella  2+ 2+   Achilles   2+ 2+       MEDICAL DECISION MAKING    Medical records review: see under HPI section.     DATA    Labs:   Lab Results   Component Value Date/Time    SODIUM 134 (L) 04/17/2019 03:30 PM    POTASSIUM 4.0 04/17/2019 03:30 PM    CHLORIDE 104 04/17/2019 03:30 PM    CO2 23 04/17/2019 03:30 PM    ANION 7.0 04/17/2019 03:30 PM    GLUCOSE 144 (H) 04/17/2019 03:30 PM    BUN 14 04/17/2019 03:30 PM    CREATININE 0.93 04/17/2019 03:30 PM    CALCIUM 8.7 04/17/2019 03:30 PM    ASTSGOT 17 03/30/2019 05:37 AM    ALTSGPT 23 03/30/2019 05:37 AM    TBILIRUBIN 1.8 (H) 03/30/2019 05:37 AM    ALBUMIN 3.0 (L) 03/30/2019 05:37 AM    TOTPROTEIN 5.7 (L) 03/30/2019 05:37 AM    GLOBULIN 2.7 03/30/2019 05:37 AM    AGRATIO 1.1 03/30/2019 05:37 AM       Lab Results   Component Value Date/Time    PROTHROMBTM 17.2 (H) 03/30/2019 05:37 AM    INR 1.39 (H) 03/30/2019 05:37 AM        Lab Results   Component Value Date/Time    WBC 5.7 04/17/2019 03:30 PM    RBC 2.98 (L) 04/17/2019 03:30 PM    HEMOGLOBIN 11.3 (L) 04/17/2019 03:30 PM    HEMATOCRIT 35.4 (L) 04/17/2019 03:30 PM    .8 (H) 04/17/2019 03:30 PM    MCH 37.9 (H) 04/17/2019 03:30 PM    MCHC 31.9 (L) 04/17/2019 03:30 PM    MPV 9.5 04/17/2019 03:30 PM     NEUTSPOLYS 62.60 04/17/2019 03:30 PM    LYMPHOCYTES 17.70 (L) 04/17/2019 03:30 PM    MONOCYTES 15.80 (H) 04/17/2019 03:30 PM    EOSINOPHILS 1.40 04/17/2019 03:30 PM    BASOPHILS 1.10 04/17/2019 03:30 PM    ANISOCYTOSIS 2+ 04/17/2019 03:30 PM        Lab Results   Component Value Date/Time    HBA1C 5.9 (H) 03/30/2019 05:37 AM        Imaging:     IMAGING radiology reads. I reviewed the following radiology reads             Results for orders placed during the hospital encounter of 03/24/19   MR-BRAIN-W/O    Impression 1.  Study is significantly limited by motion artifact.  2.  Diffuse primarily central atrophy.  3.  Nonspecific foci of abnormal signal in the white matter bilaterally.  Microvascular ischemic change versus demyelination or gliosis.  4.  No acute stroke or evidence for hemorrhage.  5.  Acute and chronic paranasal sinus disease.                                                                                                                                                                      Diagnosis   Visit Diagnoses     ICD-10-CM   1. Low back pain, unspecified back pain laterality, unspecified chronicity, with sciatica presence unspecified M54.5   2. Weakness R53.1   3. Paresthesias R20.2   4. Vitamin D deficiency E55.9           ASSESSMENT:  Han Pat 84 y.o. male with weakness     Han was seen today for new patient.    Diagnoses and all orders for this visit:    Low back pain, unspecified back pain laterality, unspecified chronicity, with sciatica presence unspecified  -     REFERRAL TO EMG - PHYSIATRY (PMR)    Weakness  -     REFERRAL TO EMG - PHYSIATRY (PMR)    Paresthesias  -     REFERRAL TO EMG - PHYSIATRY (PMR)    Vitamin D deficiency  -     VITAMIN D,25 HYDROXY; Future        1. Discussed EMG of the lower extremities  2. Encouraged him to start outpatient physical therapy  3. Neurology consults have been placed, encouraged he and his wife to follow-up as scheduled  4. Check vitamin  D.  His vitamin D level was 10 about a month ago.  Plan to see if supplementation is improving.      Follow-up:For EMG      Please note that this dictation was created using voice recognition software. I have made every reasonable attempt to correct obvious errors but there may be errors of grammar and content that I may have overlooked prior to finalization of this note.      Juan Izquierdo MD  Physical Medicine and Rehabilitation  Interventional Spine and Sports Physiatry  [facility]

## 2019-04-29 ENCOUNTER — TELEPHONE (OUTPATIENT)
Dept: MEDICAL GROUP | Facility: MEDICAL CENTER | Age: 84
End: 2019-04-29

## 2019-04-29 ENCOUNTER — OFFICE VISIT (OUTPATIENT)
Dept: NEUROLOGY | Facility: MEDICAL CENTER | Age: 84
End: 2019-04-29
Payer: MEDICARE

## 2019-04-29 VITALS
RESPIRATION RATE: 16 BRPM | SYSTOLIC BLOOD PRESSURE: 108 MMHG | TEMPERATURE: 97.5 F | HEIGHT: 72 IN | WEIGHT: 203 LBS | BODY MASS INDEX: 27.5 KG/M2 | DIASTOLIC BLOOD PRESSURE: 70 MMHG | OXYGEN SATURATION: 92 % | HEART RATE: 60 BPM

## 2019-04-29 DIAGNOSIS — R56.9 SEIZURE (HCC): ICD-10-CM

## 2019-04-29 DIAGNOSIS — R40.20 LOSS OF CONSCIOUSNESS (HCC): ICD-10-CM

## 2019-04-29 DIAGNOSIS — R41.89 SPELL OF ALTERED COGNITION: ICD-10-CM

## 2019-04-29 PROCEDURE — 99215 OFFICE O/P EST HI 40 MIN: CPT

## 2019-04-29 RX ORDER — LEVETIRACETAM 750 MG/1
750 TABLET ORAL DAILY
Qty: 90 TAB | Refills: 1 | Status: SHIPPED | OUTPATIENT
Start: 2019-04-29 | End: 2019-04-29

## 2019-04-29 RX ORDER — LEVETIRACETAM 500 MG/1
500 TABLET ORAL 2 TIMES DAILY
Qty: 90 TAB | Refills: 3 | Status: SHIPPED | OUTPATIENT
Start: 2019-04-29 | End: 2019-06-05

## 2019-04-29 NOTE — TELEPHONE ENCOUNTER
Patient's spouse called today and has requested to cancel the appointment made for 4/30/19 with Dr. Jalen M.D. Patient at this time is a Kia Thompson patient. Spoke with MA for Kia and it was decided that at this time/moving forward, due to patients health concerns and health history, it would be best to see an M.D. Please make patient a NU2U for following appointments so that patient is officially established. Patient spouse said she would reach out when they need to be seen. 4/29/19 JH

## 2019-04-29 NOTE — PROGRESS NOTES
CC: Follow up with Neurology after hospital discharge for   seizure like episode     Referred by Kia Thompson P.A.-C.    HPI:  Patient is pleasant 85 yo man with PMH of sleep apnea, HL, kidney stone,GERD who presents today in a follow up after his recent hospital discharge.  In 3/2019 one day all of a sudden could not get out of bed was incontinent, confused  Wife found him on the floor and he was awake and alert and knew where he was immediately. No tongue bite.he was incontinent. He states he did not lose consciousness but did feel lightheaded.   Was not able to move for a few days afterwards with very heavy and weak legs.  In ED he had no infection, eeg showed frontal asymmetry and no electrographic seizures. No prior history of seizures ir syncopal episodes.  MRI brain did not reveal tumor or stroke. He had no infections. He was sent to rehab facility due to generalized legs> arms weakness and he had spent few weeks there.  He still feels weak but it is improving and he can get up and walk. He sustained a fall past Friday when he walked and tripped on an object on the ground. Did not hit his head or suffer loss of consciousness.  Him and his wife reports good memory and balance problems.He sometimes shuffles. No tremors, no stiffness, no loss of facial expression, no stooped posture.No hallucinations, memory loss or behaviora changes.    ROS:   Constitutional: No fevers or chills.  Eyes: No blurry vision or eye pain.  ENT: No dysphagia or hearing loss.  Respiratory: No cough or shortness of breath.  Cardiovascular: No chest pain or palpitations.  GI: No nausea, vomiting, or diarrhea.  : No urinary incontinence or dysuria.  Musculoskeletal: No joint swelling or arthralgias.back pain.  Skin: No skin rashes.  Neuro: as above   Endocrine: No heat or cold intolerance. No polydipsia or polyuria.  Psych: No depression or anxiety.  Heme/Lymph: No easy bruising or swollen lymph nodes.      Past Medical History:    Past Medical History:   Diagnosis Date   • Back pain    • GERD (gastroesophageal reflux disease)    • Hyperlipidemia    • Kidney stone    • PND (post-nasal drip)    • Sleep apnea        Past Surgical History:   Past Surgical History:   Procedure Laterality Date   • CATARACT EXTRACTION WITH IOL     • OTHER      Nasal surgery   • OTHER ORTHOPEDIC SURGERY  lumbar disectomy 2000       Social History:  Social History     Social History   • Marital status:      Spouse name: N/A   • Number of children: N/A   • Years of education: N/A     Occupational History   • Not on file.     Social History Main Topics   • Smoking status: Former Smoker     Packs/day: 1.00     Years: 15.00     Types: Cigarettes     Quit date: 1/1/1980   • Smokeless tobacco: Never Used   • Alcohol use 0.0 oz/week      Comment: nightly   • Drug use: No   • Sexual activity: Not on file     Other Topics Concern   •  Service Yes   • Blood Transfusions No   • Caffeine Concern No   • Occupational Exposure No   • Hobby Hazards No   • Sleep Concern No   • Stress Concern No   • Weight Concern No   • Special Diet No   • Back Care No   • Exercise Yes   • Bike Helmet No   • Seat Belt Yes   • Self-Exams No     Social History Narrative   • No narrative on file       Family History:   Family History   Problem Relation Age of Onset   • Other Father         Heart problems; unspecified   • Sleep Apnea Father    • Other Mother         Aneurism   • Sleep Apnea Brother    • Other Brother         Heart problems; unspecified   • Sleep Apnea Son    • Sleep Apnea Son        Allergies:   Patient has no known allergies.    Physical Exam:     Vitals:    04/29/19 1059   BP: 108/70   Pulse: 60   Resp: 16   Temp: 36.4 °C (97.5 °F)   SpO2: 92%     Constitutional: Well-developed, well-nourished, good hygiene. Appears stated age.  Cardiovascular: RRR, with no murmurs, rubs or gallops. No carotid bruits.   Respiratory: Lungs CTA B/L, no W/R/R.   Abdomen: Soft Non-tender to  Palpation. Non-distended.  Extremities: No peripheral edema, pedal pulses intact.   Skin: Warm, dry, intact. No rashes observed.  Eyes: Sclera anicteric   Funduscopic: Optic discs flat with no evidence of papilledema or pallor.   Neurologic:   Mental Status: Awake, alert, oriented x 3.   Speech: Fluent with normal prosody.   Memory: Able to recall recent and remote events accurately.    Concentration: Attentive. Able to focus on history and follow multi-step commands.              Fund of Knowledge: Appropriate   Cranial Nerves:    CN II: PERRL. No afferent pupillary defect.    CN III, IV, VI: Good eye closure, EOMI.     CN V: Facial sensation intact and symmetric.     CN VII: No facial asymmetry.     CN VIII: Hearing intact.     CN IX and X: Palate elevates symmetrically. Normal gag reflex.    CN XI: Symmetric shoulder shrug.     CN XII: Tongue midline.    Sensory: Intact light touch, vibration and temperature.    Coordination: No evidence of past-pointing on finger to nose testing, no dysdiadochokinesia. Heel to shin intact.             DTR's: 2+ throughout without clonus.    Babinski: Toes downgoing bilaterally.   Romberg: Negative.   Movements: No tremors observed.   Musculoskeletal:    Strength: 5/5 in upper and 4/5 lower extremities bilaterally.(effort decreased 2/2 back pain)    Gait: wide based antalgic gait   Short stride  Slow to turn   Can not tandem walk or walk on tiptoes or heels   Decreased R arm swing   Trace cogwheel in R wrist?   Tone: Normal bulk and tone.   Joints: No swelling.       Labs:  Reviewed    Basic Metabolic Panel (Order [id number])   Component Results     Component Value Ref Range & Units Status   Sodium 134   135 - 145 mmol/L Final   Potassium 4.0  3.6 - 5.5 mmol/L Final   Chloride 104  96 - 112 mmol/L Final   Co2 23  20 - 33 mmol/L Final   Glucose 144   65 - 99 mg/dL Final   Bun 14  8 - 22 mg/dL Final   Creatinine 0.93  0.50 - 1.40 mg/dL Final   Calcium 8.7  8.5 - 10.5 mg/dL Final    Anion Gap 7.0  0.0 - 11.9 Final         Imaging:   DESCRIPTION OF THE RECORD:  During the awake state, background shows symmetrical 8-9 Hz alpha   activity posteriorly with amplitude of 70 mV.  There was   reactivity with eye opening/closure.  Normal anterior-posterior   gradient was noted with faster beta frequencies seen anteriorly.    During drowsiness, high-amplitude delta frequencies were seen.   There are monomorphic delta 2 Hz bursts over frontal R>L as long   as 5-10 seconds    During the sleep state, background shows diffuse high-amplitude   4-5 Hz delta activity.  Symmetrical high-amplitude sleep spindles   and vertex sharp activities were seen in the central leads.  MRI brain   Personally reviewed and no structural lesions   Impression       1.  Study is significantly limited by motion artifact.  2.  Diffuse primarily central atrophy.  3.  Nonspecific foci of abnormal signal in the white matter bilaterally.  Microvascular ischemic change versus demyelination or gliosis.  4.  No acute stroke or evidence for hemorrhage.  5.  Acute and chronic paranasal sinus disease.         ACTIVATION PROCEDURES:   Hyperventilation was not performed  Intermittent Photic stimulation was performed in a stepwise   fashion from 1 to 30 Hz and elicited a normal response (photic   driving), most noticeable in the posterior leads.      ICTAL AND/OR INTERICTAL FINDINGS:   No focal or generalized epileptiform activity was noted.  There   are monomorphic delta 2 Hz bursts over frontal R>L as long as   5-10 secondsNo seizures were recorded during the study.   Assessment/Plan:  Episode of confusion with urinary incontinence followed by several days of bilateral LE weakness   This is unlikely to represent postictal confusion ( would not be expected to last this long)   Still with LE weakness and unsteady gait and ? Mild parkinsonism   Parkinsonian symptoms and findings not striking at tis point I would not treat   EEG with ? Mild  frontal cortical irritability R   No driving for 3 mo after the event ( wife drives him)   He has SE with  mg bid: confusion,behavioral changes, sleepiness   Plan  EEG repeat ( longer recording awake and asleep)  decreasing lev to 500 bid slowly by 250 mg qweek to 500 mg bid   I am not convinced he had seizure as tis would not explain 4-5 days of leg weakness and overall inability to walk.  MRI wit no frontal structural lesions to explain EEG just mild microvascular ischemia   Again no strokes etc and now back to full strength.  Given drowsiness and tiredness we will decrease LEV to 500 mg bid and depending on EEG may continue to taper down until off.    Denise Min MD PhD   Board Certified neurologist     Greater than 50% of this 60 minute face to face encounter was devoted to disease state counseling and coordination of care.  Please see above assessment and plan for discussion.

## 2019-04-30 ENCOUNTER — APPOINTMENT (OUTPATIENT)
Dept: MEDICAL GROUP | Facility: MEDICAL CENTER | Age: 84
End: 2019-04-30
Payer: MEDICARE

## 2019-05-01 NOTE — PATIENT INSTRUCTIONS
Syncope  Introduction  Syncope is when you lose temporarily pass out (faint). Signs that you may be about to pass out include:  · Feeling dizzy or light-headed.  · Feeling sick to your stomach (nauseous).  · Seeing all white or all black.  · Having cold, clammy skin.  If you passed out, get help right away. Call your local emergency services (991 in the [place] Do not drive yourself to the hospital.  Follow these instructions at home:  Pay attention to any changes in your symptoms. Take these actions to help with your condition:  · Have someone stay with you until you feel stable.  · Do not drive, use machinery, or play sports until your doctor says it is okay.  · Keep all follow-up visits as told by your doctor. This is important.  · If you start to feel like you might pass out, lie down right away and raise (elevate) your feet above the level of your heart. Breathe deeply and steadily. Wait until all of the symptoms are gone.  · Drink enough fluid to keep your pee (urine) clear or pale yellow.  · If you are taking blood pressure or heart medicine, get up slowly and spend many minutes getting ready to sit and then stand. This can help with dizziness.  · Take over-the-counter and prescription medicines only as told by your doctor.  Get help right away if:  · You have a very bad headache.  · You have unusual pain in your chest, tummy, or back.  · You are bleeding from your mouth or rectum.  · You have black or tarry poop (stool).  · You have a very fast or uneven heartbeat (palpitations).  · It hurts to breathe.  · You pass out once or more than once.  · You have jerky movements that you cannot control (seizure).  · You are confused.  · You have trouble walking.  · You are very weak.  · You have vision problems.  These symptoms may be an emergency. Do not wait to see if the symptoms will go away. Get medical help right away. Call your local emergency services ([phone] in the [place] Do not drive yourself to the hospital.    This information is not intended to replace advice given to you by your health care provider. Make sure you discuss any questions you have with your health care provider.  Document Released: 06/05/2009 Document Revised: 05/25/2017 Document Reviewed: 08/31/2016  © 2017 [employer]

## 2019-05-02 ENCOUNTER — PHYSICAL THERAPY (OUTPATIENT)
Dept: PHYSICAL THERAPY | Facility: MEDICAL CENTER | Age: 84
End: 2019-05-02
Attending: PHYSICIAN ASSISTANT
Payer: MEDICARE

## 2019-05-02 DIAGNOSIS — Z92.89 HISTORY OF RECENT HOSPITALIZATION: ICD-10-CM

## 2019-05-02 DIAGNOSIS — R26.2 DIFFICULTY WALKING: ICD-10-CM

## 2019-05-02 DIAGNOSIS — R53.1 WEAKNESS: ICD-10-CM

## 2019-05-02 PROCEDURE — 97162 PT EVAL MOD COMPLEX 30 MIN: CPT

## 2019-05-02 ASSESSMENT — BALANCE ASSESSMENTS
PLACE ALTERNATE FOOT ON STEP OR STOOL WHILE STANDING UNSUPPORTED: 4
STANDING TO SITTING: 4
LONG VERSION TOTAL SCORE (MAX 56): 42
TURN 360 DEGREES: 2
STANDING ON ONE LEG: 1
PICK UP OBJECT FROM THE FLOOR FROM A STANDING POSITION: 3
STANDING UNSUPPORTED WITH FEET TOGETHER: 3
STANDING UNSUPPORTED ONE FOOT IN FRONT: 0
LONG VERSION TOTAL SCORE (MAX 56): 42
REACHING FORWARD WITH OUTSTRETCHED ARM WHILE STANDING: 3
SITTING UNSUPPORTED: 4
STANDING UNSUPPORTED: 4
STANDING UNSUPPORTED WITH EYES CLOSED: 3
LOOK OVER LEFT AND RIGHT SHOULDERS WHILE STANDING: 3
TRANSFERS: 4
SITTING TO STANDING: 4

## 2019-05-02 ASSESSMENT — ENCOUNTER SYMPTOMS
PAIN SCALE: 4
PAIN TIMING: INTERMITTENT

## 2019-05-02 ASSESSMENT — ACTIVITIES OF DAILY LIVING (ADL)
POOR_BALANCE: 1
AMBULATION_WITH_ASSISTIVE_DEVICE: CONTACT GUARD ASSIST

## 2019-05-02 NOTE — OP THERAPY EVALUATION
"  Outpatient Physical Therapy  INITIAL EVALUATION    [facility]  [address]  [place] [address]  Phone:  [phone]  Fax:  [phone]    Date of Evaluation: 2019    Patient: Han Pat  YOB: 1935  MRN: [id number]     Referring Provider: Kia Thompson P.A.-C.   Referring Diagnosis R53.1 (ICD-10-CM) - Weakness   Z92.89 (ICD-10-CM) - History of recent hospitalization          Time Calculation  Start time: 1400  Stop time: 1450 Time Calculation (min): 50 minutes     Physical Therapy Occurrence Codes    Date of onset of impairment:  19   Date physical therapy care plan established or reviewed:  19   Date physical therapy treatment started:  19          Chief Complaint: Difficulty Walking; Loss Of Balance; Fall; and Weakness    Visit Diagnoses     ICD-10-CM   1. Weakness R53.1   2. History of recent hospitalization Z92.89   3. Difficulty walking R26.2         Subjective:   History of Present Illness:     Mechanism of injury:  Pt \"Ed\" states his knee joints and legs don't seem to have a lot of strength. They seem better than before, but still weak. He also has low back pain but unrelated. Pt uses FWW to get around. Pt fell twice pretty badly so has started taking the FWW more seriously. Pt does everything on his own, including taking care of his yard, but now has someone come and care for the yard. Pt can get dressed, showering all by himself. Pt is not allowed to drive due to medications. Pt has stairs; he goes up side ways. Pt has grab bars in his shower to help.     Pt would like to return to golf if possible.   Pain:     Current pain ratin    Pain timing:  Intermittent  Patient Goals:     Patient goals for therapy:  Increased strength and improved balance    Other patient goals:  Walk without FWW, golf      Past Medical History:   Diagnosis Date   • Back pain    • GERD (gastroesophageal reflux disease)  " "  • Hyperlipidemia    • Kidney stone    • PND (post-nasal drip)    • Sleep apnea      Past Surgical History:   Procedure Laterality Date   • CATARACT EXTRACTION WITH IOL     • OTHER      Nasal surgery   • OTHER ORTHOPEDIC SURGERY  lumbar disectomy      Social History   Substance Use Topics   • Smoking status: Former Smoker     Packs/day: 1.00     Years: 15.00     Types: Cigarettes     Quit date: 1980   • Smokeless tobacco: Never Used   • Alcohol use 0.0 oz/week      Comment: nightly     Family and Occupational History     Social History   • Marital status:      Spouse name: N/A   • Number of children: N/A   • Years of education: N/A       Objective     Strength:      Left Hip   Planes of Motion   Flexion: 4+    Right Hip   Planes of Motion   Flexion: 4+    Left Knee   Flexion: 5  Extension: 5    Right Knee   Flexion: 5  Extension: 5    Left Ankle/Foot   Dorsiflexion: 5  Plantar flexion: 5    Right Ankle/Foot   Dorsiflexion: 5  Plantar flexion: 5    Additional Strength Details  MMT: 5/5 for B shoulder abduction, elbow flexion/extension,  squeeze, thumb abduction, finger adduction  Ambulation     Ambulation: Level Surfaces   Ambulation with assistive device: contact guard assist    Additional Level Surfaces Ambulation Details  More shuffling gait, required VCs to stay close to FWW and to keep the FWW on the ground with turns    Functional Assessment     Comments  30 sec sit<>stand: 9 times, use of HHA  5 TSTS: 16.58, no HHA  TU.58 sec   6 MWT: 540ft        Therapeutic Exercises (CPT 40588):     1. Sit<>stand, 10 x 1    2. Semi tandem stance, 20\" x 1, w/ FWW    10. UPOC: 19      Therapeutic Exercise Summary: Spoke to pt about staying closer to FWW, especially when fatigued and to keep the FWW on the ground with turns.      Time-based treatments/modalities:  Therapeutic exercise minutes (CPT 11090): 5 minutes       Assessment, Response and Plan:   Impairments: abnormal gait, activity " intolerance, impaired balance, impaired physical strength and limited ADL's    Assessment details:  Pt is a pleasant, cooperative 83 yo male who presents with general weakness, fatigue, and decreased activity tolerance after a hospitalization stay. Pt has increased TUG time score, moderate RUBIO score, and moderate 6 MWT, indicating decreased endurance, functional strength, and increased fall risk. Pt will benefit from skilled physical therapy in order to improve his functional strength, improve his balance, and improve his activity tolerance in order to decrease his risk of falls and return to ADLs and recreational activities with less restriction, need for AD, and supervision.  Barriers to therapy:  Age  Prognosis: good    Goals:   Short Term Goals:   1. Pt will be able to perform the TUG in <18 sec indicating significant improvement  2. Pt will be able to perform the 30 sec sit<>stand test without use of UE and reach 12 repetitions  3. Pt is to perform HEP without cueing demonstrating compliance  Short term goal time span:  2-4 weeks      Long Term Goals:    1. Pt is to be able to perform 6 MWT without AD and reach >605 ft demonstrating significant change  2. Pt is to score >45 on the RUBIO demonstrating decreased fall risk  3. Pt to report no falls or LOB in past 2 weeks  Long term goal time span:  6-8 weeks    Plan:   Therapy options:  Physical therapy treatment to continue  Planned therapy interventions:  E Stim Unattended (CPT 38570), Gait Training (CPT 47949), Manual Therapy (CPT 07802), Mechanical Traction (CPT 95919), Neuromuscular Re-education (CPT 60483), Therapeutic Activities (CPT 37523) and Therapeutic Exercise (CPT 39072)  Frequency:  2x week  Duration in weeks:  8  Discussed with:  Patient  Plan details:  Pt to start with PT for 2x/week and will then decrease to 1x/week once HEP is established and pt is making improvements between visits.        Functional Limitations and Severity Modifiers  Rubio  Balance Total Score (0-56): 42   Current:  na   Goal:  na     Referring provider co-signature:  I have reviewed this plan of care and my co-signature certifies the need for services.  Certification Dates:   From 05/02/19     To 06/27/19    Physician Signature: ________________________________ Date: ______________

## 2019-05-09 ENCOUNTER — OFFICE VISIT (OUTPATIENT)
Dept: PHYSICAL MEDICINE AND REHAB | Facility: MEDICAL CENTER | Age: 84
End: 2019-05-09
Payer: MEDICARE

## 2019-05-09 ENCOUNTER — PHYSICAL THERAPY (OUTPATIENT)
Dept: PHYSICAL THERAPY | Facility: MEDICAL CENTER | Age: 84
End: 2019-05-09
Attending: PHYSICIAN ASSISTANT
Payer: MEDICARE

## 2019-05-09 VITALS
SYSTOLIC BLOOD PRESSURE: 130 MMHG | OXYGEN SATURATION: 93 % | TEMPERATURE: 98 F | BODY MASS INDEX: 27.59 KG/M2 | DIASTOLIC BLOOD PRESSURE: 82 MMHG | HEIGHT: 72 IN | WEIGHT: 203.71 LBS | HEART RATE: 67 BPM

## 2019-05-09 DIAGNOSIS — R53.1 WEAKNESS: ICD-10-CM

## 2019-05-09 DIAGNOSIS — R26.2 DIFFICULTY WALKING: ICD-10-CM

## 2019-05-09 DIAGNOSIS — G62.9 NEUROPATHY: ICD-10-CM

## 2019-05-09 DIAGNOSIS — M54.50 LOW BACK PAIN WITH RADIATION: ICD-10-CM

## 2019-05-09 DIAGNOSIS — Z92.89 HISTORY OF RECENT HOSPITALIZATION: ICD-10-CM

## 2019-05-09 PROCEDURE — 95909 NRV CNDJ TST 5-6 STUDIES: CPT | Performed by: PHYSICAL MEDICINE & REHABILITATION

## 2019-05-09 PROCEDURE — 95886 MUSC TEST DONE W/N TEST COMP: CPT | Performed by: PHYSICAL MEDICINE & REHABILITATION

## 2019-05-09 PROCEDURE — 97110 THERAPEUTIC EXERCISES: CPT

## 2019-05-09 RX ORDER — CHOLECALCIFEROL (VITAMIN D3) 50 MCG
TABLET ORAL
COMMUNITY
Start: 2019-05-08 | End: 2020-05-18

## 2019-05-09 ASSESSMENT — PAIN SCALES - GENERAL: PAINLEVEL: 4=SLIGHT-MODERATE PAIN

## 2019-05-09 NOTE — OP THERAPY DAILY TREATMENT
Outpatient Physical Therapy  DAILY TREATMENT     [facility]  [address]  [place] [address]  Phone:  [phone]  Fax:  [phone]    Date: 05/09/2019    Patient: Han Pat  YOB: 1935  MRN: [id number]     Time Calculation  Start time: 1000  Stop time: 1030 Time Calculation (min): 30 minutes     Chief Complaint: Difficulty Walking; Loss Of Balance; and Fall    Visit #: 2    SUBJECTIVE:  Ed is seen for follow up for weakness and balance. He had throughout diagnotistc work but it is not really clear what is going on. He was Rehab for 2-3  Weeks. Per wife he needs to pushed      OBJECTIVE:  Current objective measures:           Therapeutic Exercises (CPT 77806):     1. Nu step , Level 3 x 5 mins    2. Stepping pattern work, Foward and lateral steps    3. Walking over roller, 3 x foam roller, 1 x over folded balance beam.    4. Sit to stand form progressivly lower bench      Time-based treatments/modalities:  Therapeutic exercise minutes (CPT 79048): 30 minutes      ASSESSMENT:   Response to treatment: He has trouble picking up his feet. Keep progressing as able.     PLAN/RECOMMENDATIONS:   Plan for treatment: therapy treatment to continue next visit.  Planned interventions for next visit: continue with current treatment.

## 2019-05-09 NOTE — PROGRESS NOTES
"  On license of [facility]  Sports and Spine, Physiatry, EMG  [facility]  [address]  [place] [address]    Test Date:  2019    Patient: Han Pat : 1935 Physician: Juan Izquierdo MD   Sex: Male Height: 6' 0\" Ref Phys: Juan Izquierdo MD   MRN#: [id number] Weight: 92.4 kg Technician: N/A      Complaints:  Low back pain with weakness in legs and paresthesias    PMH/PSH/Meds/SH are unchanged from previous visit on 2019    Past medical history is negative for diabetes mellitus, thryoid disease, cervical spine surgery, or known history of cancer.    FH is negative for known history of neuromuscular disease.    Exam:  Unchanged from 2019      NCV & EMG Findings:  Evaluation of the right fibular motor and the right tibial motor nerves showed reduced amplitude (R1.7, R1.0 mV).  The left tibial motor nerve showed no response (Knee) and reduced amplitude (1.0 mV).  There is note of significant atrophy of the EDB and abductor hallucis bilaterally.  All remaining nerves (as indicated in the following tables) were within normal limits.  All left vs. right side differences were within normal limits.      Needle evaluation of the right gastroc muscle showed slightly increased spontaneous activity.  All remaining muscles (as indicated in the following table) showed no evidence of electrical instability.      Impression/Recommendations:  Abnormal study  1. There are multiple abnormalities in the nerve conduction studies in the lower extremities.  It is possible that this could be suggestive of a primarily motor peripheral polyneuropathy (PPN), but could also reflect axonal injury from history of radiculopathy that he reports.   2. Today's electrodiagnostic findings point against, active right lumbosacral radiculopathy, although the abnormalities in the right gastrocnemius muscle could suggest remote radiculopathy  3. Clinically, it is difficult to interpret today's findings with " certainty, given the patient's history of lumbar spine surgery and report of bilateral radiculopathy and his age.  Since he reports paresthesias and difficulty feeling he feet at times, and nerve conduction study abnormalities are primarily motor, it is possible that these symptoms are related to his spine.  We discussed these findings and possible work-up of spine or neuropathy.  For now, he would like to hold off on that, but will continue with his physical therapy.      ___________________________  Juan Izquierdo MD  Physical Medicine and Rehabilitation  Interventional Spine and Sports Physiatry  [facility]          Nerve Conduction Studies  Anti Sensory Summary Table     Stim Site NR Peak (ms) Norm Peak (ms) P-T Amp (µV) Norm P-T Amp Site1 Site2 Delta-P (ms) Dist (cm) Joesph (m/s) Norm Joesph (m/s)   Left Sural Anti Sensory (Lat Mall)   Calf    2.3 <4.0 9.8 >5.0 Calf Lat Mall 2.3 8.0 35 >35   Site 2    3.1  6.0          Right Sural Anti Sensory (Lat Mall)   Calf    3.8 <4.0 14.2 >5.0 Calf Lat Mall 3.8 14.0 37 >35   Site 2    3.7  14.7          Site 3    4.0  4.3            Motor Summary Table     Stim Site NR Onset (ms) Norm Onset (ms) O-P Amp (mV) Norm O-P Amp Site1 Site2 Delta-0 (ms) Dist (cm) Joesph (m/s) Norm Joesph (m/s)   Right Fibular Motor (Ext Dig Brev)    Temp over the right haris 95.9F   Ankle    4.0 <6.1 1.7 >2.5 B Fib Ankle 7.4 33.5 45 >38   B Fib    11.4  1.1  Poplt B Fib 2.0 12.0 60 >40   Poplt    13.4  1.1          Left Tibial Motor (Abd Sargent Brev)   Ankle    3.8 <6.1 1.0 >3.0 Knee Ankle  0.0  >35   Knee NR             Right Tibial Motor (Abd Sargent Brev)   Ankle    4.0 <6.1 1.0 >3.0 Knee Ankle 12.6 45.0 36 >35   Knee    16.6  0.5            EMG+     Side Muscle Nerve Root Ins Act Fibs Psw Amp Dur Poly Recrt Int Pat Other Comment   Right AntTibialis Dp Br Fibular L4-5 Nml Nml Nml Nml Nml 0 Nml Nml None    Right Gastroc Tibial S1-2 Nml 1+ 1+ Nml Nml 0 Nml Nml None    Right VastusMed Femoral L2-4 Nml Nml  Nml Nml Nml 0 Nml Nml None    Right RectFemoris Femoral L2-4 Nml Nml Nml Nml Nml 0 Nml Nml None    Right BicepsFemS Sciatic L5-S1 Nml Nml Nml Nml Nml 0 Nml Nml None    Right TensorFascLat SupGluteal L4-5, S1 Nml Nml Nml Nml Nml 0 Nml Nml None    Right AbdHallucis MedPlantar S1-2 Nml Nml Nml Nml Nml 0 Nml Nml None            Waveforms:

## 2019-05-10 ENCOUNTER — PATIENT OUTREACH (OUTPATIENT)
Dept: HEALTH INFORMATION MANAGEMENT | Facility: OTHER | Age: 84
End: 2019-05-10

## 2019-05-10 ENCOUNTER — PHYSICAL THERAPY (OUTPATIENT)
Dept: PHYSICAL THERAPY | Facility: MEDICAL CENTER | Age: 84
End: 2019-05-10
Attending: PHYSICIAN ASSISTANT
Payer: MEDICARE

## 2019-05-10 DIAGNOSIS — R53.1 WEAKNESS: ICD-10-CM

## 2019-05-10 DIAGNOSIS — R26.2 DIFFICULTY WALKING: ICD-10-CM

## 2019-05-10 DIAGNOSIS — Z92.89 HISTORY OF RECENT HOSPITALIZATION: ICD-10-CM

## 2019-05-10 PROCEDURE — 97110 THERAPEUTIC EXERCISES: CPT

## 2019-05-10 NOTE — OP THERAPY DAILY TREATMENT
Outpatient Physical Therapy  DAILY TREATMENT     [facility]  [address]  [place] [address]  Phone:  [phone]  Fax:  [phone]    Date: 05/10/2019    Patient: Han Pat  YOB: 1935  MRN: [id number]     Time Calculation  Start time: 1100  Stop time: 1130 Time Calculation (min): 30 minutes     Chief Complaint: Difficulty Walking; Fall; and Loss Of Balance    Visit #: 3    SUBJECTIVE:  Ed is seen for follow up for weakness and balance. He likes to avoid some tasks and needs motivation.   OBJECTIVE:  Current objective measures:           Therapeutic Exercises (CPT 10226):     1. Nu step , Level 3 x 5 mins    2. Standing rows/pullbacks    3. Golf chops , Green band 2 direction     4. 30# ball kick /roll, 25feet x 2     5. Sit to stand , standard chair x 10       Time-based treatments/modalities:  Therapeutic exercise minutes (CPT 97325): 30 minutes      ASSESSMENT:   Response to treatment: Wants to look at fall recovery next visit Working antirotation, chops, obstacle course in the future.   PLAN/RECOMMENDATIONS:   Plan for treatment: therapy treatment to continue next visit.  Planned interventions for next visit: continue with current treatment.

## 2019-05-13 ENCOUNTER — PHYSICAL THERAPY (OUTPATIENT)
Dept: PHYSICAL THERAPY | Facility: MEDICAL CENTER | Age: 84
End: 2019-05-13
Attending: PHYSICIAN ASSISTANT
Payer: MEDICARE

## 2019-05-13 DIAGNOSIS — R53.1 WEAKNESS: ICD-10-CM

## 2019-05-13 DIAGNOSIS — R26.2 DIFFICULTY WALKING: ICD-10-CM

## 2019-05-13 DIAGNOSIS — Z92.89 HISTORY OF RECENT HOSPITALIZATION: ICD-10-CM

## 2019-05-13 PROCEDURE — 97112 NEUROMUSCULAR REEDUCATION: CPT

## 2019-05-13 PROCEDURE — 97110 THERAPEUTIC EXERCISES: CPT

## 2019-05-13 NOTE — OP THERAPY DAILY TREATMENT
"    Outpatient Physical Therapy  DAILY TREATMENT     [facility]  [address]  [place] [address]  Phone:  [phone]  Fax:  [phone]    Date: 05/13/2019    Patient: Han Pat  YOB: 1935  MRN: [id number]     Time Calculation  Start time: 0900  Stop time: 0945 Time Calculation (min): 45 minutes     Chief Complaint: Back Problem; Difficulty Walking; Fall; and Loss Of Balance    Visit #: 4    SUBJECTIVE:  Ed is seen for follow up for weakness and balance. He likes to avoid some tasks and needs motivation.   OBJECTIVE:  Current objective measures:           Therapeutic Exercises (CPT 99305):     1. Nu step , Level 3 x 5 mins    2. Leg press, 30#    3. Modified lunge , rail and box with 5\" raised floor surface    Therapeutic Treatments and Modalities:     1. Neuromuscular Re-education (CPT 48981), Fall recovery check, Needs mod assist to standing without UE and counter.    Therapeutic Treatment and Modalities Summary: Neuro  Quadruped 4 -3 base UE and lifting 1 LE  Quad 4-2 points ALT UE and LE  Quad to tall kneeling with small bench help     Time-based treatments/modalities:  Therapeutic exercise minutes (CPT 79621): 15 minutes  Neuromusc re-ed, balance, coor, post minutes (CPT 98321): 30 minutes      ASSESSMENT:   Response to treatment: Wants to work antirotation, chops, obstacle course in the future.   PLAN/RECOMMENDATIONS:   Plan for treatment: therapy treatment to continue next visit.  Planned interventions for next visit: continue with current treatment.      "

## 2019-05-13 NOTE — PROGRESS NOTES
Han Pat was admitted to [facility] on 3/24/19 for Weakness, and was discharged on 3/29/19 to [facility] for Acute Encephalopathy. Patient was discharged on 4/11/19. [facility] Patient Advocate was able to successfully engage with patient post-discharge. Per discharge orders, patient began home health services with [facility] on 4/12/19. Patient also had a follow-up appointment with PCP on 4/16/19, Physiatry on 4/24/19, and with Neurology on 4/29/19. Patient has a future appointment with Physiatry on 5/9/19, with Out-Patient Physical Therapy on 5/13/19, and with PCP on 8/7/19. PPS screening was not conducted secondary to low LACE score upon hospital discharge.

## 2019-05-14 ENCOUNTER — HOSPITAL ENCOUNTER (OUTPATIENT)
Dept: LAB | Facility: MEDICAL CENTER | Age: 84
End: 2019-05-14
Attending: PHYSICAL MEDICINE & REHABILITATION
Payer: MEDICARE

## 2019-05-14 DIAGNOSIS — E55.9 VITAMIN D DEFICIENCY: ICD-10-CM

## 2019-05-14 LAB — 25(OH)D3 SERPL-MCNC: 23 NG/ML (ref 30–100)

## 2019-05-14 PROCEDURE — 82306 VITAMIN D 25 HYDROXY: CPT

## 2019-05-14 PROCEDURE — 36415 COLL VENOUS BLD VENIPUNCTURE: CPT

## 2019-05-15 ENCOUNTER — PHYSICAL THERAPY (OUTPATIENT)
Dept: PHYSICAL THERAPY | Facility: MEDICAL CENTER | Age: 84
End: 2019-05-15
Attending: PHYSICIAN ASSISTANT
Payer: MEDICARE

## 2019-05-15 DIAGNOSIS — Z92.89 HISTORY OF RECENT HOSPITALIZATION: ICD-10-CM

## 2019-05-15 DIAGNOSIS — R26.2 DIFFICULTY WALKING: ICD-10-CM

## 2019-05-15 DIAGNOSIS — R53.1 WEAKNESS: ICD-10-CM

## 2019-05-15 PROCEDURE — 97014 ELECTRIC STIMULATION THERAPY: CPT

## 2019-05-15 PROCEDURE — 97110 THERAPEUTIC EXERCISES: CPT

## 2019-05-15 PROCEDURE — 97112 NEUROMUSCULAR REEDUCATION: CPT

## 2019-05-15 NOTE — OP THERAPY DAILY TREATMENT
Outpatient Physical Therapy  DAILY TREATMENT     [facility]  [address]  [place] [address]  Phone:  [phone]  Fax:  [phone]    Date: 05/15/2019    Patient: Han Pat  YOB: 1935  MRN: [id number]     Time Calculation  Start time: 1100  Stop time: 1130 Time Calculation (min): 30 minutes     Chief Complaint: Difficulty Walking; Fall; and Loss Of Balance    Visit #: 5    SUBJECTIVE:  Ed is seen for follow up for weakness and balance. He likes to avoid some tasks and needs motivation.   OBJECTIVE:  Current objective measures:           Therapeutic Exercises (CPT 59644):     1. Nu step , Level 3 x 10 mins    2. Sport cord, 5 reps each 4 direction, Needs 2 pvc    Therapeutic Treatments and Modalities:     1. Neuromuscular Re-education (CPT 60853), Fall recovery work, Mod lunge    Time-based treatments/modalities:  Therapeutic exercise minutes (CPT 48904): 15 minutes  Neuromusc re-ed, balance, coor, post minutes (CPT 51127): 15 minutes      ASSESSMENT:   Response to treatment: Wants to work on mod climbing over hot tub antirotation, chops, obstacle course in the future.   PLAN/RECOMMENDATIONS:   Plan for treatment: therapy treatment to continue next visit.  Planned interventions for next visit: continue with current treatment.

## 2019-05-20 ENCOUNTER — PHYSICAL THERAPY (OUTPATIENT)
Dept: PHYSICAL THERAPY | Facility: MEDICAL CENTER | Age: 84
End: 2019-05-20
Attending: PHYSICIAN ASSISTANT
Payer: MEDICARE

## 2019-05-20 DIAGNOSIS — R26.2 DIFFICULTY WALKING: ICD-10-CM

## 2019-05-20 DIAGNOSIS — R53.1 WEAKNESS: ICD-10-CM

## 2019-05-20 DIAGNOSIS — Z92.89 HISTORY OF RECENT HOSPITALIZATION: ICD-10-CM

## 2019-05-20 PROCEDURE — 97110 THERAPEUTIC EXERCISES: CPT

## 2019-05-20 PROCEDURE — 97112 NEUROMUSCULAR REEDUCATION: CPT

## 2019-05-20 NOTE — OP THERAPY DAILY TREATMENT
"    Outpatient Physical Therapy  DAILY TREATMENT     [facility] Outpatient Physical Therapy  [address]  [place] [address]  Phone:  [phone]  Fax:  [phone]    Date: 05/20/2019    Patient: Han Pat  YOB: 1935  MRN: [id number]     Time Calculation  Start time: 0900  Stop time: 0930 Time Calculation (min): 30 minutes     Chief Complaint: Difficulty Walking; Fall; and Loss Of Balance    Visit #: 6    SUBJECTIVE:  Ed is seen for follow up for weakness and balance. He was able to work on hot tub this weekend and was pleased that he could get of the floor.     OBJECTIVE:  Current objective measures:           Therapeutic Exercises (CPT 79080):     1. Nu step , Level 5 x 5 mins     2. Anti rotation, pallof press, green band 2 x 10 each    3. Griselda steps 12\" x 4    Therapeutic Treatments and Modalities:     1. Neuromuscular Re-education (CPT 72584), \"mimic hot tub\", climb over stick and box    Time-based treatments/modalities:  Therapeutic exercise minutes (CPT 67243): 15 minutes  Neuromusc re-ed, balance, coor, post minutes (CPT 52521): 15 minutes      ASSESSMENT:   Response to treatment: Will work on, obstacle course in the future.Fall recovery items as well.       PLAN/RECOMMENDATIONS:   Plan for treatment: therapy treatment to continue next visit.  Planned interventions for next visit: continue with current treatment.      "

## 2019-05-22 ENCOUNTER — PHYSICAL THERAPY (OUTPATIENT)
Dept: PHYSICAL THERAPY | Facility: MEDICAL CENTER | Age: 84
End: 2019-05-22
Attending: PHYSICIAN ASSISTANT
Payer: MEDICARE

## 2019-05-22 DIAGNOSIS — Z92.89 HISTORY OF RECENT HOSPITALIZATION: ICD-10-CM

## 2019-05-22 DIAGNOSIS — R53.1 WEAKNESS: ICD-10-CM

## 2019-05-22 DIAGNOSIS — R26.2 DIFFICULTY WALKING: ICD-10-CM

## 2019-05-22 PROCEDURE — 97110 THERAPEUTIC EXERCISES: CPT

## 2019-05-22 NOTE — OP THERAPY DAILY TREATMENT
"    Outpatient Physical Therapy  DAILY TREATMENT     [facility] Outpatient Physical Therapy  [address]  [place] [address]  Phone:  [phone]  Fax:  [phone]    Date: 05/22/2019    Patient: Han Pat  YOB: 1935  MRN: [id number]     Time Calculation  Start time: 0930  Stop time: 1000 Time Calculation (min): 30 minutes     Chief Complaint: Difficulty Walking; Fall; and Loss Of Balance    Visit #: 7    SUBJECTIVE:  Ed is seen for follow up for weakness and balance. He was able to work on hot tub this weekend and was pleased that he could get of the floor.     OBJECTIVE:  Current objective measures:           Therapeutic Exercises (CPT 59737):     1. Nu step , Level 5 x 7 mins     2. Incline/flat/decline, forward and 2 ways laterally    3. Lunge work box only and airex pad, 2 sets of 5-7 reps    4. Push up on mat table., 2 sets of 5-7 reps     Therapeutic Treatments and Modalities:     1. Neuromuscular Re-education (CPT 99163), \"mimic hot tub\", climb over stick and box    Time-based treatments/modalities:  Therapeutic exercise minutes (CPT 88527): 30 minutes      ASSESSMENT:   Response to treatment: He was pretty pleased with his ability today. Keep progressing balance tasks.        PLAN/RECOMMENDATIONS:   Plan for treatment: therapy treatment to continue next visit.  Planned interventions for next visit: continue with current treatment.      "

## 2019-05-28 ENCOUNTER — PHYSICAL THERAPY (OUTPATIENT)
Dept: PHYSICAL THERAPY | Facility: MEDICAL CENTER | Age: 84
End: 2019-05-28
Attending: PHYSICIAN ASSISTANT
Payer: MEDICARE

## 2019-05-28 DIAGNOSIS — Z92.89 HISTORY OF RECENT HOSPITALIZATION: ICD-10-CM

## 2019-05-28 DIAGNOSIS — R53.1 WEAKNESS: ICD-10-CM

## 2019-05-28 DIAGNOSIS — R26.2 DIFFICULTY WALKING: ICD-10-CM

## 2019-05-28 PROCEDURE — 97110 THERAPEUTIC EXERCISES: CPT

## 2019-05-28 PROCEDURE — 97112 NEUROMUSCULAR REEDUCATION: CPT

## 2019-05-28 NOTE — OP THERAPY DAILY TREATMENT
Outpatient Physical Therapy  DAILY TREATMENT     [facility] Outpatient Physical Therapy  [address]  [place] [address]  Phone:  [phone]  Fax:  [phone]    Date: 05/28/2019    Patient: Han Pat  YOB: 1935  MRN: [id number]     Time Calculation  Start time: 0900  Stop time: 0930 Time Calculation (min): 30 minutes     Chief Complaint: Difficulty Walking; Fall; and Loss Of Balance    Visit #: 8    SUBJECTIVE:  Ed is seen for follow up for weakness and balance. He was able to work on hot tub this weekend and was pleased that he could get of the floor.     OBJECTIVE:  Current objective measures:           Therapeutic Exercises (CPT 65840):     1. Nu step , Level 5 x 7 mins     2. Leg press, double and single leg press    Therapeutic Treatments and Modalities:     1. Neuromuscular Re-education (CPT 28583), standing and stepping on various surface and hitting ballon    Time-based treatments/modalities:  Therapeutic exercise minutes (CPT 25897): 15 minutes  Neuromusc re-ed, balance, coor, post minutes (CPT 84931): 15 minutes      ASSESSMENT:   Response to treatment: He was pretty pleased with his ability today. Keep progressing balance tasks.        PLAN/RECOMMENDATIONS:   Plan for treatment: therapy treatment to continue next visit.  Planned interventions for next visit: continue with current treatment.

## 2019-05-30 ENCOUNTER — PHYSICAL THERAPY (OUTPATIENT)
Dept: PHYSICAL THERAPY | Facility: MEDICAL CENTER | Age: 84
End: 2019-05-30
Attending: PHYSICIAN ASSISTANT
Payer: MEDICARE

## 2019-05-30 DIAGNOSIS — R53.1 WEAKNESS: ICD-10-CM

## 2019-05-30 DIAGNOSIS — Z92.89 HISTORY OF RECENT HOSPITALIZATION: ICD-10-CM

## 2019-05-30 DIAGNOSIS — R26.2 DIFFICULTY WALKING: ICD-10-CM

## 2019-05-30 PROCEDURE — 97110 THERAPEUTIC EXERCISES: CPT

## 2019-05-30 PROCEDURE — 97116 GAIT TRAINING THERAPY: CPT

## 2019-05-30 NOTE — OP THERAPY DAILY TREATMENT
Outpatient Physical Therapy  DAILY TREATMENT     [facility] Outpatient Physical Therapy  [address]  [place] [address]  Phone:  [phone]  Fax:  [phone]    Date: 05/30/2019    Patient: Han Pat  YOB: 1935  MRN: [id number]     Time Calculation  Start time: 0900  Stop time: 0930 Time Calculation (min): 30 minutes     Chief Complaint: Difficulty Walking; Fall; and Loss Of Balance    Visit #: 9    SUBJECTIVE:  Ed is seen for follow up for weakness and balance.Will work on capacity today      OBJECTIVE:  Current objective measures:           Therapeutic Exercises (CPT 00641):     1. Nu step , Level 5 x 7 mins     2. Interval work       Therapeutic Exercise Summary: Interval work x 2 trials   Sit to stand x 5, box push x 25 feet  Farmer carry x 25 ft.     Therapeutic Treatments and Modalities:     1. Gait Training (CPT 98062), Walking 1200 ftt + 2 flight stairs , 02 mid 90 and HR niot highter than 90 BPM    Time-based treatments/modalities:  Gait training minutes (CPT 20162): 15 minutes  Therapeutic exercise minutes (CPT 37127): 15 minutes      ASSESSMENT:   Response to treatment: He was pretty pleased with his ability today. Keep progressing balance tasks. Walk out side.        PLAN/RECOMMENDATIONS:   Plan for treatment: therapy treatment to continue next visit.  Planned interventions for next visit: continue with current treatment.

## 2019-06-05 ENCOUNTER — TELEPHONE (OUTPATIENT)
Dept: NEUROLOGY | Facility: MEDICAL CENTER | Age: 84
End: 2019-06-05

## 2019-06-05 RX ORDER — LEVETIRACETAM 500 MG/1
500 TABLET ORAL 2 TIMES DAILY
Qty: 90 TAB | Refills: 3 | Status: SHIPPED | OUTPATIENT
Start: 2019-06-05 | End: 2019-12-19

## 2019-06-05 NOTE — TELEPHONE ENCOUNTER
Pharmacy is requesting  90 day supply of Levetiracetam 500 mg tabs 1 tab po bid. Current script is only 45 day supply. Request scanned in media    Please advise

## 2019-06-06 ENCOUNTER — PHYSICAL THERAPY (OUTPATIENT)
Dept: PHYSICAL THERAPY | Facility: MEDICAL CENTER | Age: 84
End: 2019-06-06
Attending: PHYSICIAN ASSISTANT
Payer: MEDICARE

## 2019-06-06 DIAGNOSIS — R26.2 DIFFICULTY WALKING: ICD-10-CM

## 2019-06-06 DIAGNOSIS — Z92.89 HISTORY OF RECENT HOSPITALIZATION: ICD-10-CM

## 2019-06-06 DIAGNOSIS — R53.1 WEAKNESS: ICD-10-CM

## 2019-06-06 PROCEDURE — 97112 NEUROMUSCULAR REEDUCATION: CPT

## 2019-06-06 PROCEDURE — 97110 THERAPEUTIC EXERCISES: CPT

## 2019-06-06 NOTE — OP THERAPY DAILY TREATMENT
Outpatient Physical Therapy  DAILY TREATMENT     [facility] Outpatient Physical Therapy  [address]  [place] [address]  Phone:  [phone]  Fax:  [phone]    Date: 06/06/2019    Patient: Han Pat  YOB: 1935  MRN: [id number]     Time Calculation  Start time: 0900  Stop time: 0930 Time Calculation (min): 30 minutes     Chief Complaint: Difficulty Walking; Fall; and Loss Of Balance    Visit #: 10    SUBJECTIVE:  Ed is seen for follow up for weakness and balance.Will work on capacity today      OBJECTIVE:  Current objective measures:           Therapeutic Exercises (CPT 54355):     1. Nu step , Level 5 x 7 mins       Therapeutic Exercise Summary: Interval work x 2 trials   Sit to stand x 5, box push x 25 feet  Farmer carry x 25 ft.     Therapeutic Treatments and Modalities:     1. Gait Training (CPT 77816), Walking outside , uneven terrain. walking up/down    2. Neuromuscular Re-education (CPT 89888), Balance work, Side step up on and over various patterns     Time-based treatments/modalities:  Gait training minutes (CPT 45298): 10 minutes  Therapeutic exercise minutes (CPT 21295): 10 minutes  Neuromusc re-ed, balance, coor, post minutes (CPT 09009): 10 minutes      ASSESSMENT:   Response to treatment: He is accompanied by his son Prem who is visiting. Who will increase his walking. Do short bouts 10-15 min up to 30 min before rest. Keep working to increase balance and strength in LE's      PLAN/RECOMMENDATIONS:   Plan for treatment: therapy treatment to continue next visit.  Planned interventions for next visit: continue with current treatment.

## 2019-06-21 ENCOUNTER — PHYSICAL THERAPY (OUTPATIENT)
Dept: PHYSICAL THERAPY | Facility: MEDICAL CENTER | Age: 84
End: 2019-06-21
Attending: PHYSICIAN ASSISTANT
Payer: MEDICARE

## 2019-06-21 DIAGNOSIS — R53.1 WEAKNESS: ICD-10-CM

## 2019-06-21 DIAGNOSIS — R26.2 DIFFICULTY WALKING: ICD-10-CM

## 2019-06-21 DIAGNOSIS — Z92.89 HISTORY OF RECENT HOSPITALIZATION: ICD-10-CM

## 2019-06-21 PROCEDURE — 97110 THERAPEUTIC EXERCISES: CPT

## 2019-06-21 PROCEDURE — 97112 NEUROMUSCULAR REEDUCATION: CPT

## 2019-06-21 NOTE — OP THERAPY DAILY TREATMENT
Outpatient Physical Therapy  DAILY TREATMENT     [facility] Outpatient Physical Therapy  [address]  [place] [address]  Phone:  [phone]  Fax:  [phone]    Date: 06/21/2019    Patient: Han Pat  YOB: 1935  MRN: [id number]     Time Calculation  Start time: 1030  Stop time: 1100 Time Calculation (min): 30 minutes     Chief Complaint: Difficulty Walking; Fall; and Loss Of Balance    Visit #: 11    SUBJECTIVE:  Ed is seen for follow up for weakness and balance.Will work on capacity today      OBJECTIVE:  Current objective measures:           Therapeutic Exercises (CPT 91738):     1. Nu step , Level 5 x 7 mins     2. Gastroc stretch , SLant board    Therapeutic Treatments and Modalities:     1. Gait Training (CPT 37552), Walking outside , uneven terrain. walking up/down    2. Neuromuscular Re-education (CPT 41722), Balance work, Squig pull with level change, various foot position.     Time-based treatments/modalities:  Therapeutic exercise minutes (CPT 96366): 15 minutes  Neuromusc re-ed, balance, coor, post minutes (CPT 21037): 15 minutes      ASSESSMENT:   Response to treatment: he is doing well and does regular .25 mile walks at home with some hill/elevation change.        PLAN/RECOMMENDATIONS:   Plan for treatment: therapy treatment to continue next visit.  Planned interventions for next visit: continue with current treatment.

## 2019-06-26 ENCOUNTER — PHYSICAL THERAPY (OUTPATIENT)
Dept: PHYSICAL THERAPY | Facility: MEDICAL CENTER | Age: 84
End: 2019-06-26
Attending: PHYSICIAN ASSISTANT
Payer: MEDICARE

## 2019-06-26 ENCOUNTER — SLEEP CENTER VISIT (OUTPATIENT)
Dept: SLEEP MEDICINE | Facility: MEDICAL CENTER | Age: 84
End: 2019-06-26
Payer: MEDICARE

## 2019-06-26 VITALS
WEIGHT: 203 LBS | OXYGEN SATURATION: 94 % | SYSTOLIC BLOOD PRESSURE: 100 MMHG | HEIGHT: 72 IN | DIASTOLIC BLOOD PRESSURE: 60 MMHG | RESPIRATION RATE: 16 BRPM | BODY MASS INDEX: 27.5 KG/M2 | HEART RATE: 63 BPM

## 2019-06-26 DIAGNOSIS — R26.2 DIFFICULTY WALKING: ICD-10-CM

## 2019-06-26 DIAGNOSIS — G47.33 OBSTRUCTIVE SLEEP APNEA: ICD-10-CM

## 2019-06-26 DIAGNOSIS — R53.1 WEAKNESS: ICD-10-CM

## 2019-06-26 DIAGNOSIS — Z92.89 HISTORY OF RECENT HOSPITALIZATION: ICD-10-CM

## 2019-06-26 PROBLEM — E66.9 OBESITY (BMI 30.0-34.9): Status: RESOLVED | Noted: 2017-08-14 | Resolved: 2019-06-26

## 2019-06-26 PROCEDURE — 97110 THERAPEUTIC EXERCISES: CPT

## 2019-06-26 PROCEDURE — 99214 OFFICE O/P EST MOD 30 MIN: CPT | Performed by: NURSE PRACTITIONER

## 2019-06-26 RX ORDER — ASPIRIN 81 MG/1
81 TABLET, CHEWABLE ORAL DAILY
COMMUNITY
End: 2021-01-03

## 2019-06-26 NOTE — OP THERAPY DAILY TREATMENT
Outpatient Physical Therapy  DAILY TREATMENT     [facility] Outpatient Physical Therapy  [address]  [place] [address]  Phone:  [phone]  Fax:  [phone]    Date: 2019    Patient: Han Pat  YOB: 1935  MRN: [id number]     Time Calculation  Start time: 1500  Stop time: 1530 Time Calculation (min): 30 minutes     Chief Complaint: Difficulty Walking; Fall; and Loss Of Balance    Visit #: 12    SUBJECTIVE:  Ed is seen for follow up for weakness and balance.Will work on capacity today      OBJECTIVE:  Current objective measures:   Eval 30 sec sit<>stand: 9 times, use of HHA  5 TSTS: 16.58, no HHA  TU.58 sec   6 MWT: 540ft    19  30 se sit to stand: 10 no use of hands  5 x Sit to stand: 12 no hands  Tu sec  6 mwt: 875       Short Term Goals:   1. Pt will be able to perform the TUG in <18 sec indicating significant improvement  2. Pt will be able to perform the 30 sec sit<>stand test without use of UE and reach 12 repetitions  3. Pt is to perform HEP without cueing demonstrating compliance  Short term goal time span:  2-4 weeks      Long Term Goals:    1. Pt is to be able to perform 6 MWT without AD and reach >605 ft demonstrating significant change  2. Pt is to score >45 on the RUBIO demonstrating decreased fall risk  3. Pt to report no falls or LOB in past 2 weeks      Therapeutic Exercises (CPT 49254):     1. Nu step , Level 5 x 7 mins     2. Gastroc stretch , SLant board    Therapeutic Treatments and Modalities:     1. Neuromuscular Re-education (CPT 01851), Re test walking and balance     Time-based treatments/modalities:  Therapeutic exercise minutes (CPT 14211): 30 minutes      ASSESSMENT:   Response to treatment: Travelled to [place] Did well. We have 1 more visit to update HEP and will DC.   PLAN/RECOMMENDATIONS:   Plan for treatment: therapy treatment to continue next visit.  Planned interventions for next visit: continue  with current treatment.

## 2019-06-26 NOTE — PATIENT INSTRUCTIONS
1) Continue autoCPAP at [id number]  2) Clean mask and supplies weekly and change them as insurance allows  3) Light conditioning encouraged  4) Continue smoking cessation   5) Vaccines: Recommended  6) HST now   7) Return in about 1 year (around 6/26/2020) for follow up with RICH Duggan, if not sooner, review of symptoms, Compliance.

## 2019-06-26 NOTE — PROGRESS NOTES
CC:  Here for f/u sleep issues as listed below    HPI:   Han presents today for follow up obstructive sleep apnea.  PMH of thrombocytosis, vertigo, weakness in PT.     PSG was originally completed 2007 with Dr. Fuentes, we do not have records.  Currently he is being treated with autoCPAP @ [id number] with 2L. Started oxygen bleed in since last OV without subjective benefit.  Compliance download from the dates 5/27/2019 - 6/25/2019 indicates he is wearing the device 100% for an avg of 7 hours and 19 minutes per night with a reduced AHI of 0.2.  He does tolerate pressure and mask well.  He wakes up refreshed and is less tired throughout the day. They deny morning H/A. He naps daily for 30-60 min. He sleeps better overall. He will continue to clean supplies weekly and change them as insurance allows. They are currently with [facility], but want to change to [facility].       Patient Active Problem List    Diagnosis Date Noted   • Weakness 03/24/2019     Priority: High   • Acute encephalopathy 03/24/2019     Priority: High   • Cognitive impairment 03/27/2019   • Serum total bilirubin elevated 03/24/2019   • Lower back pain 03/24/2019   • Cough 03/12/2019   • Thrombocytosis (HCC) 02/05/2019   • Benign prostatic hyperplasia without lower urinary tract symptoms 02/05/2019   • Mixed hyperlipidemia 02/05/2019   • Seasonal allergies 02/05/2019   • Vertigo 02/05/2019   • Obstructive sleep apnea 08/14/2017   • Gastroesophageal reflux disease 08/14/2017       Past Medical History:   Diagnosis Date   • Back pain    • GERD (gastroesophageal reflux disease)    • Hyperlipidemia    • Kidney stone    • PND (post-nasal drip)    • Sleep apnea        Past Surgical History:   Procedure Laterality Date   • CATARACT EXTRACTION WITH IOL     • OTHER      Nasal surgery   • OTHER ORTHOPEDIC SURGERY  lumbar disectomy 2000       Family History   Problem Relation Age of Onset   • Other Father         Heart problems; unspecified   • Sleep Apnea Father     • Other Mother         Aneurism   • Sleep Apnea Brother    • Other Brother         Heart problems; unspecified   • Sleep Apnea Son    • Sleep Apnea Son        Social History     Social History   • Marital status:      Spouse name: N/A   • Number of children: N/A   • Years of education: N/A     Occupational History   • Not on file.     Social History Main Topics   • Smoking status: Former Smoker     Packs/day: 1.00     Years: 15.00     Types: Cigarettes     Quit date: 1/1/1980   • Smokeless tobacco: Never Used   • Alcohol use 0.0 oz/week      Comment: nightly   • Drug use: No   • Sexual activity: Not on file     Other Topics Concern   •  Service Yes   • Blood Transfusions No   • Caffeine Concern No   • Occupational Exposure No   • Hobby Hazards No   • Sleep Concern No   • Stress Concern No   • Weight Concern No   • Special Diet No   • Back Care No   • Exercise Yes   • Bike Helmet No   • Seat Belt Yes   • Self-Exams No     Social History Narrative   • No narrative on file       Current Outpatient Prescriptions   Medication Sig Dispense Refill   • aspirin (ASA) 81 MG Chew Tab chewable tablet Take 81 mg by mouth every day.     • levETIRAcetam (KEPPRA) 500 MG Tab Take 1 Tab by mouth 2 times a day. 90 Tab 3   • Cholecalciferol (VITAMIN D) 2000 UNIT Tab      • lansoprazole (PREVACID) 30 MG TABLET DISPERSIBLE Take 30 mg by mouth every day.     • simvastatin (ZOCOR) 20 MG Tab Take 1 Tab by mouth every evening. 30 Tab 3   • ferrous sulfate 325 (65 Fe) MG tablet Take 1 Tab by mouth 2 times a day, with meals. 30 Tab 3   • tamsulosin (FLOMAX) 0.4 MG capsule Take 1 Cap by mouth every bedtime. 30 Cap 3   • vitamin D 4000 units Tab Take 4,000 Units by mouth every day. 1200 Tab 3   • Multiple Vitamins-Minerals (OCUVITE PO) Take 2 Caps by mouth every evening.     • hydroxyurea (HYDREA) 500 MG Cap Take 1,000-1,500 mg by mouth See Admin Instructions. Pt takes 1000MG on Sat and Sun  1500MG on Mon, Tue, Wed, Thur, and  Fri     • montelukast (SINGULAIR) 10 MG Tab Take 10 mg by mouth every evening.     • Home Care Oxygen Inhale 2 L/min by mouth every bedtime. with CPAP     • Lidocaine 4 % Patch Apply 1 Patch to affected area(s) PRN (pain).     • acetaminophen (TYLENOL) 325 MG Tab Take 2 Tabs by mouth every 6 hours as needed. (Patient not taking: Reported on 5/9/2019) 30 Tab 3   • lidocaine (LIDODERM) 5 % Patch Apply 1 Patch to skin as directed every 24 hours. (Patient not taking: Reported on 6/26/2019) 30 Patch 3   • albuterol 108 (90 Base) MCG/ACT Aero Soln inhalation aerosol Inhale 2 Puffs by mouth every four hours as needed for Shortness of Breath. 1 Inhaler 0     No current facility-administered medications for this visit.           Allergies: Patient has no known allergies.      ROS   Gen: Denies fever, chills, unintentional weight loss, fatigue  Resp:Denies Dyspnea  CV: Denies chest pain, chest tightness  Sleep:Denies morning headache, insomnia,  snoring, gasping for air, apnea  Neuro: Denies frequent headaches, weakness, dizziness  See HPI.  All other systems reviewed and negative        Vital signs for this encounter:  Vitals:    06/26/19 1021   Height: 1.829 m (6')   Weight: 92.1 kg (203 lb)   Weight % change since last entry.: 0 %   BP: 100/60   Pulse: 63   BMI (Calculated): 27.53   Resp: 16                   Physical Exam:   Gen:         Alert and oriented, No apparent distress.   Neck:        No Lymphadenopathy.  Lungs:     Clear to auscultation bilaterally.    CV:          Regular rate and rhythm. No murmurs, rubs or gallops.   Abd:         Soft non tender, non distended.            Ext:          No clubbing, cyanosis, edema.    Assessment   1. Obstructive sleep apnea  Overnight Home Sleep Study       Patient is clinically stable and will proceed with following plan. Needs sleep study in order to switch to [facility].  He understands to not use his machine for 2 days prior to sleep study to decrease skewing of results.  They can call for results so that we can place order for change of DME.     PLAN:   Patient Instructions   1) Continue autoCPAP at [id number]  2) Clean mask and supplies weekly and change them as insurance allows  3) Light conditioning encouraged  4) Continue smoking cessation   5) Vaccines: Recommended  6) HST now   7) Return in about 1 year (around 6/26/2020) for follow up with RICH Duggan, if not sooner, review of symptoms, Compliance.

## 2019-07-02 ENCOUNTER — HOME STUDY (OUTPATIENT)
Dept: SLEEP MEDICINE | Facility: MEDICAL CENTER | Age: 84
End: 2019-07-02
Attending: NURSE PRACTITIONER
Payer: MEDICARE

## 2019-07-02 DIAGNOSIS — G47.33 OBSTRUCTIVE SLEEP APNEA: ICD-10-CM

## 2019-07-02 PROCEDURE — G0399 HOME SLEEP TEST/TYPE 3 PORTA: HCPCS | Performed by: INTERNAL MEDICINE

## 2019-07-03 ENCOUNTER — PHYSICAL THERAPY (OUTPATIENT)
Dept: PHYSICAL THERAPY | Facility: MEDICAL CENTER | Age: 84
End: 2019-07-03
Attending: PHYSICIAN ASSISTANT
Payer: MEDICARE

## 2019-07-03 DIAGNOSIS — Z92.89 HISTORY OF RECENT HOSPITALIZATION: ICD-10-CM

## 2019-07-03 DIAGNOSIS — R53.1 WEAKNESS: ICD-10-CM

## 2019-07-03 PROCEDURE — 97110 THERAPEUTIC EXERCISES: CPT

## 2019-07-03 NOTE — PROCEDURES
Interpretation:    This home sleep test was performed on 7/2/2019 using a [employer] NightOne recording device. The device has 3 sensors (effort belt, cannula and oximeter) and a built-in body position sensor that provide seven channels of data (body position, pressure flow, snore, respiratory effort, SpO2, pleth and pulse rate).  The effort belt utilizes respiratory inductive plethysmography technology.      The total recording time was 516.4 minutes, the time in bed was 516.4 minutes, and the monitoring time was 424.0 minutes.    The device recorded 0 central apneas, 220 obstructive apneas, 0 mixed apneas, 256 hypopneas, 476 apneas and hypopneas, and 0 RERAs.  The PEG (respiratory event index which is a surrogate for the AHI) was 67.4.  The OAI (obstructive apnea index) was 31.1.  The SOLA (the central apnea index) was 0.0.  The supine PEG was 71.4.  Most of the respiratory events occurred in the supine position.    The patient experienced for 38 desaturations and the oxygen desaturation index was 65.9.  During sleep the average saturation was 90 %, the lizzy saturation was 79 %, and the highest saturation was 100 %.  The patient spent 45.7 % of TIB with saturations less than 90%.      The mean heart rate during sleep was 56 bpm, the maximum heart rate during sleep was 225 bpm, and the minimum heart rate during sleep was 42 bpm.    The patient experienced 971 snoring episodes.  The percentage of snoring was 23.8 %.    Assessment:    Severe sleep apnea - PEG 67.4  Significant nocturnal desaturation - lizzy saturation 79 % - less than 90% for 45.7 % of the TIB  Significant snoring -971 snoring episodes comprising 100.9 minutes  The highest heart rate may have been artifactual    Recommendation:    Recommend a positive airway pressure titration

## 2019-07-03 NOTE — OP THERAPY DISCHARGE SUMMARY
Outpatient Physical Therapy  DISCHARGE SUMMARY NOTE      [facility] Outpatient Physical Therapy  [address]  [place] [address]  Phone:  [phone]  Fax:  [phone]    Date of Visit: 07/03/2019    Patient: Han Pat  YOB: 1935  MRN: [id number]     Referring Provider: Kia Thompson P.A.-C.  [address]  [address]  [place] [address]   Referring Diagnosis Other abnormalities of gait and mobility [R26.89]     Physical Therapy Occurrence Codes    Date of onset of impairment:  4/18/19   Date physical therapy care plan established or reviewed:  5/2/19   Date physical therapy treatment started:  5/2/19          Functional Limitations and Severity Modifiers      Goal:     Discharge:         Your patient is being discharged from Physical Therapy with the following comments:   · Goals met    Comments:  Ed was sen consistently or therapy for weakness, balance issues from a hospitalization. He has done well and while he is aging and still has some low energy days is doing better and back to baseline.     Limitations Remaining:  Aging. And some generalized weakness     Recommendations:  Stay active with HEP and walking as able. Follow up with primary care as needed.     Franky Mcmillan, PT, DPT    Date: 7/3/2019

## 2019-07-03 NOTE — OP THERAPY DAILY TREATMENT
Outpatient Physical Therapy  DAILY TREATMENT     [facility] Outpatient Physical Therapy  [address]  [place] [address]  Phone:  [phone]  Fax:  [phone]    Date: 2019    Patient: Han Pat  YOB: 1935  MRN: [id number]     Time Calculation  Start time: 0800  Stop time: 0830 Time Calculation (min): 30 minutes     Chief Complaint: Difficulty Walking and Loss Of Balance    Visit #: Visit count could not be calculated. Make sure you are using a visit which is associated with an episode.    SUBJECTIVE:  Ed is seen for follow up for weakness and balance.Will update HEP today and prep for DC     OBJECTIVE:  Current objective measures:   Eval 30 sec sit<>stand: 9 times, use of HHA  5 TSTS: 16.58, no HHA  TU.58 sec   6 MWT: 540ft    19  30 se sit to stand: 10 no use of hands  5 x Sit to stand: 12 no hands  Tu sec  6 mwt: 875         Therapeutic Exercises (CPT 29193):     1. Nu step , Level 5 x 7 mins     2. Gastroc stretch , SLant board    3. Update HEP       Therapeutic Exercise Summary: Access Code: [email or web address]   URL: [email or web address]   Date: 2019   Prepared by: Franky Mcmillan     Exercises  · Squat with Chair Touch - 5-7 reps - 3 sets - 1x daily - 3x weekly  · Heel Toe Raises with Counter Support - 5-7 reps - 3 sets - 1x daily - 3x weekly  · Lunge with Counter Support - 5-7 reps - 3 sets - 1x daily - 3x weekly  · Forward T with Counter Support - 5-7 reps - 3 sets - 1x daily - 3x weekly  · Standing Tandem Balance with Unilateral Counter Support - 3 sets - 15-30 hold - 1x daily - 3x weekly  · Standing Hip Abduction with Resistance at Ankles and Counter Support - 5-7 reps - 3 sets - 1x daily - 3x weekly        Time-based treatments/modalities:  Therapeutic exercise minutes (CPT 90969): 30 minutes      ASSESSMENT:   Response to treatment: Will dc to HEP at this time, Formal report to follow.   PLAN/RECOMMENDATIONS:   Plan  for treatment: DC to HEP

## 2019-07-09 ENCOUNTER — TELEPHONE (OUTPATIENT)
Dept: PULMONOLOGY | Facility: HOSPICE | Age: 84
End: 2019-07-09

## 2019-07-09 DIAGNOSIS — G47.33 OSA (OBSTRUCTIVE SLEEP APNEA): ICD-10-CM

## 2019-07-12 NOTE — TELEPHONE ENCOUNTER
Pt called and LM wanting an update on if she would be switched to [facility] or if he was going ro stay with [facility].  I called pt back and LM stating that the order was sent to [facility] and if they had any further questions they could call us back and we will help.

## 2019-07-23 NOTE — REHAB-NURSING IDT TEAM NOTE
Nursing   Nursing  Diet/Nutrition:  Regular and Thin Liquids  Medication Administration:  Whole with Liquid Wash  % consumed at meals in last 24 hours:  Consumed 25-50% of meals per documentation.  Meal/Snack Consumption for the past 24 hrs:   Oral Nutrition   04/09/19 1747 Refused   04/09/19 1216 Lunch;Less than 25% Consumed   04/09/19 0800 Breakfast;Between 25-50% Consumed       Snack schedule:  None  Appetite:  Fair  Fluid Intake/Output in past 24 hours: In: 660 [P.O.:660]  Out: 300   Admit Weight:  Weight: 91.5 kg (201 lb 11.5 oz)  Weight Last 7 Days   [91.5 kg (201 lb 11.5 oz)] 91.5 kg (201 lb 11.5 oz) (04/07 0500)    Pain Issues:    Location:  Leg (04/09 0815)  Right;Left (04/09 0815)         Severity:  Mild   Scheduled pain medications:  lidoderm  patches     PRN pain medications used in last 24 hours:  acetaminophen (TYLENOL)    Non Pharmacologic Interventions:  distraction and emotional support  Effectiveness of pain management plan:  good=patient states acceptable comfort after interventions    Bowel:    Bowel Assist Initial Score:  2 - Max Assistance  Bowel Assist Current Score:  4 - Minimal Assistance  Bowl Accidents in last 7 days:  0  Last bowel movement: 04/09/19  Stool Description: Large, Formed, Soft     Usual bowel pattern:  every other day  Scheduled bowel medications:  senna-docusate (PERICOLACE or SENOKOT S)   PRN bowel medications used in last 24 hours:  None  Nursing Interventions:  Increased time, Scheduled medication, Supervision, Verbal cueing  Effectiveness of bowel program:   fair=sometimes needs prn bowel meds for constipation  Bladder:    Bladder Assist Initial Score:  1 - Total Assistance  Bladder Assist Current Score:  4 - Minimal Assistance  Bladder Accidents in last 7 days:  0  PVR range for past 24-48 hours:  []  ()  Medications affecting bladder:  Flomax    Time void schedule/voiding pattern:  Pt initiates voiding  Interventions:  Increased time, Medication, Supervision,  Verbal cueing, Emptying of device, Urinal  Effectiveness of bladder training:  continent  Sleep/wake cycle:   Average hours slept:  Sleeps 3-4 hours without waking  Sleep medication usage:  None    Patient/Family Training/Education:  Bladder Management/Training, Bowel Management/Training, Fall Prevention, General Self Care and Medication Management  Strengths: Alert and oriented, Willingly participates in therapeutic activities, Able to follow instructions, Supportive family, Pleasant and cooperative and Motivated for self care and independence   Barriers:   Fatigue and Generalized weakness            Nursing Problems           Problem: Bowel/Gastric:     Goal: Normal bowel function is maintained or improved     Flowsheet:     Taken at 04/08/19 1700    Last BM 04/08/19 by Katalina Vela, C.N.A.                Goal: Will not experience complications related to bowel motility             Problem: Communication     Goal: The ability to communicate needs accurately and effectively will improve             Problem: Discharge Barriers/Planning     Goal: Patient's continuum of care needs will be met             Problem: Infection     Goal: Will remain free from infection             Problem: Knowledge Deficit     Goal: Knowledge of disease process/condition, treatment plan, diagnostic tests, and medications will improve           Goal: Knowledge of the prescribed therapeutic regimen will improve             Problem: Mobility     Goal: Risk for activity intolerance will decrease             Problem: Pain Management     Goal: Pain level will decrease to patient's comfort goal             Problem: Respiratory:     Goal: Respiratory status will improve             Problem: Safety     Goal: Will remain free from injury           Goal: Will remain free from falls             Problem: Skin Integrity     Goal: Risk for impaired skin integrity will decrease             Problem: Urinary Elimination:     Goal: Ability to reestablish a  normal urinary elimination pattern will improve             Problem: Venous Thromboembolism (VTW)/Deep Vein Thrombosis (DVT) Prevention:     Goal: Patient will participate in Venous Thrombosis (VTE)/Deep Vein Thrombosis (DVT)Prevention Measures     Flowsheet:     Taken at 04/09/19 1230    Mechanical Prophylaxis  SCDs, Sequential Compression Device by Rosi Bedolla R.N.    SCDs, Sequential Compression Device Off by Rosi Bedolla R.N.    Pharmacologic Prophylaxis Used LMWH: Enoxaparin(Lovenox) by Rosi Bedolla R.N.                       Long Term Goals:   At discharge patient will be able to function safely at home and in the community with support.    Section completed by:  Johnson Montenegro R.N.          Smokeless tobacco

## 2019-07-30 ENCOUNTER — TELEPHONE (OUTPATIENT)
Dept: MEDICAL GROUP | Facility: MEDICAL CENTER | Age: 84
End: 2019-07-30

## 2019-07-30 NOTE — TELEPHONE ENCOUNTER
ESTABLISHED PATIENT PRE-VISIT PLANNING     Patient was NOT contacted to complete PVP.     Note: Patient will not be contacted if there is no indication to call.     1.  Reviewed notes from the last few office visits within the medical group: Yes    2.  If any orders were placed at last visit or intended to be done for this visit (i.e. 6 mos follow-up), do we have Results/Consult Notes?        •  Labs - Labs were not ordered at last office visit.   Note: If patient appointment is for lab review and patient did not complete labs, check with provider if OK to reschedule patient until labs completed.       •  Imaging - Imaging was not ordered at last office visit.       •  Referrals - No referrals were ordered at last office visit.    3. Is this appointment scheduled as a Hospital Follow-Up? No    4.  Immunizations were updated in Epic using WebIZ?: Epic matches WebIZ       •  Web Iz Recommendations: HEPATITIS B, MMR , PREVNAR (PCV13) , TDAP and SHINGRIX (Shingles)    5.  Patient is due for the following Health Maintenance Topics:   Health Maintenance Due   Topic Date Due   • Annual Wellness Visit  1935   • IMM DTaP/Tdap/Td Vaccine (1 - Tdap) 03/17/1956   • IMM PNEUMOCOCCAL 65+ (ADULT) LOW/MEDIUM RISK SERIES (1 of 2 - PCV13) 03/17/2002   • IMM ZOSTER VACCINES (2 of 3) 04/07/2011     6. Orders for overdue Health Maintenance topics pended in Pre-Charting? NO    7.  AHA (MDX) form printed for Provider? YES    8.  Patient was NOT informed to arrive 15 min prior to their scheduled appointment and bring in their medication bottles.

## 2019-08-09 ENCOUNTER — HOSPITAL ENCOUNTER (OUTPATIENT)
Dept: LAB | Facility: MEDICAL CENTER | Age: 84
End: 2019-08-09
Attending: INTERNAL MEDICINE
Payer: MEDICARE

## 2019-08-09 LAB
ALBUMIN SERPL BCP-MCNC: 3.7 G/DL (ref 3.2–4.9)
ALBUMIN/GLOB SERPL: 1.5 G/DL
ALP SERPL-CCNC: 60 U/L (ref 30–99)
ALT SERPL-CCNC: 26 U/L (ref 2–50)
ANION GAP SERPL CALC-SCNC: 7 MMOL/L (ref 0–11.9)
ANISOCYTOSIS BLD QL SMEAR: ABNORMAL
AST SERPL-CCNC: 24 U/L (ref 12–45)
BASOPHILS # BLD AUTO: 1.3 % (ref 0–1.8)
BASOPHILS # BLD: 0.06 K/UL (ref 0–0.12)
BILIRUB SERPL-MCNC: 1 MG/DL (ref 0.1–1.5)
BUN SERPL-MCNC: 20 MG/DL (ref 8–22)
CALCIUM SERPL-MCNC: 8.7 MG/DL (ref 8.5–10.5)
CHLORIDE SERPL-SCNC: 111 MMOL/L (ref 96–112)
CO2 SERPL-SCNC: 24 MMOL/L (ref 20–33)
COMMENT 1642: NORMAL
CREAT SERPL-MCNC: 0.92 MG/DL (ref 0.5–1.4)
EOSINOPHIL # BLD AUTO: 0.23 K/UL (ref 0–0.51)
EOSINOPHIL NFR BLD: 5.1 % (ref 0–6.9)
ERYTHROCYTE [DISTWIDTH] IN BLOOD BY AUTOMATED COUNT: 86.1 FL (ref 35.9–50)
GLOBULIN SER CALC-MCNC: 2.5 G/DL (ref 1.9–3.5)
GLUCOSE SERPL-MCNC: 125 MG/DL (ref 65–99)
HCT VFR BLD AUTO: 36.1 % (ref 42–52)
HGB BLD-MCNC: 11.8 G/DL (ref 14–18)
IMM GRANULOCYTES # BLD AUTO: 0.08 K/UL (ref 0–0.11)
IMM GRANULOCYTES NFR BLD AUTO: 1.8 % (ref 0–0.9)
LYMPHOCYTES # BLD AUTO: 0.74 K/UL (ref 1–4.8)
LYMPHOCYTES NFR BLD: 16.6 % (ref 22–41)
MACROCYTES BLD QL SMEAR: ABNORMAL
MCH RBC QN AUTO: 38.9 PG (ref 27–33)
MCHC RBC AUTO-ENTMCNC: 32.7 G/DL (ref 33.7–35.3)
MCV RBC AUTO: 119.1 FL (ref 81.4–97.8)
MONOCYTES # BLD AUTO: 0.74 K/UL (ref 0–0.85)
MONOCYTES NFR BLD AUTO: 16.6 % (ref 0–13.4)
MORPHOLOGY BLD-IMP: NORMAL
NEUTROPHILS # BLD AUTO: 2.62 K/UL (ref 1.82–7.42)
NEUTROPHILS NFR BLD: 58.6 % (ref 44–72)
NRBC # BLD AUTO: 0 K/UL
NRBC BLD-RTO: 0 /100 WBC
OVALOCYTES BLD QL SMEAR: NORMAL
PLATELET # BLD AUTO: 283 K/UL (ref 164–446)
PLATELET BLD QL SMEAR: NORMAL
PMV BLD AUTO: 9.5 FL (ref 9–12.9)
POIKILOCYTOSIS BLD QL SMEAR: NORMAL
POTASSIUM SERPL-SCNC: 4 MMOL/L (ref 3.6–5.5)
PROT SERPL-MCNC: 6.2 G/DL (ref 6–8.2)
RBC # BLD AUTO: 3.03 M/UL (ref 4.7–6.1)
RBC BLD AUTO: PRESENT
SODIUM SERPL-SCNC: 142 MMOL/L (ref 135–145)
WBC # BLD AUTO: 4.5 K/UL (ref 4.8–10.8)

## 2019-08-09 PROCEDURE — 85025 COMPLETE CBC W/AUTO DIFF WBC: CPT

## 2019-08-09 PROCEDURE — 36415 COLL VENOUS BLD VENIPUNCTURE: CPT

## 2019-08-09 PROCEDURE — 80053 COMPREHEN METABOLIC PANEL: CPT

## 2019-08-14 ENCOUNTER — OFFICE VISIT (OUTPATIENT)
Dept: MEDICAL GROUP | Facility: MEDICAL CENTER | Age: 84
End: 2019-08-14
Payer: MEDICARE

## 2019-08-14 ENCOUNTER — APPOINTMENT (RX ONLY)
Dept: URBAN - METROPOLITAN AREA CLINIC 35 | Facility: CLINIC | Age: 84
Setting detail: DERMATOLOGY
End: 2019-08-14

## 2019-08-14 VITALS
HEIGHT: 72 IN | RESPIRATION RATE: 14 BRPM | WEIGHT: 212.8 LBS | BODY MASS INDEX: 28.82 KG/M2 | OXYGEN SATURATION: 92 % | DIASTOLIC BLOOD PRESSURE: 52 MMHG | SYSTOLIC BLOOD PRESSURE: 106 MMHG | TEMPERATURE: 98.3 F | HEART RATE: 62 BPM

## 2019-08-14 DIAGNOSIS — D75.839 THROMBOCYTOSIS: ICD-10-CM

## 2019-08-14 DIAGNOSIS — J44.9 CHRONIC OBSTRUCTIVE PULMONARY DISEASE, UNSPECIFIED COPD TYPE (HCC): ICD-10-CM

## 2019-08-14 DIAGNOSIS — R41.89 COGNITIVE IMPAIRMENT: ICD-10-CM

## 2019-08-14 DIAGNOSIS — D22 MELANOCYTIC NEVI: ICD-10-CM

## 2019-08-14 DIAGNOSIS — L57.0 ACTINIC KERATOSIS: ICD-10-CM

## 2019-08-14 DIAGNOSIS — R53.1 WEAKNESS: ICD-10-CM

## 2019-08-14 DIAGNOSIS — L82.1 OTHER SEBORRHEIC KERATOSIS: ICD-10-CM

## 2019-08-14 DIAGNOSIS — L81.7 PIGMENTED PURPURIC DERMATOSIS: ICD-10-CM

## 2019-08-14 DIAGNOSIS — N40.0 BENIGN PROSTATIC HYPERPLASIA WITHOUT LOWER URINARY TRACT SYMPTOMS: ICD-10-CM

## 2019-08-14 DIAGNOSIS — Z91.81 HISTORY OF FALL: ICD-10-CM

## 2019-08-14 DIAGNOSIS — L81.4 OTHER MELANIN HYPERPIGMENTATION: ICD-10-CM

## 2019-08-14 DIAGNOSIS — Z85.828 PERSONAL HISTORY OF OTHER MALIGNANT NEOPLASM OF SKIN: ICD-10-CM

## 2019-08-14 DIAGNOSIS — Z71.89 OTHER SPECIFIED COUNSELING: ICD-10-CM

## 2019-08-14 PROBLEM — D22.9 MELANOCYTIC NEVI, UNSPECIFIED: Status: ACTIVE | Noted: 2019-08-14

## 2019-08-14 PROBLEM — R05.9 COUGH: Status: RESOLVED | Noted: 2019-03-12 | Resolved: 2019-08-14

## 2019-08-14 PROBLEM — M47.816 DEGENERATIVE JOINT DISEASE (DJD) OF LUMBAR SPINE: Status: ACTIVE | Noted: 2019-03-24

## 2019-08-14 PROBLEM — G93.40 ACUTE ENCEPHALOPATHY: Status: RESOLVED | Noted: 2019-03-24 | Resolved: 2019-08-14

## 2019-08-14 PROBLEM — R17 SERUM TOTAL BILIRUBIN ELEVATED: Status: RESOLVED | Noted: 2019-03-24 | Resolved: 2019-08-14

## 2019-08-14 PROCEDURE — 8041 PR SCP AHA: Performed by: PHYSICIAN ASSISTANT

## 2019-08-14 PROCEDURE — 17004 DESTROY PREMAL LESIONS 15/>: CPT

## 2019-08-14 PROCEDURE — ? LIQUID NITROGEN

## 2019-08-14 PROCEDURE — 99214 OFFICE O/P EST MOD 30 MIN: CPT | Performed by: PHYSICIAN ASSISTANT

## 2019-08-14 PROCEDURE — 99213 OFFICE O/P EST LOW 20 MIN: CPT | Mod: 25

## 2019-08-14 PROCEDURE — ? COUNSELING

## 2019-08-14 ASSESSMENT — LOCATION SIMPLE DESCRIPTION DERM
LOCATION SIMPLE: LEFT TEMPLE
LOCATION SIMPLE: LEFT ZYGOMA
LOCATION SIMPLE: LEFT PRETIBIAL REGION
LOCATION SIMPLE: POSTERIOR SCALP
LOCATION SIMPLE: POSTERIOR NECK
LOCATION SIMPLE: RIGHT TEMPLE
LOCATION SIMPLE: RIGHT ZYGOMA
LOCATION SIMPLE: NOSE
LOCATION SIMPLE: LEFT CHEEK
LOCATION SIMPLE: RIGHT FOREHEAD
LOCATION SIMPLE: LEFT SCALP
LOCATION SIMPLE: RIGHT PRETIBIAL REGION
LOCATION SIMPLE: RIGHT OCCIPITAL SCALP
LOCATION SIMPLE: RIGHT SHOULDER
LOCATION SIMPLE: LEFT EAR
LOCATION SIMPLE: HAIR

## 2019-08-14 ASSESSMENT — LOCATION DETAILED DESCRIPTION DERM
LOCATION DETAILED: LEFT DISTAL PRETIBIAL REGION
LOCATION DETAILED: HAIR
LOCATION DETAILED: NASAL TIP
LOCATION DETAILED: RIGHT MEDIAL TRAPEZIAL NECK
LOCATION DETAILED: RIGHT DISTAL PRETIBIAL REGION
LOCATION DETAILED: LEFT MEDIAL FRONTAL SCALP
LOCATION DETAILED: LEFT CENTRAL ZYGOMA
LOCATION DETAILED: RIGHT CENTRAL ZYGOMA
LOCATION DETAILED: RIGHT POSTERIOR NECK
LOCATION DETAILED: NASAL DORSUM
LOCATION DETAILED: LEFT LATERAL ZYGOMA
LOCATION DETAILED: LEFT CENTRAL TEMPLE
LOCATION DETAILED: RIGHT CENTRAL TEMPLE
LOCATION DETAILED: RIGHT POSTERIOR SHOULDER
LOCATION DETAILED: MID POSTERIOR NECK
LOCATION DETAILED: MID-OCCIPITAL SCALP
LOCATION DETAILED: LEFT INFERIOR CENTRAL MALAR CHEEK
LOCATION DETAILED: RIGHT MEDIAL ZYGOMA
LOCATION DETAILED: NASAL SUPRATIP
LOCATION DETAILED: RIGHT INFERIOR FOREHEAD
LOCATION DETAILED: LEFT SUPERIOR HELIX
LOCATION DETAILED: RIGHT SUPERIOR OCCIPITAL SCALP

## 2019-08-14 ASSESSMENT — LOCATION ZONE DERM
LOCATION ZONE: NECK
LOCATION ZONE: NOSE
LOCATION ZONE: EAR
LOCATION ZONE: LEG
LOCATION ZONE: SCALP
LOCATION ZONE: FACE
LOCATION ZONE: ARM

## 2019-08-14 NOTE — LETTER
[facility]  Kia Thompson P.A.-C.  [address]  [place] [address]  Fax: [phone]   Authorization for Release/Disclosure of   Protected Health Information   Name: RAFIQ PAT : 1935 SSN: [id number]   Address: [address] [place] [address]  [place] [address] Phone:    [phone] (home)    I authorize the entity listed below to release/disclose the PHI below to:   [facility]/Kia Thompson P.A.-C. and Kia Thompson P.A.-C.   Provider or Entity Name:  [facility]   Address   City, State, Zip   Phone:      Fax:     Reason for request: continuity of care   Information to be released:    [  ] LAST COLONOSCOPY,  including any PATH REPORT and follow-up  [  ] LAST FIT/COLOGUARD RESULT [  ] LAST DEXA  [  ] LAST MAMMOGRAM  [  ] LAST PAP  [  ] LAST LABS [  ] RETINA EXAM REPORT  [  ] IMMUNIZATION RECORDS  [  ] Release all info      [  ] Check here and initial the line next to each item to release ALL health information INCLUDING  _____ Care and treatment for drug and / or alcohol abuse  _____ HIV testing, infection status, or AIDS  _____ Genetic Testing    DATES OF SERVICE OR TIME PERIOD TO BE DISCLOSED: _____________  I understand and acknowledge that:  * This Authorization may be revoked at any time by you in writing, except if your health information has already been used or disclosed.  * Your health information that will be used or disclosed as a result of you signing this authorization could be re-disclosed by the recipient. If this occurs, your re-disclosed health information may no longer be protected by State or Federal laws.  * You may refuse to sign this Authorization. Your refusal will not affect your ability to obtain treatment.  * This Authorization becomes effective upon signing and will  on (date) __________.      If no date is indicated, this Authorization will  one (1) year from the signature date.    Name: Rafiq Pat    Signature:   Date:          8/14/2019       PLEASE FAX REQUESTED RECORDS BACK TO: [phone]

## 2019-08-14 NOTE — PROGRESS NOTES
Subjective:   Han Pat is a 84 y.o. male here today for follow up on chronic conditions    Thrombocytosis (HCC)  Chronic, stable, Seeing Dr. Rosi Luna, recent labs show platelets in normal range    Weakness  bilat lower leg weakness slightly improved with PT, still shuffling, no new falls    COPD (chronic obstructive pulmonary disease) (HCC)  Chronic, stable, managed with pulmonology, taking meds as prescribed, last exacerbation in the Spring, PFTs and pulm exam up to date    Cognitive impairment  Per wife slightly improved since the Spring, has another EEG scheduled and then will f/u with neuro    Benign prostatic hyperplasia without lower urinary tract symptoms  Taking flomax, no new concerns    History of fall  Still unclear if this was seizure activity or other acute encephalopathy. Seeing neuro. Currently on keppra. Has EEG scheduled. No recent falls.        Current medicines (including changes today)  Current Outpatient Medications   Medication Sig Dispense Refill   • aspirin (ASA) 81 MG Chew Tab chewable tablet Take 81 mg by mouth every day.     • levETIRAcetam (KEPPRA) 500 MG Tab Take 1 Tab by mouth 2 times a day. 90 Tab 3   • Cholecalciferol (VITAMIN D) 2000 UNIT Tab      • lansoprazole (PREVACID) 30 MG TABLET DISPERSIBLE Take 30 mg by mouth every day.     • simvastatin (ZOCOR) 20 MG Tab Take 1 Tab by mouth every evening. 30 Tab 3   • ferrous sulfate 325 (65 Fe) MG tablet Take 1 Tab by mouth 2 times a day, with meals. 30 Tab 3   • tamsulosin (FLOMAX) 0.4 MG capsule Take 1 Cap by mouth every bedtime. 30 Cap 3   • vitamin D 4000 units Tab Take 4,000 Units by mouth every day. 1200 Tab 3   • hydroxyurea (HYDREA) 500 MG Cap Take 1,000-1,500 mg by mouth See Admin Instructions. Pt takes 1000MG on Sat and Sun  1500MG on Mon, Tue, Wed, Thur, and Fri     • montelukast (SINGULAIR) 10 MG Tab Take 10 mg by mouth every evening.     • Home Care Oxygen Inhale 2 L/min by mouth every bedtime. with CPAP     •  Lidocaine 4 % Patch Apply 1 Patch to affected area(s) PRN (pain).     • acetaminophen (TYLENOL) 325 MG Tab Take 2 Tabs by mouth every 6 hours as needed. (Patient not taking: Reported on 5/9/2019) 30 Tab 3   • lidocaine (LIDODERM) 5 % Patch Apply 1 Patch to skin as directed every 24 hours. (Patient not taking: Reported on 6/26/2019) 30 Patch 3   • Multiple Vitamins-Minerals (OCUVITE PO) Take 2 Caps by mouth every evening.     • albuterol 108 (90 Base) MCG/ACT Aero Soln inhalation aerosol Inhale 2 Puffs by mouth every four hours as needed for Shortness of Breath. 1 Inhaler 0     No current facility-administered medications for this visit.      He  has a past medical history of Back pain, GERD (gastroesophageal reflux disease), Hyperlipidemia, Kidney stone, PND (post-nasal drip), and Sleep apnea.    ROS   No fever/chills. No weight change. No headache/dizziness.No sore throat, nasal congestion, ear pain. No chest pain, no shortness of breath, difficulty breathing. No n/v/d/c or abdominal pain. No urinary complaint.      Objective:     /52 (BP Location: Right arm, Patient Position: Sitting, BP Cuff Size: Adult)   Pulse 62   Temp 36.8 °C (98.3 °F) (Temporal)   Resp 14   Ht 1.829 m (6')   Wt 96.5 kg (212 lb 12.8 oz)   SpO2 92%  Body mass index is 28.86 kg/m².   Physical Exam  Constitutional: WDWN, NAD  Skin: Warm, dry, good turgor, no rashes in visible areas.  Respiratory: Unlabored respiratory effort, lungs clear to auscultation, no wheezes, no ronchi.  Cardiovascular: Normal S1, S2, no murmur, no leg edema.  Psych: Alert and oriented x3, normal affect and mood.  Walking with slow shuffle, using walking cane for assistance      Assessment and Plan:   The following treatment plan was discussed    1. Thrombocytosis (HCC)  Will f/u with hematology    2. Weakness  Will continue home exercises and f/u with neurology    3. Chronic obstructive pulmonary disease, unspecified COPD type (HCC)  F/u with pulmonology  yearly    4. Cognitive impairment  F/u with neuro    5. Benign prostatic hyperplasia without lower urinary tract symptoms  Cont flomax    6. History of fall  F/u with neuro      Followup: Return in about 3 months (around 11/14/2019).

## 2019-08-14 NOTE — LETTER
[facility]  Kia Thompson P.A.-C.  [address]  [place] [address]  Fax: [phone]   Authorization for Release/Disclosure of   Protected Health Information   Name: RAFIQ PAT : 1935 SSN: [id number]   Address: [address] [place] [address]  [place] [address] Phone:    [phone] (home)    I authorize the entity listed below to release/disclose the PHI below to:   [facility]/Kia Thompson P.A.-C. and Kia Thompson P.A.-C.   Provider or Entity Name:     Address   City, State, Zip   Phone:      Fax:     Reason for request: continuity of care   Information to be released:    [  ] LAST COLONOSCOPY,  including any PATH REPORT and follow-up  [  ] LAST FIT/COLOGUARD RESULT [  ] LAST DEXA  [  ] LAST MAMMOGRAM  [  ] LAST PAP  [  ] LAST LABS [  ] RETINA EXAM REPORT  [  ] IMMUNIZATION RECORDS  [  ] Release all info      [  ] Check here and initial the line next to each item to release ALL health information INCLUDING  _____ Care and treatment for drug and / or alcohol abuse  _____ HIV testing, infection status, or AIDS  _____ Genetic Testing    DATES OF SERVICE OR TIME PERIOD TO BE DISCLOSED: _____________  I understand and acknowledge that:  * This Authorization may be revoked at any time by you in writing, except if your health information has already been used or disclosed.  * Your health information that will be used or disclosed as a result of you signing this authorization could be re-disclosed by the recipient. If this occurs, your re-disclosed health information may no longer be protected by State or Federal laws.  * You may refuse to sign this Authorization. Your refusal will not affect your ability to obtain treatment.  * This Authorization becomes effective upon signing and will  on (date) __________.      If no date is indicated, this Authorization will  one (1) year from the signature date.    Name: Rafiq Pat    Signature:   Date:          8/14/2019       PLEASE FAX REQUESTED RECORDS BACK TO: [phone]

## 2019-08-14 NOTE — ASSESSMENT & PLAN NOTE
Per wife slightly improved since the Spring, has another EEG scheduled and then will f/u with neuro

## 2019-08-14 NOTE — PROCEDURE: LIQUID NITROGEN
Duration Of Freeze Thaw-Cycle (Seconds): 10
Post-Care Instructions: I reviewed with the patient in detail post-care instructions. Patient is to wear sunprotection, and avoid picking at any of the treated lesions. Pt may apply Vaseline to crusted or scabbing areas.
Render Note In Bullet Format When Appropriate: No
Detail Level: Detailed
Consent: The patient's consent was obtained including but not limited to risks of crusting, scabbing, blistering, scarring, darker or lighter pigmentary change, recurrence, incomplete removal and infection.
Number Of Freeze-Thaw Cycles: 1 freeze-thaw cycle

## 2019-08-14 NOTE — NON-PROVIDER
Annual Health Assessment Questions:    1.  Are you currently engaging in any exercise or physical activity? Yes    2.  How would you describe your mood or emotional well-being today? crummy    3.  Have you had any falls in the last year? Yes    4.  Have you noticed any problems with your balance or had difficulty walking? Yes    5.  In the last six months have you experienced any leakage of urine? No    6. DPA/Advanced Directive: Patient has Advanced Directive on file.

## 2019-08-14 NOTE — ASSESSMENT & PLAN NOTE
Chronic, stable, managed with pulmonology, taking meds as prescribed, last exacerbation in the Spring, PFTs and pulm exam up to date

## 2019-08-14 NOTE — ASSESSMENT & PLAN NOTE
Still unclear if this was seizure activity or other acute encephalopathy. Seeing neuro. Currently on keppra. Has EEG scheduled. No recent falls.

## 2019-09-24 ENCOUNTER — NON-PROVIDER VISIT (OUTPATIENT)
Dept: NEUROLOGY | Facility: MEDICAL CENTER | Age: 84
End: 2019-09-24
Payer: MEDICARE

## 2019-09-24 DIAGNOSIS — R40.20 LOSS OF CONSCIOUSNESS (HCC): ICD-10-CM

## 2019-09-24 DIAGNOSIS — R56.9 SEIZURE (HCC): ICD-10-CM

## 2019-09-24 DIAGNOSIS — R41.89 SPELL OF ALTERED COGNITION: ICD-10-CM

## 2019-09-24 PROCEDURE — 95816 EEG AWAKE AND DROWSY: CPT

## 2019-09-24 NOTE — PROCEDURES
Referring provider: Dr. Min       INDICATION: altered mental status, possible seizure like episodes     CURRENT ANTIEPILEPTIC REGIMEN:  mg bid     TECHNIQUE: 30 channel routine electroencephalogram (EEG) was performed in accordance with the international 10-20 system. The study was reviewed in bipolar and referential montages. The recording examined the patient during wakeful and drowsy/sleep state(s).     DESCRIPTION OF THE RECORD:  During the wakefulness, the background showed a symmetrical 8-9  Hz alpha activity posteriorly with amplitude of 70 mV.  There was reactivity to eye closure/opening.  A normal anterior-posterior gradient was noted with faster beta frequencies seen anteriorly. There are monomorphic delta 2 Hz bursts over right and left frontal and temporal regions  lasting 3-5 seconds at times.  During drowsiness, frequent theta/delta frequencies were seen over both hemispheres.    ACTIVATION PROCEDURES:   Hyperventilation was performed by the patient for a total of 3 minutes. The technician performing the test noted good effort. This technique produced electroencephalographic changes in keeping with the expected bilaterally synchronous, frontally predominant, high amplitude slow waves build up.     Intermittent Photic stimulation was performed in a stepwise fashion from 1 to 30 Hz and elicited a normal response (photic driving), most noticeable in the posterior leads.      ICTAL AND/OR INTERICTAL FINDINGS:   No focal or generalized epileptiform activity noted. No regional slowing was seen during this routine study. Generalized slowing was observed over both left and right frontal and temporal regions. No clinical events or seizures were reported or recorded during the study.     EKG: sampling of the EKG recording demonstrated sinus rhythm.       INTERPRETATION:  There was intermittent slowing over both right and left hemisphere suggestive of mild encephalopathy which can be seen with  toxic,metabolic conditions or certain medications .There were no seizures recorded.    Note: A normal EEG does not rule out epilepsy.  If the clinical suspicion remains high for seizures, a prolonged recording to capture clinical or subclinical events may be helpful.

## 2019-09-26 ENCOUNTER — PATIENT MESSAGE (OUTPATIENT)
Dept: NEUROLOGY | Facility: MEDICAL CENTER | Age: 84
End: 2019-09-26

## 2019-10-08 ENCOUNTER — PATIENT MESSAGE (OUTPATIENT)
Dept: NEUROLOGY | Facility: MEDICAL CENTER | Age: 84
End: 2019-10-08

## 2019-10-31 ENCOUNTER — TELEPHONE (OUTPATIENT)
Dept: MEDICAL GROUP | Facility: MEDICAL CENTER | Age: 84
End: 2019-10-31

## 2019-10-31 NOTE — TELEPHONE ENCOUNTER
ESTABLISHED PATIENT PRE-VISIT PLANNING     Patient was NOT contacted to complete PVP.     Note: Patient will not be contacted if there is no indication to call.     1.  Reviewed notes from the last few office visits within the medical group: Yes    2.  If any orders were placed at last visit or intended to be done for this visit (i.e. 6 mos follow-up), do we have Results/Consult Notes?        •  Labs - Labs were not ordered at last office visit.   Note: If patient appointment is for lab review and patient did not complete labs, check with provider if OK to reschedule patient until labs completed.       •  Imaging - Imaging was not ordered at last office visit.       •  Referrals - No referrals were ordered at last office visit.    3. Is this appointment scheduled as a Hospital Follow-Up? No    4.  Immunizations were updated in Epic using WebIZ?: Epic matches WebIZ       •  Web Iz Recommendations: FLU, HEPATITIS B, MMR , PREVNAR (PCV13)  and TDAP    5.  Patient is due for the following Health Maintenance Topics:   Health Maintenance Due   Topic Date Due   • Annual Wellness Visit  1935   • PFT SCREENING-FEV1 AND FEV/FVC RATIO / SPIROMETRY SHOULD BE PERFORMED ANNUALLY  03/17/1955   • IMM HEP B VACCINE (1 of 3 - Risk 3-dose series) 03/17/1956   • IMM DTaP/Tdap/Td Vaccine (1 - Tdap) 03/17/1956   • IMM PNEUMOCOCCAL VACCINE: 65+ Years (1 of 2 - PCV13) 03/17/2002   • IMM ZOSTER VACCINES (2 of 3) 04/07/2011   • IMM INFLUENZA (1) 09/01/2019       6. Orders for overdue Health Maintenance topics pended in Pre-Charting? NO    7.  AHA (MDX) form printed for Provider? No, already completed    8.  Patient was NOT informed to arrive 15 min prior to their scheduled appointment and bring in their medication bottles.

## 2019-11-05 ENCOUNTER — TELEPHONE (OUTPATIENT)
Dept: SLEEP MEDICINE | Facility: MEDICAL CENTER | Age: 84
End: 2019-11-05

## 2019-11-05 DIAGNOSIS — G47.33 OSA (OBSTRUCTIVE SLEEP APNEA): ICD-10-CM

## 2019-11-05 NOTE — TELEPHONE ENCOUNTER
Pts CPAP stopped working last night, it is 10 years old.    Last seen in June 2019.     Requested new CPAP order be sent to DME:  [facility] /  [phone] / fax [phone]

## 2019-11-06 NOTE — ASSESSMENT & PLAN NOTE
Dr. Tapia, audiologist, wax buildup and some BPV   Pharmacy faxed in a request for prior authorization on:    Medication: Phentermine  Dosage: 37.5 mg  Quantity requested:  30  Pharmacy for prescription has been selected.    Prior authorization request has been initiated and sent for completion.

## 2019-11-13 ENCOUNTER — OFFICE VISIT (OUTPATIENT)
Dept: MEDICAL GROUP | Facility: MEDICAL CENTER | Age: 84
End: 2019-11-13
Payer: MEDICARE

## 2019-11-13 VITALS
OXYGEN SATURATION: 92 % | HEIGHT: 72 IN | DIASTOLIC BLOOD PRESSURE: 60 MMHG | HEART RATE: 60 BPM | RESPIRATION RATE: 16 BRPM | BODY MASS INDEX: 29.34 KG/M2 | SYSTOLIC BLOOD PRESSURE: 108 MMHG | TEMPERATURE: 98.2 F | WEIGHT: 216.6 LBS

## 2019-11-13 DIAGNOSIS — R53.1 WEAKNESS: ICD-10-CM

## 2019-11-13 DIAGNOSIS — R41.89 COGNITIVE IMPAIRMENT: ICD-10-CM

## 2019-11-13 DIAGNOSIS — D75.839 THROMBOCYTOSIS: ICD-10-CM

## 2019-11-13 DIAGNOSIS — K21.9 GASTROESOPHAGEAL REFLUX DISEASE, ESOPHAGITIS PRESENCE NOT SPECIFIED: ICD-10-CM

## 2019-11-13 DIAGNOSIS — N40.0 BENIGN PROSTATIC HYPERPLASIA WITHOUT LOWER URINARY TRACT SYMPTOMS: ICD-10-CM

## 2019-11-13 PROCEDURE — 99214 OFFICE O/P EST MOD 30 MIN: CPT | Performed by: PHYSICIAN ASSISTANT

## 2019-11-13 NOTE — ASSESSMENT & PLAN NOTE
Since hospitalization earlier this year. Especially in legs. Wife notes he is still shuffling, not lifting his feet. Concerned for falls. Denies recent falls. Did PT for a short time. Doesn't want to use cane or walker. Sometimes uses walking sticks when he is out of the house. Will discuss with neuro whether this is secondary to stroke? Other neuro disorder? Wife is interested in further PT, patient feels he is doing fine at home

## 2019-11-13 NOTE — ASSESSMENT & PLAN NOTE
Wife wonders if cognition would be improved with anti-depressant? Patient denies depression symptoms. Asked them to discuss with neuro

## 2019-11-13 NOTE — PROGRESS NOTES
Subjective:   Han Pat is a 84 y.o. male here today for follow up on chronic conditions. No new concerns. Here with wife. Appt with neuro on the 18th, hoping to be able to start taper off keppra as the most recent EEG did not show any seizure activity.    Weakness  Since hospitalization earlier this year. Especially in legs. Wife notes he is still shuffling, not lifting his feet. Concerned for falls. Denies recent falls. Did PT for a short time. Doesn't want to use cane or walker. Sometimes uses walking sticks when he is out of the house. Will discuss with neuro whether this is secondary to stroke? Other neuro disorder? Wife is interested in further PT, patient feels he is doing fine at home    Gastroesophageal reflux disease  Chronic, stable on current med    Thrombocytosis (HCC)  Chronic, managed with hematology, on hydroxyurea    Benign prostatic hyperplasia without lower urinary tract symptoms  Chronic, stable on current flomax    Cognitive impairment  Wife wonders if cognition would be improved with anti-depressant? Patient denies depression symptoms. Asked them to discuss with neuro       Current medicines (including changes today)  Current Outpatient Medications   Medication Sig Dispense Refill   • aspirin (ASA) 81 MG Chew Tab chewable tablet Take 81 mg by mouth every day.     • levETIRAcetam (KEPPRA) 500 MG Tab Take 1 Tab by mouth 2 times a day. 90 Tab 3   • Cholecalciferol (VITAMIN D) 2000 UNIT Tab      • Home Care Oxygen Inhale 2 L/min by mouth every bedtime. with CPAP     • lansoprazole (PREVACID) 30 MG TABLET DISPERSIBLE Take 30 mg by mouth every day.     • Lidocaine 4 % Patch Apply 1 Patch to affected area(s) PRN (pain).     • simvastatin (ZOCOR) 20 MG Tab Take 1 Tab by mouth every evening. 30 Tab 3   • ferrous sulfate 325 (65 Fe) MG tablet Take 1 Tab by mouth 2 times a day, with meals. 30 Tab 3   • tamsulosin (FLOMAX) 0.4 MG capsule Take 1 Cap by mouth every bedtime. 30 Cap 3   • vitamin D  4000 units Tab Take 4,000 Units by mouth every day. 1200 Tab 3   • Multiple Vitamins-Minerals (OCUVITE PO) Take 2 Caps by mouth every evening.     • albuterol 108 (90 Base) MCG/ACT Aero Soln inhalation aerosol Inhale 2 Puffs by mouth every four hours as needed for Shortness of Breath. 1 Inhaler 0   • hydroxyurea (HYDREA) 500 MG Cap Take 1,000-1,500 mg by mouth See Admin Instructions. Pt takes 1000MG on Sat and Sun  1500MG on Mon, Tue, Wed, Thur, and Fri     • montelukast (SINGULAIR) 10 MG Tab Take 10 mg by mouth every evening.       No current facility-administered medications for this visit.      He  has a past medical history of Back pain, GERD (gastroesophageal reflux disease), Hyperlipidemia, Kidney stone, PND (post-nasal drip), and Sleep apnea.    ROS   No fever/chills. Gaining back weight he lost when hospitalized. No headache/dizziness.  No sore throat, nasal congestion, ear pain. No chest pain, no shortness of breath, difficulty breathing. No n/v/d/c or abdominal pain. No urinary complaint. No rash or skin lesion. No joint pain or swelling.       Objective:     /60 (BP Location: Right arm, Patient Position: Sitting, BP Cuff Size: Adult long)   Pulse 60   Temp 36.8 °C (98.2 °F) (Temporal)   Resp 16   Ht 1.829 m (6')   Wt 98.2 kg (216 lb 9.6 oz)   SpO2 92%  Body mass index is 29.38 kg/m².   Physical Exam:  Constitutional: WDWN, NAD  Skin: Warm, dry, good turgor, no rashes in visible areas.  Respiratory: Unlabored respiratory effort, lungs clear to auscultation, no wheezes, no ronchi.  Cardiovascular: Normal S1, S2, no murmur, no leg edema.  Gait is shuffling, patient bends forward, very slow but no swaying or obvious balance concern  Psych: Alert and oriented x3, normal affect and mood.        Assessment and Plan:   The following treatment plan was discussed    1. Weakness  Will f/u with neuro, consider further PT for fall prevention    2. Gastroesophageal reflux disease, esophagitis presence not  specified  Cont current    3. Thrombocytosis (HCC)  Cont current    4. Benign prostatic hyperplasia without lower urinary tract symptoms  Cont current    5. Cognitive impairment  Will f/u with neuro      Followup: 6 months

## 2019-11-18 ENCOUNTER — APPOINTMENT (OUTPATIENT)
Dept: NEUROLOGY | Facility: MEDICAL CENTER | Age: 84
End: 2019-11-18
Payer: MEDICARE

## 2019-12-19 ENCOUNTER — OFFICE VISIT (OUTPATIENT)
Dept: NEUROLOGY | Facility: MEDICAL CENTER | Age: 84
End: 2019-12-19
Payer: MEDICARE

## 2019-12-19 VITALS
SYSTOLIC BLOOD PRESSURE: 130 MMHG | HEART RATE: 67 BPM | OXYGEN SATURATION: 95 % | TEMPERATURE: 97.3 F | DIASTOLIC BLOOD PRESSURE: 58 MMHG | BODY MASS INDEX: 28.99 KG/M2 | WEIGHT: 214 LBS | HEIGHT: 72 IN | RESPIRATION RATE: 16 BRPM

## 2019-12-19 DIAGNOSIS — G20.C PARKINSONISM, UNSPECIFIED PARKINSONISM TYPE: ICD-10-CM

## 2019-12-19 DIAGNOSIS — R41.89 SPELL OF ALTERED COGNITION: ICD-10-CM

## 2019-12-19 PROCEDURE — 99215 OFFICE O/P EST HI 40 MIN: CPT

## 2019-12-19 NOTE — PROGRESS NOTES
Mr Pat is here for a follow up and is accompanied by his wife.  He is on  mg bid and reports no seizures.  He does have side effects of fatigue and would like to stop LEV.    Subjective   Doing well.  EEg did not reveal any seizures   We discussed how we will slowly reduce LEV    mg   Discontinue after that   1 tablet 500 mg 4 days   1/2 tablet for 4 days   Then off    He is doing well otherwise. Bilateral leg weakness continues as well as gait instability.  Shuffling,gait instability multiple near falls but no true falls as he is more careful.  As on my prior observation- mild ? Parkinsonism.  MRI brain no structural lesions.  He is not interested in obtaining a MARITA scan at this time.    Review of Systems   Constitutional: Positive for malaise/fatigue.   HENT: Positive for hearing loss.    Eyes: Negative.    Respiratory: Negative.    Cardiovascular: Negative.    Gastrointestinal: Negative.    Musculoskeletal: Positive for back pain.   Skin: Negative.    Neurological: Positive for tremors and weakness.   Endo/Heme/Allergies: Negative.    Psychiatric/Behavioral: Negative.      Past Medical History:   Diagnosis Date   • Back pain    • GERD (gastroesophageal reflux disease)    • Hyperlipidemia    • Kidney stone    • PND (post-nasal drip)    • Sleep apnea      Past Surgical History:   Procedure Laterality Date   • CATARACT EXTRACTION WITH IOL     • OTHER      Nasal surgery   • OTHER ORTHOPEDIC SURGERY  lumbar disectomy 2000     Social History     Socioeconomic History   • Marital status:      Spouse name: Not on file   • Number of children: Not on file   • Years of education: Not on file   • Highest education level: Not on file   Occupational History   • Not on file   Social Needs   • Financial resource strain: Not on file   • Food insecurity:     Worry: Not on file     Inability: Not on file   • Transportation needs:     Medical: Not on file     Non-medical: Not on file   Tobacco Use   • Smoking  status: Former Smoker     Packs/day: 1.00     Years: 15.00     Pack years: 15.00     Types: Cigarettes     Last attempt to quit: 1980     Years since quittin.0   • Smokeless tobacco: Never Used   Substance and Sexual Activity   • Alcohol use: Yes     Alcohol/week: 0.0 oz     Comment: nightly   • Drug use: No   • Sexual activity: Not on file   Lifestyle   • Physical activity:     Days per week: Not on file     Minutes per session: Not on file   • Stress: Not on file   Relationships   • Social connections:     Talks on phone: Not on file     Gets together: Not on file     Attends [employer] service: Not on file     Active member of club or organization: Not on file     Attends meetings of clubs or organizations: Not on file     Relationship status: Not on file   • Intimate partner violence:     Fear of current or ex partner: Not on file     Emotionally abused: Not on file     Physically abused: Not on file     Forced sexual activity: Not on file   Other Topics Concern   •  Service Yes   • Blood Transfusions No   • Caffeine Concern No   • Occupational Exposure No   • Hobby Hazards No   • Sleep Concern No   • Stress Concern No   • Weight Concern No   • Special Diet No   • Back Care No   • Exercise Yes   • Bike Helmet No   • Seat Belt Yes   • Self-Exams No   Social History Narrative   • Not on file     Family History   Problem Relation Age of Onset   • Other Father         Heart problems; unspecified   • Sleep Apnea Father    • Other Mother         Aneurism   • Sleep Apnea Brother    • Other Brother         Heart problems; unspecified   • Sleep Apnea Son    • Sleep Apnea Son      Current Outpatient Medications on File Prior to Visit   Medication Sig Dispense Refill   • aspirin (ASA) 81 MG Chew Tab chewable tablet Take 81 mg by mouth every day.     • Cholecalciferol (VITAMIN D) 2000 UNIT Tab      • lansoprazole (PREVACID) 30 MG TABLET DISPERSIBLE Take 30 mg by mouth every day.     • simvastatin (ZOCOR) 20  MG Tab Take 1 Tab by mouth every evening. 30 Tab 3   • ferrous sulfate 325 (65 Fe) MG tablet Take 1 Tab by mouth 2 times a day, with meals. 30 Tab 3   • vitamin D 4000 units Tab Take 4,000 Units by mouth every day. 1200 Tab 3   • Multiple Vitamins-Minerals (OCUVITE PO) Take 2 Caps by mouth every evening.     • albuterol 108 (90 Base) MCG/ACT Aero Soln inhalation aerosol Inhale 2 Puffs by mouth every four hours as needed for Shortness of Breath. 1 Inhaler 0   • hydroxyurea (HYDREA) 500 MG Cap Take 1,000-1,500 mg by mouth See Admin Instructions. Pt takes 1000MG on Sat and Sun  1500MG on Mon, Tue, Wed, Thur, and Fri     • montelukast (SINGULAIR) 10 MG Tab Take 10 mg by mouth every evening.     • Home Care Oxygen Inhale 2 L/min by mouth every bedtime. with CPAP     • Lidocaine 4 % Patch Apply 1 Patch to affected area(s) PRN (pain).     • tamsulosin (FLOMAX) 0.4 MG capsule Take 1 Cap by mouth every bedtime. 30 Cap 3     No current facility-administered medications on file prior to visit.      IMPRESSION:     1.  Study is significantly limited by motion artifact.  2.  Diffuse primarily central atrophy.  3.  Nonspecific foci of abnormal signal in the white matter bilaterally.  Microvascular ischemic change versus demyelination or gliosis.  4.  No acute stroke or evidence for hemorrhage.  5.  Acute and chronic paranasal sinus disease.  Encounter Vitals  Standard Vitals  Vitals  Blood Pressure : 130/58  Temperature: 36.3 °C (97.3 °F)  Temp src: Temporal  Pulse: 67  Respiration: 16  Pulse Oximetry: 95 %  Height: 182.9 cm (6')  Weight: 97.1 kg (214 lb)  Encounter Vitals  Temperature: 36.3 °C (97.3 °F)  Temp src: Temporal  Blood Pressure : 130/58  Pulse: 67  Respiration: 16  Pulse Oximetry: 95 %  Weight: 97.1 kg (214 lb)  Height: 182.9 cm (6')  BMI (Calculated): 29.02  Pulmonary-Specific Vitals     Durable Medical Equipment-Specific Vitals           Physical Exam   Constitutional: He is oriented to person, place, and time and  well-developed, well-nourished, and in no distress.   HENT:   Head: Normocephalic and atraumatic.   Right Ear: External ear normal.   Left Ear: External ear normal.   Nose: Nose normal.   Mouth/Throat: Oropharynx is clear and moist.   Eyes: Pupils are equal, round, and reactive to light. Conjunctivae and EOM are normal.   Neck: Normal range of motion. Neck supple.   Cardiovascular: Normal rate and regular rhythm.   Pulmonary/Chest: Effort normal and breath sounds normal.   Abdominal: Soft.   Musculoskeletal: Normal range of motion.   Neurological: He is alert and oriented to person, place, and time. He has normal reflexes.   Skin: Skin is warm and dry.   Psychiatric: Mood, memory, affect and judgment normal.     Neurological exam   Mental Status: Awake, alert, oriented x 3.              Speech: Fluent with normal prosody.              Memory: Able to recall recent and remote events accurately.               Concentration: Attentive. Able to focus on history and follow multi-step commands.                         Fund of Knowledge: Appropriate              Cranial Nerves:                          CN II: PERRL. No afferent pupillary defect.                          CN III, IV, VI: Good eye closure, EOMI.                           CN V: Facial sensation intact and symmetric.                           CN VII: No facial asymmetry.                           CN VIII: Hearing intact.                           CN IX and X: Palate elevates symmetrically. Normal gag reflex.                          CN XI: Symmetric shoulder shrug.                           CN XII: Tongue midline.               Sensory: Intact light touch, vibration and temperature.               Coordination: No evidence of past-pointing on finger to nose testing, no dysdiadochokinesia. Heel to shin intact.                        DTR's: 2+ throughout without clonus.               Babinski: Toes downgoing bilaterally.              Romberg: Negative.               Movements: No tremors observed.   Musculoskeletal:               Strength: 5/5 in upper and 4/5 lower extremities bilaterally.(effort decreased 2/2 back pain)               Gait: wide based antalgic gait   Short stride  Slow to turn   Can not tandem walk or walk on tiptoes or heels   Decreased R arm swing   Trace cogwheel in R wrist?              Tone: Normal bulk and tone.              Joints: No swelling.     Lab Results   Component Value Date/Time    SODIUM 142 08/09/2019 06:14 AM    POTASSIUM 4.0 08/09/2019 06:14 AM    CHLORIDE 111 08/09/2019 06:14 AM    CO2 24 08/09/2019 06:14 AM    GLUCOSE 125 (H) 08/09/2019 06:14 AM    BUN 20 08/09/2019 06:14 AM    CREATININE 0.92 08/09/2019 06:14 AM      Lab Results   Component Value Date/Time    WBC 4.5 (L) 08/09/2019 06:14 AM    RBC 3.03 (L) 08/09/2019 06:14 AM    HEMOGLOBIN 11.8 (L) 08/09/2019 06:14 AM    HEMATOCRIT 36.1 (L) 08/09/2019 06:14 AM    .1 (H) 08/09/2019 06:14 AM    MCH 38.9 (H) 08/09/2019 06:14 AM    MCHC 32.7 (L) 08/09/2019 06:14 AM    MPV 9.5 08/09/2019 06:14 AM    NEUTSPOLYS 58.60 08/09/2019 06:14 AM    LYMPHOCYTES 16.60 (L) 08/09/2019 06:14 AM    MONOCYTES 16.60 (H) 08/09/2019 06:14 AM    EOSINOPHILS 5.10 08/09/2019 06:14 AM    BASOPHILS 1.30 08/09/2019 06:14 AM    ANISOCYTOSIS 1+ 08/09/2019 06:14 AM        Impression and plan   Mild parkinsonism   Recommend MARITA scan   Decrease LEV until off   500 mg daily for 3-4 days then 250 mg bid for 4 days then 250 mg daily for 4 days then off  In case MARITA scan suggestive of Parkinson's disease will start Sinemet tablets  1 tab tid   Discussed with patient and his wife.  Discussed Physical therapy and falls lizz   Home safety   Gait training and balance training   Referral to [facility]     RTC 3 months

## 2019-12-23 ASSESSMENT — ENCOUNTER SYMPTOMS
GASTROINTESTINAL NEGATIVE: 1
TREMORS: 1
PSYCHIATRIC NEGATIVE: 1
CARDIOVASCULAR NEGATIVE: 1
RESPIRATORY NEGATIVE: 1
EYES NEGATIVE: 1
BACK PAIN: 1
WEAKNESS: 1

## 2020-01-10 ENCOUNTER — HOSPITAL ENCOUNTER (OUTPATIENT)
Dept: LAB | Facility: MEDICAL CENTER | Age: 85
End: 2020-01-10
Attending: INTERNAL MEDICINE
Payer: MEDICARE

## 2020-01-10 LAB
ALBUMIN SERPL BCP-MCNC: 3.7 G/DL (ref 3.2–4.9)
ALBUMIN/GLOB SERPL: 1.8 G/DL
ALP SERPL-CCNC: 64 U/L (ref 30–99)
ALT SERPL-CCNC: 45 U/L (ref 2–50)
ANION GAP SERPL CALC-SCNC: 10 MMOL/L (ref 0–11.9)
ANISOCYTOSIS BLD QL SMEAR: ABNORMAL
AST SERPL-CCNC: 39 U/L (ref 12–45)
BASOPHILS # BLD AUTO: 1.1 % (ref 0–1.8)
BASOPHILS # BLD: 0.03 K/UL (ref 0–0.12)
BILIRUB SERPL-MCNC: 1.2 MG/DL (ref 0.1–1.5)
BUN SERPL-MCNC: 15 MG/DL (ref 8–22)
CALCIUM SERPL-MCNC: 8.4 MG/DL (ref 8.5–10.5)
CHLORIDE SERPL-SCNC: 108 MMOL/L (ref 96–112)
CO2 SERPL-SCNC: 21 MMOL/L (ref 20–33)
COMMENT 1642: NORMAL
CREAT SERPL-MCNC: 0.89 MG/DL (ref 0.5–1.4)
DACRYOCYTES BLD QL SMEAR: NORMAL
EOSINOPHIL # BLD AUTO: 0.11 K/UL (ref 0–0.51)
EOSINOPHIL NFR BLD: 3.9 % (ref 0–6.9)
ERYTHROCYTE [DISTWIDTH] IN BLOOD BY AUTOMATED COUNT: 95 FL (ref 35.9–50)
FASTING STATUS PATIENT QL REPORTED: NORMAL
GLOBULIN SER CALC-MCNC: 2.1 G/DL (ref 1.9–3.5)
GLUCOSE SERPL-MCNC: 126 MG/DL (ref 65–99)
HCT VFR BLD AUTO: 32.3 % (ref 42–52)
HGB BLD-MCNC: 10.8 G/DL (ref 14–18)
IMM GRANULOCYTES # BLD AUTO: 0.05 K/UL (ref 0–0.11)
IMM GRANULOCYTES NFR BLD AUTO: 1.8 % (ref 0–0.9)
LYMPHOCYTES # BLD AUTO: 0.58 K/UL (ref 1–4.8)
LYMPHOCYTES NFR BLD: 20.5 % (ref 22–41)
MACROCYTES BLD QL SMEAR: ABNORMAL
MCH RBC QN AUTO: 42.5 PG (ref 27–33)
MCHC RBC AUTO-ENTMCNC: 33.4 G/DL (ref 33.7–35.3)
MCV RBC AUTO: 127.2 FL (ref 81.4–97.8)
MONOCYTES # BLD AUTO: 0.59 K/UL (ref 0–0.85)
MONOCYTES NFR BLD AUTO: 20.8 % (ref 0–13.4)
MORPHOLOGY BLD-IMP: NORMAL
NEUTROPHILS # BLD AUTO: 1.47 K/UL (ref 1.82–7.42)
NEUTROPHILS NFR BLD: 51.9 % (ref 44–72)
NRBC # BLD AUTO: 0 K/UL
NRBC BLD-RTO: 0 /100 WBC
OVALOCYTES BLD QL SMEAR: NORMAL
PLATELET # BLD AUTO: 473 K/UL (ref 164–446)
PLATELET BLD QL SMEAR: NORMAL
PMV BLD AUTO: 9.6 FL (ref 9–12.9)
POIKILOCYTOSIS BLD QL SMEAR: NORMAL
POLYCHROMASIA BLD QL SMEAR: NORMAL
POTASSIUM SERPL-SCNC: 4.3 MMOL/L (ref 3.6–5.5)
PROT SERPL-MCNC: 5.8 G/DL (ref 6–8.2)
RBC # BLD AUTO: 2.54 M/UL (ref 4.7–6.1)
RBC BLD AUTO: PRESENT
SODIUM SERPL-SCNC: 139 MMOL/L (ref 135–145)
WBC # BLD AUTO: 2.8 K/UL (ref 4.8–10.8)

## 2020-01-10 PROCEDURE — 80053 COMPREHEN METABOLIC PANEL: CPT

## 2020-01-10 PROCEDURE — 85025 COMPLETE CBC W/AUTO DIFF WBC: CPT

## 2020-01-10 PROCEDURE — 36415 COLL VENOUS BLD VENIPUNCTURE: CPT

## 2020-02-18 ENCOUNTER — APPOINTMENT (RX ONLY)
Dept: URBAN - METROPOLITAN AREA CLINIC 35 | Facility: CLINIC | Age: 85
Setting detail: DERMATOLOGY
End: 2020-02-18

## 2020-02-18 DIAGNOSIS — D22 MELANOCYTIC NEVI: ICD-10-CM

## 2020-02-18 DIAGNOSIS — L82.1 OTHER SEBORRHEIC KERATOSIS: ICD-10-CM

## 2020-02-18 DIAGNOSIS — L57.0 ACTINIC KERATOSIS: ICD-10-CM

## 2020-02-18 DIAGNOSIS — Z71.89 OTHER SPECIFIED COUNSELING: ICD-10-CM

## 2020-02-18 DIAGNOSIS — L81.4 OTHER MELANIN HYPERPIGMENTATION: ICD-10-CM

## 2020-02-18 PROBLEM — D48.5 NEOPLASM OF UNCERTAIN BEHAVIOR OF SKIN: Status: ACTIVE | Noted: 2020-02-18

## 2020-02-18 PROBLEM — D22.39 MELANOCYTIC NEVI OF OTHER PARTS OF FACE: Status: ACTIVE | Noted: 2020-02-18

## 2020-02-18 PROCEDURE — 11102 TANGNTL BX SKIN SINGLE LES: CPT | Mod: 59

## 2020-02-18 PROCEDURE — 11103 TANGNTL BX SKIN EA SEP/ADDL: CPT | Mod: 59

## 2020-02-18 PROCEDURE — ? COUNSELING

## 2020-02-18 PROCEDURE — 99212 OFFICE O/P EST SF 10 MIN: CPT | Mod: 25

## 2020-02-18 PROCEDURE — ? BIOPSY BY SHAVE METHOD

## 2020-02-18 PROCEDURE — 69100 BIOPSY OF EXTERNAL EAR: CPT

## 2020-02-18 PROCEDURE — ? ADDITIONAL NOTES

## 2020-02-18 ASSESSMENT — LOCATION DETAILED DESCRIPTION DERM
LOCATION DETAILED: RIGHT CYMBA CONCHA
LOCATION DETAILED: LEFT CYMBA CONCHA
LOCATION DETAILED: LEFT SUPERIOR PARIETAL SCALP
LOCATION DETAILED: LEFT LATERAL FOREHEAD
LOCATION DETAILED: LEFT CENTRAL MALAR CHEEK
LOCATION DETAILED: LEFT CENTRAL TEMPLE
LOCATION DETAILED: LEFT INFERIOR LATERAL FOREHEAD

## 2020-02-18 ASSESSMENT — LOCATION ZONE DERM
LOCATION ZONE: SCALP
LOCATION ZONE: EAR
LOCATION ZONE: FACE

## 2020-02-18 ASSESSMENT — LOCATION SIMPLE DESCRIPTION DERM
LOCATION SIMPLE: SCALP
LOCATION SIMPLE: LEFT FOREHEAD
LOCATION SIMPLE: LEFT TEMPLE
LOCATION SIMPLE: LEFT CHEEK
LOCATION SIMPLE: RIGHT EAR
LOCATION SIMPLE: LEFT EAR

## 2020-02-18 NOTE — PROCEDURE: BIOPSY BY SHAVE METHOD
Detail Level: Detailed
Depth Of Biopsy: dermis
Was A Bandage Applied: Yes
Size Of Lesion In Cm: 0.4
X Size Of Lesion In Cm: 0
Biopsy Type: H and E
Biopsy Method: double edge Personna blade
Anesthesia Type: 0.5% lidocaine with 1:200,000 epinephrine and a 1:10 solution of 8.4% sodium bicarbonate
Anesthesia Volume In Cc: 0.5
Hemostasis: Aluminum Chloride
Wound Care: Petrolatum
Dressing: bandage
Destruction After The Procedure: No
Type Of Destruction Used: Curettage
Curettage Text: The wound bed was treated with curettage after the biopsy was performed.
Cryotherapy Text: The wound bed was treated with cryotherapy after the biopsy was performed.
Electrodesiccation Text: The wound bed was treated with electrodesiccation after the biopsy was performed.
Electrodesiccation And Curettage Text: The wound bed was treated with electrodesiccation and curettage after the biopsy was performed.
Silver Nitrate Text: The wound bed was treated with silver nitrate after the biopsy was performed.
Lab: [id number]
Lab Facility: 
Consent: Written consent was obtained and risks were reviewed including but not limited to scarring, infection, bleeding, scabbing, incomplete removal, nerve damage and allergy to anesthesia.
Post-Care Instructions: I reviewed with the patient in detail post-care instructions. Keep the biopsy site dry overnight, and then change the dressing once daily and apply a thin layer of petrolatum with a clean q-tip. \\nShowers are OK after 24 hours.  Allow clean water to run over the area.  Do not touch the biopsy site with your hands.  Do not immerse the biopsy site in water such as going into a swimming pool or bathtub until the sutures are removed.  If the biopsy site gets more painful with time, red, or drains, this is concerning for infection.  If you have signs of infection please call the office to come in for a walk in visit Monday through Friday 8:30 am to 12 pm or 1 pm to 4:30 pm.  If we are not in the office, please call through the answering service.
Notification Instructions: Patient will be notified of biopsy results. However, patient instructed to call the office if not contacted within 2 weeks.
Billing Type: Third-Party Bill
Size Of Lesion In Cm: 1.3

## 2020-02-18 NOTE — PROCEDURE: ADDITIONAL NOTES
Detail Level: Simple
Additional Notes: Recommend to follow up in 2 months to curettage and blue light to face, ears and scalp.  No auth needed, scheduled a follow up in 04/2020

## 2020-02-27 ENCOUNTER — APPOINTMENT (RX ONLY)
Dept: URBAN - METROPOLITAN AREA CLINIC 4 | Facility: CLINIC | Age: 85
Setting detail: DERMATOLOGY
End: 2020-02-27

## 2020-02-27 PROBLEM — D04.21 CARCINOMA IN SITU OF SKIN OF RIGHT EAR AND EXTERNAL AURICULAR CANAL: Status: ACTIVE | Noted: 2020-02-27

## 2020-02-27 PROCEDURE — ? MOHS SURGERY PHONE CONSULTATION

## 2020-02-27 NOTE — PROCEDURE: MOHS SURGERY PHONE CONSULTATION
Referring Provider: Dr Katina Roe
Does The Patient Have An Artificial Heart Valve?: No
Does The Patient Take Blood Thinners?: Yes
Patient's Insurance: [employer]
Patient Reported Location: Right superior helix
If Yes- What Blood Thinners Do They Take?: Asp
Office Location Of Mohs Surgery: [place] [id number]
Date Of Mohs Surgery: 03/19/2020
Pathology Accession #: [id number]
Which Antibiotic Do They Take For Surgical Prophylaxis?: Amoxicillin (2 grams)
Detail Level: Detailed
Time Of Mohs Surgery: 9:00 am

## 2020-02-28 ENCOUNTER — APPOINTMENT (RX ONLY)
Dept: URBAN - METROPOLITAN AREA CLINIC 35 | Facility: CLINIC | Age: 85
Setting detail: DERMATOLOGY
End: 2020-02-28

## 2020-02-28 DIAGNOSIS — L57.0 ACTINIC KERATOSIS: ICD-10-CM

## 2020-02-28 PROCEDURE — ? LIQUID NITROGEN

## 2020-02-28 PROCEDURE — 17000 DESTRUCT PREMALG LESION: CPT

## 2020-02-28 RX ORDER — SIMVASTATIN 20 MG
TABLET ORAL
Qty: 100 TAB | Refills: 1 | OUTPATIENT
Start: 2020-02-28

## 2020-02-28 ASSESSMENT — LOCATION ZONE DERM: LOCATION ZONE: FACE

## 2020-02-28 ASSESSMENT — LOCATION SIMPLE DESCRIPTION DERM: LOCATION SIMPLE: LEFT TEMPLE

## 2020-02-28 ASSESSMENT — LOCATION DETAILED DESCRIPTION DERM: LOCATION DETAILED: LEFT CENTRAL TEMPLE

## 2020-03-12 RX ORDER — MONTELUKAST SODIUM 10 MG/1
10 TABLET ORAL EVERY EVENING
Qty: 100 TAB | Refills: 3 | Status: SHIPPED | OUTPATIENT
Start: 2020-03-12 | End: 2021-04-05

## 2020-03-12 RX ORDER — SIMVASTATIN 20 MG
20 TABLET ORAL EVERY EVENING
Qty: 100 TAB | Refills: 4 | Status: SHIPPED | OUTPATIENT
Start: 2020-03-12 | End: 2021-02-26

## 2020-03-13 ENCOUNTER — APPOINTMENT (RX ONLY)
Dept: URBAN - METROPOLITAN AREA CLINIC 35 | Facility: CLINIC | Age: 85
Setting detail: DERMATOLOGY
End: 2020-03-13

## 2020-03-13 PROBLEM — C44.91 BASAL CELL CARCINOMA OF SKIN, UNSPECIFIED: Status: ACTIVE | Noted: 2020-03-13

## 2020-03-13 PROCEDURE — ? MOHS SURGERY PHONE CONSULTATION

## 2020-03-13 NOTE — PROCEDURE: MOHS SURGERY PHONE CONSULTATION
Which Antibiotic Do They Take For Surgical Prophylaxis?: Amoxicillin (2 grams)
Detail Level: Simple
Has The Pathology Report Been Received?: Yes
Does The Patient Have A Pacemaker Or Defibrillator?: No
Time Of Mohs Surgery: 9:00
Pathology Accession #: [id number]
Referring Provider Office Number: [phone]
Patient Reported Location: Right superior helix
Date Of Mohs Surgery: 03/19/2020
Office Location Of Mohs Surgery: Dr. Whitman
Patient Preferred Phone Number: [phone]
Referring Provider: Dr. Katina Roe
Patient's Insurance: [employer]

## 2020-03-17 ENCOUNTER — PATIENT OUTREACH (OUTPATIENT)
Dept: HEALTH INFORMATION MANAGEMENT | Facility: OTHER | Age: 85
End: 2020-03-17

## 2020-03-17 NOTE — PROGRESS NOTES
Completed HBD and Intro to [employer] PA. No resp Symptoms at this time.  No questions or concerns  [employer] PA Marci ext [phone]

## 2020-03-19 ENCOUNTER — APPOINTMENT (RX ONLY)
Dept: URBAN - METROPOLITAN AREA CLINIC 36 | Facility: CLINIC | Age: 85
Setting detail: DERMATOLOGY
End: 2020-03-19

## 2020-03-19 VITALS — HEART RATE: 65 BPM | SYSTOLIC BLOOD PRESSURE: 118 MMHG | DIASTOLIC BLOOD PRESSURE: 65 MMHG

## 2020-03-19 PROBLEM — D04.21 CARCINOMA IN SITU OF SKIN OF RIGHT EAR AND EXTERNAL AURICULAR CANAL: Status: ACTIVE | Noted: 2020-03-19

## 2020-03-19 PROCEDURE — 14060 TIS TRNFR E/N/E/L 10 SQ CM/<: CPT

## 2020-03-19 PROCEDURE — 17312 MOHS ADDL STAGE: CPT

## 2020-03-19 PROCEDURE — 17311 MOHS 1 STAGE H/N/HF/G: CPT

## 2020-03-19 PROCEDURE — ? MOHS SURGERY

## 2020-03-19 NOTE — PROCEDURE: MOHS SURGERY
Consent (Near Eyelid Margin)/Introductory Paragraph: The rationale for Mohs was explained to the patient and consent was obtained. The risks, benefits and alternatives to therapy were discussed in detail. Specifically, the risks of ectropion or eyelid deformity, infection, scarring, bleeding, prolonged wound healing, incomplete removal, allergy to anesthesia, nerve injury and recurrence were addressed. Prior to the procedure, the treatment site was clearly identified and confirmed by the patient. All components of Universal Protocol/PAUSE Rule completed.
Special Stains Stage 10 - Results: Base On Clearance Noted Above
Stage 11: Additional Anesthesia Type: 1% lidocaine with epinephrine
M-Plasty Intermediate Repair Preamble Text (Leave Blank If You Do Not Want): Undermining was performed with blunt dissection.
Suturegard Retention Suture: 0-0 Nylon
Quadrants Reporting?: 0
Show Eye Protection Variable: Yes
Repair Performed By Another Provider Text (Leave Blank If You Do Not Want): After obtaining clear surgical margins the defect was repaired by another provider.
Consent (Spinal Accessory)/Introductory Paragraph: The rationale for Mohs was explained to the patient and consent was obtained. The risks, benefits and alternatives to therapy were discussed in detail. Specifically, the risks of damage to the spinal accessory nerve, infection, scarring, bleeding, prolonged wound healing, incomplete removal, allergy to anesthesia, and recurrence were addressed. Prior to the procedure, the treatment site was clearly identified and confirmed by the patient. All components of Universal Protocol/PAUSE Rule completed.
Consent (Marginal Mandibular)/Introductory Paragraph: The rationale for Mohs was explained to the patient and consent was obtained. The risks, benefits and alternatives to therapy were discussed in detail. Specifically, the risks of damage to the marginal mandibular branch of the facial nerve, infection, scarring, bleeding, prolonged wound healing, incomplete removal, allergy to anesthesia, and recurrence were addressed. Prior to the procedure, the treatment site was clearly identified and confirmed by the patient. All components of Universal Protocol/PAUSE Rule completed.
Medical Necessity Statement: Based on my medical judgement, Mohs surgery is the most appropriate treatment for this cancer compared to other treatments.
Use Subsequent Stages Verbiage?: No
Partial Purse String (Intermediate) Text: Given the location of the defect and the characteristics of the surrounding skin an intermediate purse string closure was deemed most appropriate.  Undermining was performed circumfirentially around the surgical defect.  A purse string suture was then placed and tightened. Wound tension only allowed a partial closure of the circular defect.
Complex Repair And Flap Additional Text (Will Appearing After The Standard Complex Repair Text): The complex repair was not sufficient to completely close the primary defect. The remaining additional defect was repaired with the flap mentioned below.
Mart-1 - Positive Histology Text: MART-1 staining demonstrates areas of higher density and clustering of melanocytes with Pagetoid spread upwards within the epidermis. The surgical margins are positive for tumor cells.
Purse String (Simple) Text: Given the location of the defect and the characteristics of the surrounding skin a purse string closure was deemed most appropriate.  Undermining was performed circumfirentially around the surgical defect.  A purse string suture was then placed and tightened.
Referred To Mid-Level For Closure Text (Leave Blank If You Do Not Want): After obtaining clear surgical margins the patient was sent to a mid-level provider for surgical repair.  The patient understands they will receive post-surgical care and follow-up from the mid-level provider.
Oculoplastic Surgeon Procedure Text (E): After obtaining clear surgical margins the patient was sent to oculoplastics for surgical repair.  The patient understands they will receive post-surgical care and follow-up from the referring physician's office.
Plastic Surgeon Procedure Text (F): After obtaining clear surgical margins the patient was sent to plastics for surgical repair.  The patient understands they will receive post-surgical care and follow-up from the referring physician's office.
Cheiloplasty (Less Than 50%) Text: A decision was made to reconstruct the defect with a  cheiloplasty.  The defect was undermined extensively.  Additional obicularis oris muscle was excised with a 15 blade scalpel.  The defect was converted into a full thickness wedge, of less than 50% of the vertical height of the lip, to facilite a better cosmetic result.  Small vessels were then tied off with 5-0 monocyrl. The obicularis oris, superficial fascia, adipose and dermis were then reapproximated.  After the deeper layers were approximated the epidermis was reapproximated with particular care given to realign the vermilion border.
M-Plasty Complex Repair Preamble Text (Leave Blank If You Do Not Want): Extensive wide undermining was performed.
Surgeon: Ayaka Whitman MD
Vermilion Border Text: The closure involved the vermilion border.
Graft Donor Site Dermal Sutures (Optional): 5-0 Polysorb
Referred To Otolaryngology For Closure Text (Leave Blank If You Do Not Want): After obtaining clear surgical margins the patient was sent to otolaryngology for surgical repair.  The patient understands they will receive post-surgical care and follow-up from the referring physician's office.
Consent (Nose)/Introductory Paragraph: The rationale for Mohs was explained to the patient and consent was obtained. The risks, benefits and alternatives to therapy were discussed in detail. Specifically, the risks of nasal deformity, changes in the flow of air through the nose, infection, scarring, bleeding, prolonged wound healing, incomplete removal, allergy to anesthesia, nerve injury and recurrence were addressed. Prior to the procedure, the treatment site was clearly identified and confirmed by the patient. All components of Universal Protocol/PAUSE Rule completed.
No Repair - Repaired With Adjacent Surgical Defect Text (Leave Blank If You Do Not Want): After obtaining clear surgical margins the defect was repaired concurrently with another surgical defect which was in close approximation.
Primary Defect Length In Cm (Final Defect Size - Required For Flaps/Grafts): 2.5
Z Plasty Text: The lesion was extirpated to the level of the fat with a #15 scalpel blade.  Given the location of the defect, shape of the defect and the proximity to free margins a Z-plasty was deemed most appropriate for repair.  Using a sterile surgical marker, the appropriate transposition arms of the Z-plasty were drawn incorporating the defect and placing the expected incisions within the relaxed skin tension lines where possible.    The area thus outlined was incised deep to adipose tissue with a #15 scalpel blade.  The skin margins were undermined to an appropriate distance in all directions utilizing iris scissors.  The opposing transposition arms were then transposed into place in opposite direction and anchored with interrupted buried subcutaneous sutures.
Primary Defect Width In Cm (Final Defect Size - Required For Flaps/Grafts): 1.3
Consent (Lip)/Introductory Paragraph: The rationale for Mohs was explained to the patient and consent was obtained. The risks, benefits and alternatives to therapy were discussed in detail. Specifically, the risks of lip deformity, changes in the oral aperture, infection, scarring, bleeding, prolonged wound healing, incomplete removal, allergy to anesthesia, nerve injury and recurrence were addressed. Prior to the procedure, the treatment site was clearly identified and confirmed by the patient. All components of Universal Protocol/PAUSE Rule completed.
Trilobed Flap Text: The defect edges were debeveled with a #15 scalpel blade.  Given the location of the defect and the proximity to free margins a trilobed flap was deemed most appropriate.  Using a sterile surgical marker, an appropriate trilobed flap drawn around the defect.    The area thus outlined was incised deep to adipose tissue with a #15 scalpel blade.  The skin margins were undermined to an appropriate distance in all directions utilizing iris scissors.
Eye Protection Verbiage: Before proceeding with the stage, a plastic scleral shield was inserted. The globe was anesthetized with a few drops of 1% lidocaine with 1:100,000 epinephrine. Then, an appropriate sized scleral shield was chosen and coated with lacrilube ointment. The shield was gently inserted and left in place for the duration of each stage. After the stage was completed, the shield was gently removed.
Mauc Instructions: By selecting yes to the question below the MAUC number will be added into the note.  This will be calculated automatically based on the diagnosis chosen, the size entered, the body zone selected (H,M,L) and the specific indications you chose. You will also have the option to override the Mohs AUC if you disagree with the automatically calculated number and this option is found in the Case Summary tab.
Paramedian Forehead Flap Text: A decision was made to reconstruct the defect utilizing an interpolation axial flap and a staged reconstruction.  A telfa template was made of the defect.  This telfa template was then used to outline the paramedian forehead pedicle flap.  The donor area for the pedicle flap was then injected with anesthesia.  The flap was excised through the skin and subcutaneous tissue down to the layer of the underlying musculature.  The pedicle flap was carefully excised within this deep plane to maintain its blood supply.  The edges of the donor site were undermined.   The donor site was closed in a primary fashion.  The pedicle was then rotated into position and sutured.  Once the tube was sutured into place, adequate blood supply was confirmed with blanching and refill.  The pedicle was then wrapped with xeroform gauze and dressed appropriately with a telfa and gauze bandage to ensure continued blood supply and protect the attached pedicle.
Dressing: pressure dressing with telfa
Mastoid Interpolation Flap Text: A decision was made to reconstruct the defect utilizing an interpolation axial flap and a staged reconstruction.  A telfa template was made of the defect.  This telfa template was then used to outline the mastoid interpolation flap.  The donor area for the pedicle flap was then injected with anesthesia.  The flap was excised through the skin and subcutaneous tissue down to the layer of the underlying musculature.  The pedicle flap was carefully excised within this deep plane to maintain its blood supply.  The edges of the donor site were undermined.   The donor site was closed in a primary fashion.  The pedicle was then rotated into position and sutured.  Once the tube was sutured into place, adequate blood supply was confirmed with blanching and refill.  The pedicle was then wrapped with xeroform gauze and dressed appropriately with a telfa and gauze bandage to ensure continued blood supply and protect the attached pedicle.
Estimated Blood Loss (Cc): minimal
Asc Procedure Text (F): After obtaining clear surgical margins the patient was sent to an ASC for surgical repair.  The patient understands they will receive post-surgical care and follow-up from the ASC physician.
Subsequent Stages Histo Method Verbiage: Using a similar technique to that described above, a thin layer of tissue was removed from all areas where tumor was visible on the previous stage.  The tissue was again oriented, mapped, dyed, and processed as above.
Bilobed Flap Text: The defect edges were debeveled with a #15 scalpel blade.  Given the location of the defect and the proximity to free margins a bilobe flap was deemed most appropriate.  Using a sterile surgical marker, an appropriate bilobe flap drawn around the defect.    The area thus outlined was incised deep to adipose tissue with a #15 scalpel blade.  The skin margins were undermined to an appropriate distance in all directions utilizing iris scissors.
Alternatives Discussed Intro (Do Not Add Period): I discussed alternative treatments to Mohs surgery and specifically discussed the risks and benefits of
Wound Care: Vaseline
Burow's Advancement Flap Text: The defect edges were debeveled with a #15 scalpel blade.  Given the location of the defect and the proximity to free margins a Burow's advancement flap was deemed most appropriate.  Using a sterile surgical marker, the appropriate advancement flap was drawn incorporating the defect and placing the expected incisions within the relaxed skin tension lines where possible.    The area thus outlined was incised deep to adipose tissue with a #15 scalpel blade.  The skin margins were undermined to an appropriate distance in all directions utilizing iris scissors.
X Size Of Lesion In Cm (Optional): 1
Double Island Pedicle Flap Text: The defect edges were debeveled with a #15 scalpel blade.  Given the location of the defect, shape of the defect and the proximity to free margins a double island pedicle advancement flap was deemed most appropriate.  Using a sterile surgical marker, an appropriate advancement flap was drawn incorporating the defect, outlining the appropriate donor tissue and placing the expected incisions within the relaxed skin tension lines where possible.    The area thus outlined was incised deep to adipose tissue with a #15 scalpel blade.  The skin margins were undermined to an appropriate distance in all directions around the primary defect and laterally outward around the island pedicle utilizing iris scissors.  There was minimal undermining beneath the pedicle flap.
Advancement Flap (Single) Text: The defect edges were debeveled with a #15 scalpel blade.  Given the location of the defect and the proximity to free margins a single advancement flap was deemed most appropriate.  Using a sterile surgical marker, an appropriate advancement flap was drawn incorporating the defect and placing the expected incisions within the relaxed skin tension lines where possible.    The area thus outlined was incised deep to adipose tissue with a #15 scalpel blade.  The skin margins were undermined to an appropriate distance in all directions utilizing iris scissors.
Same Histology In Subsequent Stages Text: The pattern and morphology of the tumor is as described in the first stage.
Area L Indication Text: Tumors in this location are included in Area L (trunk and extremities).  Mohs surgery is indicated for larger tumors, or tumors with aggressive histologic features, in these anatomic locations.
No Residual Tumor Seen Histology Text: There were no malignant cells seen in the sections examined.
Rotation Flap Text: The defect edges were debeveled with a #15 scalpel blade.  Given the location of the defect, shape of the defect and the proximity to free margins a rotation flap was deemed most appropriate.  Using a sterile surgical marker, an appropriate rotation flap was drawn incorporating the defect and placing the expected incisions within the relaxed skin tension lines where possible.    The area thus outlined was incised deep to adipose tissue with a #15 scalpel blade.  The skin margins were undermined to an appropriate distance in all directions utilizing iris scissors.
Hatchet Flap Text: The defect edges were debeveled with a #15 scalpel blade.  Given the location of the defect, shape of the defect and the proximity to free margins a hatchet flap was deemed most appropriate.  Using a sterile surgical marker, an appropriate hatchet flap was drawn incorporating the defect and placing the expected incisions within the relaxed skin tension lines where possible.    The area thus outlined was incised deep to adipose tissue with a #15 scalpel blade.  The skin margins were undermined to an appropriate distance in all directions utilizing iris scissors.
Secondary Intention Text (Leave Blank If You Do Not Want): The defect will heal with secondary intention.
Closure 3 Information: This tab is for additional flaps and grafts above and beyond our usual structured repairs.  Please note if you enter information here it will not currently bill and you will need to add the billing information manually.
Full Thickness Lip Wedge Repair (Flap) Text: Given the location of the defect and the proximity to free margins a full thickness wedge repair was deemed most appropriate.  Using a sterile surgical marker, the appropriate repair was drawn incorporating the defect and placing the expected incisions perpendicular to the vermilion border.  The vermilion border was also meticulously outlined to ensure appropriate reapproximation during the repair.  The area thus outlined was incised through and through with a #15 scalpel blade.  The muscularis and dermis were reaproximated with deep sutures following hemostasis. Care was taken to realign the vermilion border before proceeding with the superficial closure.  Once the vermilion was realigned the superfical and mucosal closure was finished.
Closure 2 Information: This tab is for additional flaps and grafts, including complex repair and grafts and complex repair and flaps. You can also specify a different location for the additional defect, if the location is the same you do not need to select a new one. We will insert the automated text for the repair you select below just as we do for solitary flaps and grafts. Please note that at this time if you select a location with a different insurance zone you will need to override the ICD10 and CPT if appropriate.
O-T Plasty Text: The defect edges were debeveled with a #15 scalpel blade.  Given the location of the defect, shape of the defect and the proximity to free margins an O-T plasty was deemed most appropriate.  Using a sterile surgical marker, an appropriate O-T plasty was drawn incorporating the defect and placing the expected incisions within the relaxed skin tension lines where possible.    The area thus outlined was incised deep to adipose tissue with a #15 scalpel blade.  The skin margins were undermined to an appropriate distance in all directions utilizing iris scissors.
Ear Wedge Repair Text: A wedge excision was completed by carrying down an excision through the full thickness of the ear and cartilage with an inward facing Burow's triangle. The wound was then closed in a layered fashion.
O-L Flap Text: The defect edges were debeveled with a #15 scalpel blade.  Given the location of the defect, shape of the defect and the proximity to free margins an O-L flap was deemed most appropriate.  Using a sterile surgical marker, an appropriate advancement flap was drawn incorporating the defect and placing the expected incisions within the relaxed skin tension lines where possible.    The area thus outlined was incised deep to adipose tissue with a #15 scalpel blade.  The skin margins were undermined to an appropriate distance in all directions utilizing iris scissors.
Additional Anesthesia Volume In Cc: 6
Ftsg Text: The defect edges were debeveled with a #15 scalpel blade.  Given the location of the defect, shape of the defect and the proximity to free margins a full thickness skin graft was deemed most appropriate.  Using a sterile surgical marker, the primary defect shape was transferred to the donor site. The area thus outlined was incised deep to adipose tissue with a #15 scalpel blade.  The harvested graft was then trimmed of adipose tissue until only dermis and epidermis was left.  The skin margins of the secondary defect were undermined to an appropriate distance in all directions utilizing iris scissors.  The secondary defect was closed with interrupted buried subcutaneous sutures.  The skin edges were then re-apposed with running  sutures.  The skin graft was then placed in the primary defect and oriented appropriately.
Mohs Case Number: [id number]
Hemostasis: Electrocautery
H Plasty Text: Given the location of the defect, shape of the defect and the proximity to free margins a H-plasty was deemed most appropriate for repair.  Using a sterile surgical marker, the appropriate advancement arms of the H-plasty were drawn incorporating the defect and placing the expected incisions within the relaxed skin tension lines where possible. The area thus outlined was incised deep to adipose tissue with a #15 scalpel blade. The skin margins were undermined to an appropriate distance in all directions utilizing iris scissors.  The opposing advancement arms were then advanced into place in opposite direction and anchored with interrupted buried subcutaneous sutures.
Dorsal Nasal Flap Text: The defect edges were debeveled with a #15 scalpel blade.  Given the location of the defect and the proximity to free margins a dorsal nasal flap was deemed most appropriate.  Using a sterile surgical marker, an appropriate dorsal nasal flap was drawn around the defect.    The area thus outlined was incised deep to adipose tissue with a #15 scalpel blade.  The skin margins were undermined to an appropriate distance in all directions utilizing iris scissors.
V-Y Flap Text: The defect edges were debeveled with a #15 scalpel blade.  Given the location of the defect, shape of the defect and the proximity to free margins a V-Y flap was deemed most appropriate.  Using a sterile surgical marker, an appropriate advancement flap was drawn incorporating the defect and placing the expected incisions within the relaxed skin tension lines where possible.    The area thus outlined was incised deep to adipose tissue with a #15 scalpel blade.  The skin margins were undermined to an appropriate distance in all directions utilizing iris scissors.
Purse String (Intermediate) Text: Given the location of the defect and the characteristics of the surrounding skin a purse string intermediate closure was deemed most appropriate.  Undermining was performed circumfirentially around the surgical defect.  A purse string suture was then placed and tightened.
Transposition Flap Text: The defect edges were debeveled with a #15 scalpel blade.  Given the location of the defect and the proximity to free margins a transposition flap was deemed most appropriate.  Using a sterile surgical marker, an appropriate transposition flap was drawn incorporating the defect.    The area thus outlined was incised deep to adipose tissue with a #15 scalpel blade.  The skin margins were undermined to an appropriate distance in all directions utilizing iris scissors.
Chonodrocutaneous Helical Advancement Flap Text: The defect edges were debeveled with a #15 scalpel blade.  Given the location of the defect and the proximity to free margins a chondrocutaneous helical advancement flap was deemed most appropriate.  Using a sterile surgical marker, the appropriate advancement flap was drawn incorporating the defect and placing the expected incisions within the relaxed skin tension lines where possible.    The area thus outlined was incised deep to adipose tissue with a #15 scalpel blade.  The skin margins were undermined to an appropriate distance in all directions utilizing iris scissors.
Spiral Flap Text: The defect edges were debeveled with a #15 scalpel blade.  Given the location of the defect, shape of the defect and the proximity to free margins a spiral flap was deemed most appropriate.  Using a sterile surgical marker, an appropriate rotation flap was drawn incorporating the defect and placing the expected incisions within the relaxed skin tension lines where possible. The area thus outlined was incised deep to adipose tissue with a #15 scalpel blade.  The skin margins were undermined to an appropriate distance in all directions utilizing iris scissors.
O-Z Flap Text: The defect edges were debeveled with a #15 scalpel blade.  Given the location of the defect, shape of the defect and the proximity to free margins an O-Z flap was deemed most appropriate.  Using a sterile surgical marker, an appropriate transposition flap was drawn incorporating the defect and placing the expected incisions within the relaxed skin tension lines where possible. The area thus outlined was incised deep to adipose tissue with a #15 scalpel blade.  The skin margins were undermined to an appropriate distance in all directions utilizing iris scissors.
A-T Advancement Flap Text: The defect edges were debeveled with a #15 scalpel blade.  Given the location of the defect, shape of the defect and the proximity to free margins an A-T advancement flap was deemed most appropriate.  Using a sterile surgical marker, an appropriate advancement flap was drawn incorporating the defect and placing the expected incisions within the relaxed skin tension lines where possible.    The area thus outlined was incised deep to adipose tissue with a #15 scalpel blade.  The skin margins were undermined to an appropriate distance in all directions utilizing iris scissors.
Consent (Ear)/Introductory Paragraph: The rationale for Mohs was explained to the patient and consent was obtained. The risks, benefits and alternatives to therapy were discussed in detail. Specifically, the risks of ear deformity, infection, scarring, bleeding, prolonged wound healing, incomplete removal, allergy to anesthesia, nerve injury and recurrence were addressed. Prior to the procedure, the treatment site was clearly identified and confirmed by the patient. All components of Universal Protocol/PAUSE Rule completed.
Tissue Cultured Epidermal Autograft Text: The defect edges were debeveled with a #15 scalpel blade.  Given the location of the defect, shape of the defect and the proximity to free margins a tissue cultured epidermal autograft was deemed most appropriate.  The graft was then trimmed to fit the size of the defect.  The graft was then placed in the primary defect and oriented appropriately.
Repair Hemostasis (Optional): Pinpoint electrocautery
Helical Rim Text: The closure involved the helical rim.
Interpolation Flap Text: A decision was made to reconstruct the defect utilizing an interpolation axial flap and a staged reconstruction.  A telfa template was made of the defect.  This telfa template was then used to outline the interpolation flap.  The donor area for the pedicle flap was then injected with anesthesia.  The flap was excised through the skin and subcutaneous tissue down to the layer of the underlying musculature.  The interpolation flap was carefully excised within this deep plane to maintain its blood supply.  The edges of the donor site were undermined.   The donor site was closed in a primary fashion.  The pedicle was then rotated into position and sutured.  Once the tube was sutured into place, adequate blood supply was confirmed with blanching and refill.  The pedicle was then wrapped with xeroform gauze and dressed appropriately with a telfa and gauze bandage to ensure continued blood supply and protect the attached pedicle.
Rhombic Flap Text: The defect edges were debeveled with a #15 scalpel blade.  Given the location of the defect and the proximity to free margins a rhombic flap was deemed most appropriate.  Using a sterile surgical marker, an appropriate rhombic flap was drawn incorporating the defect.    The area thus outlined was incised deep to adipose tissue with a #15 scalpel blade.  The skin margins were undermined to an appropriate distance in all directions utilizing iris scissors.
Epidermal Closure: running cuticular
Posterior Auricular Interpolation Flap Text: A decision was made to reconstruct the defect utilizing an interpolation axial flap and a staged reconstruction.  A telfa template was made of the defect.  This telfa template was then used to outline the posterior auricular interpolation flap.  The donor area for the pedicle flap was then injected with anesthesia.  The flap was excised through the skin and subcutaneous tissue down to the layer of the underlying musculature.  The pedicle flap was carefully excised within this deep plane to maintain its blood supply.  The edges of the donor site were undermined.   The donor site was closed in a primary fashion.  The pedicle was then rotated into position and sutured.  Once the tube was sutured into place, adequate blood supply was confirmed with blanching and refill.  The pedicle was then wrapped with xeroform gauze and dressed appropriately with a telfa and gauze bandage to ensure continued blood supply and protect the attached pedicle.
Complex Repair And Graft Additional Text (Will Appearing After The Standard Complex Repair Text): The complex repair was not sufficient to completely close the primary defect. The remaining additional defect was repaired with the graft mentioned below.
Mart-1 - Negative Histology Text: MART-1 staining demonstrates a normal density and pattern of melanocytes along the dermal-epidermal junction. The surgical margins are negative for tumor cells.
Mucosal Advancement Flap Text: Given the location of the defect, shape of the defect and the proximity to free margins a mucosal advancement flap was deemed most appropriate. Incisions were made with a 15 blade scalpel in the appropriate fashion along the cutaneous vermilion border and the mucosal lip. The remaining actinically damaged mucosal tissue was excised.  The mucosal advancement flap was then elevated to the gingival sulcus with care taken to preserve the neurovascular structures and advanced into the primary defect. Care was taken to ensure that precise realignment of the vermilion border was achieved.
O-Z Plasty Text: The defect edges were debeveled with a #15 scalpel blade.  Given the location of the defect, shape of the defect and the proximity to free margins an O-Z plasty (double transposition flap) was deemed most appropriate.  Using a sterile surgical marker, the appropriate transposition flaps were drawn incorporating the defect and placing the expected incisions within the relaxed skin tension lines where possible.    The area thus outlined was incised deep to adipose tissue with a #15 scalpel blade.  The skin margins were undermined to an appropriate distance in all directions utilizing iris scissors.  Hemostasis was achieved with electrocautery.  The flaps were then transposed into place, one clockwise and the other counterclockwise, and anchored with interrupted buried subcutaneous sutures.
Graft Cartilage Fenestration Text: The cartilage was fenestrated with a 2mm punch biopsy to help facilitate graft survival and healing.
Bi-Rhombic Flap Text: The defect edges were debeveled with a #15 scalpel blade.  Given the location of the defect and the proximity to free margins a bi-rhombic flap was deemed most appropriate.  Using a sterile surgical marker, an appropriate rhombic flap was drawn incorporating the defect. The area thus outlined was incised deep to adipose tissue with a #15 scalpel blade.  The skin margins were undermined to an appropriate distance in all directions utilizing iris scissors.
Double O-Z Flap Text: The defect edges were debeveled with a #15 scalpel blade.  Given the location of the defect, shape of the defect and the proximity to free margins a Double O-Z flap was deemed most appropriate.  Using a sterile surgical marker, an appropriate transposition flap was drawn incorporating the defect and placing the expected incisions within the relaxed skin tension lines where possible. The area thus outlined was incised deep to adipose tissue with a #15 scalpel blade.  The skin margins were undermined to an appropriate distance in all directions utilizing iris scissors.
Cheek-To-Nose Interpolation Flap Text: A decision was made to reconstruct the defect utilizing an interpolation axial flap and a staged reconstruction.  A telfa template was made of the defect.  This telfa template was then used to outline the Cheek-To-Nose Interpolation flap.  The donor area for the pedicle flap was then injected with anesthesia.  The flap was excised through the skin and subcutaneous tissue down to the layer of the underlying musculature.  The interpolation flap was carefully excised within this deep plane to maintain its blood supply.  The edges of the donor site were undermined.   The donor site was closed in a primary fashion.  The pedicle was then rotated into position and sutured.  Once the tube was sutured into place, adequate blood supply was confirmed with blanching and refill.  The pedicle was then wrapped with xeroform gauze and dressed appropriately with a telfa and gauze bandage to ensure continued blood supply and protect the attached pedicle.
Surgeon/Pathologist Verbiage (Will Incorporate Name Of Surgeon From Intro If Not Blank): operated in two distinct and integrated capacities as the surgeon and pathologist.
Undermining Type: Entire Wound
Helical Rim Advancement Flap Text: The defect edges were debeveled with a #15 blade scalpel.  Given the location of the defect and the proximity to free margins (helical rim) a double helical rim advancement flap was deemed most appropriate.  Using a sterile surgical marker, the appropriate advancement flaps were drawn incorporating the defect and placing the expected incisions between the helical rim and antihelix where possible.  The area thus outlined was incised through and through with a #15 scalpel blade.  With a skin hook and iris scissors, the flaps were gently and sharply undermined and freed up.
O-T Advancement Flap Text: The defect edges were debeveled with a #15 scalpel blade.  Given the location of the defect, shape of the defect and the proximity to free margins an O-T advancement flap was deemed most appropriate.  Using a sterile surgical marker, an appropriate advancement flap was drawn incorporating the defect and placing the expected incisions within the relaxed skin tension lines where possible.    The area thus outlined was incised deep to adipose tissue with a #15 scalpel blade.  The skin margins were undermined to an appropriate distance in all directions utilizing iris scissors.
Bilateral Helical Rim Advancement Flap Text: The defect edges were debeveled with a #15 blade scalpel.  Given the location of the defect and the proximity to free margins (helical rim) a bilateral helical rim advancement flap was deemed most appropriate.  Using a sterile surgical marker, the appropriate advancement flaps were drawn incorporating the defect and placing the expected incisions between the helical rim and antihelix where possible.  The area thus outlined was incised through and through with a #15 scalpel blade.  With a skin hook and iris scissors, the flaps were gently and sharply undermined and freed up.
Home Suture Removal Text: Patient was provided instructions on removing sutures and will remove their sutures at home.  If they have any questions or difficulties they will call the office.
Island Pedicle Flap Text: The defect edges were debeveled with a #15 scalpel blade.  Given the location of the defect, shape of the defect and the proximity to free margins an island pedicle advancement flap was deemed most appropriate.  Using a sterile surgical marker, an appropriate advancement flap was drawn incorporating the defect, outlining the appropriate donor tissue and placing the expected incisions within the relaxed skin tension lines where possible.    The area thus outlined was incised deep to adipose tissue with a #15 scalpel blade.  The skin margins were undermined to an appropriate distance in all directions around the primary defect and laterally outward around the island pedicle utilizing iris scissors.  There was minimal undermining beneath the pedicle flap.
Consent 3/Introductory Paragraph: I gave the patient a chance to ask questions they had about the procedure.  Following this I explained the Mohs procedure and consent was obtained. The risks, benefits and alternatives to therapy were discussed in detail. Specifically, the risks of infection, scarring, bleeding, prolonged wound healing, incomplete removal, allergy to anesthesia, nerve injury and recurrence were addressed. Prior to the procedure, the treatment site was clearly identified and confirmed by the patient. All components of Universal Protocol/PAUSE Rule completed.
Bcc Infiltrative Histology Text: There were numerous aggregates of basaloid cells demonstrating an infiltrative pattern.
Alar Island Pedicle Flap Text: The defect edges were debeveled with a #15 scalpel blade.  Given the location of the defect, shape of the defect and the proximity to the alar rim an island pedicle advancement flap was deemed most appropriate.  Using a sterile surgical marker, an appropriate advancement flap was drawn incorporating the defect, outlining the appropriate donor tissue and placing the expected incisions within the nasal ala running parallel to the alar rim. The area thus outlined was incised with a #15 scalpel blade.  The skin margins were undermined minimally to an appropriate distance in all directions around the primary defect and laterally outward around the island pedicle utilizing iris scissors.  There was minimal undermining beneath the pedicle flap.
Anesthesia Volume In Cc: 9
Composite Graft Text: The defect edges were debeveled with a #15 scalpel blade.  Given the location of the defect, shape of the defect, the proximity to free margins and the fact the defect was full thickness a composite graft was deemed most appropriate.  The defect was outline and then transferred to the donor site.  A full thickness graft was then excised from the donor site. The graft was then placed in the primary defect, oriented appropriately and then sutured into place.  The secondary defect was then repaired using a primary closure.
Additional Epidermal Closure (Optional): simple interrupted
Island Pedicle Flap With Canthal Suspension Text: The defect edges were debeveled with a #15 scalpel blade.  Given the location of the defect, shape of the defect and the proximity to free margins an island pedicle advancement flap was deemed most appropriate.  Using a sterile surgical marker, an appropriate advancement flap was drawn incorporating the defect, outlining the appropriate donor tissue and placing the expected incisions within the relaxed skin tension lines where possible. The area thus outlined was incised deep to adipose tissue with a #15 scalpel blade.  The skin margins were undermined to an appropriate distance in all directions around the primary defect and laterally outward around the island pedicle utilizing iris scissors.  There was minimal undermining beneath the pedicle flap. A suspension suture was placed in the canthal tendon to prevent tension and prevent ectropion.
Deep Sutures: 5-0 Maxon
Referring Physician (Optional): Katina Roe MD
Split-Thickness Skin Graft Text: The defect edges were debeveled with a #15 scalpel blade.  Given the location of the defect, shape of the defect and the proximity to free margins a split thickness skin graft was deemed most appropriate.  Using a sterile surgical marker, the primary defect shape was transferred to the donor site. The split thickness graft was then harvested.  The skin graft was then placed in the primary defect and oriented appropriately.
V-Y Plasty Text: The defect edges were debeveled with a #15 scalpel blade.  Given the location of the defect, shape of the defect and the proximity to free margins an V-Y advancement flap was deemed most appropriate.  Using a sterile surgical marker, an appropriate advancement flap was drawn incorporating the defect and placing the expected incisions within the relaxed skin tension lines where possible.    The area thus outlined was incised deep to adipose tissue with a #15 scalpel blade.  The skin margins were undermined to an appropriate distance in all directions utilizing iris scissors.
Repair Anesthesia Method: local infiltration
Secondary Defect Length In Cm (Required For Flaps): 2
Bcc Histology Text: There were numerous aggregates of basaloid cells.
Epidermal Autograft Text: The defect edges were debeveled with a #15 scalpel blade.  Given the location of the defect, shape of the defect and the proximity to free margins an epidermal autograft was deemed most appropriate.  Using a sterile surgical marker, the primary defect shape was transferred to the donor site. The epidermal graft was then harvested.  The skin graft was then placed in the primary defect and oriented appropriately.
Mohs Rapid Report Verbiage: The area of clinically evident tumor was marked with skin marking ink and appropriately hatched.  The initial incision was made following the Mohs approach through the skin.  The specimen was taken to the lab, divided into the necessary number of pieces, chromacoded and processed according to the Mohs protocol.  This was repeated in successive stages until a tumor free defect was achieved.
Suturegard Body: The suture ends were repeatedly re-tightened and re-clamped to achieve the desired tissue expansion.
Nostril Rim Text: The closure involved the nostril rim.
Detail Level: Detailed
Retention Suture Text: Retention sutures were placed to support the closure and prevent dehiscence.
Non-Graft Cartilage Fenestration Text: The cartilage was fenestrated with a 2mm punch biopsy to help facilitate healing.
Postop Diagnosis: same
Epidermal Sutures: 4-0 Maxon
Consent (Temporal Branch)/Introductory Paragraph: The rationale for Mohs was explained to the patient and consent was obtained. The risks, benefits and alternatives to therapy were discussed in detail. Specifically, the risks of damage to the temporal branch of the facial nerve, infection, scarring, bleeding, prolonged wound healing, incomplete removal, allergy to anesthesia, and recurrence were addressed. Prior to the procedure, the treatment site was clearly identified and confirmed by the patient. All components of Universal Protocol/PAUSE Rule completed.
Island Pedicle Flap-Requiring Vessel Identification Text: The defect edges were debeveled with a #15 scalpel blade.  Given the location of the defect, shape of the defect and the proximity to free margins an island pedicle advancement flap was deemed most appropriate.  Using a sterile surgical marker, an appropriate advancement flap was drawn, based on the axial vessel mentioned above, incorporating the defect, outlining the appropriate donor tissue and placing the expected incisions within the relaxed skin tension lines where possible.    The area thus outlined was incised deep to adipose tissue with a #15 scalpel blade.  The skin margins were undermined to an appropriate distance in all directions around the primary defect and laterally outward around the island pedicle utilizing iris scissors.  There was minimal undermining beneath the pedicle flap.
Advancement Flap (Double) Text: The defect edges were debeveled with a #15 scalpel blade.  Given the location of the defect and the proximity to free margins a double advancement flap was deemed most appropriate.  Using a sterile surgical marker, the appropriate advancement flaps were drawn incorporating the defect and placing the expected incisions within the relaxed skin tension lines where possible.    The area thus outlined was incised deep to adipose tissue with a #15 scalpel blade.  The skin margins were undermined to an appropriate distance in all directions utilizing iris scissors.
Dermal Autograft Text: The defect edges were debeveled with a #15 scalpel blade.  Given the location of the defect, shape of the defect and the proximity to free margins a dermal autograft was deemed most appropriate.  Using a sterile surgical marker, the primary defect shape was transferred to the donor site. The area thus outlined was incised deep to adipose tissue with a #15 scalpel blade.  The harvested graft was then trimmed of adipose and epidermal tissue until only dermis was left.  The skin graft was then placed in the primary defect and oriented appropriately.
Flap Type: Rhombic Flap
Suturegard Intro: Intraoperative tissue expansion was performed, utilizing the SUTUREGARD device, in order to reduce wound tension.
Keystone Flap Text: The defect edges were debeveled with a #15 scalpel blade.  Given the location of the defect, shape of the defect a keystone flap was deemed most appropriate.  Using a sterile surgical marker, an appropriate keystone flap was drawn incorporating the defect, outlining the appropriate donor tissue and placing the expected incisions within the relaxed skin tension lines where possible. The area thus outlined was incised deep to adipose tissue with a #15 scalpel blade.  The skin margins were undermined to an appropriate distance in all directions around the primary defect and laterally outward around the flap utilizing iris scissors.
Information: Selecting Yes will display possible errors in your note based on the variables you have selected. This validation is only offered as a suggestion for you. PLEASE NOTE THAT THE VALIDATION TEXT WILL BE REMOVED WHEN YOU FINALIZE YOUR NOTE. IF YOU WANT TO FAX A PRELIMINARY NOTE YOU WILL NEED TO TOGGLE THIS TO 'NO' IF YOU DO NOT WANT IT IN YOUR FAXED NOTE.
Date Of Previous Biopsy (Optional): 2-18-20
Surgical Defect Width In Cm (Optional): 1.2
Inflammation Suggestive Of Cancer Camouflage Histology Text: There was a dense lymphocytic infiltrate which prevented adequate histologic evaluation of adjacent structures.
Previous Accession (Optional): [id number]
Retention Suture Bite Size: 1 mm
Xenograft Text: The defect edges were debeveled with a #15 scalpel blade.  Given the location of the defect, shape of the defect and the proximity to free margins a xenograft was deemed most appropriate.  The graft was then trimmed to fit the size of the defect.  The graft was then placed in the primary defect and oriented appropriately.
Skin Substitute Text: The defect edges were debeveled with a #15 scalpel blade.  Given the location of the defect, shape of the defect and the proximity to free margins a skin substitute graft was deemed most appropriate.  The graft material was trimmed to fit the size of the defect. The graft was then placed in the primary defect and oriented appropriately.
Mohs Histo Method Verbiage: Each section was then chromacoded and processed in the Mohs lab using the Mohs protocol and submitted for frozen section.
Epidermal Closure Graft Donor Site (Optional): running
Where Do You Want The Question To Include Opioid Counseling Located?: Case Summary Tab
Cheek Interpolation Flap Text: A decision was made to reconstruct the defect utilizing an interpolation axial flap and a staged reconstruction.  A telfa template was made of the defect.  This telfa template was then used to outline the Cheek Interpolation flap.  The donor area for the pedicle flap was then injected with anesthesia.  The flap was excised through the skin and subcutaneous tissue down to the layer of the underlying musculature.  The interpolation flap was carefully excised within this deep plane to maintain its blood supply.  The edges of the donor site were undermined.   The donor site was closed in a primary fashion.  The pedicle was then rotated into position and sutured.  Once the tube was sutured into place, adequate blood supply was confirmed with blanching and refill.  The pedicle was then wrapped with xeroform gauze and dressed appropriately with a telfa and gauze bandage to ensure continued blood supply and protect the attached pedicle.
Mercedes Flap Text: The defect edges were debeveled with a #15 scalpel blade.  Given the location of the defect, shape of the defect and the proximity to free margins a Mercedes flap was deemed most appropriate.  Using a sterile surgical marker, an appropriate advancement flap was drawn incorporating the defect and placing the expected incisions within the relaxed skin tension lines where possible. The area thus outlined was incised deep to adipose tissue with a #15 scalpel blade.  The skin margins were undermined to an appropriate distance in all directions utilizing iris scissors.
Cartilage Graft Text: The defect edges were debeveled with a #15 scalpel blade.  Given the location of the defect, shape of the defect, the fact the defect involved a full thickness cartilage defect a cartilage graft was deemed most appropriate.  An appropriate donor site was identified, cleansed, and anesthetized. The cartilage graft was then harvested and transferred to the recipient site, oriented appropriately and then sutured into place.  The secondary defect was then repaired using a primary closure.
Ear Star Wedge Flap Text: The defect edges were debeveled with a #15 blade scalpel.  Given the location of the defect and the proximity to free margins (helical rim) an ear star wedge flap was deemed most appropriate.  Using a sterile surgical marker, the appropriate flap was drawn incorporating the defect and placing the expected incisions between the helical rim and antihelix where possible.  The area thus outlined was incised through and through with a #15 scalpel blade.
Manual Repair Warning Statement: We plan on removing the manually selected variable below in favor of our much easier automatic structured text blocks found in the previous tab. We decided to do this to help make the flow better and give you the full power of structured data. Manual selection is never going to be ideal in our platform and I would encourage you to avoid using manual selection from this point on, especially since I will be sunsetting this feature. It is important that you do one of two things with the customized text below. First, you can save all of the text in a word file so you can have it for future reference. Second, transfer the text to the appropriate area in the Library tab. Lastly, if there is a flap or graft type which we do not have you need to let us know right away so I can add it in before the variable is hidden. No need to panic, we plan to give you roughly 6 months to make the change.
Consent 2/Introductory Paragraph: Mohs surgery was explained to the patient and consent was obtained. The risks, benefits and alternatives to therapy were discussed in detail. Specifically, the risks of infection, scarring, bleeding, prolonged wound healing, incomplete removal, allergy to anesthesia, nerve injury and recurrence were addressed. Prior to the procedure, the treatment site was clearly identified and confirmed by the patient. All components of Universal Protocol/PAUSE Rule completed.
Melolabial Transposition Flap Text: The defect edges were debeveled with a #15 scalpel blade.  Given the location of the defect and the proximity to free margins a melolabial flap was deemed most appropriate.  Using a sterile surgical marker, an appropriate melolabial transposition flap was drawn incorporating the defect.    The area thus outlined was incised deep to adipose tissue with a #15 scalpel blade.  The skin margins were undermined to an appropriate distance in all directions utilizing iris scissors.
Unna Boot Text: An Unna boot was placed to help immobilize the limb and facilitate more rapid healing.
Mohs Method Verbiage: An incision at a 45 degree angle following the standard Mohs approach was done and the specimen was harvested as a microscopic controlled layer.
Advancement-Rotation Flap Text: The defect edges were debeveled with a #15 scalpel blade.  Given the location of the defect, shape of the defect and the proximity to free margins an advancement-rotation flap was deemed most appropriate.  Using a sterile surgical marker, an appropriate flap was drawn incorporating the defect and placing the expected incisions within the relaxed skin tension lines where possible. The area thus outlined was incised deep to adipose tissue with a #15 scalpel blade.  The skin margins were undermined to an appropriate distance in all directions utilizing iris scissors.
Undermining Location (Optional): in the superficial subcutaneous fat
Modified Advancement Flap Text: The defect edges were debeveled with a #15 scalpel blade.  Given the location of the defect, shape of the defect and the proximity to free margins a modified advancement flap was deemed most appropriate.  Using a sterile surgical marker, an appropriate advancement flap was drawn incorporating the defect and placing the expected incisions within the relaxed skin tension lines where possible.    The area thus outlined was incised deep to adipose tissue with a #15 scalpel blade.  The skin margins were undermined to an appropriate distance in all directions utilizing iris scissors.
Consent 1/Introductory Paragraph: The rationale for Mohs was explained to the patient and consent was obtained. The risks, benefits and alternatives to therapy were discussed in detail. Specifically, the risks of infection, scarring, bleeding, prolonged wound healing, incomplete removal, allergy to anesthesia, nerve injury and recurrence were addressed. Prior to the procedure, the treatment site was clearly identified and confirmed by the patient. All components of Universal Protocol/PAUSE Rule completed.
Area M Indication Text: Tumors in this location are included in Area M (cheek, forehead, scalp, neck, jawline and pretibial skin).  Mohs surgery is indicated for tumors in these anatomic locations.
Partial Purse String (Simple) Text: Given the location of the defect and the characteristics of the surrounding skin a simple purse string closure was deemed most appropriate.  Undermining was performed circumfirentially around the surgical defect.  A purse string suture was then placed and tightened. Wound tension only allowed a partial closure of the circular defect.
Pain Refusal Text: I offered to prescribe pain medication but the patient refused to take this medication.
Tarsorrhaphy Text: A tarsorrhaphy was performed using Frost sutures.
Localized Dermabrasion With Wire Brush Text: The patient was draped in routine manner.  Localized dermabrasion using 3 x 17 mm wire brush was performed in routine manner to papillary dermis. This spot dermabrasion is being performed to complete skin cancer reconstruction. It also will eliminate the other sun damaged precancerous cells that are known to be part of the regional effect of a lifetime's worth of sun exposure. This localized dermabrasion is therapeutic and should not be considered cosmetic in any regard.
Rhomboid Transposition Flap Text: The defect edges were debeveled with a #15 scalpel blade.  Given the location of the defect and the proximity to free margins a rhomboid transposition flap was deemed most appropriate.  Using a sterile surgical marker, an appropriate rhomboid flap was drawn incorporating the defect.    The area thus outlined was incised deep to adipose tissue with a #15 scalpel blade.  The skin margins were undermined to an appropriate distance in all directions utilizing iris scissors.
Location Indication Override (Is Already Calculated Based On Selected Body Location): Area H
S Plasty Text: Given the location and shape of the defect, and the orientation of relaxed skin tension lines, an S-plasty was deemed most appropriate for repair.  Using a sterile surgical marker, the appropriate outline of the S-plasty was drawn, incorporating the defect and placing the expected incisions within the relaxed skin tension lines where possible.  The area thus outlined was incised deep to adipose tissue with a #15 scalpel blade.  The skin margins were undermined to an appropriate distance in all directions utilizing iris scissors. The skin flaps were advanced over the defect.  The opposing margins were then approximated with interrupted buried subcutaneous sutures.
Surgical Defect Length In Cm (Optional): 2.2
Repair Type: Flap
Post-Care Instructions: I reviewed with the patient in detail post-care instructions. Patient is not to engage in any heavy lifting, exercise, or swimming for the next 14 days. Should the patient develop any fevers, chills, bleeding, severe pain patient will contact the office immediately.
Consent Type: Consent 1 (Standard)
Area H Indication Text: Tumors in this location are included in Area H (eyelids, eyebrows, nose, lips, chin, ear, pre-auricular, post-auricular, temple, genitalia, hands, feet, ankles and areola).  Tissue conservation is critical in these anatomic locations.
Muscle Hinge Flap Text: The defect edges were debeveled with a #15 scalpel blade.  Given the size, depth and location of the defect and the proximity to free margins a muscle hinge flap was deemed most appropriate.  Using a sterile surgical marker, an appropriate hinge flap was drawn incorporating the defect. The area thus outlined was incised with a #15 scalpel blade.  The skin margins were undermined to an appropriate distance in all directions utilizing iris scissors.
Cheiloplasty (Complex) Text: A decision was made to reconstruct the defect with a  cheiloplasty.  The defect was undermined extensively.  Additional obicularis oris muscle was excised with a 15 blade scalpel.  The defect was converted into a full thickness wedge to facilite a better cosmetic result.  Small vessels were then tied off with 5-0 monocyrl. The obicularis oris, superficial fascia, adipose and dermis were then reapproximated.  After the deeper layers were approximated the epidermis was reapproximated with particular care given to realign the vermilion border.
Donor Site Anesthesia Type: same as repair anesthesia
Graft Donor Site Epidermal Sutures (Optional): 5-0 Surgipro
Star Wedge Flap Text: The defect edges were debeveled with a #15 scalpel blade.  Given the location of the defect, shape of the defect and the proximity to free margins a star wedge flap was deemed most appropriate.  Using a sterile surgical marker, an appropriate rotation flap was drawn incorporating the defect and placing the expected incisions within the relaxed skin tension lines where possible. The area thus outlined was incised deep to adipose tissue with a #15 scalpel blade.  The skin margins were undermined to an appropriate distance in all directions utilizing iris scissors.
W Plasty Text: The lesion was extirpated to the level of the fat with a #15 scalpel blade.  Given the location of the defect, shape of the defect and the proximity to free margins a W-plasty was deemed most appropriate for repair.  Using a sterile surgical marker, the appropriate transposition arms of the W-plasty were drawn incorporating the defect and placing the expected incisions within the relaxed skin tension lines where possible.    The area thus outlined was incised deep to adipose tissue with a #15 scalpel blade.  The skin margins were undermined to an appropriate distance in all directions utilizing iris scissors.  The opposing transposition arms were then transposed into place in opposite direction and anchored with interrupted buried subcutaneous sutures.
Graft Donor Site Bandage (Optional-Leave Blank If You Don't Want In Note): Aquaplast was fitted to the graft site and sewn into place. A pressure bandage were applied to the donor site and over the aquaplast bolster.
Melolabial Interpolation Flap Text: A decision was made to reconstruct the defect utilizing an interpolation axial flap and a staged reconstruction.  A telfa template was made of the defect.  This telfa template was then used to outline the melolabial interpolation flap.  The donor area for the pedicle flap was then injected with anesthesia.  The flap was excised through the skin and subcutaneous tissue down to the layer of the underlying musculature.  The pedicle flap was carefully excised within this deep plane to maintain its blood supply.  The edges of the donor site were undermined.   The donor site was closed in a primary fashion.  The pedicle was then rotated into position and sutured.  Once the tube was sutured into place, adequate blood supply was confirmed with blanching and refill.  The pedicle was then wrapped with xeroform gauze and dressed appropriately with a telfa and gauze bandage to ensure continued blood supply and protect the attached pedicle.
Crescentic Advancement Flap Text: The defect edges were debeveled with a #15 scalpel blade.  Given the location of the defect and the proximity to free margins a crescentic advancement flap was deemed most appropriate.  Using a sterile surgical marker, the appropriate advancement flap was drawn incorporating the defect and placing the expected incisions within the relaxed skin tension lines where possible.    The area thus outlined was incised deep to adipose tissue with a #15 scalpel blade.  The skin margins were undermined to an appropriate distance in all directions utilizing iris scissors.
Tumor Debulked?: curette
Consent (Scalp)/Introductory Paragraph: The rationale for Mohs was explained to the patient and consent was obtained. The risks, benefits and alternatives to therapy were discussed in detail. Specifically, the risks of changes in hair growth pattern secondary to repair, infection, scarring, bleeding, prolonged wound healing, incomplete removal, allergy to anesthesia, nerve injury and recurrence were addressed. Prior to the procedure, the treatment site was clearly identified and confirmed by the patient. All components of Universal Protocol/PAUSE Rule completed.
Banner Transposition Flap Text: The defect edges were debeveled with a #15 scalpel blade.  Given the location of the defect and the proximity to free margins a Banner transposition flap was deemed most appropriate.  Using a sterile surgical marker, an appropriate flap drawn around the defect. The area thus outlined was incised deep to adipose tissue with a #15 scalpel blade.  The skin margins were undermined to an appropriate distance in all directions utilizing iris scissors.
Debridement Text: The wound edges were debrided prior to proceeding with the closure to facilitate wound healing.
Bilobed Transposition Flap Text: The defect edges were debeveled with a #15 scalpel blade.  Given the location of the defect and the proximity to free margins a bilobed transposition flap was deemed most appropriate.  Using a sterile surgical marker, an appropriate bilobe flap drawn around the defect.    The area thus outlined was incised deep to adipose tissue with a #15 scalpel blade.  The skin margins were undermined to an appropriate distance in all directions utilizing iris scissors.
Double O-Z Plasty Text: The defect edges were debeveled with a #15 scalpel blade.  Given the location of the defect, shape of the defect and the proximity to free margins a Double O-Z plasty (double transposition flap) was deemed most appropriate.  Using a sterile surgical marker, the appropriate transposition flaps were drawn incorporating the defect and placing the expected incisions within the relaxed skin tension lines where possible. The area thus outlined was incised deep to adipose tissue with a #15 scalpel blade.  The skin margins were undermined to an appropriate distance in all directions utilizing iris scissors.  Hemostasis was achieved with electrocautery.  The flaps were then transposed into place, one clockwise and the other counterclockwise, and anchored with interrupted buried subcutaneous sutures.
Wound Care (No Sutures): Petrolatum

## 2020-03-25 ENCOUNTER — APPOINTMENT (RX ONLY)
Dept: URBAN - METROPOLITAN AREA CLINIC 35 | Facility: CLINIC | Age: 85
Setting detail: DERMATOLOGY
End: 2020-03-25

## 2020-03-25 PROBLEM — C44.91 BASAL CELL CARCINOMA OF SKIN, UNSPECIFIED: Status: ACTIVE | Noted: 2020-03-25

## 2020-03-25 PROCEDURE — ? MOHS SURGERY PHONE CONSULTATION

## 2020-03-25 NOTE — PROCEDURE: MOHS SURGERY PHONE CONSULTATION
Detail Level: Simple
Does The Patient Have An Artificial Heart Valve?: No
Referring Provider Office Number: [phone]
Has The Pathology Report Been Received?: Yes
Time Of Mohs Surgery: 11:15
Referring Provider: Dr. Katina Roe
Patient Reported Location: left central zygoma
Date Of Mohs Surgery: 04/07/2020
Office Location Of Mohs Surgery: Dr. Whitman
Patient's Insurance: [employer]
Pathology Accession #: [id number]
Patient Preferred Phone Number: [phone]
Which Antibiotic Do They Take For Surgical Prophylaxis?: Amoxicillin (2 grams)

## 2020-04-07 ENCOUNTER — APPOINTMENT (RX ONLY)
Dept: URBAN - METROPOLITAN AREA CLINIC 36 | Facility: CLINIC | Age: 85
Setting detail: DERMATOLOGY
End: 2020-04-07

## 2020-04-07 VITALS — DIASTOLIC BLOOD PRESSURE: 60 MMHG | HEART RATE: 64 BPM | SYSTOLIC BLOOD PRESSURE: 130 MMHG

## 2020-04-07 PROBLEM — C44.319 BASAL CELL CARCINOMA OF SKIN OF OTHER PARTS OF FACE: Status: ACTIVE | Noted: 2020-04-07

## 2020-04-07 PROCEDURE — 17312 MOHS ADDL STAGE: CPT | Mod: 79

## 2020-04-07 PROCEDURE — 12052 INTMD RPR FACE/MM 2.6-5.0 CM: CPT | Mod: 79

## 2020-04-07 PROCEDURE — ? MOHS SURGERY

## 2020-04-07 PROCEDURE — 17311 MOHS 1 STAGE H/N/HF/G: CPT | Mod: 79

## 2020-04-07 NOTE — PROCEDURE: MOHS SURGERY
Rhombic Flap Text: The defect edges were debeveled with a #15 scalpel blade.  Given the location of the defect and the proximity to free margins a rhombic flap was deemed most appropriate.  Using a sterile surgical marker, an appropriate rhombic flap was drawn incorporating the defect.    The area thus outlined was incised deep to adipose tissue with a #15 scalpel blade.  The skin margins were undermined to an appropriate distance in all directions utilizing iris scissors.
Surgical Defect Length In Cm (Optional): 1.9
Validate Previous Accession (Can Hide Previous Accession In Settings Tab): No
Plastic Surgeon Procedure Text (C): After obtaining clear surgical margins the patient was sent to plastics for surgical repair.  The patient understands they will receive post-surgical care and follow-up from the referring physician's office.
Donor Site Anesthesia Volume In Cc: 0
Star Wedge Flap Text: The defect edges were debeveled with a #15 scalpel blade.  Given the location of the defect, shape of the defect and the proximity to free margins a star wedge flap was deemed most appropriate.  Using a sterile surgical marker, an appropriate rotation flap was drawn incorporating the defect and placing the expected incisions within the relaxed skin tension lines where possible. The area thus outlined was incised deep to adipose tissue with a #15 scalpel blade.  The skin margins were undermined to an appropriate distance in all directions utilizing iris scissors.
Repair Anesthesia Type: 1% lidocaine with epinephrine
Use Separate Skin Prep For Stages And Repair: Yes
Post-Care Instructions: I reviewed with the patient in detail post-care instructions. Patient is not to engage in any heavy lifting, exercise, or swimming for the next 14 days. Should the patient develop any fevers, chills, bleeding, severe pain patient will contact the office immediately.
Tarsorrhaphy Text: A tarsorrhaphy was performed using Frost sutures.
Secondary Intention Text (Leave Blank If You Do Not Want): The defect will heal with secondary intention.
Partial Purse String (Simple) Text: Given the location of the defect and the characteristics of the surrounding skin a simple purse string closure was deemed most appropriate.  Undermining was performed circumfirentially around the surgical defect.  A purse string suture was then placed and tightened. Wound tension only allowed a partial closure of the circular defect.
Mauc Instructions: By selecting yes to the question below the MAUC number will be added into the note.  This will be calculated automatically based on the diagnosis chosen, the size entered, the body zone selected (H,M,L) and the specific indications you chose. You will also have the option to override the Mohs AUC if you disagree with the automatically calculated number and this option is found in the Case Summary tab.
Undermining Type: Entire Wound
M-Plasty Intermediate Repair Preamble Text (Leave Blank If You Do Not Want): Undermining was performed with blunt dissection.
Closure 2 Information: This tab is for additional flaps and grafts, including complex repair and grafts and complex repair and flaps. You can also specify a different location for the additional defect, if the location is the same you do not need to select a new one. We will insert the automated text for the repair you select below just as we do for solitary flaps and grafts. Please note that at this time if you select a location with a different insurance zone you will need to override the ICD10 and CPT if appropriate.
Mohs Rapid Report Verbiage: The area of clinically evident tumor was marked with skin marking ink and appropriately hatched.  The initial incision was made following the Mohs approach through the skin.  The specimen was taken to the lab, divided into the necessary number of pieces, chromacoded and processed according to the Mohs protocol.  This was repeated in successive stages until a tumor free defect was achieved.
Purse String (Intermediate) Text: Given the location of the defect and the characteristics of the surrounding skin a purse string intermediate closure was deemed most appropriate.  Undermining was performed circumfirentially around the surgical defect.  A purse string suture was then placed and tightened.
Surgeon Performing Repair (Optional): Ayaka Whitman MD
Manual Repair Warning Statement: We plan on removing the manually selected variable below in favor of our much easier automatic structured text blocks found in the previous tab. We decided to do this to help make the flow better and give you the full power of structured data. Manual selection is never going to be ideal in our platform and I would encourage you to avoid using manual selection from this point on, especially since I will be sunsetting this feature. It is important that you do one of two things with the customized text below. First, you can save all of the text in a word file so you can have it for future reference. Second, transfer the text to the appropriate area in the Library tab. Lastly, if there is a flap or graft type which we do not have you need to let us know right away so I can add it in before the variable is hidden. No need to panic, we plan to give you roughly 6 months to make the change.
Paramedian Forehead Flap Text: A decision was made to reconstruct the defect utilizing an interpolation axial flap and a staged reconstruction.  A telfa template was made of the defect.  This telfa template was then used to outline the paramedian forehead pedicle flap.  The donor area for the pedicle flap was then injected with anesthesia.  The flap was excised through the skin and subcutaneous tissue down to the layer of the underlying musculature.  The pedicle flap was carefully excised within this deep plane to maintain its blood supply.  The edges of the donor site were undermined.   The donor site was closed in a primary fashion.  The pedicle was then rotated into position and sutured.  Once the tube was sutured into place, adequate blood supply was confirmed with blanching and refill.  The pedicle was then wrapped with xeroform gauze and dressed appropriately with a telfa and gauze bandage to ensure continued blood supply and protect the attached pedicle.
No Repair - Repaired With Adjacent Surgical Defect Text (Leave Blank If You Do Not Want): After obtaining clear surgical margins the defect was repaired concurrently with another surgical defect which was in close approximation.
Medical Necessity Statement: Based on my medical judgement, Mohs surgery is the most appropriate treatment for this cancer compared to other treatments.
Consent (Temporal Branch)/Introductory Paragraph: The rationale for Mohs was explained to the patient and consent was obtained. The risks, benefits and alternatives to therapy were discussed in detail. Specifically, the risks of damage to the temporal branch of the facial nerve, infection, scarring, bleeding, prolonged wound healing, incomplete removal, allergy to anesthesia, and recurrence were addressed. Prior to the procedure, the treatment site was clearly identified and confirmed by the patient. All components of Universal Protocol/PAUSE Rule completed.
Double O-Z Flap Text: The defect edges were debeveled with a #15 scalpel blade.  Given the location of the defect, shape of the defect and the proximity to free margins a Double O-Z flap was deemed most appropriate.  Using a sterile surgical marker, an appropriate transposition flap was drawn incorporating the defect and placing the expected incisions within the relaxed skin tension lines where possible. The area thus outlined was incised deep to adipose tissue with a #15 scalpel blade.  The skin margins were undermined to an appropriate distance in all directions utilizing iris scissors.
Special Stains Stage 10 - Results: Base On Clearance Noted Above
Deep Sutures: 4-0 Polysorb
Cartilage Graft Text: The defect edges were debeveled with a #15 scalpel blade.  Given the location of the defect, shape of the defect, the fact the defect involved a full thickness cartilage defect a cartilage graft was deemed most appropriate.  An appropriate donor site was identified, cleansed, and anesthetized. The cartilage graft was then harvested and transferred to the recipient site, oriented appropriately and then sutured into place.  The secondary defect was then repaired using a primary closure.
Otolaryngologist Procedure Text (F): After obtaining clear surgical margins the patient was sent to otolaryngology for surgical repair.  The patient understands they will receive post-surgical care and follow-up from the referring physician's office.
Island Pedicle Flap-Requiring Vessel Identification Text: The defect edges were debeveled with a #15 scalpel blade.  Given the location of the defect, shape of the defect and the proximity to free margins an island pedicle advancement flap was deemed most appropriate.  Using a sterile surgical marker, an appropriate advancement flap was drawn, based on the axial vessel mentioned above, incorporating the defect, outlining the appropriate donor tissue and placing the expected incisions within the relaxed skin tension lines where possible.    The area thus outlined was incised deep to adipose tissue with a #15 scalpel blade.  The skin margins were undermined to an appropriate distance in all directions around the primary defect and laterally outward around the island pedicle utilizing iris scissors.  There was minimal undermining beneath the pedicle flap.
Retention Suture Text: Retention sutures were placed to support the closure and prevent dehiscence.
Area L Indication Text: Tumors in this location are included in Area L (trunk and extremities).  Mohs surgery is indicated for larger tumors, or tumors with aggressive histologic features, in these anatomic locations.
Consent (Ear)/Introductory Paragraph: The rationale for Mohs was explained to the patient and consent was obtained. The risks, benefits and alternatives to therapy were discussed in detail. Specifically, the risks of ear deformity, infection, scarring, bleeding, prolonged wound healing, incomplete removal, allergy to anesthesia, nerve injury and recurrence were addressed. Prior to the procedure, the treatment site was clearly identified and confirmed by the patient. All components of Universal Protocol/PAUSE Rule completed.
Provider Procedure Text (A): After obtaining clear surgical margins the defect was repaired by another provider.
Mohs Histo Method Verbiage: Each section was then chromacoded and processed in the Mohs lab using the Mohs protocol and submitted for frozen section.
Epidermal Autograft Text: The defect edges were debeveled with a #15 scalpel blade.  Given the location of the defect, shape of the defect and the proximity to free margins an epidermal autograft was deemed most appropriate.  Using a sterile surgical marker, the primary defect shape was transferred to the donor site. The epidermal graft was then harvested.  The skin graft was then placed in the primary defect and oriented appropriately.
Consent (Lip)/Introductory Paragraph: The rationale for Mohs was explained to the patient and consent was obtained. The risks, benefits and alternatives to therapy were discussed in detail. Specifically, the risks of lip deformity, changes in the oral aperture, infection, scarring, bleeding, prolonged wound healing, incomplete removal, allergy to anesthesia, nerve injury and recurrence were addressed. Prior to the procedure, the treatment site was clearly identified and confirmed by the patient. All components of Universal Protocol/PAUSE Rule completed.
Mid-Level Procedure Text (A): After obtaining clear surgical margins the patient was sent to a mid-level provider for surgical repair.  The patient understands they will receive post-surgical care and follow-up from the mid-level provider.
Oculoplastic Surgeon Procedure Text (E): After obtaining clear surgical margins the patient was sent to oculoplastics for surgical repair.  The patient understands they will receive post-surgical care and follow-up from the referring physician's office.
Consent 2/Introductory Paragraph: Mohs surgery was explained to the patient and consent was obtained. The risks, benefits and alternatives to therapy were discussed in detail. Specifically, the risks of infection, scarring, bleeding, prolonged wound healing, incomplete removal, allergy to anesthesia, nerve injury and recurrence were addressed. Prior to the procedure, the treatment site was clearly identified and confirmed by the patient. All components of Universal Protocol/PAUSE Rule completed.
Rotation Flap Text: The defect edges were debeveled with a #15 scalpel blade.  Given the location of the defect, shape of the defect and the proximity to free margins a rotation flap was deemed most appropriate.  Using a sterile surgical marker, an appropriate rotation flap was drawn incorporating the defect and placing the expected incisions within the relaxed skin tension lines where possible.    The area thus outlined was incised deep to adipose tissue with a #15 scalpel blade.  The skin margins were undermined to an appropriate distance in all directions utilizing iris scissors.
Debridement Text: The wound edges were debrided prior to proceeding with the closure to facilitate wound healing.
Asc Procedure Text (C): After obtaining clear surgical margins the patient was sent to an ASC for surgical repair.  The patient understands they will receive post-surgical care and follow-up from the ASC physician.
Repair Anesthesia Method: local infiltration
Undermining Location (Optional): in the superficial subcutaneous fat
Trilobed Flap Text: The defect edges were debeveled with a #15 scalpel blade.  Given the location of the defect and the proximity to free margins a trilobed flap was deemed most appropriate.  Using a sterile surgical marker, an appropriate trilobed flap drawn around the defect.    The area thus outlined was incised deep to adipose tissue with a #15 scalpel blade.  The skin margins were undermined to an appropriate distance in all directions utilizing iris scissors.
No Residual Tumor Seen Histology Text: There were no malignant cells seen in the sections examined.
Anesthesia Volume In Cc: 9
Bilateral Helical Rim Advancement Flap Text: The defect edges were debeveled with a #15 blade scalpel.  Given the location of the defect and the proximity to free margins (helical rim) a bilateral helical rim advancement flap was deemed most appropriate.  Using a sterile surgical marker, the appropriate advancement flaps were drawn incorporating the defect and placing the expected incisions between the helical rim and antihelix where possible.  The area thus outlined was incised through and through with a #15 scalpel blade.  With a skin hook and iris scissors, the flaps were gently and sharply undermined and freed up.
Inflammation Suggestive Of Cancer Camouflage Histology Text: There was a dense lymphocytic infiltrate which prevented adequate histologic evaluation of adjacent structures.
Bilobed Transposition Flap Text: The defect edges were debeveled with a #15 scalpel blade.  Given the location of the defect and the proximity to free margins a bilobed transposition flap was deemed most appropriate.  Using a sterile surgical marker, an appropriate bilobe flap drawn around the defect.    The area thus outlined was incised deep to adipose tissue with a #15 scalpel blade.  The skin margins were undermined to an appropriate distance in all directions utilizing iris scissors.
Number Of Stages: 2
Wound Care: Vaseline
Banner Transposition Flap Text: The defect edges were debeveled with a #15 scalpel blade.  Given the location of the defect and the proximity to free margins a Banner transposition flap was deemed most appropriate.  Using a sterile surgical marker, an appropriate flap drawn around the defect. The area thus outlined was incised deep to adipose tissue with a #15 scalpel blade.  The skin margins were undermined to an appropriate distance in all directions utilizing iris scissors.
Hatchet Flap Text: The defect edges were debeveled with a #15 scalpel blade.  Given the location of the defect, shape of the defect and the proximity to free margins a hatchet flap was deemed most appropriate.  Using a sterile surgical marker, an appropriate hatchet flap was drawn incorporating the defect and placing the expected incisions within the relaxed skin tension lines where possible.    The area thus outlined was incised deep to adipose tissue with a #15 scalpel blade.  The skin margins were undermined to an appropriate distance in all directions utilizing iris scissors.
Ear Wedge Repair Text: A wedge excision was completed by carrying down an excision through the full thickness of the ear and cartilage with an inward facing Burow's triangle. The wound was then closed in a layered fashion.
Dorsal Nasal Flap Text: The defect edges were debeveled with a #15 scalpel blade.  Given the location of the defect and the proximity to free margins a dorsal nasal flap was deemed most appropriate.  Using a sterile surgical marker, an appropriate dorsal nasal flap was drawn around the defect.    The area thus outlined was incised deep to adipose tissue with a #15 scalpel blade.  The skin margins were undermined to an appropriate distance in all directions utilizing iris scissors.
Surgical Defect Width In Cm (Optional): 1.2
Consent (Marginal Mandibular)/Introductory Paragraph: The rationale for Mohs was explained to the patient and consent was obtained. The risks, benefits and alternatives to therapy were discussed in detail. Specifically, the risks of damage to the marginal mandibular branch of the facial nerve, infection, scarring, bleeding, prolonged wound healing, incomplete removal, allergy to anesthesia, and recurrence were addressed. Prior to the procedure, the treatment site was clearly identified and confirmed by the patient. All components of Universal Protocol/PAUSE Rule completed.
M-Plasty Complex Repair Preamble Text (Leave Blank If You Do Not Want): Extensive wide undermining was performed.
Muscle Hinge Flap Text: The defect edges were debeveled with a #15 scalpel blade.  Given the size, depth and location of the defect and the proximity to free margins a muscle hinge flap was deemed most appropriate.  Using a sterile surgical marker, an appropriate hinge flap was drawn incorporating the defect. The area thus outlined was incised with a #15 scalpel blade.  The skin margins were undermined to an appropriate distance in all directions utilizing iris scissors.
Z Plasty Text: The lesion was extirpated to the level of the fat with a #15 scalpel blade.  Given the location of the defect, shape of the defect and the proximity to free margins a Z-plasty was deemed most appropriate for repair.  Using a sterile surgical marker, the appropriate transposition arms of the Z-plasty were drawn incorporating the defect and placing the expected incisions within the relaxed skin tension lines where possible.    The area thus outlined was incised deep to adipose tissue with a #15 scalpel blade.  The skin margins were undermined to an appropriate distance in all directions utilizing iris scissors.  The opposing transposition arms were then transposed into place in opposite direction and anchored with interrupted buried subcutaneous sutures.
Alternatives Discussed Intro (Do Not Add Period): I discussed alternative treatments to Mohs surgery and specifically discussed the risks and benefits of
Consent (Near Eyelid Margin)/Introductory Paragraph: The rationale for Mohs was explained to the patient and consent was obtained. The risks, benefits and alternatives to therapy were discussed in detail. Specifically, the risks of ectropion or eyelid deformity, infection, scarring, bleeding, prolonged wound healing, incomplete removal, allergy to anesthesia, nerve injury and recurrence were addressed. Prior to the procedure, the treatment site was clearly identified and confirmed by the patient. All components of Universal Protocol/PAUSE Rule completed.
Advancement Flap (Single) Text: The defect edges were debeveled with a #15 scalpel blade.  Given the location of the defect and the proximity to free margins a single advancement flap was deemed most appropriate.  Using a sterile surgical marker, an appropriate advancement flap was drawn incorporating the defect and placing the expected incisions within the relaxed skin tension lines where possible.    The area thus outlined was incised deep to adipose tissue with a #15 scalpel blade.  The skin margins were undermined to an appropriate distance in all directions utilizing iris scissors.
Detail Level: Detailed
Additional Anesthesia Volume In Cc: 6
Advancement Flap (Double) Text: The defect edges were debeveled with a #15 scalpel blade.  Given the location of the defect and the proximity to free margins a double advancement flap was deemed most appropriate.  Using a sterile surgical marker, the appropriate advancement flaps were drawn incorporating the defect and placing the expected incisions within the relaxed skin tension lines where possible.    The area thus outlined was incised deep to adipose tissue with a #15 scalpel blade.  The skin margins were undermined to an appropriate distance in all directions utilizing iris scissors.
Consent (Nose)/Introductory Paragraph: The rationale for Mohs was explained to the patient and consent was obtained. The risks, benefits and alternatives to therapy were discussed in detail. Specifically, the risks of nasal deformity, changes in the flow of air through the nose, infection, scarring, bleeding, prolonged wound healing, incomplete removal, allergy to anesthesia, nerve injury and recurrence were addressed. Prior to the procedure, the treatment site was clearly identified and confirmed by the patient. All components of Universal Protocol/PAUSE Rule completed.
Bi-Rhombic Flap Text: The defect edges were debeveled with a #15 scalpel blade.  Given the location of the defect and the proximity to free margins a bi-rhombic flap was deemed most appropriate.  Using a sterile surgical marker, an appropriate rhombic flap was drawn incorporating the defect. The area thus outlined was incised deep to adipose tissue with a #15 scalpel blade.  The skin margins were undermined to an appropriate distance in all directions utilizing iris scissors.
Consent 1/Introductory Paragraph: The rationale for Mohs was explained to the patient and consent was obtained. The risks, benefits and alternatives to therapy were discussed in detail. Specifically, the risks of infection, scarring, bleeding, prolonged wound healing, incomplete removal, allergy to anesthesia, nerve injury and recurrence were addressed. Prior to the procedure, the treatment site was clearly identified and confirmed by the patient. All components of Universal Protocol/PAUSE Rule completed.
Graft Donor Site Bandage (Optional-Leave Blank If You Don't Want In Note): Aquaplast was fitted to the graft site and sewn into place. A pressure bandage were applied to the donor site and over the aquaplast bolster.
Subsequent Stages Histo Method Verbiage: Using a similar technique to that described above, a thin layer of tissue was removed from all areas where tumor was visible on the previous stage.  The tissue was again oriented, mapped, dyed, and processed as above.
Mastoid Interpolation Flap Text: A decision was made to reconstruct the defect utilizing an interpolation axial flap and a staged reconstruction.  A telfa template was made of the defect.  This telfa template was then used to outline the mastoid interpolation flap.  The donor area for the pedicle flap was then injected with anesthesia.  The flap was excised through the skin and subcutaneous tissue down to the layer of the underlying musculature.  The pedicle flap was carefully excised within this deep plane to maintain its blood supply.  The edges of the donor site were undermined.   The donor site was closed in a primary fashion.  The pedicle was then rotated into position and sutured.  Once the tube was sutured into place, adequate blood supply was confirmed with blanching and refill.  The pedicle was then wrapped with xeroform gauze and dressed appropriately with a telfa and gauze bandage to ensure continued blood supply and protect the attached pedicle.
Interpolation Flap Text: A decision was made to reconstruct the defect utilizing an interpolation axial flap and a staged reconstruction.  A telfa template was made of the defect.  This telfa template was then used to outline the interpolation flap.  The donor area for the pedicle flap was then injected with anesthesia.  The flap was excised through the skin and subcutaneous tissue down to the layer of the underlying musculature.  The interpolation flap was carefully excised within this deep plane to maintain its blood supply.  The edges of the donor site were undermined.   The donor site was closed in a primary fashion.  The pedicle was then rotated into position and sutured.  Once the tube was sutured into place, adequate blood supply was confirmed with blanching and refill.  The pedicle was then wrapped with xeroform gauze and dressed appropriately with a telfa and gauze bandage to ensure continued blood supply and protect the attached pedicle.
Split-Thickness Skin Graft Text: The defect edges were debeveled with a #15 scalpel blade.  Given the location of the defect, shape of the defect and the proximity to free margins a split thickness skin graft was deemed most appropriate.  Using a sterile surgical marker, the primary defect shape was transferred to the donor site. The split thickness graft was then harvested.  The skin graft was then placed in the primary defect and oriented appropriately.
Transposition Flap Text: The defect edges were debeveled with a #15 scalpel blade.  Given the location of the defect and the proximity to free margins a transposition flap was deemed most appropriate.  Using a sterile surgical marker, an appropriate transposition flap was drawn incorporating the defect.    The area thus outlined was incised deep to adipose tissue with a #15 scalpel blade.  The skin margins were undermined to an appropriate distance in all directions utilizing iris scissors.
Consent (Scalp)/Introductory Paragraph: The rationale for Mohs was explained to the patient and consent was obtained. The risks, benefits and alternatives to therapy were discussed in detail. Specifically, the risks of changes in hair growth pattern secondary to repair, infection, scarring, bleeding, prolonged wound healing, incomplete removal, allergy to anesthesia, nerve injury and recurrence were addressed. Prior to the procedure, the treatment site was clearly identified and confirmed by the patient. All components of Universal Protocol/PAUSE Rule completed.
Epidermal Closure Graft Donor Site (Optional): running
Double O-Z Plasty Text: The defect edges were debeveled with a #15 scalpel blade.  Given the location of the defect, shape of the defect and the proximity to free margins a Double O-Z plasty (double transposition flap) was deemed most appropriate.  Using a sterile surgical marker, the appropriate transposition flaps were drawn incorporating the defect and placing the expected incisions within the relaxed skin tension lines where possible. The area thus outlined was incised deep to adipose tissue with a #15 scalpel blade.  The skin margins were undermined to an appropriate distance in all directions utilizing iris scissors.  Hemostasis was achieved with electrocautery.  The flaps were then transposed into place, one clockwise and the other counterclockwise, and anchored with interrupted buried subcutaneous sutures.
Bcc Infiltrative Histology Text: There were numerous aggregates of basaloid cells demonstrating an infiltrative pattern.
Mart-1 - Negative Histology Text: MART-1 staining demonstrates a normal density and pattern of melanocytes along the dermal-epidermal junction. The surgical margins are negative for tumor cells.
Epidermal Sutures: 5-0 Fast Absorbing Gut
Area M Indication Text: Tumors in this location are included in Area M (cheek, forehead, scalp, neck, jawline and pretibial skin).  Mohs surgery is indicated for tumors in these anatomic locations.
Stage 2: Number Of Blocks?: 1
Simple / Intermediate / Complex Repair - Final Wound Length In Cm: 3.4
Mucosal Advancement Flap Text: Given the location of the defect, shape of the defect and the proximity to free margins a mucosal advancement flap was deemed most appropriate. Incisions were made with a 15 blade scalpel in the appropriate fashion along the cutaneous vermilion border and the mucosal lip. The remaining actinically damaged mucosal tissue was excised.  The mucosal advancement flap was then elevated to the gingival sulcus with care taken to preserve the neurovascular structures and advanced into the primary defect. Care was taken to ensure that precise realignment of the vermilion border was achieved.
Closure 4 Information: This tab is for additional flaps and grafts above and beyond our usual structured repairs.  Please note if you enter information here it will not currently bill and you will need to add the billing information manually.
Island Pedicle Flap With Canthal Suspension Text: The defect edges were debeveled with a #15 scalpel blade.  Given the location of the defect, shape of the defect and the proximity to free margins an island pedicle advancement flap was deemed most appropriate.  Using a sterile surgical marker, an appropriate advancement flap was drawn incorporating the defect, outlining the appropriate donor tissue and placing the expected incisions within the relaxed skin tension lines where possible. The area thus outlined was incised deep to adipose tissue with a #15 scalpel blade.  The skin margins were undermined to an appropriate distance in all directions around the primary defect and laterally outward around the island pedicle utilizing iris scissors.  There was minimal undermining beneath the pedicle flap. A suspension suture was placed in the canthal tendon to prevent tension and prevent ectropion.
A-T Advancement Flap Text: The defect edges were debeveled with a #15 scalpel blade.  Given the location of the defect, shape of the defect and the proximity to free margins an A-T advancement flap was deemed most appropriate.  Using a sterile surgical marker, an appropriate advancement flap was drawn incorporating the defect and placing the expected incisions within the relaxed skin tension lines where possible.    The area thus outlined was incised deep to adipose tissue with a #15 scalpel blade.  The skin margins were undermined to an appropriate distance in all directions utilizing iris scissors.
Localized Dermabrasion With Wire Brush Text: The patient was draped in routine manner.  Localized dermabrasion using 3 x 17 mm wire brush was performed in routine manner to papillary dermis. This spot dermabrasion is being performed to complete skin cancer reconstruction. It also will eliminate the other sun damaged precancerous cells that are known to be part of the regional effect of a lifetime's worth of sun exposure. This localized dermabrasion is therapeutic and should not be considered cosmetic in any regard.
Retention Suture Bite Size: 1 mm
X Size Of Lesion In Cm (Optional): 0.7
Donor Site Anesthesia Type: same as repair anesthesia
Cheiloplasty (Complex) Text: A decision was made to reconstruct the defect with a  cheiloplasty.  The defect was undermined extensively.  Additional obicularis oris muscle was excised with a 15 blade scalpel.  The defect was converted into a full thickness wedge to facilite a better cosmetic result.  Small vessels were then tied off with 5-0 monocyrl. The obicularis oris, superficial fascia, adipose and dermis were then reapproximated.  After the deeper layers were approximated the epidermis was reapproximated with particular care given to realign the vermilion border.
Surgeon/Pathologist Verbiage (Will Incorporate Name Of Surgeon From Intro If Not Blank): operated in two distinct and integrated capacities as the surgeon and pathologist.
O-Z Plasty Text: The defect edges were debeveled with a #15 scalpel blade.  Given the location of the defect, shape of the defect and the proximity to free margins an O-Z plasty (double transposition flap) was deemed most appropriate.  Using a sterile surgical marker, the appropriate transposition flaps were drawn incorporating the defect and placing the expected incisions within the relaxed skin tension lines where possible.    The area thus outlined was incised deep to adipose tissue with a #15 scalpel blade.  The skin margins were undermined to an appropriate distance in all directions utilizing iris scissors.  Hemostasis was achieved with electrocautery.  The flaps were then transposed into place, one clockwise and the other counterclockwise, and anchored with interrupted buried subcutaneous sutures.
Mercedes Flap Text: The defect edges were debeveled with a #15 scalpel blade.  Given the location of the defect, shape of the defect and the proximity to free margins a Mercedes flap was deemed most appropriate.  Using a sterile surgical marker, an appropriate advancement flap was drawn incorporating the defect and placing the expected incisions within the relaxed skin tension lines where possible. The area thus outlined was incised deep to adipose tissue with a #15 scalpel blade.  The skin margins were undermined to an appropriate distance in all directions utilizing iris scissors.
Wound Check: 6 weeks
Initial Size Of Lesion: 1.6
Estimated Blood Loss (Cc): minimal
Helical Rim Text: The closure involved the helical rim.
Cheek Interpolation Flap Text: A decision was made to reconstruct the defect utilizing an interpolation axial flap and a staged reconstruction.  A telfa template was made of the defect.  This telfa template was then used to outline the Cheek Interpolation flap.  The donor area for the pedicle flap was then injected with anesthesia.  The flap was excised through the skin and subcutaneous tissue down to the layer of the underlying musculature.  The interpolation flap was carefully excised within this deep plane to maintain its blood supply.  The edges of the donor site were undermined.   The donor site was closed in a primary fashion.  The pedicle was then rotated into position and sutured.  Once the tube was sutured into place, adequate blood supply was confirmed with blanching and refill.  The pedicle was then wrapped with xeroform gauze and dressed appropriately with a telfa and gauze bandage to ensure continued blood supply and protect the attached pedicle.
Date Of Previous Biopsy (Optional): 2/18/2020
Burow's Advancement Flap Text: The defect edges were debeveled with a #15 scalpel blade.  Given the location of the defect and the proximity to free margins a Burow's advancement flap was deemed most appropriate.  Using a sterile surgical marker, the appropriate advancement flap was drawn incorporating the defect and placing the expected incisions within the relaxed skin tension lines where possible.    The area thus outlined was incised deep to adipose tissue with a #15 scalpel blade.  The skin margins were undermined to an appropriate distance in all directions utilizing iris scissors.
Tissue Cultured Epidermal Autograft Text: The defect edges were debeveled with a #15 scalpel blade.  Given the location of the defect, shape of the defect and the proximity to free margins a tissue cultured epidermal autograft was deemed most appropriate.  The graft was then trimmed to fit the size of the defect.  The graft was then placed in the primary defect and oriented appropriately.
Complex Repair And Flap Additional Text (Will Appearing After The Standard Complex Repair Text): The complex repair was not sufficient to completely close the primary defect. The remaining additional defect was repaired with the flap mentioned below.
Repair Type: Intermediate Layered Repair
Keystone Flap Text: The defect edges were debeveled with a #15 scalpel blade.  Given the location of the defect, shape of the defect a keystone flap was deemed most appropriate.  Using a sterile surgical marker, an appropriate keystone flap was drawn incorporating the defect, outlining the appropriate donor tissue and placing the expected incisions within the relaxed skin tension lines where possible. The area thus outlined was incised deep to adipose tissue with a #15 scalpel blade.  The skin margins were undermined to an appropriate distance in all directions around the primary defect and laterally outward around the flap utilizing iris scissors.
Graft Donor Site Dermal Sutures (Optional): 5-0 Polysorb
Nostril Rim Text: The closure involved the nostril rim.
Suture Removal: 8 days
Dermal Autograft Text: The defect edges were debeveled with a #15 scalpel blade.  Given the location of the defect, shape of the defect and the proximity to free margins a dermal autograft was deemed most appropriate.  Using a sterile surgical marker, the primary defect shape was transferred to the donor site. The area thus outlined was incised deep to adipose tissue with a #15 scalpel blade.  The harvested graft was then trimmed of adipose and epidermal tissue until only dermis was left.  The skin graft was then placed in the primary defect and oriented appropriately.
Ftsg Text: The defect edges were debeveled with a #15 scalpel blade.  Given the location of the defect, shape of the defect and the proximity to free margins a full thickness skin graft was deemed most appropriate.  Using a sterile surgical marker, the primary defect shape was transferred to the donor site. The area thus outlined was incised deep to adipose tissue with a #15 scalpel blade.  The harvested graft was then trimmed of adipose tissue until only dermis and epidermis was left.  The skin margins of the secondary defect were undermined to an appropriate distance in all directions utilizing iris scissors.  The secondary defect was closed with interrupted buried subcutaneous sutures.  The skin edges were then re-apposed with running  sutures.  The skin graft was then placed in the primary defect and oriented appropriately.
Location Indication Override (Is Already Calculated Based On Selected Body Location): Area M
Full Thickness Lip Wedge Repair (Flap) Text: Given the location of the defect and the proximity to free margins a full thickness wedge repair was deemed most appropriate.  Using a sterile surgical marker, the appropriate repair was drawn incorporating the defect and placing the expected incisions perpendicular to the vermilion border.  The vermilion border was also meticulously outlined to ensure appropriate reapproximation during the repair.  The area thus outlined was incised through and through with a #15 scalpel blade.  The muscularis and dermis were reaproximated with deep sutures following hemostasis. Care was taken to realign the vermilion border before proceeding with the superficial closure.  Once the vermilion was realigned the superfical and mucosal closure was finished.
Melolabial Transposition Flap Text: The defect edges were debeveled with a #15 scalpel blade.  Given the location of the defect and the proximity to free margins a melolabial flap was deemed most appropriate.  Using a sterile surgical marker, an appropriate melolabial transposition flap was drawn incorporating the defect.    The area thus outlined was incised deep to adipose tissue with a #15 scalpel blade.  The skin margins were undermined to an appropriate distance in all directions utilizing iris scissors.
Suturegard Retention Suture: 0-0 Nylon
Complex Repair And Graft Additional Text (Will Appearing After The Standard Complex Repair Text): The complex repair was not sufficient to completely close the primary defect. The remaining additional defect was repaired with the graft mentioned below.
Referring Physician (Optional): Dr. Roe
Mart-1 - Positive Histology Text: MART-1 staining demonstrates areas of higher density and clustering of melanocytes with Pagetoid spread upwards within the epidermis. The surgical margins are positive for tumor cells.
Crescentic Advancement Flap Text: The defect edges were debeveled with a #15 scalpel blade.  Given the location of the defect and the proximity to free margins a crescentic advancement flap was deemed most appropriate.  Using a sterile surgical marker, the appropriate advancement flap was drawn incorporating the defect and placing the expected incisions within the relaxed skin tension lines where possible.    The area thus outlined was incised deep to adipose tissue with a #15 scalpel blade.  The skin margins were undermined to an appropriate distance in all directions utilizing iris scissors.
O-Z Flap Text: The defect edges were debeveled with a #15 scalpel blade.  Given the location of the defect, shape of the defect and the proximity to free margins an O-Z flap was deemed most appropriate.  Using a sterile surgical marker, an appropriate transposition flap was drawn incorporating the defect and placing the expected incisions within the relaxed skin tension lines where possible. The area thus outlined was incised deep to adipose tissue with a #15 scalpel blade.  The skin margins were undermined to an appropriate distance in all directions utilizing iris scissors.
Bcc Histology Text: There were numerous aggregates of basaloid cells.
Primary Defect Length In Cm (Final Defect Size - Required For Flaps/Grafts): 2.5
Where Do You Want The Question To Include Opioid Counseling Located?: Case Summary Tab
O-T Advancement Flap Text: The defect edges were debeveled with a #15 scalpel blade.  Given the location of the defect, shape of the defect and the proximity to free margins an O-T advancement flap was deemed most appropriate.  Using a sterile surgical marker, an appropriate advancement flap was drawn incorporating the defect and placing the expected incisions within the relaxed skin tension lines where possible.    The area thus outlined was incised deep to adipose tissue with a #15 scalpel blade.  The skin margins were undermined to an appropriate distance in all directions utilizing iris scissors.
Partial Purse String (Intermediate) Text: Given the location of the defect and the characteristics of the surrounding skin an intermediate purse string closure was deemed most appropriate.  Undermining was performed circumfirentially around the surgical defect.  A purse string suture was then placed and tightened. Wound tension only allowed a partial closure of the circular defect.
Cheek-To-Nose Interpolation Flap Text: A decision was made to reconstruct the defect utilizing an interpolation axial flap and a staged reconstruction.  A telfa template was made of the defect.  This telfa template was then used to outline the Cheek-To-Nose Interpolation flap.  The donor area for the pedicle flap was then injected with anesthesia.  The flap was excised through the skin and subcutaneous tissue down to the layer of the underlying musculature.  The interpolation flap was carefully excised within this deep plane to maintain its blood supply.  The edges of the donor site were undermined.   The donor site was closed in a primary fashion.  The pedicle was then rotated into position and sutured.  Once the tube was sutured into place, adequate blood supply was confirmed with blanching and refill.  The pedicle was then wrapped with xeroform gauze and dressed appropriately with a telfa and gauze bandage to ensure continued blood supply and protect the attached pedicle.
Same Histology In Subsequent Stages Text: The pattern and morphology of the tumor is as described in the first stage.
Dressing (No Sutures): pressure dressing with telfa
Mohs Case Number: [id number]
Unna Boot Text: An Unna boot was placed to help immobilize the limb and facilitate more rapid healing.
Suturegard Intro: Intraoperative tissue expansion was performed, utilizing the SUTUREGARD device, in order to reduce wound tension.
Mohs Method Verbiage: An incision at a 45 degree angle following the standard Mohs approach was done and the specimen was harvested as a microscopic controlled layer.
Alar Island Pedicle Flap Text: The defect edges were debeveled with a #15 scalpel blade.  Given the location of the defect, shape of the defect and the proximity to the alar rim an island pedicle advancement flap was deemed most appropriate.  Using a sterile surgical marker, an appropriate advancement flap was drawn incorporating the defect, outlining the appropriate donor tissue and placing the expected incisions within the nasal ala running parallel to the alar rim. The area thus outlined was incised with a #15 scalpel blade.  The skin margins were undermined minimally to an appropriate distance in all directions around the primary defect and laterally outward around the island pedicle utilizing iris scissors.  There was minimal undermining beneath the pedicle flap.
Epidermal Closure: running cuticular
Non-Graft Cartilage Fenestration Text: The cartilage was fenestrated with a 2mm punch biopsy to help facilitate healing.
O-L Flap Text: The defect edges were debeveled with a #15 scalpel blade.  Given the location of the defect, shape of the defect and the proximity to free margins an O-L flap was deemed most appropriate.  Using a sterile surgical marker, an appropriate advancement flap was drawn incorporating the defect and placing the expected incisions within the relaxed skin tension lines where possible.    The area thus outlined was incised deep to adipose tissue with a #15 scalpel blade.  The skin margins were undermined to an appropriate distance in all directions utilizing iris scissors.
Pain Refusal Text: I offered to prescribe pain medication but the patient refused to take this medication.
Graft Cartilage Fenestration Text: The cartilage was fenestrated with a 2mm punch biopsy to help facilitate graft survival and healing.
Melolabial Interpolation Flap Text: A decision was made to reconstruct the defect utilizing an interpolation axial flap and a staged reconstruction.  A telfa template was made of the defect.  This telfa template was then used to outline the melolabial interpolation flap.  The donor area for the pedicle flap was then injected with anesthesia.  The flap was excised through the skin and subcutaneous tissue down to the layer of the underlying musculature.  The pedicle flap was carefully excised within this deep plane to maintain its blood supply.  The edges of the donor site were undermined.   The donor site was closed in a primary fashion.  The pedicle was then rotated into position and sutured.  Once the tube was sutured into place, adequate blood supply was confirmed with blanching and refill.  The pedicle was then wrapped with xeroform gauze and dressed appropriately with a telfa and gauze bandage to ensure continued blood supply and protect the attached pedicle.
Helical Rim Advancement Flap Text: The defect edges were debeveled with a #15 blade scalpel.  Given the location of the defect and the proximity to free margins (helical rim) a double helical rim advancement flap was deemed most appropriate.  Using a sterile surgical marker, the appropriate advancement flaps were drawn incorporating the defect and placing the expected incisions between the helical rim and antihelix where possible.  The area thus outlined was incised through and through with a #15 scalpel blade.  With a skin hook and iris scissors, the flaps were gently and sharply undermined and freed up.
Graft Donor Site Epidermal Sutures (Optional): 5-0 Surgipro
Consent (Spinal Accessory)/Introductory Paragraph: The rationale for Mohs was explained to the patient and consent was obtained. The risks, benefits and alternatives to therapy were discussed in detail. Specifically, the risks of damage to the spinal accessory nerve, infection, scarring, bleeding, prolonged wound healing, incomplete removal, allergy to anesthesia, and recurrence were addressed. Prior to the procedure, the treatment site was clearly identified and confirmed by the patient. All components of Universal Protocol/PAUSE Rule completed.
W Plasty Text: The lesion was extirpated to the level of the fat with a #15 scalpel blade.  Given the location of the defect, shape of the defect and the proximity to free margins a W-plasty was deemed most appropriate for repair.  Using a sterile surgical marker, the appropriate transposition arms of the W-plasty were drawn incorporating the defect and placing the expected incisions within the relaxed skin tension lines where possible.    The area thus outlined was incised deep to adipose tissue with a #15 scalpel blade.  The skin margins were undermined to an appropriate distance in all directions utilizing iris scissors.  The opposing transposition arms were then transposed into place in opposite direction and anchored with interrupted buried subcutaneous sutures.
Skin Substitute Text: The defect edges were debeveled with a #15 scalpel blade.  Given the location of the defect, shape of the defect and the proximity to free margins a skin substitute graft was deemed most appropriate.  The graft material was trimmed to fit the size of the defect. The graft was then placed in the primary defect and oriented appropriately.
Suturegard Body: The suture ends were repeatedly re-tightened and re-clamped to achieve the desired tissue expansion.
Composite Graft Text: The defect edges were debeveled with a #15 scalpel blade.  Given the location of the defect, shape of the defect, the proximity to free margins and the fact the defect was full thickness a composite graft was deemed most appropriate.  The defect was outline and then transferred to the donor site.  A full thickness graft was then excised from the donor site. The graft was then placed in the primary defect, oriented appropriately and then sutured into place.  The secondary defect was then repaired using a primary closure.
Bilobed Flap Text: The defect edges were debeveled with a #15 scalpel blade.  Given the location of the defect and the proximity to free margins a bilobe flap was deemed most appropriate.  Using a sterile surgical marker, an appropriate bilobe flap drawn around the defect.    The area thus outlined was incised deep to adipose tissue with a #15 scalpel blade.  The skin margins were undermined to an appropriate distance in all directions utilizing iris scissors.
Posterior Auricular Interpolation Flap Text: A decision was made to reconstruct the defect utilizing an interpolation axial flap and a staged reconstruction.  A telfa template was made of the defect.  This telfa template was then used to outline the posterior auricular interpolation flap.  The donor area for the pedicle flap was then injected with anesthesia.  The flap was excised through the skin and subcutaneous tissue down to the layer of the underlying musculature.  The pedicle flap was carefully excised within this deep plane to maintain its blood supply.  The edges of the donor site were undermined.   The donor site was closed in a primary fashion.  The pedicle was then rotated into position and sutured.  Once the tube was sutured into place, adequate blood supply was confirmed with blanching and refill.  The pedicle was then wrapped with xeroform gauze and dressed appropriately with a telfa and gauze bandage to ensure continued blood supply and protect the attached pedicle.
Consent 3/Introductory Paragraph: I gave the patient a chance to ask questions they had about the procedure.  Following this I explained the Mohs procedure and consent was obtained. The risks, benefits and alternatives to therapy were discussed in detail. Specifically, the risks of infection, scarring, bleeding, prolonged wound healing, incomplete removal, allergy to anesthesia, nerve injury and recurrence were addressed. Prior to the procedure, the treatment site was clearly identified and confirmed by the patient. All components of Universal Protocol/PAUSE Rule completed.
Double Island Pedicle Flap Text: The defect edges were debeveled with a #15 scalpel blade.  Given the location of the defect, shape of the defect and the proximity to free margins a double island pedicle advancement flap was deemed most appropriate.  Using a sterile surgical marker, an appropriate advancement flap was drawn incorporating the defect, outlining the appropriate donor tissue and placing the expected incisions within the relaxed skin tension lines where possible.    The area thus outlined was incised deep to adipose tissue with a #15 scalpel blade.  The skin margins were undermined to an appropriate distance in all directions around the primary defect and laterally outward around the island pedicle utilizing iris scissors.  There was minimal undermining beneath the pedicle flap.
Rhomboid Transposition Flap Text: The defect edges were debeveled with a #15 scalpel blade.  Given the location of the defect and the proximity to free margins a rhomboid transposition flap was deemed most appropriate.  Using a sterile surgical marker, an appropriate rhomboid flap was drawn incorporating the defect.    The area thus outlined was incised deep to adipose tissue with a #15 scalpel blade.  The skin margins were undermined to an appropriate distance in all directions utilizing iris scissors.
V-Y Plasty Text: The defect edges were debeveled with a #15 scalpel blade.  Given the location of the defect, shape of the defect and the proximity to free margins an V-Y advancement flap was deemed most appropriate.  Using a sterile surgical marker, an appropriate advancement flap was drawn incorporating the defect and placing the expected incisions within the relaxed skin tension lines where possible.    The area thus outlined was incised deep to adipose tissue with a #15 scalpel blade.  The skin margins were undermined to an appropriate distance in all directions utilizing iris scissors.
Spiral Flap Text: The defect edges were debeveled with a #15 scalpel blade.  Given the location of the defect, shape of the defect and the proximity to free margins a spiral flap was deemed most appropriate.  Using a sterile surgical marker, an appropriate rotation flap was drawn incorporating the defect and placing the expected incisions within the relaxed skin tension lines where possible. The area thus outlined was incised deep to adipose tissue with a #15 scalpel blade.  The skin margins were undermined to an appropriate distance in all directions utilizing iris scissors.
Repair Hemostasis (Optional): Pinpoint electrocautery
Postop Diagnosis: same
Chonodrocutaneous Helical Advancement Flap Text: The defect edges were debeveled with a #15 scalpel blade.  Given the location of the defect and the proximity to free margins a chondrocutaneous helical advancement flap was deemed most appropriate.  Using a sterile surgical marker, the appropriate advancement flap was drawn incorporating the defect and placing the expected incisions within the relaxed skin tension lines where possible.    The area thus outlined was incised deep to adipose tissue with a #15 scalpel blade.  The skin margins were undermined to an appropriate distance in all directions utilizing iris scissors.
Cheiloplasty (Less Than 50%) Text: A decision was made to reconstruct the defect with a  cheiloplasty.  The defect was undermined extensively.  Additional obicularis oris muscle was excised with a 15 blade scalpel.  The defect was converted into a full thickness wedge, of less than 50% of the vertical height of the lip, to facilite a better cosmetic result.  Small vessels were then tied off with 5-0 monocyrl. The obicularis oris, superficial fascia, adipose and dermis were then reapproximated.  After the deeper layers were approximated the epidermis was reapproximated with particular care given to realign the vermilion border.
Consent Type: Consent 1 (Standard)
Information: Selecting Yes will display possible errors in your note based on the variables you have selected. This validation is only offered as a suggestion for you. PLEASE NOTE THAT THE VALIDATION TEXT WILL BE REMOVED WHEN YOU FINALIZE YOUR NOTE. IF YOU WANT TO FAX A PRELIMINARY NOTE YOU WILL NEED TO TOGGLE THIS TO 'NO' IF YOU DO NOT WANT IT IN YOUR FAXED NOTE.
S Plasty Text: Given the location and shape of the defect, and the orientation of relaxed skin tension lines, an S-plasty was deemed most appropriate for repair.  Using a sterile surgical marker, the appropriate outline of the S-plasty was drawn, incorporating the defect and placing the expected incisions within the relaxed skin tension lines where possible.  The area thus outlined was incised deep to adipose tissue with a #15 scalpel blade.  The skin margins were undermined to an appropriate distance in all directions utilizing iris scissors. The skin flaps were advanced over the defect.  The opposing margins were then approximated with interrupted buried subcutaneous sutures.
Hemostasis: Electrocautery
Ear Star Wedge Flap Text: The defect edges were debeveled with a #15 blade scalpel.  Given the location of the defect and the proximity to free margins (helical rim) an ear star wedge flap was deemed most appropriate.  Using a sterile surgical marker, the appropriate flap was drawn incorporating the defect and placing the expected incisions between the helical rim and antihelix where possible.  The area thus outlined was incised through and through with a #15 scalpel blade.
Vermilion Border Text: The closure involved the vermilion border.
Area H Indication Text: Tumors in this location are included in Area H (eyelids, eyebrows, nose, lips, chin, ear, pre-auricular, post-auricular, temple, genitalia, hands, feet, ankles and areola).  Tissue conservation is critical in these anatomic locations.
Modified Advancement Flap Text: The defect edges were debeveled with a #15 scalpel blade.  Given the location of the defect, shape of the defect and the proximity to free margins a modified advancement flap was deemed most appropriate.  Using a sterile surgical marker, an appropriate advancement flap was drawn incorporating the defect and placing the expected incisions within the relaxed skin tension lines where possible.    The area thus outlined was incised deep to adipose tissue with a #15 scalpel blade.  The skin margins were undermined to an appropriate distance in all directions utilizing iris scissors.
Eye Protection Verbiage: Before proceeding with the stage, a plastic scleral shield was inserted. The globe was anesthetized with a few drops of 1% lidocaine with 1:100,000 epinephrine. Then, an appropriate sized scleral shield was chosen and coated with lacrilube ointment. The shield was gently inserted and left in place for the duration of each stage. After the stage was completed, the shield was gently removed.
H Plasty Text: Given the location of the defect, shape of the defect and the proximity to free margins a H-plasty was deemed most appropriate for repair.  Using a sterile surgical marker, the appropriate advancement arms of the H-plasty were drawn incorporating the defect and placing the expected incisions within the relaxed skin tension lines where possible. The area thus outlined was incised deep to adipose tissue with a #15 scalpel blade. The skin margins were undermined to an appropriate distance in all directions utilizing iris scissors.  The opposing advancement arms were then advanced into place in opposite direction and anchored with interrupted buried subcutaneous sutures.
Home Suture Removal Text: Patient was provided instructions on removing sutures and will remove their sutures at home.  If they have any questions or difficulties they will call the office.
Previous Accession (Optional): [id number].
Tumor Debulked?: curette
V-Y Flap Text: The defect edges were debeveled with a #15 scalpel blade.  Given the location of the defect, shape of the defect and the proximity to free margins a V-Y flap was deemed most appropriate.  Using a sterile surgical marker, an appropriate advancement flap was drawn incorporating the defect and placing the expected incisions within the relaxed skin tension lines where possible.    The area thus outlined was incised deep to adipose tissue with a #15 scalpel blade.  The skin margins were undermined to an appropriate distance in all directions utilizing iris scissors.
Xenograft Text: The defect edges were debeveled with a #15 scalpel blade.  Given the location of the defect, shape of the defect and the proximity to free margins a xenograft was deemed most appropriate.  The graft was then trimmed to fit the size of the defect.  The graft was then placed in the primary defect and oriented appropriately.
Island Pedicle Flap Text: The defect edges were debeveled with a #15 scalpel blade.  Given the location of the defect, shape of the defect and the proximity to free margins an island pedicle advancement flap was deemed most appropriate.  Using a sterile surgical marker, an appropriate advancement flap was drawn incorporating the defect, outlining the appropriate donor tissue and placing the expected incisions within the relaxed skin tension lines where possible.    The area thus outlined was incised deep to adipose tissue with a #15 scalpel blade.  The skin margins were undermined to an appropriate distance in all directions around the primary defect and laterally outward around the island pedicle utilizing iris scissors.  There was minimal undermining beneath the pedicle flap.
Wound Care (No Sutures): Petrolatum
Purse String (Simple) Text: Given the location of the defect and the characteristics of the surrounding skin a purse string closure was deemed most appropriate.  Undermining was performed circumfirentially around the surgical defect.  A purse string suture was then placed and tightened.
Advancement-Rotation Flap Text: The defect edges were debeveled with a #15 scalpel blade.  Given the location of the defect, shape of the defect and the proximity to free margins an advancement-rotation flap was deemed most appropriate.  Using a sterile surgical marker, an appropriate flap was drawn incorporating the defect and placing the expected incisions within the relaxed skin tension lines where possible. The area thus outlined was incised deep to adipose tissue with a #15 scalpel blade.  The skin margins were undermined to an appropriate distance in all directions utilizing iris scissors.
O-T Plasty Text: The defect edges were debeveled with a #15 scalpel blade.  Given the location of the defect, shape of the defect and the proximity to free margins an O-T plasty was deemed most appropriate.  Using a sterile surgical marker, an appropriate O-T plasty was drawn incorporating the defect and placing the expected incisions within the relaxed skin tension lines where possible.    The area thus outlined was incised deep to adipose tissue with a #15 scalpel blade.  The skin margins were undermined to an appropriate distance in all directions utilizing iris scissors.

## 2020-04-15 ENCOUNTER — APPOINTMENT (RX ONLY)
Dept: URBAN - METROPOLITAN AREA CLINIC 35 | Facility: CLINIC | Age: 85
Setting detail: DERMATOLOGY
End: 2020-04-15

## 2020-04-22 RX ORDER — LANSOPRAZOLE 30 MG/1
30 CAPSULE, DELAYED RELEASE ORAL DAILY
COMMUNITY
Start: 2018-05-29 | End: 2020-04-22 | Stop reason: SDUPTHER

## 2020-04-22 RX ORDER — LANSOPRAZOLE 30 MG/1
30 CAPSULE, DELAYED RELEASE ORAL DAILY
Qty: 90 CAP | Refills: 3 | Status: SHIPPED | OUTPATIENT
Start: 2020-04-22 | End: 2021-04-12

## 2020-05-18 ENCOUNTER — TELEMEDICINE (OUTPATIENT)
Dept: MEDICAL GROUP | Facility: MEDICAL CENTER | Age: 85
End: 2020-05-18
Payer: MEDICARE

## 2020-05-18 VITALS — BODY MASS INDEX: 29.66 KG/M2 | WEIGHT: 219 LBS | HEIGHT: 72 IN | OXYGEN SATURATION: 93 %

## 2020-05-18 ASSESSMENT — FIBROSIS 4 INDEX: FIB4 SCORE: 1.02

## 2020-05-18 NOTE — PROGRESS NOTES
Annual Health Assessment Questions:    1.  Are you currently engaging in any exercise or physical activity? Yes, stretching, walks 100yards daily, yard work    2.  How would you describe your mood or emotional well-being today? good    3.  Have you had any falls in the last year? {YES / NO:72660}    4.  Have you noticed any problems with your balance or had difficulty walking? No    5.  In the last six months have you experienced any leakage of urine? No    6. DPA/Advanced Directive: Patient has Advanced Directive on file.

## 2020-06-17 ENCOUNTER — TELEPHONE (OUTPATIENT)
Dept: MEDICAL GROUP | Facility: PHYSICIAN GROUP | Age: 85
End: 2020-06-17

## 2020-06-17 NOTE — TELEPHONE ENCOUNTER
ESTABLISHED PATIENT PRE-VISIT PLANNING     Patient was NOT contacted to complete PVP.     Note: Patient will not be contacted if there is no indication to call.     1.  Reviewed notes from the last few office visits within the medical group: Yes    2.  If any orders were placed at last visit or intended to be done for this visit (i.e. 6 mos follow-up), do we have Results/Consult Notes?        •  Labs - Labs were not ordered at last office visit.   Note: If patient appointment is for lab review and patient did not complete labs, check with provider if OK to reschedule patient until labs completed.       •  Imaging - Imaging was not ordered at last office visit.       •  Referrals - No referrals were ordered at last office visit.    3. Is this appointment scheduled as a Hospital Follow-Up? No    4.  Immunizations were updated in Epic using WebIZ?: Epic matches WebIZ       •  Web Iz Recommendations: PNEUMOVAX (PPSV23), TDAP and SHINGRIX (Shingles)    5.  Patient is due for the following Health Maintenance Topics:   Health Maintenance Due   Topic Date Due   • Annual Wellness Visit  1935   • Annual Pulmonary Function Test / Spirometry  03/17/1941   • IMM DTaP/Tdap/Td Vaccine (1 - Tdap) 03/17/1956   • IMM PNEUMOCOCCAL VACCINE: 65+ Years (1 of 2 - PCV13) 03/17/2002   • IMM ZOSTER VACCINES (2 of 3) 04/07/2011       - Patient plans to schedule appointment for Annual Wellness Visit (AWV).    6. Orders for overdue Health Maintenance topics pended in Pre-Charting? N\A    7.  AHA (MDX) form printed for Provider? YES    8.  Patient was NOT informed to arrive 15 min prior to their scheduled appointment and bring in their medication bottles.

## 2020-06-18 ENCOUNTER — OFFICE VISIT (OUTPATIENT)
Dept: MEDICAL GROUP | Facility: MEDICAL CENTER | Age: 85
End: 2020-06-18
Payer: MEDICARE

## 2020-06-18 VITALS
HEIGHT: 72 IN | BODY MASS INDEX: 27.9 KG/M2 | OXYGEN SATURATION: 94 % | TEMPERATURE: 98.3 F | SYSTOLIC BLOOD PRESSURE: 110 MMHG | WEIGHT: 206 LBS | DIASTOLIC BLOOD PRESSURE: 52 MMHG | HEART RATE: 60 BPM

## 2020-06-18 DIAGNOSIS — R41.89 COGNITIVE IMPAIRMENT: ICD-10-CM

## 2020-06-18 DIAGNOSIS — N40.0 BENIGN PROSTATIC HYPERPLASIA WITHOUT LOWER URINARY TRACT SYMPTOMS: ICD-10-CM

## 2020-06-18 DIAGNOSIS — J44.9 CHRONIC OBSTRUCTIVE PULMONARY DISEASE, UNSPECIFIED COPD TYPE (HCC): ICD-10-CM

## 2020-06-18 DIAGNOSIS — G47.33 OBSTRUCTIVE SLEEP APNEA: ICD-10-CM

## 2020-06-18 DIAGNOSIS — D75.839 THROMBOCYTOSIS: ICD-10-CM

## 2020-06-18 DIAGNOSIS — H90.0 CONDUCTIVE HEARING LOSS, BILATERAL: ICD-10-CM

## 2020-06-18 DIAGNOSIS — Z23 NEED FOR VACCINATION: ICD-10-CM

## 2020-06-18 DIAGNOSIS — R53.1 WEAKNESS: ICD-10-CM

## 2020-06-18 PROCEDURE — 99214 OFFICE O/P EST MOD 30 MIN: CPT | Performed by: PHYSICIAN ASSISTANT

## 2020-06-18 ASSESSMENT — PATIENT HEALTH QUESTIONNAIRE - PHQ9: CLINICAL INTERPRETATION OF PHQ2 SCORE: 0

## 2020-06-18 ASSESSMENT — FIBROSIS 4 INDEX: FIB4 SCORE: 1.02

## 2020-06-18 NOTE — ASSESSMENT & PLAN NOTE
No formal diagnosis but both patient and wife say he has hearing loss. He declines further evaluation or hearing aids. He knows he could see Dr. Tapia if needed.

## 2020-06-18 NOTE — PROGRESS NOTES
Annual Health Assessment Questions:    1.  Are you currently engaging in any exercise or physical activity? Yes, stretching 3 times a week    2.  How would you describe your mood or emotional well-being today? good    3.  Have you had any falls in the last year? No    4.  Have you noticed any problems with your balance or had difficulty walking? Yes    5.  In the last six months have you experienced any leakage of urine? No    6. DPA/Advanced Directive: Patient has Advanced Directive on file.

## 2020-06-18 NOTE — PROGRESS NOTES
Subjective:   Han Pat is a 85 y.o. male here today for follow up on chronic conditions. Here with wife. Denies recent illness or injury. Well dressed. Good mood. Good eye contact and answers questions appropriately.    COPD (chronic obstructive pulmonary disease) (HCC)  Chronic,stable on current, managed with pulmonology, no recent exacerbation    Thrombocytosis (HCC)  Chronic, managed with Dr. Reed, recently more fatigued, will have labs ordered by hematology and then f/u with that specialist    Obstructive sleep apnea  Using CPAP as directed    Weakness  Patient denies new symptoms. Per wife he seems to still having weakness with walking. No falls. She worries about him when he is out working in the yard. Patient states he does daily strengthening exercises. Declines PT at this time.    Benign prostatic hyperplasia without lower urinary tract symptoms  Chronic, denies concern with symptoms, taking med as prescribed    Cognitive impairment  Per wife getting worse over time, will f/u with neurology    Conductive hearing loss, bilateral  No formal diagnosis but both patient and wife say he has hearing loss. He declines further evaluation or hearing aids. He knows he could see Dr. Tapia if needed.       Current medicines (including changes today)  Current Outpatient Medications   Medication Sig Dispense Refill   • Aspirin-Calcium Carbonate  MG Tab Take 81 mg by mouth every day.     • lansoprazole (PREVACID) 30 MG CAPSULE DELAYED RELEASE Take 1 Cap by mouth every day. 90 Cap 3   • simvastatin (ZOCOR) 20 MG Tab Take 1 Tab by mouth every evening. 100 Tab 4   • montelukast (SINGULAIR) 10 MG Tab Take 1 Tab by mouth every evening. Indications: Hayfever 100 Tab 3   • aspirin (ASA) 81 MG Chew Tab chewable tablet Take 81 mg by mouth every day.     • Home Care Oxygen Inhale 2 L/min by mouth every bedtime. with CPAP     • lansoprazole (PREVACID) 30 MG TABLET DISPERSIBLE Take 30 mg by mouth every day.     •  "Lidocaine 4 % Patch Apply 1 Patch to affected area(s) PRN (pain).     • ferrous sulfate 325 (65 Fe) MG tablet Take 1 Tab by mouth 2 times a day, with meals. 30 Tab 3   • tamsulosin (FLOMAX) 0.4 MG capsule Take 1 Cap by mouth every bedtime. 30 Cap 3   • vitamin D 4000 units Tab Take 4,000 Units by mouth every day. 1200 Tab 3   • Multiple Vitamins-Minerals (OCUVITE PO) Take 2 Caps by mouth every evening.     • hydroxyurea (HYDREA) 500 MG Cap Take 1,000-1,500 mg by mouth See Admin Instructions. Pt takes 1000MG on Sat and Sun  1500MG on Mon, Tue, Wed, Thur, and Fri       No current facility-administered medications for this visit.      He  has a past medical history of Back pain, GERD (gastroesophageal reflux disease), Hyperlipidemia, Kidney stone, PND (post-nasal drip), and Sleep apnea.    ROS   No fever/chills. No weight change. No headache. No sore throat, nasal congestion, ear pain. No chest pain, no shortness of breath, difficulty breathing. No n/v/d/c or abdominal pain. No urinary complaint. No rash or skin lesion. No joint pain or swelling.       Objective:     /52 (BP Location: Left arm, Patient Position: Sitting, BP Cuff Size: Adult long)   Pulse 60   Temp 36.8 °C (98.3 °F) (Temporal)   Ht 1.829 m (6' 0.01\")   Wt 93.4 kg (206 lb)   SpO2 94%  Body mass index is 27.93 kg/m².   Physical Exam:  Constitutional: WDWN, NAD  Skin: Warm, dry, good turgor, no rashes in visible areas.  Eye: Equal, round and reactive, conjunctiva clear, lids normal.  ENMT: Lips without lesions, good dentition, oropharynx clear. Bilat ear canals with impacted cerumen, some cleared with warm water irrigation, will cont home debrox and then rtc if needed  Neck: Trachea midline, no masses, no thyromegaly. No cervical or supraclavicular lymphadenopathy  Respiratory: Unlabored respiratory effort, lungs clear to auscultation, no wheezes, no ronchi.  Cardiovascular: Normal S1, S2, no murmur, no leg edema.  Psych: Alert and oriented x3, " normal affect and mood.      Assessment and Plan:   The following treatment plan was discussed    1. Chronic obstructive pulmonary disease, unspecified COPD type (HCC)  Cont current    2. Thrombocytosis (HCC)  Labs and f/u with hematology          4. Obstructive sleep apnea  Cont current    5. Weakness  Consider PT if/as needed    6. Benign prostatic hyperplasia without lower urinary tract symptoms  Cont current    7. Cognitive impairment  Make appt with neurology for follow up      Followup: 6 months and as needed

## 2020-06-18 NOTE — ASSESSMENT & PLAN NOTE
Chronic, managed with Dr. Reed, recently more fatigued, will have labs ordered by hematology and then f/u with that specialist

## 2020-06-29 ENCOUNTER — HOSPITAL ENCOUNTER (OUTPATIENT)
Dept: LAB | Facility: MEDICAL CENTER | Age: 85
End: 2020-06-29
Attending: INTERNAL MEDICINE
Payer: MEDICARE

## 2020-06-29 LAB
ALBUMIN SERPL BCP-MCNC: 3.6 G/DL (ref 3.2–4.9)
ALBUMIN/GLOB SERPL: 1.3 G/DL
ALP SERPL-CCNC: 131 U/L (ref 30–99)
ALT SERPL-CCNC: 108 U/L (ref 2–50)
ANION GAP SERPL CALC-SCNC: 10 MMOL/L (ref 7–16)
ANISOCYTOSIS BLD QL SMEAR: ABNORMAL
AST SERPL-CCNC: 105 U/L (ref 12–45)
BASOPHILS # BLD AUTO: 1.3 % (ref 0–1.8)
BASOPHILS # BLD: 0.1 K/UL (ref 0–0.12)
BILIRUB SERPL-MCNC: 1.1 MG/DL (ref 0.1–1.5)
BUN SERPL-MCNC: 16 MG/DL (ref 8–22)
CALCIUM SERPL-MCNC: 8.6 MG/DL (ref 8.5–10.5)
CHLORIDE SERPL-SCNC: 104 MMOL/L (ref 96–112)
CO2 SERPL-SCNC: 22 MMOL/L (ref 20–33)
COMMENT 1642: NORMAL
CREAT SERPL-MCNC: 0.78 MG/DL (ref 0.5–1.4)
EOSINOPHIL # BLD AUTO: 0.32 K/UL (ref 0–0.51)
EOSINOPHIL NFR BLD: 4.3 % (ref 0–6.9)
ERYTHROCYTE [DISTWIDTH] IN BLOOD BY AUTOMATED COUNT: 77.1 FL (ref 35.9–50)
GLOBULIN SER CALC-MCNC: 2.8 G/DL (ref 1.9–3.5)
GLUCOSE SERPL-MCNC: 121 MG/DL (ref 65–99)
HCT VFR BLD AUTO: 43.2 % (ref 42–52)
HGB BLD-MCNC: 14.3 G/DL (ref 14–18)
IMM GRANULOCYTES # BLD AUTO: 0.18 K/UL (ref 0–0.11)
IMM GRANULOCYTES NFR BLD AUTO: 2.4 % (ref 0–0.9)
LYMPHOCYTES # BLD AUTO: 0.79 K/UL (ref 1–4.8)
LYMPHOCYTES NFR BLD: 10.5 % (ref 22–41)
MACROCYTES BLD QL SMEAR: ABNORMAL
MCH RBC QN AUTO: 37.4 PG (ref 27–33)
MCHC RBC AUTO-ENTMCNC: 33.1 G/DL (ref 33.7–35.3)
MCV RBC AUTO: 113.1 FL (ref 81.4–97.8)
MONOCYTES # BLD AUTO: 1.41 K/UL (ref 0–0.85)
MONOCYTES NFR BLD AUTO: 18.8 % (ref 0–13.4)
MORPHOLOGY BLD-IMP: NORMAL
NEUTROPHILS # BLD AUTO: 4.72 K/UL (ref 1.82–7.42)
NEUTROPHILS NFR BLD: 62.7 % (ref 44–72)
NRBC # BLD AUTO: 0 K/UL
NRBC BLD-RTO: 0 /100 WBC
OVALOCYTES BLD QL SMEAR: NORMAL
PLATELET # BLD AUTO: 546 K/UL (ref 164–446)
PLATELET BLD QL SMEAR: NORMAL
PMV BLD AUTO: 10.1 FL (ref 9–12.9)
POTASSIUM SERPL-SCNC: 4.6 MMOL/L (ref 3.6–5.5)
PROT SERPL-MCNC: 6.4 G/DL (ref 6–8.2)
RBC # BLD AUTO: 3.82 M/UL (ref 4.7–6.1)
RBC BLD AUTO: PRESENT
SODIUM SERPL-SCNC: 136 MMOL/L (ref 135–145)
WBC # BLD AUTO: 7.5 K/UL (ref 4.8–10.8)

## 2020-06-29 PROCEDURE — 85025 COMPLETE CBC W/AUTO DIFF WBC: CPT

## 2020-06-29 PROCEDURE — 36415 COLL VENOUS BLD VENIPUNCTURE: CPT

## 2020-06-29 PROCEDURE — 80053 COMPREHEN METABOLIC PANEL: CPT

## 2020-07-23 ENCOUNTER — OFFICE VISIT (OUTPATIENT)
Dept: NEUROLOGY | Facility: MEDICAL CENTER | Age: 85
End: 2020-07-23
Payer: MEDICARE

## 2020-07-23 VITALS
TEMPERATURE: 97.6 F | BODY MASS INDEX: 28.05 KG/M2 | HEART RATE: 68 BPM | HEIGHT: 72 IN | DIASTOLIC BLOOD PRESSURE: 60 MMHG | WEIGHT: 207.1 LBS | RESPIRATION RATE: 16 BRPM | SYSTOLIC BLOOD PRESSURE: 118 MMHG | OXYGEN SATURATION: 94 %

## 2020-07-23 DIAGNOSIS — R41.89 SPELL OF ALTERED COGNITION: ICD-10-CM

## 2020-07-23 DIAGNOSIS — G20.C PARKINSONISM, UNSPECIFIED PARKINSONISM TYPE (HCC): ICD-10-CM

## 2020-07-23 PROCEDURE — 99213 OFFICE O/P EST LOW 20 MIN: CPT

## 2020-07-23 ASSESSMENT — FIBROSIS 4 INDEX: FIB4 SCORE: 1.54

## 2020-07-23 NOTE — PROGRESS NOTES
CC   Follow up for tremors    HPI  86 yo male here for a follow up after he is off Keppra.  Mild hand tremors not bothersome at all   Did not have MARITA scan done, does not want   Memory is OK.  He still drives and no issues with worng turns or traffic accidents.  Overall energy level is much better now that he is off keppra.    Review of Systems   Constitutional: Negative.    HENT: Positive for hearing loss.    Eyes: Negative.    Respiratory: Negative.    Cardiovascular: Negative.    Gastrointestinal: Negative.    Genitourinary: Negative.    Musculoskeletal: Positive for back pain.   Skin: Negative.    Neurological: Negative.    Endo/Heme/Allergies: Negative.    Psychiatric/Behavioral: Negative.      Past Medical History:   Diagnosis Date   • Back pain    • GERD (gastroesophageal reflux disease)    • Hyperlipidemia    • Kidney stone    • PND (post-nasal drip)    • Sleep apnea      Past Surgical History:   Procedure Laterality Date   • CATARACT EXTRACTION WITH IOL     • OTHER      Nasal surgery   • OTHER ORTHOPEDIC SURGERY  lumbar disectomy 2000     Family History   Problem Relation Age of Onset   • Other Father         Heart problems; unspecified   • Sleep Apnea Father    • Other Mother         Aneurism   • Sleep Apnea Brother    • Other Brother         Heart problems; unspecified   • Sleep Apnea Son    • Sleep Apnea Son      Social History     Socioeconomic History   • Marital status:      Spouse name: Not on file   • Number of children: Not on file   • Years of education: Not on file   • Highest education level: Not on file   Occupational History   • Not on file   Social Needs   • Financial resource strain: Not on file   • Food insecurity     Worry: Not on file     Inability: Not on file   • Transportation needs     Medical: Not on file     Non-medical: Not on file   Tobacco Use   • Smoking status: Former Smoker     Packs/day: 1.00     Years: 15.00     Pack years: 15.00     Types: Cigarettes     Last attempt  to quit: 1980     Years since quittin.6   • Smokeless tobacco: Never Used   Substance and Sexual Activity   • Alcohol use: Yes     Alcohol/week: 0.0 oz     Comment: nightly   • Drug use: No   • Sexual activity: Not on file   Lifestyle   • Physical activity     Days per week: Not on file     Minutes per session: Not on file   • Stress: Not on file   Relationships   • Social connections     Talks on phone: Not on file     Gets together: Not on file     Attends [employer]ical service: Not on file     Active member of club or organization: Not on file     Attends meetings of clubs or organizations: Not on file     Relationship status: Not on file   • Intimate partner violence     Fear of current or ex partner: Not on file     Emotionally abused: Not on file     Physically abused: Not on file     Forced sexual activity: Not on file   Other Topics Concern   •  Service Yes   • Blood Transfusions No   • Caffeine Concern No   • Occupational Exposure No   • Hobby Hazards No   • Sleep Concern No   • Stress Concern No   • Weight Concern No   • Special Diet No   • Back Care No   • Exercise Yes   • Bike Helmet No   • Seat Belt Yes   • Self-Exams No   Social History Narrative   • Not on file       Current Outpatient Medications on File Prior to Visit   Medication Sig Dispense Refill   • lansoprazole (PREVACID) 30 MG CAPSULE DELAYED RELEASE Take 1 Cap by mouth every day. 90 Cap 3   • montelukast (SINGULAIR) 10 MG Tab Take 1 Tab by mouth every evening. Indications: Hayfever 100 Tab 3   • aspirin (ASA) 81 MG Chew Tab chewable tablet Take 81 mg by mouth every day.     • tamsulosin (FLOMAX) 0.4 MG capsule Take 1 Cap by mouth every bedtime. 30 Cap 3   • vitamin D 4000 units Tab Take 4,000 Units by mouth every day. 1200 Tab 3   • Multiple Vitamins-Minerals (OCUVITE PO) Take 2 Caps by mouth every evening.     • hydroxyurea (HYDREA) 500 MG Cap Take 1,000-1,500 mg by mouth See Admin Instructions. Pt takes 1000MG on Sat and  Sun  1500MG on Mon, Tue, Wed, Thur, and Fri     • Aspirin-Calcium Carbonate  MG Tab Take 81 mg by mouth every day.     • simvastatin (ZOCOR) 20 MG Tab Take 1 Tab by mouth every evening. (Patient not taking: Reported on 7/23/2020) 100 Tab 4   • Home Care Oxygen Inhale 2 L/min by mouth every bedtime. with CPAP     • lansoprazole (PREVACID) 30 MG TABLET DISPERSIBLE Take 30 mg by mouth every day.     • Lidocaine 4 % Patch Apply 1 Patch to affected area(s) PRN (pain).     • ferrous sulfate 325 (65 Fe) MG tablet Take 1 Tab by mouth 2 times a day, with meals. (Patient not taking: Reported on 7/23/2020) 30 Tab 3     No current facility-administered medications on file prior to visit.          Invalid input(s):  ABSCRN,  CBC PLTDF,  HEMOGLOBIN,  PLATELETCT,  HBA1C,  SKHGOPO2UK, HIV, CHLAM     Encounter Vitals  Standard Vitals  Vitals  Blood Pressure : 118/60  Temperature: 36.4 °C (97.6 °F)  Temp src: Temporal  Pulse: 68  Respiration: 16  Pulse Oximetry: 94 %  Height: 182.9 cm (6')  Weight: 93.9 kg (207 lb 1.6 oz)  Encounter Vitals  Temperature: 36.4 °C (97.6 °F)  Temp src: Temporal  Blood Pressure : 118/60  Pulse: 68  Respiration: 16  Pulse Oximetry: 94 %  Weight: 93.9 kg (207 lb 1.6 oz)  Height: 182.9 cm (6')  BMI (Calculated): 28.09  Pulmonary-Specific Vitals     Durable Medical Equipment-Specific Vitals         Physical exam   Physical Exam   Constitutional: He is oriented to person, place, and time and well-developed, well-nourished, and in no distress.   HENT:   Head: Normocephalic and atraumatic.   Right Ear: External ear normal.   Left Ear: External ear normal.   Nose: Nose normal.   Mouth/Throat: Oropharynx is clear and moist.   Eyes: Pupils are equal, round, and reactive to light. Conjunctivae and EOM are normal.   Neck: Normal range of motion. Neck supple.   Cardiovascular: Normal rate and regular rhythm.   Pulmonary/Chest: Effort normal and breath sounds normal.   Abdominal: Soft.   Musculoskeletal: Normal  range of motion.   Neurological: He is alert and oriented to person, place, and time. He has normal reflexes.   Skin: Skin is warm and dry.   Psychiatric: Mood, memory, affect and judgment normal.      Neurological exam   Mental Status: Awake, alert, oriented x 3.              Speech: Fluent with normal prosody.              Memory: Able to recall recent and remote events accurately.               Concentration: Attentive. Able to focus on history and follow multi-step commands.                         Fund of Knowledge: Appropriate              Cranial Nerves:                          CN II: PERRL. No afferent pupillary defect.                          CN III, IV, VI: Good eye closure, EOMI.                           CN V: Facial sensation intact and symmetric.                           CN VII: No facial asymmetry.                           CN VIII: Hearing intact.                           CN IX and X: Palate elevates symmetrically. Normal gag reflex.                          CN XI: Symmetric shoulder shrug.                           CN XII: Tongue midline.               Sensory: Intact light touch, vibration and temperature.               Coordination: No evidence of past-pointing on finger to nose testing, no dysdiadochokinesia. Heel to shin intact.                        DTR's: 2+ throughout without clonus.               Babinski: Toes downgoing bilaterally.              Romberg: Negative.              Movements: No tremors observed.   Musculoskeletal:               Strength: 5/5 in upper and 4/5 lower extremities bilaterally.(effort decreased 2/2 back pain)               Gait: wide based antalgic gait   Short stride  Slow to turn   Can not tandem walk or walk on tiptoes or heels   Decreased R arm swing   Trace cogwheel in R wrist?              Tone: Normal bulk and tone.              Joints: No swelling.     Impression and plan    Possible mild parkinsonism   Patient not bothered by symptoms del valle snot wish to  try medication and does not want MARITA scan for confirmation   Off LEV and much better with clarity and energy level   No seizures or confusional spells   Gait stability, PT, falls prevention  Discussed with patient and wife- he does not want physical therapy at this time   I stressed the importance of using a cane or a walker in order to keep balanced and prevents falls  RTC prn

## 2020-07-24 ENCOUNTER — HOSPITAL ENCOUNTER (OUTPATIENT)
Dept: LAB | Facility: MEDICAL CENTER | Age: 85
End: 2020-07-24
Attending: INTERNAL MEDICINE
Payer: MEDICARE

## 2020-07-24 LAB
ALBUMIN SERPL BCP-MCNC: 3.6 G/DL (ref 3.2–4.9)
ALP SERPL-CCNC: 129 U/L (ref 30–99)
ALT SERPL-CCNC: 106 U/L (ref 2–50)
AST SERPL-CCNC: 103 U/L (ref 12–45)
BILIRUB CONJ SERPL-MCNC: 0.4 MG/DL (ref 0.1–0.5)
BILIRUB INDIRECT SERPL-MCNC: 0.8 MG/DL (ref 0–1)
BILIRUB SERPL-MCNC: 1.2 MG/DL (ref 0.1–1.5)
PROT SERPL-MCNC: 6.2 G/DL (ref 6–8.2)

## 2020-07-24 PROCEDURE — 36415 COLL VENOUS BLD VENIPUNCTURE: CPT

## 2020-07-24 PROCEDURE — 80076 HEPATIC FUNCTION PANEL: CPT

## 2020-07-28 ASSESSMENT — ENCOUNTER SYMPTOMS
CONSTITUTIONAL NEGATIVE: 1
CARDIOVASCULAR NEGATIVE: 1
EYES NEGATIVE: 1
NEUROLOGICAL NEGATIVE: 1
RESPIRATORY NEGATIVE: 1
BACK PAIN: 1
GASTROINTESTINAL NEGATIVE: 1
PSYCHIATRIC NEGATIVE: 1

## 2020-08-05 ENCOUNTER — APPOINTMENT (RX ONLY)
Dept: URBAN - METROPOLITAN AREA CLINIC 35 | Facility: CLINIC | Age: 85
Setting detail: DERMATOLOGY
End: 2020-08-05

## 2020-08-05 DIAGNOSIS — L81.4 OTHER MELANIN HYPERPIGMENTATION: ICD-10-CM

## 2020-08-05 DIAGNOSIS — Z71.89 OTHER SPECIFIED COUNSELING: ICD-10-CM

## 2020-08-05 DIAGNOSIS — D485 NEOPLASM OF UNCERTAIN BEHAVIOR OF SKIN: ICD-10-CM

## 2020-08-05 DIAGNOSIS — D22 MELANOCYTIC NEVI: ICD-10-CM

## 2020-08-05 DIAGNOSIS — L82.1 OTHER SEBORRHEIC KERATOSIS: ICD-10-CM

## 2020-08-05 DIAGNOSIS — Z85.828 PERSONAL HISTORY OF OTHER MALIGNANT NEOPLASM OF SKIN: ICD-10-CM

## 2020-08-05 DIAGNOSIS — L57.0 ACTINIC KERATOSIS: ICD-10-CM

## 2020-08-05 PROBLEM — D22.9 MELANOCYTIC NEVI, UNSPECIFIED: Status: ACTIVE | Noted: 2020-08-05

## 2020-08-05 PROBLEM — D48.5 NEOPLASM OF UNCERTAIN BEHAVIOR OF SKIN: Status: ACTIVE | Noted: 2020-08-05

## 2020-08-05 PROCEDURE — ? COUNSELING

## 2020-08-05 PROCEDURE — ? LIQUID NITROGEN

## 2020-08-05 PROCEDURE — ? BIOPSY BY SHAVE METHOD

## 2020-08-05 PROCEDURE — 99214 OFFICE O/P EST MOD 30 MIN: CPT | Mod: 25

## 2020-08-05 PROCEDURE — 11102 TANGNTL BX SKIN SINGLE LES: CPT | Mod: 59

## 2020-08-05 PROCEDURE — 11103 TANGNTL BX SKIN EA SEP/ADDL: CPT

## 2020-08-05 PROCEDURE — 17004 DESTROY PREMAL LESIONS 15/>: CPT

## 2020-08-05 PROCEDURE — ? ORDER FOR PHOTODYNAMIC THERAPY

## 2020-08-05 ASSESSMENT — LOCATION DETAILED DESCRIPTION DERM
LOCATION DETAILED: RIGHT INFERIOR CRUS OF ANTIHELIX
LOCATION DETAILED: LEFT CENTRAL TEMPLE
LOCATION DETAILED: LEFT SUPERIOR POSTERIOR HELIX
LOCATION DETAILED: RIGHT SUPERIOR OCCIPITAL SCALP
LOCATION DETAILED: POSTERIOR MID-PARIETAL SCALP
LOCATION DETAILED: LEFT CENTRAL PARIETAL SCALP
LOCATION DETAILED: RIGHT CENTRAL PARIETAL SCALP
LOCATION DETAILED: RIGHT OCCIPITAL SCALP
LOCATION DETAILED: LEFT INFERIOR POSTERIOR HELIX
LOCATION DETAILED: LEFT SUPERIOR PARIETAL SCALP
LOCATION DETAILED: RIGHT CENTRAL MALAR CHEEK
LOCATION DETAILED: RIGHT SUPERIOR HELIX
LOCATION DETAILED: NASAL DORSUM
LOCATION DETAILED: RIGHT SUPERIOR PARIETAL SCALP
LOCATION DETAILED: LEFT MEDIAL MALAR CHEEK
LOCATION DETAILED: RIGHT CENTRAL TEMPLE
LOCATION DETAILED: LEFT CENTRAL ZYGOMA
LOCATION DETAILED: LEFT SUPERIOR CENTRAL MALAR CHEEK

## 2020-08-05 ASSESSMENT — LOCATION ZONE DERM
LOCATION ZONE: SCALP
LOCATION ZONE: NOSE
LOCATION ZONE: EAR
LOCATION ZONE: FACE

## 2020-08-05 ASSESSMENT — LOCATION SIMPLE DESCRIPTION DERM
LOCATION SIMPLE: SCALP
LOCATION SIMPLE: RIGHT TEMPLE
LOCATION SIMPLE: RIGHT CHEEK
LOCATION SIMPLE: POSTERIOR SCALP
LOCATION SIMPLE: LEFT CHEEK
LOCATION SIMPLE: LEFT ZYGOMA
LOCATION SIMPLE: NOSE
LOCATION SIMPLE: RIGHT EAR
LOCATION SIMPLE: LEFT EAR
LOCATION SIMPLE: LEFT TEMPLE

## 2020-08-05 NOTE — PROCEDURE: BIOPSY BY SHAVE METHOD
Detail Level: Detailed
Depth Of Biopsy: dermis
Was A Bandage Applied: Yes
Size Of Lesion In Cm: 2
X Size Of Lesion In Cm: 0
Biopsy Type: H and E
Biopsy Method: double edge Personna blade
Anesthesia Type: 0.5% lidocaine with 1:200,000 epinephrine and a 1:10 solution of 8.4% sodium bicarbonate
Anesthesia Volume In Cc: 0.5
Hemostasis: Aluminum Chloride
Wound Care: Petrolatum
Dressing: bandage
Destruction After The Procedure: No
Type Of Destruction Used: Curettage
Curettage Text: The wound bed was treated with curettage after the biopsy was performed.
Cryotherapy Text: The wound bed was treated with cryotherapy after the biopsy was performed.
Electrodesiccation Text: The wound bed was treated with electrodesiccation after the biopsy was performed.
Electrodesiccation And Curettage Text: The wound bed was treated with electrodesiccation and curettage after the biopsy was performed.
Silver Nitrate Text: The wound bed was treated with silver nitrate after the biopsy was performed.
Lab: [id number]
Lab Facility: 
Consent: Written consent was obtained and risks were reviewed including but not limited to scarring, infection, bleeding, scabbing, incomplete removal, nerve damage and allergy to anesthesia.
Post-Care Instructions: I reviewed with the patient in detail post-care instructions. Keep the biopsy site dry overnight, and then change the dressing once daily and apply a thin layer of petrolatum with a clean q-tip. \\nShowers are OK after 24 hours.  Allow clean water to run over the area.  Do not touch the biopsy site with your hands.  Do not immerse the biopsy site in water such as going into a swimming pool or bathtub until the sutures are removed.  If the biopsy site gets more painful with time, red, or drains, this is concerning for infection.  If you have signs of infection please call the office to come in for a walk in visit Monday through Friday 8:30 am to 12 pm or 1 pm to 4:30 pm.  If we are not in the office, please call through the answering service.
Notification Instructions: Patient will be notified of biopsy results. However, patient instructed to call the office if not contacted within 2 weeks.
Billing Type: Third-Party Bill
Information: Selecting Yes will display possible errors in your note based on the variables you have selected. This validation is only offered as a suggestion for you. PLEASE NOTE THAT THE VALIDATION TEXT WILL BE REMOVED WHEN YOU FINALIZE YOUR NOTE. IF YOU WANT TO FAX A PRELIMINARY NOTE YOU WILL NEED TO TOGGLE THIS TO 'NO' IF YOU DO NOT WANT IT IN YOUR FAXED NOTE.
Size Of Lesion In Cm: 1.5

## 2020-08-05 NOTE — PROCEDURE: ORDER FOR PHOTODYNAMIC THERAPY
Hands Incubation Time: 2 Hours
Debridement: No
Neck Incubation Time: 1 Hour
Pdt Type: STEVE-U
Frequency Of Pdt: Single Treatment
Consent: Written consent was obtained today.  The procedure and risks were reviewed with the patient including but not limited to: burning, pigmentary changes, pain, blistering, scabbing, redness, and the possibility of needing numerous treatments. Strict photoprotection after the procedure was also discussed.
Face And Scalp Incubation Time: 1 Hour for the face and 2 Hours for the scalp
Photosensitizer: Levulan
Location: Face and Scalp
Detail Level: Zone

## 2020-08-05 NOTE — PROCEDURE: LIQUID NITROGEN
Number Of Freeze-Thaw Cycles: 1 freeze-thaw cycle
Post-Care Instructions: I reviewed with the patient in detail post-care instructions. Patient is to wear sunprotection, and avoid picking at any of the treated lesions. Pt may apply Vaseline to crusted or scabbing areas.
Detail Level: Detailed
Render Note In Bullet Format When Appropriate: No
Duration Of Freeze Thaw-Cycle (Seconds): 10
Consent: The patient's consent was obtained including but not limited to risks of crusting, scabbing, blistering, scarring, darker or lighter pigmentary change, recurrence, incomplete removal and infection.

## 2020-08-14 ENCOUNTER — HOSPITAL ENCOUNTER (OUTPATIENT)
Dept: RADIOLOGY | Facility: MEDICAL CENTER | Age: 85
End: 2020-08-14
Attending: INTERNAL MEDICINE
Payer: MEDICARE

## 2020-08-14 DIAGNOSIS — D47.3 THROMBOCYTHEMIA, ESSENTIAL (HCC): ICD-10-CM

## 2020-08-14 PROCEDURE — 76700 US EXAM ABDOM COMPLETE: CPT

## 2020-08-20 ENCOUNTER — APPOINTMENT (RX ONLY)
Dept: URBAN - METROPOLITAN AREA CLINIC 35 | Facility: CLINIC | Age: 85
Setting detail: DERMATOLOGY
End: 2020-08-20

## 2020-08-20 DIAGNOSIS — L57.0 ACTINIC KERATOSIS: ICD-10-CM

## 2020-08-20 PROCEDURE — ? ADDITIONAL NOTES

## 2020-08-20 PROCEDURE — ? CURETTAGE AND DESTRUCTION

## 2020-08-20 PROCEDURE — 17000 DESTRUCT PREMALG LESION: CPT

## 2020-08-20 PROCEDURE — ? DIAGNOSIS COMMENT

## 2020-08-20 PROCEDURE — 17003 DESTRUCT PREMALG LES 2-14: CPT

## 2020-08-20 ASSESSMENT — LOCATION ZONE DERM: LOCATION ZONE: SCALP

## 2020-08-20 ASSESSMENT — LOCATION SIMPLE DESCRIPTION DERM: LOCATION SIMPLE: SCALP

## 2020-08-20 ASSESSMENT — LOCATION DETAILED DESCRIPTION DERM
LOCATION DETAILED: RIGHT CENTRAL PARIETAL SCALP
LOCATION DETAILED: LEFT CENTRAL PARIETAL SCALP

## 2020-08-20 NOTE — PROCEDURE: ADDITIONAL NOTES
Additional Notes: Other areas on the scalp were debulked/curettaged today prior to PDT in a few weeks.
Detail Level: Simple

## 2020-08-20 NOTE — PROCEDURE: CURETTAGE AND DESTRUCTION
Detail Level: Detailed
Size Of Lesion After Curettage: 1.2
Anesthesia Type: 1% lidocaine with epinephrine
Anesthesia Volume In Cc: 1
Cautery Type: electrodesiccation
Number Of Curettages: 3
What Was Performed First?: Curettage
Additional Information: (Optional): The wound was cleaned, and a pressure dressing was applied.  The patient received detailed post-op instructions.
Bill For Surgical Tray: no
Consent was obtained from the patient. The risks, benefits and alternatives to therapy were discussed in detail. Specifically, the risks of infection, scarring, bleeding, prolonged wound healing, nerve injury, incomplete removal, allergy to anesthesia and recurrence were addressed. Alternatives to ED&C, such as: surgical removal and XRT were also discussed.  Prior to the procedure, the treatment site was clearly identified and confirmed by the patient. All components of Universal Protocol/PAUSE Rule completed.
Render Post-Care Instructions In Note?: yes
Post-Care Instructions: I reviewed with the patient in detail post-care instructions. Patient is to keep the area dry for 48 hours, and not to engage in any swimming until the area is healed. Should the patient develop any fevers, chills, bleeding, severe pain patient will contact the office immediately.

## 2020-09-03 ENCOUNTER — HOSPITAL ENCOUNTER (OUTPATIENT)
Dept: LAB | Facility: MEDICAL CENTER | Age: 85
End: 2020-09-03
Attending: INTERNAL MEDICINE
Payer: MEDICARE

## 2020-09-03 ENCOUNTER — APPOINTMENT (RX ONLY)
Dept: URBAN - METROPOLITAN AREA CLINIC 35 | Facility: CLINIC | Age: 85
Setting detail: DERMATOLOGY
End: 2020-09-03

## 2020-09-03 DIAGNOSIS — L57.0 ACTINIC KERATOSIS: ICD-10-CM

## 2020-09-03 LAB
25(OH)D3 SERPL-MCNC: 53 NG/ML (ref 30–100)
ALBUMIN SERPL BCP-MCNC: 3.3 G/DL (ref 3.2–4.9)
ALBUMIN/GLOB SERPL: 1.1 G/DL
ALP SERPL-CCNC: 141 U/L (ref 30–99)
ALT SERPL-CCNC: 97 U/L (ref 2–50)
ANION GAP SERPL CALC-SCNC: 12 MMOL/L (ref 7–16)
ANISOCYTOSIS BLD QL SMEAR: ABNORMAL
AST SERPL-CCNC: 101 U/L (ref 12–45)
BASOPHILS # BLD AUTO: 1.1 % (ref 0–1.8)
BASOPHILS # BLD: 0.07 K/UL (ref 0–0.12)
BILIRUB SERPL-MCNC: 1.1 MG/DL (ref 0.1–1.5)
BUN SERPL-MCNC: 15 MG/DL (ref 8–22)
CALCIUM SERPL-MCNC: 9 MG/DL (ref 8.5–10.5)
CHLORIDE SERPL-SCNC: 106 MMOL/L (ref 96–112)
CO2 SERPL-SCNC: 20 MMOL/L (ref 20–33)
COMMENT 1642: NORMAL
CREAT SERPL-MCNC: 0.77 MG/DL (ref 0.5–1.4)
EOSINOPHIL # BLD AUTO: 0.21 K/UL (ref 0–0.51)
EOSINOPHIL NFR BLD: 3.3 % (ref 0–6.9)
ERYTHROCYTE [DISTWIDTH] IN BLOOD BY AUTOMATED COUNT: 82 FL (ref 35.9–50)
FASTING STATUS PATIENT QL REPORTED: NORMAL
FERRITIN SERPL-MCNC: 1275 NG/ML (ref 22–322)
GLOBULIN SER CALC-MCNC: 3.1 G/DL (ref 1.9–3.5)
GLUCOSE SERPL-MCNC: 146 MG/DL (ref 65–99)
HBV SURFACE AB SERPL IA-ACNC: <3.5 MIU/ML (ref 0–10)
HBV SURFACE AG SER QL: NORMAL
HCT VFR BLD AUTO: 40.9 % (ref 42–52)
HCV AB SER QL: NORMAL
HGB BLD-MCNC: 13.9 G/DL (ref 14–18)
IMM GRANULOCYTES # BLD AUTO: 0.15 K/UL (ref 0–0.11)
IMM GRANULOCYTES NFR BLD AUTO: 2.3 % (ref 0–0.9)
INR PPP: 1.05 (ref 0.87–1.13)
IRON SATN MFR SERPL: 66 % (ref 15–55)
IRON SERPL-MCNC: 127 UG/DL (ref 50–180)
LYMPHOCYTES # BLD AUTO: 0.6 K/UL (ref 1–4.8)
LYMPHOCYTES NFR BLD: 9.4 % (ref 22–41)
MACROCYTES BLD QL SMEAR: ABNORMAL
MCH RBC QN AUTO: 39.6 PG (ref 27–33)
MCHC RBC AUTO-ENTMCNC: 34 G/DL (ref 33.7–35.3)
MCV RBC AUTO: 116.5 FL (ref 81.4–97.8)
MONOCYTES # BLD AUTO: 1.26 K/UL (ref 0–0.85)
MONOCYTES NFR BLD AUTO: 19.7 % (ref 0–13.4)
MORPHOLOGY BLD-IMP: NORMAL
NEUTROPHILS # BLD AUTO: 4.1 K/UL (ref 1.82–7.42)
NEUTROPHILS NFR BLD: 64.2 % (ref 44–72)
NRBC # BLD AUTO: 0 K/UL
NRBC BLD-RTO: 0 /100 WBC
PLATELET # BLD AUTO: 410 K/UL (ref 164–446)
PMV BLD AUTO: 10.3 FL (ref 9–12.9)
POTASSIUM SERPL-SCNC: 4.3 MMOL/L (ref 3.6–5.5)
PROT SERPL-MCNC: 6.4 G/DL (ref 6–8.2)
PROTHROMBIN TIME: 14 SEC (ref 12–14.6)
RBC # BLD AUTO: 3.51 M/UL (ref 4.7–6.1)
RBC BLD AUTO: PRESENT
SODIUM SERPL-SCNC: 138 MMOL/L (ref 135–145)
TIBC SERPL-MCNC: 191 UG/DL (ref 250–450)
UIBC SERPL-MCNC: 64 UG/DL (ref 110–370)
WBC # BLD AUTO: 6.4 K/UL (ref 4.8–10.8)

## 2020-09-03 PROCEDURE — 83516 IMMUNOASSAY NONANTIBODY: CPT | Mod: 91

## 2020-09-03 PROCEDURE — 82103 ALPHA-1-ANTITRYPSIN TOTAL: CPT

## 2020-09-03 PROCEDURE — 83540 ASSAY OF IRON: CPT

## 2020-09-03 PROCEDURE — 84443 ASSAY THYROID STIM HORMONE: CPT

## 2020-09-03 PROCEDURE — ? PDT: BLUE

## 2020-09-03 PROCEDURE — 86256 FLUORESCENT ANTIBODY TITER: CPT

## 2020-09-03 PROCEDURE — 82784 ASSAY IGA/IGD/IGG/IGM EACH: CPT

## 2020-09-03 PROCEDURE — 86708 HEPATITIS A ANTIBODY: CPT

## 2020-09-03 PROCEDURE — 82785 ASSAY OF IGE: CPT

## 2020-09-03 PROCEDURE — 36415 COLL VENOUS BLD VENIPUNCTURE: CPT

## 2020-09-03 PROCEDURE — 87340 HEPATITIS B SURFACE AG IA: CPT

## 2020-09-03 PROCEDURE — ? COUNSELING

## 2020-09-03 PROCEDURE — 86694 HERPES SIMPLEX NES ANTBDY: CPT

## 2020-09-03 PROCEDURE — 82728 ASSAY OF FERRITIN: CPT

## 2020-09-03 PROCEDURE — 85610 PROTHROMBIN TIME: CPT

## 2020-09-03 PROCEDURE — 96567 PDT DSTR PRMLG LES SKN: CPT

## 2020-09-03 PROCEDURE — 83550 IRON BINDING TEST: CPT

## 2020-09-03 PROCEDURE — 80053 COMPREHEN METABOLIC PANEL: CPT

## 2020-09-03 PROCEDURE — 86803 HEPATITIS C AB TEST: CPT

## 2020-09-03 PROCEDURE — 86645 CMV ANTIBODY IGM: CPT

## 2020-09-03 PROCEDURE — 86038 ANTINUCLEAR ANTIBODIES: CPT

## 2020-09-03 PROCEDURE — 86787 VARICELLA-ZOSTER ANTIBODY: CPT

## 2020-09-03 PROCEDURE — 82306 VITAMIN D 25 HYDROXY: CPT

## 2020-09-03 PROCEDURE — 85025 COMPLETE CBC W/AUTO DIFF WBC: CPT

## 2020-09-03 PROCEDURE — 86706 HEP B SURFACE ANTIBODY: CPT

## 2020-09-03 ASSESSMENT — LOCATION DETAILED DESCRIPTION DERM: LOCATION DETAILED: RIGHT SUPERIOR PARIETAL SCALP

## 2020-09-03 ASSESSMENT — LOCATION SIMPLE DESCRIPTION DERM: LOCATION SIMPLE: SCALP

## 2020-09-03 ASSESSMENT — LOCATION ZONE DERM: LOCATION ZONE: SCALP

## 2020-09-03 NOTE — PROCEDURE: PDT: BLUE
Who Performed The Pdt?: Performed by Nurse, MA or Aesthetician ([id number])
Detail Level: Zone
Medical Necessity: Precancerous Lesions
Occlusion: No
Treatment Number: 1
Anesthesia Type: 1% lidocaine with epinephrine
Incubation Time: 02:00:00
Incubation Start Time: 12:55pm
Illumination Time: 00:16:40
Incubation End Time: 2:55pm
Light Source: Soto-U
Which Photosensitizer Was Used: Levulan
Expiration Date (Optional): 10/2020
Lot # (Optional): [id number]
Debridement Text (Will Only Render In Visit Note If You Select Debridement Option Under Who Performed The Pdt Field): Prior to application of the photodynamic medication the hyperkeratotic lesions were curetted to make them more amenable to therapy.
Comments: Treating greater than 15 lesions
Total Number Of Aks Treated (Optional To Report): 0
Pre-Procedure Text: The treatment areas were cleaned and prepped in the usual fashion.
Consent: Written consent obtained.  The risks were reviewed with the patient including but not limited to: pigmentary changes, pain, blistering, scabbing, redness, and the remote possibility of scarring.
Post-Care Instructions: I reviewed with the patient in detail post-care instructions. Patient is to avoid sunlight for the next 2 days, and wear sun protection. Patients may expect sunburn like redness, discomfort and scabbing.
Show Medical Necessity In Plan?: Yes

## 2020-09-04 LAB
A1AT SERPL-MCNC: 164 MG/DL (ref 90–200)
CMV IGM SERPL IA-ACNC: <8 AU/ML
HAV AB SER QL IA: POSITIVE
MITOCHONDRIA M2 IGG SER-ACNC: 7.2 UNITS (ref 0–24.9)
NUCLEAR IGG SER QL IA: NORMAL

## 2020-09-05 LAB
HSV1+2 IGM SER IA-ACNC: 0.74 IV
IGA SERPL-MCNC: 204 MG/DL (ref 68–408)
IGG SERPL-MCNC: 1210 MG/DL (ref 768–1632)
IGM SERPL-MCNC: 221 MG/DL (ref 35–263)

## 2020-09-07 LAB
GLIADIN PEPTIDE+TTG IGA+IGG SER QL IA: 7 UNITS (ref 0–19)
SMA IGG SER-ACNC: 42 UNITS (ref 0–19)
SMOOTH MUSCLE IGG TITR SER: ABNORMAL {TITER}
VZV IGG SER IA-ACNC: 4.12

## 2020-09-11 LAB
IGE SERPL-ACNC: 19 KU/L
TEST NAME 95000: NORMAL

## 2020-09-14 ENCOUNTER — HOSPITAL ENCOUNTER (OUTPATIENT)
Dept: RADIOLOGY | Facility: MEDICAL CENTER | Age: 85
End: 2020-09-14
Attending: INTERNAL MEDICINE
Payer: MEDICARE

## 2020-09-14 DIAGNOSIS — R94.5 LIVER FUNCTION ABNORMALITY: ICD-10-CM

## 2020-09-14 PROCEDURE — 76981 USE PARENCHYMA: CPT

## 2020-10-06 ENCOUNTER — HOSPITAL ENCOUNTER (OUTPATIENT)
Dept: LAB | Facility: MEDICAL CENTER | Age: 85
End: 2020-10-06
Attending: INTERNAL MEDICINE
Payer: MEDICARE

## 2020-10-06 LAB
HAV IGM SERPL QL IA: NORMAL
HBV CORE IGM SER QL: NORMAL
HBV SURFACE AG SER QL: NORMAL
HCV AB SER QL: NORMAL

## 2020-10-06 PROCEDURE — 36415 COLL VENOUS BLD VENIPUNCTURE: CPT

## 2020-10-06 PROCEDURE — 80074 ACUTE HEPATITIS PANEL: CPT

## 2020-10-23 ENCOUNTER — HOSPITAL ENCOUNTER (OUTPATIENT)
Dept: LAB | Facility: MEDICAL CENTER | Age: 85
End: 2020-10-23
Attending: INTERNAL MEDICINE
Payer: MEDICARE

## 2020-10-23 LAB
ALBUMIN SERPL BCP-MCNC: 3.5 G/DL (ref 3.2–4.9)
ALBUMIN/GLOB SERPL: 1.1 G/DL
ALP SERPL-CCNC: 142 U/L (ref 30–99)
ALT SERPL-CCNC: 89 U/L (ref 2–50)
ANION GAP SERPL CALC-SCNC: 9 MMOL/L (ref 7–16)
ANISOCYTOSIS BLD QL SMEAR: ABNORMAL
AST SERPL-CCNC: 100 U/L (ref 12–45)
BASOPHILS # BLD AUTO: 1.2 % (ref 0–1.8)
BASOPHILS # BLD: 0.08 K/UL (ref 0–0.12)
BILIRUB SERPL-MCNC: 1.3 MG/DL (ref 0.1–1.5)
BUN SERPL-MCNC: 15 MG/DL (ref 8–22)
CALCIUM SERPL-MCNC: 8.9 MG/DL (ref 8.4–10.2)
CHLORIDE SERPL-SCNC: 103 MMOL/L (ref 96–112)
CO2 SERPL-SCNC: 24 MMOL/L (ref 20–33)
COMMENT 1642: NORMAL
CREAT SERPL-MCNC: 0.8 MG/DL (ref 0.5–1.4)
EOSINOPHIL # BLD AUTO: 0.24 K/UL (ref 0–0.51)
EOSINOPHIL NFR BLD: 3.7 % (ref 0–6.9)
ERYTHROCYTE [DISTWIDTH] IN BLOOD BY AUTOMATED COUNT: 78.8 FL (ref 35.9–50)
GIANT PLATELETS BLD QL SMEAR: NORMAL
GLOBULIN SER CALC-MCNC: 3.2 G/DL (ref 1.9–3.5)
GLUCOSE SERPL-MCNC: 147 MG/DL (ref 65–99)
HCT VFR BLD AUTO: 41.5 % (ref 42–52)
HGB BLD-MCNC: 14.2 G/DL (ref 14–18)
IMM GRANULOCYTES # BLD AUTO: 0.21 K/UL (ref 0–0.11)
IMM GRANULOCYTES NFR BLD AUTO: 3.2 % (ref 0–0.9)
LYMPHOCYTES # BLD AUTO: 0.67 K/UL (ref 1–4.8)
LYMPHOCYTES NFR BLD: 10.3 % (ref 22–41)
MACROCYTES BLD QL SMEAR: ABNORMAL
MCH RBC QN AUTO: 39.7 PG (ref 27–33)
MCHC RBC AUTO-ENTMCNC: 34.2 G/DL (ref 33.7–35.3)
MCV RBC AUTO: 115.9 FL (ref 81.4–97.8)
MONOCYTES # BLD AUTO: 1.37 K/UL (ref 0–0.85)
MONOCYTES NFR BLD AUTO: 21.1 % (ref 0–13.4)
NEUTROPHILS # BLD AUTO: 3.92 K/UL (ref 1.82–7.42)
NEUTROPHILS NFR BLD: 60.5 % (ref 44–72)
NRBC # BLD AUTO: 0 K/UL
NRBC BLD-RTO: 0 /100 WBC
PLATELET # BLD AUTO: 470 K/UL (ref 164–446)
PLATELET BLD QL SMEAR: NORMAL
PMV BLD AUTO: 9.3 FL (ref 9–12.9)
POTASSIUM SERPL-SCNC: 4.6 MMOL/L (ref 3.6–5.5)
PROT SERPL-MCNC: 6.7 G/DL (ref 6–8.2)
RBC # BLD AUTO: 3.58 M/UL (ref 4.7–6.1)
RBC BLD AUTO: PRESENT
SODIUM SERPL-SCNC: 136 MMOL/L (ref 135–145)
WBC # BLD AUTO: 6.5 K/UL (ref 4.8–10.8)

## 2020-10-23 PROCEDURE — 36415 COLL VENOUS BLD VENIPUNCTURE: CPT

## 2020-10-23 PROCEDURE — 85025 COMPLETE CBC W/AUTO DIFF WBC: CPT

## 2020-10-23 PROCEDURE — 80053 COMPREHEN METABOLIC PANEL: CPT

## 2020-10-23 PROCEDURE — 81256 HFE GENE: CPT

## 2020-10-29 LAB
HFE GENE MUT ANL BLD/T: NORMAL
HFE P.C282Y BLD/T QL: NEGATIVE
HFE P.H63D BLD/T QL: NEGATIVE
HFE P.S65C BLD/T QL: NEGATIVE

## 2020-11-24 ENCOUNTER — HOSPITAL ENCOUNTER (OUTPATIENT)
Dept: LAB | Facility: MEDICAL CENTER | Age: 85
End: 2020-11-24
Attending: INTERNAL MEDICINE
Payer: MEDICARE

## 2020-11-24 LAB
FOLATE SERPL-MCNC: 11.4 NG/ML
VIT B12 SERPL-MCNC: 1181 PG/ML (ref 211–911)

## 2020-11-24 PROCEDURE — 36415 COLL VENOUS BLD VENIPUNCTURE: CPT

## 2020-11-24 PROCEDURE — 82607 VITAMIN B-12: CPT

## 2020-11-24 PROCEDURE — 82746 ASSAY OF FOLIC ACID SERUM: CPT

## 2021-01-03 ENCOUNTER — APPOINTMENT (OUTPATIENT)
Dept: RADIOLOGY | Facility: MEDICAL CENTER | Age: 86
End: 2021-01-03
Attending: EMERGENCY MEDICINE
Payer: MEDICARE

## 2021-01-03 ENCOUNTER — HOSPITAL ENCOUNTER (EMERGENCY)
Facility: MEDICAL CENTER | Age: 86
End: 2021-01-03
Attending: EMERGENCY MEDICINE | Admitting: EMERGENCY MEDICINE
Payer: MEDICARE

## 2021-01-03 VITALS
SYSTOLIC BLOOD PRESSURE: 121 MMHG | DIASTOLIC BLOOD PRESSURE: 54 MMHG | BODY MASS INDEX: 28.37 KG/M2 | HEART RATE: 64 BPM | WEIGHT: 209.44 LBS | RESPIRATION RATE: 20 BRPM | TEMPERATURE: 101 F | OXYGEN SATURATION: 91 % | HEIGHT: 72 IN

## 2021-01-03 DIAGNOSIS — D75.839 THROMBOCYTOSIS: ICD-10-CM

## 2021-01-03 DIAGNOSIS — D72.829 LEUKOCYTOSIS, UNSPECIFIED TYPE: ICD-10-CM

## 2021-01-03 DIAGNOSIS — R10.84 GENERALIZED ABDOMINAL PAIN: ICD-10-CM

## 2021-01-03 LAB
ALBUMIN SERPL BCP-MCNC: 3.6 G/DL (ref 3.2–4.9)
ALBUMIN/GLOB SERPL: 0.9 G/DL
ALP SERPL-CCNC: 163 U/L (ref 30–99)
ALT SERPL-CCNC: 86 U/L (ref 2–50)
ANION GAP SERPL CALC-SCNC: 11 MMOL/L (ref 7–16)
ANISOCYTOSIS BLD QL SMEAR: ABNORMAL
APPEARANCE UR: CLEAR
AST SERPL-CCNC: 87 U/L (ref 12–45)
BASOPHILS # BLD AUTO: 1 % (ref 0–1.8)
BASOPHILS # BLD: 0.19 K/UL (ref 0–0.12)
BILIRUB SERPL-MCNC: 1.7 MG/DL (ref 0.1–1.5)
BILIRUB UR QL STRIP.AUTO: ABNORMAL
BUN SERPL-MCNC: 17 MG/DL (ref 8–22)
CALCIUM SERPL-MCNC: 9.1 MG/DL (ref 8.4–10.2)
CHLORIDE SERPL-SCNC: 102 MMOL/L (ref 96–112)
CO2 SERPL-SCNC: 22 MMOL/L (ref 20–33)
COLOR UR: YELLOW
COVID ORDER STATUS COVID19: NORMAL
CREAT SERPL-MCNC: 0.91 MG/DL (ref 0.5–1.4)
EOSINOPHIL # BLD AUTO: 0 K/UL (ref 0–0.51)
EOSINOPHIL NFR BLD: 0 % (ref 0–6.9)
ERYTHROCYTE [DISTWIDTH] IN BLOOD BY AUTOMATED COUNT: 79.9 FL (ref 35.9–50)
GLOBULIN SER CALC-MCNC: 3.8 G/DL (ref 1.9–3.5)
GLUCOSE SERPL-MCNC: 172 MG/DL (ref 65–99)
GLUCOSE UR STRIP.AUTO-MCNC: NEGATIVE MG/DL
HCT VFR BLD AUTO: 44.1 % (ref 42–52)
HGB BLD-MCNC: 15.1 G/DL (ref 14–18)
KETONES UR STRIP.AUTO-MCNC: NEGATIVE MG/DL
LEUKOCYTE ESTERASE UR QL STRIP.AUTO: NEGATIVE
LG PLATELETS BLD QL SMEAR: NORMAL
LIPASE SERPL-CCNC: 9 U/L (ref 7–58)
LYMPHOCYTES # BLD AUTO: 0.38 K/UL (ref 1–4.8)
LYMPHOCYTES NFR BLD: 2 % (ref 22–41)
MACROCYTES BLD QL SMEAR: ABNORMAL
MANUAL DIFF BLD: NORMAL
MCH RBC QN AUTO: 39.3 PG (ref 27–33)
MCHC RBC AUTO-ENTMCNC: 34.2 G/DL (ref 33.7–35.3)
MCV RBC AUTO: 114.8 FL (ref 81.4–97.8)
METAMYELOCYTES NFR BLD MANUAL: 1 %
MICRO URNS: ABNORMAL
MONOCYTES # BLD AUTO: 1.32 K/UL (ref 0–0.85)
MONOCYTES NFR BLD AUTO: 7 % (ref 0–13.4)
NEUTROPHILS # BLD AUTO: 16.82 K/UL (ref 1.82–7.42)
NEUTROPHILS NFR BLD: 87 % (ref 44–72)
NEUTS BAND NFR BLD MANUAL: 2 % (ref 0–10)
NITRITE UR QL STRIP.AUTO: NEGATIVE
NRBC # BLD AUTO: 0.02 K/UL
NRBC BLD-RTO: 0.1 /100 WBC
PH UR STRIP.AUTO: 5.5 [PH] (ref 5–8)
PLATELET # BLD AUTO: 623 K/UL (ref 164–446)
PLATELET BLD QL SMEAR: NORMAL
PMV BLD AUTO: 9.7 FL (ref 9–12.9)
POTASSIUM SERPL-SCNC: 4.3 MMOL/L (ref 3.6–5.5)
PROT SERPL-MCNC: 7.4 G/DL (ref 6–8.2)
PROT UR QL STRIP: NEGATIVE MG/DL
RBC # BLD AUTO: 3.84 M/UL (ref 4.7–6.1)
RBC BLD AUTO: PRESENT
RBC UR QL AUTO: NEGATIVE
SODIUM SERPL-SCNC: 135 MMOL/L (ref 135–145)
SP GR UR STRIP.AUTO: 1.01
WBC # BLD AUTO: 18.9 K/UL (ref 4.8–10.8)

## 2021-01-03 PROCEDURE — U0005 INFEC AGEN DETEC AMPLI PROBE: HCPCS

## 2021-01-03 PROCEDURE — 83690 ASSAY OF LIPASE: CPT

## 2021-01-03 PROCEDURE — 74177 CT ABD & PELVIS W/CONTRAST: CPT

## 2021-01-03 PROCEDURE — A9270 NON-COVERED ITEM OR SERVICE: HCPCS | Performed by: EMERGENCY MEDICINE

## 2021-01-03 PROCEDURE — 80053 COMPREHEN METABOLIC PANEL: CPT

## 2021-01-03 PROCEDURE — 85027 COMPLETE CBC AUTOMATED: CPT

## 2021-01-03 PROCEDURE — 76705 ECHO EXAM OF ABDOMEN: CPT

## 2021-01-03 PROCEDURE — 36415 COLL VENOUS BLD VENIPUNCTURE: CPT

## 2021-01-03 PROCEDURE — 71045 X-RAY EXAM CHEST 1 VIEW: CPT

## 2021-01-03 PROCEDURE — 700102 HCHG RX REV CODE 250 W/ 637 OVERRIDE(OP): Performed by: EMERGENCY MEDICINE

## 2021-01-03 PROCEDURE — U0003 INFECTIOUS AGENT DETECTION BY NUCLEIC ACID (DNA OR RNA); SEVERE ACUTE RESPIRATORY SYNDROME CORONAVIRUS 2 (SARS-COV-2) (CORONAVIRUS DISEASE [COVID-19]), AMPLIFIED PROBE TECHNIQUE, MAKING USE OF HIGH THROUGHPUT TECHNOLOGIES AS DESCRIBED BY CMS-2020-01-R: HCPCS

## 2021-01-03 PROCEDURE — C9803 HOPD COVID-19 SPEC COLLECT: HCPCS | Performed by: EMERGENCY MEDICINE

## 2021-01-03 PROCEDURE — 81003 URINALYSIS AUTO W/O SCOPE: CPT

## 2021-01-03 PROCEDURE — 99285 EMERGENCY DEPT VISIT HI MDM: CPT

## 2021-01-03 PROCEDURE — 700117 HCHG RX CONTRAST REV CODE 255: Performed by: EMERGENCY MEDICINE

## 2021-01-03 PROCEDURE — 85007 BL SMEAR W/DIFF WBC COUNT: CPT

## 2021-01-03 RX ORDER — ACETAMINOPHEN 325 MG/1
650 TABLET ORAL ONCE
Status: COMPLETED | OUTPATIENT
Start: 2021-01-03 | End: 2021-01-03

## 2021-01-03 RX ADMIN — ACETAMINOPHEN 650 MG: 325 TABLET, FILM COATED ORAL at 15:21

## 2021-01-03 RX ADMIN — IOHEXOL 100 ML: 350 INJECTION, SOLUTION INTRAVENOUS at 12:16

## 2021-01-03 ASSESSMENT — PAIN DESCRIPTION - DESCRIPTORS: DESCRIPTORS: ACHING

## 2021-01-03 ASSESSMENT — FIBROSIS 4 INDEX: FIB4 SCORE: 1.87

## 2021-01-03 NOTE — ED PROVIDER NOTES
ED Provider Note    CHIEF COMPLAINT  Chief Complaint   Patient presents with   • RUQ Pain   • Nausea       HPI  Edbrian Pat is a 85 y.o. male who presents to the emergency department complaining of right upper right mid quadrant pain.  The pain is dull in nature, comes and goes, no alleviating exacerbating factors, there is no radiation to his back, to his chest to his groin.  In addition he endorses nausea and vomiting for the last several hours.  She does have a history of liver enzyme abnormality, as well as gallstones.  The patient does see a GI specialist and had a an EGD completed that was negative recently.    REVIEW OF SYSTEMS  Positives as above. Pertinent negatives include fever, shakes, chills, sweats, [place], melena, hematemesis all other 10 review of systems are negative    PAST MEDICAL HISTORY  Past Medical History:   Diagnosis Date   • Back pain    • GERD (gastroesophageal reflux disease)    • Hyperlipidemia    • Kidney stone    • PND (post-nasal drip)    • Sleep apnea        FAMILY HISTORY  Noncontributory    SOCIAL HISTORY  Social History     Socioeconomic History   • Marital status:      Spouse name: Not on file   • Number of children: Not on file   • Years of education: Not on file   • Highest education level: Not on file   Occupational History   • Not on file   Social Needs   • Financial resource strain: Not on file   • Food insecurity     Worry: Not on file     Inability: Not on file   • Transportation needs     Medical: Not on file     Non-medical: Not on file   Tobacco Use   • Smoking status: Former Smoker     Packs/day: 1.00     Years: 15.00     Pack years: 15.00     Types: Cigarettes     Quit date: 1980     Years since quittin.0   • Smokeless tobacco: Never Used   Substance and Sexual Activity   • Alcohol use: Yes     Alcohol/week: 0.0 oz     Comment: Nightly   • Drug use: No   • Sexual activity: Not on file   Lifestyle   • Physical activity     Days per week: Not on  file     Minutes per session: Not on file   • Stress: Not on file   Relationships   • Social connections     Talks on phone: Not on file     Gets together: Not on file     Attends L[employer]-day service: Not on file     Active member of club or organization: Not on file     Attends meetings of clubs or organizations: Not on file     Relationship status: Not on file   • Intimate partner violence     Fear of current or ex partner: Not on file     Emotionally abused: Not on file     Physically abused: Not on file     Forced sexual activity: Not on file   Other Topics Concern   •  Service Yes   • Blood Transfusions No   • Caffeine Concern No   • Occupational Exposure No   • Hobby Hazards No   • Sleep Concern No   • Stress Concern No   • Weight Concern No   • Special Diet No   • Back Care No   • Exercise Yes   • Bike Helmet No   • Seat Belt Yes   • Self-Exams No   Social History Narrative   • Not on file       SURGICAL HISTORY  Past Surgical History:   Procedure Laterality Date   • CATARACT EXTRACTION WITH IOL     • OTHER      Nasal surgery   • OTHER ORTHOPEDIC SURGERY  lumbar disectomy 2000       CURRENT MEDICATIONS  Home Medications     Reviewed by Rene Bryant (Pharmacy Tech) on 01/03/21 at 0926  Med List Status: Complete   Medication Last Dose Status   aspirin EC (ECOTRIN) 81 MG Tablet Delayed Response 1/1/2021 Active   ferrous sulfate 325 (65 Fe) MG tablet Not Taking Active   hydroxyurea (HYDREA) 500 MG Cap 1/1/2021 Active   lansoprazole (PREVACID) 30 MG CAPSULE DELAYED RELEASE 1/1/2021 Active   montelukast (SINGULAIR) 10 MG Tab 1/1/2021 Active   Multiple Vitamins-Minerals (OCUVITE PO) 1/1/2021 Active   simvastatin (ZOCOR) 20 MG Tab Not Taking Active   tamsulosin (FLOMAX) 0.4 MG capsule 1/1/2021 Active   vitamin D 4000 units Tab 1/1/2021 Active                ALLERGIES  No Known Allergies    PHYSICAL EXAM  VITAL SIGNS: /54   Pulse 64   Temp (!) 38.3 °C (101 °F) (Temporal)   Resp 20   Ht 1.829  m (6')   Wt 95 kg (209 lb 7 oz)   SpO2 91%   BMI 28.40 kg/m²      Constitutional: Well developed, Well nourished, No acute distress, Non-toxic appearance.   Eyes: PERRLA, EOMI, Conjunctiva normal, No discharge.   Cardiovascular: Normal heart rate, Normal rhythm, No murmurs, No rubs, No gallops, and intact distal pulses.   Thorax & Lungs:  No respiratory distress, no rales, no rhonchi, No wheezing, No chest wall tenderness.   Abdomen: Bowel sounds normal, Soft, slight mid abdominal right-sided tenderness, negative Robbins sign, negative McBurney point tenderness, No guarding, No rebound, No pulsatile masses.   Skin: Warm, Dry, No erythema, No rash.   Extremities: Full range of motion, no deformity, no edema.  Neurologic: Alert & oriented x 3, No focal deficits noted, acting appropriately on exam.  Psychiatric: Affect normal for clinical presentation.      RADIOLOGY/PROCEDURES  DX-CHEST-LIMITED (1 VIEW)   Final Result      Minimal bibasilar atelectasis. No focal consolidation or pleural effusions.      US-RUQ   Final Result         1. Cholelithiasis. No evidence of cholecystitis.   2. Increased hepatic echogenicity, suggestive of steatosis and/or hepatocellular disease.      CT-ABDOMEN-PELVIS WITH   Final Result      1.  No acute inflammatory process in the abdomen or pelvis.   2.  Cirrhotic liver and splenomegaly. No hepatic mass lesions are detected.   3.  Cholelithiasis. No cholecystitis.   4.  Trace bilateral pleural effusions and associated atelectasis.   5.  Bilateral nonobstructing nephrolithiasis.        Results for orders placed or performed during the hospital encounter of 01/03/21   CBC WITH DIFFERENTIAL   Result Value Ref Range    WBC 18.9 (H) 4.8 - 10.8 K/uL    RBC 3.84 (L) 4.70 - 6.10 M/uL    Hemoglobin 15.1 14.0 - 18.0 g/dL    Hematocrit 44.1 42.0 - 52.0 %    .8 (H) 81.4 - 97.8 fL    MCH 39.3 (H) 27.0 - 33.0 pg    MCHC 34.2 33.7 - 35.3 g/dL    RDW 79.9 (H) 35.9 - 50.0 fL    Platelet Count 623  (H) 164 - 446 K/uL    MPV 9.7 9.0 - 12.9 fL    Neutrophils-Polys 87.00 (H) 44.00 - 72.00 %    Lymphocytes 2.00 (L) 22.00 - 41.00 %    Monocytes 7.00 0.00 - 13.40 %    Eosinophils 0.00 0.00 - 6.90 %    Basophils 1.00 0.00 - 1.80 %    Nucleated RBC 0.10 /100 WBC    Neutrophils (Absolute) 16.82 (H) 1.82 - 7.42 K/uL    Lymphs (Absolute) 0.38 (L) 1.00 - 4.80 K/uL    Monos (Absolute) 1.32 (H) 0.00 - 0.85 K/uL    Eos (Absolute) 0.00 0.00 - 0.51 K/uL    Baso (Absolute) 0.19 (H) 0.00 - 0.12 K/uL    NRBC (Absolute) 0.02 K/uL    Anisocytosis 3+ (A)     Macrocytosis 3+ (A)    COMP METABOLIC PANEL   Result Value Ref Range    Sodium 135 135 - 145 mmol/L    Potassium 4.3 3.6 - 5.5 mmol/L    Chloride 102 96 - 112 mmol/L    Co2 22 20 - 33 mmol/L    Anion Gap 11.0 7.0 - 16.0    Glucose 172 (H) 65 - 99 mg/dL    Bun 17 8 - 22 mg/dL    Creatinine 0.91 0.50 - 1.40 mg/dL    Calcium 9.1 8.4 - 10.2 mg/dL    AST(SGOT) 87 (H) 12 - 45 U/L    ALT(SGPT) 86 (H) 2 - 50 U/L    Alkaline Phosphatase 163 (H) 30 - 99 U/L    Total Bilirubin 1.7 (H) 0.1 - 1.5 mg/dL    Albumin 3.6 3.2 - 4.9 g/dL    Total Protein 7.4 6.0 - 8.2 g/dL    Globulin 3.8 (H) 1.9 - 3.5 g/dL    A-G Ratio 0.9 g/dL   LIPASE   Result Value Ref Range    Lipase 9 7 - 58 U/L   URINALYSIS (UA)    Specimen: Urine   Result Value Ref Range    Color Yellow     Character Clear     Specific Gravity 1.010 <1.035    Ph 5.5 5.0 - 8.0    Glucose Negative Negative mg/dL    Ketones Negative Negative mg/dL    Protein Negative Negative mg/dL    Bilirubin Small (A) Negative    Nitrite Negative Negative    Leukocyte Esterase Negative Negative    Occult Blood Negative Negative    Micro Urine Req see below    ESTIMATED GFR   Result Value Ref Range    GFR If African American >60 >60 mL/min/1.73 m 2    GFR If Non African American >60 >60 mL/min/1.73 m 2   DIFFERENTIAL MANUAL   Result Value Ref Range    Bands-Stabs 2.00 0.00 - 10.00 %    Metamyelocytes 1.00 %    Manual Diff Status PERFORMED    PLATELET  ESTIMATE   Result Value Ref Range    Plt Estimation Increased    MORPHOLOGY   Result Value Ref Range    RBC Morphology Present     Large Platelets 1+    COVID/SARS CoV-2 PCR    Specimen: Nasopharyngeal; Respirate   Result Value Ref Range    COVID Order Status Received    SARS-CoV-2, PCR (In-House)   Result Value Ref Range    SARS-CoV-2 Source NP Swab          COURSE & MEDICAL DECISION MAKING  Pertinent Labs & Imaging studies reviewed. (See chart for details)  This is a pleasant 84-year-old gentleman complains of diffuse abdominal discomfort earlier today nausea vomiting.  Here in the emergency department, thorough evaluation ensued.  CT scan of the abdomen pelvis reveals no evidence of acute cholecystitis, appendicitis, small bowel obstruction, diverticulitis or intra-abdominal infection.  He does have chronic elevation of his LFTs and has been following with GI with this as well as chronic elevation of his total bilirubin.  The patient has a normal lipase as well.  Ultrasound was negative for cholecystitis although does have cholelithiasis.  In addition patient did not have right upper quadrant tenderness he had more right mid to lower quadrant tenderness.  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  Continue evaluation for the patient for source of infection urinalysis is negative.  The patient is hypoxic I do not believe he has pneumonia.  I had a long conversation with the patient and the patient's wife concerning his lab findings.  He states now he feels under percent normal like to go home.  Does have a fever, he has no abdominal discomfort, has had no nausea or vomiting, CT and ultrasound did not show acute abnormality, urinalysis is negative as well.  Patient did spike a fever 100.5 here and received Tylenol for it.  X-ray was completed revealed no evidence of focal pneumonia.  At this point  there is a very high suspicion of Covid infection.  I had a long conversation with the patient and the patient's wife and they both want to go home, they do not want to stay here in the hospital, the patient understands the risk of increasing infection and becoming oxygen dependent or having a very bad outcome from Covid is positive.  They do not want to stay here and they like to go home.  The patient has no evidence of respiratory distress, his saturation oxygen 91 to 92% which is good for him as he wears oxygen, he is speaking in full sentences.    A Covid test has been obtained he is to follow-up with the test on my chart and return to the emergency department for increasing symptomatology.  The patient has no evidence of meningitis, toxicity, hemodynamic instability on discharge.  FINAL IMPRESSION     1. Generalized abdominal pain Active   2. Thrombocytosis (HCC) Active   3. Leukocytosis, unspecified type Active       DISPOSITION:  Patient will be discharged home in stable condition.    FOLLOW UP:  [facility], Emergency Dept  [address]  [facility] [address]  [phone]        Kia Thompson P.A.-C.  [address]  [address]  [facility] [address]  [phone]    Schedule an appointment as soon as possible for a visit in 3 days        Electronically signed by: Omid Shipman D.O., 1/3/2021 9:21 AM

## 2021-01-03 NOTE — DISCHARGE INSTRUCTIONS
You have been tested for COVID-19 today.  Your results are pending.  Please act as if these results are positive and self isolate until you receive the results.  Make sure you still wear a mask, social distance and practice good hand hygiene no matter the result.  In order to receive the results you will need to log into your MyChart on the [email or web address] website.  If you do not have an account you can create an account.  You can login or create an account for my chart at "[email or web address].  If your symptoms worsen to a point that you become so short of breath you can only walk a very short distances prior to fatigue or feel you were unable to manage your symptoms at home, please return to the emergency department.  For a more objective approach you can buy a pulse oximeter online.  [employer] has multiple to choose from.  If your oxygen saturation in these devices is persistently below 90% please return to the ER.    Although at this point I do not believe the patient has an acute intra-abdominal process that requires emergent surgical intervention there is a possibility that the patient is experiencing an early infection that is in evolution that may require further evaluation and possible surgical consultation. Therefore, strict return precautions have been given to return to the emergency department within 24 hours if the patient has any remaining abdominal pain and to return sooner for increasing pain, uncontrolled nausea or vomiting, fevers or change in symptoms. The patient will followup with their primary care physician within 3 days for re-evaluation.

## 2021-01-03 NOTE — ED TRIAGE NOTES
Pt presents complaining of RUQ abdominal pain with associated episodes of nausea but no vomiting recurring since last night.   His medical history is significant for the following conditions:    • *Acute encephalopathy   • Weakness   • Lower back pain   • Thrombocytosis (HCC)   • Benign prostatic hyperplasia without lower urinary tract symptoms   • Mixed hyperlipidemia   • Obstructive sleep apnea   • Gastroesophageal reflux disease   • Obesity (BMI 30.0-34.9)     Chief Complaint   Patient presents with   • RUQ Pain   • Nausea   /60   Pulse 60   Temp 36.7 °C (98 °F) (Temporal)   Resp 20   Ht 1.829 m (6')   Wt 95 kg (209 lb 7 oz)   SpO2 93%   BMI 28.40 kg/m²

## 2021-01-03 NOTE — ED NOTES
Med rec updated and complete  Allergies reviewed  Interviewed pt with wife at bedside with permission from pt  Pts wife reports no antibiotics in the last 2 weeks

## 2021-01-04 LAB
SARS-COV-2 RNA RESP QL NAA+PROBE: NOTDETECTED
SPECIMEN SOURCE: NORMAL

## 2021-01-11 DIAGNOSIS — Z23 NEED FOR VACCINATION: ICD-10-CM

## 2021-01-17 ENCOUNTER — IMMUNIZATION (OUTPATIENT)
Dept: FAMILY PLANNING/WOMEN'S HEALTH CLINIC | Facility: IMMUNIZATION CENTER | Age: 86
End: 2021-01-17
Attending: INTERNAL MEDICINE
Payer: MEDICARE

## 2021-01-17 DIAGNOSIS — Z23 ENCOUNTER FOR VACCINATION: Primary | ICD-10-CM

## 2021-01-17 DIAGNOSIS — Z23 NEED FOR VACCINATION: ICD-10-CM

## 2021-01-17 PROCEDURE — 91301 MODERNA SARS-COV-2 VACCINE: CPT

## 2021-01-17 PROCEDURE — 0011A MODERNA SARS-COV-2 VACCINE: CPT

## 2021-01-18 ENCOUNTER — HOSPITAL ENCOUNTER (OUTPATIENT)
Dept: LAB | Facility: MEDICAL CENTER | Age: 86
End: 2021-01-18
Attending: INTERNAL MEDICINE
Payer: MEDICARE

## 2021-01-18 LAB
ALBUMIN SERPL BCP-MCNC: 3.1 G/DL (ref 3.2–4.9)
ALBUMIN/GLOB SERPL: 0.8 G/DL
ALP SERPL-CCNC: 169 U/L (ref 30–99)
ALT SERPL-CCNC: 69 U/L (ref 2–50)
ANION GAP SERPL CALC-SCNC: 10 MMOL/L (ref 7–16)
AST SERPL-CCNC: 92 U/L (ref 12–45)
BASOPHILS # BLD AUTO: 1.1 % (ref 0–1.8)
BASOPHILS # BLD: 0.12 K/UL (ref 0–0.12)
BILIRUB SERPL-MCNC: 1.1 MG/DL (ref 0.1–1.5)
BUN SERPL-MCNC: 13 MG/DL (ref 8–22)
CALCIUM SERPL-MCNC: 8.9 MG/DL (ref 8.4–10.2)
CHLORIDE SERPL-SCNC: 103 MMOL/L (ref 96–112)
CO2 SERPL-SCNC: 23 MMOL/L (ref 20–33)
COMMENT 1642: NORMAL
CREAT SERPL-MCNC: 0.82 MG/DL (ref 0.5–1.4)
EOSINOPHIL # BLD AUTO: 0.27 K/UL (ref 0–0.51)
EOSINOPHIL NFR BLD: 2.6 % (ref 0–6.9)
ERYTHROCYTE [DISTWIDTH] IN BLOOD BY AUTOMATED COUNT: 76.9 FL (ref 35.9–50)
FERRITIN SERPL-MCNC: 1967 NG/ML (ref 22–322)
GLOBULIN SER CALC-MCNC: 3.8 G/DL (ref 1.9–3.5)
GLUCOSE SERPL-MCNC: 147 MG/DL (ref 65–99)
HCT VFR BLD AUTO: 41.4 % (ref 42–52)
HGB BLD-MCNC: 14 G/DL (ref 14–18)
IMM GRANULOCYTES # BLD AUTO: 0.51 K/UL (ref 0–0.11)
IMM GRANULOCYTES NFR BLD AUTO: 4.8 % (ref 0–0.9)
IRON SATN MFR SERPL: 68 % (ref 15–55)
IRON SERPL-MCNC: 118 UG/DL (ref 50–180)
LYMPHOCYTES # BLD AUTO: 0.78 K/UL (ref 1–4.8)
LYMPHOCYTES NFR BLD: 7.4 % (ref 22–41)
MCH RBC QN AUTO: 38.8 PG (ref 27–33)
MCHC RBC AUTO-ENTMCNC: 33.8 G/DL (ref 33.7–35.3)
MCV RBC AUTO: 114.7 FL (ref 81.4–97.8)
MONOCYTES # BLD AUTO: 1.24 K/UL (ref 0–0.85)
MONOCYTES NFR BLD AUTO: 11.7 % (ref 0–13.4)
NEUTROPHILS # BLD AUTO: 7.65 K/UL (ref 1.82–7.42)
NEUTROPHILS NFR BLD: 72.4 % (ref 44–72)
NRBC # BLD AUTO: 0 K/UL
NRBC BLD-RTO: 0 /100 WBC
PLATELET # BLD AUTO: 647 K/UL (ref 164–446)
PLATELET BLD QL SMEAR: NORMAL
PMV BLD AUTO: 9.7 FL (ref 9–12.9)
POLYCHROMASIA BLD QL SMEAR: NORMAL
POTASSIUM SERPL-SCNC: 4.6 MMOL/L (ref 3.6–5.5)
PROT SERPL-MCNC: 6.9 G/DL (ref 6–8.2)
RBC # BLD AUTO: 3.61 M/UL (ref 4.7–6.1)
RBC BLD AUTO: PRESENT
SODIUM SERPL-SCNC: 136 MMOL/L (ref 135–145)
TIBC SERPL-MCNC: 174 UG/DL (ref 250–450)
TOXIC GRANULES BLD QL SMEAR: SLIGHT
UIBC SERPL-MCNC: 56 UG/DL (ref 110–370)
WBC # BLD AUTO: 10.6 K/UL (ref 4.8–10.8)

## 2021-01-18 PROCEDURE — 85025 COMPLETE CBC W/AUTO DIFF WBC: CPT

## 2021-01-18 PROCEDURE — 80053 COMPREHEN METABOLIC PANEL: CPT

## 2021-01-18 PROCEDURE — 36415 COLL VENOUS BLD VENIPUNCTURE: CPT

## 2021-01-18 PROCEDURE — 83540 ASSAY OF IRON: CPT

## 2021-01-18 PROCEDURE — 82728 ASSAY OF FERRITIN: CPT

## 2021-01-18 PROCEDURE — 83550 IRON BINDING TEST: CPT

## 2021-02-14 ENCOUNTER — IMMUNIZATION (OUTPATIENT)
Dept: FAMILY PLANNING/WOMEN'S HEALTH CLINIC | Facility: IMMUNIZATION CENTER | Age: 86
End: 2021-02-14
Attending: INTERNAL MEDICINE
Payer: MEDICARE

## 2021-02-14 DIAGNOSIS — Z23 ENCOUNTER FOR VACCINATION: Primary | ICD-10-CM

## 2021-02-14 PROCEDURE — 0012A MODERNA SARS-COV-2 VACCINE: CPT

## 2021-02-14 PROCEDURE — 91301 MODERNA SARS-COV-2 VACCINE: CPT

## 2021-02-18 ENCOUNTER — APPOINTMENT (RX ONLY)
Dept: URBAN - METROPOLITAN AREA CLINIC 35 | Facility: CLINIC | Age: 86
Setting detail: DERMATOLOGY
End: 2021-02-18

## 2021-02-18 DIAGNOSIS — Z71.89 OTHER SPECIFIED COUNSELING: ICD-10-CM

## 2021-02-18 DIAGNOSIS — L57.0 ACTINIC KERATOSIS: ICD-10-CM

## 2021-02-18 DIAGNOSIS — L82.1 OTHER SEBORRHEIC KERATOSIS: ICD-10-CM

## 2021-02-18 DIAGNOSIS — Z85.828 PERSONAL HISTORY OF OTHER MALIGNANT NEOPLASM OF SKIN: ICD-10-CM

## 2021-02-18 DIAGNOSIS — L81.4 OTHER MELANIN HYPERPIGMENTATION: ICD-10-CM

## 2021-02-18 DIAGNOSIS — D22 MELANOCYTIC NEVI: ICD-10-CM

## 2021-02-18 PROBLEM — D22.5 MELANOCYTIC NEVI OF TRUNK: Status: ACTIVE | Noted: 2021-02-18

## 2021-02-18 PROCEDURE — ? ADDITIONAL NOTES

## 2021-02-18 PROCEDURE — 99213 OFFICE O/P EST LOW 20 MIN: CPT | Mod: 25

## 2021-02-18 PROCEDURE — ? LIQUID NITROGEN

## 2021-02-18 PROCEDURE — ? COUNSELING

## 2021-02-18 PROCEDURE — 17003 DESTRUCT PREMALG LES 2-14: CPT

## 2021-02-18 PROCEDURE — 17000 DESTRUCT PREMALG LESION: CPT

## 2021-02-18 ASSESSMENT — LOCATION DETAILED DESCRIPTION DERM
LOCATION DETAILED: LEFT POSTERIOR SHOULDER
LOCATION DETAILED: RIGHT SUPERIOR HELIX
LOCATION DETAILED: LEFT MEDIAL ZYGOMA
LOCATION DETAILED: RIGHT DISTAL DORSAL FOREARM
LOCATION DETAILED: LEFT MEDIAL TEMPLE
LOCATION DETAILED: RIGHT POSTERIOR SHOULDER
LOCATION DETAILED: LEFT LATERAL FOREHEAD
LOCATION DETAILED: LEFT SUPERIOR FOREHEAD
LOCATION DETAILED: LEFT MEDIAL MALAR CHEEK
LOCATION DETAILED: RIGHT SUPERIOR LATERAL FOREHEAD
LOCATION DETAILED: MEDIAL FRONTAL SCALP
LOCATION DETAILED: RIGHT MEDIAL UPPER BACK
LOCATION DETAILED: LEFT CENTRAL ZYGOMA
LOCATION DETAILED: LEFT DISTAL DORSAL FOREARM
LOCATION DETAILED: INFERIOR THORACIC SPINE
LOCATION DETAILED: EPIGASTRIC SKIN

## 2021-02-18 ASSESSMENT — LOCATION SIMPLE DESCRIPTION DERM
LOCATION SIMPLE: ABDOMEN
LOCATION SIMPLE: LEFT ZYGOMA
LOCATION SIMPLE: UPPER BACK
LOCATION SIMPLE: RIGHT FOREARM
LOCATION SIMPLE: LEFT FOREARM
LOCATION SIMPLE: LEFT TEMPLE
LOCATION SIMPLE: LEFT FOREHEAD
LOCATION SIMPLE: RIGHT UPPER BACK
LOCATION SIMPLE: FRONTAL SCALP
LOCATION SIMPLE: LEFT SHOULDER
LOCATION SIMPLE: RIGHT EAR
LOCATION SIMPLE: RIGHT SHOULDER
LOCATION SIMPLE: LEFT CHEEK
LOCATION SIMPLE: RIGHT FOREHEAD

## 2021-02-18 ASSESSMENT — LOCATION ZONE DERM
LOCATION ZONE: EAR
LOCATION ZONE: FACE
LOCATION ZONE: SCALP
LOCATION ZONE: TRUNK
LOCATION ZONE: ARM

## 2021-02-18 NOTE — PROCEDURE: ADDITIONAL NOTES
Detail Level: Simple
Render Risk Assessment In Note?: no
Additional Notes: Will plan on curettage for hypertrophic lesions, patient scheduled

## 2021-02-18 NOTE — PROCEDURE: LIQUID NITROGEN
Post-Care Instructions: I reviewed with the patient in detail post-care instructions. Patient is to wear sunprotection, and avoid picking at any of the treated lesions. Pt may apply Vaseline to crusted or scabbing areas.
Number Of Freeze-Thaw Cycles: 1 freeze-thaw cycle
Detail Level: Detailed
Duration Of Freeze Thaw-Cycle (Seconds): 10
Render Post-Care Instructions In Note?: no
Consent: The patient's consent was obtained including but not limited to risks of crusting, scabbing, blistering, scarring, darker or lighter pigmentary change, recurrence, incomplete removal and infection.

## 2021-02-26 ENCOUNTER — OFFICE VISIT (OUTPATIENT)
Dept: NEUROLOGY | Facility: MEDICAL CENTER | Age: 86
End: 2021-02-26
Attending: STUDENT IN AN ORGANIZED HEALTH CARE EDUCATION/TRAINING PROGRAM
Payer: MEDICARE

## 2021-02-26 VITALS
BODY MASS INDEX: 28.76 KG/M2 | HEIGHT: 72 IN | DIASTOLIC BLOOD PRESSURE: 62 MMHG | HEART RATE: 77 BPM | WEIGHT: 212.3 LBS | TEMPERATURE: 97.5 F | SYSTOLIC BLOOD PRESSURE: 118 MMHG | OXYGEN SATURATION: 94 %

## 2021-02-26 DIAGNOSIS — G11.9 CEREBELLAR ATAXIA (HCC): ICD-10-CM

## 2021-02-26 DIAGNOSIS — M48.04 SPINAL STENOSIS OF THORACIC REGION: ICD-10-CM

## 2021-02-26 DIAGNOSIS — M47.896 OTHER OSTEOARTHRITIS OF SPINE, LUMBAR REGION: ICD-10-CM

## 2021-02-26 DIAGNOSIS — R27.8 SENSORY ATAXIA: ICD-10-CM

## 2021-02-26 DIAGNOSIS — Z91.81 HISTORY OF FALL: ICD-10-CM

## 2021-02-26 DIAGNOSIS — R93.89 ABNORMAL MRI: ICD-10-CM

## 2021-02-26 DIAGNOSIS — R40.4 TRANSIENT ALTERATION OF AWARENESS: ICD-10-CM

## 2021-02-26 DIAGNOSIS — G89.29 CHRONIC MIDLINE LOW BACK PAIN WITHOUT SCIATICA: ICD-10-CM

## 2021-02-26 DIAGNOSIS — Z98.890 HISTORY OF BACK SURGERY: ICD-10-CM

## 2021-02-26 DIAGNOSIS — M50.30 OTHER CERVICAL DISC DEGENERATION, UNSPECIFIED CERVICAL REGION: ICD-10-CM

## 2021-02-26 DIAGNOSIS — D75.839 THROMBOCYTOSIS: ICD-10-CM

## 2021-02-26 DIAGNOSIS — R79.89 OTHER SPECIFIED ABNORMAL FINDINGS OF BLOOD CHEMISTRY: ICD-10-CM

## 2021-02-26 DIAGNOSIS — M54.2 CERVICALGIA: ICD-10-CM

## 2021-02-26 DIAGNOSIS — Z87.898 HISTORY OF URINARY INCONTINENCE: ICD-10-CM

## 2021-02-26 DIAGNOSIS — G47.33 OBSTRUCTIVE SLEEP APNEA: ICD-10-CM

## 2021-02-26 DIAGNOSIS — G62.9 POLYNEUROPATHY: ICD-10-CM

## 2021-02-26 DIAGNOSIS — R53.1 ACUTE WEAKNESS: ICD-10-CM

## 2021-02-26 DIAGNOSIS — G93.89 CEREBRAL VENTRICULOMEGALY: ICD-10-CM

## 2021-02-26 DIAGNOSIS — M54.50 CHRONIC MIDLINE LOW BACK PAIN WITHOUT SCIATICA: ICD-10-CM

## 2021-02-26 DIAGNOSIS — R29.898 WEAKNESS OF BOTH LEGS: ICD-10-CM

## 2021-02-26 DIAGNOSIS — M51.34 OTHER INTERVERTEBRAL DISC DEGENERATION, THORACIC REGION: ICD-10-CM

## 2021-02-26 PROCEDURE — 99215 OFFICE O/P EST HI 40 MIN: CPT | Performed by: STUDENT IN AN ORGANIZED HEALTH CARE EDUCATION/TRAINING PROGRAM

## 2021-02-26 PROCEDURE — 99211 OFF/OP EST MAY X REQ PHY/QHP: CPT | Performed by: STUDENT IN AN ORGANIZED HEALTH CARE EDUCATION/TRAINING PROGRAM

## 2021-02-26 PROCEDURE — G2212 PROLONG OUTPT/OFFICE VIS: HCPCS | Performed by: STUDENT IN AN ORGANIZED HEALTH CARE EDUCATION/TRAINING PROGRAM

## 2021-02-26 ASSESSMENT — FIBROSIS 4 INDEX: FIB4 SCORE: 1.42

## 2021-02-26 ASSESSMENT — PATIENT HEALTH QUESTIONNAIRE - PHQ9: CLINICAL INTERPRETATION OF PHQ2 SCORE: 0

## 2021-02-26 NOTE — PROGRESS NOTES
GENERAL NEUROLOGY INITIAL ENCOUNTER  REFERRING PROVIDER:  Kia Thompson P.A.-C.  [address]  [address]  [place] [address]      CHIEF COMPLAINT(S): LE weakness, falls    History of present illness:  Han Pat 85 y.o. male presents today for evaluation of lower extremity weakness, impaired balance, and regular falls.    Every started about a year ago. Had a fall a year ago and could never find out why this happened. His legs wouldn't work. He couldn't stand up.Seemed to be sudden onset weakness, as he was fine the night before. Went to rehab for a weak after being hospitalized and worked up and was sent home with no real answers or follow-up. Some incontinence in beginning but this is no longer happening.    Severe sleep apnea. Going back to get mask and setting adjusted.    Worsening forgetfulness but patient and wife dont think that is an issue because it is stable. He can balance checkup and    Lost weight but thinks its his wife's food.     Had severe pain in lower back years ago and is s/p Microdysctomy years ago in lower back for treatment of the pain which helped. Still had diffuse midline back pain with no definite radicular symptoms but it is more tolerable. No Lermitte's symptoms.    Patient has chronic Consitpation, no diarrhea. NO urinary incontinence.     Has mild tremor on the right worsened when trying too use it. Handwriting has become messier and smaller. Used to have good penmanship.    Hygiene good.    Last fall was months go.    Was a regular drinker of alcohol, daily, multiple shots of liquur per day. Stopped drinking this months 3 years ago. Currently, he has a  Glass of wine per day and a beer sometimes in afternoon. Non smoker. NO illciits.    No fever or chills. No myalgias. No rashes. No cough, CP, or SOB. Not working with PT/OT.        Patient's PMH, PSH, FH, and SH were reviewed.    Medications and allergies were reviewed.        Past medical history:   Past Medical  History:   Diagnosis Date   • Back pain    • GERD (gastroesophageal reflux disease)    • Hyperlipidemia    • Kidney stone    • PND (post-nasal drip)    • Sleep apnea        Past surgical history:   Past Surgical History:   Procedure Laterality Date   • CATARACT EXTRACTION WITH IOL     • OTHER      Nasal surgery   • OTHER ORTHOPEDIC SURGERY  lumbar disectomy        Family history:   Family History   Problem Relation Age of Onset   • Other Father         Heart problems; unspecified   • Sleep Apnea Father    • Other Mother         Aneurism   • Sleep Apnea Brother    • Other Brother         Heart problems; unspecified   • Sleep Apnea Son    • Sleep Apnea Son        Social history:   Social History     Socioeconomic History   • Marital status:      Spouse name: Not on file   • Number of children: Not on file   • Years of education: Not on file   • Highest education level: Not on file   Occupational History   • Not on file   Tobacco Use   • Smoking status: Former Smoker     Packs/day: 1.00     Years: 15.00     Pack years: 15.00     Types: Cigarettes     Quit date: 1980     Years since quittin.1   • Smokeless tobacco: Never Used   Substance and Sexual Activity   • Alcohol use: Yes     Alcohol/week: 0.0 oz     Comment: Nightly   • Drug use: No   • Sexual activity: Not on file   Other Topics Concern   •  Service Yes   • Blood Transfusions No   • Caffeine Concern No   • Occupational Exposure No   • Hobby Hazards No   • Sleep Concern No   • Stress Concern No   • Weight Concern No   • Special Diet No   • Back Care No   • Exercise Yes   • Bike Helmet No   • Seat Belt Yes   • Self-Exams No   Social History Narrative   • Not on file     Social Determinants of Health     Financial Resource Strain:    • Difficulty of Paying Living Expenses:    Food Insecurity:    • Worried About Running Out of Food in the Last Year:    • Ran Out of Food in the Last Year:    Transportation Needs:    • Lack of  Transportation (Medical):    • Lack of Transportation (Non-Medical):    Physical Activity:    • Days of Exercise per Week:    • Minutes of Exercise per Session:    Stress:    • Feeling of Stress :    Social Connections:    • Frequency of Communication with Friends and Family:    • Frequency of Social Gatherings with Friends and Family:    • Attends [employer] Services:    • Active Member of Clubs or Organizations:    • Attends Club or Organization Meetings:    • Marital Status:    Intimate Partner Violence:    • Fear of Current or Ex-Partner:    • Emotionally Abused:    • Physically Abused:    • Sexually Abused:        Current medications:   Current Outpatient Medications   Medication   • aspirin EC (ECOTRIN) 81 MG Tablet Delayed Response   • lansoprazole (PREVACID) 30 MG CAPSULE DELAYED RELEASE   • montelukast (SINGULAIR) 10 MG Tab   • vitamin D 4000 units Tab   • Multiple Vitamins-Minerals (OCUVITE PO)   • hydroxyurea (HYDREA) 500 MG Cap     No current facility-administered medications for this visit.       Medication Allergy:  No Known Allergies      Review of systems:   Pertinent positives and negatives are as outlined above      Physical examination:   Vitals:    02/26/21 1248   BP: 118/62   Pulse: 77   Temp: 36.4 °C (97.5 °F)   SpO2: 94%       General: Patient in no acute distress, pleasant and cooperative.  HEENT: Normocephalic, no signs of acute trauma.   Neck: appears supple, here is normal range of motion. No tenderness on exam.   Chest: clear to auscultation. Symmetrical chest rise with inhalation. No cough.   CV: RRR, no murmurs.   Skin: no signs of acute rashes or trauma.   Musculoskeletal: joints exhibit diminshed ROM especially at knees and ankle.  Psychiatric: pertinent positives as discussed above    NEUROLOGICAL EXAM:   Mental status:  orientation: Awake, alert and oriented to self, month, year, situation.  Attention: Intact  Speech and language: speech is clear and fluent. The patient is able to  name, repeat and comprehend. No word substitions or paraphasic errors  Memory: There is intact recollection of recent and remote events.   Cranial nerve exam:   I: smell Not tested   II: visual acuity  Not Tested   II: Fundoscpic Unable to visualize   II: visual fields Full to confrontation  Visual neglect: absent   II: pupils Equal, round, reactive to light   III,VII: ptosis None   III,IV,VI: extraocular muscles  EOMI   V: mastication Normal   V: facial light touch sensation  Normal   V,VII: corneal reflex  Not tested   VII: facial muscle function - upper  Normal   VII: facial muscle function - lower Normal   VIII: hearing Not tested   IX: soft palate elevation  Normal   IX,X: gag reflex Not tested   XI: trapezius strength  NT   XI: sternocleidomastoid strength NT   XI: neck flexion strength  NT   XII: tongue  Protrudes Midline     Motor exam:   • Strength in the b/l UE intact. 4/5 hip flexor weakness b/l. 4+/5 right knee extensor weakness. 4+/5 b/l knee flexor weakness. 4+/5 Left dorsiflexor weakness. 4+/5 right eversion.  • R>L elbow and wrist rigidity  • Mild bilateral intention tremor on FNF  • No muscle fasciculation  • Finger tapping normal  • No bradykinesia  • Hips and knees are rigid  • No areas of atrophy   Sensory exam Mod to severe vibratory sensory loss in a gradient fashion below the knees. Diminshed pain perception and temporature sensation in a patchy distrubution bilaterally. No definite dermatomal pattern. No sensory level detected.  Deep tendon reflexes: absent AJ, and patella bilaterally. Trace to 1+ bi, tri, and BR b/l. . Plantar responses are mute .   Coordination: B/L intention tremor on FNF  Gait:   • The patient was able to get up from seated position with some difficulty, needing to press off the chair handles to stand.   • Somewhat wide-based slowed gait. Somewhat of a shuffling quality but overral appeared balanced,. No hesitancy.  Short steps/stride.  • Good arm swing bilateral  • Extra  steps on turning  • Romberg exam present          ANCILLARY DATA REVIEWED:       Lab Data Review:  Reviewed    Records reviewed:   Reviewed    Imaging:   Reviewed    EEG: 3/25/19:                1. Diffuse nonspecific cortical dysfunction - mild degree                  2. Mild focal cortical irritability over frontal R>L --this patten could be interictal - advise clinical correlation regarding further management. This does not necessary mean the patient needs seizure medication        ASSESSMENT, PLAN, EDUCATION/COUNSELING:  This is an 85-year-old male who arrives to my clinic to establish care for bilateral lower extremity weakness, impaired balance, ongoing falls, that seem to occur approximately a year ago of sudden onset.  Differential remains broad and I suspect there may be multiple etiologies to his gait:  1.  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  We will plan to repeat an MRI brain with and without contrast to further evaluate and consider lumbar puncture trial in the future.  2.  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  Chronic alcohol use may be a contributor.  We will send off labs to further evaluate for this.  Nerve conduction study/EMG to further evaluate.  3.  Possible mild parkinsonism as a result of NPH or some other cause.  MRI and consideration for LP as above.  4.  Deconditioning    Labs, imaging of his brain and spine, serum and CSF labs as outlined below to further clarify his diagnosis.    Had an extensive discussion about the work-up and possible reasons for his difficulties and walking and balancing.  Also discussed that in the meantime I would like for him to work with PT OT.    Patient/family agree with plan, as outlined:      Visit Diagnoses     ICD-10-CM   1. History of fall  Z91.81   2. Other osteoarthritis of spine, lumbar region  M47.896   3. Thrombocytosis (HCC)  D47.3   4. Obstructive sleep apnea  G47.33   5. Abnormal MRI  R93.89   6. Transient alteration of awareness  R40.4   7.  Weakness of both legs  R29.898   8. Polyneuropathy  G62.9   9. 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 History of urinary incontinence  Z87.898   12. Acute weakness  R53.1   13. Cerebral ventriculomegaly  G93.89   14. History of back surgery  Z98.890   15. Chronic midline low back pain without sciatica  M54.5    G89.29   16. Other cervical disc degeneration, unspecified cervical region  M50.30   17. Cervicalgia  M54.2   18. Other intervertebral disc degeneration, thoracic region  M51.34   19. Spinal stenosis of thoracic region  M48.04   20. Other specified abnormal findings of blood chemistry   R79.89        Orders Placed This Encounter   • MR-BRAIN-WITH & W/O   • MR-CERVICAL SPINE-W/O   • MR-LUMBAR SPINE-W/O   • MR-THORACIC SPINE-W/O   • ARIS COMPREHENSIVE PANEL   • ANCA PANEL   • CRP QUANTITIVE (NON-CARDIAC)   • Sed Rate   • TSH   • TSH+FREE T4   • CK ISOENZYMES SERUM   • HEMOGLOBIN A1C   • SPEP W/REFLEX TO JIL, A, G, M   • JEANINE+PE, 24 HR URINE   • Monoclonal Protein, Ur (24 hr or Random)   • VITAMIN B1   • VITAMIN B3 (NIACIN+METABOLITE)   • VITAMIN B6   • VITAMIN B12   • FOLATE   • METHYLMALONIC ACID, SERUM   • HIV AG/AB COMBO ASSAY SCREENING   • RPR (SYPHILIS)   • PTH WITH CALCIUM   • MAGNESIUM   • ANTI-JO01 ABS   • Anti-Synthetase Profile (RDL)   • CERULOPLASMIN   • COPPER, SERUM   • VITAMIN E   • REFERRAL TO NEURODIAGNOSTICS (EEG,EP,EMG/NCS/DBS)   • REFERRAL TO PHYSICAL THERAPY            FOLLOW-UP:   6-8 weeks            BILLING DOCUMENTATION:       Counseling:  I spent a total of 75 minutes of face-to-face time in this visit. Over 50% of the time of the visit today was spent on counseling and or coordination of care wtih the patient and/or family, as above in assessment in plan.       Edmund Corrales MD  Epilepsy and General Neurology  Department of Neurology  Clinical  of Neurology [facility].

## 2021-03-02 ENCOUNTER — OFFICE VISIT (OUTPATIENT)
Dept: SLEEP MEDICINE | Facility: MEDICAL CENTER | Age: 86
End: 2021-03-02
Payer: MEDICARE

## 2021-03-02 VITALS
SYSTOLIC BLOOD PRESSURE: 128 MMHG | OXYGEN SATURATION: 94 % | HEART RATE: 68 BPM | BODY MASS INDEX: 28.99 KG/M2 | DIASTOLIC BLOOD PRESSURE: 66 MMHG | HEIGHT: 72 IN | WEIGHT: 214 LBS

## 2021-03-02 DIAGNOSIS — G47.33 OBSTRUCTIVE SLEEP APNEA: ICD-10-CM

## 2021-03-02 PROCEDURE — 99213 OFFICE O/P EST LOW 20 MIN: CPT | Performed by: NURSE PRACTITIONER

## 2021-03-02 ASSESSMENT — FIBROSIS 4 INDEX: FIB4 SCORE: 1.42

## 2021-03-02 NOTE — PROGRESS NOTES
Chief Complaint   Patient presents with   • Follow-Up     FREYA-Last seen 06/26/2019       HPI:      Mr. Pat is a 86 y/o male patient who is in today for annual FREYA f/u, overdue. Former patient of Dr. Fuentes. PMH includes GERD, thrombocytosis, BPH, mixed hyperlipidemia, seasonal allergies, vertigo, COPD, bilateral cognitive hearing loss, cognitive impairment, former smoker.     Patient is accompanied by his wife today.  He and his wife both have received both of their COVID-19 immunizations.  Most recent compliance data is not available for download as the patient did not bring the SD card today. He is being treated with autoCPAP 9-13 cmH2O and 2L O2 bleed in. He received his current machine around 2009 from [facility]. He is tolerating the pressure and mask well.  He uses the CPAP every night on average sometimes up to 12 hours.  He goes to bed between 7-7:30 pm and wakes up between 7-7:30 am. He denies morning headache or snoring.  He feels more refreshed during the morning when he uses the CPAP and oxygen.  He stays active by doing daily back stretching and strengthening exercises and he also rides his stationary bike up to 30 minutes often.  He denies any new health problems or medications.    Sleep Study History:   HSS from 7/2/19 indicated severe sleep apnea - PEG 67.4. Significant nocturnal desaturation - lizzy saturation 79 % - less than 90% for 45.7 % of the TIB. Significant snoring -971 snoring episodes comprising 100.9 minutes. The highest heart rate may have been artifactual.     PSG was originally completed in 2007 with Dr. Fuentes, we do not have records.      ROS:    Constitutional: Denies fevers, Denies weight changes  Eyes: Denies changes in vision, no eye pain  Ears/Nose/Throat/Mouth: Denies nasal congestion or sore throat   Cardiovascular: Denies chest pain or palpitations   Respiratory: Denies shortness of breath , Denies cough  Gastrointestinal/Hepatic: Denies abdominal pain, nausea, vomiting,    Skin/Breast: Denies rash,   Neurological: Denies headache, confusion,   Psychiatric: denies mood disorder   Sleep: denies snoring       Past Medical History:   Diagnosis Date   • Back pain    • GERD (gastroesophageal reflux disease)    • Hyperlipidemia    • Kidney stone    • PND (post-nasal drip)    • Sleep apnea        Past Surgical History:   Procedure Laterality Date   • CATARACT EXTRACTION WITH IOL     • OTHER      Nasal surgery   • OTHER ORTHOPEDIC SURGERY  lumbar disectomy        Family History   Problem Relation Age of Onset   • Other Father         Heart problems; unspecified   • Sleep Apnea Father    • Other Mother         Aneurism   • Sleep Apnea Brother    • Other Brother         Heart problems; unspecified   • Sleep Apnea Son    • Sleep Apnea Son        Social History     Socioeconomic History   • Marital status:      Spouse name: Not on file   • Number of children: Not on file   • Years of education: Not on file   • Highest education level: Not on file   Occupational History   • Not on file   Tobacco Use   • Smoking status: Former Smoker     Packs/day: 1.00     Years: 15.00     Pack years: 15.00     Types: Cigarettes     Quit date: 1980     Years since quittin.1   • Smokeless tobacco: Never Used   Substance and Sexual Activity   • Alcohol use: Yes     Alcohol/week: 0.0 oz     Comment: Nightly   • Drug use: No   • Sexual activity: Not on file   Other Topics Concern   •  Service Yes   • Blood Transfusions No   • Caffeine Concern No   • Occupational Exposure No   • Hobby Hazards No   • Sleep Concern No   • Stress Concern No   • Weight Concern No   • Special Diet No   • Back Care No   • Exercise Yes   • Bike Helmet No   • Seat Belt Yes   • Self-Exams No   Social History Narrative   • Not on file     Social Determinants of Health     Financial Resource Strain:    • Difficulty of Paying Living Expenses:    Food Insecurity:    • Worried About Running Out of Food in the Last Year:     • Ran Out of Food in the Last Year:    Transportation Needs:    • Lack of Transportation (Medical):    • Lack of Transportation (Non-Medical):    Physical Activity:    • Days of Exercise per Week:    • Minutes of Exercise per Session:    Stress:    • Feeling of Stress :    Social Connections:    • Frequency of Communication with Friends and Family:    • Frequency of Social Gatherings with Friends and Family:    • Attends [employer] Services:    • Active Member of Clubs or Organizations:    • Attends Club or Organization Meetings:    • Marital Status:    Intimate Partner Violence:    • Fear of Current or Ex-Partner:    • Emotionally Abused:    • Physically Abused:    • Sexually Abused:        Allergies as of 03/02/2021   • (No Known Allergies)        Vitals:  Vitals:    03/02/21 1309   BP: 128/66   Pulse: 68   SpO2: 94%       Current medications as of today   Current Outpatient Medications   Medication Sig Dispense Refill   • aspirin EC (ECOTRIN) 81 MG Tablet Delayed Response Take 81 mg by mouth every evening.     • lansoprazole (PREVACID) 30 MG CAPSULE DELAYED RELEASE Take 1 Cap by mouth every day. (Patient taking differently: Take 30 mg by mouth every bedtime.) 90 Cap 3   • montelukast (SINGULAIR) 10 MG Tab Take 1 Tab by mouth every evening. Indications: Hayfever 100 Tab 3   • vitamin D 4000 units Tab Take 4,000 Units by mouth every day. (Patient taking differently: Take 4,000 Units by mouth every bedtime.) 1200 Tab 3   • Multiple Vitamins-Minerals (OCUVITE PO) Take 2 Caps by mouth every evening.     • hydroxyurea (HYDREA) 500 MG Cap Take 1,000-1,500 mg by mouth see administration instructions. Pt takes 1500MG on Sat and Sun  1000MG on Mon, Tue, Wed, Thur, and Fri  Indications: Chronic Myelogenous Leukemia       No current facility-administered medications for this visit.         Physical Exam: Limited by COVID-19 precautions.  Appearance: Well developed, well nourished, no acute distress  Eyes: PERRL, EOM intact,  sclera white, conjunctiva moist  Ears: no lesions or deformities  Hearing: grossly intact  Nose: no lesions or deformities  Respiratory effort: no intercostal retractions or use of accessory muscles  Extremities: no cyanosis or edema  Abdomen: soft   Gait and Station: normal  Digits and nails: no clubbing, cyanosis, petechiae or nodes.  Cranial nerves: grossly intact  Skin: no visible rashes, lesions or ulcers noted  Orientation: Oriented to time, person and place  Mood and affect: mood and affect appropriate, normal interaction with examiner  Judgement: Intact    Assessment:  1. Obstructive sleep apnea     2. BMI 29.0-29.9,adult           Plan  Discussed the cardiovascular and neuropsychiatric risks of untreated FREYA; including but not limited to: HTN, DM, MI, ASCVD, CVA, CHF, traffic accidents.     1. Most recent compliance data is not available for download as the patient did not bring the SD card today. He is being treated with autoCPAP 9-13 cmH2O and 2L O2 bleed in. Continue autoCPAP and 2L O2 bleed in. Patient is compliant and benefiting from autoCPAP and 2L O2 bleed in therapy for management of FREYA. Advised patient to continue to use the CPAP every night for more than four hours for optimal health benefit and to meet the health insurance 70% compliance guideline.   2. DME order ([facility]) for new autoCPAP 9-13 cmH2O and 2L O2 bleed in, mask (FFM or MOC) and supplies was provided today. Continue to clean mask and supplies weekly with soap and water, and change supplies per insurance guidelines.   3. Request compliance data from [employer]. [employer] was original DME that provided the patient with his CPAP in 2009.  4. Order OPO on autoCPAP 9-13 cmH2O and 2L O2 bleed in at next office visit after compliance data review and if AHI is normal to r/o nocturnal hypoxia.   5. Continue to stay active.    6. Follow up with the appropriate healthcare practitioners for all other medical problems and issues.  7. Sleep hygiene discussed.     8. F/u in 3 months.        SUSAN Vigil.      This dictation was created using voice recognition software. The accuracy of the dictation is limited to the abilities of the software. I expect there may be some errors of grammar and possibly content.

## 2021-03-08 ENCOUNTER — TELEPHONE (OUTPATIENT)
Dept: NEUROLOGY | Facility: MEDICAL CENTER | Age: 86
End: 2021-03-08

## 2021-03-08 NOTE — TELEPHONE ENCOUNTER
Patient's wife called to report she had to call the fire department over the weekend to help her  get off the toilet . Patients wife specified they have imaging and blood work they have scheduled . Patient does have a scheduled follow up on 04/08/2021.    Patients wife is concerned and would like to know if you recommend anything to be done ?

## 2021-03-10 ENCOUNTER — APPOINTMENT (OUTPATIENT)
Dept: RADIOLOGY | Facility: MEDICAL CENTER | Age: 86
End: 2021-03-10
Attending: STUDENT IN AN ORGANIZED HEALTH CARE EDUCATION/TRAINING PROGRAM
Payer: MEDICARE

## 2021-03-10 DIAGNOSIS — R29.898 WEAKNESS OF BOTH LEGS: ICD-10-CM

## 2021-03-10 DIAGNOSIS — R53.1 ACUTE WEAKNESS: ICD-10-CM

## 2021-03-10 DIAGNOSIS — M50.30 OTHER CERVICAL DISC DEGENERATION, UNSPECIFIED CERVICAL REGION: ICD-10-CM

## 2021-03-10 DIAGNOSIS — M51.34 OTHER INTERVERTEBRAL DISC DEGENERATION, THORACIC REGION: ICD-10-CM

## 2021-03-10 DIAGNOSIS — R27.8 SENSORY ATAXIA: ICD-10-CM

## 2021-03-10 DIAGNOSIS — Z98.890 HISTORY OF BACK SURGERY: ICD-10-CM

## 2021-03-10 DIAGNOSIS — Z87.898 HISTORY OF URINARY INCONTINENCE: ICD-10-CM

## 2021-03-10 DIAGNOSIS — M54.2 CERVICALGIA: ICD-10-CM

## 2021-03-10 DIAGNOSIS — M48.04 SPINAL STENOSIS OF THORACIC REGION: ICD-10-CM

## 2021-03-10 PROCEDURE — 72146 MRI CHEST SPINE W/O DYE: CPT | Mod: MG

## 2021-03-10 PROCEDURE — 72141 MRI NECK SPINE W/O DYE: CPT | Mod: MG

## 2021-03-10 PROCEDURE — 72148 MRI LUMBAR SPINE W/O DYE: CPT | Mod: ME

## 2021-03-29 ENCOUNTER — HOSPITAL ENCOUNTER (OUTPATIENT)
Dept: LAB | Facility: MEDICAL CENTER | Age: 86
End: 2021-03-29
Attending: STUDENT IN AN ORGANIZED HEALTH CARE EDUCATION/TRAINING PROGRAM
Payer: MEDICARE

## 2021-03-29 DIAGNOSIS — G62.9 POLYNEUROPATHY: ICD-10-CM

## 2021-03-29 DIAGNOSIS — R53.1 ACUTE WEAKNESS: ICD-10-CM

## 2021-03-29 DIAGNOSIS — Z87.898 HISTORY OF URINARY INCONTINENCE: ICD-10-CM

## 2021-03-29 DIAGNOSIS — D75.839 THROMBOCYTOSIS: ICD-10-CM

## 2021-03-29 DIAGNOSIS — Z91.81 HISTORY OF FALL: ICD-10-CM

## 2021-03-29 DIAGNOSIS — R93.89 ABNORMAL MRI: ICD-10-CM

## 2021-03-29 DIAGNOSIS — G11.9 CEREBELLAR ATAXIA (HCC): ICD-10-CM

## 2021-03-29 DIAGNOSIS — R79.89 OTHER SPECIFIED ABNORMAL FINDINGS OF BLOOD CHEMISTRY: ICD-10-CM

## 2021-03-29 DIAGNOSIS — R29.898 WEAKNESS OF BOTH LEGS: ICD-10-CM

## 2021-03-29 LAB
CRP SERPL HS-MCNC: 1.22 MG/DL (ref 0–0.75)
ERYTHROCYTE [SEDIMENTATION RATE] IN BLOOD BY WESTERGREN METHOD: 48 MM/HOUR (ref 0–20)
EST. AVERAGE GLUCOSE BLD GHB EST-MCNC: 126 MG/DL
HBA1C MFR BLD: 6 % (ref 4–5.6)
MAGNESIUM SERPL-MCNC: 2 MG/DL (ref 1.5–2.5)
T4 FREE SERPL-MCNC: 1.27 NG/DL (ref 0.93–1.7)
TSH SERPL DL<=0.005 MIU/L-ACNC: 1.66 UIU/ML (ref 0.38–5.33)

## 2021-03-29 PROCEDURE — 83970 ASSAY OF PARATHORMONE: CPT

## 2021-03-29 PROCEDURE — 86334 IMMUNOFIX E-PHORESIS SERUM: CPT

## 2021-03-29 PROCEDURE — 82784 ASSAY IGA/IGD/IGG/IGM EACH: CPT

## 2021-03-29 PROCEDURE — 86780 TREPONEMA PALLIDUM: CPT

## 2021-03-29 PROCEDURE — 83516 IMMUNOASSAY NONANTIBODY: CPT

## 2021-03-29 PROCEDURE — 86235 NUCLEAR ANTIGEN ANTIBODY: CPT

## 2021-03-29 PROCEDURE — 86038 ANTINUCLEAR ANTIBODIES: CPT

## 2021-03-29 PROCEDURE — 82746 ASSAY OF FOLIC ACID SERUM: CPT

## 2021-03-29 PROCEDURE — 84591 ASSAY OF NOS VITAMIN: CPT

## 2021-03-29 PROCEDURE — 86431 RHEUMATOID FACTOR QUANT: CPT

## 2021-03-29 PROCEDURE — 84446 ASSAY OF VITAMIN E: CPT

## 2021-03-29 PROCEDURE — 82525 ASSAY OF COPPER: CPT

## 2021-03-29 PROCEDURE — 83921 ORGANIC ACID SINGLE QUANT: CPT

## 2021-03-29 PROCEDURE — 87389 HIV-1 AG W/HIV-1&-2 AB AG IA: CPT

## 2021-03-29 PROCEDURE — 82390 ASSAY OF CERULOPLASMIN: CPT

## 2021-03-29 PROCEDURE — 82550 ASSAY OF CK (CPK): CPT

## 2021-03-29 PROCEDURE — 84439 ASSAY OF FREE THYROXINE: CPT

## 2021-03-29 PROCEDURE — 86140 C-REACTIVE PROTEIN: CPT

## 2021-03-29 PROCEDURE — 36415 COLL VENOUS BLD VENIPUNCTURE: CPT

## 2021-03-29 PROCEDURE — 84165 PROTEIN E-PHORESIS SERUM: CPT

## 2021-03-29 PROCEDURE — 84155 ASSAY OF PROTEIN SERUM: CPT

## 2021-03-29 PROCEDURE — 82607 VITAMIN B-12: CPT

## 2021-03-29 PROCEDURE — 83735 ASSAY OF MAGNESIUM: CPT

## 2021-03-29 PROCEDURE — 85652 RBC SED RATE AUTOMATED: CPT

## 2021-03-29 PROCEDURE — 86160 COMPLEMENT ANTIGEN: CPT

## 2021-03-29 PROCEDURE — 84425 ASSAY OF VITAMIN B-1: CPT

## 2021-03-29 PROCEDURE — 84207 ASSAY OF VITAMIN B-6: CPT

## 2021-03-29 PROCEDURE — 84443 ASSAY THYROID STIM HORMONE: CPT

## 2021-03-29 PROCEDURE — 83036 HEMOGLOBIN GLYCOSYLATED A1C: CPT

## 2021-03-29 PROCEDURE — 82552 ASSAY OF CPK IN BLOOD: CPT

## 2021-03-30 ENCOUNTER — PHYSICAL THERAPY (OUTPATIENT)
Dept: PHYSICAL THERAPY | Facility: MEDICAL CENTER | Age: 86
End: 2021-03-30
Attending: STUDENT IN AN ORGANIZED HEALTH CARE EDUCATION/TRAINING PROGRAM
Payer: MEDICARE

## 2021-03-30 DIAGNOSIS — G11.9 CEREBELLAR ATAXIA (HCC): ICD-10-CM

## 2021-03-30 DIAGNOSIS — Z91.81 HISTORY OF FALL: ICD-10-CM

## 2021-03-30 DIAGNOSIS — R53.1 ACUTE WEAKNESS: ICD-10-CM

## 2021-03-30 DIAGNOSIS — R93.89 ABNORMAL MRI: ICD-10-CM

## 2021-03-30 DIAGNOSIS — R29.898 WEAKNESS OF BOTH LEGS: ICD-10-CM

## 2021-03-30 DIAGNOSIS — Z87.898 HISTORY OF URINARY INCONTINENCE: ICD-10-CM

## 2021-03-30 DIAGNOSIS — D75.839 THROMBOCYTOSIS: ICD-10-CM

## 2021-03-30 DIAGNOSIS — G62.9 POLYNEUROPATHY: ICD-10-CM

## 2021-03-30 LAB
CALCIUM SERPL-MCNC: 9 MG/DL (ref 8.4–10.2)
CERULOPLASMIN SERPL-MCNC: 29 MG/DL (ref 17–54)
FOLATE SERPL-MCNC: 9.9 NG/ML
HIV 1+2 AB+HIV1 P24 AG SERPL QL IA: NORMAL
PTH-INTACT SERPL-MCNC: 21.7 PG/ML (ref 14–72)
TREPONEMA PALLIDUM IGG+IGM AB [PRESENCE] IN SERUM OR PLASMA BY IMMUNOASSAY: NORMAL
VIT B12 SERPL-MCNC: 1208 PG/ML (ref 211–911)

## 2021-03-30 PROCEDURE — 97163 PT EVAL HIGH COMPLEX 45 MIN: CPT

## 2021-03-30 ASSESSMENT — BALANCE ASSESSMENTS
TRANSFERS: 4
SITTING TO STANDING: 4
PLACE ALTERNATE FOOT ON STEP OR STOOL WHILE STANDING UNSUPPORTED: 0
REACHING FORWARD WITH OUTSTRETCHED ARM WHILE STANDING: 3
LOOK OVER LEFT AND RIGHT SHOULDERS WHILE STANDING: 3
SITTING UNSUPPORTED: 4
STANDING UNSUPPORTED WITH FEET TOGETHER: 3
STANDING TO SITTING: 4
LONG VERSION TOTAL SCORE (MAX 56): 42
LONG VERSION TOTAL SCORE (MAX 56): 42
STANDING UNSUPPORTED: 4
STANDING UNSUPPORTED ONE FOOT IN FRONT: 2
PICK UP OBJECT FROM THE FLOOR FROM A STANDING POSITION: 3
STANDING ON ONE LEG: 0
STANDING UNSUPPORTED WITH EYES CLOSED: 4
TURN 360 DEGREES: 4

## 2021-03-30 ASSESSMENT — ENCOUNTER SYMPTOMS: QUALITY: ACHING

## 2021-03-30 ASSESSMENT — ACTIVITIES OF DAILY LIVING (ADL): POOR_BALANCE: 1

## 2021-03-30 NOTE — OP THERAPY EVALUATION
"  Outpatient Physical Therapy  INITIAL EVALUATION    [facility] Outpatient Physical Therapy  [address]  [place] [address]  Phone:  [phone]  Fax:  [phone]    Date of Evaluation: 03/30/2021    Patient: Han Pat  YOB: 1935  MRN: [id number]     Referring Provider: Edmund Corrales M.D.  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 History of fall  Z91.81   2. Thrombocytosis (HCC)  D47.3   3. Abnormal MRI  R93.89   4. Weakness of both legs  R29.898   5. Polyneuropathy  G62.9   6. Cerebellar ataxia (HCC)  G11.9   7. History of urinary incontinence  Z87.898   8. Acute weakness  R53.1       No data found  Subjective:   History of Present Illness:     Mechanism of injury:  *Pt late to check in today; delayed start to session  Patient is an 86-year-old male with a PMH including: Back pain, GERD, hyperlipidemia, kidney stone, PND, sleep apnea (CPAP). Has a surgical history of: Cataract extraction, nasal surgery, lumbar discectomy 2000. Not on oxygen during day; 2L at night.   Pt presents to therapy with wife, Sally, who assists with providing subjective hx. Presents with SPC.     Pt presents to therapy with complaints of BLE weakness and impaired balance, which started after initial fall 2 years ago.  Per pt and wife, this fall occurred 2 years ago with insidious onset. Pt admitted to ED on 3/24/19 by TARAN for weakness. Had workup and sent to rehab after hospitalization. Per MD note, no real answers or follow up\" " "(2/26/21 neuro). Pt's wife reporting similar occurrence occurred 2.5 weeks ago where pt became \"stuck\" on the toilet and could not get up. \"He was moving slower and sleeping a lot.\" Pt endorsing was initially walking with walker due to weakness. Reports this has now improved and is using SPC in community, SPC or no AD within the home. Current complaints include difficulty with prolonged standing 10-15 min due to fatigue, prolonged walking with SPC 1/4 mile. Most recent fall was 2 years ago but pt and wife report difficulty with balance. Pt is currently on chemo. (Medication admin via wife at home).     No current official diagnosis on file. Pt seen by neurology on 2/26/21 with differential diagnosis NPH v. polyneuropathy vs. mild parkinsonism vs. deconditioning; extensive blood work up and imaging completed. Pt and wife currently awaiting results.     Prior level of function:  2 years ago; walking 2 miles, golfing 2x a week; took care of yard and sprinkler; not completing since fall in 2019  Pain:     Location:  B LB above belt line    Quality:  Aching    Pain Comments::  Chronic diffuse midline back pain without radiation; denies n/t in LE's  Social Support:     Lives in:  Multiple-level home (Stairs with HR)    Lives with:  Spouse  Diagnostic Tests:     Diagnostic Tests Comments:  *Scheduled for brain MRI with contrast, not yet completed  Cervical MRI:  IMPRESSION:     Moderate degenerative disc disease and uncovertebral hypertrophy with ligamentum flavum buckling results in at most mild central stenosis at C6/7     Greatest foraminal stenosis is moderate on the right at C3/4  Lumbar MRI:  IMPRESSION:     1.  Degraded by motion artifact     2.  Grade 2 L5/S1 anterolisthesis secondary to chronic pars interarticularis defects. There are severe foraminal and mild lateral recess stenoses     3.  Degenerative disc disease and facet arthropathy resulting in multilevel mild right lateral recess stenoses, multilevel " moderate to severe foraminal stenoses as detailed above     4.  Interspinous edema compatible with Hoke's disease     5.  Diminished T1 marrow signal is a nonspecific finding that can be seen in the context of any elevated hematopoetic state, most commonly anemia. Less benign marrow replacement including malignant replacement is in the differential  Thoracic MRI:  IMPRESSION:     No MR evidence of significant central or foraminal stenosis     No fracture or subluxation     Diffusely abnormal marrow signal is compatible with marrow replacement process. Often this is just secondary to anemia but malignancy is in the differential    Brain MRI 3/25/19:  IMPRESSION:     1.  Study is significantly limited by motion artifact.  2.  Diffuse primarily central atrophy.  3.  Nonspecific foci of abnormal signal in the white matter bilaterally.  Microvascular ischemic change versus demyelination or gliosis.  4.  No acute stroke or evidence for hemorrhage.  5.  Acute and chronic paranasal sinus disease.    Treatments:     Treatment Comments:  PT in the past   Hospital and rehab     Currently undergoing excessive blood workup and imaging to determine causation for falls  Activities of Daily Living:     Patient reported ADL status: Patient's current daily routine includes:  Work: Retired   Hobbies: Golfing, hunting  Exercise: stationary bike (has not used lately), currently min walking due to fatigue    DME: SPC, grab bars in shower (holds on while showering), walker, grab bars at toilet (uses for transfers.     Wife, Sally, reporting tried shower chair but does not fit in shower.    Pt indep with ADL's. Sally lays clothes out for pt but he is indep with dressing. Pt reports she stays close when pt is showering. Pt's wife reports she is a little fearful of leaving him home by himself.    Patient Goals:     Other patient goals:  Improved walking       Past Medical History:   Diagnosis Date   • Back pain    • GERD  (gastroesophageal reflux disease)    • Hyperlipidemia    • Kidney stone    • PND (post-nasal drip)    • Sleep apnea      Past Surgical History:   Procedure Laterality Date   • CATARACT EXTRACTION WITH IOL     • OTHER      Nasal surgery   • OTHER ORTHOPEDIC SURGERY  lumbar disectomy      Social History     Tobacco Use   • Smoking status: Former Smoker     Packs/day: 1.00     Years: 15.00     Pack years: 15.00     Types: Cigarettes     Quit date: 1980     Years since quittin.2   • Smokeless tobacco: Never Used   Substance Use Topics   • Alcohol use: Yes     Alcohol/week: 0.0 oz     Comment: Nightly     Family and Occupational History     Socioeconomic History   • Marital status:      Spouse name: Not on file   • Number of children: Not on file   • Years of education: Not on file   • Highest education level: Not on file   Occupational History   • Not on file       Objective     Postural Observations  Seated posture: poor  Standing posture: poor    Additional Postural Observation Details  Forward head and rounded shoulders; kyphotic     Forward flexed at trunk with standing    Neurological Testing     Reflexes   Left   Patellar (L4): trace (1+)  Achilles (S1): trace (1+)    Right   Patellar (L4): trace (1+)  Achilles (S1): trace (1+)    Myotome testing   Lumbar (left)   L1 (hip flexors): 4  L2 (hip flexors): 4  L3 (knee extensors): 5  L4 (ankle dorsiflexors): 4+  L5 (great toe extension): 4+  S1 (ankle plantar flexors): 3+    Lumbar (right)   L1 (hip flexors): 4  L2 (hip flexors): 4  L3 (knee extensors): 5  L4 (ankle dorsiflexors): 4+  L5 (great toe extension): 4  S1 (ankle plantar flexors): 3+    Dermatome testing   Lumbar (left)   All left lumbar dermatomes intact    Lumbar (right)   All right lumbar dermatomes intact    Additional Neurological Details  L1 palpated indep by pt under therapist guidance    General Comments     Spine Comments   Gait: Forward flexed posture with decreased step length,  "min arm swing, increased steps for turning. Occasionally min shuffling noted with walking up to plinth table.     5 Times sit to stand: 23 sec (without UE use)  (Denies fatigue, SOB following)                 Therapeutic Exercises (CPT 61439):     19. UPOC: 5/28/21      Time-based treatments/modalities:           Assessment, Response and Plan:   Impairments: abnormal gait, abnormal or restricted ROM, activity intolerance, impaired balance, impaired physical strength, lacks appropriate home exercise program, limited ADL's and safety issue    Assessment details:  Patient is a pleasant and cooperative 86-year-old male who presents to therapy with complaints of BLE weakness and impaired balance, which started after initial fall 2 years ago.  Seen in ED with workup with no causation to explain fall and sudden weakness. Pt and wife reporting second occurrence 2.5 weeks ago with pt \"stuck\" on toilet and experiencing sudden LE weakness, could not get off of toilet. Pt wife reporting pt moving slower and increased sleeping. Orginially  using walker, now using SPC community, SPC or no AD within home. Endorses poor balance.     Pt is currently on chemo. (Medication admin via wife at home). CPAP at night, no O2 in day.   No current official diagnosis on file. Pt seen by neurology on 2/26/21 with differential diagnosis NPH v. polyneuropathy vs. mild parkinsonism vs. deconditioning; extensive blood work up and imaging completed. Pt and wife currently awaiting results.     Exam findings suggestive of fall risk secondary to 5STS testing and RUBIO balance testing; Greatest difficulty with narrow PREMA, tandem stance, SLS. Additional findings include deconditioning with pt admitting decreased walking secondary to fatigue and decreased use of stationary bike; is not spending a lot of time in community due to COVID. Pt may benefit from skilled physical therapy in order to address above impairments in order to improve QOL and return to " reported ADL's.     Barriers to therapy:  Comorbidities and hearing  Other barriers to therapy:  Falls and weakness due to unknown diagnosis   Prognosis: fair    Goals:   Short Term Goals:   1. Pt will be independent with written HEP.  2. Pt will complete at least 20 min cumulative aerobic activity at least 2x/wk.  3. Pt will be able to progress 2/3 hep.   4. Pt will report no falls   Short term goal time span:  2-4 weeks      Long Term Goals:    1. Pt will be independent with written HEP.  2. Pt will have a score of >47/56 or greater to signify decrease in fall risk (eval:42/56)  3. Pt will complete at least 30 min cumulative aerobic activity at least 4x/wk to work towards meeting ADA guidelines for aerobic health.  4. Pt will be able to ambulate .5 mile with LRAD without being limited by fatigue.  5. Pt will report no falls  6. Pt will be able to stand for 30 min or longer without being limited by fatigue in order to complete ADL's such as self care, meal prepping, and spending time with family.  Long term goal time span:  6-8 weeks    Plan:   Therapy options:  Physical therapy treatment to continue  Planned therapy interventions:  Neuromuscular Re-education (CPT 70563), Therapeutic Exercise (CPT 74419), Therapeutic Activities (CPT 43267), Gait Training (CPT 29954) and E Stim Unattended (CPT 89999)  Frequency:  2x week  Duration in weeks:  8  Discussed with:  Patient  Plan details:  UPOC: 5/28/21          Functional Assessment Used  Escobar Balance Total Score (0-56): 42    <45/56 signifies fall risk  *greatest difficulty with narrow PREMA including ft together standing, tandem stance and SLS    Referring provider co-signature:  I have reviewed this plan of care and my co-signature certifies the need for services.    Certification Period: 03/30/2021 to  5/28/21    Physician Signature: ________________________________ Date: ______________

## 2021-03-31 ENCOUNTER — HOSPITAL ENCOUNTER (OUTPATIENT)
Facility: MEDICAL CENTER | Age: 86
End: 2021-03-31
Attending: STUDENT IN AN ORGANIZED HEALTH CARE EDUCATION/TRAINING PROGRAM
Payer: MEDICARE

## 2021-03-31 ENCOUNTER — APPOINTMENT (RX ONLY)
Dept: URBAN - METROPOLITAN AREA CLINIC 35 | Facility: CLINIC | Age: 86
Setting detail: DERMATOLOGY
End: 2021-03-31

## 2021-03-31 DIAGNOSIS — L57.0 ACTINIC KERATOSIS: ICD-10-CM

## 2021-03-31 LAB
C3 SERPL-MCNC: 127 MG/DL (ref 88–201)
C4 SERPL-MCNC: 10 MG/DL (ref 10–40)
CK BB CFR SERPL ELPH: NORMAL % (ref 0–0)
CK MACRO1 CFR SERPL: NORMAL % (ref 0–0)
CK MACRO2 CFR SERPL: NORMAL % (ref 0–0)
CK MB CFR SERPL ELPH: NORMAL % (ref 0–4)
CK MM CFR SERPL ELPH: NORMAL % (ref 96–100)
CK SERPL-CCNC: 20 U/L (ref 20–200)
COPPER SERPL-MCNC: 146.3 UG/DL (ref 70–140)
ENA JO1 AB TITR SER: 41 AU/ML (ref 0–40)
MYELOPEROXIDASE AB SER-ACNC: 4 AU/ML (ref 0–19)
NUCLEAR IGG SER QL IA: NORMAL
PROTEINASE3 AB SER-ACNC: 5 AU/ML (ref 0–19)
RHEUMATOID FACT SER NEPH-ACNC: 11 IU/ML (ref 0–14)

## 2021-03-31 PROCEDURE — ? PREMALIGNANT DESTRUCTION

## 2021-03-31 PROCEDURE — 17003 DESTRUCT PREMALG LES 2-14: CPT

## 2021-03-31 PROCEDURE — 17000 DESTRUCT PREMALG LESION: CPT

## 2021-03-31 ASSESSMENT — LOCATION DETAILED DESCRIPTION DERM
LOCATION DETAILED: RIGHT INFERIOR POSTERIOR PARIETAL SCALP
LOCATION DETAILED: MID-OCCIPITAL SCALP
LOCATION DETAILED: POSTERIOR MID-PARIETAL SCALP
LOCATION DETAILED: LEFT SUPERIOR OCCIPITAL SCALP

## 2021-03-31 ASSESSMENT — LOCATION SIMPLE DESCRIPTION DERM: LOCATION SIMPLE: POSTERIOR SCALP

## 2021-03-31 ASSESSMENT — LOCATION ZONE DERM: LOCATION ZONE: SCALP

## 2021-03-31 NOTE — PROCEDURE: PREMALIGNANT DESTRUCTION
Anesthesia Volume In Cc: 0.5
Method: curettage
Render Note In Bullet Format When Appropriate: No
Detail Level: Detailed
Post-Care Instructions: I reviewed with the patient in detail post-care instructions. Patient is to wear sunprotection, and avoid picking at any of the treated lesions. Pt may apply Vaseline to crusted or scabbing areas.
Consent: The patient's consent was obtained including but not limited to risks of crusting, scabbing, blistering, scarring, darker or lighter pigmentary change, recurrence, incomplete removal and infection.
Post-Procedure Care (Optional): The wound was cleaned, and a pressure dressing was applied.  The patient received detailed post-op instructions.

## 2021-04-01 LAB
A-TOCOPHEROL VIT E SERPL-MCNC: 11.7 MG/L (ref 5.5–18)
BETA+GAMMA TOCOPHEROL SERPL-MCNC: 0.6 MG/L (ref 0–6)
IGA SERPL-MCNC: 290 MG/DL (ref 68–408)
IGG SERPL-MCNC: 1573 MG/DL (ref 768–1632)
IGM SERPL-MCNC: 324 MG/DL (ref 35–263)
METHYLMALONATE SERPL-SCNC: 0.23 UMOL/L (ref 0–0.4)
VIT B1 BLD-MCNC: 137 NMOL/L (ref 70–180)
VIT B6 SERPL-MCNC: 13.2 NMOL/L (ref 20–125)

## 2021-04-02 ENCOUNTER — PHYSICAL THERAPY (OUTPATIENT)
Dept: PHYSICAL THERAPY | Facility: MEDICAL CENTER | Age: 86
End: 2021-04-02
Attending: PHYSICIAN ASSISTANT
Payer: MEDICARE

## 2021-04-02 DIAGNOSIS — Z91.81 HISTORY OF FALL: ICD-10-CM

## 2021-04-02 LAB
ALBUMIN SERPL ELPH-MCNC: 3.14 G/DL (ref 3.75–5.01)
ALPHA1 GLOB SERPL ELPH-MCNC: 0.4 G/DL (ref 0.19–0.46)
ALPHA2 GLOB SERPL ELPH-MCNC: 0.74 G/DL (ref 0.48–1.05)
B-GLOBULIN SERPL ELPH-MCNC: 0.7 G/DL (ref 0.48–1.1)
GAMMA GLOB SERPL ELPH-MCNC: 1.74 G/DL (ref 0.62–1.51)
INTERPRETATION SERPL IFE-IMP: ABNORMAL
MONOCLON BAND OBS SERPL: ABNORMAL
PATHOLOGY STUDY: ABNORMAL
PROT SERPL-MCNC: 6.7 G/DL (ref 6.3–8.2)

## 2021-04-02 PROCEDURE — 97110 THERAPEUTIC EXERCISES: CPT

## 2021-04-02 PROCEDURE — 97116 GAIT TRAINING THERAPY: CPT

## 2021-04-02 PROCEDURE — 97112 NEUROMUSCULAR REEDUCATION: CPT

## 2021-04-05 RX ORDER — MONTELUKAST SODIUM 10 MG/1
10 TABLET ORAL EVERY EVENING
Qty: 90 TABLET | Refills: 1 | Status: SHIPPED | OUTPATIENT
Start: 2021-04-05 | End: 2021-10-01

## 2021-04-06 ENCOUNTER — PHYSICAL THERAPY (OUTPATIENT)
Dept: PHYSICAL THERAPY | Facility: MEDICAL CENTER | Age: 86
End: 2021-04-06
Attending: PHYSICIAN ASSISTANT
Payer: MEDICARE

## 2021-04-06 DIAGNOSIS — R26.2 DIFFICULTY WALKING: ICD-10-CM

## 2021-04-06 DIAGNOSIS — Z87.898 HISTORY OF URINARY INCONTINENCE: ICD-10-CM

## 2021-04-06 DIAGNOSIS — Z92.89 HISTORY OF RECENT HOSPITALIZATION: ICD-10-CM

## 2021-04-06 DIAGNOSIS — R53.1 WEAKNESS: ICD-10-CM

## 2021-04-06 DIAGNOSIS — D75.839 THROMBOCYTOSIS: ICD-10-CM

## 2021-04-06 DIAGNOSIS — R29.898 WEAKNESS OF BOTH LEGS: ICD-10-CM

## 2021-04-06 DIAGNOSIS — G62.9 POLYNEUROPATHY: ICD-10-CM

## 2021-04-06 DIAGNOSIS — R53.1 ACUTE WEAKNESS: ICD-10-CM

## 2021-04-06 DIAGNOSIS — R93.89 ABNORMAL MRI: ICD-10-CM

## 2021-04-06 DIAGNOSIS — Z91.81 HISTORY OF FALL: ICD-10-CM

## 2021-04-06 DIAGNOSIS — G11.9 CEREBELLAR ATAXIA (HCC): ICD-10-CM

## 2021-04-06 LAB
ALBUMIN 24H MFR UR ELPH: 13.3 %
ALPHA1 GLOB 24H MFR UR ELPH: 43 %
ALPHA2 GLOB 24H MFR UR ELPH: 42 %
B-GLOBULIN 24H MFR UR ELPH: 1.6 %
COLLECT DURATION TIME SPEC: NORMAL HRS
EER MONOCLONAL PROTEIN STUDY, 24 HOUR U Q5964: NORMAL
GAMMA GLOB 24H MFR UR ELPH: 0.1 %
INTERPRETATION UR IFE-IMP: NORMAL
M PROTEIN 24H MFR UR ELPH: 0 %
M PROTEIN 24H UR ELPH-MRATE: NORMAL MG/24 HRS
PROT 24H UR-MRATE: NORMAL MG/D (ref 40–150)
PROT UR-MCNC: 18 MG/DL
SPECIMEN VOL ?TM UR: NORMAL ML

## 2021-04-06 PROCEDURE — 97110 THERAPEUTIC EXERCISES: CPT

## 2021-04-06 PROCEDURE — 97112 NEUROMUSCULAR REEDUCATION: CPT

## 2021-04-07 LAB
ALBUMIN 24H MFR UR ELPH: 19.6 %
ALPHA1 GLOB 24H MFR UR ELPH: 29.1 %
ALPHA2 GLOB 24H MFR UR ELPH: 28.4 %
B-GLOBULIN 24H MFR UR ELPH: 16.7 %
COLLECT DURATION TIME SPEC: 24 HRS
EER MONOCLONAL PROTEIN STUDY, 24 HOUR U Q5964: NORMAL
GAMMA GLOB 24H MFR UR ELPH: 6.2 %
INTERPRETATION UR IFE-IMP: NORMAL
M PROTEIN 24H MFR UR ELPH: 0 %
M PROTEIN 24H UR ELPH-MRATE: NORMAL MG/24 HRS
NIACIN SERPL-MCNC: 2.71 UG/ML (ref 0.5–8.45)
PROT 24H UR-MRATE: NORMAL MG/D (ref 40–150)
PROT UR-MCNC: 13 MG/DL
SPECIMEN VOL ?TM UR: NORMAL ML

## 2021-04-09 ENCOUNTER — OFFICE VISIT (OUTPATIENT)
Dept: NEUROLOGY | Facility: MEDICAL CENTER | Age: 86
End: 2021-04-09
Attending: STUDENT IN AN ORGANIZED HEALTH CARE EDUCATION/TRAINING PROGRAM
Payer: MEDICARE

## 2021-04-09 ENCOUNTER — APPOINTMENT (OUTPATIENT)
Dept: PHYSICAL THERAPY | Facility: MEDICAL CENTER | Age: 86
End: 2021-04-09
Attending: PHYSICIAN ASSISTANT
Payer: MEDICARE

## 2021-04-09 VITALS
DIASTOLIC BLOOD PRESSURE: 65 MMHG | SYSTOLIC BLOOD PRESSURE: 118 MMHG | WEIGHT: 202.82 LBS | RESPIRATION RATE: 16 BRPM | OXYGEN SATURATION: 94 % | HEART RATE: 72 BPM | HEIGHT: 72 IN | TEMPERATURE: 99.2 F | BODY MASS INDEX: 27.47 KG/M2

## 2021-04-09 DIAGNOSIS — R29.898 LEG WEAKNESS, BILATERAL: ICD-10-CM

## 2021-04-09 DIAGNOSIS — G72.9 MYOPATHY: ICD-10-CM

## 2021-04-09 DIAGNOSIS — R53.1 WEAKNESS: ICD-10-CM

## 2021-04-09 DIAGNOSIS — Z91.81 HISTORY OF FALL: ICD-10-CM

## 2021-04-09 PROCEDURE — 99211 OFF/OP EST MAY X REQ PHY/QHP: CPT | Performed by: STUDENT IN AN ORGANIZED HEALTH CARE EDUCATION/TRAINING PROGRAM

## 2021-04-09 PROCEDURE — 99215 OFFICE O/P EST HI 40 MIN: CPT | Performed by: STUDENT IN AN ORGANIZED HEALTH CARE EDUCATION/TRAINING PROGRAM

## 2021-04-09 ASSESSMENT — FIBROSIS 4 INDEX: FIB4 SCORE: 1.44

## 2021-04-09 NOTE — OP THERAPY DAILY TREATMENT
"  Outpatient Physical Therapy  DAILY TREATMENT     [facility] Outpatient Physical Therapy  [address]  [place] [address]  Phone:  [phone]  Fax:  [phone]    Date: 04/09/2021    Patient: Han Pat  YOB: 1935  MRN: [id number]     Time Calculation                   Chief Complaint: No chief complaint on file.    Visit #: 4    SUBJECTIVE:  No new complaints today.     Pt present with wife Sally; she reports, \"we have not gotten all of the lab tests back yet, we see the neurologist on Thursday.\"    OBJECTIVE:    Posterior chain weakness <30 sec bridging on ball  Difficulty with tandem stance, SLS and narrow PREMA requiring frequent UE use  Muscle juddering with mini squats    Therapeutic Exercises (CPT 62334):     1. Nustep L3 7 minutes, cardiovascular training    2. Sit to stand, 2x6, VC's for sit at edge with feet behind knees and head forward during transition to stand; chair in front for safety with SBA    3. Hamstring curls with ball, 1x15; 1x10, reviewed     4. Bridging on swissball , 5x, reviewed; 3/4 AROM, <30 sec     5. Mini squats with UE support, 10x, muscle juddering noted with descent, added to hep    6. Pt education, re: increasing bouts of short walks for cardiovascular training    Therapeutic Treatments and Modalities:     1. Neuromuscular Re-education (CPT 51561), see below    Therapeutic Treatment and Modalities Summary: Neuro re-ed:  Balance in corner x5 min ea:  1. Single leg stance; difficulty bilaterally with intermittent and frequent UE use for balance  2. Standing feet together, increased difficulty with head turns B; mod sway with addition of head movements  3. Modified partial tandem standing; intermittent and frequent UE use, difficulty Bilaterally  VC's to stand close to corner without touching, chair in front for safety; added to hep  VC's to keep hands out for safety in case you need to grab chair    In // bars:  Backwards " walking  Sideways stepping  Standing hip abduction with UE support    Time-based treatments/modalities:         ASSESSMENT:   Difficulty with tandem, SLS, and narrow PREMA with posterior chain weakness noted. Requires frequent UE support for balance activities. Updated hep with balance hep using chair and corner for safety. Several VC's required to keep hands up for safety in case of needing external support for balance. Frequent cues needed to review mechanics for sit>stand; pt may continue to benefit from continued repetitions to ensure safety and correct set up of ex.    PLAN/RECOMMENDATIONS:   Plan for treatment: therapy treatment to continue next visit.  Planned interventions for next visit: continue with current treatment, gait training (CPT 33006), neuromuscular re-education (CPT 36993) and therapeutic exercise (CPT 32452).  Assess response to new balance hep; review sit to stand techniques

## 2021-04-09 NOTE — PROGRESS NOTES
GENERAL NEUROLOGY INITIAL ENCOUNTER  REFERRING PROVIDER:  Kia Thompson P.A.-C.  [address]  [address]  [place] [address]      CHIEF COMPLAINT(S): LE weakness, falls    History of present illness:  Han Pat 85 y.o. male presents today for evaluation of lower extremity weakness, impaired balance, and regular falls.    Every started about a year ago. Had a fall a year ago and could never find out why this happened. His legs wouldn't work. He couldn't stand up.Seemed to be sudden onset weakness, as he was fine the night before. Went to rehab for a weak after being hospitalized and worked up and was sent home with no real answers or follow-up. Some incontinence in beginning but this is no longer happening.    Severe sleep apnea. Going back to get mask and setting adjusted.    Worsening forgetfulness but patient and wife dont think that is an issue because it is stable. He can balance checkup and    Lost weight but thinks its his wife's food.     Had severe pain in lower back years ago and is s/p Microdysctomy years ago in lower back for treatment of the pain which helped. Still had diffuse midline back pain with no definite radicular symptoms but it is more tolerable. No Lermitte's symptoms.    Patient has chronic Consitpation, no diarrhea. NO urinary incontinence.     Has mild tremor on the right worsened when trying too use it. Handwriting has become messier and smaller. Used to have good penmanship.    Hygiene good.    Last fall was months go.    Was a regular drinker of alcohol, daily, multiple shots of liquur per day. Stopped drinking this months 3 years ago. Currently, he has a  Glass of wine per day and a beer sometimes in afternoon. Non smoker. NO illciits.    No fever or chills. No myalgias. No rashes. No cough, CP, or SOB. Not working with PT/OT.        Patient's PMH, PSH, FH, and SH were reviewed.    Medications and allergies were reviewed.        Past medical history:   Past Medical  History:   Diagnosis Date   • Back pain    • GERD (gastroesophageal reflux disease)    • Hyperlipidemia    • Kidney stone    • PND (post-nasal drip)    • Sleep apnea        Past surgical history:   Past Surgical History:   Procedure Laterality Date   • CATARACT EXTRACTION WITH IOL     • OTHER      Nasal surgery   • OTHER ORTHOPEDIC SURGERY  lumbar disectomy        Family history:   Family History   Problem Relation Age of Onset   • Other Father         Heart problems; unspecified   • Sleep Apnea Father    • Other Mother         Aneurism   • Sleep Apnea Brother    • Other Brother         Heart problems; unspecified   • Sleep Apnea Son    • Sleep Apnea Son        Social history:   Social History     Socioeconomic History   • Marital status:      Spouse name: Not on file   • Number of children: Not on file   • Years of education: Not on file   • Highest education level: Not on file   Occupational History   • Not on file   Tobacco Use   • Smoking status: Former Smoker     Packs/day: 1.00     Years: 15.00     Pack years: 15.00     Types: Cigarettes     Quit date: 1980     Years since quittin.3   • Smokeless tobacco: Never Used   Substance and Sexual Activity   • Alcohol use: Yes     Alcohol/week: 0.0 oz     Comment: Nightly   • Drug use: No   • Sexual activity: Not on file   Other Topics Concern   •  Service Yes   • Blood Transfusions No   • Caffeine Concern No   • Occupational Exposure No   • Hobby Hazards No   • Sleep Concern No   • Stress Concern No   • Weight Concern No   • Special Diet No   • Back Care No   • Exercise Yes   • Bike Helmet No   • Seat Belt Yes   • Self-Exams No   Social History Narrative   • Not on file     Social Determinants of Health     Financial Resource Strain:    • Difficulty of Paying Living Expenses:    Food Insecurity:    • Worried About Running Out of Food in the Last Year:    • Ran Out of Food in the Last Year:    Transportation Needs:    • Lack of  Transportation (Medical):    • Lack of Transportation (Non-Medical):    Physical Activity:    • Days of Exercise per Week:    • Minutes of Exercise per Session:    Stress:    • Feeling of Stress :    Social Connections:    • Frequency of Communication with Friends and Family:    • Frequency of Social Gatherings with Friends and Family:    • Attends Yarsani Services:    • Active Member of Clubs or Organizations:    • Attends Club or Organization Meetings:    • Marital Status:    Intimate Partner Violence:    • Fear of Current or Ex-Partner:    • Emotionally Abused:    • Physically Abused:    • Sexually Abused:        Current medications:   Current Outpatient Medications   Medication   • montelukast (SINGULAIR) 10 MG Tab   • aspirin EC (ECOTRIN) 81 MG Tablet Delayed Response   • lansoprazole (PREVACID) 30 MG CAPSULE DELAYED RELEASE   • vitamin D 4000 units Tab   • Multiple Vitamins-Minerals (OCUVITE PO)   • hydroxyurea (HYDREA) 500 MG Cap     No current facility-administered medications for this visit.       Medication Allergy:  No Known Allergies      Review of systems:   Pertinent positives and negatives are as outlined above      Physical examination:   Vitals:    04/09/21 0921   BP: 118/65   Pulse: 72   Resp: 16   Temp: 37.3 °C (99.2 °F)   SpO2: 94%       General: Patient in no acute distress, pleasant and cooperative.  HEENT: Normocephalic, no signs of acute trauma.   Neck: appears supple, here is normal range of motion. No tenderness on exam.   Chest: clear to auscultation. Symmetrical chest rise with inhalation. No cough.   CV: RRR, no murmurs.   Skin: no signs of acute rashes or trauma.   Musculoskeletal: joints exhibit diminshed ROM especially at knees and ankle.  Psychiatric: pertinent positives as discussed above    NEUROLOGICAL EXAM:   Mental status:  orientation: Awake, alert and oriented to self, month, year, situation.  Attention: Intact  Speech and language: speech is clear and fluent. The patient is  able to name, repeat and comprehend. No word substitions or paraphasic errors  Memory: There is intact recollection of recent and remote events.   Cranial nerve exam:   I: smell Not tested   II: visual acuity  Not Tested   II: Fundoscpic Unable to visualize   II: visual fields Full to confrontation  Visual neglect: absent   II: pupils Equal, round, reactive to light   III,VII: ptosis None   III,IV,VI: extraocular muscles  EOMI   V: mastication Normal   V: facial light touch sensation  Normal   V,VII: corneal reflex  Not tested   VII: facial muscle function - upper  Normal   VII: facial muscle function - lower Normal   VIII: hearing Not tested   IX: soft palate elevation  Normal   IX,X: gag reflex Not tested   XI: trapezius strength  NT   XI: sternocleidomastoid strength NT   XI: neck flexion strength  NT   XII: tongue  Protrudes Midline     Motor exam:   • Strength in the b/l UE intact. 4/5 hip flexor weakness b/l. 4+/5 right knee extensor weakness. 4+/5 b/l knee flexor weakness. 4+/5 Left dorsiflexor weakness. 4+/5 right eversion.  • R>L elbow and wrist rigidity  • Mild bilateral intention tremor on FNF  • No muscle fasciculation  • Finger tapping normal  • No bradykinesia  • Hips and knees are rigid  • No areas of atrophy   Sensory exam Mod to severe vibratory sensory loss in a gradient fashion below the knees. Diminshed pain perception and temporature sensation in a patchy distrubution bilaterally. No definite dermatomal pattern. No sensory level detected.  Deep tendon reflexes: absent AJ, and patella bilaterally. Trace to 1+ bi, tri, and BR b/l. . Plantar responses are mute .   Coordination: B/L intention tremor on FNF  Gait:   • The patient was able to get up from seated position with some difficulty, needing to press off the chair handles to stand.   • Somewhat wide-based slowed gait. Somewhat of a shuffling quality but overral appeared balanced,. No hesitancy.  Short steps/stride.  • Good arm swing  bilateral  • Extra steps on turning  • Romberg exam present          ANCILLARY DATA REVIEWED:       Lab Data Review:  Reviewed    Records reviewed:   Reviewed    Imaging:   Reviewed    EEG: 3/25/19:                1. Diffuse nonspecific cortical dysfunction - mild degree                  2. Mild focal cortical irritability over frontal R>L --this patten could be interictal - advise clinical correlation regarding further management. This does not necessary mean the patient needs seizure medication        ASSESSMENT, PLAN, EDUCATION/COUNSELING:  This is an 85-year-old male who arrives to my clinic to establish care for bilateral lower extremity weakness, impaired balance, ongoing falls, that seem to occur approximately a year ago of sudden onset.  Differential remains broad and I suspect there may be multiple etiologies to his gait:  1.  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  consider lumbar puncture trial in the future.  2.  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  Chronic alcohol use may be a contributor. Nerve conduction study/EMG to further evaluate. Consider LP.  3.  Possible mild parkinsonism as a result of NPH or some other cause.  MRI and consideration for LP as above.  4.  Deconditioning  5. Inflammatory myopathy (+ Sed rate/CRP, elevated Anti-Jessica antibody). Needs/EMG NCS.    Labs, imaging of his brain and spine, serum and CSF labs as outlined below to further clarify his diagnosis.    Had an extensive discussion about the work-up and possible reasons for his difficulties and walking and balancing.      Patient/family agree with plan, as outlined:      Visit Diagnoses     ICD-10-CM   1. Leg weakness, bilateral  R29.898   2. Weakness  R53.1   3. History of fall  Z91.81   4. Myopathy  G72.9        Orders Placed This Encounter   • MYASTHENIA GRAVIS PANEL W/RFLX   • CONNECTIVE TISSUE DISEASES PROFILE   • CREATINE KINASE   • REFERRAL TO NEURODIAGNOSTICS (EEG,EP,EMG/NCS/DBS)            FOLLOW-UP:   6-8  weeks            BILLING DOCUMENTATION:       Counseling:  I spent a total of 63 minutes of face-to-face time in this visit. Over 50% of the time of the visit today was spent on counseling and or coordination of care wtih the patient and/or family, as above in assessment in plan.       Edmund Corrales MD  Epilepsy and General Neurology  Department of Neurology  Clinical  of [facility]rology [facility].

## 2021-04-12 RX ORDER — LANSOPRAZOLE 30 MG/1
30 CAPSULE, DELAYED RELEASE ORAL
Qty: 90 CAPSULE | Refills: 0 | Status: SHIPPED | OUTPATIENT
Start: 2021-04-12 | End: 2021-07-09

## 2021-04-13 ENCOUNTER — PHYSICAL THERAPY (OUTPATIENT)
Dept: PHYSICAL THERAPY | Facility: MEDICAL CENTER | Age: 86
End: 2021-04-13
Attending: PHYSICIAN ASSISTANT
Payer: MEDICARE

## 2021-04-13 DIAGNOSIS — R53.1 ACUTE WEAKNESS: ICD-10-CM

## 2021-04-13 DIAGNOSIS — D75.839 THROMBOCYTOSIS: ICD-10-CM

## 2021-04-13 DIAGNOSIS — G11.9 CEREBELLAR ATAXIA (HCC): ICD-10-CM

## 2021-04-13 DIAGNOSIS — R93.89 ABNORMAL MRI: ICD-10-CM

## 2021-04-13 DIAGNOSIS — R29.898 WEAKNESS OF BOTH LEGS: ICD-10-CM

## 2021-04-13 DIAGNOSIS — Z92.89 HISTORY OF RECENT HOSPITALIZATION: ICD-10-CM

## 2021-04-13 DIAGNOSIS — G62.9 POLYNEUROPATHY: ICD-10-CM

## 2021-04-13 DIAGNOSIS — Z91.81 HISTORY OF FALL: ICD-10-CM

## 2021-04-13 DIAGNOSIS — R26.2 DIFFICULTY WALKING: ICD-10-CM

## 2021-04-13 DIAGNOSIS — Z87.898 HISTORY OF URINARY INCONTINENCE: ICD-10-CM

## 2021-04-13 DIAGNOSIS — R53.1 WEAKNESS: ICD-10-CM

## 2021-04-13 PROCEDURE — 97112 NEUROMUSCULAR REEDUCATION: CPT

## 2021-04-13 PROCEDURE — 97110 THERAPEUTIC EXERCISES: CPT

## 2021-04-13 NOTE — OP THERAPY DAILY TREATMENT
Outpatient Physical Therapy  DAILY TREATMENT     [facility] Outpatient Physical Therapy  [address]  [place] [address]  Phone:  [phone]  Fax:  [phone]    Date: 04/13/2021    Patient: Han Pat  YOB: 1935  MRN: [id number]     Time Calculation    Start time: 1318  Stop time: 1414 Time Calculation (min): 56 minutes         Chief Complaint: Difficulty Walking and Loss Of Balance    Visit #: 4    SUBJECTIVE:  We had to cancel the last appointment because I had a stomach ache and then it went away.    Pt present with wife, Sally, who reports he had stomach pain for half hour and vomited. Then he was fine. Antoni thinks maybe is Myasthenia Gravis but there hasn't been anything concrete to support that.    OBJECTIVE:  Fwd lean during amb  Muscle juddering with eccentrics  Decreased hip extension ROM    Therapeutic Exercises (CPT 87627):     1. Nustep L3 7 minutes, 10 min, cardiovascular training/warm up    2. Sit to stand with emphasis on eccentric control, 10x, Reviewed; pt able to recall with gentle cuing for set up; VC's for slow controlled decent. Visual muscle juddering during stand>sit B thighs    Therapeutic Treatments and Modalities:     1. Neuromuscular Re-education (CPT 76460), see below    Therapeutic Treatment and Modalities Summary: Neuro re-ed:  In // bars:  1. Side ways stepping 4x 12 ft//pt watching feet while walking, VC's to face forward with SBA provided   2. Posterior walking 4x12 ft; SBA with VC's for erect posture due to tendency for fwd lean at trunk, additionally cuing for larger steps due to decreased step length and hip extension bilaterally  3. SLS with one UE hold -fingertips x8ea  4. SLS with one UE hold-fingertips cone tapping x 10 ea; VC's for glute squeeze for increased stance ability    Seated rest break x 3 min due to fatigued glutes  Vitals after rest break: HR: 87bpm; 93% SPO2    Balance activities in front of int  with chair in front for safety x10 min -reviewed (prev performed in corner)  1. Single leg stance; difficulty bilaterally with intermittent and frequent UE use for balance  2. Standing feet together with head nods and head turns B; mod sway with addition of head movements  3. Modified partial tandem standing; intermittent and frequent UE use, difficulty Bilaterally -VC's to utilize UE's for positioning.  VC's to stand close to plinth without touching, chair in front for safety;  VC's to keep hands out for safety in case you need to grab chair.  Reviewed safety requirements with pt and wife for balance hep at home    Time-based treatments/modalities:    Physical Therapy Timed Treatment Charges  Neuromusc re-ed, balance, coor, post minutes (CPT 11367): 35 minutes  Therapeutic exercise minutes (CPT 85094): 18 minutes    ASSESSMENT:   Modified balance hep to perform from corner>in front of bed with chair due to difficulty with hep at home; reviewed safety precautions to prevent imbalance/falls. Impaired eccentric control noted. One rest break utilized today due to glute fatigue.  Pt may continue to benefit from further balance challenges and strengthening.    Per pt's wife, neuro suspects Myasthenia Gravis but not yet confirmed with testing.      PLAN/RECOMMENDATIONS:   Plan for treatment: therapy treatment to continue next visit.  Planned interventions for next visit: continue with current treatment, gait training (CPT 86434), neuromuscular re-education (CPT 14927) and therapeutic exercise (CPT 49077).  Assess response to new balance hep; review sit to stand techniques

## 2021-04-16 ENCOUNTER — PHYSICAL THERAPY (OUTPATIENT)
Dept: PHYSICAL THERAPY | Facility: MEDICAL CENTER | Age: 86
End: 2021-04-16
Attending: PHYSICIAN ASSISTANT
Payer: MEDICARE

## 2021-04-16 DIAGNOSIS — D75.839 THROMBOCYTOSIS: ICD-10-CM

## 2021-04-16 DIAGNOSIS — R29.898 WEAKNESS OF BOTH LEGS: ICD-10-CM

## 2021-04-16 DIAGNOSIS — Z92.89 HISTORY OF RECENT HOSPITALIZATION: ICD-10-CM

## 2021-04-16 DIAGNOSIS — Z91.81 HISTORY OF FALL: ICD-10-CM

## 2021-04-16 DIAGNOSIS — Z87.898 HISTORY OF URINARY INCONTINENCE: ICD-10-CM

## 2021-04-16 DIAGNOSIS — R26.2 DIFFICULTY WALKING: ICD-10-CM

## 2021-04-16 DIAGNOSIS — R53.1 WEAKNESS: ICD-10-CM

## 2021-04-16 DIAGNOSIS — G62.9 POLYNEUROPATHY: ICD-10-CM

## 2021-04-16 DIAGNOSIS — R53.1 ACUTE WEAKNESS: ICD-10-CM

## 2021-04-16 DIAGNOSIS — R93.89 ABNORMAL MRI: ICD-10-CM

## 2021-04-16 DIAGNOSIS — G11.9 CEREBELLAR ATAXIA (HCC): ICD-10-CM

## 2021-04-16 PROCEDURE — 97110 THERAPEUTIC EXERCISES: CPT

## 2021-04-16 PROCEDURE — 97112 NEUROMUSCULAR REEDUCATION: CPT

## 2021-04-16 NOTE — OP THERAPY DAILY TREATMENT
"  Outpatient Physical Therapy  DAILY TREATMENT     [facility] Outpatient Physical Therapy  [address]  [place] [address]  Phone:  [phone]  Fax:  [phone]    Date: 04/16/2021    Patient: Han Pat  YOB: 1935  MRN: [id number]     Time Calculation    Start time: 0955  Stop time: 1047 Time Calculation (min): 52 minutes         Chief Complaint: Difficulty Walking and Loss Of Balance    Visit #: 5     Pt late to appt today    SUBJECTIVE:  It's still a little hard to get around but I noticed my legs don't hurt as much when I walk. I still use my walking stick when I walk. I haven't really done the balance exercises but I do the sit to stand from a chair, I haven't used the chair in front.    OBJECTIVE:  Fwd lean during amb  Muscle juddering with eccentrics  Decreased hip extension ROM    Ball bridge:   Trial 1: 1 min 11 sec  Trial 2: 26     Therapeutic Exercises (CPT 52495):     1. Nustep L3 7 minutes, 10 min, cardiovascular training/warm up    2. Sit to stand with emphasis on eccentric control, 10x, Reviewed; pt able to recall with gentle cuing for set up; VC's for slow controlled decent. Visual muscle juddering during stand>sit B thighs    4. Clamshells, pink TB in SL; 2x20 ea, VC's to avoid trunk rotation    5. Ball bridge, endurance hold x2, 3/4 AROM with bridge; limited hip and l/s extension     6. Pt education, Re: using stationary bike at home, starting with 10 min (cumulative) or more per day, hep    Therapeutic Treatments and Modalities:     1. Neuromuscular Re-education (CPT 23303), see below    Therapeutic Treatment and Modalities Summary: Neuro re-ed:  In // bars:  1.Standing hip [id number] x ea; Requires BUE support throughout ex; VC's for upright posture and avoiding side lying    2. Standing hip extension x 10 ea with BUE support, VC\"s for upright posture/minimal hip extension    3. WS fwd/bwd x5 min//decreased excursion posterior>anterior, " with hip strategy utilized during fwd. Verbal, tactile cuing using pole at hips and visual demonstration attempted. Responded best to verbal cuing of keeping chest up with improved ankle strategy.    Pt education: Edu pt and wife re: increasing limits of stability for decreasing fall risk.     Balance activities in front of plinth with chair in front for safety (reviewed hep for home; reminded pt to utilize chair for safety)    Time-based treatments/modalities:    Physical Therapy Timed Treatment Charges  Neuromusc re-ed, balance, coor, post minutes (CPT 22067): 29 minutes  Therapeutic exercise minutes (CPT 63011): 23 minutes    ASSESSMENT:   Decreased excursion during weight shifting in sagittal plane with hip strategy utilized with forward direction; Responded best to verbal cuing of keeping chest up with improved ankle strategy.      PLAN/RECOMMENDATIONS:   Plan for treatment: therapy treatment to continue next visit.  Planned interventions for next visit: continue with current treatment, gait training (CPT 25410), neuromuscular re-education (CPT 88353) and therapeutic exercise (CPT 40177).  Review ankle strategy; continue with balance and strengthening per original POC

## 2021-04-20 ENCOUNTER — HOSPITAL ENCOUNTER (OUTPATIENT)
Dept: LAB | Facility: MEDICAL CENTER | Age: 86
End: 2021-04-20
Attending: STUDENT IN AN ORGANIZED HEALTH CARE EDUCATION/TRAINING PROGRAM
Payer: MEDICARE

## 2021-04-20 ENCOUNTER — APPOINTMENT (OUTPATIENT)
Dept: RADIOLOGY | Facility: MEDICAL CENTER | Age: 86
End: 2021-04-20
Attending: STUDENT IN AN ORGANIZED HEALTH CARE EDUCATION/TRAINING PROGRAM
Payer: MEDICARE

## 2021-04-20 DIAGNOSIS — R53.1 WEAKNESS: ICD-10-CM

## 2021-04-20 DIAGNOSIS — Z87.898 HISTORY OF URINARY INCONTINENCE: ICD-10-CM

## 2021-04-20 DIAGNOSIS — R93.89 ABNORMAL MRI: ICD-10-CM

## 2021-04-20 DIAGNOSIS — R40.4 TRANSIENT ALTERATION OF AWARENESS: ICD-10-CM

## 2021-04-20 DIAGNOSIS — R29.898 WEAKNESS OF BOTH LEGS: ICD-10-CM

## 2021-04-20 DIAGNOSIS — G11.9 CEREBELLAR ATAXIA (HCC): ICD-10-CM

## 2021-04-20 DIAGNOSIS — R53.1 ACUTE WEAKNESS: ICD-10-CM

## 2021-04-20 DIAGNOSIS — Z91.81 HISTORY OF FALL: ICD-10-CM

## 2021-04-20 LAB — CK SERPL-CCNC: 29 U/L (ref 0–154)

## 2021-04-20 PROCEDURE — 700117 HCHG RX CONTRAST REV CODE 255: Performed by: STUDENT IN AN ORGANIZED HEALTH CARE EDUCATION/TRAINING PROGRAM

## 2021-04-20 PROCEDURE — 70553 MRI BRAIN STEM W/O & W/DYE: CPT | Mod: MG

## 2021-04-20 PROCEDURE — 83516 IMMUNOASSAY NONANTIBODY: CPT | Mod: 91

## 2021-04-20 PROCEDURE — 82550 ASSAY OF CK (CPK): CPT

## 2021-04-20 PROCEDURE — 36415 COLL VENOUS BLD VENIPUNCTURE: CPT

## 2021-04-20 PROCEDURE — 83519 RIA NONANTIBODY: CPT

## 2021-04-20 PROCEDURE — 86235 NUCLEAR ANTIGEN ANTIBODY: CPT

## 2021-04-20 PROCEDURE — A9576 INJ PROHANCE MULTIPACK: HCPCS | Performed by: STUDENT IN AN ORGANIZED HEALTH CARE EDUCATION/TRAINING PROGRAM

## 2021-04-20 RX ADMIN — GADOTERIDOL 20 ML: 279.3 INJECTION, SOLUTION INTRAVENOUS at 10:02

## 2021-04-21 ENCOUNTER — APPOINTMENT (OUTPATIENT)
Dept: PHYSICAL THERAPY | Facility: MEDICAL CENTER | Age: 86
End: 2021-04-21
Attending: PHYSICIAN ASSISTANT
Payer: MEDICARE

## 2021-04-22 LAB
ACHR BIND AB SER-SCNC: 0.1 NMOL/L (ref 0–0.4)
ACHR BLOCK AB/ACHR TOTAL SFR SER: 5 % (ref 0–26)

## 2021-04-23 ENCOUNTER — NON-PROVIDER VISIT (OUTPATIENT)
Dept: NEUROLOGY | Facility: MEDICAL CENTER | Age: 86
End: 2021-04-23
Attending: STUDENT IN AN ORGANIZED HEALTH CARE EDUCATION/TRAINING PROGRAM
Payer: MEDICARE

## 2021-04-23 DIAGNOSIS — R29.898 LEG WEAKNESS, BILATERAL: ICD-10-CM

## 2021-04-23 DIAGNOSIS — Z91.81 HISTORY OF FALL: ICD-10-CM

## 2021-04-23 LAB
CENTROMERE IGG TITR SER IF: 1 AU/ML (ref 0–40)
ENA JO1 AB TITR SER: 8 AU/ML (ref 0–40)
ENA SCL70 IGG SER QL: 2 AU/ML (ref 0–40)
ENA SM IGG SER-ACNC: 0 AU/ML (ref 0–40)
ENA SS-B IGG SER IA-ACNC: 2 AU/ML (ref 0–40)
RIBOSOMAL P AB SER-ACNC: 3 AU/ML (ref 0–40)
SSA52 R0ENA AB IGG Q0420: 2 AU/ML (ref 0–40)
SSA60 R0ENA AB IGG Q0419: 1 AU/ML (ref 0–40)
U1 SNRNP IGG SER QL: NORMAL

## 2021-04-23 PROCEDURE — 95886 MUSC TEST DONE W/N TEST COMP: CPT | Performed by: SPECIALIST

## 2021-04-23 PROCEDURE — 95909 NRV CNDJ TST 5-6 STUDIES: CPT | Performed by: SPECIALIST

## 2021-04-23 PROCEDURE — 95909 NRV CNDJ TST 5-6 STUDIES: CPT | Mod: 26 | Performed by: SPECIALIST

## 2021-04-23 PROCEDURE — 95886 MUSC TEST DONE W/N TEST COMP: CPT | Mod: 26 | Performed by: SPECIALIST

## 2021-04-23 NOTE — PROCEDURES
NERVE CONDUCTION STUDIES AND ELECTROMYOGRAPHY REPORT        04/23/21      Referring provider: Kia Thompson P.A.-C.      SUMMARY OF PATIENT'S CLINICAL HISTORY,PHYSICAL EXAM, AND RATIONALE FOR TESTING:    Mr. Han Pat 86 y.o. presenting with lower extremity weakness and gait ataxia.  Past Medical History is significant for :   Past Medical History:   Diagnosis Date   • Back pain    • GERD (gastroesophageal reflux disease)    • Hyperlipidemia    • Kidney stone    • PND (post-nasal drip)    • Sleep apnea        The electrodiagnostic studies were performed to evaluate for possible peripheral neuropathy versus radiculopathy.      ELECTRODIAGNOSTIC EXAMINATION:  Nerve conduction studies (NCS) and electromyography (EMG) are utilized to evaluate direct or indirect damage to the peripheral nervous system. NCS are performed to measure the nerve(s) response(s) to electrostimulation across a given nerve segment. EMG evaluates the passive and active electrical activity of the muscle(s) in question.  Muscles are innervated by specific peripheral nerves and roots. Often times, several nerves the muscle to be examined in order to determine the presence or absence of the disease process. Furthermore, nerves and muscles may need to be tested in a [id number] comparison, as well as in additional extremities, as this may be crucial in characterizing the extent of the disease process, which may be diffuse or isolated and of varying degree of severity. The extent of the neurodiagnostic exam is justified as it may help arrive to a proper diagnosis, which ultimately may contribute to better management of the patient. Therefore, the nerves to muscles examined during the study were medically necessary.    Unless otherwise noted, temperature of the extremity(s) study was monitored before and during the examination and remained between 32 and 36 degrees C for the upper extremities, and between 30 and 36 degrees C for the lower  extremities.      NERVE CONDUCTION STUDIES:  Sensory nerves:   - Bilateral Sural nerves were tested. The responses were not elicitable with antidromic stimuli bilaterally.    Motor nerves:  - Bilateral Tibial nerves were examined. Recording electrodes placed at the Abductor Hallucis muscles. The responses were abnormal bilaterally.  On the left onset latency was within normal limits, amplitude was decreased 2.8 mV and conduction velocity was within normal limits.  On the right onset latency was within normal limits, amplitude was decreased 2.6 mV and a proximal response could not be elicited.  - Bilateral Deep Peroneal motor nerves were examined. Recording electrodes were placed at the Extensor Digitorum Brevis muscles.The responses were within normal limits.         ELECTROMYOGRAPHY:  The study was performed the concentric needle electrode. Fibrillation and fasciculation activity is graded by convention from none (0) to continuous (4+).  Needle electrode examination was performed in the following muscles: Bilateral vastus lateralis, tibialis anterior, gastrocnemius, extensor digitorum brevis, abductor hallucis.  No acute denervation changes were noted, no increased insertional activity fibrillation potentials or positive sharp waves were noted.  Significant chronic neuropathic changes with marked decrease in recruitment of low amplitude potentials were noted in the bilateral abductor hallucis muscles.  All other muscles tested were normal electrophysiologically.      Nerve Conduction Studies     Stim Site NR Onset (ms) Norm Onset (ms) O-P Amp (µV) Norm O-P Amp Site1 Site2 Delta-P (ms) Dist (cm) Joesph (m/s) Norm Joesph (m/s)   Left Sural Anti Sensory (Lat Mall)   Calf *NR  <4.6  >3 Calf Lat Mall  14.0  >40   Right Sural Anti Sensory (Lat Mall)   Calf *NR  <4.6  >3 Calf Lat Mall  14.0  >40        Stim Site NR Onset (ms) Norm Onset (ms) O-P Amp (mV) Norm O-P Amp Site1 Site2 Delta-0 (ms) Dist (cm) Joesph (m/s) Norm Joesph (m/s)    Left Peroneal EDB Motor (Ext Dig Brev)   Ankle    4.3 <6 3.0 >2.5 B Fib Ankle 8.4 35.0 42 >40   B Fib    12.7  2.4  Poplt B Fib 1.6 10.0 62    Poplt    14.3  2.4          Right Peroneal EDB Motor (Ext Dig Brev)   Ankle    4.6 <6 2.8 >2.5 B Fib Ankle 8.1 32.0 40 >40   B Fib    12.7  2.2  Poplt B Fib 1.5 10.0 67    Poplt    14.2  2.3          Left Tibial Motor (Abd Sargent Brev)   Ankle    3.0 <6 *0.8 >4 Knee Ankle 10.8 43.0 40 >40   Knee    13.8  0.2          Right Tibial Motor (Abd Sargent Brev)   Ankle    3.0 <6 *0.6 >4 Knee Ankle  0.0  >40   Knee *NR                             Electromyography     Side Muscle Nerve Root Ins Act Fibs Psw Amp Dur Poly Recrt Int Pat Comment   Right VastusLat Femoral L2-4 Nml Nml Nml Nml Nml 0 Nml Nml    Right AntTibialis Dp Br Fibular L4-5 Nml Nml Nml Nml Nml 0 Nml Nml    Right Gastroc Tibial S1-2 Nml Nml Nml Nml Nml 0 Nml Nml    Right Ext Dig Brev Dp Br Fibular L5, S1 Nml Nml Nml Nml Nml 0 Nml Nml    Right AbdHallucis MedPlantar S1-2 Nml Nml Nml *Decr Nml 0 *Reduced *25%    Left VastusLat Femoral L2-4 Nml Nml Nml Nml Nml 0 Nml Nml    Left AntTibialis Dp Br Fibular L4-5 Nml Nml Nml Nml Nml 0 Nml Nml    Left Gastroc Tibial S1-2 Nml Nml Nml Nml Nml 0 Nml Nml    Left Ext Dig Brev Dp Br Fibular L5, S1 Nml Nml Nml Nml Nml 0 Nml Nml    Left AbdHallucis MedPlantar S1-2 Nml Nml Nml *Decr Nml 0 *Reduced *25%            DIAGNOSTIC INTERPRETATION:   Extensive electrodiagnostic studies were performed to the bilateral lower extremities.  The results are as follows:    1.  Bilateral normal peroneal motor conduction studies.    2.  Bilateral low amplitude tibial motor conduction studies with chronic denervation changes noted in the bilateral tibial nerve supplied abductor hallucis muscles.    3.  Bilateral absent sural sensory conduction studies.    4.  No evidence of acute radiculopathy in selected muscle studied bilateral lower extremities.    CLINICAL DISCUSSION:   The abnormal tibial nerve  conduction studies and chronic denervation changes in the abductor hallucis muscles bilaterally are of uncertain significance.  Despite the normal onset latency of the tibial nerve conduction studies the patient has flat feet bilaterally and could well have tarsal tunnel syndrome.  The normal electromyographic examination of the gastrocnemius muscles bilaterally makes the possibility of an S1 radiculopathy less likely.  The absent sural sensory responses are  not necessarily abnormal in an 86-year-old patient.  Clinical correlation is suggested.    CLIFTON [place] M.D.

## 2021-04-26 ENCOUNTER — PHYSICAL THERAPY (OUTPATIENT)
Dept: PHYSICAL THERAPY | Facility: MEDICAL CENTER | Age: 86
End: 2021-04-26
Attending: PHYSICIAN ASSISTANT
Payer: MEDICARE

## 2021-04-26 DIAGNOSIS — D75.839 THROMBOCYTOSIS: ICD-10-CM

## 2021-04-26 DIAGNOSIS — Z87.898 HISTORY OF URINARY INCONTINENCE: ICD-10-CM

## 2021-04-26 DIAGNOSIS — Z91.81 HISTORY OF FALL: ICD-10-CM

## 2021-04-26 DIAGNOSIS — G11.9 CEREBELLAR ATAXIA (HCC): ICD-10-CM

## 2021-04-26 DIAGNOSIS — R26.2 DIFFICULTY WALKING: ICD-10-CM

## 2021-04-26 DIAGNOSIS — R29.898 WEAKNESS OF BOTH LEGS: ICD-10-CM

## 2021-04-26 DIAGNOSIS — Z92.89 HISTORY OF RECENT HOSPITALIZATION: ICD-10-CM

## 2021-04-26 DIAGNOSIS — R93.89 ABNORMAL MRI: ICD-10-CM

## 2021-04-26 DIAGNOSIS — R53.1 ACUTE WEAKNESS: ICD-10-CM

## 2021-04-26 DIAGNOSIS — G62.9 POLYNEUROPATHY: ICD-10-CM

## 2021-04-26 DIAGNOSIS — R53.1 WEAKNESS: ICD-10-CM

## 2021-04-26 PROCEDURE — 97112 NEUROMUSCULAR REEDUCATION: CPT

## 2021-04-26 ASSESSMENT — BALANCE ASSESSMENTS
STANDING ON ONE LEG: 0
STANDING UNSUPPORTED WITH FEET TOGETHER: 3
LONG VERSION TOTAL SCORE (MAX 56): 44
TURN 360 DEGREES: 4
REACHING FORWARD WITH OUTSTRETCHED ARM WHILE STANDING: 3
LONG VERSION TOTAL SCORE (MAX 56): 44
SITTING UNSUPPORTED: 4
STANDING TO SITTING: 4
PICK UP OBJECT FROM THE FLOOR FROM A STANDING POSITION: 4
STANDING UNSUPPORTED: 4
TRANSFERS: 4
STANDING UNSUPPORTED WITH EYES CLOSED: 3
SITTING TO STANDING: 4
PLACE ALTERNATE FOOT ON STEP OR STOOL WHILE STANDING UNSUPPORTED: 0
LOOK OVER LEFT AND RIGHT SHOULDERS WHILE STANDING: 4
STANDING UNSUPPORTED ONE FOOT IN FRONT: 3

## 2021-04-26 NOTE — OP THERAPY PROGRESS SUMMARY
Outpatient Physical Therapy  PROGRESS SUMMARY NOTE      [facility] Outpatient Physical Therapy  [address]  [place] [address]  Phone:  [phone]  Fax:  [phone]    Date of Visit: 04/26/2021    Patient: Han Pat  YOB: 1935  MRN: [id number]     Referring Provider: Edmund Corrales M.D.  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 History of fall  Z91.81   2. History of urinary incontinence  Z87.898   3. Abnormal MRI  R93.89   4. 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 Difficulty walking  R26.2   6. Weakness of both legs  R29.898   7. History of recent hospitalization  Z92.89   8. Thrombocytosis (HCC)  D47.3   9. Polyneuropathy  G62.9   10. Weakness  R53.1   11. Acute weakness  R53.1       Rehab Potential: fair    Progress Report Period: 3/30/21-4/26/21    Functional Assessment Used  Escobar Balance Total Score (0-56): 44       Objective Findings and Assessment:   Patient progression towards goals:    Pt has been seen for 5 visits of skilled physical therapy and reporting improved prolonged walking, now walking 220 yards x 2 on inclines. Additional improvements reported with stair navigation and transfers in/out of chairs. Objectively, noted improvements in ESCOBAR balance with tandem, trunk rotation, and narrow PREMA but continues to fall in fall risk category at this point (greatest difficulties with SLS). Some continued difficulties with transfers from standard height chair with several attempts required to stand up with occasional UE use. Pt may continue to benefit from core/posterior chain strengthening in addition to balance challenges with emphasis on SLS. Of note, no  official diagnosis at this point; MD speculating Myasthenia Aravis per pt's wife.      Objective findings and assessment details: Fwd lean during amb  Muscle juddering with eccentrics  Decreased hip extension ROM    Vitals pre-testing:  HR: 77  SPO2: 93%    5 Times sit to stand:   Today: 42 sec from standard height chair (one occasion UE use)  Today: 24 sec from raised plinth (without UE use)  At eval: 23 sec from raised plinth (without UE use)  (Denies fatigue, SOB following)     Escobar Balance Total Score (0-56):   Today: 44/56  At eval: 42/56    <45/56 signifies fall risk  *greatest difficulty with narrow PREMA including ft together standing, tandem stance and SLS       Goals:   Short Term Goals:   Goals:   Short Term Goals:   1. Pt will be independent with written HEP. (Partially Met)  2. Pt will complete at least 20 min cumulative aerobic activity at least 2x/wk. (Partially met)  3. Pt will be able to progress 2/3 hep. (Partially met)  4. Pt will report no falls (Met)      Short term goal time span:  2-4 weeks      Long Term Goals:    Long Term Goals:  (Ongoing goals)  1. Pt will be independent with written HEP.   2. Pt will have a score of >47/56 or greater to signify decrease in fall risk (eval:42/56)  3. Pt will complete at least 30 min cumulative aerobic activity at least 4x/wk to work towards meeting ADA guidelines for aerobic health.  4. Pt will be able to ambulate .5 mile with LRAD without being limited by fatigue.  5. Pt will report no falls  6. Pt will be able to stand for 30 min or longer without being limited by fatigue in order to complete ADL's such as self care, meal prepping, and spending time with family.      Long term goal time span:  6-8 weeks    Plan:   Planned therapy interventions:  Neuromuscular Re-education (CPT 57727), Manual Therapy (CPT 05452), Gait Training (CPT 29335), Therapeutic Exercise (CPT 95482) and Therapeutic Activities (CPT 44335)  Frequency:  1x week  Duration in weeks:   6  Plan details:  UPOC: 6/11/21      Referring provider co-signature:  I have reviewed this plan of care and my co-signature certifies the need for services.     Certification Period: 04/26/2021 to 6/11/21    Physician Signature: ________________________________ Date: ______________

## 2021-05-03 ENCOUNTER — APPOINTMENT (RX ONLY)
Dept: URBAN - METROPOLITAN AREA CLINIC 35 | Facility: CLINIC | Age: 86
Setting detail: DERMATOLOGY
End: 2021-05-03

## 2021-05-03 DIAGNOSIS — L57.0 ACTINIC KERATOSIS: ICD-10-CM

## 2021-05-03 DIAGNOSIS — D485 NEOPLASM OF UNCERTAIN BEHAVIOR OF SKIN: ICD-10-CM

## 2021-05-03 PROBLEM — D48.5 NEOPLASM OF UNCERTAIN BEHAVIOR OF SKIN: Status: ACTIVE | Noted: 2021-05-03

## 2021-05-03 PROCEDURE — ? DEFER

## 2021-05-03 PROCEDURE — ? BIOPSY BY SHAVE METHOD

## 2021-05-03 PROCEDURE — 69100 BIOPSY OF EXTERNAL EAR: CPT

## 2021-05-03 PROCEDURE — ? ADDITIONAL NOTES

## 2021-05-03 PROCEDURE — 99213 OFFICE O/P EST LOW 20 MIN: CPT | Mod: 25

## 2021-05-03 ASSESSMENT — LOCATION ZONE DERM
LOCATION ZONE: SCALP
LOCATION ZONE: LIP
LOCATION ZONE: EAR

## 2021-05-03 ASSESSMENT — LOCATION DETAILED DESCRIPTION DERM
LOCATION DETAILED: LEFT SUPERIOR PARIETAL SCALP
LOCATION DETAILED: LEFT TRAGUS
LOCATION DETAILED: RIGHT TRIANGULAR FOSSA
LOCATION DETAILED: RIGHT UPPER CUTANEOUS LIP

## 2021-05-03 ASSESSMENT — LOCATION SIMPLE DESCRIPTION DERM
LOCATION SIMPLE: RIGHT EAR
LOCATION SIMPLE: SCALP
LOCATION SIMPLE: LEFT EAR
LOCATION SIMPLE: RIGHT LIP

## 2021-05-03 NOTE — PROCEDURE: BIOPSY BY SHAVE METHOD

## 2021-05-03 NOTE — PROCEDURE: ADDITIONAL NOTES
Detail Level: Simple
Additional Notes: Hyperkeratotic lesions treated with light curettage prior blue light treatment
Render Risk Assessment In Note?: no

## 2021-05-04 ENCOUNTER — PHYSICAL THERAPY (OUTPATIENT)
Dept: PHYSICAL THERAPY | Facility: MEDICAL CENTER | Age: 86
End: 2021-05-04
Attending: STUDENT IN AN ORGANIZED HEALTH CARE EDUCATION/TRAINING PROGRAM
Payer: MEDICARE

## 2021-05-04 DIAGNOSIS — R26.2 DIFFICULTY WALKING: ICD-10-CM

## 2021-05-04 DIAGNOSIS — Z91.81 HISTORY OF FALL: ICD-10-CM

## 2021-05-04 PROCEDURE — 97112 NEUROMUSCULAR REEDUCATION: CPT

## 2021-05-04 PROCEDURE — 97110 THERAPEUTIC EXERCISES: CPT

## 2021-05-04 NOTE — OP THERAPY DAILY TREATMENT
Outpatient Physical Therapy  DAILY TREATMENT     [facility] Outpatient Physical Therapy  [address]  [place] [address]  Phone:  [phone]  Fax:  [phone]    Date: 05/04/2021    Patient: Han Pat  YOB: 1935  MRN: [id number]     Time Calculation    Start time: 0215  Stop time: 0311 Time Calculation (min): 56 minutes         Chief Complaint: balance problems    Visit #: 7    SUBJECTIVE:  Pt states he was apathetic about exercising last week.     OBJECTIVE:  Current objective measures: pulse ox 94%.        Therapeutic Exercises (CPT 20854):     1. Nustep L4 , 10 minutes    2. Balance work: tandem walking, standing on foam and 1/2 roller    3. Hs curls     4. Sit to stand, 12x    5. Ball bridging, 15x    6. Bridge without ball , 15x    7. Balance on 1/2 roller tandem and side by side    8. Toe taps on cone: difficult for pt to do without UE assist      Time-based treatments/modalities:    Physical Therapy Timed Treatment Charges  Neuromusc re-ed, balance, coor, post minutes (CPT 92792): 34 minutes  Therapeutic exercise minutes (CPT 39238): 22 minutes        ASSESSMENT:   Pt hasn't been very compliant with HEP, but wife states he is do more activities around the house.     PLAN/RECOMMENDATIONS:   Plan for treatment: therapy treatment to continue next visit.  Planned interventions for next visit: gait training (CPT 79049), neuromuscular re-education (CPT 82373) and therapeutic exercise (CPT 79944).

## 2021-05-12 ENCOUNTER — PHYSICAL THERAPY (OUTPATIENT)
Dept: PHYSICAL THERAPY | Facility: MEDICAL CENTER | Age: 86
End: 2021-05-12
Attending: STUDENT IN AN ORGANIZED HEALTH CARE EDUCATION/TRAINING PROGRAM
Payer: MEDICARE

## 2021-05-12 DIAGNOSIS — Z91.81 HISTORY OF FALL: ICD-10-CM

## 2021-05-12 DIAGNOSIS — G62.9 POLYNEUROPATHY: ICD-10-CM

## 2021-05-12 DIAGNOSIS — Z87.898 HISTORY OF URINARY INCONTINENCE: ICD-10-CM

## 2021-05-12 DIAGNOSIS — R53.1 ACUTE WEAKNESS: ICD-10-CM

## 2021-05-12 DIAGNOSIS — R93.89 ABNORMAL MRI: ICD-10-CM

## 2021-05-12 DIAGNOSIS — R29.898 WEAKNESS OF BOTH LEGS: ICD-10-CM

## 2021-05-12 DIAGNOSIS — R26.2 DIFFICULTY WALKING: ICD-10-CM

## 2021-05-12 DIAGNOSIS — R53.1 WEAKNESS: ICD-10-CM

## 2021-05-12 DIAGNOSIS — G11.9 CEREBELLAR ATAXIA (HCC): ICD-10-CM

## 2021-05-12 DIAGNOSIS — D75.839 THROMBOCYTOSIS: ICD-10-CM

## 2021-05-12 DIAGNOSIS — Z92.89 HISTORY OF RECENT HOSPITALIZATION: ICD-10-CM

## 2021-05-12 PROCEDURE — 97110 THERAPEUTIC EXERCISES: CPT

## 2021-05-12 NOTE — OP THERAPY DAILY TREATMENT
Outpatient Physical Therapy  DAILY TREATMENT     [facility] Outpatient Physical Therapy  [address]  [place] [address]  Phone:  [phone]  Fax:  [phone]    Date: 05/12/2021    Patient: Han Pat  YOB: 1935  MRN: [id number]     Time Calculation    Start time: 1447  Stop time: 1516 Time Calculation (min): 29 minutes         Chief Complaint: balance problems    Visit #: 8    SUBJECTIVE:  Pt states he was apathetic about exercising last week.     *last sched session    OBJECTIVE:  Current objective measures: pulse ox 94%.        Therapeutic Exercises (CPT 08628):     1. Nustep L4 , x5 minutes    2. Review of hep    3. Balance training in corner, re: narrow PREMA, tandem standing, SLS, reviewed; pt reporting has not yet attempted within home    20.  6/11/21    Therapeutic Treatments and Modalities:     1. Neuromuscular Re-education (CPT 21335), see below    Therapeutic Treatment and Modalities Summary: In // bars:  WS fwd and bwd x10 ea; mild intermit compensations using hip strategy    Time-based treatments/modalities:    Physical Therapy Timed Treatment Charges  Therapeutic exercise minutes (CPT 08887): 29 minutes      ASSESSMENT:   Improved limits of stability with improved WS fwd; continued difficulty in post direction. Improved ankle strategy  compared to previous sessions. Pt reporting pos impact on ADL's including: decreased falls, improved walking, improved transfers with agreement from wife.      PLAN/RECOMMENDATIONS:   Plan for treatment: therapy treatment to continue next visit.  Planned interventions for next visit: gait training (CPT 83870), neuromuscular re-education (CPT 05903) and therapeutic exercise (CPT 04823).  Assess goals met; continuing complaints; discuss continuation of PT

## 2021-05-19 ENCOUNTER — PHYSICAL THERAPY (OUTPATIENT)
Dept: PHYSICAL THERAPY | Facility: MEDICAL CENTER | Age: 86
End: 2021-05-19
Attending: STUDENT IN AN ORGANIZED HEALTH CARE EDUCATION/TRAINING PROGRAM
Payer: MEDICARE

## 2021-05-19 ENCOUNTER — PATIENT MESSAGE (OUTPATIENT)
Dept: HEALTH INFORMATION MANAGEMENT | Facility: OTHER | Age: 86
End: 2021-05-19

## 2021-05-19 DIAGNOSIS — G11.9 CEREBELLAR ATAXIA (HCC): ICD-10-CM

## 2021-05-19 DIAGNOSIS — G62.9 POLYNEUROPATHY: ICD-10-CM

## 2021-05-19 DIAGNOSIS — R53.1 ACUTE WEAKNESS: ICD-10-CM

## 2021-05-19 DIAGNOSIS — R93.89 ABNORMAL MRI: ICD-10-CM

## 2021-05-19 DIAGNOSIS — Z92.89 HISTORY OF RECENT HOSPITALIZATION: ICD-10-CM

## 2021-05-19 DIAGNOSIS — D75.839 THROMBOCYTOSIS: ICD-10-CM

## 2021-05-19 DIAGNOSIS — R53.1 WEAKNESS: ICD-10-CM

## 2021-05-19 DIAGNOSIS — Z91.81 HISTORY OF FALL: ICD-10-CM

## 2021-05-19 DIAGNOSIS — R29.898 WEAKNESS OF BOTH LEGS: ICD-10-CM

## 2021-05-19 DIAGNOSIS — Z87.898 HISTORY OF URINARY INCONTINENCE: ICD-10-CM

## 2021-05-19 DIAGNOSIS — R26.2 DIFFICULTY WALKING: ICD-10-CM

## 2021-05-19 PROCEDURE — 97110 THERAPEUTIC EXERCISES: CPT

## 2021-05-19 NOTE — OP THERAPY DAILY TREATMENT
Outpatient Physical Therapy  DAILY TREATMENT     [facility] Outpatient Physical Therapy  [address]  [place] [address]  Phone:  [phone]  Fax:  [phone]    Date: 05/19/2021    Patient: Han Pat  YOB: 1935  MRN: [id number]     Time Calculation    Start time: 1447  Stop time: 1520 Time Calculation (min): 33 minutes         Chief Complaint: balance problems    Visit #: 9    SUBJECTIVE:  I haven't been doing my exercises as much as I should. I didn't do the balance ex in the corner. I don't need my walking sticks anymore.     Present with wife, Sally. Reports pt has been performing balance exercises using a counter/island. He gets tired in the afternoon and he still shuffles. We see the neurologist next week so I hope we have some answers.        OBJECTIVE:  Current objective measures:   glute med weakness L>R with visible     Goals:   Short Term Goals:   Goals:   Short Term Goals:   1. Pt will be independent with written HEP. (Partially Met)  2. Pt will complete at least 20 min cumulative aerobic activity at least 2x/wk. (Partially met)  3. Pt will be able to progress 2/3 hep. (Partially met)  4. Pt will report no falls (Met)        Short term goal time span:  2-4 weeks      Long Term Goals:    Long Term Goals:   1. Pt will be independent with written HEP.   2. Pt will have a score of >47/56 or greater to signify decrease in fall risk (eval:42/56)  3. Pt will complete at least 30 min cumulative aerobic activity at least 4x/wk to work towards meeting ADA guidelines for aerobic health. (ongoing)  4. Pt will be able to ambulate .5 mile with LRAD without being limited by fatigue. (ongoing)  5. Pt will report no falls (met)  6. Pt will be able to stand for 30 min or longer without being limited by fatigue in order to complete ADL's such as self care, meal prepping, and spending time with family. (ongoing)        Long term goal time span:  6-8  weeks    Therapeutic Exercises (CPT 79380):     1. Nustep L4 , x5 minutes    2. Review of hep    3. Supine bridge on swissball, 10 sec x 4    4. [place] man in SL, 10 sec x2 ea, increased difficulty LLE; tactile cuing for RLE    5. Sit to stand, 5x, review of mechanics, pt unable to identify sets for sit to stand but able to perform correctly using momentum, no LOB    6. Pt education, re: balance exercises with a chair behind for safety due to fall risk; discussed use of walking sticks for longer distances and in afternoons due to shuffling for safety    20.  6/11/21      Time-based treatments/modalities:    Physical Therapy Timed Treatment Charges  Therapeutic exercise minutes (CPT 81445): 33 minutes      ASSESSMENT:   Poor sag and frontal plane endurance. Pt demonstrating good carryover of sit to stand despite unable to recall appropriate steps. Reporting min/mod compliance to hep; has been having difficulty in past few sessions with hep compliance. Discussed DC at subsequent session in order to spend more time on indep hep. Has follow up with neurologist next week, wife is hopeful for 'answers' regarding pt's neurological symptoms of unknown origin.        PLAN/RECOMMENDATIONS:   Plan for treatment: therapy treatment to continue next visit.  Planned interventions for next visit: gait training (CPT 12400), neuromuscular re-education (CPT 20794) and therapeutic exercise (CPT 16612).  Anticipate DC next session. Follow up on visit with neurology.

## 2021-05-26 ENCOUNTER — OFFICE VISIT (OUTPATIENT)
Dept: NEUROLOGY | Facility: MEDICAL CENTER | Age: 86
End: 2021-05-26
Attending: STUDENT IN AN ORGANIZED HEALTH CARE EDUCATION/TRAINING PROGRAM
Payer: MEDICARE

## 2021-05-26 VITALS
HEART RATE: 70 BPM | SYSTOLIC BLOOD PRESSURE: 118 MMHG | HEIGHT: 72 IN | WEIGHT: 201.72 LBS | BODY MASS INDEX: 27.32 KG/M2 | TEMPERATURE: 98.6 F | OXYGEN SATURATION: 95 % | DIASTOLIC BLOOD PRESSURE: 72 MMHG

## 2021-05-26 DIAGNOSIS — R53.1 WEAKNESS: ICD-10-CM

## 2021-05-26 DIAGNOSIS — M54.50 CHRONIC MIDLINE LOW BACK PAIN WITHOUT SCIATICA: ICD-10-CM

## 2021-05-26 DIAGNOSIS — J44.9 CHRONIC OBSTRUCTIVE PULMONARY DISEASE, UNSPECIFIED COPD TYPE (HCC): ICD-10-CM

## 2021-05-26 DIAGNOSIS — G47.33 OBSTRUCTIVE SLEEP APNEA: ICD-10-CM

## 2021-05-26 DIAGNOSIS — R63.4 WEIGHT LOSS, UNINTENTIONAL: ICD-10-CM

## 2021-05-26 DIAGNOSIS — G89.29 CHRONIC MIDLINE LOW BACK PAIN WITHOUT SCIATICA: ICD-10-CM

## 2021-05-26 DIAGNOSIS — G62.9 POLYNEUROPATHY: ICD-10-CM

## 2021-05-26 DIAGNOSIS — E53.1 VITAMIN B6 DEFICIENCY NEUROPATHY (HCC): ICD-10-CM

## 2021-05-26 DIAGNOSIS — G63 VITAMIN B6 DEFICIENCY NEUROPATHY (HCC): ICD-10-CM

## 2021-05-26 DIAGNOSIS — R29.898 LEG WEAKNESS, BILATERAL: ICD-10-CM

## 2021-05-26 DIAGNOSIS — G93.89 CEREBRAL VENTRICULOMEGALY: ICD-10-CM

## 2021-05-26 DIAGNOSIS — R41.89 COGNITIVE IMPAIRMENT: ICD-10-CM

## 2021-05-26 DIAGNOSIS — M47.896 OTHER OSTEOARTHRITIS OF SPINE, LUMBAR REGION: ICD-10-CM

## 2021-05-26 DIAGNOSIS — R41.89 SPELL OF ALTERED COGNITION: ICD-10-CM

## 2021-05-26 PROCEDURE — 99212 OFFICE O/P EST SF 10 MIN: CPT | Performed by: STUDENT IN AN ORGANIZED HEALTH CARE EDUCATION/TRAINING PROGRAM

## 2021-05-26 PROCEDURE — 99215 OFFICE O/P EST HI 40 MIN: CPT | Performed by: STUDENT IN AN ORGANIZED HEALTH CARE EDUCATION/TRAINING PROGRAM

## 2021-05-26 ASSESSMENT — FIBROSIS 4 INDEX: FIB4 SCORE: 1.44

## 2021-05-26 NOTE — PROGRESS NOTES
GENERAL NEUROLOGY FOLLOW-UP VISIT  REFERRING PROVIDER:  Kia Thomposn P.A.-C.  [address]  [address]  [place] [address]      CHIEF COMPLAINT(S): LE weakness, falls    History of present illness:  Han Pat 85 y.o. male presents today for evaluation of lower extremity weakness, impaired balance, and regular falls.    Every started about a year ago. Had a fall a year ago and could never find out why this happened. His legs wouldn't work. He couldn't stand up.Seemed to be sudden onset weakness, as he was fine the night before. Went to rehab for a weak after being hospitalized and worked up and was sent home with no real answers or follow-up. Some incontinence in beginning but this is no longer happening.    Severe sleep apnea. Going back to get mask and setting adjusted.    Worsening forgetfulness but patient and wife dont think that is an issue because it is stable. He can balance checkup and    Lost weight but thinks its his wife's food.     Had severe pain in lower back years ago and is s/p Microdysctomy years ago in lower back for treatment of the pain which helped. Still had diffuse midline back pain with no definite radicular symptoms but it is more tolerable. No Lermitte's symptoms.    Patient has chronic Consitpation, no diarrhea. NO urinary incontinence.     Has mild tremor on the right worsened when trying too use it. Handwriting has become messier and smaller. Used to have good penmanship.    Hygiene good.    Last fall was months go.    Was a regular drinker of alcohol, daily, multiple shots of liquur per day. Stopped drinking this months 3 years ago. Currently, he has a  Glass of wine per day and a beer sometimes in afternoon. Non smoker. NO illciits.    No fever or chills. No myalgias. No rashes. No cough, CP, or SOB. Not working with PT/OT.        Patient's PMH, PSH, FH, and SH were reviewed.    Medications and allergies were reviewed.    INTERVAL HISTORY:  PT has helped.Balance,  endurance, strength. Has been riding bike too.  Has not had any falls. Legs are not bothering him as much. Much less stiffness.    Appetite is less. Lost 20 pounds since April.          Past medical history:   Past Medical History:   Diagnosis Date   • Back pain    • GERD (gastroesophageal reflux disease)    • Hyperlipidemia    • Kidney stone    • PND (post-nasal drip)    • Sleep apnea        Past surgical history:   Past Surgical History:   Procedure Laterality Date   • CATARACT EXTRACTION WITH IOL     • OTHER      Nasal surgery   • OTHER ORTHOPEDIC SURGERY  lumbar disectomy        Family history:   Family History   Problem Relation Age of Onset   • Other Father         Heart problems; unspecified   • Sleep Apnea Father    • Other Mother         Aneurism   • Sleep Apnea Brother    • Other Brother         Heart problems; unspecified   • Sleep Apnea Son    • Sleep Apnea Son        Social history:   Social History     Socioeconomic History   • Marital status:      Spouse name: Not on file   • Number of children: Not on file   • Years of education: Not on file   • Highest education level: Not on file   Occupational History   • Not on file   Tobacco Use   • Smoking status: Former Smoker     Packs/day: 1.00     Years: 15.00     Pack years: 15.00     Types: Cigarettes     Quit date: 1980     Years since quittin.4   • Smokeless tobacco: Never Used   Vaping Use   • Vaping Use: Never used   Substance and Sexual Activity   • Alcohol use: Yes     Alcohol/week: 0.0 oz     Comment: Nightly   • Drug use: No   • Sexual activity: Not on file   Other Topics Concern   •  Service Yes   • Blood Transfusions No   • Caffeine Concern No   • Occupational Exposure No   • Hobby Hazards No   • Sleep Concern No   • Stress Concern No   • Weight Concern No   • Special Diet No   • Back Care No   • Exercise Yes   • Bike Helmet No   • Seat Belt Yes   • Self-Exams No   Social History Narrative   • Not on file     Social  Determinants of Health     Financial Resource Strain:    • Difficulty of Paying Living Expenses:    Food Insecurity:    • Worried About Running Out of Food in the Last Year:    • Ran Out of Food in the Last Year:    Transportation Needs:    • Lack of Transportation (Medical):    • Lack of Transportation (Non-Medical):    Physical Activity:    • Days of Exercise per Week:    • Minutes of Exercise per Session:    Stress:    • Feeling of Stress :    Social Connections:    • Frequency of Communication with Friends and Family:    • Frequency of Social Gatherings with Friends and Family:    • Attends Jewish Services:    • Active Member of Clubs or Organizations:    • Attends Club or Organization Meetings:    • Marital Status:    Intimate Partner Violence:    • Fear of Current or Ex-Partner:    • Emotionally Abused:    • Physically Abused:    • Sexually Abused:        Current medications:   Current Outpatient Medications   Medication   • Folic Acid-Vit B6-Vit B12 0.8-10-0.115 MG Tab   • lansoprazole (PREVACID) 30 MG CAPSULE DELAYED RELEASE   • montelukast (SINGULAIR) 10 MG Tab   • aspirin EC (ECOTRIN) 81 MG Tablet Delayed Response   • vitamin D 4000 units Tab   • Multiple Vitamins-Minerals (OCUVITE PO)   • hydroxyurea (HYDREA) 500 MG Cap     No current facility-administered medications for this visit.       Medication Allergy:  No Known Allergies      Review of systems:   Pertinent positives and negatives are as outlined above      Physical examination:   Vitals:    05/26/21 0939   BP: 118/72   Pulse: 70   Temp: 37 °C (98.6 °F)   SpO2: 95%       General: Patient in no acute distress, pleasant and cooperative.  HEENT: Normocephalic, no signs of acute trauma.   Neck: appears supple, here is normal range of motion. No tenderness on exam.   Chest: clear to auscultation. Symmetrical chest rise with inhalation. No cough.   CV: RRR, no murmurs.   Skin: no signs of acute rashes or trauma.   Musculoskeletal: joints exhibit  diminshed ROM especially at knees and ankle.  Psychiatric: pertinent positives as discussed above    NEUROLOGICAL EXAM:   Mental status:  orientation: Awake, alert and oriented to self, month, year, situation.  Attention: Intact  Speech and language: speech is clear and fluent. The patient is able to name, repeat and comprehend. No word substitions or paraphasic errors  Memory: There is intact recollection of recent and remote events.   Cranial nerve exam:   I: smell Not tested   II: visual acuity  Not Tested   II: Fundoscpic Unable to visualize   II: visual fields Full to confrontation  Visual neglect: absent   II: pupils Equal, round, reactive to light   III,VII: ptosis None   III,IV,VI: extraocular muscles  EOMI   V: mastication Normal   V: facial light touch sensation  Normal   V,VII: corneal reflex  Not tested   VII: facial muscle function - upper  Normal   VII: facial muscle function - lower Normal   VIII: hearing Not tested   IX: soft palate elevation  Normal   IX,X: gag reflex Not tested   XI: trapezius strength  NT   XI: sternocleidomastoid strength NT   XI: neck flexion strength  NT   XII: tongue  Protrudes Midline     Motor exam:   • Strength in the b/l UE intact. 4+/5 hip flexor weakness b/l. 4+/5 right knee extensor weakness. 4+/5 b/l knee flexor weakness. 5/5 Left dorsiflexor weakness. 5/5 right eversion.  • R>L elbow and wrist rigidity  • Mild bilateral intention tremor on FNF  • No muscle fasciculation  • Finger tapping normal  • No bradykinesia  • Hips and knees are rigid  • No areas of atrophy   Sensory exam Mod to severe vibratory sensory loss in a gradient fashion below the knees. Diminshed pain perception and temporature sensation in a patchy distrubution bilaterally. No definite dermatomal pattern. No sensory level detected.  Deep tendon reflexes: absent AJ, and patella bilaterally. Trace to 1+ bi, tri, and BR b/l. . Plantar responses are mute .   Coordination: B/L intention tremor on  FNF  Gait:   • The patient was able to get up from seated position with some difficulty, needing to press off the chair handles to stand.   • Somewhat wide-based slowed gait, slow. shuffling quality but overral appeared balanced. No hesitancy.  Short steps/stride.  • Good arm swing bilateral  • Extra steps on turning  • Romberg exam present          ANCILLARY DATA REVIEWED:       Lab Data Review:  Reviewed    Records reviewed:   Reviewed    Imaging:   Reviewed    EEG: 3/25/19:                1. Diffuse nonspecific cortical dysfunction - mild degree                  2. Mild focal cortical irritability over frontal R>L --this patten could be interictal - advise clinical correlation regarding further management. This does not necessary mean the patient needs seizure medication     NCS/EMG 4/23/21:  DIAGNOSTIC INTERPRETATION:   Extensive electrodiagnostic studies were performed to the   bilateral lower extremities.  The results are as follows:     1.  Bilateral normal peroneal motor conduction studies.     2.  Bilateral low amplitude tibial motor conduction studies with   chronic denervation changes noted in the bilateral tibial nerve   supplied abductor hallucis muscles.     3.  Bilateral absent sural sensory conduction studies.     4.  No evidence of acute radiculopathy in selected muscle studied   bilateral lower extremities.     CLINICAL DISCUSSION:   The abnormal tibial nerve conduction studies and chronic   denervation changes in the abductor hallucis muscles bilaterally   are of uncertain significance.  Despite the normal onset latency   of the tibial nerve conduction studies the patient has flat feet   bilaterally and could well have tarsal tunnel syndrome.  The   normal electromyographic examination of the gastrocnemius muscles   bilaterally makes the possibility of an S1 radiculopathy less   likely.  The absent sural sensory responses are  not necessarily   abnormal in an 86-year-old patient.  Clinical  correlation is   suggested.        ASSESSMENT, PLAN, EDUCATION/COUNSELING:  This is an 85-year-old male who arrives to my clinic to establish care for bilateral lower extremity weakness, impaired balance, history of falls, impaired global cognition. Does have a history of incontinence (where Depends diapers now) so this symptoms, along with his slowed, wide based gait, cognitive decline in the setting of a worsening ventriculomegaly makes NPH a real possibility. Discussed the option of doing a large volume lumbar puncture and assessing gait pre and post LP. Patient and wife want to think about it. Also affecting his balance is a confirmed neuropathy, likely multifactorial as mentioned.  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 Lower suspicion of a myopathy given his improvement in legs strength with PT, normal CK markers, EMG/NCS not suggestive of a myopathic process, and non-diagnostic myopathic lab markers. Causes of his constellation of symptoms.  1.  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  consider lumbar puncture trial in the future.  2.  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  Chronic past alcohol, vitamin defieicny B6 likely contributor. Recommending B complex vitamin with B6. No evidene of radiculopathy on NCS. No indication for LP to evaluate neuropathy  3.  Deconditioning (improving with PT). He should continue.  5. Possibly an underlying dementia. Sonya consider Brain PET, LP, and neuropsych testing      Items to address inupcoming visit:  · Determine if patient wants to pursue LP to evaluate for NPH  · Repeat MRI spine in approx. 6 months from last ones to evaluate for progress of abnormal bone marrow signal changes which may be a benign vs malignant process. Will consider referral to hem/onc if necessary  · Monitor weight loss. Consider broader work-up for systemic illness/malignancy and/or referral to nutritional  services  · Repeat autoimmune markers in 12 weeks  · Will plan to do more comprehensive cognitive testing to further determine safety of driving  · Inquire about sleep and mood       Patient/family agree with plan, as outlined:      Visit Diagnoses     ICD-10-CM   1. Chronic obstructive pulmonary disease, unspecified COPD type (HCC)  J44.9   2. Other osteoarthritis of spine, lumbar region  M47.896   3. Obstructive sleep apnea  G47.33   4. Cognitive impairment  R41.89   5. Weakness  R53.1   6. Polyneuropathy  G62.9   7. Weight loss, unintentional  R63.4   8. 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 Cerebral ventriculomegaly  G93.89   10. Leg weakness, bilateral  R29.898   11. Chronic midline low back pain without sciatica  M54.5    G89.29   12. Spell of altered cognition  R41.89        Orders Placed This Encounter   • Folic Acid-Vit B6-Vit B12 0.8-10-0.115 MG Tab            FOLLOW-UP:   6-8 weeks            BILLING DOCUMENTATION:       Counseling:  I spent a total of 66 minutes of face-to-face time in this visit. Over 50% of the time of the visit today was spent on counseling and or coordination of care wtih the patient and/or family, as above in assessment in plan.       Edmund Corrales MD  Epilepsy and General Neurology  Department of Neurology  Clinical  of Neurology [facility].

## 2021-06-03 ENCOUNTER — PHYSICAL THERAPY (OUTPATIENT)
Dept: PHYSICAL THERAPY | Facility: MEDICAL CENTER | Age: 86
End: 2021-06-03
Attending: STUDENT IN AN ORGANIZED HEALTH CARE EDUCATION/TRAINING PROGRAM
Payer: MEDICARE

## 2021-06-03 DIAGNOSIS — R53.1 WEAKNESS: ICD-10-CM

## 2021-06-03 DIAGNOSIS — Z91.81 HISTORY OF FALL: ICD-10-CM

## 2021-06-03 DIAGNOSIS — Z87.898 HISTORY OF URINARY INCONTINENCE: ICD-10-CM

## 2021-06-03 DIAGNOSIS — D75.839 THROMBOCYTOSIS: ICD-10-CM

## 2021-06-03 DIAGNOSIS — R26.2 DIFFICULTY WALKING: ICD-10-CM

## 2021-06-03 DIAGNOSIS — R93.89 ABNORMAL MRI: ICD-10-CM

## 2021-06-03 DIAGNOSIS — Z92.89 HISTORY OF RECENT HOSPITALIZATION: ICD-10-CM

## 2021-06-03 DIAGNOSIS — G11.9 CEREBELLAR ATAXIA (HCC): ICD-10-CM

## 2021-06-03 DIAGNOSIS — R29.898 WEAKNESS OF BOTH LEGS: ICD-10-CM

## 2021-06-03 DIAGNOSIS — R53.1 ACUTE WEAKNESS: ICD-10-CM

## 2021-06-03 DIAGNOSIS — G62.9 POLYNEUROPATHY: ICD-10-CM

## 2021-06-03 PROCEDURE — 97110 THERAPEUTIC EXERCISES: CPT

## 2021-06-03 ASSESSMENT — BALANCE ASSESSMENTS
TURN 360 DEGREES: 1
STANDING UNSUPPORTED WITH EYES CLOSED: 4
STANDING ON ONE LEG: 0
SITTING UNSUPPORTED: 4
TRANSFERS: 4
STANDING UNSUPPORTED: 4
REACHING FORWARD WITH OUTSTRETCHED ARM WHILE STANDING: 4
LONG VERSION TOTAL SCORE (MAX 56): 42
STANDING TO SITTING: 4
SITTING TO STANDING: 4
STANDING UNSUPPORTED WITH FEET TOGETHER: 4
PICK UP OBJECT FROM THE FLOOR FROM A STANDING POSITION: 4
LOOK OVER LEFT AND RIGHT SHOULDERS WHILE STANDING: 4
LONG VERSION TOTAL SCORE (MAX 56): 42
STANDING UNSUPPORTED ONE FOOT IN FRONT: 1
PLACE ALTERNATE FOOT ON STEP OR STOOL WHILE STANDING UNSUPPORTED: 0

## 2021-06-03 NOTE — OP THERAPY DAILY TREATMENT
Outpatient Physical Therapy  DAILY TREATMENT     [facility]  [address]  [place] [address]  Phone:  [phone]  Fax:  [phone]    Date: 06/03/2021    Patient: Han Pat  YOB: 1935  MRN: [id number]     Time Calculation    Start time: 1416  Stop time: 1444 Time Calculation (min): 28 minutes         Chief Complaint: balance problems    Visit #: 10    SUBJECTIVE:  I don't always use my cane. Sometimes I forget it. I would be okay to do a break because we have a lot of family coming into town this summer.        OBJECTIVE:  Current objective measures:     See DC note      Therapeutic Exercises (CPT 37689):     2. Review of hep    3. 5TSTS test    4. RUBIO balance test    5. Goals assessment    6. Pt education, re: using AD for community, long distances and unfamiliar locations    20.  6/11/21      Time-based treatments/modalities:    Physical Therapy Timed Treatment Charges  Therapeutic exercise minutes (CPT 26926): 28 minutes      ASSESSMENT:   See DC note            PLAN/RECOMMENDATIONS:   Plan for treatment: DC with indep hep. See DC note for additional details

## 2021-06-04 NOTE — OP THERAPY DISCHARGE SUMMARY
"  Outpatient Physical Therapy  DISCHARGE SUMMARY NOTE      [facility]  [address]  [place] [address]  Phone:  [phone]  Fax:  [phone]    Date of Visit: 06/03/2021    Patient: Han Pat  YOB: 1935  MRN: [id number]     Referring Provider: Edmund Corrales M.D.  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 Demonstrating improved balance and strength (see ESCOBAR balance and 5 times sit to stand score). Pt now 46/56 ESCOBAR score (fall risk <45/56). Improving in transfers, ambulation with overall decreased fall risk status. Of note, pt has not been using AD for ambulation and was observed furniture walking in the clinic; discussed appropriateness of continuing to use AD for community/long distances and unfamiliar places for safety; pt candid about being reluctant to use walker; has not been using SPC due to “forgetting it.” Pt requesting to take a break from therapy and work on indep hep due to prior engagements this summer and \"family coming to town.\" Anticipate pt will continue to improve if compliant to hep. Discussed following up in OP PT in a few months.    Objective:  Objective findings and assessment details: Fwd lean during amb  Muscle " juddering with eccentrics  Decreased hip extension ROM      5 Times sit to stand:   Today: 27 sec (from standard height chair without UE’s)  4/26/21: 42 sec from standard height chair (one occasion UE use)  4/26/21: 24 sec from raised plinth (without UE use)  At eval: 23 sec from raised plinth (without UE use)     Escobar Balance Total Score (0-56):   Today: 46/54  4/26/21: 44/56  At eval: 42/56    <45/56 signifies fall risk      Recommendations:  Pt reports is ready to DC today with independent hep. All questions answered and verbally reviewed independent hep with pt. Expressed to pt that he can return to skilled physical therapy if condition should change or worsen in future.    Raheem Feliz, PT    Date: 6/3/2021

## 2021-06-07 ENCOUNTER — OFFICE VISIT (OUTPATIENT)
Dept: SLEEP MEDICINE | Facility: MEDICAL CENTER | Age: 86
End: 2021-06-07
Payer: MEDICARE

## 2021-06-07 VITALS
HEIGHT: 72 IN | SYSTOLIC BLOOD PRESSURE: 120 MMHG | RESPIRATION RATE: 16 BRPM | OXYGEN SATURATION: 91 % | BODY MASS INDEX: 27.5 KG/M2 | HEART RATE: 71 BPM | DIASTOLIC BLOOD PRESSURE: 60 MMHG | WEIGHT: 203 LBS

## 2021-06-07 DIAGNOSIS — Z87.891 FORMER SMOKER: ICD-10-CM

## 2021-06-07 DIAGNOSIS — G47.34 NOCTURNAL HYPOXIA: ICD-10-CM

## 2021-06-07 DIAGNOSIS — G47.33 OBSTRUCTIVE SLEEP APNEA: ICD-10-CM

## 2021-06-07 PROCEDURE — 99214 OFFICE O/P EST MOD 30 MIN: CPT | Performed by: NURSE PRACTITIONER

## 2021-06-07 ASSESSMENT — FIBROSIS 4 INDEX: FIB4 SCORE: 1.44

## 2021-06-07 NOTE — PATIENT INSTRUCTIONS
Try tightening velcro on headgear straps first to improve fit,  If this doesn't help, please take new and old mask to preferred homecare to evaluate getting the old mask again    In 1 month after mask fitting better, complete overnight oximetry using CPAP with 2LPM

## 2021-06-08 ENCOUNTER — TELEPHONE (OUTPATIENT)
Dept: NEUROLOGY | Facility: MEDICAL CENTER | Age: 86
End: 2021-06-08

## 2021-06-08 NOTE — PROGRESS NOTES
Chief Complaint   Patient presents with   • Apnea     last seen 3/2/2021        HPI:  Han Pat is a 86 y.o. year old male here today for follow-up on FREYA; compliance check on new device. He is accompanied by his wife.  Last OV 3/2/21 Gomez APRN    PMH includes GERD, thrombocytosis, BPH, mixed hyperlipidemia, seasonal allergies, vertigo, COPD, bilateral cognitive hearing loss, cognitive impairment, former smoker.      Patient complains about mask fit today.  His wife is also concerned about recent MRI results noting possible hydrocephalus discussed with neurology.  Currently using auto CPAP 9 to 13 cm with 2 L/min O2 bleed in.  Compliance report 5/7/2021 3 6/5/2021 notes 100% compliance, average nightly use 8 hours 38 minutes, minimal mask leak with reduced HI 1.8/h.  Reviewed findings with wife and patient.  His wife notes him to snore through this mask.  He did obtain a new mask with his new machine.  His prior mask did not leak like this 1 dose.  We discussed tightening the headgear prior to performing a mask fit.  He tolerates pressure.  He overall feels he sleeps well in therapy.  He denies morning headaches.  No daytime napping.  He is no longer able to drive due to recent MRI results per neurology.  He denies cardiac or respiratory symptoms today.     Sleep Study History:   HSS from 7/2/19 indicated severe sleep apnea - PEG 67.4. Significant nocturnal desaturation - lizzy saturation 79 % - less than 90% for 45.7 % of the TIB. Significant snoring -971 snoring episodes comprising 100.9 minutes. The highest heart rate may have been artifactual.      PSG was originally completed in 2007 with Dr. Fuentes, we do not have records.        ROS: As per HPI and otherwise negative if not stated.    Past Medical History:   Diagnosis Date   • Back pain    • GERD (gastroesophageal reflux disease)    • Hyperlipidemia    • Kidney stone    • PND (post-nasal drip)    • Sleep apnea        Past Surgical History:    Procedure Laterality Date   • CATARACT EXTRACTION WITH IOL     • OTHER      Nasal surgery   • OTHER ORTHOPEDIC SURGERY  lumbar disectomy        Family History   Problem Relation Age of Onset   • Other Father         Heart problems; unspecified   • Sleep Apnea Father    • Other Mother         Aneurism   • Sleep Apnea Brother    • Other Brother         Heart problems; unspecified   • Sleep Apnea Son    • Sleep Apnea Son        Social History     Socioeconomic History   • Marital status:      Spouse name: Not on file   • Number of children: Not on file   • Years of education: Not on file   • Highest education level: Not on file   Occupational History   • Not on file   Tobacco Use   • Smoking status: Former Smoker     Packs/day: 1.00     Years: 15.00     Pack years: 15.00     Types: Cigarettes     Quit date: 1980     Years since quittin.4   • Smokeless tobacco: Never Used   Vaping Use   • Vaping Use: Never used   Substance and Sexual Activity   • Alcohol use: Yes     Alcohol/week: 0.0 oz     Comment: Nightly   • Drug use: No   • Sexual activity: Not on file   Other Topics Concern   •  Service Yes   • Blood Transfusions No   • Caffeine Concern No   • Occupational Exposure No   • Hobby Hazards No   • Sleep Concern No   • Stress Concern No   • Weight Concern No   • Special Diet No   • Back Care No   • Exercise Yes   • Bike Helmet No   • Seat Belt Yes   • Self-Exams No   Social History Narrative   • Not on file     Social Determinants of Health     Financial Resource Strain:    • Difficulty of Paying Living Expenses:    Food Insecurity:    • Worried About Running Out of Food in the Last Year:    • Ran Out of Food in the Last Year:    Transportation Needs:    • Lack of Transportation (Medical):    • Lack of Transportation (Non-Medical):    Physical Activity:    • Days of Exercise per Week:    • Minutes of Exercise per Session:    Stress:    • Feeling of Stress :    Social Connections:    •  Frequency of Communication with Friends and Family:    • Frequency of Social Gatherings with Friends and Family:    • Attends Am[employer] Services:    • Active Member of Clubs or Organizations:    • Attends Club or Organization Meetings:    • Marital Status:    Intimate Partner Violence:    • Fear of Current or Ex-Partner:    • Emotionally Abused:    • Physically Abused:    • Sexually Abused:        Allergies as of 06/07/2021   • (No Known Allergies)        Vitals:  /60 (BP Location: Left arm, Patient Position: Sitting, BP Cuff Size: Adult)   Pulse 71   Resp 16   Ht 1.829 m (6')   Wt 92.1 kg (203 lb)   SpO2 91%     Current medications as of today   Current Outpatient Medications   Medication Sig Dispense Refill   • Folic Acid-Vit B6-Vit B12 0.8-10-0.115 MG Tab Take 1 tablet by mouth every day. 90 tablet 3   • lansoprazole (PREVACID) 30 MG CAPSULE DELAYED RELEASE Take 1 capsule by mouth at bedtime. 90 capsule 0   • montelukast (SINGULAIR) 10 MG Tab TAKE 1 TAB BY MOUTH EVERY EVENING. INDICATIONS: HAYFEVER 90 tablet 1   • aspirin EC (ECOTRIN) 81 MG Tablet Delayed Response Take 81 mg by mouth every evening.     • vitamin D 4000 units Tab Take 4,000 Units by mouth every day. (Patient taking differently: Take 4,000 Units by mouth every bedtime.) 1200 Tab 3   • Multiple Vitamins-Minerals (OCUVITE PO) Take 2 Caps by mouth every evening.     • hydroxyurea (HYDREA) 500 MG Cap Take 1,000-1,500 mg by mouth see administration instructions. Pt takes 1500MG on Sat and Sun  1000MG on Mon, Tue, Wed, Thur, and Fri  Indications: Chronic Myelogenous Leukemia       No current facility-administered medications for this visit.         Physical Exam:   Gen:           Alert and oriented, No apparent distress. Mood and affect appropriate, normal interaction with examiner.  Eyes:          PERRL, EOM intact, sclere white, conjunctive moist.  Ears:          Not examined.   Hearing:     Grossly intact.  Nose:          Normal, no lesions  or deformities.  Dentition:    mask  Oropharynx:   mask  Mallampati Classification: mask  Neck:        Supple, trachea midline, no masses.  Respiratory Effort: No intercostal retractions or use of accessory muscles.   Lung Auscultation:      Clear to auscultation bilaterally; no rales, rhonchi or wheezing.  CV:            Regular rate and rhythm. No murmurs, rubs or gallops.  Abd:           Not examined.   Lymphadenopathy: Not examined.  Gait and Station: Normal.  Digits and Nails: No clubbing, cyanosis, petechiae, or nodes.   Cranial Nerves: II-XII grossly intact.  Skin:        No rashes, lesions or ulcers noted.               Ext:           No cyanosis or edema.      Assessment:  1. Obstructive sleep apnea  Overnight Oximetry   2. Nocturnal hypoxia  Overnight Oximetry   3. BMI 27.0-27.9,adult     4. Former smoker         Immunizations:    Flu:10/2020  Pneumovax 23:recommend  Prevnar 13:2020  COVID-19: 2/14/21, 1/17/21    Plan:  1.  Patient sleep apnea is clinically stable.  He will continue auto CPAP 9 to 13 cm with 2 L oxygen bleed in.  DME mask/supplies; mask fit  Patient will attempt tightening headgear at this point to see if leaking resolves, if not he will have mask fit performed at DME  2.  CNOX on PAP with O2 now; pending results will send via [employer] or call wife  Patient's wife is concerned about his oxygen levels due to recent MRI results possible hydrocephalus and noting some cognitive changes  3.  Discussed sleep hygiene.  4.  For primary care for the health concerns.  5.  Follow-up in 6 months for compliance check, sooner if needed.    Please note that this dictation was created using voice recognition software. I have made every reasonable attempt to correct obvious errors, but it is possible there are errors of grammar and possibly content that I did not discover before finalizing the note.

## 2021-06-28 ENCOUNTER — TELEPHONE (OUTPATIENT)
Dept: NEUROLOGY | Facility: MEDICAL CENTER | Age: 86
End: 2021-06-28

## 2021-06-28 NOTE — TELEPHONE ENCOUNTER
Pt's wife and pt would like to proceed with the LP, will you be performing the procedure? Can you put in an order?    Ro cesar

## 2021-07-01 ENCOUNTER — APPOINTMENT (RX ONLY)
Dept: URBAN - METROPOLITAN AREA CLINIC 35 | Facility: CLINIC | Age: 86
Setting detail: DERMATOLOGY
End: 2021-07-01

## 2021-07-01 DIAGNOSIS — L57.0 ACTINIC KERATOSIS: ICD-10-CM

## 2021-07-01 PROCEDURE — ? PDT: BLUE

## 2021-07-01 PROCEDURE — 96573 PDT DSTR PRMLG LES PHYS/QHP: CPT

## 2021-07-01 PROCEDURE — ? COUNSELING

## 2021-07-01 ASSESSMENT — LOCATION ZONE DERM: LOCATION ZONE: SCALP

## 2021-07-01 ASSESSMENT — LOCATION SIMPLE DESCRIPTION DERM: LOCATION SIMPLE: SCALP

## 2021-07-01 ASSESSMENT — LOCATION DETAILED DESCRIPTION DERM: LOCATION DETAILED: RIGHT SUPERIOR PARIETAL SCALP

## 2021-07-01 NOTE — PROCEDURE: PDT: BLUE
Light Source: Soto-U
Consent: Written consent obtained.  The risks were reviewed with the patient including but not limited to: pigmentary changes, pain, blistering, scabbing, redness, and the remote possibility of scarring.
Ndc# (Optional): 09776-669-78
Incubation Time: 2 hours
Detail Level: Zone
Show Anesthesia In Plan?: No
Total Number Of Aks Treated (Optional To Report): 0
Post-Care Instructions: I reviewed with the patient in detail post-care instructions. Patient is to avoid sunlight for the next 2 days, and wear sun protection. Patients may expect sunburn like redness, discomfort and scabbing.
Number Of Kerasticks/Tubes Billed For: 1
Incubation Start Time: 10:00 AM
Anesthesia Type: 1% lidocaine with epinephrine
Comments: Treated greater than 15 lesions today
Which Photosensitizer Was Used: Levulan
Lot # (Optional): [id number]
Incubation End Time: 12:00 PM
Who Performed The Pdt?: Performed by MD JI, MARIO or NP ([id number])
Medical Necessity: Precancerous Lesions
Show Medical Necessity In Plan?: Yes
Illumination Time: 00:16:40
Expiration Date (Optional): 06/2022
Pre-Procedure Text: The treatment areas were cleaned and prepped with Acetone in the usual fashion.
Treatment Number: 2

## 2021-07-09 RX ORDER — LANSOPRAZOLE 30 MG/1
CAPSULE, DELAYED RELEASE ORAL
Qty: 90 CAPSULE | Refills: 0 | Status: SHIPPED | OUTPATIENT
Start: 2021-07-09 | End: 2021-10-08

## 2021-07-09 NOTE — TELEPHONE ENCOUNTER
Was the patient seen in the last year in this department? No    Does patient have an active prescription for medications requested? No   Received Request Via: Pharmacy

## 2021-07-19 ENCOUNTER — APPOINTMENT (RX ONLY)
Dept: URBAN - METROPOLITAN AREA CLINIC 35 | Facility: CLINIC | Age: 86
Setting detail: DERMATOLOGY
End: 2021-07-19

## 2021-07-19 DIAGNOSIS — D485 NEOPLASM OF UNCERTAIN BEHAVIOR OF SKIN: ICD-10-CM

## 2021-07-19 DIAGNOSIS — L57.0 ACTINIC KERATOSIS: ICD-10-CM

## 2021-07-19 PROBLEM — D48.5 NEOPLASM OF UNCERTAIN BEHAVIOR OF SKIN: Status: ACTIVE | Noted: 2021-07-19

## 2021-07-19 PROCEDURE — ? BIOPSY BY SHAVE METHOD

## 2021-07-19 PROCEDURE — 69100 BIOPSY OF EXTERNAL EAR: CPT | Mod: 59

## 2021-07-19 PROCEDURE — ? LIQUID NITROGEN

## 2021-07-19 PROCEDURE — 17003 DESTRUCT PREMALG LES 2-14: CPT

## 2021-07-19 PROCEDURE — 17000 DESTRUCT PREMALG LESION: CPT

## 2021-07-19 ASSESSMENT — LOCATION ZONE DERM
LOCATION ZONE: LIP
LOCATION ZONE: EAR
LOCATION ZONE: NOSE

## 2021-07-19 ASSESSMENT — LOCATION SIMPLE DESCRIPTION DERM
LOCATION SIMPLE: NOSE
LOCATION SIMPLE: RIGHT EAR
LOCATION SIMPLE: RIGHT LIP

## 2021-07-19 ASSESSMENT — LOCATION DETAILED DESCRIPTION DERM
LOCATION DETAILED: NASAL DORSUM
LOCATION DETAILED: NASAL TIP
LOCATION DETAILED: RIGHT TRIANGULAR FOSSA
LOCATION DETAILED: RIGHT UPPER CUTANEOUS LIP

## 2021-07-19 NOTE — PROCEDURE: LIQUID NITROGEN
Number Of Freeze-Thaw Cycles: 1 freeze-thaw cycle
Detail Level: Detailed
Show Applicator Variable?: Yes
Duration Of Freeze Thaw-Cycle (Seconds): 10
Render Note In Bullet Format When Appropriate: No
Post-Care Instructions: I reviewed with the patient in detail post-care instructions. Patient is to wear sunprotection, and avoid picking at any of the treated lesions. Pt may apply Vaseline to crusted or scabbing areas.
Consent: The patient's consent was obtained including but not limited to risks of crusting, scabbing, blistering, scarring, darker or lighter pigmentary change, recurrence, incomplete removal and infection.

## 2021-07-19 NOTE — PROCEDURE: BIOPSY BY SHAVE METHOD

## 2021-07-24 SDOH — ECONOMIC STABILITY: TRANSPORTATION INSECURITY
IN THE PAST 12 MONTHS, HAS LACK OF RELIABLE TRANSPORTATION KEPT YOU FROM MEDICAL APPOINTMENTS, MEETINGS, WORK OR FROM GETTING THINGS NEEDED FOR DAILY LIVING?: NO

## 2021-07-24 SDOH — ECONOMIC STABILITY: HOUSING INSECURITY: IN THE LAST 12 MONTHS, HOW MANY PLACES HAVE YOU LIVED?: 1

## 2021-07-24 SDOH — ECONOMIC STABILITY: TRANSPORTATION INSECURITY
IN THE PAST 12 MONTHS, HAS LACK OF TRANSPORTATION KEPT YOU FROM MEETINGS, WORK, OR FROM GETTING THINGS NEEDED FOR DAILY LIVING?: NO

## 2021-07-24 SDOH — ECONOMIC STABILITY: TRANSPORTATION INSECURITY
IN THE PAST 12 MONTHS, HAS THE LACK OF TRANSPORTATION KEPT YOU FROM MEDICAL APPOINTMENTS OR FROM GETTING MEDICATIONS?: NO

## 2021-07-24 SDOH — ECONOMIC STABILITY: HOUSING INSECURITY
IN THE LAST 12 MONTHS, WAS THERE A TIME WHEN YOU DID NOT HAVE A STEADY PLACE TO SLEEP OR SLEPT IN A SHELTER (INCLUDING NOW)?: NO

## 2021-07-24 SDOH — ECONOMIC STABILITY: FOOD INSECURITY: WITHIN THE PAST 12 MONTHS, YOU WORRIED THAT YOUR FOOD WOULD RUN OUT BEFORE YOU GOT MONEY TO BUY MORE.: NEVER TRUE

## 2021-07-24 SDOH — ECONOMIC STABILITY: INCOME INSECURITY: IN THE LAST 12 MONTHS, WAS THERE A TIME WHEN YOU WERE NOT ABLE TO PAY THE MORTGAGE OR RENT ON TIME?: NO

## 2021-07-24 SDOH — HEALTH STABILITY: PHYSICAL HEALTH: ON AVERAGE, HOW MANY DAYS PER WEEK DO YOU ENGAGE IN MODERATE TO STRENUOUS EXERCISE (LIKE A BRISK WALK)?: 0 DAYS

## 2021-07-24 SDOH — ECONOMIC STABILITY: FOOD INSECURITY: WITHIN THE PAST 12 MONTHS, THE FOOD YOU BOUGHT JUST DIDN'T LAST AND YOU DIDN'T HAVE MONEY TO GET MORE.: NEVER TRUE

## 2021-07-24 SDOH — HEALTH STABILITY: PHYSICAL HEALTH: ON AVERAGE, HOW MANY MINUTES DO YOU ENGAGE IN EXERCISE AT THIS LEVEL?: 0 MIN

## 2021-07-24 SDOH — ECONOMIC STABILITY: INCOME INSECURITY: HOW HARD IS IT FOR YOU TO PAY FOR THE VERY BASICS LIKE FOOD, HOUSING, MEDICAL CARE, AND HEATING?: NOT HARD AT ALL

## 2021-07-24 SDOH — HEALTH STABILITY: MENTAL HEALTH
STRESS IS WHEN SOMEONE FEELS TENSE, NERVOUS, ANXIOUS, OR CAN'T SLEEP AT NIGHT BECAUSE THEIR MIND IS TROUBLED. HOW STRESSED ARE YOU?: NOT AT ALL

## 2021-07-24 ASSESSMENT — SOCIAL DETERMINANTS OF HEALTH (SDOH)
DO YOU BELONG TO ANY CLUBS OR ORGANIZATIONS SUCH AS CHURCH GROUPS UNIONS, FRATERNAL OR ATHLETIC GROUPS, OR SCHOOL GROUPS?: NO
HOW OFTEN DO YOU HAVE A DRINK CONTAINING ALCOHOL: 4 OR MORE TIMES A WEEK
HOW OFTEN DO YOU GET TOGETHER WITH FRIENDS OR RELATIVES?: ONCE A WEEK
HOW OFTEN DO YOU ATTEND CHURCH OR RELIGIOUS SERVICES?: NEVER
HOW OFTEN DO YOU ATTENT MEETINGS OF THE CLUB OR ORGANIZATION YOU BELONG TO?: NEVER
WITHIN THE PAST 12 MONTHS, YOU WORRIED THAT YOUR FOOD WOULD RUN OUT BEFORE YOU GOT THE MONEY TO BUY MORE: NEVER TRUE
HOW MANY DRINKS CONTAINING ALCOHOL DO YOU HAVE ON A TYPICAL DAY WHEN YOU ARE DRINKING: 1 OR 2
IN A TYPICAL WEEK, HOW MANY TIMES DO YOU TALK ON THE PHONE WITH FAMILY, FRIENDS, OR NEIGHBORS?: ONCE A WEEK
HOW OFTEN DO YOU ATTENT MEETINGS OF THE CLUB OR ORGANIZATION YOU BELONG TO?: NEVER
HOW OFTEN DO YOU GET TOGETHER WITH FRIENDS OR RELATIVES?: ONCE A WEEK
HOW OFTEN DO YOU HAVE SIX OR MORE DRINKS ON ONE OCCASION: NEVER
HOW OFTEN DO YOU ATTEND CHURCH OR RELIGIOUS SERVICES?: NEVER
HOW HARD IS IT FOR YOU TO PAY FOR THE VERY BASICS LIKE FOOD, HOUSING, MEDICAL CARE, AND HEATING?: NOT HARD AT ALL
DO YOU BELONG TO ANY CLUBS OR ORGANIZATIONS SUCH AS CHURCH GROUPS UNIONS, FRATERNAL OR ATHLETIC GROUPS, OR SCHOOL GROUPS?: NO
IN A TYPICAL WEEK, HOW MANY TIMES DO YOU TALK ON THE PHONE WITH FAMILY, FRIENDS, OR NEIGHBORS?: ONCE A WEEK

## 2021-07-24 ASSESSMENT — LIFESTYLE VARIABLES
HOW OFTEN DO YOU HAVE SIX OR MORE DRINKS ON ONE OCCASION: NEVER
HOW MANY STANDARD DRINKS CONTAINING ALCOHOL DO YOU HAVE ON A TYPICAL DAY: 1 OR 2
HOW OFTEN DO YOU HAVE A DRINK CONTAINING ALCOHOL: 4 OR MORE TIMES A WEEK

## 2021-07-28 ENCOUNTER — OFFICE VISIT (OUTPATIENT)
Dept: NEUROLOGY | Facility: MEDICAL CENTER | Age: 86
End: 2021-07-28
Attending: STUDENT IN AN ORGANIZED HEALTH CARE EDUCATION/TRAINING PROGRAM
Payer: MEDICARE

## 2021-07-28 VITALS
SYSTOLIC BLOOD PRESSURE: 122 MMHG | HEIGHT: 72 IN | HEART RATE: 73 BPM | DIASTOLIC BLOOD PRESSURE: 68 MMHG | WEIGHT: 203 LBS | OXYGEN SATURATION: 94 % | TEMPERATURE: 98.3 F | BODY MASS INDEX: 27.5 KG/M2

## 2021-07-28 DIAGNOSIS — R93.89 ABNORMAL MRI: ICD-10-CM

## 2021-07-28 DIAGNOSIS — M47.896 OTHER OSTEOARTHRITIS OF SPINE, LUMBAR REGION: ICD-10-CM

## 2021-07-28 DIAGNOSIS — R26.9 GAIT ABNORMALITY: ICD-10-CM

## 2021-07-28 DIAGNOSIS — R29.898 LEG WEAKNESS, BILATERAL: ICD-10-CM

## 2021-07-28 DIAGNOSIS — R32 URINARY INCONTINENCE, UNSPECIFIED TYPE: ICD-10-CM

## 2021-07-28 DIAGNOSIS — Z91.89 DRIVING SAFETY ISSUE: ICD-10-CM

## 2021-07-28 DIAGNOSIS — Z91.81 HISTORY OF FALL: ICD-10-CM

## 2021-07-28 DIAGNOSIS — J44.9 CHRONIC OBSTRUCTIVE PULMONARY DISEASE, UNSPECIFIED COPD TYPE (HCC): ICD-10-CM

## 2021-07-28 DIAGNOSIS — G93.89 CEREBRAL VENTRICULOMEGALY: ICD-10-CM

## 2021-07-28 DIAGNOSIS — G47.33 OBSTRUCTIVE SLEEP APNEA: ICD-10-CM

## 2021-07-28 DIAGNOSIS — G62.9 POLYNEUROPATHY: ICD-10-CM

## 2021-07-28 DIAGNOSIS — R41.89 COGNITIVE IMPAIRMENT: Primary | ICD-10-CM

## 2021-07-28 DIAGNOSIS — N32.89 OTHER SPECIFIED DISORDERS OF BLADDER: ICD-10-CM

## 2021-07-28 PROCEDURE — 99215 OFFICE O/P EST HI 40 MIN: CPT | Performed by: STUDENT IN AN ORGANIZED HEALTH CARE EDUCATION/TRAINING PROGRAM

## 2021-07-28 PROCEDURE — 99211 OFF/OP EST MAY X REQ PHY/QHP: CPT | Performed by: STUDENT IN AN ORGANIZED HEALTH CARE EDUCATION/TRAINING PROGRAM

## 2021-07-28 ASSESSMENT — FIBROSIS 4 INDEX: FIB4 SCORE: 1.44

## 2021-07-28 NOTE — PROGRESS NOTES
GENERAL NEUROLOGY FOLLOW-UP VISIT  REFERRING PROVIDER:  Kia Thompson P.A.-C.  [address]  [place] [address]      CHIEF COMPLAINT(S): LE weakness, falls    History of present illness:  Han Pat 85 y.o. male presents today for evaluation of lower extremity weakness, impaired balance, and regular falls.    Every started about a year ago. Had a fall a year ago and could never find out why this happened. His legs wouldn't work. He couldn't stand up.Seemed to be sudden onset weakness, as he was fine the night before. Went to rehab for a weak after being hospitalized and worked up and was sent home with no real answers or follow-up. Some incontinence in beginning but this is no longer happening.    Severe sleep apnea. Going back to get mask and setting adjusted.    Worsening forgetfulness but patient and wife dont think that is an issue because it is stable. He can balance checkup and    Lost weight but thinks its his wife's food.     Had severe pain in lower back years ago and is s/p Microdysctomy years ago in lower back for treatment of the pain which helped. Still had diffuse midline back pain with no definite radicular symptoms but it is more tolerable. No Lermitte's symptoms.    Patient has chronic Consitpation, no diarrhea. NO urinary incontinence.     Has mild tremor on the right worsened when trying too use it. Handwriting has become messier and smaller. Used to have good penmanship.    Hygiene good.    Last fall was months go.    Was a regular drinker of alcohol, daily, multiple shots of liquur per day. Stopped drinking this months 3 years ago. Currently, he has a  Glass of wine per day and a beer sometimes in afternoon. Non smoker. NO illciits.    No fever or chills. No myalgias. No rashes. No cough, CP, or SOB. Not working with PT/OT.        Patient's PMH, PSH, FH, and SH were reviewed.    Medications and allergies were reviewed.    INTERVAL HISTORY 5/26/21:  PT has  helped.Balance, endurance, strength. Has been riding bike too.  Has not had any falls. Legs are not bothering him as much. Much less stiffness.    Appetite is less. Lost 20 pounds since April.      INTERVAL HISTORY 21 :  • Appetite and weight stable  • Gait has improve somewhat with PT. No longer using cane. No falls  • Still requires Depend diapers for urinary incontinence   • Wife is sick at home. Son is here today  • Patient and wife want to pursue LP to evaluate for NPH           Past medical history:   Past Medical History:   Diagnosis Date   • Back pain    • GERD (gastroesophageal reflux disease)    • Hyperlipidemia    • Kidney stone    • PND (post-nasal drip)    • Sleep apnea        Past surgical history:   Past Surgical History:   Procedure Laterality Date   • CATARACT EXTRACTION WITH IOL     • OTHER      Nasal surgery   • OTHER ORTHOPEDIC SURGERY  lumbar disectomy        Family history:   Family History   Problem Relation Age of Onset   • Other Father         Heart problems; unspecified   • Sleep Apnea Father    • Other Mother         Aneurism   • Sleep Apnea Brother    • Other Brother         Heart problems; unspecified   • Sleep Apnea Son    • Sleep Apnea Son        Social history:   Social History     Socioeconomic History   • Marital status:      Spouse name: Not on file   • Number of children: Not on file   • Years of education: Not on file   • Highest education level: 12th grade   Occupational History   • Not on file   Tobacco Use   • Smoking status: Former Smoker     Packs/day: 1.00     Years: 15.00     Pack years: 15.00     Types: Cigarettes     Quit date: 1980     Years since quittin.6   • Smokeless tobacco: Never Used   Vaping Use   • Vaping Use: Never used   Substance and Sexual Activity   • Alcohol use: Yes     Alcohol/week: 0.0 oz     Comment: Nightly   • Drug use: No   • Sexual activity: Not on file   Other Topics Concern   •  Service Yes   • Blood  Transfusions No   • Caffeine Concern No   • Occupational Exposure No   • Hobby Hazards No   • Sleep Concern No   • Stress Concern No   • Weight Concern No   • Special Diet No   • Back Care No   • Exercise Yes   • Bike Helmet No   • Seat Belt Yes   • Self-Exams No   Social History Narrative   • Not on file     Social Determinants of Health     Financial Resource Strain: Low Risk    • Difficulty of Paying Living Expenses: Not hard at all   Food Insecurity: No Food Insecurity   • Worried About Running Out of Food in the Last Year: Never true   • Ran Out of Food in the Last Year: Never true   Transportation Needs: No Transportation Needs   • Lack of Transportation (Medical): No   • Lack of Transportation (Non-Medical): No   Physical Activity: Inactive   • Days of Exercise per Week: 0 days   • Minutes of Exercise per Session: 0 min   Stress: No Stress Concern Present   • Feeling of Stress : Not at all   Social Connections: Socially Isolated   • Frequency of Communication with Friends and Family: Once a week   • Frequency of Social Gatherings with Friends and Family: Once a week   • Attends Mandaen Services: Never   • Active Member of Clubs or Organizations: No   • Attends Club or Organization Meetings: Never   • Marital Status:    Intimate Partner Violence:    • Fear of Current or Ex-Partner:    • Emotionally Abused:    • Physically Abused:    • Sexually Abused:        Current medications:   Current Outpatient Medications   Medication   • lansoprazole (PREVACID) 30 MG CAPSULE DELAYED RELEASE   • Folic Acid-Vit B6-Vit B12 0.8-10-0.115 MG Tab   • montelukast (SINGULAIR) 10 MG Tab   • aspirin EC (ECOTRIN) 81 MG Tablet Delayed Response   • vitamin D 4000 units Tab   • Multiple Vitamins-Minerals (OCUVITE PO)   • hydroxyurea (HYDREA) 500 MG Cap     No current facility-administered medications for this visit.       Medication Allergy:  No Known Allergies      Review of systems:   Pertinent positives and negatives are  as outlined above      Physical examination:   Vitals:    07/28/21 1124   BP: 122/68   Pulse: 73   Temp: 36.8 °C (98.3 °F)   SpO2: 94%       General: Patient in no acute distress, pleasant and cooperative.  HEENT: Normocephalic, no signs of acute trauma.   Neck: appears supple, here is normal range of motion. No tenderness on exam.   Chest: clear to auscultation. Symmetrical chest rise with inhalation. No cough.   CV: RRR, no murmurs.   Skin: no signs of acute rashes or trauma.   Musculoskeletal: joints exhibit diminshed ROM especially at knees and ankle.  Psychiatric: pertinent positives as discussed above    NEUROLOGICAL EXAM:   Mental status:  orientation: Awake, alert and oriented to self, month, year, situation.  Attention: Intact  Speech and language: speech is clear and fluent. The patient is able to name, repeat and comprehend. No word substitions or paraphasic errors  Memory: There is intact recollection of recent and remote events.   Cranial nerve exam:   I: smell Not tested   II: visual acuity  Not Tested   II: Fundoscpic Unable to visualize   II: visual fields Full to confrontation  Visual neglect: absent   II: pupils Equal, round, reactive to light   III,VII: ptosis None   III,IV,VI: extraocular muscles  EOMI   V: mastication Normal   V: facial light touch sensation  Normal   V,VII: corneal reflex  Not tested   VII: facial muscle function - upper  Normal   VII: facial muscle function - lower Normal   VIII: hearing Not tested   IX: soft palate elevation  Normal   IX,X: gag reflex Not tested   XI: trapezius strength  NT   XI: sternocleidomastoid strength NT   XI: neck flexion strength  NT   XII: tongue  Protrudes Midline     Motor exam:   • Strength in the b/l UE intact. 4+/5 hip flexor weakness b/l. 4+/5 right knee extensor weakness. 4+/5 b/l knee flexor weakness. 5/5 Left dorsiflexor weakness. 5/5 right eversion.  • R>L elbow and wrist rigidity  • Mild bilateral intention tremor on FNF  • No muscle  fasciculation  • Finger tapping normal  • No bradykinesia  • Hips and knees are rigid  • No areas of atrophy   Sensory exam Mod to severe vibratory sensory loss in a gradient fashion below the knees. Diminshed pain perception and temporature sensation in a patchy distrubution bilaterally. No definite dermatomal pattern. No sensory level detected.  Deep tendon reflexes: absent AJ, and patella bilaterally. Trace to 1+ bi, tri, and BR b/l. . Plantar responses are mute .   Coordination: B/L intention tremor on FNF  Gait:   • The patient was able to get up from seated position with some difficulty, needing to press off the chair handles to stand.   • Somewhat wide-based slowed gait, slow. shuffling quality but overral appeared balanced. No hesitancy.  Short steps/stride.  • Good arm swing bilateral  • Extra steps on turning  • Romberg exam present    10 meters down and back walk time: 27.8 seconds      ANCILLARY DATA REVIEWED:       Lab Data Review:  Reviewed    Records reviewed:   Reviewed    Imaging:   Reviewed    EEG: 3/25/19:                1. Diffuse nonspecific cortical dysfunction - mild degree                  2. Mild focal cortical irritability over frontal R>L --this patten could be interictal - advise clinical correlation regarding further management. This does not necessary mean the patient needs seizure medication     NCS/EMG 4/23/21:  DIAGNOSTIC INTERPRETATION:   Extensive electrodiagnostic studies were performed to the   bilateral lower extremities.  The results are as follows:     1.  Bilateral normal peroneal motor conduction studies.     2.  Bilateral low amplitude tibial motor conduction studies with   chronic denervation changes noted in the bilateral tibial nerve   supplied abductor hallucis muscles.     3.  Bilateral absent sural sensory conduction studies.     4.  No evidence of acute radiculopathy in selected muscle studied   bilateral lower extremities.     CLINICAL DISCUSSION:   The abnormal  tibial nerve conduction studies and chronic   denervation changes in the abductor hallucis muscles bilaterally   are of uncertain significance.  Despite the normal onset latency   of the tibial nerve conduction studies the patient has flat feet   bilaterally and could well have tarsal tunnel syndrome.  The   normal electromyographic examination of the gastrocnemius muscles   bilaterally makes the possibility of an S1 radiculopathy less   likely.  The absent sural sensory responses are  not necessarily   abnormal in an 86-year-old patient.  Clinical correlation is   suggested.        ASSESSMENT, PLAN, EDUCATION/COUNSELING:  This is an 85-year-old male who arrives for f/u for bilateral lower extremity weakness, impaired balance, history of falls, impaired global cognition. Does have a history of incontinence (wears Depends diapers now) so this symptoms, along with his slowed, wide based gait, cognitive decline in the setting of a worsening ventriculomegaly makes NPH a real possibility. Discussed the option of doing a large volume lumbar puncture and assessing gait pre and post LP. Patient and wife want to think about it. Also affecting his balance is a confirmed neuropathy, likely multifactorial as mentioned.  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 Lower suspicion of a myopathy given his improvement in legs strength with PT, normal CK markers, EMG/NCS not suggestive of a myopathic process, and non-diagnostic myopathic lab markers. Causes of his constellation of symptoms.  1.  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  Arranging lumbar puncture trial in the future.  2.  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  Chronic past alcohol, vitamin defieicny B6 likely contributor. Recommending B complex vitamin with B6. No evidene of radiculopathy on NCS. No indication for LP to evaluate neuropathy  3.  Deconditioning (improving with PT). He should  continue.  5. Possibly an underlying dementia.  consider Brain PET, LP, and neuropsych testing  7 Concern about driving safety. Referring to OT for driving safety assessment      Items to address inupcoming visit:  · Repeat MRI spine next visit months from last ones to evaluate for progress of abnormal bone marrow signal changes which may be a benign vs malignant process. Will consider referral to hem/onc if necessary  · Monitor weight loss. Consider broader work-up for systemic illness/malignancy and/or referral to nutritional services  · Consider Repeating autoimmune markers in 12 weeks  ·       Patient/family agree with plan, as outlined:      Visit Diagnoses     ICD-10-CM   1. Cognitive impairment  R41.89   2. Cerebral ventriculomegaly  G93.89   3. History of fall  Z91.81   4. Gait abnormality  R26.9   5. Chronic obstructive pulmonary disease, unspecified COPD type (HCC)  J44.9   6. Other osteoarthritis of spine, lumbar region  M47.896   7. Obstructive sleep apnea  G47.33   8. Polyneuropathy  G62.9   9. Leg weakness, bilateral  R29.898   10. Abnormal MRI  R93.89   11. Driving safety issue  Z91.89   12. Urinary incontinence, unspecified type  R32   13. Other specified disorders of bladder   N32.89        Orders Placed This Encounter   • DX-LUMBAR PUNCTURE FOR DIAGNOSIS   • CBC WITH DIFFERENTIAL   • Prothrombin Time   • CSF CULTURE   • CSF PROTEIN   • CSF GLUCOSE   • REFERRAL TO OCCUPATIONAL THERAPY   • REFERRAL TO NEUROLOGY            FOLLOW-UP:   2-3 months            BILLING DOCUMENTATION:       Counseling:  I spent a total of 45 minutes of face-to-face time in this visit. Over 50% of the time of the visit today was spent on counseling and or coordination of care wtih the patient and/or family, as above in assessment in plan.       Edmund Corrales MD  Epilepsy and General Neurology  Department of Neurology  Clinical  of Neurology [facility].

## 2021-07-28 NOTE — PATIENT INSTRUCTIONS
Neurology Clinic Visit     IMPORTANT: If imaging, procedure(s), AND/or referrals were placed for you:  Contact [facility] Scheduling if you have not heard from them in 5 day: [phone]    Outpatient [facility] Imaging Sites.  • [address]  Mon-Fri 8am-5pm   • [address] (Breast Center) Mon-Fri 7am-4pm    • [address]  Mon-Fri 8am-5pm  • [facility] Radiology 7am-6:30pm  • [address] Mon-Fri 8am-7pm  Sat 9am-6pm   • [address]  Mon-Fri 8am-7pm and Sat/Sun 9am-5pm  • [address]  Mon-Fri 8am-5pm  • [address] (PET/CT)  Mon-Fri 8:30am-4:30pm     If labs were ordered for you:  • To Schedule an appointment for lab services, please call [phone] or visit [email or web address].  • [facility] Lab Services Convenient Locations:    [place]: • [address] (Ground Floor)  • [address] (Admitting Entrance)  • [address]  • [address]  • [address] [facility], [address]  • [address] [facility] [address]  • [address]: • [address]  • [address]       [place]: • [address]  • [address]     [facility] Advanced Medicine     [address] [address]     Laboratory Hours: 6:30am - 6pm  Monday - Friday,   7am - 2pm Saturday    [facility]      [address] [address]      Laboratory Hours:  7:30am - 4pm  Monday - Friday,  9am - 3pm Saturday    [facility]     [address]    [address] [address]      Laboratory Hours:  7:00am - 5:30pm  Monday - Friday    [facility]      [address] [address]       Laboratory Hours:  7:30am - 4:30pm  Monday - Friday    [facility]       [address] [place] [address]       Laboratory Hours:  8am - 5pm  Monday - Friday    [facility]       [address] [place] [address]        Laboratory hours:  7:30am - 4pm  Monday - Friday    GENERAL REMINDERS:  · Request refills 1 week in advance to ensure you do not run out of medications  · All diagnostic study results will be reviewed at your next visit, UNLESS there are urgent results that need to be acted on sooner.  · Please remember that it is the your responsibility to check with your Insurance for benefit coverage for visit / visits.  · 24 hours notice is required for all appointment changes or cancellation.  · Please arrive 20 min. before your appointment time  · Please note that because Dr. Corrales has high daily clinic volume, he is unable to accommodate late arrivals. If you are more than 15 minutes late for the scheduled appointment you will be asked to reschedule  · Please bring the following with you:  1) Picture ID  2) Insurance card  3) An updated list of ALL your medications and their dosages (prescribed medications, over the counter medications, and all supplements), as well as allergies with you at all times  4) A list of questions, concerns, comments that you wish to discuss with Dr. Antoni Corrales MD  General Neurologist and Epileptologist  Department of Neurology  Instructor of Clinical Neurology [facility].

## 2021-07-29 ENCOUNTER — OFFICE VISIT (OUTPATIENT)
Dept: MEDICAL GROUP | Facility: MEDICAL CENTER | Age: 86
End: 2021-07-29
Payer: MEDICARE

## 2021-07-29 VITALS
DIASTOLIC BLOOD PRESSURE: 42 MMHG | OXYGEN SATURATION: 92 % | BODY MASS INDEX: 26.95 KG/M2 | HEART RATE: 65 BPM | HEIGHT: 72 IN | WEIGHT: 199 LBS | SYSTOLIC BLOOD PRESSURE: 100 MMHG | TEMPERATURE: 96.6 F

## 2021-07-29 DIAGNOSIS — Z00.00 MEDICARE ANNUAL WELLNESS VISIT, SUBSEQUENT: ICD-10-CM

## 2021-07-29 DIAGNOSIS — G47.33 OBSTRUCTIVE SLEEP APNEA: ICD-10-CM

## 2021-07-29 DIAGNOSIS — R53.1 WEAKNESS: ICD-10-CM

## 2021-07-29 DIAGNOSIS — D75.839 THROMBOCYTOSIS: ICD-10-CM

## 2021-07-29 DIAGNOSIS — M47.896 OTHER OSTEOARTHRITIS OF SPINE, LUMBAR REGION: ICD-10-CM

## 2021-07-29 DIAGNOSIS — N40.0 BENIGN PROSTATIC HYPERPLASIA WITHOUT LOWER URINARY TRACT SYMPTOMS: ICD-10-CM

## 2021-07-29 DIAGNOSIS — H90.0 CONDUCTIVE HEARING LOSS, BILATERAL: ICD-10-CM

## 2021-07-29 DIAGNOSIS — J44.9 CHRONIC OBSTRUCTIVE PULMONARY DISEASE, UNSPECIFIED COPD TYPE (HCC): ICD-10-CM

## 2021-07-29 DIAGNOSIS — K21.9 GASTROESOPHAGEAL REFLUX DISEASE, UNSPECIFIED WHETHER ESOPHAGITIS PRESENT: ICD-10-CM

## 2021-07-29 PROCEDURE — G0439 PPPS, SUBSEQ VISIT: HCPCS | Performed by: PHYSICIAN ASSISTANT

## 2021-07-29 ASSESSMENT — FIBROSIS 4 INDEX: FIB4 SCORE: 1.44

## 2021-07-29 ASSESSMENT — ACTIVITIES OF DAILY LIVING (ADL): BATHING_REQUIRES_ASSISTANCE: 0

## 2021-07-29 ASSESSMENT — PATIENT HEALTH QUESTIONNAIRE - PHQ9: CLINICAL INTERPRETATION OF PHQ2 SCORE: 0

## 2021-07-29 ASSESSMENT — ENCOUNTER SYMPTOMS: GENERAL WELL-BEING: EXCELLENT

## 2021-07-29 NOTE — PROGRESS NOTES
Chief Complaint   Patient presents with   • Medicare Annual Wellness         HPI:  Han is a 86 y.o. here for Medicare Annual Wellness Visit    Obstructive sleep apnea  Working with sleep specialist, has overnight pulse ox scheduled to see if new mask fitting is working    Gastroesophageal reflux disease  Chronic, stable on current, no new concerns    Thrombocytosis (HCC)  Chronic, stable on current hydroxyurea, managed with hematology/oncology    Benign prostatic hyperplasia without lower urinary tract symptoms  Chronic, stable, no new concerns    COPD (chronic obstructive pulmonary disease) (HCC)  Chronic, stable, no new cough/SOB    Weakness  Seeing neurology - change in gait/weakness/falls - possible NPH, scheduling LP with neurology      Patient Active Problem List    Diagnosis Date Noted   • Conductive hearing loss, bilateral 06/18/2020   • COPD (chronic obstructive pulmonary disease) ([facility]) 08/14/2019   • History of fall 08/14/2019   • Cognitive impairment 03/27/2019   • Weakness 03/24/2019   • Degenerative joint disease (DJD) of lumbar spine 03/24/2019   • Thrombocytosis (HCC) 02/05/2019   • Benign prostatic hyperplasia without lower urinary tract symptoms 02/05/2019   • Mixed hyperlipidemia 02/05/2019   • Seasonal allergies 02/05/2019   • Vertigo 02/05/2019   • Obstructive sleep apnea 08/14/2017   • Gastroesophageal reflux disease 08/14/2017       Current Outpatient Medications   Medication Sig Dispense Refill   • lansoprazole (PREVACID) 30 MG CAPSULE DELAYED RELEASE TAKE 1 CAPSULE BY MOUTH EVERYDAY AT BEDTIME 90 capsule 0   • Folic Acid-Vit B6-Vit B12 0.8-10-0.115 MG Tab Take 1 tablet by mouth every day. 90 tablet 3   • montelukast (SINGULAIR) 10 MG Tab TAKE 1 TAB BY MOUTH EVERY EVENING. INDICATIONS: HAYFEVER 90 tablet 1   • aspirin EC (ECOTRIN) 81 MG Tablet Delayed Response Take 81 mg by mouth every evening.     • vitamin D 4000 units Tab Take 4,000 Units by mouth every day. (Patient taking differently:  Take 4,000 Units by mouth every bedtime.) 1200 Tab 3   • Multiple Vitamins-Minerals (OCUVITE PO) Take 2 Caps by mouth every evening.     • hydroxyurea (HYDREA) 500 MG Cap Take 1,000-1,500 mg by mouth see administration instructions. Pt takes 1500MG on Sat and Sun  1000MG on Mon, Tue, Wed, Thur, and Fri  Indications: Chronic Myelogenous Leukemia       No current facility-administered medications for this visit.        Patient is taking medications as noted in medication list.  Current supplements as per medication list.     Allergies: Patient has no known allergies.    Current social contact/activities: Visiting family & friends     Is patient current with immunizations? No, due for TDAP and SHINGRIX (Shingles). Patient is interested in receiving NONE today.    He  reports that he quit smoking about 41 years ago. His smoking use included cigarettes. He has a 15.00 pack-year smoking history. He has never used smokeless tobacco. He reports current alcohol use. He reports that he does not use drugs.  Counseling given: Not Answered        DPA/Advanced directive: Patient has Advanced Directive on file.     ROS:    Gait: Uses no assistive device   Ostomy: No   Other tubes: No   Amputations: No   Chronic oxygen use Yes , 2 liters added to his CPAP  Last eye exam 2020   Wears hearing aids: No   : Denies any urinary leakage during the last 6 months      Screening:    Annual Health Assessment Questions:    1.  Are you currently engaging in any exercise or physical activity? Yes, Morning routine stretching & using stationary bike daily    2.  How would you describe your mood or emotional well-being today? good    3.  Have you had any falls in the last year? No    4.  Have you noticed any problems with your balance or had difficulty walking? No    5.  In the last six months have you experienced any leakage of urine? No    6. DPA/Advanced Directive: Patient has Advanced Directive on file.     Depression Screening    Little  interest or pleasure in doing things?  0 - not at all  Feeling down, depressed, or hopeless? 0 - not at all  Patient Health Questionnaire Score: 0    If depressive symptoms identified deferred to follow up visit unless specifically addressed in assessment and plan.    Interpretation of PHQ-9 Total Score   Score Severity   1-4 No Depression   5-9 Mild Depression   10-14 Moderate Depression   15-19 Moderately Severe Depression   20-27 Severe Depression    Screening for Cognitive Impairment    Three Minute Recall (captain, garden, picture)  3/3 Captain, Garden, Picture  Markel clock face with all 12 numbers and set the hands to show 5 past 8.  Yes 8:05 3/5  If cognitive concerns identified, deferred for follow up unless specifically addressed in assessment and plan.    Fall Risk Assessment    Has the patient had two or more falls in the last year or any fall with injury in the last year?  No  If fall risk identified, deferred for follow up unless specifically addressed in assessment and plan.    Safety Assessment    Throw rugs on floor.  Yes  Handrails on all stairs.  Yes  Good lighting in all hallways.  Yes  Difficulty hearing.  No  Patient counseled about all safety risks that were identified.    Functional Assessment ADLs    Are there any barriers preventing you from cooking for yourself or meeting nutritional needs?  No.    Are there any barriers preventing you from driving safely or obtaining transportation?  No.    Are there any barriers preventing you from using a telephone or calling for help?  No.    Are there any barriers preventing you from shopping?  No.    Are there any barriers preventing you from taking care of your own finances?  No.    Are there any barriers preventing you from managing your medications?  No.    Are there any barriers preventing you from showering, bathing or dressing yourself?  No.    Are you currently engaging in any exercise or physical activity?  Yes.  Morning routine stretching &  using stationary bike daily  What is your perception of your health?  Excellent.    Health Maintenance Summary                Annual Pulmonary Function Test / Spirometry Overdue 3/17/1941     IMM DTaP/Tdap/Td Vaccine Overdue 3/17/1956     IMM ZOSTER VACCINES Postponed 2021 Originally 2011. Patient Refused     Done 2/10/2011 Imm Admin: Zoster Vaccine Live (ZVL) (Zostavax) - HISTORICAL DATA    IMM INFLUENZA Next Due 2021      Done 10/5/2020 Imm Admin: Influenza Vaccine Adult HD     Patient has more history with this topic...          Patient Care Team:  Kia Thompson P.A.-C. as PCP - General (Family Medicine)  Rosi Reed M.D. (Oncology)  Rebecca Black, PT, DPT (Inactive) as Physical Therapist  Wendie Raymundo M.D. (Dermatology)  [facility]i [facility] as [facility]   Preferred home care  as Respiratory Therapist (DME Supplier)    Social History     Tobacco Use   • Smoking status: Former Smoker     Packs/day: 1.00     Years: 15.00     Pack years: 15.00     Types: Cigarettes     Quit date: 1980     Years since quittin.6   • Smokeless tobacco: Never Used   Vaping Use   • Vaping Use: Never used   Substance Use Topics   • Alcohol use: Yes     Alcohol/week: 0.0 oz     Comment: Nightly   • Drug use: No     Family History   Problem Relation Age of Onset   • Other Father         Heart problems; unspecified   • Sleep Apnea Father    • Other Mother         Aneurism   • Sleep Apnea Brother    • Other Brother         Heart problems; unspecified   • Sleep Apnea Son    • Sleep Apnea Son      He  has a past medical history of Back pain, GERD (gastroesophageal reflux disease), Hyperlipidemia, Kidney stone, PND (post-nasal drip), and Sleep apnea.   Past Surgical History:   Procedure Laterality Date   • CATARACT EXTRACTION WITH IOL     • OTHER      Nasal surgery   • OTHER ORTHOPEDIC SURGERY  lumbar disectomy            Exam:     /42 (BP Location: Left arm,  "Patient Position: Sitting, BP Cuff Size: Adult long)   Pulse 65   Temp 35.9 °C (96.6 °F) (Temporal)   Ht 1.829 m (6' 0.01\")   Wt 90.3 kg (199 lb)   SpO2 92%  Body mass index is 26.98 kg/m².    Hearing fair.    Alert, oriented in no acute distress.  Eye contact is good, speech goal directed, affect calm    Assessment and Plan. The following treatment and monitoring plan is recommended:    1. Medicare annual wellness visit, subsequent     2. Obstructive sleep apnea     3. Gastroesophageal reflux disease, unspecified whether esophagitis present     4. Thrombocytosis (HCC)     5. Benign prostatic hyperplasia without lower urinary tract symptoms     6. Chronic obstructive pulmonary disease, unspecified COPD type (HCC)     7. Weakness     8. Conductive hearing loss, bilateral     9. Other osteoarthritis of spine, lumbar region           Services suggested: No services needed at this time  Health Care Screening recommendations as per orders if indicated.  Referrals offered: PT/OT/Nutrition counseling/Behavioral Health/Smoking cessation as per orders if indicated.    Discussion today about general wellness and lifestyle habits:    · Prevent falls and reduce trip hazards; Cautioned about securing or removing rugs.  · Have a working fire alarm and carbon monoxide detector;   · Engage in regular physical activity and social activities       Follow-up: q 6 months and as needed  "

## 2021-07-29 NOTE — ASSESSMENT & PLAN NOTE
Working with sleep specialist, has overnight pulse ox scheduled to see if new mask fitting is working

## 2021-07-30 ENCOUNTER — TELEPHONE (OUTPATIENT)
Dept: NEUROLOGY | Facility: MEDICAL CENTER | Age: 86
End: 2021-07-30

## 2021-08-02 ENCOUNTER — TELEPHONE (OUTPATIENT)
Dept: NEUROLOGY | Facility: MEDICAL CENTER | Age: 86
End: 2021-08-02

## 2021-08-02 DIAGNOSIS — D68.8 OTHER SPECIFIED COAGULATION DEFECTS (HCC): ICD-10-CM

## 2021-08-02 DIAGNOSIS — Z01.812 PRE-PROCEDURE LAB EXAM: ICD-10-CM

## 2021-08-02 NOTE — TELEPHONE ENCOUNTER
Called pt to schedule lp. Pt's wife, Sally answered. Scheduled pt for September 21st at 2:20PM. Pt understood that blood work was to be done only a few days before the lp.

## 2021-08-04 ENCOUNTER — APPOINTMENT (RX ONLY)
Dept: URBAN - METROPOLITAN AREA CLINIC 35 | Facility: CLINIC | Age: 86
Setting detail: DERMATOLOGY
End: 2021-08-04

## 2021-08-04 DIAGNOSIS — L57.0 ACTINIC KERATOSIS: ICD-10-CM

## 2021-08-04 PROCEDURE — ? ADDITIONAL NOTES

## 2021-08-04 PROCEDURE — 17003 DESTRUCT PREMALG LES 2-14: CPT

## 2021-08-04 PROCEDURE — 17000 DESTRUCT PREMALG LESION: CPT

## 2021-08-04 PROCEDURE — ? LIQUID NITROGEN

## 2021-08-04 ASSESSMENT — LOCATION DETAILED DESCRIPTION DERM
LOCATION DETAILED: RIGHT SUPERIOR CRUS OF ANTIHELIX
LOCATION DETAILED: RIGHT INFERIOR CRUS OF ANTIHELIX
LOCATION DETAILED: NASAL DORSUM

## 2021-08-04 ASSESSMENT — LOCATION ZONE DERM
LOCATION ZONE: NOSE
LOCATION ZONE: EAR

## 2021-08-04 ASSESSMENT — LOCATION SIMPLE DESCRIPTION DERM
LOCATION SIMPLE: RIGHT EAR
LOCATION SIMPLE: NOSE

## 2021-08-04 NOTE — PROCEDURE: ADDITIONAL NOTES
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  The hydrocephalus has been causing gait and mentation problems.  If he does well with the spinal tap and his general health is improving I would plan on doing a blue light to treat the AKs on his face and scalp in the fall.
Render Risk Assessment In Note?: no
Detail Level: Simple

## 2021-08-04 NOTE — PROCEDURE: LIQUID NITROGEN
Duration Of Freeze Thaw-Cycle (Seconds): 10
Show Applicator Variable?: Yes
Render Note In Bullet Format When Appropriate: No
Detail Level: Detailed
Consent: The patient's consent was obtained including but not limited to risks of crusting, scabbing, blistering, scarring, darker or lighter pigmentary change, recurrence, incomplete removal and infection.
Number Of Freeze-Thaw Cycles: 1 freeze-thaw cycle
Post-Care Instructions: I reviewed with the patient in detail post-care instructions. Patient is to wear sunprotection, and avoid picking at any of the treated lesions. Pt may apply Vaseline to crusted or scabbing areas.

## 2021-08-06 ENCOUNTER — HOSPITAL ENCOUNTER (OUTPATIENT)
Dept: LAB | Facility: MEDICAL CENTER | Age: 86
End: 2021-08-06
Attending: INTERNAL MEDICINE
Payer: MEDICARE

## 2021-08-06 LAB
25(OH)D3 SERPL-MCNC: 35 NG/ML (ref 30–100)
ALBUMIN SERPL BCP-MCNC: 3.5 G/DL (ref 3.2–4.9)
ALBUMIN/GLOB SERPL: 1 G/DL
ALP SERPL-CCNC: 139 U/L (ref 30–99)
ALT SERPL-CCNC: 47 U/L (ref 2–50)
ANION GAP SERPL CALC-SCNC: 8 MMOL/L (ref 7–16)
ANISOCYTOSIS BLD QL SMEAR: ABNORMAL
AST SERPL-CCNC: 64 U/L (ref 12–45)
BASOPHILS # BLD AUTO: 1.2 % (ref 0–1.8)
BASOPHILS # BLD: 0.11 K/UL (ref 0–0.12)
BILIRUB SERPL-MCNC: 1.4 MG/DL (ref 0.1–1.5)
BUN SERPL-MCNC: 14 MG/DL (ref 8–22)
CALCIUM SERPL-MCNC: 8.8 MG/DL (ref 8.4–10.2)
CHLORIDE SERPL-SCNC: 102 MMOL/L (ref 96–112)
CO2 SERPL-SCNC: 24 MMOL/L (ref 20–33)
COMMENT 1642: NORMAL
CREAT SERPL-MCNC: 0.85 MG/DL (ref 0.5–1.4)
EOSINOPHIL # BLD AUTO: 0.18 K/UL (ref 0–0.51)
EOSINOPHIL NFR BLD: 2 % (ref 0–6.9)
ERYTHROCYTE [DISTWIDTH] IN BLOOD BY AUTOMATED COUNT: 79.1 FL (ref 35.9–50)
FASTING STATUS PATIENT QL REPORTED: NORMAL
FOLATE SERPL-MCNC: 32.2 NG/ML
GLOBULIN SER CALC-MCNC: 3.6 G/DL (ref 1.9–3.5)
GLUCOSE SERPL-MCNC: 132 MG/DL (ref 65–99)
HCT VFR BLD AUTO: 41.1 % (ref 42–52)
HGB BLD-MCNC: 14 G/DL (ref 14–18)
IMM GRANULOCYTES # BLD AUTO: 0.35 K/UL (ref 0–0.11)
IMM GRANULOCYTES NFR BLD AUTO: 3.9 % (ref 0–0.9)
INR PPP: 1.3 (ref 0.87–1.13)
LG PLATELETS BLD QL SMEAR: NORMAL
LYMPHOCYTES # BLD AUTO: 0.75 K/UL (ref 1–4.8)
LYMPHOCYTES NFR BLD: 8.3 % (ref 22–41)
MAGNESIUM SERPL-MCNC: 2.1 MG/DL (ref 1.5–2.5)
MCH RBC QN AUTO: 39 PG (ref 27–33)
MCHC RBC AUTO-ENTMCNC: 34.1 G/DL (ref 33.7–35.3)
MCV RBC AUTO: 114.5 FL (ref 81.4–97.8)
MICROCYTES BLD QL SMEAR: ABNORMAL
MONOCYTES # BLD AUTO: 1.61 K/UL (ref 0–0.85)
MONOCYTES NFR BLD AUTO: 17.8 % (ref 0–13.4)
NEUTROPHILS # BLD AUTO: 6.03 K/UL (ref 1.82–7.42)
NEUTROPHILS NFR BLD: 66.8 % (ref 44–72)
NRBC # BLD AUTO: 0 K/UL
NRBC BLD-RTO: 0 /100 WBC
PLATELET # BLD AUTO: 557 K/UL (ref 164–446)
PLATELET BLD QL SMEAR: NORMAL
PMV BLD AUTO: 9.9 FL (ref 9–12.9)
POLYCHROMASIA BLD QL SMEAR: NORMAL
POTASSIUM SERPL-SCNC: 4.5 MMOL/L (ref 3.6–5.5)
PROT SERPL-MCNC: 7.1 G/DL (ref 6–8.2)
PROTHROMBIN TIME: 15.2 SEC (ref 12–14.6)
RBC # BLD AUTO: 3.59 M/UL (ref 4.7–6.1)
RBC BLD AUTO: PRESENT
SODIUM SERPL-SCNC: 134 MMOL/L (ref 135–145)
TOXIC GRANULES BLD QL SMEAR: SLIGHT
VIT B12 SERPL-MCNC: 1219 PG/ML (ref 211–911)
WBC # BLD AUTO: 9 K/UL (ref 4.8–10.8)

## 2021-08-06 PROCEDURE — 82105 ALPHA-FETOPROTEIN SERUM: CPT

## 2021-08-06 PROCEDURE — 85025 COMPLETE CBC W/AUTO DIFF WBC: CPT

## 2021-08-06 PROCEDURE — 82746 ASSAY OF FOLIC ACID SERUM: CPT

## 2021-08-06 PROCEDURE — 36415 COLL VENOUS BLD VENIPUNCTURE: CPT

## 2021-08-06 PROCEDURE — 82306 VITAMIN D 25 HYDROXY: CPT

## 2021-08-06 PROCEDURE — 85610 PROTHROMBIN TIME: CPT

## 2021-08-06 PROCEDURE — 80053 COMPREHEN METABOLIC PANEL: CPT

## 2021-08-06 PROCEDURE — 82607 VITAMIN B-12: CPT

## 2021-08-06 PROCEDURE — 83735 ASSAY OF MAGNESIUM: CPT

## 2021-08-08 LAB — AFP-TM SERPL-MCNC: 4 NG/ML (ref 0–9)

## 2021-08-10 ENCOUNTER — HOME STUDY (OUTPATIENT)
Dept: SLEEP MEDICINE | Facility: MEDICAL CENTER | Age: 86
End: 2021-08-10
Attending: NURSE PRACTITIONER
Payer: MEDICARE

## 2021-08-10 DIAGNOSIS — G47.33 OBSTRUCTIVE SLEEP APNEA: ICD-10-CM

## 2021-08-10 DIAGNOSIS — G47.34 NOCTURNAL HYPOXIA: ICD-10-CM

## 2021-08-10 PROCEDURE — 94762 N-INVAS EAR/PLS OXIMTRY CONT: CPT | Performed by: INTERNAL MEDICINE

## 2021-08-11 NOTE — PROCEDURES
Interpretation:    This overnight oximetry was recorded on 8/10/2021  with the patient on CPAP 9-13 cm water and O2 2 LPM.  The analysis duration was 5 hours 36 minutes.  4 total oximetric events occurred encompassing 4.3 minutes .  The average event duration was 64 seconds .  The saturations were less than 90% for 1.1% of the recording.  The lizzy saturation was 83% .  The patient spent 0.1 minutes with saturations < 88%.  Overall, this continuous nocturnal oximetry demonstrates satisfactory nocturnal saturation while on positive airway pressure therapy and supplemental oxygen.      Recommendation:    Continue current therapy.

## 2021-08-17 ENCOUNTER — OCCUPATIONAL THERAPY (OUTPATIENT)
Dept: OCCUPATIONAL THERAPY | Facility: REHABILITATION | Age: 86
End: 2021-08-17
Attending: STUDENT IN AN ORGANIZED HEALTH CARE EDUCATION/TRAINING PROGRAM
Payer: MEDICARE

## 2021-08-17 DIAGNOSIS — Z91.89 DRIVING SAFETY ISSUE: ICD-10-CM

## 2021-08-17 PROCEDURE — 97750 PHYSICAL PERFORMANCE TEST: CPT
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
## 2021-09-14 ENCOUNTER — HOSPITAL ENCOUNTER (OUTPATIENT)
Dept: RADIOLOGY | Facility: MEDICAL CENTER | Age: 86
End: 2021-09-14
Attending: INTERNAL MEDICINE
Payer: MEDICARE

## 2021-09-14 DIAGNOSIS — K70.2 ALCOHOLIC FIBROSIS AND SCLEROSIS OF LIVER: ICD-10-CM

## 2021-09-14 DIAGNOSIS — D47.3 ESSENTIAL THROMBOCYTHEMIA (HCC): ICD-10-CM

## 2021-09-14 PROCEDURE — 76705 ECHO EXAM OF ABDOMEN: CPT

## 2021-09-16 ENCOUNTER — TELEPHONE (OUTPATIENT)
Dept: NEUROLOGY | Facility: MEDICAL CENTER | Age: 86
End: 2021-09-16

## 2021-09-16 ENCOUNTER — DOCUMENTATION (OUTPATIENT)
Dept: NEUROLOGY | Facility: MEDICAL CENTER | Age: 86
End: 2021-09-16

## 2021-09-16 ENCOUNTER — HOSPITAL ENCOUNTER (OUTPATIENT)
Dept: LAB | Facility: MEDICAL CENTER | Age: 86
End: 2021-09-16
Attending: PSYCHIATRY & NEUROLOGY
Payer: MEDICARE

## 2021-09-16 DIAGNOSIS — Z01.812 PRE-PROCEDURE LAB EXAM: ICD-10-CM

## 2021-09-16 DIAGNOSIS — D68.8 OTHER SPECIFIED COAGULATION DEFECTS (HCC): ICD-10-CM

## 2021-09-16 LAB
APTT PPP: 40.4 SEC (ref 24.7–36)
ERYTHROCYTE [DISTWIDTH] IN BLOOD BY AUTOMATED COUNT: 83.7 FL (ref 35.9–50)
HCT VFR BLD AUTO: 41.8 % (ref 42–52)
HGB BLD-MCNC: 13.9 G/DL (ref 14–18)
INR PPP: 1.27 (ref 0.87–1.13)
MCH RBC QN AUTO: 39 PG (ref 27–33)
MCHC RBC AUTO-ENTMCNC: 33.3 G/DL (ref 33.7–35.3)
MCV RBC AUTO: 117.4 FL (ref 81.4–97.8)
PLATELET # BLD AUTO: 598 K/UL (ref 164–446)
PMV BLD AUTO: 9.6 FL (ref 9–12.9)
PROTHROMBIN TIME: 14.9 SEC (ref 12–14.6)
RBC # BLD AUTO: 3.56 M/UL (ref 4.7–6.1)
WBC # BLD AUTO: 9.5 K/UL (ref 4.8–10.8)

## 2021-09-16 PROCEDURE — 85027 COMPLETE CBC AUTOMATED: CPT

## 2021-09-16 PROCEDURE — 36415 COLL VENOUS BLD VENIPUNCTURE: CPT

## 2021-09-16 PROCEDURE — 85730 THROMBOPLASTIN TIME PARTIAL: CPT

## 2021-09-16 PROCEDURE — 85610 PROTHROMBIN TIME: CPT

## 2021-09-16 NOTE — TELEPHONE ENCOUNTER
"Called pt to report ' message to him. Pt's wife, Sally, answered. I told her, per  \"his anticoagulation profile is abnormal thus it would be risky for me to do the lumbar puncture until those tests are in a good range.\"  recommend they get the labs done again either tomorrow or Monday. Pt understood.    "

## 2021-09-17 ENCOUNTER — HOSPITAL ENCOUNTER (OUTPATIENT)
Dept: LAB | Facility: MEDICAL CENTER | Age: 86
End: 2021-09-17
Attending: PSYCHIATRY & NEUROLOGY
Payer: MEDICARE

## 2021-09-17 DIAGNOSIS — D68.8 OTHER SPECIFIED COAGULATION DEFECTS (HCC): ICD-10-CM

## 2021-09-17 DIAGNOSIS — Z01.812 PRE-PROCEDURE LAB EXAM: ICD-10-CM

## 2021-09-17 LAB
ANISOCYTOSIS BLD QL SMEAR: ABNORMAL
APTT PPP: 44.5 SEC (ref 24.7–36)
BASOPHILS # BLD AUTO: 1.1 % (ref 0–1.8)
BASOPHILS # BLD: 0.11 K/UL (ref 0–0.12)
BURR CELLS BLD QL SMEAR: NORMAL
COMMENT 1642: NORMAL
EOSINOPHIL # BLD AUTO: 0.26 K/UL (ref 0–0.51)
EOSINOPHIL NFR BLD: 2.5 % (ref 0–6.9)
ERYTHROCYTE [DISTWIDTH] IN BLOOD BY AUTOMATED COUNT: 82 FL (ref 35.9–50)
HCT VFR BLD AUTO: 40.1 % (ref 42–52)
HGB BLD-MCNC: 13.8 G/DL (ref 14–18)
IMM GRANULOCYTES # BLD AUTO: 0.27 K/UL (ref 0–0.11)
IMM GRANULOCYTES NFR BLD AUTO: 2.6 % (ref 0–0.9)
INR PPP: 1.24 (ref 0.87–1.13)
LG PLATELETS BLD QL SMEAR: NORMAL
LYMPHOCYTES # BLD AUTO: 0.84 K/UL (ref 1–4.8)
LYMPHOCYTES NFR BLD: 8.1 % (ref 22–41)
MACROCYTES BLD QL SMEAR: ABNORMAL
MCH RBC QN AUTO: 39.8 PG (ref 27–33)
MCHC RBC AUTO-ENTMCNC: 34.4 G/DL (ref 33.7–35.3)
MCV RBC AUTO: 115.6 FL (ref 81.4–97.8)
MONOCYTES # BLD AUTO: 1.57 K/UL (ref 0–0.85)
MONOCYTES NFR BLD AUTO: 15.2 % (ref 0–13.4)
MORPHOLOGY BLD-IMP: NORMAL
NEUTROPHILS # BLD AUTO: 7.3 K/UL (ref 1.82–7.42)
NEUTROPHILS NFR BLD: 70.5 % (ref 44–72)
NRBC # BLD AUTO: 0 K/UL
NRBC BLD-RTO: 0 /100 WBC
OVALOCYTES BLD QL SMEAR: NORMAL
PLATELET # BLD AUTO: 604 K/UL (ref 164–446)
PLATELET BLD QL SMEAR: NORMAL
PMV BLD AUTO: 10 FL (ref 9–12.9)
POIKILOCYTOSIS BLD QL SMEAR: NORMAL
PROTHROMBIN TIME: 15.2 SEC (ref 12–14.6)
RBC # BLD AUTO: 3.47 M/UL (ref 4.7–6.1)
RBC BLD AUTO: PRESENT
WBC # BLD AUTO: 10.4 K/UL (ref 4.8–10.8)

## 2021-09-17 PROCEDURE — 36415 COLL VENOUS BLD VENIPUNCTURE: CPT

## 2021-09-17 PROCEDURE — 85730 THROMBOPLASTIN TIME PARTIAL: CPT

## 2021-09-17 PROCEDURE — 85610 PROTHROMBIN TIME: CPT

## 2021-09-17 PROCEDURE — 85025 COMPLETE CBC W/AUTO DIFF WBC: CPT

## 2021-09-21 ENCOUNTER — TELEPHONE (OUTPATIENT)
Dept: NEUROLOGY | Facility: MEDICAL CENTER | Age: 86
End: 2021-09-21

## 2021-09-21 ENCOUNTER — APPOINTMENT (OUTPATIENT)
Dept: NEUROLOGY | Facility: MEDICAL CENTER | Age: 86
End: 2021-09-21
Attending: PSYCHIATRY & NEUROLOGY
Payer: MEDICARE

## 2021-09-21 ENCOUNTER — DOCUMENTATION (OUTPATIENT)
Dept: NEUROLOGY | Facility: MEDICAL CENTER | Age: 86
End: 2021-09-21

## 2021-09-21 ENCOUNTER — HOSPITAL ENCOUNTER (OUTPATIENT)
Dept: LAB | Facility: MEDICAL CENTER | Age: 86
End: 2021-09-21
Attending: PSYCHIATRY & NEUROLOGY
Payer: MEDICARE

## 2021-09-21 DIAGNOSIS — D68.8 OTHER SPECIFIED COAGULATION DEFECTS (HCC): ICD-10-CM

## 2021-09-21 DIAGNOSIS — Z01.812 PRE-PROCEDURE LAB EXAM: ICD-10-CM

## 2021-09-21 LAB
APTT PPP: 42 SEC (ref 24.7–36)
INR PPP: 1.27 (ref 0.87–1.13)
PROTHROMBIN TIME: 15.6 SEC (ref 12–14.6)

## 2021-09-21 PROCEDURE — 36415 COLL VENOUS BLD VENIPUNCTURE: CPT

## 2021-09-21 PROCEDURE — 85730 THROMBOPLASTIN TIME PARTIAL: CPT

## 2021-09-21 PROCEDURE — 85610 PROTHROMBIN TIME: CPT

## 2021-09-21 NOTE — PROGRESS NOTES
Elevated PTT and INR persist with progressively higher Platelet Count.    Just discussed above results from today with Ed and his wife> due to the risk for bleeding in the area of the needle insertion zone or within the brain from secondary effects from removing CSF fluid, Ed/wife agree to hold off today on Lumbar Puncture.    Ed/wife will be contacting Dr. Thomas (oncologist) about abnormal lab results and when INR/PTT normalized I told Ed/wife to contact me back to reschedule LP procedure.

## 2021-09-21 NOTE — TELEPHONE ENCOUNTER
Called pt to let him know that his labs still came back with irregularities. Told pt they could go to the lab right now to see if anything has changed. Pt understood and said they would go to the lab now to get the labs done.

## 2021-09-24 ENCOUNTER — TELEPHONE (OUTPATIENT)
Dept: NEUROLOGY | Facility: MEDICAL CENTER | Age: 86
End: 2021-09-24

## 2021-09-24 NOTE — TELEPHONE ENCOUNTER
Called Chase MEDINA from cancer specialist to return her VM. She was wondering what exactly the lab works said and why this pt's lp was canceled. Called back but was told she was out for the day, and to call back on Monday.

## 2021-09-27 ENCOUNTER — TELEPHONE (OUTPATIENT)
Dept: NEUROLOGY | Facility: MEDICAL CENTER | Age: 86
End: 2021-09-27

## 2021-09-27 NOTE — TELEPHONE ENCOUNTER
Called Chase MEDINA back, and left a message with my extension saying that I had Han's lab results if she still needed to discuss them.

## 2021-09-29 ENCOUNTER — TELEPHONE (OUTPATIENT)
Dept: NEUROLOGY | Facility: MEDICAL CENTER | Age: 86
End: 2021-09-29

## 2021-09-29 NOTE — TELEPHONE ENCOUNTER
Called cancer specialist again but both Raheem and Chase were busy. I left a message saying if they wanted the labs with irregularities to just let me know and I can fax them over.

## 2021-10-01 RX ORDER — MONTELUKAST SODIUM 10 MG/1
10 TABLET ORAL EVERY EVENING
Qty: 90 TABLET | Refills: 3 | Status: SHIPPED | OUTPATIENT
Start: 2021-10-01 | End: 2022-01-01 | Stop reason: SDUPTHER

## 2021-10-01 NOTE — TELEPHONE ENCOUNTER
Received request via: Pharmacy    Was the patient seen in the last year in this department? Yes    Does the patient have an active prescription (recently filled or refills available) for medication(s) requested? No     Future Appointments       Provider Department Center    11/1/2021 11:20 AM Edmund Corrales M.D. [facility] - Neurology     12/7/2021 11:00 AM ARASH Blake [facility] Pulmonary Medicine

## 2021-10-04 ENCOUNTER — TELEPHONE (OUTPATIENT)
Dept: NEUROLOGY | Facility: MEDICAL CENTER | Age: 86
End: 2021-10-04

## 2021-10-04 NOTE — TELEPHONE ENCOUNTER
Chase, RN from [facility], called and said that  was wondering why Han can't get the lp. I told them it was because of his abnormal lab levels, specifically the prothrombin and APTT labs. They said that these levels are consistent for Han and they were expecting them to be abnormal since Han has liver cirrhosis. They don't think his lab levels will ever be in normal range. They were wondering if there was anyway Han could get the lp given his current lab levels. Please advise.

## 2021-10-07 ENCOUNTER — DOCUMENTATION (OUTPATIENT)
Dept: NEUROLOGY | Facility: MEDICAL CENTER | Age: 86
End: 2021-10-07

## 2021-10-07 ENCOUNTER — TELEPHONE (OUTPATIENT)
Dept: NEUROLOGY | Facility: MEDICAL CENTER | Age: 86
End: 2021-10-07

## 2021-10-07 DIAGNOSIS — D68.8 OTHER SPECIFIED COAGULATION DEFECTS (HCC): ICD-10-CM

## 2021-10-07 DIAGNOSIS — Z01.812 PRE-PROCEDURE LAB EXAM: ICD-10-CM

## 2021-10-07 NOTE — TELEPHONE ENCOUNTER
Called pt to schedule him for his lp Risks were discussed given his abnormal lab levels. Pt understood. Pt scheduled on 11/5/21 at 1pm. Messaged  to see if anymore labs were needed.

## 2021-10-08 RX ORDER — LANSOPRAZOLE 30 MG/1
CAPSULE, DELAYED RELEASE ORAL
Qty: 100 CAPSULE | Refills: 3 | Status: SHIPPED | OUTPATIENT
Start: 2021-10-08 | End: 2022-01-01 | Stop reason: SDUPTHER

## 2021-10-12 ENCOUNTER — HOSPITAL ENCOUNTER (OUTPATIENT)
Dept: LAB | Facility: MEDICAL CENTER | Age: 86
End: 2021-10-12
Attending: PSYCHIATRY & NEUROLOGY
Payer: MEDICARE

## 2021-10-14 ENCOUNTER — TELEPHONE (OUTPATIENT)
Dept: NEUROLOGY | Facility: MEDICAL CENTER | Age: 86
End: 2021-10-14

## 2021-10-14 NOTE — TELEPHONE ENCOUNTER
Called pt and let them now to get blood work done at least 3 days before the scheduled lp. Pt understood.

## 2021-10-19 ENCOUNTER — TELEPHONE (OUTPATIENT)
Dept: NEUROLOGY | Facility: MEDICAL CENTER | Age: 86
End: 2021-10-19

## 2021-10-19 NOTE — TELEPHONE ENCOUNTER
Called pt to make sure they were aware of blood work needing to be done before lp. Pt was aware and had an appt with lab on 11/3/21.

## 2021-11-01 ENCOUNTER — APPOINTMENT (OUTPATIENT)
Dept: NEUROLOGY | Facility: MEDICAL CENTER | Age: 86
End: 2021-11-01
Attending: STUDENT IN AN ORGANIZED HEALTH CARE EDUCATION/TRAINING PROGRAM
Payer: MEDICARE

## 2021-11-03 ENCOUNTER — HOSPITAL ENCOUNTER (OUTPATIENT)
Dept: LAB | Facility: MEDICAL CENTER | Age: 86
End: 2021-11-03
Attending: PSYCHIATRY & NEUROLOGY
Payer: MEDICARE

## 2021-11-03 DIAGNOSIS — D68.8 OTHER SPECIFIED COAGULATION DEFECTS (HCC): ICD-10-CM

## 2021-11-03 DIAGNOSIS — Z01.812 PRE-PROCEDURE LAB EXAM: ICD-10-CM

## 2021-11-03 LAB
ANISOCYTOSIS BLD QL SMEAR: ABNORMAL
APTT PPP: 40.2 SEC (ref 24.7–36)
BASOPHILS # BLD AUTO: 0.8 % (ref 0–1.8)
BASOPHILS # BLD: 0.06 K/UL (ref 0–0.12)
COMMENT 1642: NORMAL
EOSINOPHIL # BLD AUTO: 0.2 K/UL (ref 0–0.51)
EOSINOPHIL NFR BLD: 2.8 % (ref 0–6.9)
ERYTHROCYTE [DISTWIDTH] IN BLOOD BY AUTOMATED COUNT: 88.4 FL (ref 35.9–50)
GIANT PLATELETS BLD QL SMEAR: NORMAL
HCT VFR BLD AUTO: 37.5 % (ref 42–52)
HGB BLD-MCNC: 12.8 G/DL (ref 14–18)
IMM GRANULOCYTES # BLD AUTO: 0.12 K/UL (ref 0–0.11)
IMM GRANULOCYTES NFR BLD AUTO: 1.7 % (ref 0–0.9)
INR PPP: 1.27 (ref 0.87–1.13)
LYMPHOCYTES # BLD AUTO: 0.63 K/UL (ref 1–4.8)
LYMPHOCYTES NFR BLD: 8.7 % (ref 22–41)
MACROCYTES BLD QL SMEAR: ABNORMAL
MCH RBC QN AUTO: 40 PG (ref 27–33)
MCHC RBC AUTO-ENTMCNC: 34.1 G/DL (ref 33.7–35.3)
MCV RBC AUTO: 117.2 FL (ref 81.4–97.8)
MICROCYTES BLD QL SMEAR: ABNORMAL
MONOCYTES # BLD AUTO: 1.33 K/UL (ref 0–0.85)
MONOCYTES NFR BLD AUTO: 18.4 % (ref 0–13.4)
MORPHOLOGY BLD-IMP: NORMAL
NEUTROPHILS # BLD AUTO: 4.9 K/UL (ref 1.82–7.42)
NEUTROPHILS NFR BLD: 67.6 % (ref 44–72)
NRBC # BLD AUTO: 0 K/UL
NRBC BLD-RTO: 0 /100 WBC
OVALOCYTES BLD QL SMEAR: NORMAL
PLATELET # BLD AUTO: 391 K/UL (ref 164–446)
PLATELET BLD QL SMEAR: NORMAL
PMV BLD AUTO: 10 FL (ref 9–12.9)
PROTHROMBIN TIME: 15.5 SEC (ref 12–14.6)
RBC # BLD AUTO: 3.2 M/UL (ref 4.7–6.1)
RBC BLD AUTO: PRESENT
WBC # BLD AUTO: 7.2 K/UL (ref 4.8–10.8)

## 2021-11-03 PROCEDURE — 85730 THROMBOPLASTIN TIME PARTIAL: CPT

## 2021-11-03 PROCEDURE — 85025 COMPLETE CBC W/AUTO DIFF WBC: CPT

## 2021-11-03 PROCEDURE — 85610 PROTHROMBIN TIME: CPT

## 2021-11-03 PROCEDURE — 36415 COLL VENOUS BLD VENIPUNCTURE: CPT

## 2021-11-05 ENCOUNTER — OFFICE VISIT (OUTPATIENT)
Dept: NEUROLOGY | Facility: MEDICAL CENTER | Age: 86
End: 2021-11-05
Attending: PSYCHIATRY & NEUROLOGY
Payer: MEDICARE

## 2021-11-05 VITALS
WEIGHT: 198.41 LBS | DIASTOLIC BLOOD PRESSURE: 66 MMHG | SYSTOLIC BLOOD PRESSURE: 122 MMHG | BODY MASS INDEX: 26.87 KG/M2 | HEIGHT: 72 IN | HEART RATE: 69 BPM | TEMPERATURE: 98.5 F | OXYGEN SATURATION: 93 %

## 2021-11-05 DIAGNOSIS — G91.1 ACQUIRED CEREBRAL VENTRICULOMEGALY (HCC): ICD-10-CM

## 2021-11-05 PROCEDURE — 62270 DX LMBR SPI PNXR: CPT | Performed by: PSYCHIATRY & NEUROLOGY

## 2021-11-05 PROCEDURE — 99212 OFFICE O/P EST SF 10 MIN: CPT | Mod: 25 | Performed by: PSYCHIATRY & NEUROLOGY

## 2021-11-05 ASSESSMENT — FIBROSIS 4 INDEX: FIB4 SCORE: 2.01

## 2021-11-05 NOTE — PROCEDURES
Reason for Procedure:  Question of Ventricular Enlargement causing gait changes.    Here for Large Volume CSF removal (36-40 ccs).    Risks and benefits discussed- patient with wife co signing consent.    Time Out session done with Griselda confirming right patient,right procedure,right position.    Lumbar Puncture    Date/Time: 11/5/2021 2:13 PM  Performed by: Dayne Christine M.D.  Authorized by: Dayne Christine M.D.     Consent:     Consent obtained:  Written    Consent given by:  Patient and spouse    Risks discussed:  Bleeding, infection, pain, repeat procedure, nerve damage and headache    Alternatives discussed:  No treatment, alternative treatment and delayed treatment  Pre-procedure details:     Procedure purpose:  Diagnostic    Preparation: Patient was prepped and draped in usual sterile fashion    Anesthesia:     Anesthesia method:  Local infiltration    Local anesthetic:  Lidocaine 1% w/o epi  Procedure details:     Lumbar space:  L3-L4 interspace    Patient position:  L lateral decubitus    Needle gauge:  20    Needle type:  Spinal needle - Quincke tip    Needle length (in):  3.5    Opening pressure (cm H2O):  160    Closing pressure (cm H2O):  120    Fluid appearance:  Clear    Tubes of fluid:  4    Total volume (ml):  38  Comments:      Note:    I reviewed with Ed and his wife the pre procedure coagulation lab work and the fact that his PTT and INR were both mildly elevated.  Wife was a med-surg nurse for many years. After discussion of risks (not doing procdure) with unlikelihood of INR and/or PTT normalizing vs doing the study to see if removing a large volume of CSF will or would improve his walking/gait ability, they decided to proceed in light of some potential bleeding risks either locally in the spinal needle location of entry and passage and/or from a subdural due to a tract effect.    Pre Procedure Gait assessment- 2 (50 feet) walks conducted and average time was 27.5 seconds.  Post Procedure  Gait assessment: 2 (50 feet) walks conducted and average time was 28.2 seconds.  No clear or observable immediately post CSF removal in gait-balance-ability to stand or easy of walking noticed by wife or patient.    Note: I did not send fluid to lab for analysis as the primary reason for this test was removal of a given amount of CSF.    I told Ed and/or his wife to email me by next Monday or Tuesday to let me know of walking ability improved or not over this weekend as this factor will determine consideration for further referral to Neurosurgeon for consideration of  Shunt placement (or not).

## 2021-11-11 ENCOUNTER — APPOINTMENT (RX ONLY)
Dept: URBAN - METROPOLITAN AREA CLINIC 35 | Facility: CLINIC | Age: 86
Setting detail: DERMATOLOGY
End: 2021-11-11

## 2021-11-11 DIAGNOSIS — L57.0 ACTINIC KERATOSIS: ICD-10-CM

## 2021-11-11 PROCEDURE — ? PDT: BLUE

## 2021-11-11 PROCEDURE — 96573 PDT DSTR PRMLG LES PHYS/QHP: CPT

## 2021-11-11 PROCEDURE — ? COUNSELING

## 2021-11-11 ASSESSMENT — LOCATION DETAILED DESCRIPTION DERM
LOCATION DETAILED: LEFT INFERIOR CRUS OF ANTIHELIX
LOCATION DETAILED: LEFT CENTRAL MALAR CHEEK
LOCATION DETAILED: RIGHT INFERIOR CRUS OF ANTIHELIX

## 2021-11-11 ASSESSMENT — LOCATION SIMPLE DESCRIPTION DERM
LOCATION SIMPLE: LEFT EAR
LOCATION SIMPLE: LEFT CHEEK
LOCATION SIMPLE: RIGHT EAR

## 2021-11-11 ASSESSMENT — LOCATION ZONE DERM
LOCATION ZONE: FACE
LOCATION ZONE: EAR

## 2021-11-11 NOTE — HPI: PHOTODYNAMIC THERAPY (PDT)
Have You Had Previous Treatments With Pdt Before?: has had previous treatments
When Outside In The Sun, Do You...: rarely burns, mostly tans
Number Of Treatments: 1
When Was Your Last Pdt Treatment?: Years per per patient

## 2021-11-11 NOTE — PROCEDURE: PDT: BLUE
Was Levulan/Ameluz Applied On A Previous Day?: No
Treatment Number: 2
Detail Level: Zone
Incubation Time: 1 hour 30 minutes
Comments: Treated greater than 15 lesions
Light Source: Soto-U
Post-Care Instructions: I reviewed with the patient in detail post-care instructions. Patient is to avoid sunlight for the next 2 days, and wear sun protection. Patients may expect sunburn like redness, discomfort and scabbing.
Incubation Start Time: 10:00 AM
Medical Necessity: Precancerous Lesions
Anesthesia Type: 1% lidocaine with epinephrine
Expiration Date (Optional): 2022-07
Number Of Kerasticks/Tubes Billed For: 1
Who Performed The Pdt?: Performed by MD JI, MARIO or NP ([id number])
Pre-Procedure Text: The treatment areas were cleaned and prepped with Acetone in the usual fashion.
Which Photosensitizer Was Used: Levulan
Eye Protection Applied?: Yes
Ndc# (Optional): 79891-031-94
Incubation End Time: 11:30 AM
Lot # (Optional): [id number]
Total Number Of Aks Treated (Optional To Report): 0
Illumination Time: 00:16:40
Consent: Written consent obtained.  The risks were reviewed with the patient including but not limited to: pigmentary changes, pain, blistering, scabbing, redness, and the remote possibility of scarring.

## 2021-11-12 ENCOUNTER — DOCUMENTATION (OUTPATIENT)
Dept: NEUROLOGY | Facility: MEDICAL CENTER | Age: 86
End: 2021-11-12

## 2021-11-12 DIAGNOSIS — G91.2 NORMAL PRESSURE HYDROCEPHALUS SYNDROME (HCC): ICD-10-CM

## 2021-11-22 ENCOUNTER — APPOINTMENT (RX ONLY)
Dept: URBAN - METROPOLITAN AREA CLINIC 35 | Facility: CLINIC | Age: 86
Setting detail: DERMATOLOGY
End: 2021-11-22

## 2021-11-22 DIAGNOSIS — L57.0 ACTINIC KERATOSIS: ICD-10-CM

## 2021-11-22 DIAGNOSIS — D22 MELANOCYTIC NEVI: ICD-10-CM

## 2021-11-22 DIAGNOSIS — L82.1 OTHER SEBORRHEIC KERATOSIS: ICD-10-CM

## 2021-11-22 DIAGNOSIS — L81.4 OTHER MELANIN HYPERPIGMENTATION: ICD-10-CM

## 2021-11-22 DIAGNOSIS — Z71.89 OTHER SPECIFIED COUNSELING: ICD-10-CM

## 2021-11-22 DIAGNOSIS — Z85.828 PERSONAL HISTORY OF OTHER MALIGNANT NEOPLASM OF SKIN: ICD-10-CM

## 2021-11-22 PROBLEM — D22.5 MELANOCYTIC NEVI OF TRUNK: Status: ACTIVE | Noted: 2021-11-22

## 2021-11-22 PROCEDURE — 17000 DESTRUCT PREMALG LESION: CPT

## 2021-11-22 PROCEDURE — 99213 OFFICE O/P EST LOW 20 MIN: CPT | Mod: 25

## 2021-11-22 PROCEDURE — ? LIQUID NITROGEN

## 2021-11-22 PROCEDURE — 17003 DESTRUCT PREMALG LES 2-14: CPT

## 2021-11-22 PROCEDURE — ? ORDER FOR PHOTODYNAMIC THERAPY

## 2021-11-22 PROCEDURE — ? COUNSELING

## 2021-11-22 ASSESSMENT — LOCATION DETAILED DESCRIPTION DERM
LOCATION DETAILED: RIGHT INFERIOR HELIX
LOCATION DETAILED: LEFT SUPERIOR HELIX
LOCATION DETAILED: LEFT SUPERIOR PARIETAL SCALP
LOCATION DETAILED: LEFT SUPERIOR CENTRAL MALAR CHEEK
LOCATION DETAILED: RIGHT SUPERIOR HELIX
LOCATION DETAILED: RIGHT CENTRAL MALAR CHEEK
LOCATION DETAILED: INFERIOR THORACIC SPINE
LOCATION DETAILED: RIGHT POSTERIOR SHOULDER
LOCATION DETAILED: LEFT SCAPHA
LOCATION DETAILED: LEFT MEDIAL MALAR CHEEK
LOCATION DETAILED: RIGHT MEDIAL UPPER BACK
LOCATION DETAILED: LEFT INFERIOR HELIX
LOCATION DETAILED: RIGHT DISTAL DORSAL FOREARM
LOCATION DETAILED: LEFT POSTERIOR SHOULDER
LOCATION DETAILED: LEFT CENTRAL ZYGOMA
LOCATION DETAILED: EPIGASTRIC SKIN
LOCATION DETAILED: LEFT DISTAL DORSAL FOREARM
LOCATION DETAILED: RIGHT ANTIHELIX

## 2021-11-22 ASSESSMENT — LOCATION SIMPLE DESCRIPTION DERM
LOCATION SIMPLE: LEFT CHEEK
LOCATION SIMPLE: RIGHT EAR
LOCATION SIMPLE: RIGHT SHOULDER
LOCATION SIMPLE: LEFT ZYGOMA
LOCATION SIMPLE: LEFT EAR
LOCATION SIMPLE: RIGHT UPPER BACK
LOCATION SIMPLE: SCALP
LOCATION SIMPLE: ABDOMEN
LOCATION SIMPLE: LEFT FOREARM
LOCATION SIMPLE: UPPER BACK
LOCATION SIMPLE: RIGHT FOREARM
LOCATION SIMPLE: LEFT SHOULDER
LOCATION SIMPLE: RIGHT CHEEK

## 2021-11-22 ASSESSMENT — LOCATION ZONE DERM
LOCATION ZONE: ARM
LOCATION ZONE: FACE
LOCATION ZONE: EAR
LOCATION ZONE: TRUNK
LOCATION ZONE: SCALP

## 2021-11-22 NOTE — PROCEDURE: ORDER FOR PHOTODYNAMIC THERAPY
Chest Incubation Time: 2 Hours
Face And Ears Incubation Time: 1 Hour
Face And Scalp Incubation Time: 1 Hour for the face and 2 Hours for the scalp
Photosensitizer: Levulan
Location: Face
Consent: Written consent was obtained today.  The procedure and risks were reviewed with the patient including but not limited to: burning, pigmentary changes, pain, blistering, scabbing, redness, and the possibility of needing numerous treatments. Strict photoprotection after the procedure was also discussed.
Detail Level: Zone
Pdt Type: STEVE-U
Frequency Of Pdt: Single Treatment
Debridement: No
Location: Scalp

## 2021-11-22 NOTE — PROCEDURE: REASSURANCE
Detail Level: Detailed
Detail Level: Simple
Hide Include Location In Plan Question?: No
Detail Level: Generalized

## 2021-11-22 NOTE — PROCEDURE: LIQUID NITROGEN
Render Note In Bullet Format When Appropriate: No
Consent: The patient's consent was obtained including but not limited to risks of crusting, scabbing, blistering, scarring, darker or lighter pigmentary change, recurrence, incomplete removal and infection.
Post-Care Instructions: I reviewed with the patient in detail post-care instructions. Patient is to wear sunprotection, and avoid picking at any of the treated lesions. Pt may apply Vaseline to crusted or scabbing areas.
Duration Of Freeze Thaw-Cycle (Seconds): 10
Number Of Freeze-Thaw Cycles: 1 freeze-thaw cycle
Detail Level: Detailed
Show Applicator Variable?: Yes

## 2022-01-01 ENCOUNTER — APPOINTMENT (OUTPATIENT)
Dept: SLEEP MEDICINE | Facility: MEDICAL CENTER | Age: 87
End: 2022-01-01
Payer: MEDICARE

## 2022-01-01 ENCOUNTER — HOSPITAL ENCOUNTER (OUTPATIENT)
Dept: LAB | Facility: MEDICAL CENTER | Age: 87
End: 2022-06-29
Attending: FAMILY MEDICINE
Payer: MEDICARE

## 2022-01-01 ENCOUNTER — NON-PROVIDER VISIT (OUTPATIENT)
Dept: CARDIOLOGY | Facility: MEDICAL CENTER | Age: 87
End: 2022-01-01
Payer: MEDICARE

## 2022-01-01 ENCOUNTER — HOME CARE VISIT (OUTPATIENT)
Dept: HOME HEALTH SERVICES | Facility: HOME HEALTHCARE | Age: 87
End: 2022-01-01
Payer: MEDICARE

## 2022-01-01 ENCOUNTER — APPOINTMENT (OUTPATIENT)
Dept: RADIOLOGY | Facility: MEDICAL CENTER | Age: 87
DRG: 177 | End: 2022-01-01
Attending: EMERGENCY MEDICINE
Payer: MEDICARE

## 2022-01-01 ENCOUNTER — APPOINTMENT (RX ONLY)
Dept: URBAN - METROPOLITAN AREA CLINIC 35 | Facility: CLINIC | Age: 87
Setting detail: DERMATOLOGY
End: 2022-01-01

## 2022-01-01 ENCOUNTER — OFFICE VISIT (OUTPATIENT)
Dept: MEDICAL GROUP | Facility: MEDICAL CENTER | Age: 87
End: 2022-01-01
Payer: MEDICARE

## 2022-01-01 ENCOUNTER — APPOINTMENT (OUTPATIENT)
Dept: RADIOLOGY | Facility: MEDICAL CENTER | Age: 87
DRG: 177 | End: 2022-01-01
Payer: MEDICARE

## 2022-01-01 ENCOUNTER — PATIENT MESSAGE (OUTPATIENT)
Dept: MEDICAL GROUP | Facility: PHYSICIAN GROUP | Age: 87
End: 2022-01-01
Payer: MEDICARE

## 2022-01-01 ENCOUNTER — HOSPITAL ENCOUNTER (INPATIENT)
Facility: MEDICAL CENTER | Age: 87
LOS: 7 days | DRG: 177 | End: 2022-12-19
Attending: EMERGENCY MEDICINE | Admitting: HOSPITALIST
Payer: MEDICARE

## 2022-01-01 ENCOUNTER — TELEMEDICINE (OUTPATIENT)
Dept: MEDICAL GROUP | Facility: MEDICAL CENTER | Age: 87
End: 2022-01-01
Payer: MEDICARE

## 2022-01-01 ENCOUNTER — OFFICE VISIT (OUTPATIENT)
Dept: MEDICAL GROUP | Facility: PHYSICIAN GROUP | Age: 87
End: 2022-01-01
Payer: MEDICARE

## 2022-01-01 ENCOUNTER — TELEPHONE (OUTPATIENT)
Dept: SCHEDULING | Facility: IMAGING CENTER | Age: 87
End: 2022-01-01

## 2022-01-01 ENCOUNTER — HOME HEALTH ADMISSION (OUTPATIENT)
Dept: HOME HEALTH SERVICES | Facility: HOME HEALTHCARE | Age: 87
End: 2022-01-01
Payer: MEDICARE

## 2022-01-01 ENCOUNTER — HOSPITAL ENCOUNTER (OUTPATIENT)
Dept: LAB | Facility: MEDICAL CENTER | Age: 87
End: 2022-03-08
Attending: INTERNAL MEDICINE
Payer: MEDICARE

## 2022-01-01 ENCOUNTER — HOSPITAL ENCOUNTER (OUTPATIENT)
Dept: RADIOLOGY | Facility: MEDICAL CENTER | Age: 87
End: 2022-08-03
Attending: NURSE PRACTITIONER
Payer: MEDICARE

## 2022-01-01 ENCOUNTER — TELEPHONE (OUTPATIENT)
Dept: HEALTH INFORMATION MANAGEMENT | Facility: OTHER | Age: 87
End: 2022-01-01

## 2022-01-01 ENCOUNTER — TELEPHONE (OUTPATIENT)
Dept: CARDIOLOGY | Facility: MEDICAL CENTER | Age: 87
End: 2022-01-01

## 2022-01-01 ENCOUNTER — HOSPITAL ENCOUNTER (OUTPATIENT)
Facility: MEDICAL CENTER | Age: 87
End: 2022-01-01
Attending: NEUROLOGICAL SURGERY | Admitting: NEUROLOGICAL SURGERY
Payer: MEDICARE

## 2022-01-01 ENCOUNTER — HOSPITAL ENCOUNTER (OUTPATIENT)
Dept: RADIOLOGY | Facility: MEDICAL CENTER | Age: 87
End: 2022-06-17
Attending: FAMILY MEDICINE
Payer: MEDICARE

## 2022-01-01 ENCOUNTER — DOCUMENTATION (OUTPATIENT)
Dept: MEDICAL GROUP | Facility: PHYSICIAN GROUP | Age: 87
End: 2022-01-01
Payer: MEDICARE

## 2022-01-01 ENCOUNTER — OFFICE VISIT (OUTPATIENT)
Dept: NEUROLOGY | Facility: MEDICAL CENTER | Age: 87
End: 2022-01-01
Attending: NURSE PRACTITIONER
Payer: MEDICARE

## 2022-01-01 ENCOUNTER — HOSPITAL ENCOUNTER (OUTPATIENT)
Dept: RADIOLOGY | Facility: MEDICAL CENTER | Age: 87
End: 2022-06-07
Attending: INTERNAL MEDICINE
Payer: MEDICARE

## 2022-01-01 ENCOUNTER — APPOINTMENT (RX ONLY)
Dept: URBAN - METROPOLITAN AREA CLINIC 36 | Facility: CLINIC | Age: 87
Setting detail: DERMATOLOGY
End: 2022-01-01

## 2022-01-01 ENCOUNTER — HOME CARE VISIT (OUTPATIENT)
Dept: HOSPICE | Facility: HOSPICE | Age: 87
End: 2022-01-01

## 2022-01-01 ENCOUNTER — TELEPHONE (OUTPATIENT)
Dept: CARDIOLOGY | Facility: MEDICAL CENTER | Age: 87
End: 2022-01-01
Payer: MEDICARE

## 2022-01-01 ENCOUNTER — TELEPHONE (OUTPATIENT)
Dept: MEDICAL GROUP | Facility: PHYSICIAN GROUP | Age: 87
End: 2022-01-01
Payer: MEDICARE

## 2022-01-01 ENCOUNTER — HOSPITAL ENCOUNTER (OUTPATIENT)
Dept: CARDIOLOGY | Facility: MEDICAL CENTER | Age: 87
End: 2022-10-07
Attending: NURSE PRACTITIONER
Payer: MEDICARE

## 2022-01-01 ENCOUNTER — HOSPICE ADMISSION (OUTPATIENT)
Dept: HOSPICE | Facility: HOSPICE | Age: 87
End: 2022-01-01

## 2022-01-01 ENCOUNTER — PATIENT MESSAGE (OUTPATIENT)
Dept: HEALTH INFORMATION MANAGEMENT | Facility: OTHER | Age: 87
End: 2022-01-01

## 2022-01-01 ENCOUNTER — TELEPHONE (OUTPATIENT)
Dept: MEDICAL GROUP | Facility: PHYSICIAN GROUP | Age: 87
End: 2022-01-01

## 2022-01-01 VITALS
RESPIRATION RATE: 18 BRPM | HEIGHT: 72 IN | OXYGEN SATURATION: 94 % | HEART RATE: 78 BPM | SYSTOLIC BLOOD PRESSURE: 108 MMHG | DIASTOLIC BLOOD PRESSURE: 52 MMHG | TEMPERATURE: 98.4 F | BODY MASS INDEX: 27.22 KG/M2 | WEIGHT: 201 LBS

## 2022-01-01 VITALS — SYSTOLIC BLOOD PRESSURE: 124 MMHG | DIASTOLIC BLOOD PRESSURE: 68 MMHG

## 2022-01-01 VITALS
DIASTOLIC BLOOD PRESSURE: 60 MMHG | HEART RATE: 77 BPM | TEMPERATURE: 99 F | RESPIRATION RATE: 20 BRPM | SYSTOLIC BLOOD PRESSURE: 120 MMHG | OXYGEN SATURATION: 97 %

## 2022-01-01 VITALS
WEIGHT: 209 LBS | HEIGHT: 72 IN | BODY MASS INDEX: 28.31 KG/M2 | OXYGEN SATURATION: 98 % | SYSTOLIC BLOOD PRESSURE: 118 MMHG | TEMPERATURE: 97.9 F | DIASTOLIC BLOOD PRESSURE: 60 MMHG | HEART RATE: 74 BPM | RESPIRATION RATE: 16 BRPM

## 2022-01-01 VITALS
OXYGEN SATURATION: 96 % | HEART RATE: 72 BPM | TEMPERATURE: 97.9 F | DIASTOLIC BLOOD PRESSURE: 60 MMHG | SYSTOLIC BLOOD PRESSURE: 110 MMHG | RESPIRATION RATE: 16 BRPM

## 2022-01-01 VITALS
HEART RATE: 77 BPM | TEMPERATURE: 98.6 F | SYSTOLIC BLOOD PRESSURE: 110 MMHG | RESPIRATION RATE: 18 BRPM | OXYGEN SATURATION: 92 % | DIASTOLIC BLOOD PRESSURE: 60 MMHG

## 2022-01-01 VITALS
DIASTOLIC BLOOD PRESSURE: 62 MMHG | OXYGEN SATURATION: 92 % | TEMPERATURE: 97.7 F | WEIGHT: 182.32 LBS | SYSTOLIC BLOOD PRESSURE: 113 MMHG | BODY MASS INDEX: 24.73 KG/M2

## 2022-01-01 VITALS
RESPIRATION RATE: 16 BRPM | DIASTOLIC BLOOD PRESSURE: 60 MMHG | OXYGEN SATURATION: 95 % | SYSTOLIC BLOOD PRESSURE: 125 MMHG | TEMPERATURE: 97.8 F | HEART RATE: 75 BPM

## 2022-01-01 VITALS
DIASTOLIC BLOOD PRESSURE: 56 MMHG | TEMPERATURE: 98.6 F | BODY MASS INDEX: 26.46 KG/M2 | HEART RATE: 83 BPM | HEIGHT: 72 IN | OXYGEN SATURATION: 94 % | WEIGHT: 195.33 LBS | SYSTOLIC BLOOD PRESSURE: 124 MMHG

## 2022-01-01 VITALS
BODY MASS INDEX: 27.36 KG/M2 | RESPIRATION RATE: 16 BRPM | WEIGHT: 202 LBS | DIASTOLIC BLOOD PRESSURE: 70 MMHG | TEMPERATURE: 97.9 F | SYSTOLIC BLOOD PRESSURE: 118 MMHG | HEART RATE: 69 BPM | HEIGHT: 72 IN | OXYGEN SATURATION: 95 %

## 2022-01-01 VITALS
RESPIRATION RATE: 14 BRPM | SYSTOLIC BLOOD PRESSURE: 110 MMHG | HEART RATE: 79 BPM | WEIGHT: 194.89 LBS | DIASTOLIC BLOOD PRESSURE: 54 MMHG | BODY MASS INDEX: 26.4 KG/M2 | HEIGHT: 72 IN | OXYGEN SATURATION: 93 %

## 2022-01-01 VITALS — HEIGHT: 72 IN | BODY MASS INDEX: 26.14 KG/M2 | WEIGHT: 193 LBS

## 2022-01-01 VITALS
OXYGEN SATURATION: 97 % | RESPIRATION RATE: 16 BRPM | TEMPERATURE: 99 F | DIASTOLIC BLOOD PRESSURE: 56 MMHG | SYSTOLIC BLOOD PRESSURE: 108 MMHG | HEART RATE: 75 BPM

## 2022-01-01 VITALS
SYSTOLIC BLOOD PRESSURE: 100 MMHG | RESPIRATION RATE: 14 BRPM | WEIGHT: 193 LBS | HEART RATE: 73 BPM | TEMPERATURE: 94 F | BODY MASS INDEX: 26.14 KG/M2 | HEIGHT: 72 IN | OXYGEN SATURATION: 94 % | DIASTOLIC BLOOD PRESSURE: 48 MMHG

## 2022-01-01 VITALS
HEART RATE: 75 BPM | RESPIRATION RATE: 16 BRPM | TEMPERATURE: 97.6 F | SYSTOLIC BLOOD PRESSURE: 122 MMHG | OXYGEN SATURATION: 95 % | DIASTOLIC BLOOD PRESSURE: 68 MMHG

## 2022-01-01 VITALS
RESPIRATION RATE: 15 BRPM | DIASTOLIC BLOOD PRESSURE: 46 MMHG | TEMPERATURE: 99.2 F | OXYGEN SATURATION: 95 % | SYSTOLIC BLOOD PRESSURE: 110 MMHG | HEART RATE: 74 BPM

## 2022-01-01 VITALS
DIASTOLIC BLOOD PRESSURE: 58 MMHG | HEART RATE: 76 BPM | TEMPERATURE: 99.5 F | RESPIRATION RATE: 15 BRPM | SYSTOLIC BLOOD PRESSURE: 126 MMHG | OXYGEN SATURATION: 95 %

## 2022-01-01 VITALS
RESPIRATION RATE: 16 BRPM | OXYGEN SATURATION: 97 % | DIASTOLIC BLOOD PRESSURE: 52 MMHG | TEMPERATURE: 98.3 F | SYSTOLIC BLOOD PRESSURE: 120 MMHG | HEART RATE: 79 BPM

## 2022-01-01 VITALS
RESPIRATION RATE: 16 BRPM | DIASTOLIC BLOOD PRESSURE: 60 MMHG | OXYGEN SATURATION: 96 % | SYSTOLIC BLOOD PRESSURE: 120 MMHG | HEART RATE: 78 BPM | TEMPERATURE: 97.9 F

## 2022-01-01 VITALS
OXYGEN SATURATION: 96 % | HEART RATE: 75 BPM | SYSTOLIC BLOOD PRESSURE: 104 MMHG | RESPIRATION RATE: 18 BRPM | DIASTOLIC BLOOD PRESSURE: 52 MMHG | TEMPERATURE: 98.3 F

## 2022-01-01 VITALS
WEIGHT: 202 LBS | OXYGEN SATURATION: 94 % | HEIGHT: 72 IN | SYSTOLIC BLOOD PRESSURE: 106 MMHG | DIASTOLIC BLOOD PRESSURE: 62 MMHG | RESPIRATION RATE: 16 BRPM | HEART RATE: 78 BPM | TEMPERATURE: 98.7 F | BODY MASS INDEX: 27.36 KG/M2

## 2022-01-01 DIAGNOSIS — L57.0 ACTINIC KERATOSIS: ICD-10-CM

## 2022-01-01 DIAGNOSIS — E78.2 MIXED HYPERLIPIDEMIA: ICD-10-CM
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
## 2022-01-01 DIAGNOSIS — D22 MELANOCYTIC NEVI: ICD-10-CM

## 2022-01-01 DIAGNOSIS — I49.3 PVC'S (PREMATURE VENTRICULAR CONTRACTIONS): ICD-10-CM

## 2022-01-01 DIAGNOSIS — I69.30 LATE EFFECT OF STROKE: ICD-10-CM

## 2022-01-01 DIAGNOSIS — K74.69 OTHER CIRRHOSIS OF LIVER (HCC): ICD-10-CM

## 2022-01-01 DIAGNOSIS — G62.9 POLYNEUROPATHY: ICD-10-CM

## 2022-01-01 DIAGNOSIS — I63.9 ACUTE CVA (CEREBROVASCULAR ACCIDENT) (HCC): ICD-10-CM

## 2022-01-01 DIAGNOSIS — E66.3 SEVERELY OVERWEIGHT: ICD-10-CM

## 2022-01-01 DIAGNOSIS — L81.4 OTHER MELANIN HYPERPIGMENTATION: ICD-10-CM

## 2022-01-01 DIAGNOSIS — D45 POLYCYTHEMIA VERA (HCC): ICD-10-CM

## 2022-01-01 DIAGNOSIS — Z41.9 ENCOUNTER FOR PROCEDURE FOR PURPOSES OTHER THAN REMEDYING HEALTH STATE, UNSPECIFIED: ICD-10-CM

## 2022-01-01 DIAGNOSIS — G91.1 ACQUIRED CEREBRAL VENTRICULOMEGALY (HCC): ICD-10-CM

## 2022-01-01 DIAGNOSIS — R41.89 COGNITIVE IMPAIRMENT: ICD-10-CM

## 2022-01-01 DIAGNOSIS — D485 NEOPLASM OF UNCERTAIN BEHAVIOR OF SKIN: ICD-10-CM

## 2022-01-01 DIAGNOSIS — Z66 DNR (DO NOT RESUSCITATE): ICD-10-CM

## 2022-01-01 DIAGNOSIS — Z00.00 HEALTHCARE MAINTENANCE: ICD-10-CM

## 2022-01-01 DIAGNOSIS — Z20.822 CLOSE EXPOSURE TO COVID-19 VIRUS: ICD-10-CM

## 2022-01-01 DIAGNOSIS — C92.10 CHRONIC MYELOGENOUS LEUKEMIA (HCC): ICD-10-CM

## 2022-01-01 DIAGNOSIS — R41.0 CONFUSION: ICD-10-CM

## 2022-01-01 DIAGNOSIS — Z48.02 ENCOUNTER FOR REMOVAL OF SUTURES: ICD-10-CM

## 2022-01-01 DIAGNOSIS — L82.1 OTHER SEBORRHEIC KERATOSIS: ICD-10-CM

## 2022-01-01 DIAGNOSIS — I49.1 APC (ATRIAL PREMATURE CONTRACTIONS): ICD-10-CM

## 2022-01-01 DIAGNOSIS — K22.719 BARRETT'S ESOPHAGUS WITH DYSPLASIA: ICD-10-CM

## 2022-01-01 DIAGNOSIS — G47.33 OBSTRUCTIVE SLEEP APNEA: ICD-10-CM

## 2022-01-01 DIAGNOSIS — D47.3 THROMBOCYTHEMIA, ESSENTIAL (HCC): ICD-10-CM

## 2022-01-01 DIAGNOSIS — Z85.828 PERSONAL HISTORY OF OTHER MALIGNANT NEOPLASM OF SKIN: ICD-10-CM

## 2022-01-01 DIAGNOSIS — R53.1 WEAKNESS: ICD-10-CM

## 2022-01-01 DIAGNOSIS — K70.2 ALCOHOLIC FIBROSIS AND SCLEROSIS OF LIVER: ICD-10-CM

## 2022-01-01 DIAGNOSIS — Z71.89 OTHER SPECIFIED COUNSELING: ICD-10-CM

## 2022-01-01 DIAGNOSIS — L85.8 OTHER SPECIFIED EPIDERMAL THICKENING: ICD-10-CM

## 2022-01-01 DIAGNOSIS — J12.82 PNEUMONIA DUE TO COVID-19 VIRUS: ICD-10-CM

## 2022-01-01 DIAGNOSIS — U07.1 PNEUMONIA DUE TO COVID-19 VIRUS: ICD-10-CM

## 2022-01-01 DIAGNOSIS — G63 VITAMIN B6 DEFICIENCY NEUROPATHY (HCC): ICD-10-CM

## 2022-01-01 DIAGNOSIS — I47.10 SVT (SUPRAVENTRICULAR TACHYCARDIA) (HCC): ICD-10-CM

## 2022-01-01 DIAGNOSIS — E53.1 VITAMIN B6 DEFICIENCY NEUROPATHY (HCC): ICD-10-CM

## 2022-01-01 DIAGNOSIS — Z00.00 ENCOUNTER FOR MEDICAL EXAMINATION TO ESTABLISH CARE: ICD-10-CM

## 2022-01-01 LAB
25(OH)D3 SERPL-MCNC: 65 NG/ML (ref 30–100)
AFP-TM SERPL-MCNC: 5 NG/ML (ref 0–9)
ALBUMIN SERPL BCP-MCNC: 2.5 G/DL (ref 3.2–4.9)
ALBUMIN SERPL BCP-MCNC: 3 G/DL (ref 3.2–4.9)
ALBUMIN SERPL BCP-MCNC: 3.4 G/DL (ref 3.2–4.9)
ALBUMIN SERPL BCP-MCNC: 3.7 G/DL (ref 3.2–4.9)
ALBUMIN SERPL BCP-MCNC: 3.9 G/DL (ref 3.2–4.9)
ALBUMIN/GLOB SERPL: 0.7 G/DL
ALBUMIN/GLOB SERPL: 0.8 G/DL
ALBUMIN/GLOB SERPL: 1.1 G/DL
ALBUMIN/GLOB SERPL: 1.2 G/DL
ALP SERPL-CCNC: 129 U/L (ref 30–99)
ALP SERPL-CCNC: 171 U/L (ref 30–99)
ALP SERPL-CCNC: 182 U/L (ref 30–99)
ALP SERPL-CCNC: 227 U/L (ref 30–99)
ALT SERPL-CCNC: 48 U/L (ref 2–50)
ALT SERPL-CCNC: 53 U/L (ref 2–50)
ALT SERPL-CCNC: 67 U/L (ref 2–50)
ALT SERPL-CCNC: 75 U/L (ref 2–50)
AMMONIA PLAS-SCNC: 28 UMOL/L (ref 11–45)
AMPHET UR QL SCN: NEGATIVE
ANION GAP SERPL CALC-SCNC: 10 MMOL/L (ref 7–16)
ANION GAP SERPL CALC-SCNC: 11 MMOL/L (ref 7–16)
ANION GAP SERPL CALC-SCNC: 11 MMOL/L (ref 7–16)
ANION GAP SERPL CALC-SCNC: 9 MMOL/L (ref 7–16)
ANISOCYTOSIS BLD QL SMEAR: ABNORMAL
ANISOCYTOSIS BLD QL SMEAR: ABNORMAL
APPEARANCE UR: CLEAR
APPEARANCE UR: CLEAR
AST SERPL-CCNC: 126 U/L (ref 12–45)
AST SERPL-CCNC: 70 U/L (ref 12–45)
AST SERPL-CCNC: 81 U/L (ref 12–45)
AST SERPL-CCNC: 91 U/L (ref 12–45)
BACTERIA #/AREA URNS HPF: ABNORMAL /HPF
BACTERIA #/AREA URNS HPF: NEGATIVE /HPF
BACTERIA BLD CULT: NORMAL
BACTERIA BLD CULT: NORMAL
BARBITURATES UR QL SCN: NEGATIVE
BASOPHILS # BLD AUTO: 0 % (ref 0–1.8)
BASOPHILS # BLD AUTO: 0.4 % (ref 0–1.8)
BASOPHILS # BLD AUTO: 0.5 % (ref 0–1.8)
BASOPHILS # BLD AUTO: 0.7 % (ref 0–1.8)
BASOPHILS # BLD: 0 K/UL (ref 0–0.12)
BASOPHILS # BLD: 0.03 K/UL (ref 0–0.12)
BASOPHILS # BLD: 0.05 K/UL (ref 0–0.12)
BASOPHILS # BLD: 0.06 K/UL (ref 0–0.12)
BENZODIAZ UR QL SCN: NEGATIVE
BILIRUB SERPL-MCNC: 1.1 MG/DL (ref 0.1–1.5)
BILIRUB SERPL-MCNC: 1.7 MG/DL (ref 0.1–1.5)
BILIRUB SERPL-MCNC: 1.8 MG/DL (ref 0.1–1.5)
BILIRUB SERPL-MCNC: 2 MG/DL (ref 0.1–1.5)
BILIRUB UR QL STRIP.AUTO: NEGATIVE
BILIRUB UR QL STRIP.AUTO: NEGATIVE
BLOOD CULTURE HOLD CXBCH: NORMAL
BUN SERPL-MCNC: 13 MG/DL (ref 8–22)
BUN SERPL-MCNC: 14 MG/DL (ref 8–22)
BUN SERPL-MCNC: 14 MG/DL (ref 8–22)
BUN SERPL-MCNC: 22 MG/DL (ref 8–22)
BUN SERPL-MCNC: 37 MG/DL (ref 8–22)
BZE UR QL SCN: NEGATIVE
CALCIUM ALBUM COR SERPL-MCNC: 10 MG/DL (ref 8.5–10.5)
CALCIUM ALBUM COR SERPL-MCNC: 9.7 MG/DL (ref 8.5–10.5)
CALCIUM SERPL-MCNC: 8.8 MG/DL (ref 8.5–10.5)
CALCIUM SERPL-MCNC: 8.8 MG/DL (ref 8.5–10.5)
CALCIUM SERPL-MCNC: 8.9 MG/DL (ref 8.5–10.5)
CALCIUM SERPL-MCNC: 9 MG/DL (ref 8.5–10.5)
CALCIUM SERPL-MCNC: 9.2 MG/DL (ref 8.5–10.5)
CANNABINOIDS UR QL SCN: NEGATIVE
CHLORIDE SERPL-SCNC: 104 MMOL/L (ref 96–112)
CHLORIDE SERPL-SCNC: 106 MMOL/L (ref 96–112)
CHLORIDE SERPL-SCNC: 108 MMOL/L (ref 96–112)
CHOLEST SERPL-MCNC: 184 MG/DL (ref 100–199)
CO2 SERPL-SCNC: 20 MMOL/L (ref 20–33)
CO2 SERPL-SCNC: 21 MMOL/L (ref 20–33)
CO2 SERPL-SCNC: 21 MMOL/L (ref 20–33)
CO2 SERPL-SCNC: 22 MMOL/L (ref 20–33)
CO2 SERPL-SCNC: 23 MMOL/L (ref 20–33)
COLOR UR: ABNORMAL
COLOR UR: YELLOW
COMMENT 1642: NORMAL
CREAT SERPL-MCNC: 0.71 MG/DL (ref 0.5–1.4)
CREAT SERPL-MCNC: 0.74 MG/DL (ref 0.5–1.4)
CREAT SERPL-MCNC: 0.79 MG/DL (ref 0.5–1.4)
CREAT SERPL-MCNC: 0.86 MG/DL (ref 0.5–1.4)
CREAT SERPL-MCNC: 0.9 MG/DL (ref 0.5–1.4)
EOSINOPHIL # BLD AUTO: 0.05 K/UL (ref 0–0.51)
EOSINOPHIL # BLD AUTO: 0.07 K/UL (ref 0–0.51)
EOSINOPHIL # BLD AUTO: 0.09 K/UL (ref 0–0.51)
EOSINOPHIL # BLD AUTO: 0.18 K/UL (ref 0–0.51)
EOSINOPHIL NFR BLD: 0.7 % (ref 0–6.9)
EOSINOPHIL NFR BLD: 0.7 % (ref 0–6.9)
EOSINOPHIL NFR BLD: 0.9 % (ref 0–6.9)
EOSINOPHIL NFR BLD: 2.7 % (ref 0–6.9)
EPI CELLS #/AREA URNS HPF: NEGATIVE /HPF
EPI CELLS #/AREA URNS HPF: NEGATIVE /HPF
ERYTHROCYTE [DISTWIDTH] IN BLOOD BY AUTOMATED COUNT: 72.5 FL (ref 35.9–50)
ERYTHROCYTE [DISTWIDTH] IN BLOOD BY AUTOMATED COUNT: 73.5 FL (ref 35.9–50)
ERYTHROCYTE [DISTWIDTH] IN BLOOD BY AUTOMATED COUNT: 74.8 FL (ref 35.9–50)
ERYTHROCYTE [DISTWIDTH] IN BLOOD BY AUTOMATED COUNT: 84.8 FL (ref 35.9–50)
EST. AVERAGE GLUCOSE BLD GHB EST-MCNC: 157 MG/DL
ETHANOL BLD-MCNC: <10.1 MG/DL
FASTING STATUS PATIENT QL REPORTED: NORMAL
FLUAV RNA SPEC QL NAA+PROBE: NEGATIVE
FLUBV RNA SPEC QL NAA+PROBE: NEGATIVE
GFR SERPLBLD CREATININE-BSD FMLA CKD-EPI: 83 ML/MIN/1.73 M 2
GFR SERPLBLD CREATININE-BSD FMLA CKD-EPI: 84 ML/MIN/1.73 M 2
GFR SERPLBLD CREATININE-BSD FMLA CKD-EPI: 87 ML/MIN/1.73 M 2
GFR SERPLBLD CREATININE-BSD FMLA CKD-EPI: 89 ML/MIN/1.73 M 2
GLOBULIN SER CALC-MCNC: 3.1 G/DL (ref 1.9–3.5)
GLOBULIN SER CALC-MCNC: 3.4 G/DL (ref 1.9–3.5)
GLOBULIN SER CALC-MCNC: 3.7 G/DL (ref 1.9–3.5)
GLOBULIN SER CALC-MCNC: 4.1 G/DL (ref 1.9–3.5)
GLUCOSE SERPL-MCNC: 122 MG/DL (ref 65–99)
GLUCOSE SERPL-MCNC: 154 MG/DL (ref 65–99)
GLUCOSE SERPL-MCNC: 163 MG/DL (ref 65–99)
GLUCOSE SERPL-MCNC: 176 MG/DL (ref 65–99)
GLUCOSE SERPL-MCNC: 197 MG/DL (ref 65–99)
GLUCOSE UR STRIP.AUTO-MCNC: >=1000 MG/DL
GLUCOSE UR STRIP.AUTO-MCNC: NEGATIVE MG/DL
HBA1C MFR BLD: 7.1 % (ref 4–5.6)
HCT VFR BLD AUTO: 35.5 % (ref 42–52)
HCT VFR BLD AUTO: 36.5 % (ref 42–52)
HCT VFR BLD AUTO: 39.2 % (ref 42–52)
HCT VFR BLD AUTO: 40.5 % (ref 42–52)
HDLC SERPL-MCNC: 69 MG/DL
HGB BLD-MCNC: 11.6 G/DL (ref 14–18)
HGB BLD-MCNC: 12.1 G/DL (ref 14–18)
HGB BLD-MCNC: 13.3 G/DL (ref 14–18)
HGB BLD-MCNC: 13.5 G/DL (ref 14–18)
HYALINE CASTS #/AREA URNS LPF: ABNORMAL /LPF
HYALINE CASTS #/AREA URNS LPF: ABNORMAL /LPF
IMM GRANULOCYTES # BLD AUTO: 0.11 K/UL (ref 0–0.11)
IMM GRANULOCYTES # BLD AUTO: 0.29 K/UL (ref 0–0.11)
IMM GRANULOCYTES # BLD AUTO: 0.6 K/UL (ref 0–0.11)
IMM GRANULOCYTES NFR BLD AUTO: 1.6 % (ref 0–0.9)
IMM GRANULOCYTES NFR BLD AUTO: 4 % (ref 0–0.9)
IMM GRANULOCYTES NFR BLD AUTO: 4.9 % (ref 0–0.9)
INR PPP: 1.25 (ref 0.87–1.13)
KETONES UR STRIP.AUTO-MCNC: ABNORMAL MG/DL
KETONES UR STRIP.AUTO-MCNC: ABNORMAL MG/DL
LDLC SERPL CALC-MCNC: 98 MG/DL
LEUKOCYTE ESTERASE UR QL STRIP.AUTO: ABNORMAL
LEUKOCYTE ESTERASE UR QL STRIP.AUTO: ABNORMAL
LV EJECT FRACT  99904: 60
LV EJECT FRACT MOD 2C 99903: 51.64
LV EJECT FRACT MOD 4C 99902: 60.49
LV EJECT FRACT MOD BP 99901: 56.33
LYMPHOCYTES # BLD AUTO: 0.33 K/UL (ref 1–4.8)
LYMPHOCYTES # BLD AUTO: 0.45 K/UL (ref 1–4.8)
LYMPHOCYTES # BLD AUTO: 0.47 K/UL (ref 1–4.8)
LYMPHOCYTES # BLD AUTO: 0.6 K/UL (ref 1–4.8)
LYMPHOCYTES NFR BLD: 3.7 % (ref 22–41)
LYMPHOCYTES NFR BLD: 4.6 % (ref 22–41)
LYMPHOCYTES NFR BLD: 6.2 % (ref 22–41)
LYMPHOCYTES NFR BLD: 8.9 % (ref 22–41)
MACROCYTES BLD QL SMEAR: ABNORMAL
MACROCYTES BLD QL SMEAR: ABNORMAL
MAGNESIUM SERPL-MCNC: 1.9 MG/DL (ref 1.5–2.5)
MAGNESIUM SERPL-MCNC: 2 MG/DL (ref 1.5–2.5)
MANUAL DIFF BLD: NORMAL
MCH RBC QN AUTO: 36.8 PG (ref 27–33)
MCH RBC QN AUTO: 36.8 PG (ref 27–33)
MCH RBC QN AUTO: 37.2 PG (ref 27–33)
MCH RBC QN AUTO: 40.8 PG (ref 27–33)
MCHC RBC AUTO-ENTMCNC: 32.7 G/DL (ref 33.7–35.3)
MCHC RBC AUTO-ENTMCNC: 33.2 G/DL (ref 33.7–35.3)
MCHC RBC AUTO-ENTMCNC: 33.3 G/DL (ref 33.7–35.3)
MCHC RBC AUTO-ENTMCNC: 33.9 G/DL (ref 33.7–35.3)
MCV RBC AUTO: 110.9 FL (ref 81.4–97.8)
MCV RBC AUTO: 111.6 FL (ref 81.4–97.8)
MCV RBC AUTO: 112.7 FL (ref 81.4–97.8)
MCV RBC AUTO: 120.2 FL (ref 81.4–97.8)
METHADONE UR QL SCN: NEGATIVE
MICRO URNS: ABNORMAL
MICRO URNS: ABNORMAL
MONOCYTES # BLD AUTO: 0.88 K/UL (ref 0–0.85)
MONOCYTES # BLD AUTO: 1.13 K/UL (ref 0–0.85)
MONOCYTES # BLD AUTO: 1.13 K/UL (ref 0–0.85)
MONOCYTES # BLD AUTO: 1.42 K/UL (ref 0–0.85)
MONOCYTES NFR BLD AUTO: 11.6 % (ref 0–13.4)
MONOCYTES NFR BLD AUTO: 12.2 % (ref 0–13.4)
MONOCYTES NFR BLD AUTO: 15 % (ref 0–13.4)
MONOCYTES NFR BLD AUTO: 16.7 % (ref 0–13.4)
MORPHOLOGY BLD-IMP: NORMAL
MYELOCYTES NFR BLD MANUAL: 1.8 %
NEUTROPHILS # BLD AUTO: 4.69 K/UL (ref 1.82–7.42)
NEUTROPHILS # BLD AUTO: 5.61 K/UL (ref 1.82–7.42)
NEUTROPHILS # BLD AUTO: 5.64 K/UL (ref 1.82–7.42)
NEUTROPHILS # BLD AUTO: 9.67 K/UL (ref 1.82–7.42)
NEUTROPHILS NFR BLD: 69.4 % (ref 44–72)
NEUTROPHILS NFR BLD: 74.3 % (ref 44–72)
NEUTROPHILS NFR BLD: 78.1 % (ref 44–72)
NEUTROPHILS NFR BLD: 78.6 % (ref 44–72)
NEUTS BAND NFR BLD MANUAL: 0.9 % (ref 0–10)
NITRITE UR QL STRIP.AUTO: NEGATIVE
NITRITE UR QL STRIP.AUTO: NEGATIVE
NRBC # BLD AUTO: 0 K/UL
NRBC # BLD AUTO: 0.02 K/UL
NRBC BLD-RTO: 0 /100 WBC
NRBC BLD-RTO: 0.2 /100 WBC
OPIATES UR QL SCN: NEGATIVE
OVALOCYTES BLD QL SMEAR: NORMAL
OXYCODONE UR QL SCN: NEGATIVE
PCP UR QL SCN: NEGATIVE
PH UR STRIP.AUTO: 5.5 [PH] (ref 5–8)
PH UR STRIP.AUTO: 6 [PH] (ref 5–8)
PHOSPHATE SERPL-MCNC: 2.5 MG/DL (ref 2.5–4.5)
PHOSPHATE SERPL-MCNC: 2.6 MG/DL (ref 2.5–4.5)
PLATELET # BLD AUTO: 358 K/UL (ref 164–446)
PLATELET # BLD AUTO: 400 K/UL (ref 164–446)
PLATELET # BLD AUTO: 412 K/UL (ref 164–446)
PLATELET # BLD AUTO: 494 K/UL (ref 164–446)
PLATELET BLD QL SMEAR: NORMAL
PLATELET BLD QL SMEAR: NORMAL
PMV BLD AUTO: 10 FL (ref 9–12.9)
PMV BLD AUTO: 10.1 FL (ref 9–12.9)
PMV BLD AUTO: 10.2 FL (ref 9–12.9)
PMV BLD AUTO: 10.3 FL (ref 9–12.9)
POIKILOCYTOSIS BLD QL SMEAR: NORMAL
POLYCHROMASIA BLD QL SMEAR: NORMAL
POTASSIUM SERPL-SCNC: 4 MMOL/L (ref 3.6–5.5)
POTASSIUM SERPL-SCNC: 4.1 MMOL/L (ref 3.6–5.5)
POTASSIUM SERPL-SCNC: 4.2 MMOL/L (ref 3.6–5.5)
POTASSIUM SERPL-SCNC: 4.4 MMOL/L (ref 3.6–5.5)
POTASSIUM SERPL-SCNC: 4.4 MMOL/L (ref 3.6–5.5)
PROCALCITONIN SERPL-MCNC: 0.19 NG/ML
PROMYELOCYTES NFR BLD MANUAL: 0.9 %
PROPOXYPH UR QL SCN: NEGATIVE
PROT SERPL-MCNC: 6.2 G/DL (ref 6–8.2)
PROT SERPL-MCNC: 6.8 G/DL (ref 6–8.2)
PROT SERPL-MCNC: 7.3 G/DL (ref 6–8.2)
PROT SERPL-MCNC: 7.5 G/DL (ref 6–8.2)
PROT UR QL STRIP: 30 MG/DL
PROT UR QL STRIP: 30 MG/DL
PROTHROMBIN TIME: 15.4 SEC (ref 12–14.6)
RBC # BLD AUTO: 3.15 M/UL (ref 4.7–6.1)
RBC # BLD AUTO: 3.26 M/UL (ref 4.7–6.1)
RBC # BLD AUTO: 3.29 M/UL (ref 4.7–6.1)
RBC # BLD AUTO: 3.63 M/UL (ref 4.7–6.1)
RBC # URNS HPF: ABNORMAL /HPF
RBC # URNS HPF: ABNORMAL /HPF
RBC BLD AUTO: PRESENT
RBC BLD AUTO: PRESENT
RBC UR QL AUTO: ABNORMAL
RBC UR QL AUTO: NEGATIVE
RSV RNA SPEC QL NAA+PROBE: NEGATIVE
SARS-COV-2 RNA RESP QL NAA+PROBE: DETECTED
SIGNIFICANT IND 70042: NORMAL
SIGNIFICANT IND 70042: NORMAL
SITE SITE: NORMAL
SITE SITE: NORMAL
SODIUM SERPL-SCNC: 135 MMOL/L (ref 135–145)
SODIUM SERPL-SCNC: 136 MMOL/L (ref 135–145)
SODIUM SERPL-SCNC: 137 MMOL/L (ref 135–145)
SODIUM SERPL-SCNC: 137 MMOL/L (ref 135–145)
SODIUM SERPL-SCNC: 139 MMOL/L (ref 135–145)
SOURCE SOURCE: NORMAL
SOURCE SOURCE: NORMAL
SP GR UR STRIP.AUTO: 1.02
SP GR UR STRIP.AUTO: 1.03
SPECIMEN SOURCE: ABNORMAL
TRIGL SERPL-MCNC: 83 MG/DL (ref 0–149)
TSH SERPL DL<=0.005 MIU/L-ACNC: 2.06 UIU/ML (ref 0.38–5.33)
UROBILINOGEN UR STRIP.AUTO-MCNC: 1 MG/DL
UROBILINOGEN UR STRIP.AUTO-MCNC: 1 MG/DL
VIT B12 SERPL-MCNC: 1762 PG/ML (ref 211–911)
WBC # BLD AUTO: 12.3 K/UL (ref 4.8–10.8)
WBC # BLD AUTO: 6.8 K/UL (ref 4.8–10.8)
WBC # BLD AUTO: 7.2 K/UL (ref 4.8–10.8)
WBC # BLD AUTO: 7.5 K/UL (ref 4.8–10.8)
WBC #/AREA URNS HPF: ABNORMAL /HPF
WBC #/AREA URNS HPF: ABNORMAL /HPF

## 2022-01-01 PROCEDURE — 76981 USE PARENCHYMA: CPT

## 2022-01-01 PROCEDURE — 99214 OFFICE O/P EST MOD 30 MIN: CPT | Performed by: NURSE PRACTITIONER

## 2022-01-01 PROCEDURE — 80053 COMPREHEN METABOLIC PANEL: CPT

## 2022-01-01 PROCEDURE — G0495 RN CARE TRAIN/EDU IN HH: HCPCS

## 2022-01-01 PROCEDURE — 99214 OFFICE O/P EST MOD 30 MIN: CPT | Performed by: STUDENT IN AN ORGANIZED HEALTH CARE EDUCATION/TRAINING PROGRAM

## 2022-01-01 PROCEDURE — G0180 MD CERTIFICATION HHA PATIENT: HCPCS | Performed by: FAMILY MEDICINE

## 2022-01-01 PROCEDURE — 85025 COMPLETE CBC W/AUTO DIFF WBC: CPT

## 2022-01-01 PROCEDURE — 700102 HCHG RX REV CODE 250 W/ 637 OVERRIDE(OP): Performed by: HOSPITALIST

## 2022-01-01 PROCEDURE — 69100 BIOPSY OF EXTERNAL EAR: CPT

## 2022-01-01 PROCEDURE — 84145 PROCALCITONIN (PCT): CPT

## 2022-01-01 PROCEDURE — 99213 OFFICE O/P EST LOW 20 MIN: CPT | Performed by: FAMILY MEDICINE

## 2022-01-01 PROCEDURE — A9270 NON-COVERED ITEM OR SERVICE: HCPCS | Performed by: STUDENT IN AN ORGANIZED HEALTH CARE EDUCATION/TRAINING PROGRAM

## 2022-01-01 PROCEDURE — G0493 RN CARE EA 15 MIN HH/HOSPICE: HCPCS

## 2022-01-01 PROCEDURE — 99232 SBSQ HOSP IP/OBS MODERATE 35: CPT | Mod: GC | Performed by: INTERNAL MEDICINE

## 2022-01-01 PROCEDURE — 99497 ADVNCD CARE PLAN 30 MIN: CPT | Mod: GC | Performed by: INTERNAL MEDICINE

## 2022-01-01 PROCEDURE — 87040 BLOOD CULTURE FOR BACTERIA: CPT

## 2022-01-01 PROCEDURE — 99212 OFFICE O/P EST SF 10 MIN: CPT | Performed by: NURSE PRACTITIONER

## 2022-01-01 PROCEDURE — C9803 HOPD COVID-19 SPEC COLLECT: HCPCS | Performed by: EMERGENCY MEDICINE

## 2022-01-01 PROCEDURE — 99285 EMERGENCY DEPT VISIT HI MDM: CPT

## 2022-01-01 PROCEDURE — 70498 CT ANGIOGRAPHY NECK: CPT | Mod: MG

## 2022-01-01 PROCEDURE — 770001 HCHG ROOM/CARE - MED/SURG/GYN PRIV*

## 2022-01-01 PROCEDURE — A9270 NON-COVERED ITEM OR SERVICE: HCPCS | Performed by: HOSPITALIST

## 2022-01-01 PROCEDURE — 99213 OFFICE O/P EST LOW 20 MIN: CPT

## 2022-01-01 PROCEDURE — G0151 HHCP-SERV OF PT,EA 15 MIN: HCPCS

## 2022-01-01 PROCEDURE — 700102 HCHG RX REV CODE 250 W/ 637 OVERRIDE(OP): Performed by: STUDENT IN AN ORGANIZED HEALTH CARE EDUCATION/TRAINING PROGRAM

## 2022-01-01 PROCEDURE — 96573 PDT DSTR PRMLG LES PHYS/QHP: CPT

## 2022-01-01 PROCEDURE — 36415 COLL VENOUS BLD VENIPUNCTURE: CPT

## 2022-01-01 PROCEDURE — ? COUNSELING

## 2022-01-01 PROCEDURE — 17312 MOHS ADDL STAGE: CPT

## 2022-01-01 PROCEDURE — 93268 ECG RECORD/REVIEW: CPT | Performed by: INTERNAL MEDICINE

## 2022-01-01 PROCEDURE — 99223 1ST HOSP IP/OBS HIGH 75: CPT | Mod: AI | Performed by: HOSPITALIST

## 2022-01-01 PROCEDURE — ? ADDITIONAL NOTES

## 2022-01-01 PROCEDURE — 97162 PT EVAL MOD COMPLEX 30 MIN: CPT

## 2022-01-01 PROCEDURE — ? TREATMENT REGIMEN

## 2022-01-01 PROCEDURE — 82077 ASSAY SPEC XCP UR&BREATH IA: CPT

## 2022-01-01 PROCEDURE — 99215 OFFICE O/P EST HI 40 MIN: CPT | Performed by: FAMILY MEDICINE

## 2022-01-01 PROCEDURE — 84443 ASSAY THYROID STIM HORMONE: CPT

## 2022-01-01 PROCEDURE — 71045 X-RAY EXAM CHEST 1 VIEW: CPT

## 2022-01-01 PROCEDURE — 99213 OFFICE O/P EST LOW 20 MIN: CPT | Mod: 25

## 2022-01-01 PROCEDURE — 99233 SBSQ HOSP IP/OBS HIGH 50: CPT | Mod: 25,GC | Performed by: INTERNAL MEDICINE

## 2022-01-01 PROCEDURE — ? BIOPSY BY SHAVE METHOD

## 2022-01-01 PROCEDURE — 70450 CT HEAD/BRAIN W/O DYE: CPT | Mod: MG

## 2022-01-01 PROCEDURE — G0155 HHCP-SVS OF CSW,EA 15 MIN: HCPCS

## 2022-01-01 PROCEDURE — G0153 HHCP-SVS OF S/L PATH,EA 15MN: HCPCS

## 2022-01-01 PROCEDURE — 80307 DRUG TEST PRSMV CHEM ANLYZR: CPT

## 2022-01-01 PROCEDURE — 99233 SBSQ HOSP IP/OBS HIGH 50: CPT | Mod: GC | Performed by: INTERNAL MEDICINE

## 2022-01-01 PROCEDURE — 85610 PROTHROMBIN TIME: CPT

## 2022-01-01 PROCEDURE — 99215 OFFICE O/P EST HI 40 MIN: CPT | Performed by: NURSE PRACTITIONER

## 2022-01-01 PROCEDURE — 700111 HCHG RX REV CODE 636 W/ 250 OVERRIDE (IP)

## 2022-01-01 PROCEDURE — 8E0ZXY6 ISOLATION: ICD-10-PCS | Performed by: HOSPITALIST

## 2022-01-01 PROCEDURE — ? MEDICATION COUNSELING

## 2022-01-01 PROCEDURE — 700111 HCHG RX REV CODE 636 W/ 250 OVERRIDE (IP): Performed by: STUDENT IN AN ORGANIZED HEALTH CARE EDUCATION/TRAINING PROGRAM

## 2022-01-01 PROCEDURE — 700117 HCHG RX CONTRAST REV CODE 255: Performed by: NURSE PRACTITIONER

## 2022-01-01 PROCEDURE — 99214 OFFICE O/P EST MOD 30 MIN: CPT | Mod: 95 | Performed by: FAMILY MEDICINE

## 2022-01-01 PROCEDURE — 82105 ALPHA-FETOPROTEIN SERUM: CPT

## 2022-01-01 PROCEDURE — ? SUTURE REMOVAL (GLOBAL PERIOD)

## 2022-01-01 PROCEDURE — 81001 URINALYSIS AUTO W/SCOPE: CPT

## 2022-01-01 PROCEDURE — 84100 ASSAY OF PHOSPHORUS: CPT

## 2022-01-01 PROCEDURE — 83735 ASSAY OF MAGNESIUM: CPT

## 2022-01-01 PROCEDURE — 93306 TTE W/DOPPLER COMPLETE: CPT

## 2022-01-01 PROCEDURE — 11102 TANGNTL BX SKIN SINGLE LES: CPT | Mod: 59

## 2022-01-01 PROCEDURE — 80069 RENAL FUNCTION PANEL: CPT

## 2022-01-01 PROCEDURE — 88331 PATH CONSLTJ SURG 1 BLK 1SPC: CPT | Mod: 59

## 2022-01-01 PROCEDURE — 80061 LIPID PANEL: CPT

## 2022-01-01 PROCEDURE — 17311 MOHS 1 STAGE H/N/HF/G: CPT

## 2022-01-01 PROCEDURE — ? LIQUID NITROGEN

## 2022-01-01 PROCEDURE — 13132 CMPLX RPR F/C/C/M/N/AX/G/H/F: CPT | Mod: 59

## 2022-01-01 PROCEDURE — 17003 DESTRUCT PREMALG LES 2-14: CPT

## 2022-01-01 PROCEDURE — ? MOHS SURGERY

## 2022-01-01 PROCEDURE — 82140 ASSAY OF AMMONIA: CPT

## 2022-01-01 PROCEDURE — 97535 SELF CARE MNGMENT TRAINING: CPT

## 2022-01-01 PROCEDURE — 665001 SOC-HOME HEALTH

## 2022-01-01 PROCEDURE — 70450 CT HEAD/BRAIN W/O DYE: CPT

## 2022-01-01 PROCEDURE — G0152 HHCP-SERV OF OT,EA 15 MIN: HCPCS

## 2022-01-01 PROCEDURE — 83036 HEMOGLOBIN GLYCOSYLATED A1C: CPT

## 2022-01-01 PROCEDURE — 0241U HCHG SARS-COV-2 COVID-19 NFCT DS RESP RNA 4 TRGT MIC: CPT

## 2022-01-01 PROCEDURE — 85007 BL SMEAR W/DIFF WBC COUNT: CPT

## 2022-01-01 PROCEDURE — ? PDT: BLUE

## 2022-01-01 PROCEDURE — ? LASER RESURFACING

## 2022-01-01 PROCEDURE — 17000 DESTRUCT PREMALG LESION: CPT

## 2022-01-01 PROCEDURE — ? PRESCRIPTION

## 2022-01-01 PROCEDURE — 93306 TTE W/DOPPLER COMPLETE: CPT | Mod: 26 | Performed by: INTERNAL MEDICINE

## 2022-01-01 PROCEDURE — 82306 VITAMIN D 25 HYDROXY: CPT

## 2022-01-01 PROCEDURE — 70496 CT ANGIOGRAPHY HEAD: CPT | Mod: MG

## 2022-01-01 PROCEDURE — 99231 SBSQ HOSP IP/OBS SF/LOW 25: CPT | Mod: GC | Performed by: INTERNAL MEDICINE

## 2022-01-01 PROCEDURE — 97166 OT EVAL MOD COMPLEX 45 MIN: CPT

## 2022-01-01 PROCEDURE — 82607 VITAMIN B-12: CPT

## 2022-01-01 PROCEDURE — 700111 HCHG RX REV CODE 636 W/ 250 OVERRIDE (IP): Performed by: INTERNAL MEDICINE

## 2022-01-01 PROCEDURE — 92610 EVALUATE SWALLOWING FUNCTION: CPT

## 2022-01-01 PROCEDURE — ? BIOPSY BY SHAVE METHOD WITH FROZEN SECTION

## 2022-01-01 PROCEDURE — 97530 THERAPEUTIC ACTIVITIES: CPT

## 2022-01-01 RX ORDER — LORAZEPAM 2 MG/ML
1 INJECTION INTRAMUSCULAR
Status: DISCONTINUED | OUTPATIENT
Start: 2022-01-01 | End: 2022-01-01 | Stop reason: HOSPADM

## 2022-01-01 RX ORDER — OMEPRAZOLE 20 MG/1
20 CAPSULE, DELAYED RELEASE ORAL NIGHTLY
Status: DISCONTINUED | OUTPATIENT
Start: 2022-01-01 | End: 2022-01-01

## 2022-01-01 RX ORDER — MONTELUKAST SODIUM 10 MG/1
10 TABLET ORAL NIGHTLY
Status: DISCONTINUED | OUTPATIENT
Start: 2022-01-01 | End: 2022-01-01

## 2022-01-01 RX ORDER — FUROSEMIDE 10 MG/ML
20 INJECTION INTRAMUSCULAR; INTRAVENOUS ONCE
Status: COMPLETED | OUTPATIENT
Start: 2022-01-01 | End: 2022-01-01

## 2022-01-01 RX ORDER — MONTELUKAST SODIUM 10 MG/1
10 TABLET ORAL EVERY EVENING
Qty: 90 TABLET | Refills: 3 | Status: SHIPPED | OUTPATIENT
Start: 2022-01-01 | End: 2022-01-01 | Stop reason: SDUPTHER

## 2022-01-01 RX ORDER — FLUOROURACIL 5 MG/G
CREAM TOPICAL
Qty: 40 | Refills: 1 | Status: ERX | COMMUNITY
Start: 2022-01-01

## 2022-01-01 RX ORDER — HYDROXYUREA 500 MG/1
1000 CAPSULE ORAL EVERY EVENING
Qty: 30 CAPSULE | Refills: 0 | OUTPATIENT
Start: 2022-01-01 | End: 2023-01-14

## 2022-01-01 RX ORDER — DOCUSATE SODIUM 100 MG/1
100 CAPSULE, LIQUID FILLED ORAL EVERY 12 HOURS
Status: DISCONTINUED | OUTPATIENT
Start: 2022-01-01 | End: 2022-01-01 | Stop reason: HOSPADM

## 2022-01-01 RX ORDER — LANSOPRAZOLE 30 MG/1
30 CAPSULE, DELAYED RELEASE ORAL
Qty: 100 CAPSULE | Refills: 3 | Status: SHIPPED | OUTPATIENT
Start: 2022-01-01 | End: 2022-01-01 | Stop reason: SDUPTHER

## 2022-01-01 RX ORDER — ACETAMINOPHEN 325 MG/1
650 TABLET ORAL EVERY 4 HOURS PRN
Status: DISCONTINUED | OUTPATIENT
Start: 2022-01-01 | End: 2022-01-01 | Stop reason: HOSPADM

## 2022-01-01 RX ORDER — CLOBETASOL PROPIONATE 0.46 MG/ML
SOLUTION TOPICAL
COMMUNITY
Start: 2022-01-01 | End: 2022-01-01

## 2022-01-01 RX ORDER — MONTELUKAST SODIUM 10 MG/1
10 TABLET ORAL EVERY EVENING
Qty: 100 TABLET | Refills: 0 | Status: SHIPPED | OUTPATIENT
Start: 2022-01-01

## 2022-01-01 RX ORDER — ROSUVASTATIN CALCIUM 20 MG/1
20 TABLET, COATED ORAL EVERY EVENING
Qty: 100 TABLET | Refills: 0 | Status: SHIPPED | OUTPATIENT
Start: 2022-01-01 | End: 2022-01-01 | Stop reason: SDUPTHER

## 2022-01-01 RX ORDER — ACETAMINOPHEN 650 MG/1
650 SUPPOSITORY RECTAL EVERY 4 HOURS PRN
Status: DISCONTINUED | OUTPATIENT
Start: 2022-01-01 | End: 2022-01-01 | Stop reason: HOSPADM

## 2022-01-01 RX ORDER — HYDROXYUREA 500 MG/1
1000 CAPSULE ORAL EVERY EVENING
Status: DISCONTINUED | OUTPATIENT
Start: 2022-01-01 | End: 2022-01-01

## 2022-01-01 RX ORDER — ROSUVASTATIN CALCIUM 20 MG/1
20 TABLET, COATED ORAL EVERY EVENING
Status: DISCONTINUED | OUTPATIENT
Start: 2022-01-01 | End: 2022-01-01

## 2022-01-01 RX ORDER — LORAZEPAM 2 MG/ML
1 CONCENTRATE ORAL
Status: DISCONTINUED | OUTPATIENT
Start: 2022-01-01 | End: 2022-01-01 | Stop reason: HOSPADM

## 2022-01-01 RX ORDER — ACETAMINOPHEN 325 MG/1
650 TABLET ORAL EVERY 6 HOURS PRN
Status: DISCONTINUED | OUTPATIENT
Start: 2022-01-01 | End: 2022-01-01

## 2022-01-01 RX ORDER — DEXAMETHASONE 6 MG/1
6 TABLET ORAL DAILY
Qty: 6 TABLET | Refills: 0 | OUTPATIENT
Start: 2022-01-01 | End: 2022-12-22

## 2022-01-01 RX ORDER — LANSOPRAZOLE 30 MG/1
30 CAPSULE, DELAYED RELEASE ORAL
Qty: 100 CAPSULE | Refills: 0 | Status: SHIPPED | OUTPATIENT
Start: 2022-01-01

## 2022-01-01 RX ORDER — CLOBETASOL PROPIONATE 0.5 MG/ML
SMALL AMOUNT SOLUTION TOPICAL
Qty: 50 | Refills: 0 | Status: ERX | COMMUNITY
Start: 2022-01-01

## 2022-01-01 RX ORDER — DEXAMETHASONE 6 MG/1
6 TABLET ORAL DAILY
Status: DISCONTINUED | OUTPATIENT
Start: 2022-01-01 | End: 2022-01-01 | Stop reason: HOSPADM

## 2022-01-01 RX ORDER — ATROPINE SULFATE 10 MG/ML
2 SOLUTION/ DROPS OPHTHALMIC EVERY 4 HOURS PRN
Status: DISCONTINUED | OUTPATIENT
Start: 2022-01-01 | End: 2022-01-01 | Stop reason: HOSPADM

## 2022-01-01 RX ORDER — CYANOCOBALAMIN/FOLIC AC/VIT B6 115-0.8-1
TABLET ORAL
Qty: 90 TABLET | Refills: 3 | Status: SHIPPED | OUTPATIENT
Start: 2022-01-01

## 2022-01-01 RX ORDER — POLYETHYLENE GLYCOL 3350 17 G/17G
1 POWDER, FOR SOLUTION ORAL
Status: DISCONTINUED | OUTPATIENT
Start: 2022-01-01 | End: 2022-01-01 | Stop reason: HOSPADM

## 2022-01-01 RX ORDER — FLUOROURACIL 50 MG/G
CREAM TOPICAL
COMMUNITY
Start: 2022-01-01 | End: 2022-01-01 | Stop reason: ALTCHOICE

## 2022-01-01 RX ORDER — ROSUVASTATIN CALCIUM 20 MG/1
20 TABLET, COATED ORAL EVERY EVENING
Qty: 100 TABLET | Refills: 2 | Status: SHIPPED | OUTPATIENT
Start: 2022-01-01 | End: 2023-10-25

## 2022-01-01 RX ORDER — ROSUVASTATIN CALCIUM 20 MG/1
20 TABLET, COATED ORAL EVERY EVENING
Qty: 90 TABLET | Refills: 0 | Status: SHIPPED | OUTPATIENT
Start: 2022-01-01 | End: 2022-01-01 | Stop reason: SDUPTHER

## 2022-01-01 RX ORDER — AMOXICILLIN 250 MG
2 CAPSULE ORAL 2 TIMES DAILY
Status: DISCONTINUED | OUTPATIENT
Start: 2022-01-01 | End: 2022-01-01 | Stop reason: HOSPADM

## 2022-01-01 RX ORDER — BISACODYL 10 MG
10 SUPPOSITORY, RECTAL RECTAL
Status: DISCONTINUED | OUTPATIENT
Start: 2022-01-01 | End: 2022-01-01 | Stop reason: HOSPADM

## 2022-01-01 RX ADMIN — DEXAMETHASONE 6 MG: 6 TABLET ORAL at 04:49

## 2022-01-01 RX ADMIN — ASPIRIN 81 MG: 81 TABLET, COATED ORAL at 22:54

## 2022-01-01 RX ADMIN — MINERAL OIL, PETROLATUM 1 APPLICATION: 425; 573 OINTMENT OPHTHALMIC at 10:30

## 2022-01-01 RX ADMIN — SENNOSIDES AND DOCUSATE SODIUM 2 TABLET: 50; 8.6 TABLET ORAL at 05:22

## 2022-01-01 RX ADMIN — SENNOSIDES AND DOCUSATE SODIUM 2 TABLET: 50; 8.6 TABLET ORAL at 06:37

## 2022-01-01 RX ADMIN — SENNOSIDES AND DOCUSATE SODIUM 2 TABLET: 50; 8.6 TABLET ORAL at 16:09

## 2022-01-01 RX ADMIN — HYDROXYUREA 1000 MG: 500 CAPSULE ORAL at 16:09

## 2022-01-01 RX ADMIN — MORPHINE SULFATE 5 MG: 10 INJECTION INTRAVENOUS at 12:00

## 2022-01-01 RX ADMIN — RIVAROXABAN 10 MG: 10 TABLET, FILM COATED ORAL at 17:01

## 2022-01-01 RX ADMIN — MINERAL OIL, PETROLATUM 1 APPLICATION: 425; 573 OINTMENT OPHTHALMIC at 04:56

## 2022-01-01 RX ADMIN — SENNOSIDES AND DOCUSATE SODIUM 2 TABLET: 50; 8.6 TABLET ORAL at 16:24

## 2022-01-01 RX ADMIN — SENNOSIDES AND DOCUSATE SODIUM 2 TABLET: 50; 8.6 TABLET ORAL at 04:40

## 2022-01-01 RX ADMIN — MORPHINE SULFATE 10 MG: 10 INJECTION INTRAVENOUS at 14:57

## 2022-01-01 RX ADMIN — LORAZEPAM 1 MG: 2 INJECTION INTRAMUSCULAR; INTRAVENOUS at 09:17

## 2022-01-01 RX ADMIN — MINERAL OIL, PETROLATUM 1 APPLICATION: 425; 573 OINTMENT OPHTHALMIC at 04:29

## 2022-01-01 RX ADMIN — MINERAL OIL, PETROLATUM 1 APPLICATION: 425; 573 OINTMENT OPHTHALMIC at 21:29

## 2022-01-01 RX ADMIN — RIVAROXABAN 10 MG: 10 TABLET, FILM COATED ORAL at 16:24

## 2022-01-01 RX ADMIN — DEXAMETHASONE 6 MG: 6 TABLET ORAL at 04:40

## 2022-01-01 RX ADMIN — IOHEXOL 80 ML: 350 INJECTION, SOLUTION INTRAVENOUS at 14:57

## 2022-01-01 RX ADMIN — SENNOSIDES AND DOCUSATE SODIUM 2 TABLET: 50; 8.6 TABLET ORAL at 04:42

## 2022-01-01 RX ADMIN — ASPIRIN 81 MG: 81 TABLET, COATED ORAL at 16:24

## 2022-01-01 RX ADMIN — HYDROXYUREA 1000 MG: 500 CAPSULE ORAL at 17:01

## 2022-01-01 RX ADMIN — DEXAMETHASONE 6 MG: 6 TABLET ORAL at 05:22

## 2022-01-01 RX ADMIN — ASPIRIN 81 MG: 81 TABLET, COATED ORAL at 16:11

## 2022-01-01 RX ADMIN — SENNOSIDES AND DOCUSATE SODIUM 2 TABLET: 50; 8.6 TABLET ORAL at 17:00

## 2022-01-01 RX ADMIN — SENNOSIDES AND DOCUSATE SODIUM 2 TABLET: 50; 8.6 TABLET ORAL at 16:11

## 2022-01-01 RX ADMIN — MAGNESIUM HYDROXIDE 30 ML: 400 SUSPENSION ORAL at 10:22

## 2022-01-01 RX ADMIN — MINERAL OIL, PETROLATUM 1 APPLICATION: 425; 573 OINTMENT OPHTHALMIC at 12:15

## 2022-01-01 RX ADMIN — MINERAL OIL, PETROLATUM 1 APPLICATION: 425; 573 OINTMENT OPHTHALMIC at 08:55

## 2022-01-01 RX ADMIN — HYDROXYUREA 1000 MG: 500 CAPSULE ORAL at 22:55

## 2022-01-01 RX ADMIN — ROSUVASTATIN CALCIUM 20 MG: 20 TABLET, FILM COATED ORAL at 17:57

## 2022-01-01 RX ADMIN — MONTELUKAST 10 MG: 10 TABLET, FILM COATED ORAL at 20:55

## 2022-01-01 RX ADMIN — OMEPRAZOLE 20 MG: 20 CAPSULE, DELAYED RELEASE ORAL at 20:54

## 2022-01-01 RX ADMIN — LORAZEPAM 1 MG: 2 INJECTION INTRAMUSCULAR; INTRAVENOUS at 13:55

## 2022-01-01 RX ADMIN — SENNOSIDES AND DOCUSATE SODIUM 2 TABLET: 50; 8.6 TABLET ORAL at 04:49

## 2022-01-01 RX ADMIN — POLYETHYLENE GLYCOL 3350 1 PACKET: 17 POWDER, FOR SOLUTION ORAL at 15:48

## 2022-01-01 RX ADMIN — DEXAMETHASONE 6 MG: 6 TABLET ORAL at 04:43

## 2022-01-01 RX ADMIN — MINERAL OIL, PETROLATUM 1 APPLICATION: 425; 573 OINTMENT OPHTHALMIC at 23:32

## 2022-01-01 RX ADMIN — ASPIRIN 81 MG: 81 TABLET, COATED ORAL at 17:01

## 2022-01-01 RX ADMIN — MORPHINE SULFATE 10 MG: 10 INJECTION INTRAVENOUS at 12:59

## 2022-01-01 RX ADMIN — BISACODYL 10 MG: 10 SUPPOSITORY RECTAL at 10:22

## 2022-01-01 RX ADMIN — FUROSEMIDE 20 MG: 10 INJECTION, SOLUTION INTRAMUSCULAR; INTRAVENOUS at 16:25

## 2022-01-01 RX ADMIN — HYDROXYUREA 1000 MG: 500 CAPSULE ORAL at 16:11

## 2022-01-01 RX ADMIN — RIVAROXABAN 10 MG: 10 TABLET, FILM COATED ORAL at 16:11

## 2022-01-01 RX ADMIN — CLOBETASOL PROPIONATE SMALL AMOUNT: 0.5 SOLUTION TOPICAL at 00:00

## 2022-01-01 RX ADMIN — FLUOROURACIL THIN LAYER: 5 CREAM TOPICAL at 00:00

## 2022-01-01 RX ADMIN — HYDROXYUREA 1000 MG: 500 CAPSULE ORAL at 17:12

## 2022-01-01 RX ADMIN — LORAZEPAM 1 MG: 2 INJECTION INTRAMUSCULAR; INTRAVENOUS at 14:57

## 2022-01-01 RX ADMIN — ASPIRIN 81 MG: 81 TABLET, COATED ORAL at 16:09

## 2022-01-01 RX ADMIN — RIVAROXABAN 10 MG: 10 TABLET, FILM COATED ORAL at 16:09

## 2022-01-01 RX ADMIN — LORAZEPAM 1 MG: 2 INJECTION INTRAMUSCULAR; INTRAVENOUS at 12:08

## 2022-01-01 RX ADMIN — DEXAMETHASONE 6 MG: 6 TABLET ORAL at 06:36

## 2022-01-01 SDOH — HEALTH STABILITY: PHYSICAL HEALTH: ON AVERAGE, HOW MANY MINUTES DO YOU ENGAGE IN EXERCISE AT THIS LEVEL?: 0 MIN

## 2022-01-01 SDOH — ECONOMIC STABILITY: INCOME INSECURITY: IN THE LAST 12 MONTHS, WAS THERE A TIME WHEN YOU WERE NOT ABLE TO PAY THE MORTGAGE OR RENT ON TIME?: NO

## 2022-01-01 SDOH — ECONOMIC STABILITY: HOUSING INSECURITY
HOME SAFETY: MULTIPLE FLOORS, SETS OF STAIRS,UNEVEN FLOORING;REVIEWED PT HIGH FALL RISKS; SPOUSE IS TALKING WITH CHILDREN ABOUT MODIFICATIONS

## 2022-01-01 SDOH — ECONOMIC STABILITY: FOOD INSECURITY: WITHIN THE PAST 12 MONTHS, YOU WORRIED THAT YOUR FOOD WOULD RUN OUT BEFORE YOU GOT MONEY TO BUY MORE.: NEVER TRUE

## 2022-01-01 SDOH — HEALTH STABILITY: PHYSICAL HEALTH: ON AVERAGE, HOW MANY DAYS PER WEEK DO YOU ENGAGE IN MODERATE TO STRENUOUS EXERCISE (LIKE A BRISK WALK)?: 0 DAYS

## 2022-01-01 SDOH — ECONOMIC STABILITY: FOOD INSECURITY: WITHIN THE PAST 12 MONTHS, THE FOOD YOU BOUGHT JUST DIDN'T LAST AND YOU DIDN'T HAVE MONEY TO GET MORE.: NEVER TRUE

## 2022-01-01 SDOH — ECONOMIC STABILITY: INCOME INSECURITY: HOW HARD IS IT FOR YOU TO PAY FOR THE VERY BASICS LIKE FOOD, HOUSING, MEDICAL CARE, AND HEATING?: NOT HARD AT ALL

## 2022-01-01 SDOH — ECONOMIC STABILITY: HOUSING INSECURITY

## 2022-01-01 SDOH — ECONOMIC STABILITY: HOUSING INSECURITY: EVIDENCE OF SMOKING MATERIAL: 0

## 2022-01-01 ASSESSMENT — ENCOUNTER SYMPTOMS
LOWEST PAIN SEVERITY IN PAST 24 HOURS: 0/10
MEMORY LOSS: 1
DIARRHEA: 0
DIFFICULTY THINKING: 1
DENIES PAIN: 1
PAIN: DENIES PAIN
BACK PAIN: 0
DIFFICULTY THINKING: 1
SPUTUM PRODUCTION: 0
CHILLS: 0
DIZZINESS: 0
POOR JUDGMENT: 1
ABDOMINAL PAIN: 0
HEADACHES: 0
FEVER: 0
BLOOD IN STOOL: 0
INSOMNIA: 0
EYE PAIN: 0
SPEECH CHANGE: 0
DIFFICULTY THINKING: 1
HEADACHES: 0
PSYCHIATRIC NEGATIVE: 1
PAIN LOCATION - PAIN FREQUENCY: FREQUENT
DENIES PAIN: 1
SHORTNESS OF BREATH: 0
HEMOPTYSIS: 0
POOR JUDGMENT: 1
COUGH: 0
SENSORY CHANGE: 0
DIFFICULTY THINKING: 1
PERSON REPORTING PAIN: PATIENT
PAIN LOCATION: BACK
AGGRESSION WITHIN DEFINED LIMITS: 1
MEMORY LOSS: 1
NERVOUS/ANXIOUS: 0
PERSON REPORTING PAIN: PATIENT
CONSTIPATION: 0
POOR JUDGMENT: 1
WEAKNESS: 1
WEIGHT LOSS: 0
SORE THROAT: 0
DIZZINESS: 0
WEAKNESS: 1
RESPIRATORY NEGATIVE: 1
CHILLS: 0
DOUBLE VISION: 0
PAIN: DENIES PAIN DURING THIS VISIT
CONSTIPATION: 0
PERSON REPORTING PAIN: PATIENT
DENIES PAIN: 1
BACK PAIN: 0
PAIN: PATIENT DENIES PAIN DURING THIS VISIT
PERSON REPORTING PAIN: PATIENT
MYALGIAS: 0
WEIGHT LOSS: 0
SHORTNESS OF BREATH: 0
NECK PAIN: 0
BLOOD IN STOOL: 0
DIFFICULTY THINKING: 1
PAIN LOCATION - EXACERBATING FACTORS: WALK
MEMORY LOSS: 1
DEPRESSION: 0
HALLUCINATIONS: 0
EYE PAIN: 0
INSOMNIA: 0
INSOMNIA: 0
DIZZINESS: 0
HEARTBURN: 0
PAIN SEVERITY GOAL: 0/10
PERSON REPORTING PAIN: PATIENT
DIFFICULTY THINKING: 1
PALPITATIONS: 0
CARDIOVASCULAR NEGATIVE: 1
POOR JUDGMENT: 1
DEPRESSED MOOD: 1
DEPRESSION: 0
SORE THROAT: 0
POOR JUDGMENT: 1
DIFFICULTY THINKING: 1
FALLS: 0
NECK PAIN: 0
SPUTUM PRODUCTION: 0
DIFFICULTY THINKING: 1
DEPRESSION: 0
SENSORY CHANGE: 0
WEIGHT LOSS: 0
VOMITING: 0
CONSTIPATION: 0
POOR JUDGMENT: 1
HEADACHES: 0
PALPITATIONS: 0
POOR JUDGMENT: 1
SPEECH CHANGE: 0
BLURRED VISION: 0
NAUSEA: 0
FOCAL WEAKNESS: 0
PAIN LOCATION - PAIN QUALITY: ACHE
HEMOPTYSIS: 0
VOMITING: DENIES
MEMORY LOSS: 0
LOWEST PAIN SEVERITY IN PAST 24 HOURS: 0/10
DIARRHEA: 0
PERSON REPORTING PAIN: PATIENT
BLURRED VISION: 0
GASTROINTESTINAL NEGATIVE: 1
ORTHOPNEA: 0
NAUSEA: 0
PERSON REPORTING PAIN: PATIENT
ROS GI COMMENTS: POOR APPETITE.
NERVOUS/ANXIOUS: 0
WEAKNESS: 1
WEAKNESS: 1
HEADACHES: 0
DENIES PAIN: 1
SHORTNESS OF BREATH: 1
DIZZINESS: 0
EYE PAIN: 0
BACK PAIN: 0
HEMOPTYSIS: 0
DYSPNEA ACTIVITY LEVEL: AFTER AMBULATING MORE THAN 20 FT
PERSON REPORTING PAIN: PATIENT
WEIGHT LOSS: 0
DENIES PAIN: 1
DIZZINESS: 0
DENIES PAIN: 1
DENIES PAIN: 1
DIFFICULTY THINKING: 1
PAIN LOCATION - PAIN SEVERITY: 3/10
FALLS: 1
NAUSEA: DENIES
ABDOMINAL PAIN: 0
PERSON REPORTING PAIN: PATIENT
DENIES PAIN: 1
NAUSEA: 0
BRUISES/BLEEDS EASILY: 1
POOR JUDGMENT: 1
DENIES PAIN: 1
CHILLS: 0
VOMITING: 0
WEAKNESS: 0
FEVER: 0
PND: 0
MUSCLE WEAKNESS: 1
ANGER WITHIN DEFINED LIMITS: 1
SORE THROAT: 0
POOR JUDGMENT: 1
VOMITING: 0
ORTHOPNEA: 0
DIZZINESS: 0
SUBJECTIVE PAIN PROGRESSION: GRADUALLY IMPROVING
HEARTBURN: 0
PERSON REPORTING PAIN: PATIENT
ABDOMINAL PAIN: 0
FEVER: 0
POOR JUDGMENT: 1
DIFFICULTY THINKING: 1
COUGH: 0
BLURRED VISION: 0
HIGHEST PAIN SEVERITY IN PAST 24 HOURS: 4/10
PERSON REPORTING PAIN: PATIENT
DIARRHEA: 0
BLURRED VISION: 0
DEPRESSION: 0
ORTHOPNEA: 0
INSOMNIA: 0
ABDOMINAL PAIN: 0
BLOOD IN STOOL: 0
CHILLS: 0
ARTHRALGIAS: 1
DEPRESSION: 0
DIZZINESS: 0
HIGHEST PAIN SEVERITY IN PAST 24 HOURS: 0/10
WEAKNESS: 0
MEMORY LOSS: 1
DEPRESSION: 0
HEADACHES: 0
HEADACHES: 0
TINGLING: 0
SPEECH CHANGE: 0
COUGH: 1
PAIN: DENIES PAIN DURING THIS VISIT
PERSON REPORTING PAIN: PATIENT
SHORTNESS OF BREATH: 0
NECK PAIN: 0
CHILLS: 0
SPUTUM PRODUCTION: 0
FEVER: 0
PAIN: 1
VOMITING: 0
BLURRED VISION: 0
DENIES PAIN: 1
DEPRESSION: 0
PND: 0
HEARTBURN: 0
NAUSEA: 0
PALPITATIONS: 0
MUSCLE WEAKNESS: 1
DENIES PAIN: 1
DIFFICULTY THINKING: 1
SHORTNESS OF BREATH: 0
COUGH: 1
PALPITATIONS: 0
PND: 0
FOCAL WEAKNESS: 0
PALPITATIONS: 0
PAIN SEVERITY GOAL: 0/10
COUGH: 0
DENIES PAIN: 1
FEVER: 0
SUBJECTIVE PAIN PROGRESSION: UNCHANGED
DIFFICULTY THINKING: 1
BLOOD IN STOOL: 0

## 2022-01-01 ASSESSMENT — LOCATION ZONE DERM
LOCATION ZONE: TRUNK
LOCATION ZONE: FACE
LOCATION ZONE: ARM
LOCATION ZONE: FACE
LOCATION ZONE: SCALP
LOCATION ZONE: TRUNK
LOCATION ZONE: EAR
LOCATION ZONE: FACE
LOCATION ZONE: EAR
LOCATION ZONE: SCALP
LOCATION ZONE: NOSE
LOCATION ZONE: EAR
LOCATION ZONE: NOSE
LOCATION ZONE: SCALP
LOCATION ZONE: ARM

## 2022-01-01 ASSESSMENT — ACTIVITIES OF DAILY LIVING (ADL)
AMBULATION ASSISTANCE: 1
GROOMING ASSESSED: 1
FEEDING ASSESSED: 1
DRESSING_UB_CURRENT_FUNCTION: INDEPENDENT
AMBULATION ASSISTANCE: SUPERVISION
AMBULATION_DISTANCE/DURATION_TOLERATED: FUNCTIONAL INDOOR DISTANCES
PHYSICAL TRANSFERS ASSESSED: 1
OASIS_M1830: 03
FEEDING_WITHIN_DEFINED_LIMITS: 1
OASIS_M1830: 00
AMBULATION ASSISTANCE: ONE PERSON
DRESSING_LB_CURRENT_FUNCTION: INDEPENDENT
TOILETING: INDEPENDENT
BATHING_WITHIN_DEFINED_LIMITS: 1
FEEDING_WITHIN_DEFINED_LIMITS: 1
TOILETING: 1
BATHING_CURRENT_FUNCTION: SUPERVISION
GROOMING_WITHIN_DEFINED_LIMITS: 1
AMBULATION ASSISTANCE ON FLAT SURFACES: 1
AMBULATION ASSISTANCE ON FLAT SURFACES: 1
PHYSICAL TRANSFERS ASSESSED: 1
FEEDING: INDEPENDENT
TOILETING: INDEPENDENT
AMBULATION ASSISTANCE: 1
BATHING ASSESSED: 1
HOME_HEALTH_OASIS: 00
AMBULATION ASSISTANCE ON FLAT SURFACES: 1
GROOMING_CURRENT_FUNCTION: INDEPENDENT
ORAL_CARE_CURRENT_FUNCTION: INDEPENDENT
CURRENT_FUNCTION: INDEPENDENT
CURRENT_FUNCTION: STAND BY ASSIST
ORAL_CARE_ASSESSED: 1
GROOMING_WITHIN_DEFINED_LIMITS: 1
TRANSPORTATION COMMENTS: REQUIRES ASSIST OF ANOTHER PERSON OUT OF HOME ON UNEVEN SURFACES

## 2022-01-01 ASSESSMENT — PATIENT HEALTH QUESTIONNAIRE - PHQ9
SUM OF ALL RESPONSES TO PHQ9 QUESTIONS 1 AND 2: 0
1. LITTLE INTEREST OR PLEASURE IN DOING THINGS: 01
SUM OF ALL RESPONSES TO PHQ QUESTIONS 1-9: 2
1. LITTLE INTEREST OR PLEASURE IN DOING THINGS: NOT AT ALL
1. LITTLE INTEREST OR PLEASURE IN DOING THINGS: NOT AT ALL
CLINICAL INTERPRETATION OF PHQ2 SCORE: 2
2. FEELING DOWN, DEPRESSED, IRRITABLE, OR HOPELESS: NOT AT ALL
1. LITTLE INTEREST OR PLEASURE IN DOING THINGS: NOT AT ALL
SUM OF ALL RESPONSES TO PHQ9 QUESTIONS 1 AND 2: 0
SUM OF ALL RESPONSES TO PHQ9 QUESTIONS 1 AND 2: 0
2. FEELING DOWN, DEPRESSED, IRRITABLE, OR HOPELESS: NOT AT ALL
1. LITTLE INTEREST OR PLEASURE IN DOING THINGS: NOT AT ALL
1. LITTLE INTEREST OR PLEASURE IN DOING THINGS: NOT AT ALL
2. FEELING DOWN, DEPRESSED, IRRITABLE, OR HOPELESS: 01
2. FEELING DOWN, DEPRESSED, IRRITABLE, OR HOPELESS: NOT AT ALL
SUM OF ALL RESPONSES TO PHQ9 QUESTIONS 1 AND 2: 0
2. FEELING DOWN, DEPRESSED, IRRITABLE, OR HOPELESS: NOT AT ALL
2. FEELING DOWN, DEPRESSED, IRRITABLE, OR HOPELESS: NOT AT ALL
SUM OF ALL RESPONSES TO PHQ9 QUESTIONS 1 AND 2: 0
CLINICAL INTERPRETATION OF PHQ2 SCORE: 0
5. POOR APPETITE OR OVEREATING: 0 - NOT AT ALL
CLINICAL INTERPRETATION OF PHQ2 SCORE: 0
2. FEELING DOWN, DEPRESSED, IRRITABLE, OR HOPELESS: NOT AT ALL
1. LITTLE INTEREST OR PLEASURE IN DOING THINGS: NOT AT ALL
SUM OF ALL RESPONSES TO PHQ9 QUESTIONS 1 AND 2: 0
1. LITTLE INTEREST OR PLEASURE IN DOING THINGS: NOT AT ALL
SUM OF ALL RESPONSES TO PHQ9 QUESTIONS 1 AND 2: 0

## 2022-01-01 ASSESSMENT — FIBROSIS 4 INDEX
FIB4 SCORE: 2.15
FIB4 SCORE: 2.36
FIB4 SCORE: 3
FIB4 SCORE: 1.91
FIB4 SCORE: 2.15
FIB4 SCORE: 2.15
FIB4 SCORE: 2.36
FIB4 SCORE: 2.36
FIB4 SCORE: 2.15
FIB4 SCORE: 2.36

## 2022-01-01 ASSESSMENT — LOCATION SIMPLE DESCRIPTION DERM
LOCATION SIMPLE: LEFT ZYGOMA
LOCATION SIMPLE: UPPER BACK
LOCATION SIMPLE: LEFT FOREARM
LOCATION SIMPLE: RIGHT SHOULDER
LOCATION SIMPLE: RIGHT UPPER BACK
LOCATION SIMPLE: RIGHT EAR
LOCATION SIMPLE: LEFT EAR
LOCATION SIMPLE: RIGHT FOREARM
LOCATION SIMPLE: LEFT SHOULDER
LOCATION SIMPLE: ABDOMEN
LOCATION SIMPLE: SCALP
LOCATION SIMPLE: RIGHT SHOULDER
LOCATION SIMPLE: NOSE
LOCATION SIMPLE: LEFT CHEEK
LOCATION SIMPLE: LEFT FOREARM
LOCATION SIMPLE: RIGHT UPPER BACK
LOCATION SIMPLE: LEFT ZYGOMA
LOCATION SIMPLE: UPPER BACK
LOCATION SIMPLE: RIGHT EAR
LOCATION SIMPLE: RIGHT EAR
LOCATION SIMPLE: LEFT SCALP
LOCATION SIMPLE: RIGHT EAR
LOCATION SIMPLE: LEFT EAR
LOCATION SIMPLE: ABDOMEN
LOCATION SIMPLE: LEFT CHEEK
LOCATION SIMPLE: RIGHT FOREARM
LOCATION SIMPLE: NOSE
LOCATION SIMPLE: LEFT SHOULDER
LOCATION SIMPLE: SCALP
LOCATION SIMPLE: RIGHT CHEEK
LOCATION SIMPLE: RIGHT SCALP

## 2022-01-01 ASSESSMENT — LIFESTYLE VARIABLES
CONSUMPTION TOTAL: INCOMPLETE
DOES PATIENT WANT TO STOP DRINKING: NO
HOW MANY TIMES IN THE PAST YEAR HAVE YOU HAD 5 OR MORE DRINKS IN A DAY: 0
ON A TYPICAL DAY WHEN YOU DRINK ALCOHOL HOW MANY DRINKS DO YOU HAVE: 0
EVER HAD A DRINK FIRST THING IN THE MORNING TO STEADY YOUR NERVES TO GET RID OF A HANGOVER: NO
EVER FELT BAD OR GUILTY ABOUT YOUR DRINKING: NO
HOW OFTEN DO YOU HAVE SIX OR MORE DRINKS ON ONE OCCASION: NEVER
EVER FELT BAD OR GUILTY ABOUT YOUR DRINKING: NO
HOW OFTEN DO YOU HAVE A DRINK CONTAINING ALCOHOL: 4 OR MORE TIMES A WEEK
TOTAL SCORE: 0
ALCOHOL_USE: NO
TOTAL SCORE: 0
HAVE PEOPLE ANNOYED YOU BY CRITICIZING YOUR DRINKING: NO
ALCOHOL_USE: NO
SKIP TO QUESTIONS 9-10: 0
HAVE YOU EVER FELT YOU SHOULD CUT DOWN ON YOUR DRINKING: NO
HOW MANY STANDARD DRINKS CONTAINING ALCOHOL DO YOU HAVE ON A TYPICAL DAY: 5 OR 6
AUDIT-C TOTAL SCORE: 6
CONSUMPTION TOTAL: NEGATIVE
TOTAL SCORE: 0
HAVE YOU EVER FELT YOU SHOULD CUT DOWN ON YOUR DRINKING: NO
EVER HAD A DRINK FIRST THING IN THE MORNING TO STEADY YOUR NERVES TO GET RID OF A HANGOVER: NO
ON A TYPICAL DAY WHEN YOU DRINK ALCOHOL HOW MANY DRINKS DO YOU HAVE: 0
AVERAGE NUMBER OF DAYS PER WEEK YOU HAVE A DRINK CONTAINING ALCOHOL: 0
HOW MANY TIMES IN THE PAST YEAR HAVE YOU HAD 5 OR MORE DRINKS IN A DAY: 0
AVERAGE NUMBER OF DAYS PER WEEK YOU HAVE A DRINK CONTAINING ALCOHOL: 0

## 2022-01-01 ASSESSMENT — SOCIAL DETERMINANTS OF HEALTH (SDOH)
HOW OFTEN DO YOU ATTENT MEETINGS OF THE CLUB OR ORGANIZATION YOU BELONG TO?: NEVER
HOW OFTEN DO YOU ATTENT MEETINGS OF THE CLUB OR ORGANIZATION YOU BELONG TO?: NEVER
DO YOU BELONG TO ANY CLUBS OR ORGANIZATIONS SUCH AS CHURCH GROUPS UNIONS, FRATERNAL OR ATHLETIC GROUPS, OR SCHOOL GROUPS?: NO
IN A TYPICAL WEEK, HOW MANY TIMES DO YOU TALK ON THE PHONE WITH FAMILY, FRIENDS, OR NEIGHBORS?: ONCE A WEEK
HOW MANY DRINKS CONTAINING ALCOHOL DO YOU HAVE ON A TYPICAL DAY WHEN YOU ARE DRINKING: 5 OR 6
HOW OFTEN DO YOU HAVE A DRINK CONTAINING ALCOHOL: 4 OR MORE TIMES A WEEK
HOW OFTEN DO YOU GET TOGETHER WITH FRIENDS OR RELATIVES?: ONCE A WEEK
HOW OFTEN DO YOU ATTEND CHURCH OR RELIGIOUS SERVICES?: NEVER
IN A TYPICAL WEEK, HOW MANY TIMES DO YOU TALK ON THE PHONE WITH FAMILY, FRIENDS, OR NEIGHBORS?: ONCE A WEEK
HOW OFTEN DO YOU ATTEND CHURCH OR RELIGIOUS SERVICES?: NEVER
HOW HARD IS IT FOR YOU TO PAY FOR THE VERY BASICS LIKE FOOD, HOUSING, MEDICAL CARE, AND HEATING?: NOT HARD AT ALL
HOW OFTEN DO YOU GET TOGETHER WITH FRIENDS OR RELATIVES?: ONCE A WEEK
WITHIN THE PAST 12 MONTHS, YOU WORRIED THAT YOUR FOOD WOULD RUN OUT BEFORE YOU GOT THE MONEY TO BUY MORE: NEVER TRUE
HOW OFTEN DO YOU HAVE SIX OR MORE DRINKS ON ONE OCCASION: NEVER
DO YOU BELONG TO ANY CLUBS OR ORGANIZATIONS SUCH AS CHURCH GROUPS UNIONS, FRATERNAL OR ATHLETIC GROUPS, OR SCHOOL GROUPS?: NO

## 2022-01-01 ASSESSMENT — LOCATION DETAILED DESCRIPTION DERM
LOCATION DETAILED: EPIGASTRIC SKIN
LOCATION DETAILED: RIGHT POSTERIOR SHOULDER
LOCATION DETAILED: RIGHT MEDIAL UPPER BACK
LOCATION DETAILED: INFERIOR THORACIC SPINE
LOCATION DETAILED: NASAL TIP
LOCATION DETAILED: RIGHT SUPERIOR CRUS OF ANTIHELIX
LOCATION DETAILED: LEFT DISTAL DORSAL FOREARM
LOCATION DETAILED: NASAL SUPRATIP
LOCATION DETAILED: LEFT SUPERIOR PARIETAL SCALP
LOCATION DETAILED: LEFT POSTERIOR SHOULDER
LOCATION DETAILED: LEFT MEDIAL MALAR CHEEK
LOCATION DETAILED: RIGHT POSTERIOR SHOULDER
LOCATION DETAILED: LEFT CENTRAL ZYGOMA
LOCATION DETAILED: LEFT MEDIAL MALAR CHEEK
LOCATION DETAILED: LEFT DISTAL DORSAL FOREARM
LOCATION DETAILED: LEFT SUPERIOR HELIX
LOCATION DETAILED: RIGHT SUPERIOR HELIX
LOCATION DETAILED: LEFT POSTERIOR SHOULDER
LOCATION DETAILED: LEFT CENTRAL ZYGOMA
LOCATION DETAILED: LEFT CENTRAL MALAR CHEEK
LOCATION DETAILED: RIGHT SUPERIOR CRUS OF ANTIHELIX
LOCATION DETAILED: LEFT SUPERIOR HELIX
LOCATION DETAILED: RIGHT DISTAL DORSAL FOREARM
LOCATION DETAILED: EPIGASTRIC SKIN
LOCATION DETAILED: RIGHT DISTAL DORSAL FOREARM
LOCATION DETAILED: LEFT CENTRAL PARIETAL SCALP
LOCATION DETAILED: INFERIOR THORACIC SPINE
LOCATION DETAILED: NASAL TIP
LOCATION DETAILED: RIGHT SUPERIOR HELIX
LOCATION DETAILED: RIGHT INFERIOR LATERAL MALAR CHEEK
LOCATION DETAILED: RIGHT MEDIAL FRONTAL SCALP
LOCATION DETAILED: RIGHT MEDIAL UPPER BACK
LOCATION DETAILED: NASAL DORSUM
LOCATION DETAILED: LEFT CENTRAL FRONTAL SCALP

## 2022-01-01 ASSESSMENT — COGNITIVE AND FUNCTIONAL STATUS - GENERAL
SUGGESTED CMS G CODE MODIFIER DAILY ACTIVITY: CK
EATING MEALS: A LITTLE
CLIMB 3 TO 5 STEPS WITH RAILING: TOTAL
MOVING TO AND FROM BED TO CHAIR: A LITTLE
MOVING FROM LYING ON BACK TO SITTING ON SIDE OF FLAT BED: A LOT
DRESSING REGULAR LOWER BODY CLOTHING: A LOT
CLIMB 3 TO 5 STEPS WITH RAILING: A LOT
HELP NEEDED FOR BATHING: A LOT
TOILETING: A LOT
MOVING FROM LYING ON BACK TO SITTING ON SIDE OF FLAT BED: UNABLE
CLIMB 3 TO 5 STEPS WITH RAILING: TOTAL
HELP NEEDED FOR BATHING: A LOT
PERSONAL GROOMING: A LITTLE
EATING MEALS: A LITTLE
SUGGESTED CMS G CODE MODIFIER MOBILITY: CM
MOBILITY SCORE: 13
PERSONAL GROOMING: A LOT
STANDING UP FROM CHAIR USING ARMS: A LOT
STANDING UP FROM CHAIR USING ARMS: A LOT
DAILY ACTIVITIY SCORE: 13
SUGGESTED CMS G CODE MODIFIER MOBILITY: CM
STANDING UP FROM CHAIR USING ARMS: A LOT
MOBILITY SCORE: 8
TURNING FROM BACK TO SIDE WHILE IN FLAT BAD: A LITTLE
WALKING IN HOSPITAL ROOM: A LOT
DAILY ACTIVITIY SCORE: 14
TURNING FROM BACK TO SIDE WHILE IN FLAT BAD: UNABLE
WALKING IN HOSPITAL ROOM: A LOT
DRESSING REGULAR UPPER BODY CLOTHING: A LOT
TOILETING: A LOT
DRESSING REGULAR UPPER BODY CLOTHING: A LOT
MOBILITY SCORE: 9
WALKING IN HOSPITAL ROOM: A LOT
SUGGESTED CMS G CODE MODIFIER DAILY ACTIVITY: CL
DRESSING REGULAR LOWER BODY CLOTHING: A LOT
MOVING FROM LYING ON BACK TO SITTING ON SIDE OF FLAT BED: UNABLE
SUGGESTED CMS G CODE MODIFIER MOBILITY: CL
TURNING FROM BACK TO SIDE WHILE IN FLAT BAD: UNABLE
MOVING TO AND FROM BED TO CHAIR: UNABLE
MOVING TO AND FROM BED TO CHAIR: UNABLE

## 2022-01-01 ASSESSMENT — GAIT ASSESSMENTS
ASSISTIVE DEVICE: OTHER (COMMENTS);FRONT WHEEL WALKER
GAIT LEVEL OF ASSIST: UNABLE TO PARTICIPATE
DEVIATION: BRADYKINETIC;SHUFFLED GAIT;INCREASED BASE OF SUPPORT
DISTANCE (FEET): 25
GAIT LEVEL OF ASSIST: MINIMAL ASSIST

## 2022-01-01 ASSESSMENT — PAIN DESCRIPTION - PAIN TYPE
TYPE: ACUTE PAIN
TYPE: ACUTE PAIN;CHRONIC PAIN
TYPE: ACUTE PAIN;CHRONIC PAIN

## 2022-02-10 PROBLEM — L57.0 ACTINIC KERATOSIS: Status: ACTIVE | Noted: 2022-01-01

## 2022-02-10 PROBLEM — D48.5 NEOPLASM OF UNCERTAIN BEHAVIOR OF SKIN: Status: ACTIVE | Noted: 2022-01-01

## 2022-02-10 NOTE — PROCEDURE: BIOPSY BY SHAVE METHOD

## 2022-02-10 NOTE — HPI: PHOTODYNAMIC THERAPY (PDT)
Have You Had Previous Treatments With Pdt Before?: has had previous treatments
When Outside In The Sun, Do You...: rarely burns, mostly tans
Number Of Treatments: 2
When Was Your Last Pdt Treatment?: 07/01/2021

## 2022-02-10 NOTE — PROCEDURE: PDT: BLUE
Ndc# (Optional): [id number]
Treatment Number: 3
Incubation Time: 2 hours
Consent: Written consent obtained.  The risks were reviewed with the patient including but not limited to: pigmentary changes, pain, blistering, scabbing, redness, and the remote possibility of scarring.
Light Source: Soto-U
Show Anesthesia In Plan?: No
Total Number Of Aks Treated (Optional To Report): 0
Post-Care Instructions: I reviewed with the patient in detail post-care instructions. Patient is to avoid sunlight for the next 2 days, and wear sun protection. Patients may expect sunburn like redness, discomfort and scabbing.
Incubation Start Time: 10:30 AM
Detail Level: Zone
Number Of Kerasticks/Tubes Billed For: 1
Lot # (Optional): [id number]
Incubation End Time: 12:30 PM
Comments: Treated greater than 15 lesions
Anesthesia Type: 1% lidocaine with epinephrine
Show Medical Necessity In Plan?: Yes
Which Photosensitizer Was Used: Levulan
Medical Necessity: Precancerous Lesions
Who Performed The Pdt?: Performed by MD JI, MARIO or NP ([id number])
Pre-Procedure Text: The treatment areas were cleaned and prepped with Acetone in the usual fashion.
Expiration Date (Optional): 2023-06
Illumination Time: 00:16:40

## 2022-03-10 PROBLEM — L57.0 ACTINIC KERATOSIS: Status: ACTIVE | Noted: 2022-01-01

## 2022-03-10 PROBLEM — C44.329 SQUAMOUS CELL CARCINOMA OF SKIN OF OTHER PARTS OF FACE: Status: ACTIVE | Noted: 2022-01-01

## 2022-03-10 PROBLEM — D48.5 NEOPLASM OF UNCERTAIN BEHAVIOR OF SKIN: Status: ACTIVE | Noted: 2022-01-01

## 2022-03-10 NOTE — PROCEDURE: MOHS SURGERY
Mohs Case Number: [id number]
Partial Purse String (Simple) Text: Given the location of the defect and the characteristics of the surrounding skin a simple purse string closure was deemed most appropriate.  Undermining was performed circumfirentially around the surgical defect.  A purse string suture was then placed and tightened. Wound tension only allowed a partial closure of the circular defect.
Show Asc Variables: Yes
Stage 6: Number Of Blocks?: 0
Special Stains Stage 6 - Results: Base On Clearance Noted Above
Area M Indication Text: Tumors in this location are included in Area M (cheek, forehead, scalp, neck, jawline and pretibial skin).  Mohs surgery is indicated for tumors in these anatomic locations.
Island Pedicle Flap-Requiring Vessel Identification Text: The defect edges were debeveled with a #15 scalpel blade.  Given the location of the defect, shape of the defect and the proximity to free margins an island pedicle advancement flap was deemed most appropriate.  Using a sterile surgical marker, an appropriate advancement flap was drawn, based on the axial vessel mentioned above, incorporating the defect, outlining the appropriate donor tissue and placing the expected incisions within the relaxed skin tension lines where possible.    The area thus outlined was incised deep to adipose tissue with a #15 scalpel blade.  The skin margins were undermined to an appropriate distance in all directions around the primary defect and laterally outward around the island pedicle utilizing iris scissors.  There was minimal undermining beneath the pedicle flap.
Repair Performed By Another Provider Text (Leave Blank If You Do Not Want): After obtaining clear surgical margins the defect was repaired by another provider.
O-L Flap Text: The defect edges were debeveled with a #15 scalpel blade.  Given the location of the defect, shape of the defect and the proximity to free margins an O-L flap was deemed most appropriate.  Using a sterile surgical marker, an appropriate advancement flap was drawn incorporating the defect and placing the expected incisions within the relaxed skin tension lines where possible.    The area thus outlined was incised deep to adipose tissue with a #15 scalpel blade.  The skin margins were undermined to an appropriate distance in all directions utilizing iris scissors.
Debridement Text: The wound edges were debrided prior to proceeding with the closure to facilitate wound healing.
Surgical Defect Length In Cm (Optional): 2.5
Otolaryngologist Procedure Text (F): After obtaining clear surgical margins the patient was sent to otolaryngology for surgical repair.  The patient understands they will receive post-surgical care and follow-up from the referring physician's office.
Vermilion Border Text: The closure involved the vermilion border.
Mid-Level Procedure Text (A): After obtaining clear surgical margins the patient was sent to a mid-level provider for surgical repair.  The patient understands they will receive post-surgical care and follow-up from the mid-level provider.
Include Suturegard In Note?: No
Mauc Instructions: By selecting yes to the question below the MAUC number will be added into the note.  This will be calculated automatically based on the diagnosis chosen, the size entered, the body zone selected (H,M,L) and the specific indications you chose. You will also have the option to override the Mohs AUC if you disagree with the automatically calculated number and this option is found in the Case Summary tab.
Trilobed Flap Text: The defect edges were debeveled with a #15 scalpel blade.  Given the location of the defect and the proximity to free margins a trilobed flap was deemed most appropriate.  Using a sterile surgical marker, an appropriate trilobed flap drawn around the defect.    The area thus outlined was incised deep to adipose tissue with a #15 scalpel blade.  The skin margins were undermined to an appropriate distance in all directions utilizing iris scissors.
Advancement Flap (Double) Text: The defect edges were debeveled with a #15 scalpel blade.  Given the location of the defect and the proximity to free margins a double advancement flap was deemed most appropriate.  Using a sterile surgical marker, the appropriate advancement flaps were drawn incorporating the defect and placing the expected incisions within the relaxed skin tension lines where possible.    The area thus outlined was incised deep to adipose tissue with a #15 scalpel blade.  The skin margins were undermined to an appropriate distance in all directions utilizing iris scissors.
Nostril Rim Text: The closure involved the nostril rim.
Mohs Histo Method Verbiage: Each section was then chromacoded and processed in the Mohs lab using the Mohs protocol and submitted for frozen section.
Surgeon/Pathologist Verbiage (Will Incorporate Name Of Surgeon From Intro If Not Blank): operated in two distinct and integrated capacities as the surgeon and pathologist.
Advancement-Rotation Flap Text: The defect edges were debeveled with a #15 scalpel blade.  Given the location of the defect, shape of the defect and the proximity to free margins an advancement-rotation flap was deemed most appropriate.  Using a sterile surgical marker, an appropriate flap was drawn incorporating the defect and placing the expected incisions within the relaxed skin tension lines where possible. The area thus outlined was incised deep to adipose tissue with a #15 scalpel blade.  The skin margins were undermined to an appropriate distance in all directions utilizing iris scissors.
Presence Of Scar Tissue (For Histology): absent
Rotation Flap Text: The defect edges were debeveled with a #15 scalpel blade.  Given the location of the defect, shape of the defect and the proximity to free margins a rotation flap was deemed most appropriate.  Using a sterile surgical marker, an appropriate rotation flap was drawn incorporating the defect and placing the expected incisions within the relaxed skin tension lines where possible.    The area thus outlined was incised deep to adipose tissue with a #15 scalpel blade.  The skin margins were undermined to an appropriate distance in all directions utilizing iris scissors.
Mohs Rapid Report Verbiage: The area of clinically evident tumor was marked with skin marking ink and appropriately hatched.  The initial incision was made following the Mohs approach through the skin.  The specimen was taken to the lab, divided into the necessary number of pieces, chromacoded and processed according to the Mohs protocol.  This was repeated in successive stages until a tumor free defect was achieved.
Tumor Debulked?: curette
Anesthesia Volume In Cc: 3
Asc Procedure Text (B): After obtaining clear surgical margins the patient was sent to an ASC for surgical repair.  The patient understands they will receive post-surgical care and follow-up from the ASC physician.
Bi-Rhombic Flap Text: The defect edges were debeveled with a #15 scalpel blade.  Given the location of the defect and the proximity to free margins a bi-rhombic flap was deemed most appropriate.  Using a sterile surgical marker, an appropriate rhombic flap was drawn incorporating the defect. The area thus outlined was incised deep to adipose tissue with a #15 scalpel blade.  The skin margins were undermined to an appropriate distance in all directions utilizing iris scissors.
Transposition Flap Text: The defect edges were debeveled with a #15 scalpel blade.  Given the location of the defect and the proximity to free margins a transposition flap was deemed most appropriate.  Using a sterile surgical marker, an appropriate transposition flap was drawn incorporating the defect.    The area thus outlined was incised deep to adipose tissue with a #15 scalpel blade.  The skin margins were undermined to an appropriate distance in all directions utilizing iris scissors.
Crescentic Advancement Flap Text: The defect edges were debeveled with a #15 scalpel blade.  Given the location of the defect and the proximity to free margins a crescentic advancement flap was deemed most appropriate.  Using a sterile surgical marker, the appropriate advancement flap was drawn incorporating the defect and placing the expected incisions within the relaxed skin tension lines where possible.    The area thus outlined was incised deep to adipose tissue with a #15 scalpel blade.  The skin margins were undermined to an appropriate distance in all directions utilizing iris scissors.
Purse String (Intermediate) Text: Given the location of the defect and the characteristics of the surrounding skin a purse string intermediate closure was deemed most appropriate.  Undermining was performed circumfirentially around the surgical defect.  A purse string suture was then placed and tightened.
Cheiloplasty (Complex) Text: A decision was made to reconstruct the defect with a  cheiloplasty.  The defect was undermined extensively.  Additional obicularis oris muscle was excised with a 15 blade scalpel.  The defect was converted into a full thickness wedge to facilite a better cosmetic result.  Small vessels were then tied off with 5-0 monocyrl. The obicularis oris, superficial fascia, adipose and dermis were then reapproximated.  After the deeper layers were approximated the epidermis was reapproximated with particular care given to realign the vermilion border.
Orbicularis Oris Muscle Flap Text: The defect edges were debeveled with a #15 scalpel blade.  Given that the defect affected the competency of the oral sphincter an orbicularis oris muscle flap was deemed most appropriate to restore this competency and normal muscle function.  Using a sterile surgical marker, an appropriate flap was drawn incorporating the defect. The area thus outlined was incised with a #15 scalpel blade.
Oculoplastic Surgeon Procedure Text (B): After obtaining clear surgical margins the patient was sent to oculoplastics for surgical repair.  The patient understands they will receive post-surgical care and follow-up from the referring physician's office.
Cartilage Graft Text: The defect edges were debeveled with a #15 scalpel blade.  Given the location of the defect, shape of the defect, the fact the defect involved a full thickness cartilage defect a cartilage graft was deemed most appropriate.  An appropriate donor site was identified, cleansed, and anesthetized. The cartilage graft was then harvested and transferred to the recipient site, oriented appropriately and then sutured into place.  The secondary defect was then repaired using a primary closure.
Wound Check: 6 weeks
V-Y Plasty Text: The defect edges were debeveled with a #15 scalpel blade.  Given the location of the defect, shape of the defect and the proximity to free margins an V-Y advancement flap was deemed most appropriate.  Using a sterile surgical marker, an appropriate advancement flap was drawn incorporating the defect and placing the expected incisions within the relaxed skin tension lines where possible.    The area thus outlined was incised deep to adipose tissue with a #15 scalpel blade.  The skin margins were undermined to an appropriate distance in all directions utilizing iris scissors.
Home Suture Removal Text: Patient was provided instructions on removing sutures and will remove their sutures at home.  If they have any questions or difficulties they will call the office.
Bilobed Flap Text: The defect edges were debeveled with a #15 scalpel blade.  Given the location of the defect and the proximity to free margins a bilobe flap was deemed most appropriate.  Using a sterile surgical marker, an appropriate bilobe flap drawn around the defect.    The area thus outlined was incised deep to adipose tissue with a #15 scalpel blade.  The skin margins were undermined to an appropriate distance in all directions utilizing iris scissors.
Why Was The Change Made?: Please Select the Appropriate Response
Hemostasis: Electrocautery
Split-Thickness Skin Graft Text: The defect edges were debeveled with a #15 scalpel blade.  Given the location of the defect, shape of the defect and the proximity to free margins a split thickness skin graft was deemed most appropriate.  Using a sterile surgical marker, the primary defect shape was transferred to the donor site. The split thickness graft was then harvested.  The skin graft was then placed in the primary defect and oriented appropriately.
Dressing (No Sutures): pressure dressing with telfa
Stage 9: Additional Anesthesia Type: 1% lidocaine with epinephrine
Cheek Interpolation Flap Text: A decision was made to reconstruct the defect utilizing an interpolation axial flap and a staged reconstruction.  A telfa template was made of the defect.  This telfa template was then used to outline the Cheek Interpolation flap.  The donor area for the pedicle flap was then injected with anesthesia.  The flap was excised through the skin and subcutaneous tissue down to the layer of the underlying musculature.  The interpolation flap was carefully excised within this deep plane to maintain its blood supply.  The edges of the donor site were undermined.   The donor site was closed in a primary fashion.  The pedicle was then rotated into position and sutured.  Once the tube was sutured into place, adequate blood supply was confirmed with blanching and refill.  The pedicle was then wrapped with xeroform gauze and dressed appropriately with a telfa and gauze bandage to ensure continued blood supply and protect the attached pedicle.
Medical Necessity Statement: Based on my medical judgement, Mohs surgery is the most appropriate treatment for this cancer compared to other treatments.
O-Z Plasty Text: The defect edges were debeveled with a #15 scalpel blade.  Given the location of the defect, shape of the defect and the proximity to free margins an O-Z plasty (double transposition flap) was deemed most appropriate.  Using a sterile surgical marker, the appropriate transposition flaps were drawn incorporating the defect and placing the expected incisions within the relaxed skin tension lines where possible.    The area thus outlined was incised deep to adipose tissue with a #15 scalpel blade.  The skin margins were undermined to an appropriate distance in all directions utilizing iris scissors.  Hemostasis was achieved with electrocautery.  The flaps were then transposed into place, one clockwise and the other counterclockwise, and anchored with interrupted buried subcutaneous sutures.
Ftsg Text: The defect edges were debeveled with a #15 scalpel blade.  Given the location of the defect, shape of the defect and the proximity to free margins a full thickness skin graft was deemed most appropriate.  Using a sterile surgical marker, the primary defect shape was transferred to the donor site. The area thus outlined was incised deep to adipose tissue with a #15 scalpel blade.  The harvested graft was then trimmed of adipose tissue until only dermis and epidermis was left.  The skin margins of the secondary defect were undermined to an appropriate distance in all directions utilizing iris scissors.  The secondary defect was closed with interrupted buried subcutaneous sutures.  The skin edges were then re-apposed with running  sutures.  The skin graft was then placed in the primary defect and oriented appropriately.
Consent 2/Introductory Paragraph: Mohs surgery was explained to the patient and consent was obtained. The risks, benefits and alternatives to therapy were discussed in detail. Specifically, the risks of infection, scarring, bleeding, prolonged wound healing, incomplete removal, allergy to anesthesia, nerve injury and recurrence were addressed. Prior to the procedure, the treatment site was clearly identified and confirmed by the patient. All components of Universal Protocol/PAUSE Rule completed.
Plastic Surgeon Procedure Text (F): After obtaining clear surgical margins the patient was sent to plastics for surgical repair.  The patient understands they will receive post-surgical care and follow-up from the referring physician's office.
Consent (Spinal Accessory)/Introductory Paragraph: The rationale for Mohs was explained to the patient and consent was obtained. The risks, benefits and alternatives to therapy were discussed in detail. Specifically, the risks of damage to the spinal accessory nerve, infection, scarring, bleeding, prolonged wound healing, incomplete removal, allergy to anesthesia, and recurrence were addressed. Prior to the procedure, the treatment site was clearly identified and confirmed by the patient. All components of Universal Protocol/PAUSE Rule completed.
Bcc Histology Text: There were numerous aggregates of basaloid cells.
Interpolation Flap Text: A decision was made to reconstruct the defect utilizing an interpolation axial flap and a staged reconstruction.  A telfa template was made of the defect.  This telfa template was then used to outline the interpolation flap.  The donor area for the pedicle flap was then injected with anesthesia.  The flap was excised through the skin and subcutaneous tissue down to the layer of the underlying musculature.  The interpolation flap was carefully excised within this deep plane to maintain its blood supply.  The edges of the donor site were undermined.   The donor site was closed in a primary fashion.  The pedicle was then rotated into position and sutured.  Once the tube was sutured into place, adequate blood supply was confirmed with blanching and refill.  The pedicle was then wrapped with xeroform gauze and dressed appropriately with a telfa and gauze bandage to ensure continued blood supply and protect the attached pedicle.
Lazy S Complex Repair Preamble Text (Leave Blank If You Do Not Want): Extensive wide undermining was performed.
Zygomaticofacial Flap Text: Given the location of the defect, shape of the defect and the proximity to free margins a zygomaticofacial flap was deemed most appropriate for repair.  Using a sterile surgical marker, the appropriate flap was drawn incorporating the defect and placing the expected incisions within the relaxed skin tension lines where possible. The area thus outlined was incised deep to adipose tissue with a #15 scalpel blade with preservation of a vascular pedicle.  The skin margins were undermined to an appropriate distance in all directions utilizing iris scissors.  The flap was then placed into the defect and anchored with interrupted buried subcutaneous sutures.
Suturegard Retention Suture: 0-0 Nylon
Retention Suture Bite Size: 1 mm
Surgical Defect Width In Cm (Optional): 1.3
Hemigard Intro: Due to skin fragility and wound tension, it was decided to use HEMIGARD adhesive retention suture devices to permit a linear closure. The skin was cleaned and dried for a 6cm distance away from the wound. Excessive hair, if present, was removed to allow for adhesion.
Muscle Hinge Flap Text: The defect edges were debeveled with a #15 scalpel blade.  Given the size, depth and location of the defect and the proximity to free margins a muscle hinge flap was deemed most appropriate.  Using a sterile surgical marker, an appropriate hinge flap was drawn incorporating the defect. The area thus outlined was incised with a #15 scalpel blade.  The skin margins were undermined to an appropriate distance in all directions utilizing iris scissors.
Bilateral Helical Rim Advancement Flap Text: The defect edges were debeveled with a #15 blade scalpel.  Given the location of the defect and the proximity to free margins (helical rim) a bilateral helical rim advancement flap was deemed most appropriate.  Using a sterile surgical marker, the appropriate advancement flaps were drawn incorporating the defect and placing the expected incisions between the helical rim and antihelix where possible.  The area thus outlined was incised through and through with a #15 scalpel blade.  With a skin hook and iris scissors, the flaps were gently and sharply undermined and freed up.
Closure 4 Information: This tab is for additional flaps and grafts above and beyond our usual structured repairs.  Please note if you enter information here it will not currently bill and you will need to add the billing information manually.
Consent (Nose)/Introductory Paragraph: The rationale for Mohs was explained to the patient and consent was obtained. The risks, benefits and alternatives to therapy were discussed in detail. Specifically, the risks of nasal deformity, changes in the flow of air through the nose, infection, scarring, bleeding, prolonged wound healing, incomplete removal, allergy to anesthesia, nerve injury and recurrence were addressed. Prior to the procedure, the treatment site was clearly identified and confirmed by the patient. All components of Universal Protocol/PAUSE Rule completed.
Lazy S Intermediate Repair Preamble Text (Leave Blank If You Do Not Want): Undermining was performed with blunt dissection.
Paramedian Forehead Flap Text: A decision was made to reconstruct the defect utilizing an interpolation axial flap and a staged reconstruction.  A telfa template was made of the defect.  This telfa template was then used to outline the paramedian forehead pedicle flap.  The donor area for the pedicle flap was then injected with anesthesia.  The flap was excised through the skin and subcutaneous tissue down to the layer of the underlying musculature.  The pedicle flap was carefully excised within this deep plane to maintain its blood supply.  The edges of the donor site were undermined.   The donor site was closed in a primary fashion.  The pedicle was then rotated into position and sutured.  Once the tube was sutured into place, adequate blood supply was confirmed with blanching and refill.  The pedicle was then wrapped with xeroform gauze and dressed appropriately with a telfa and gauze bandage to ensure continued blood supply and protect the attached pedicle.
Modified Advancement Flap Text: The defect edges were debeveled with a #15 scalpel blade.  Given the location of the defect, shape of the defect and the proximity to free margins a modified advancement flap was deemed most appropriate.  Using a sterile surgical marker, an appropriate advancement flap was drawn incorporating the defect and placing the expected incisions within the relaxed skin tension lines where possible.    The area thus outlined was incised deep to adipose tissue with a #15 scalpel blade.  The skin margins were undermined to an appropriate distance in all directions utilizing iris scissors.
Mart-1 - Positive Histology Text: MART-1 staining demonstrates areas of higher density and clustering of melanocytes with Pagetoid spread upwards within the epidermis. The surgical margins are positive for tumor cells.
Banner Transposition Flap Text: The defect edges were debeveled with a #15 scalpel blade.  Given the location of the defect and the proximity to free margins a Banner transposition flap was deemed most appropriate.  Using a sterile surgical marker, an appropriate flap drawn around the defect. The area thus outlined was incised deep to adipose tissue with a #15 scalpel blade.  The skin margins were undermined to an appropriate distance in all directions utilizing iris scissors.
O-T Advancement Flap Text: The defect edges were debeveled with a #15 scalpel blade.  Given the location of the defect, shape of the defect and the proximity to free margins an O-T advancement flap was deemed most appropriate.  Using a sterile surgical marker, an appropriate advancement flap was drawn incorporating the defect and placing the expected incisions within the relaxed skin tension lines where possible.    The area thus outlined was incised deep to adipose tissue with a #15 scalpel blade.  The skin margins were undermined to an appropriate distance in all directions utilizing iris scissors.
Hatchet Flap Text: The defect edges were debeveled with a #15 scalpel blade.  Given the location of the defect, shape of the defect and the proximity to free margins a hatchet flap was deemed most appropriate.  Using a sterile surgical marker, an appropriate hatchet flap was drawn incorporating the defect and placing the expected incisions within the relaxed skin tension lines where possible.    The area thus outlined was incised deep to adipose tissue with a #15 scalpel blade.  The skin margins were undermined to an appropriate distance in all directions utilizing iris scissors.
Suture Removal: 8 days
Closure 2 Information: This tab is for additional flaps and grafts, including complex repair and grafts and complex repair and flaps. You can also specify a different location for the additional defect, if the location is the same you do not need to select a new one. We will insert the automated text for the repair you select below just as we do for solitary flaps and grafts. Please note that at this time if you select a location with a different insurance zone you will need to override the ICD10 and CPT if appropriate.
Repair Hemostasis (Optional): Pinpoint electrocautery
Detail Level: Detailed
Spiral Flap Text: The defect edges were debeveled with a #15 scalpel blade.  Given the location of the defect, shape of the defect and the proximity to free margins a spiral flap was deemed most appropriate.  Using a sterile surgical marker, an appropriate rotation flap was drawn incorporating the defect and placing the expected incisions within the relaxed skin tension lines where possible. The area thus outlined was incised deep to adipose tissue with a #15 scalpel blade.  The skin margins were undermined to an appropriate distance in all directions utilizing iris scissors.
Melolabial Interpolation Flap Text: A decision was made to reconstruct the defect utilizing an interpolation axial flap and a staged reconstruction.  A telfa template was made of the defect.  This telfa template was then used to outline the melolabial interpolation flap.  The donor area for the pedicle flap was then injected with anesthesia.  The flap was excised through the skin and subcutaneous tissue down to the layer of the underlying musculature.  The pedicle flap was carefully excised within this deep plane to maintain its blood supply.  The edges of the donor site were undermined.   The donor site was closed in a primary fashion.  The pedicle was then rotated into position and sutured.  Once the tube was sutured into place, adequate blood supply was confirmed with blanching and refill.  The pedicle was then wrapped with xeroform gauze and dressed appropriately with a telfa and gauze bandage to ensure continued blood supply and protect the attached pedicle.
Staging Info: By selecting yes to the question above you will include information on AJCC 8 tumor staging in your Mohs note. Information on tumor staging will be automatically added for SCCs on the head and neck. AJCC 8 includes tumor size, tumor depth, perineural involvement and bone invasion.
Bcc Infiltrative Histology Text: There were numerous aggregates of basaloid cells demonstrating an infiltrative pattern.
Mohs Method Verbiage: An incision at a 45 degree angle following the standard Mohs approach was done and the specimen was harvested as a microscopic controlled layer.
Graft Cartilage Fenestration Text: The cartilage was fenestrated with a 2mm punch biopsy to help facilitate graft survival and healing.
Estimated Blood Loss (Cc): minimal
Number Of Hemigard Strips Per Side: 1
Postop Diagnosis: same
Referring Physician (Optional): Katina Roe
H Plasty Text: Given the location of the defect, shape of the defect and the proximity to free margins a H-plasty was deemed most appropriate for repair.  Using a sterile surgical marker, the appropriate advancement arms of the H-plasty were drawn incorporating the defect and placing the expected incisions within the relaxed skin tension lines where possible. The area thus outlined was incised deep to adipose tissue with a #15 scalpel blade. The skin margins were undermined to an appropriate distance in all directions utilizing iris scissors.  The opposing advancement arms were then advanced into place in opposite direction and anchored with interrupted buried subcutaneous sutures.
Mercedes Flap Text: The defect edges were debeveled with a #15 scalpel blade.  Given the location of the defect, shape of the defect and the proximity to free margins a Mercedes flap was deemed most appropriate.  Using a sterile surgical marker, an appropriate advancement flap was drawn incorporating the defect and placing the expected incisions within the relaxed skin tension lines where possible. The area thus outlined was incised deep to adipose tissue with a #15 scalpel blade.  The skin margins were undermined to an appropriate distance in all directions utilizing iris scissors.
Subsequent Stages Histo Method Verbiage: Using a similar technique to that described above, a thin layer of tissue was removed from all areas where tumor was visible on the previous stage.  The tissue was again oriented, mapped, dyed, and processed as above.
No Residual Tumor Seen Histology Text: There were no malignant cells seen in the sections examined.
Consent 1/Introductory Paragraph: The rationale for Mohs was explained to the patient and consent was obtained. The risks, benefits and alternatives to therapy were discussed in detail. Specifically, the risks of infection, scarring, bleeding, prolonged wound healing, incomplete removal, allergy to anesthesia, nerve injury and recurrence were addressed. Prior to the procedure, the treatment site was clearly identified and confirmed by the patient. All components of Universal Protocol/PAUSE Rule completed.
Graft Donor Site Dermal Sutures (Optional): 5-0 Polysorb
Star Wedge Flap Text: The defect edges were debeveled with a #15 scalpel blade.  Given the location of the defect, shape of the defect and the proximity to free margins a star wedge flap was deemed most appropriate.  Using a sterile surgical marker, an appropriate rotation flap was drawn incorporating the defect and placing the expected incisions within the relaxed skin tension lines where possible. The area thus outlined was incised deep to adipose tissue with a #15 scalpel blade.  The skin margins were undermined to an appropriate distance in all directions utilizing iris scissors.
Posterior Auricular Interpolation Flap Text: A decision was made to reconstruct the defect utilizing an interpolation axial flap and a staged reconstruction.  A telfa template was made of the defect.  This telfa template was then used to outline the posterior auricular interpolation flap.  The donor area for the pedicle flap was then injected with anesthesia.  The flap was excised through the skin and subcutaneous tissue down to the layer of the underlying musculature.  The pedicle flap was carefully excised within this deep plane to maintain its blood supply.  The edges of the donor site were undermined.   The donor site was closed in a primary fashion.  The pedicle was then rotated into position and sutured.  Once the tube was sutured into place, adequate blood supply was confirmed with blanching and refill.  The pedicle was then wrapped with xeroform gauze and dressed appropriately with a telfa and gauze bandage to ensure continued blood supply and protect the attached pedicle.
Consent Type: Consent 1 (Standard)
Simple / Intermediate / Complex Repair - Final Wound Length In Cm: 5.4
S Plasty Text: Given the location and shape of the defect, and the orientation of relaxed skin tension lines, an S-plasty was deemed most appropriate for repair.  Using a sterile surgical marker, the appropriate outline of the S-plasty was drawn, incorporating the defect and placing the expected incisions within the relaxed skin tension lines where possible.  The area thus outlined was incised deep to adipose tissue with a #15 scalpel blade.  The skin margins were undermined to an appropriate distance in all directions utilizing iris scissors. The skin flaps were advanced over the defect.  The opposing margins were then approximated with interrupted buried subcutaneous sutures.
Epidermal Closure Graft Donor Site (Optional): running
Surgical Defect Width In Cm (Optional): 1.6
Chonodrocutaneous Helical Advancement Flap Text: The defect edges were debeveled with a #15 scalpel blade.  Given the location of the defect and the proximity to free margins a chondrocutaneous helical advancement flap was deemed most appropriate.  Using a sterile surgical marker, the appropriate advancement flap was drawn incorporating the defect and placing the expected incisions within the relaxed skin tension lines where possible.    The area thus outlined was incised deep to adipose tissue with a #15 scalpel blade.  The skin margins were undermined to an appropriate distance in all directions utilizing iris scissors.
Partial Purse String (Intermediate) Text: Given the location of the defect and the characteristics of the surrounding skin an intermediate purse string closure was deemed most appropriate.  Undermining was performed circumfirentially around the surgical defect.  A purse string suture was then placed and tightened. Wound tension only allowed a partial closure of the circular defect.
Skin Substitute Text: The defect edges were debeveled with a #15 scalpel blade.  Given the location of the defect, shape of the defect and the proximity to free margins a skin substitute graft was deemed most appropriate.  The graft material was trimmed to fit the size of the defect. The graft was then placed in the primary defect and oriented appropriately.
Alar Island Pedicle Flap Text: The defect edges were debeveled with a #15 scalpel blade.  Given the location of the defect, shape of the defect and the proximity to the alar rim an island pedicle advancement flap was deemed most appropriate.  Using a sterile surgical marker, an appropriate advancement flap was drawn incorporating the defect, outlining the appropriate donor tissue and placing the expected incisions within the nasal ala running parallel to the alar rim. The area thus outlined was incised with a #15 scalpel blade.  The skin margins were undermined minimally to an appropriate distance in all directions around the primary defect and laterally outward around the island pedicle utilizing iris scissors.  There was minimal undermining beneath the pedicle flap.
Area L Indication Text: Tumors in this location are included in Area L (trunk and extremities).  Mohs surgery is indicated for larger tumors, or tumors with aggressive histologic features, in these anatomic locations.
Dermal Autograft Text: The defect edges were debeveled with a #15 scalpel blade.  Given the location of the defect, shape of the defect and the proximity to free margins a dermal autograft was deemed most appropriate.  Using a sterile surgical marker, the primary defect shape was transferred to the donor site. The area thus outlined was incised deep to adipose tissue with a #15 scalpel blade.  The harvested graft was then trimmed of adipose and epidermal tissue until only dermis was left.  The skin graft was then placed in the primary defect and oriented appropriately.
Double O-Z Flap Text: The defect edges were debeveled with a #15 scalpel blade.  Given the location of the defect, shape of the defect and the proximity to free margins a Double O-Z flap was deemed most appropriate.  Using a sterile surgical marker, an appropriate transposition flap was drawn incorporating the defect and placing the expected incisions within the relaxed skin tension lines where possible. The area thus outlined was incised deep to adipose tissue with a #15 scalpel blade.  The skin margins were undermined to an appropriate distance in all directions utilizing iris scissors.
Bilobed Transposition Flap Text: The defect edges were debeveled with a #15 scalpel blade.  Given the location of the defect and the proximity to free margins a bilobed transposition flap was deemed most appropriate.  Using a sterile surgical marker, an appropriate bilobe flap drawn around the defect.    The area thus outlined was incised deep to adipose tissue with a #15 scalpel blade.  The skin margins were undermined to an appropriate distance in all directions utilizing iris scissors.
Eye Protection Verbiage: Before proceeding with the stage, a plastic scleral shield was inserted. The globe was anesthetized with a few drops of 1% lidocaine with 1:100,000 epinephrine. Then, an appropriate sized scleral shield was chosen and coated with lacrilube ointment. The shield was gently inserted and left in place for the duration of each stage. After the stage was completed, the shield was gently removed.
Same Histology In Subsequent Stages Text: The pattern and morphology of the tumor is as described in the first stage.
A-T Advancement Flap Text: The defect edges were debeveled with a #15 scalpel blade.  Given the location of the defect, shape of the defect and the proximity to free margins an A-T advancement flap was deemed most appropriate.  Using a sterile surgical marker, an appropriate advancement flap was drawn incorporating the defect and placing the expected incisions within the relaxed skin tension lines where possible.    The area thus outlined was incised deep to adipose tissue with a #15 scalpel blade.  The skin margins were undermined to an appropriate distance in all directions utilizing iris scissors.
Surgeon Performing Repair (Optional): Ayaka Whitman MD
Z Plasty Text: The lesion was extirpated to the level of the fat with a #15 scalpel blade.  Given the location of the defect, shape of the defect and the proximity to free margins a Z-plasty was deemed most appropriate for repair.  Using a sterile surgical marker, the appropriate transposition arms of the Z-plasty were drawn incorporating the defect and placing the expected incisions within the relaxed skin tension lines where possible.    The area thus outlined was incised deep to adipose tissue with a #15 scalpel blade.  The skin margins were undermined to an appropriate distance in all directions utilizing iris scissors.  The opposing transposition arms were then transposed into place in opposite direction and anchored with interrupted buried subcutaneous sutures.
Number Of Stages: 2
V-Y Flap Text: The defect edges were debeveled with a #15 scalpel blade.  Given the location of the defect, shape of the defect and the proximity to free margins a V-Y flap was deemed most appropriate.  Using a sterile surgical marker, an appropriate advancement flap was drawn incorporating the defect and placing the expected incisions within the relaxed skin tension lines where possible.    The area thus outlined was incised deep to adipose tissue with a #15 scalpel blade.  The skin margins were undermined to an appropriate distance in all directions utilizing iris scissors.
Unna Boot Text: An Unna boot was placed to help immobilize the limb and facilitate more rapid healing.
Burow's Advancement Flap Text: The defect edges were debeveled with a #15 scalpel blade.  Given the location of the defect and the proximity to free margins a Burow's advancement flap was deemed most appropriate.  Using a sterile surgical marker, the appropriate advancement flap was drawn incorporating the defect and placing the expected incisions within the relaxed skin tension lines where possible.    The area thus outlined was incised deep to adipose tissue with a #15 scalpel blade.  The skin margins were undermined to an appropriate distance in all directions utilizing iris scissors.
Ear Star Wedge Flap Text: The defect edges were debeveled with a #15 blade scalpel.  Given the location of the defect and the proximity to free margins (helical rim) an ear star wedge flap was deemed most appropriate.  Using a sterile surgical marker, the appropriate flap was drawn incorporating the defect and placing the expected incisions between the helical rim and antihelix where possible.  The area thus outlined was incised through and through with a #15 scalpel blade.
Cheek-To-Nose Interpolation Flap Text: A decision was made to reconstruct the defect utilizing an interpolation axial flap and a staged reconstruction.  A telfa template was made of the defect.  This telfa template was then used to outline the Cheek-To-Nose Interpolation flap.  The donor area for the pedicle flap was then injected with anesthesia.  The flap was excised through the skin and subcutaneous tissue down to the layer of the underlying musculature.  The interpolation flap was carefully excised within this deep plane to maintain its blood supply.  The edges of the donor site were undermined.   The donor site was closed in a primary fashion.  The pedicle was then rotated into position and sutured.  Once the tube was sutured into place, adequate blood supply was confirmed with blanching and refill.  The pedicle was then wrapped with xeroform gauze and dressed appropriately with a telfa and gauze bandage to ensure continued blood supply and protect the attached pedicle.
Mart-1 - Negative Histology Text: MART-1 staining demonstrates a normal density and pattern of melanocytes along the dermal-epidermal junction. The surgical margins are negative for tumor cells.
Donor Site Anesthesia Type: same as repair anesthesia
Location Indication Override (Is Already Calculated Based On Selected Body Location): Area M
Epidermal Autograft Text: The defect edges were debeveled with a #15 scalpel blade.  Given the location of the defect, shape of the defect and the proximity to free margins an epidermal autograft was deemed most appropriate.  Using a sterile surgical marker, the primary defect shape was transferred to the donor site. The epidermal graft was then harvested.  The skin graft was then placed in the primary defect and oriented appropriately.
Tumor Depth: Less than 6mm from granular layer and no invasion beyond the subcutaneous fat
Repair Anesthesia Method: local infiltration
Epidermal Closure: running cuticular
W Plasty Text: The lesion was extirpated to the level of the fat with a #15 scalpel blade.  Given the location of the defect, shape of the defect and the proximity to free margins a W-plasty was deemed most appropriate for repair.  Using a sterile surgical marker, the appropriate transposition arms of the W-plasty were drawn incorporating the defect and placing the expected incisions within the relaxed skin tension lines where possible.    The area thus outlined was incised deep to adipose tissue with a #15 scalpel blade.  The skin margins were undermined to an appropriate distance in all directions utilizing iris scissors.  The opposing transposition arms were then transposed into place in opposite direction and anchored with interrupted buried subcutaneous sutures.
Consent (Near Eyelid Margin)/Introductory Paragraph: The rationale for Mohs was explained to the patient and consent was obtained. The risks, benefits and alternatives to therapy were discussed in detail. Specifically, the risks of ectropion or eyelid deformity, infection, scarring, bleeding, prolonged wound healing, incomplete removal, allergy to anesthesia, nerve injury and recurrence were addressed. Prior to the procedure, the treatment site was clearly identified and confirmed by the patient. All components of Universal Protocol/PAUSE Rule completed.
Suturegard Intro: Intraoperative tissue expansion was performed, utilizing the SUTUREGARD device, in order to reduce wound tension.
Date Of Previous Biopsy (Optional): 2-10-22
Repair Type: Complex Repair
Consent 3/Introductory Paragraph: I gave the patient a chance to ask questions they had about the procedure.  Following this I explained the Mohs procedure and consent was obtained. The risks, benefits and alternatives to therapy were discussed in detail. Specifically, the risks of infection, scarring, bleeding, prolonged wound healing, incomplete removal, allergy to anesthesia, nerve injury and recurrence were addressed. Prior to the procedure, the treatment site was clearly identified and confirmed by the patient. All components of Universal Protocol/PAUSE Rule completed.
Purse String (Simple) Text: Given the location of the defect and the characteristics of the surrounding skin a purse string closure was deemed most appropriate.  Undermining was performed circumfirentially around the surgical defect.  A purse string suture was then placed and tightened.
Composite Graft Text: The defect edges were debeveled with a #15 scalpel blade.  Given the location of the defect, shape of the defect, the proximity to free margins and the fact the defect was full thickness a composite graft was deemed most appropriate.  The defect was outline and then transferred to the donor site.  A full thickness graft was then excised from the donor site. The graft was then placed in the primary defect, oriented appropriately and then sutured into place.  The secondary defect was then repaired using a primary closure.
Inflammation Suggestive Of Cancer Camouflage Histology Text: There was a dense lymphocytic infiltrate which prevented adequate histologic evaluation of adjacent structures.
Consent (Temporal Branch)/Introductory Paragraph: The rationale for Mohs was explained to the patient and consent was obtained. The risks, benefits and alternatives to therapy were discussed in detail. Specifically, the risks of damage to the temporal branch of the facial nerve, infection, scarring, bleeding, prolonged wound healing, incomplete removal, allergy to anesthesia, and recurrence were addressed. Prior to the procedure, the treatment site was clearly identified and confirmed by the patient. All components of Universal Protocol/PAUSE Rule completed.
Ear Wedge Repair Text: A wedge excision was completed by carrying down an excision through the full thickness of the ear and cartilage with an inward facing Burow's triangle. The wound was then closed in a layered fashion.
Graft Donor Site Epidermal Sutures (Optional): 5-0 Surgipro
Retention Suture Text: Retention sutures were placed to support the closure and prevent dehiscence.
Mastoid Interpolation Flap Text: A decision was made to reconstruct the defect utilizing an interpolation axial flap and a staged reconstruction.  A telfa template was made of the defect.  This telfa template was then used to outline the mastoid interpolation flap.  The donor area for the pedicle flap was then injected with anesthesia.  The flap was excised through the skin and subcutaneous tissue down to the layer of the underlying musculature.  The pedicle flap was carefully excised within this deep plane to maintain its blood supply.  The edges of the donor site were undermined.   The donor site was closed in a primary fashion.  The pedicle was then rotated into position and sutured.  Once the tube was sutured into place, adequate blood supply was confirmed with blanching and refill.  The pedicle was then wrapped with xeroform gauze and dressed appropriately with a telfa and gauze bandage to ensure continued blood supply and protect the attached pedicle.
Keystone Flap Text: The defect edges were debeveled with a #15 scalpel blade.  Given the location of the defect, shape of the defect a keystone flap was deemed most appropriate.  Using a sterile surgical marker, an appropriate keystone flap was drawn incorporating the defect, outlining the appropriate donor tissue and placing the expected incisions within the relaxed skin tension lines where possible. The area thus outlined was incised deep to adipose tissue with a #15 scalpel blade.  The skin margins were undermined to an appropriate distance in all directions around the primary defect and laterally outward around the flap utilizing iris scissors.
Previous Accession (Optional): [id number]
Tarsorrhaphy Text: A tarsorrhaphy was performed using Frost sutures.
O-Z Flap Text: The defect edges were debeveled with a #15 scalpel blade.  Given the location of the defect, shape of the defect and the proximity to free margins an O-Z flap was deemed most appropriate.  Using a sterile surgical marker, an appropriate transposition flap was drawn incorporating the defect and placing the expected incisions within the relaxed skin tension lines where possible. The area thus outlined was incised deep to adipose tissue with a #15 scalpel blade.  The skin margins were undermined to an appropriate distance in all directions utilizing iris scissors.
Surgical Defect Length In Cm (Optional): 2.0
Burow's Graft Text: The defect edges were debeveled with a #15 scalpel blade.  Given the location of the defect, shape of the defect, the proximity to free margins and the presence of a standing cone deformity a Burow's skin graft was deemed most appropriate. The standing cone was removed and this tissue was then trimmed to the shape of the primary defect. The adipose tissue was also removed until only dermis and epidermis were left.  The skin margins of the secondary defect were undermined to an appropriate distance in all directions utilizing iris scissors.  The secondary defect was closed with interrupted buried subcutaneous sutures.  The skin edges were then re-apposed with running  sutures.  The skin graft was then placed in the primary defect and oriented appropriately.
Wound Care (No Sutures): Petrolatum
Full Thickness Lip Wedge Repair (Flap) Text: Given the location of the defect and the proximity to free margins a full thickness wedge repair was deemed most appropriate.  Using a sterile surgical marker, the appropriate repair was drawn incorporating the defect and placing the expected incisions perpendicular to the vermilion border.  The vermilion border was also meticulously outlined to ensure appropriate reapproximation during the repair.  The area thus outlined was incised through and through with a #15 scalpel blade.  The muscularis and dermis were reaproximated with deep sutures following hemostasis. Care was taken to realign the vermilion border before proceeding with the superficial closure.  Once the vermilion was realigned the superfical and mucosal closure was finished.
Double Island Pedicle Flap Text: The defect edges were debeveled with a #15 scalpel blade.  Given the location of the defect, shape of the defect and the proximity to free margins a double island pedicle advancement flap was deemed most appropriate.  Using a sterile surgical marker, an appropriate advancement flap was drawn incorporating the defect, outlining the appropriate donor tissue and placing the expected incisions within the relaxed skin tension lines where possible.    The area thus outlined was incised deep to adipose tissue with a #15 scalpel blade.  The skin margins were undermined to an appropriate distance in all directions around the primary defect and laterally outward around the island pedicle utilizing iris scissors.  There was minimal undermining beneath the pedicle flap.
Localized Dermabrasion With Wire Brush Text: The patient was draped in routine manner.  Localized dermabrasion using 3 x 17 mm wire brush was performed in routine manner to papillary dermis. This spot dermabrasion is being performed to complete skin cancer reconstruction. It also will eliminate the other sun damaged precancerous cells that are known to be part of the regional effect of a lifetime's worth of sun exposure. This localized dermabrasion is therapeutic and should not be considered cosmetic in any regard.
Undermining Type: Entire Wound
Suturegard Body: The suture ends were repeatedly re-tightened and re-clamped to achieve the desired tissue expansion.
Nasal Turnover Hinge Flap Text: The defect edges were debeveled with a #15 scalpel blade.  Given the size, depth, location of the defect and the defect being full thickness a nasal turnover hinge flap was deemed most appropriate.  Using a sterile surgical marker, an appropriate hinge flap was drawn incorporating the defect. The area thus outlined was incised with a #15 scalpel blade. The flap was designed to recreate the nasal mucosal lining and the alar rim. The skin margins were undermined to an appropriate distance in all directions utilizing iris scissors.
Post-Care Instructions: I reviewed with the patient in detail post-care instructions. Patient is not to engage in any heavy lifting, exercise, or swimming for the next 14 days. Should the patient develop any fevers, chills, bleeding, severe pain patient will contact the office immediately.
Helical Rim Advancement Flap Text: The defect edges were debeveled with a #15 blade scalpel.  Given the location of the defect and the proximity to free margins (helical rim) a double helical rim advancement flap was deemed most appropriate.  Using a sterile surgical marker, the appropriate advancement flaps were drawn incorporating the defect and placing the expected incisions between the helical rim and antihelix where possible.  The area thus outlined was incised through and through with a #15 scalpel blade.  With a skin hook and iris scissors, the flaps were gently and sharply undermined and freed up.
Rhombic Flap Text: The defect edges were debeveled with a #15 scalpel blade.  Given the location of the defect and the proximity to free margins a rhombic flap was deemed most appropriate.  Using a sterile surgical marker, an appropriate rhombic flap was drawn incorporating the defect.    The area thus outlined was incised deep to adipose tissue with a #15 scalpel blade.  The skin margins were undermined to an appropriate distance in all directions utilizing iris scissors.
Peng Advancement Flap Text: The defect edges were debeveled with a #15 scalpel blade.  Given the location of the defect, shape of the defect and the proximity to free margins a Peng advancement flap was deemed most appropriate.  Using a sterile surgical marker, an appropriate advancement flap was drawn incorporating the defect and placing the expected incisions within the relaxed skin tension lines where possible. The area thus outlined was incised deep to adipose tissue with a #15 scalpel blade.  The skin margins were undermined to an appropriate distance in all directions utilizing iris scissors.
Hemigard Postcare Instructions: The HEMIGARD strips are to remain completely dry for at least 5-7 days.
Consent (Lip)/Introductory Paragraph: The rationale for Mohs was explained to the patient and consent was obtained. The risks, benefits and alternatives to therapy were discussed in detail. Specifically, the risks of lip deformity, changes in the oral aperture, infection, scarring, bleeding, prolonged wound healing, incomplete removal, allergy to anesthesia, nerve injury and recurrence were addressed. Prior to the procedure, the treatment site was clearly identified and confirmed by the patient. All components of Universal Protocol/PAUSE Rule completed.
Advancement Flap (Single) Text: The defect edges were debeveled with a #15 scalpel blade.  Given the location of the defect and the proximity to free margins a single advancement flap was deemed most appropriate.  Using a sterile surgical marker, an appropriate advancement flap was drawn incorporating the defect and placing the expected incisions within the relaxed skin tension lines where possible.    The area thus outlined was incised deep to adipose tissue with a #15 scalpel blade.  The skin margins were undermined to an appropriate distance in all directions utilizing iris scissors.
Alternatives Discussed Intro (Do Not Add Period): I discussed alternative treatments to Mohs surgery and specifically discussed the risks and benefits of
Nasalis-Muscle-Based Myocutaneous Island Pedicle Flap Text: Using a #15 blade, an incision was made around the donor flap to the level of the nasalis muscle. Wide lateral undermining was then performed in both the subcutaneous plane above the nasalis muscle, and in a submuscular plane just above periosteum. This allowed the formation of a free nasalis muscle axial pedicle (based on the angular artery) which was still attached to the actual cutaneous flap, increasing its mobility and vascular viability. Hemostasis was obtained with pinpoint electrocoagulation. The flap was mobilized into position and the pivotal anchor points positioned and stabilized with buried interrupted sutures. Subcutaneous and dermal tissues were closed in a multilayered fashion with sutures. Tissue redundancies were excised, and the epidermal edges were apposed without significant tension and sutured with sutures.
Deep Sutures: 5-0 Maxon
Non-Graft Cartilage Fenestration Text: The cartilage was fenestrated with a 2mm punch biopsy to help facilitate healing.
Dorsal Nasal Flap Text: The defect edges were debeveled with a #15 scalpel blade.  Given the location of the defect and the proximity to free margins a dorsal nasal flap was deemed most appropriate.  Using a sterile surgical marker, an appropriate dorsal nasal flap was drawn around the defect.    The area thus outlined was incised deep to adipose tissue with a #15 scalpel blade.  The skin margins were undermined to an appropriate distance in all directions utilizing iris scissors.
Manual Repair Warning Statement: We plan on removing the manually selected variable below in favor of our much easier automatic structured text blocks found in the previous tab. We decided to do this to help make the flow better and give you the full power of structured data. Manual selection is never going to be ideal in our platform and I would encourage you to avoid using manual selection from this point on, especially since I will be sunsetting this feature. It is important that you do one of two things with the customized text below. First, you can save all of the text in a word file so you can have it for future reference. Second, transfer the text to the appropriate area in the Library tab. Lastly, if there is a flap or graft type which we do not have you need to let us know right away so I can add it in before the variable is hidden. No need to panic, we plan to give you roughly 6 months to make the change.
Graft Donor Site Bandage (Optional-Leave Blank If You Don't Want In Note): Aquaplast was fitted to the graft site and sewn into place. A pressure bandage were applied to the donor site and over the aquaplast bolster.
Helical Rim Text: The closure involved the helical rim.
Complex Repair And Graft Additional Text (Will Appearing After The Standard Complex Repair Text): The complex repair was not sufficient to completely close the primary defect. The remaining additional defect was repaired with the graft mentioned below.
Complex Repair And Flap Additional Text (Will Appearing After The Standard Complex Repair Text): The complex repair was not sufficient to completely close the primary defect. The remaining additional defect was repaired with the flap mentioned below.
Melolabial Transposition Flap Text: The defect edges were debeveled with a #15 scalpel blade.  Given the location of the defect and the proximity to free margins a melolabial flap was deemed most appropriate.  Using a sterile surgical marker, an appropriate melolabial transposition flap was drawn incorporating the defect.    The area thus outlined was incised deep to adipose tissue with a #15 scalpel blade.  The skin margins were undermined to an appropriate distance in all directions utilizing iris scissors.
Additional Anesthesia Volume In Cc: 7
Pain Refusal Text: I offered to prescribe pain medication but the patient refused to take this medication.
Undermining Location (Optional): in the superficial subcutaneous fat
Consent (Ear)/Introductory Paragraph: The rationale for Mohs was explained to the patient and consent was obtained. The risks, benefits and alternatives to therapy were discussed in detail. Specifically, the risks of ear deformity, infection, scarring, bleeding, prolonged wound healing, incomplete removal, allergy to anesthesia, nerve injury and recurrence were addressed. Prior to the procedure, the treatment site was clearly identified and confirmed by the patient. All components of Universal Protocol/PAUSE Rule completed.
Mucosal Advancement Flap Text: Given the location of the defect, shape of the defect and the proximity to free margins a mucosal advancement flap was deemed most appropriate. Incisions were made with a 15 blade scalpel in the appropriate fashion along the cutaneous vermilion border and the mucosal lip. The remaining actinically damaged mucosal tissue was excised.  The mucosal advancement flap was then elevated to the gingival sulcus with care taken to preserve the neurovascular structures and advanced into the primary defect. Care was taken to ensure that precise realignment of the vermilion border was achieved.
Secondary Intention Text (Leave Blank If You Do Not Want): The defect will heal with secondary intention.
Cheiloplasty (Less Than 50%) Text: A decision was made to reconstruct the defect with a  cheiloplasty.  The defect was undermined extensively.  Additional obicularis oris muscle was excised with a 15 blade scalpel.  The defect was converted into a full thickness wedge, of less than 50% of the vertical height of the lip, to facilite a better cosmetic result.  Small vessels were then tied off with 5-0 monocyrl. The obicularis oris, superficial fascia, adipose and dermis were then reapproximated.  After the deeper layers were approximated the epidermis was reapproximated with particular care given to realign the vermilion border.
Rhomboid Transposition Flap Text: The defect edges were debeveled with a #15 scalpel blade.  Given the location of the defect and the proximity to free margins a rhomboid transposition flap was deemed most appropriate.  Using a sterile surgical marker, an appropriate rhomboid flap was drawn incorporating the defect.    The area thus outlined was incised deep to adipose tissue with a #15 scalpel blade.  The skin margins were undermined to an appropriate distance in all directions utilizing iris scissors.
Wound Care: Vaseline
Brow Lift Text: A midfrontal incision was made medially to the defect to allow access to the tissues just superior to the left eyebrow. Following careful dissection inferiorly in a supraperiosteal plane to the level of the left eyebrow, several 3-0 monocryl sutures were used to resuspend the eyebrow orbicularis oculi muscular unit to the superior frontal bone periosteum. This resulted in an appropriate reapproximation of static eyebrow symmetry and correction of the left brow ptosis.
Information: Selecting Yes will display possible errors in your note based on the variables you have selected. This validation is only offered as a suggestion for you. PLEASE NOTE THAT THE VALIDATION TEXT WILL BE REMOVED WHEN YOU FINALIZE YOUR NOTE. IF YOU WANT TO FAX A PRELIMINARY NOTE YOU WILL NEED TO TOGGLE THIS TO 'NO' IF YOU DO NOT WANT IT IN YOUR FAXED NOTE.
Where Do You Want The Question To Include Opioid Counseling Located?: Case Summary Tab
Consent (Scalp)/Introductory Paragraph: The rationale for Mohs was explained to the patient and consent was obtained. The risks, benefits and alternatives to therapy were discussed in detail. Specifically, the risks of changes in hair growth pattern secondary to repair, infection, scarring, bleeding, prolonged wound healing, incomplete removal, allergy to anesthesia, nerve injury and recurrence were addressed. Prior to the procedure, the treatment site was clearly identified and confirmed by the patient. All components of Universal Protocol/PAUSE Rule completed.
Double O-Z Plasty Text: The defect edges were debeveled with a #15 scalpel blade.  Given the location of the defect, shape of the defect and the proximity to free margins a Double O-Z plasty (double transposition flap) was deemed most appropriate.  Using a sterile surgical marker, the appropriate transposition flaps were drawn incorporating the defect and placing the expected incisions within the relaxed skin tension lines where possible. The area thus outlined was incised deep to adipose tissue with a #15 scalpel blade.  The skin margins were undermined to an appropriate distance in all directions utilizing iris scissors.  Hemostasis was achieved with electrocautery.  The flaps were then transposed into place, one clockwise and the other counterclockwise, and anchored with interrupted buried subcutaneous sutures.
Initial Size Of Lesion: 1.7
O-T Plasty Text: The defect edges were debeveled with a #15 scalpel blade.  Given the location of the defect, shape of the defect and the proximity to free margins an O-T plasty was deemed most appropriate.  Using a sterile surgical marker, an appropriate O-T plasty was drawn incorporating the defect and placing the expected incisions within the relaxed skin tension lines where possible.    The area thus outlined was incised deep to adipose tissue with a #15 scalpel blade.  The skin margins were undermined to an appropriate distance in all directions utilizing iris scissors.
Island Pedicle Flap With Canthal Suspension Text: The defect edges were debeveled with a #15 scalpel blade.  Given the location of the defect, shape of the defect and the proximity to free margins an island pedicle advancement flap was deemed most appropriate.  Using a sterile surgical marker, an appropriate advancement flap was drawn incorporating the defect, outlining the appropriate donor tissue and placing the expected incisions within the relaxed skin tension lines where possible. The area thus outlined was incised deep to adipose tissue with a #15 scalpel blade.  The skin margins were undermined to an appropriate distance in all directions around the primary defect and laterally outward around the island pedicle utilizing iris scissors.  There was minimal undermining beneath the pedicle flap. A suspension suture was placed in the canthal tendon to prevent tension and prevent ectropion.
Island Pedicle Flap Text: The defect edges were debeveled with a #15 scalpel blade.  Given the location of the defect, shape of the defect and the proximity to free margins an island pedicle advancement flap was deemed most appropriate.  Using a sterile surgical marker, an appropriate advancement flap was drawn incorporating the defect, outlining the appropriate donor tissue and placing the expected incisions within the relaxed skin tension lines where possible.    The area thus outlined was incised deep to adipose tissue with a #15 scalpel blade.  The skin margins were undermined to an appropriate distance in all directions around the primary defect and laterally outward around the island pedicle utilizing iris scissors.  There was minimal undermining beneath the pedicle flap.
Area H Indication Text: Tumors in this location are included in Area H (eyelids, eyebrows, nose, lips, chin, ear, pre-auricular, post-auricular, temple, genitalia, hands, feet, ankles and areola).  Tissue conservation is critical in these anatomic locations.
Consent (Marginal Mandibular)/Introductory Paragraph: The rationale for Mohs was explained to the patient and consent was obtained. The risks, benefits and alternatives to therapy were discussed in detail. Specifically, the risks of damage to the marginal mandibular branch of the facial nerve, infection, scarring, bleeding, prolonged wound healing, incomplete removal, allergy to anesthesia, and recurrence were addressed. Prior to the procedure, the treatment site was clearly identified and confirmed by the patient. All components of Universal Protocol/PAUSE Rule completed.
Tissue Cultured Epidermal Autograft Text: The defect edges were debeveled with a #15 scalpel blade.  Given the location of the defect, shape of the defect and the proximity to free margins a tissue cultured epidermal autograft was deemed most appropriate.  The graft was then trimmed to fit the size of the defect.  The graft was then placed in the primary defect and oriented appropriately.
Xenograft Text: The defect edges were debeveled with a #15 scalpel blade.  Given the location of the defect, shape of the defect and the proximity to free margins a xenograft was deemed most appropriate.  The graft was then trimmed to fit the size of the defect.  The graft was then placed in the primary defect and oriented appropriately.
No Repair - Repaired With Adjacent Surgical Defect Text (Leave Blank If You Do Not Want): After obtaining clear surgical margins the defect was repaired concurrently with another surgical defect which was in close approximation.
Staged Advancement Flap Text: The defect edges were debeveled with a #15 scalpel blade.  Given the location of the defect, shape of the defect and the proximity to free margins a staged advancement flap was deemed most appropriate.  Using a sterile surgical marker, an appropriate advancement flap was drawn incorporating the defect and placing the expected incisions within the relaxed skin tension lines where possible. The area thus outlined was incised deep to adipose tissue with a #15 scalpel blade.  The skin margins were undermined to an appropriate distance in all directions utilizing iris scissors.
Mustarde Flap Text: The defect edges were debeveled with a #15 scalpel blade.  Given the size, depth and location of the defect and the proximity to free margins a Mustarde flap was deemed most appropriate.  Using a sterile surgical marker, an appropriate flap was drawn incorporating the defect. The area thus outlined was incised with a #15 scalpel blade.  The skin margins were undermined to an appropriate distance in all directions utilizing iris scissors.
Adjacent Tissue Transfer Text: The defect edges were debeveled with a #15 scalpel blade.  Given the location of the defect and the proximity to free margins an adjacent tissue transfer was deemed most appropriate.  Using a sterile surgical marker, an appropriate flap was drawn incorporating the defect and placing the expected incisions within the relaxed skin tension lines where possible.    The area thus outlined was incised deep to adipose tissue with a #15 scalpel blade.  The skin margins were undermined to an appropriate distance in all directions utilizing iris scissors.

## 2022-03-10 NOTE — PROCEDURE: BIOPSY BY SHAVE METHOD WITH FROZEN SECTION
Detail Level: Detailed
Depth Of Biopsy: dermis
Biopsy Type: Frozen Section
Biopsy Method: 15 blade
Anesthesia Type: 1% lidocaine with epinephrine
Anesthesia Volume In Cc: 0.5
Additional Anesthesia Volume In Cc (Will Not Render If 0): 0
Hemostasis: Drysol
Wound Care: Petrolatum
Size Of Lesion In Cm (Optional): 0.8
X Size Of Lesion In Cm (Optional): 0.3
Lab: [id number]
Lab Facility: 
Use Enhanced Frozen Section Features: Yes
Enhanced Features Information: The enhanced features will allow you to make use of the built in histology library. In this enhanced mode you will not have to select a frozen section diagnosis as this will automatically be based on the chosen impression. The parent diagnosis will also be overridden if you select a different microscopic description. The enhanced features will allow you develop a small library of gross descriptions to use as well. If you prefer the old method please leave the Use Enhanced Frozen Section Features question set to No.
Accession #: [id number]
Number Of Blocks: 1
Processing Note: The specimen was frozen in the cryostat, sectioned and stained.
Gross Description: Keratotic papule
Microscopic Override (Will Default To Parent Dx If Left Blank): Cutaneous horn
Render Path Notes In Note?: No
Consent: Written consent was obtained and risks were reviewed including but not limited to scarring, infection, bleeding, scabbing, incomplete removal, nerve damage and allergy to anesthesia.
Post-Care Instructions: I reviewed with the patient in detail post-care instructions. Patient is to keep the biopsy site dry overnight, and then apply bacitracin twice daily until healed. Patient may apply hydrogen peroxide soaks to remove any crusting.
Notification Instructions: Patient will be notified of biopsy results. However, patient instructed to call the office if not contacted within 2 weeks.
Bcc Histology Text: There were numerous aggregates of basaloid cells.
Bcc Infiltrative Histology Text: There were numerous aggregates of basaloid cells demonstrating an infiltrative pattern.
Bcc Micronodular Histology Text: The histologic sections demonstrate small nests of basaloid cells extending into the dermis.  A fibromyxoid stroma is present.
Bcc  Morpheaform/Sclerosing Histology Text: The histologic sections demonstrate spiky nests of basaloid cells extending into the dermis.  A fibromyxoid stroma is present.
Bcc  Nodular Histology Text: The histologic sections demonstrate nests of basaloid cells extending into the dermis.  A fibromyxoid stroma is present.
Scc Histology Text: The histologic sections demonstrate nests of atypical squamous cells extending into the dermis.
Scc Ka Subtype Histology Text: The lesion is composed of glassy islands of atypical squamous epithelium.  Neutrophilic microabscesses, eosinophils and elastic trapping are noted.
Scc In Situ Histology Text: The histologic sections demonstrate compact hyperkeratosis, hypergranulosis, koilocytosis and areas of full thickness atypia.
Billing Type: Third-Party Bill

## 2022-03-10 NOTE — PROCEDURE: MEDICATION COUNSELING
Acitretin Pregnancy And Lactation Text: This medication is Pregnancy Category X and should not be given to women who are pregnant or may become pregnant in the future. This medication is excreted in breast milk.
Topical Retinoid Pregnancy And Lactation Text: This medication is Pregnancy Category C. It is unknown if this medication is excreted in breast milk.
Azelaic Acid Counseling: Patient counseled that medicine may cause skin irritation and to avoid applying near the eyes.  In the event of skin irritation, the patient was advised to reduce the amount of the drug applied or use it less frequently.   The patient verbalized understanding of the proper use and possible adverse effects of azelaic acid.  All of the patient's questions and concerns were addressed.
Hydroquinone Pregnancy And Lactation Text: This medication has not been assigned a Pregnancy Risk Category but animal studies failed to show danger with the topical medication. It is unknown if the medication is excreted in breast milk.
Infliximab Counseling:  I discussed with the patient the risks of infliximab including but not limited to myelosuppression, immunosuppression, autoimmune hepatitis, demyelinating diseases, lymphoma, and serious infections.  The patient understands that monitoring is required including a PPD at baseline and must alert us or the primary physician if symptoms of infection or other concerning signs are noted.
Xolair Counseling:  Patient informed of potential adverse effects including but not limited to fever, muscle aches, rash and allergic reactions.  The patient verbalized understanding of the proper use and possible adverse effects of Xolair.  All of the patient's questions and concerns were addressed.
Spironolactone Counseling: Patient advised regarding risks of diarrhea, abdominal pain, hyperkalemia, birth defects (for female patients), liver toxicity and renal toxicity. The patient may need blood work to monitor liver and kidney function and potassium levels while on therapy. The patient verbalized understanding of the proper use and possible adverse effects of spironolactone.  All of the patient's questions and concerns were addressed.
Metronidazole Counseling:  I discussed with the patient the risks of metronidazole including but not limited to seizures, nausea/vomiting, a metallic taste in the mouth, nausea/vomiting and severe allergy.
Ivermectin Counseling:  Patient instructed to take medication on an empty stomach with a full glass of water.  Patient informed of potential adverse effects including but not limited to nausea, diarrhea, dizziness, itching, and swelling of the extremities or lymph nodes.  The patient verbalized understanding of the proper use and possible adverse effects of ivermectin.  All of the patient's questions and concerns were addressed.
Clofazimine Counseling:  I discussed with the patient the risks of clofazimine including but not limited to skin and eye pigmentation, liver damage, nausea/vomiting, gastrointestinal bleeding and allergy.
Imiquimod Counseling:  I discussed with the patient the risks of imiquimod including but not limited to erythema, scaling, itching, weeping, crusting, and pain.  Patient understands that the inflammatory response to imiquimod is variable from person to person and was educated regarded proper titration schedule.  If flu-like symptoms develop, patient knows to discontinue the medication and contact us.
Bexarotene Counseling:  I discussed with the patient the risks of bexarotene including but not limited to hair loss, dry lips/skin/eyes, liver abnormalities, hyperlipidemia, pancreatitis, depression/suicidal ideation, photosensitivity, drug rash/allergic reactions, hypothyroidism, anemia, leukopenia, infection, cataracts, and teratogenicity.  Patient understands that they will need regular blood tests to check lipid profile, liver function tests, white blood cell count, thyroid function tests and pregnancy test if applicable.
Libtayo Counseling- I discussed with the patient the risks of Libtayo including but not limited to nausea, vomiting, diarrhea, and bone or muscle pain.  The patient verbalized understanding of the proper use and possible adverse effects of Libtayo.  All of the patient's questions and concerns were addressed.
Detail Level: Zone
Infliximab Pregnancy And Lactation Text: This medication is Pregnancy Category B and is considered safe during pregnancy. It is unknown if this medication is excreted in breast milk.
Azelaic Acid Pregnancy And Lactation Text: This medication is considered safe during pregnancy and breast feeding.
Tazorac Counseling:  Patient advised that medication is irritating and drying.  Patient may need to apply sparingly and wash off after an hour before eventually leaving it on overnight.  The patient verbalized understanding of the proper use and possible adverse effects of tazorac.  All of the patient's questions and concerns were addressed.
Ivermectin Pregnancy And Lactation Text: This medication is Pregnancy Category C and it isn't known if it is safe during pregnancy. It is also excreted in breast milk.
Xolair Pregnancy And Lactation Text: This medication is Pregnancy Category B and is considered safe during pregnancy. This medication is excreted in breast milk.
Bexarotene Pregnancy And Lactation Text: This medication is Pregnancy Category X and should not be given to women who are pregnant or may become pregnant. This medication should not be used if you are breast feeding.
Birth Control Pills Pregnancy And Lactation Text: This medication should be avoided if pregnant and for the first 30 days post-partum.
Hydroxychloroquine Pregnancy And Lactation Text: This medication has been shown to cause fetal harm but it isn't assigned a Pregnancy Risk Category. There are small amounts excreted in breast milk.
Erythromycin Pregnancy And Lactation Text: This medication is Pregnancy Category B and is considered safe during pregnancy. It is also excreted in breast milk.
Rituxan Counseling:  I discussed with the patient the risks of Rituxan infusions. Side effects can include infusion reactions, severe drug rashes including mucocutaneous reactions, reactivation of latent hepatitis and other infections and rarely progressive multifocal leukoencephalopathy.  All of the patient's questions and concerns were addressed.
Arava Pregnancy And Lactation Text: This medication is Pregnancy Category X and is absolutely contraindicated during pregnancy. It is unknown if it is excreted in breast milk.
Benzoyl Peroxide Counseling: Patient counseled that medicine may cause skin irritation and bleach clothing.  In the event of skin irritation, the patient was advised to reduce the amount of the drug applied or use it less frequently.   The patient verbalized understanding of the proper use and possible adverse effects of benzoyl peroxide.  All of the patient's questions and concerns were addressed.
Tazorac Pregnancy And Lactation Text: This medication is not safe during pregnancy. It is unknown if this medication is excreted in breast milk.
Erythromycin Counseling:  I discussed with the patient the risks of erythromycin including but not limited to GI upset, allergic reaction, drug rash, diarrhea, increase in liver enzymes, and yeast infections.
Birth Control Pills Counseling: Birth Control Pill Counseling: I discussed with the patient the potential side effects of OCPs including but not limited to increased risk of stroke, heart attack, thrombophlebitis, deep venous thrombosis, hepatic adenomas, breast changes, GI upset, headaches, and depression.  The patient verbalized understanding of the proper use and possible adverse effects of OCPs. All of the patient's questions and concerns were addressed.
Arava Counseling:  Patient counseled regarding adverse effects of Arava including but not limited to nausea, vomiting, abnormalities in liver function tests. Patients may develop mouth sores, rash, diarrhea, and abnormalities in blood counts. The patient understands that monitoring is required including LFTs and blood counts.  There is a rare possibility of scarring of the liver and lung problems that can occur when taking methotrexate. Persistent nausea, loss of appetite, pale stools, dark urine, cough, and shortness of breath should be reported immediately. Patient advised to discontinue Arava treatment and consult with a physician prior to attempting conception. The patient will have to undergo a treatment to eliminate Arava from the body prior to conception.
Rituxan Pregnancy And Lactation Text: This medication is Pregnancy Category C and it isn't know if it is safe during pregnancy. It is unknown if this medication is excreted in breast milk but similar antibodies are known to be excreted.
Hydroxychloroquine Counseling:  I discussed with the patient that a baseline ophthalmologic exam is needed at the start of therapy and every year thereafter while on therapy. A CBC may also be warranted for monitoring.  The side effects of this medication were discussed with the patient, including but not limited to agranulocytosis, aplastic anemia, seizures, rashes, retinopathy, and liver toxicity. Patient instructed to call the office should any adverse effect occur.  The patient verbalized understanding of the proper use and possible adverse effects of Plaquenil.  All the patient's questions and concerns were addressed.
Isotretinoin Counseling: Patient should get monthly blood tests, not donate blood, not drive at night if vision affected, not share medication, and not undergo elective surgery for 6 months after tx completed. Side effects reviewed, pt to contact office should one occur.
Topical Clindamycin Counseling: Patient counseled that this medication may cause skin irritation or allergic reactions.  In the event of skin irritation, the patient was advised to reduce the amount of the drug applied or use it less frequently.   The patient verbalized understanding of the proper use and possible adverse effects of clindamycin.  All of the patient's questions and concerns were addressed.
Benzoyl Peroxide Pregnancy And Lactation Text: This medication is Pregnancy Category C. It is unknown if benzoyl peroxide is excreted in breast milk.
Klisyri Counseling:  I discussed with the patient the risks of Klisyri including but not limited to erythema, scaling, itching, weeping, crusting, and pain.
Azathioprine Counseling:  I discussed with the patient the risks of azathioprine including but not limited to myelosuppression, immunosuppression, hepatotoxicity, lymphoma, and infections.  The patient understands that monitoring is required including baseline LFTs, Creatinine, possible TPMP genotyping and weekly CBCs for the first month and then every 2 weeks thereafter.  The patient verbalized understanding of the proper use and possible adverse effects of azathioprine.  All of the patient's questions and concerns were addressed.
Itraconazole Pregnancy And Lactation Text: This medication is Pregnancy Category C and it isn't know if it is safe during pregnancy. It is also excreted in breast milk.
Glycopyrrolate Pregnancy And Lactation Text: This medication is Pregnancy Category B and is considered safe during pregnancy. It is unknown if it is excreted breast milk.
Propranolol Pregnancy And Lactation Text: This medication is Pregnancy Category C and it isn't known if it is safe during pregnancy. It is excreted in breast milk.
Doxycycline Pregnancy And Lactation Text: This medication is Pregnancy Category D and not consider safe during pregnancy. It is also excreted in breast milk but is considered safe for shorter treatment courses.
Klisyri Pregnancy And Lactation Text: It is unknown if this medication can harm a developing fetus or if it is excreted in breast milk.
Isotretinoin Pregnancy And Lactation Text: This medication is Pregnancy Category X and is considered extremely dangerous during pregnancy. It is unknown if it is excreted in breast milk.
Topical Clindamycin Pregnancy And Lactation Text: This medication is Pregnancy Category B and is considered safe during pregnancy. It is unknown if it is excreted in breast milk.
Siliq Counseling:  I discussed with the patient the risks of Siliq including but not limited to new or worsening depression, suicidal thoughts and behavior, immunosuppression, malignancy, posterior leukoencephalopathy syndrome, and serious infections.  The patient understands that monitoring is required including a PPD at baseline and must alert us or the primary physician if symptoms of infection or other concerning signs are noted. There is also a special program designed to monitor depression which is required with Siliq.
Azathioprine Pregnancy And Lactation Text: This medication is Pregnancy Category D and isn't considered safe during pregnancy. It is unknown if this medication is excreted in breast milk.
Carac Counseling:  I discussed with the patient the risks of Carac including but not limited to erythema, scaling, itching, weeping, crusting, and pain.
Propranolol Counseling:  I discussed with the patient the risks of propranolol including but not limited to low heart rate, low blood pressure, low blood sugar, restlessness and increased cold sensitivity. They should call the office if they experience any of these side effects.
Ketoconazole Counseling:   Patient counseled regarding improving absorption with orange juice.  Adverse effects include but are not limited to breast enlargement, headache, diarrhea, nausea, upset stomach, liver function test abnormalities, taste disturbance, and stomach pain.  There is a rare possibility of liver failure that can occur when taking ketoconazole. The patient understands that monitoring of LFTs may be required, especially at baseline. The patient verbalized understanding of the proper use and possible adverse effects of ketoconazole.  All of the patient's questions and concerns were addressed.
Doxycycline Counseling:  Patient counseled regarding possible photosensitivity and increased risk for sunburn.  Patient instructed to avoid sunlight, if possible.  When exposed to sunlight, patients should wear protective clothing, sunglasses, and sunscreen.  The patient was instructed to call the office immediately if the following severe adverse effects occur:  hearing changes, easy bruising/bleeding, severe headache, or vision changes.  The patient verbalized understanding of the proper use and possible adverse effects of doxycycline.  All of the patient's questions and concerns were addressed.
Glycopyrrolate Counseling:  I discussed with the patient the risks of glycopyrrolate including but not limited to skin rash, drowsiness, dry mouth, difficulty urinating, and blurred vision.
High Dose Vitamin A Counseling: Side effects reviewed, pt to contact office should one occur.
Opioid Pregnancy And Lactation Text: These medications can lead to premature delivery and should be avoided during pregnancy. These medications are also present in breast milk in small amounts.
Minoxidil Counseling: Minoxidil is a topical medication which can increase blood flow where it is applied. It is uncertain how this medication increases hair growth. Side effects are uncommon and include stinging and allergic reactions.
Topical Ketoconazole Counseling: Patient counseled that this medication may cause skin irritation or allergic reactions.  In the event of skin irritation, the patient was advised to reduce the amount of the drug applied or use it less frequently.   The patient verbalized understanding of the proper use and possible adverse effects of ketoconazole.  All of the patient's questions and concerns were addressed.
Siliq Pregnancy And Lactation Text: The risk during pregnancy and breastfeeding is uncertain with this medication.
Cellcept Counseling:  I discussed with the patient the risks of mycophenolate mofetil including but not limited to infection/immunosuppression, GI upset, hypokalemia, hypercholesterolemia, bone marrow suppression, lymphoproliferative disorders, malignancy, GI ulceration/bleed/perforation, colitis, interstitial lung disease, kidney failure, progressive multifocal leukoencephalopathy, and birth defects.  The patient understands that monitoring is required including a baseline creatinine and regular CBC testing. In addition, patient must alert us immediately if symptoms of infection or other concerning signs are noted.
Carac Pregnancy And Lactation Text: This medication is Pregnancy Category X and contraindicated in pregnancy and in women who may become pregnant. It is unknown if this medication is excreted in breast milk.
Ketoconazole Pregnancy And Lactation Text: This medication is Pregnancy Category C and it isn't know if it is safe during pregnancy. It is also excreted in breast milk and breast feeding isn't recommended.
Cimzia Counseling:  I discussed with the patient the risks of Cimzia including but not limited to immunosuppression, allergic reactions and infections.  The patient understands that monitoring is required including a PPD at baseline and must alert us or the primary physician if symptoms of infection or other concerning signs are noted.
Gabapentin Pregnancy And Lactation Text: This medication is Pregnancy Category C and isn't considered safe during pregnancy. It is excreted in breast milk.
Clindamycin Pregnancy And Lactation Text: This medication can be used in pregnancy if certain situations. Clindamycin is also present in breast milk.
Simponi Counseling:  I discussed with the patient the risks of golimumab including but not limited to myelosuppression, immunosuppression, autoimmune hepatitis, demyelinating diseases, lymphoma, and serious infections.  The patient understands that monitoring is required including a PPD at baseline and must alert us or the primary physician if symptoms of infection or other concerning signs are noted.
Calcipotriene Counseling:  I discussed with the patient the risks of calcipotriene including but not limited to erythema, scaling, itching, and irritation.
Cimzia Pregnancy And Lactation Text: This medication crosses the placenta but can be considered safe in certain situations. Cimzia may be excreted in breast milk.
Mirvaso Counseling: Mirvaso is a topical medication which can decrease superficial blood flow where applied. Side effects are uncommon and include stinging, redness and allergic reactions.
Sarecycline Counseling: Patient advised regarding possible photosensitivity and discoloration of the teeth, skin, lips, tongue and gums.  Patient instructed to avoid sunlight, if possible.  When exposed to sunlight, patients should wear protective clothing, sunglasses, and sunscreen.  The patient was instructed to call the office immediately if the following severe adverse effects occur:  hearing changes, easy bruising/bleeding, severe headache, or vision changes.  The patient verbalized understanding of the proper use and possible adverse effects of sarecycline.  All of the patient's questions and concerns were addressed.
Terbinafine Counseling: Patient counseling regarding adverse effects of terbinafine including but not limited to headache, diarrhea, rash, upset stomach, liver function test abnormalities, itching, taste/smell disturbance, nausea, abdominal pain, and flatulence.  There is a rare possibility of liver failure that can occur when taking terbinafine.  The patient understands that a baseline LFT and kidney function test may be required. The patient verbalized understanding of the proper use and possible adverse effects of terbinafine.  All of the patient's questions and concerns were addressed.
Gabapentin Counseling: I discussed with the patient the risks of gabapentin including but not limited to dizziness, somnolence, fatigue and ataxia.
Oxybutynin Counseling:  I discussed with the patient the risks of oxybutynin including but not limited to skin rash, drowsiness, dry mouth, difficulty urinating, and blurred vision.
Clindamycin Counseling: I counseled the patient regarding use of clindamycin as an antibiotic for prophylactic and/or therapeutic purposes. Clindamycin is active against numerous classes of bacteria, including skin bacteria. Side effects may include nausea, diarrhea, gastrointestinal upset, rash, hives, yeast infections, and in rare cases, colitis.
Topical Sulfur Applications Counseling: Topical Sulfur Counseling: Patient counseled that this medication may cause skin irritation or allergic reactions.  In the event of skin irritation, the patient was advised to reduce the amount of the drug applied or use it less frequently.   The patient verbalized understanding of the proper use and possible adverse effects of topical sulfur application.  All of the patient's questions and concerns were addressed.
Calcipotriene Pregnancy And Lactation Text: This medication has not been proven safe during pregnancy. It is unknown if this medication is excreted in breast milk.
Cyclophosphamide Counseling:  I discussed with the patient the risks of cyclophosphamide including but not limited to hair loss, hormonal abnormalities, decreased fertility, abdominal pain, diarrhea, nausea and vomiting, bone marrow suppression and infection. The patient understands that monitoring is required while taking this medication.
Sarecycline Pregnancy And Lactation Text: This medication is Pregnancy Category D and not consider safe during pregnancy. It is also excreted in breast milk.
Otezla Pregnancy And Lactation Text: This medication is Pregnancy Category C and it isn't known if it is safe during pregnancy. It is unknown if it is excreted in breast milk.
Terbinafine Pregnancy And Lactation Text: This medication is Pregnancy Category B and is considered safe during pregnancy. It is also excreted in breast milk and breast feeding isn't recommended.
Cephalexin Pregnancy And Lactation Text: This medication is Pregnancy Category B and considered safe during pregnancy.  It is also excreted in breast milk but can be used safely for shorter doses.
Cosentyx Counseling:  I discussed with the patient the risks of Cosentyx including but not limited to worsening of Crohn's disease, immunosuppression, allergic reactions and infections.  The patient understands that monitoring is required including a PPD at baseline and must alert us or the primary physician if symptoms of infection or other concerning signs are noted.
Finasteride Pregnancy And Lactation Text: This medication is absolutely contraindicated during pregnancy. It is unknown if it is excreted in breast milk.
Skyrizi Counseling: I discussed with the patient the risks of risankizumab-rzaa including but not limited to immunosuppression, and serious infections.  The patient understands that monitoring is required including a PPD at baseline and must alert us or the primary physician if symptoms of infection or other concerning signs are noted.
Mirvaso Pregnancy And Lactation Text: This medication has not been assigned a Pregnancy Risk Category. It is unknown if the medication is excreted in breast milk.
Topical Sulfur Applications Pregnancy And Lactation Text: This medication is Pregnancy Category C and has an unknown safety profile during pregnancy. It is unknown if this topical medication is excreted in breast milk.
Cyclophosphamide Pregnancy And Lactation Text: This medication is Pregnancy Category D and it isn't considered safe during pregnancy. This medication is excreted in breast milk.
Use Enhanced Medication Counseling?: No
5-Fu Counseling: 5-Fluorouracil Counseling:  I discussed with the patient the risks of 5-fluorouracil including but not limited to erythema, scaling, itching, weeping, crusting, and pain.
Otezla Counseling: The side effects of Otezla were discussed with the patient, including but not limited to worsening or new depression, weight loss, diarrhea, nausea, upper respiratory tract infection, and headache. Patient instructed to call the office should any adverse effect occur.  The patient verbalized understanding of the proper use and possible adverse effects of Otezla.  All the patient's questions and concerns were addressed.
Cephalexin Counseling: I counseled the patient regarding use of cephalexin as an antibiotic for prophylactic and/or therapeutic purposes. Cephalexin (commonly prescribed under brand name Keflex) is a cephalosporin antibiotic which is active against numerous classes of bacteria, including most skin bacteria. Side effects may include nausea, diarrhea, gastrointestinal upset, rash, hives, yeast infections, and in rare cases, hepatitis, kidney disease, seizures, fever, confusion, neurologic symptoms, and others. Patients with severe allergies to penicillin medications are cautioned that there is about a 10% incidence of cross-reactivity with cephalosporins. When possible, patients with penicillin allergies should use alternatives to cephalosporins for antibiotic therapy.
Finasteride Male Counseling: Finasteride Counseling:  I discussed with the patient the risks of use of finasteride including but not limited to decreased libido, decreased ejaculate volume, gynecomastia, and depression. Women should not handle medication.  All of the patient's questions and concerns were addressed.
Tetracycline Counseling: Patient counseled regarding possible photosensitivity and increased risk for sunburn.  Patient instructed to avoid sunlight, if possible.  When exposed to sunlight, patients should wear protective clothing, sunglasses, and sunscreen.  The patient was instructed to call the office immediately if the following severe adverse effects occur:  hearing changes, easy bruising/bleeding, severe headache, or vision changes.  The patient verbalized understanding of the proper use and possible adverse effects of tetracycline.  All of the patient's questions and concerns were addressed. Patient understands to avoid pregnancy while on therapy due to potential birth defects.
Wartpeel Counseling:  I discussed with the patient the risks of Wartpeel including but not limited to erythema, scaling, itching, weeping, crusting, and pain.
Picato Counseling:  I discussed with the patient the risks of Picato including but not limited to erythema, scaling, itching, weeping, crusting, and pain.
Cyclosporine Counseling:  I discussed with the patient the risks of cyclosporine including but not limited to hypertension, gingival hyperplasia,myelosuppression, immunosuppression, liver damage, kidney damage, neurotoxicity, lymphoma, and serious infections. The patient understands that monitoring is required including baseline blood pressure, CBC, CMP, lipid panel and uric acid, and then 1-2 times monthly CMP and blood pressure.
Valtrex Pregnancy And Lactation Text: this medication is Pregnancy Category B and is considered safe during pregnancy. This medication is not directly found in breast milk but it's metabolite acyclovir is present.
Cimetidine Counseling:  I discussed with the patient the risks of Cimetidine including but not limited to gynecomastia, headache, diarrhea, nausea, drowsiness, arrhythmias, pancreatitis, skin rashes, psychosis, bone marrow suppression and kidney toxicity.
Bactrim Pregnancy And Lactation Text: This medication is Pregnancy Category D and is known to cause fetal risk.  It is also excreted in breast milk.
Oral Minoxidil Pregnancy And Lactation Text: This medication should only be used when clearly needed if you are pregnant, attempting to become pregnant or breast feeding.
Dupixent Counseling: I discussed with the patient the risks of dupilumab including but not limited to eye infection and irritation, cold sores, injection site reactions, worsening of asthma, allergic reactions and increased risk of parasitic infection.  Live vaccines should be avoided while taking dupilumab. Dupilumab will also interact with certain medications such as warfarin and cyclosporine. The patient understands that monitoring is required and they must alert us or the primary physician if symptoms of infection or other concerning signs are noted.
Stelara Counseling:  I discussed with the patient the risks of ustekinumab including but not limited to immunosuppression, malignancy, posterior leukoencephalopathy syndrome, and serious infections.  The patient understands that monitoring is required including a PPD at baseline and must alert us or the primary physician if symptoms of infection or other concerning signs are noted.
Drysol Counseling:  I discussed with the patient the risks of drysol/aluminum chloride including but not limited to skin rash, itching, irritation, burning.
Cyclosporine Pregnancy And Lactation Text: This medication is Pregnancy Category C and it isn't know if it is safe during pregnancy. This medication is excreted in breast milk.
Valtrex Counseling: I discussed with the patient the risks of valacyclovir including but not limited to kidney damage, nausea, vomiting and severe allergy.  The patient understands that if the infection seems to be worsening or is not improving, they are to call.
Oral Minoxidil Counseling- I discussed with the patient the risks of oral minoxidil including but not limited to shortness of breath, swelling of the feet or ankles, dizziness, lightheadedness, unwanted hair growth and allergic reaction.  The patient verbalized understanding of the proper use and possible adverse effects of oral minoxidil.  All of the patient's questions and concerns were addressed.
Bactrim Counseling:  I discussed with the patient the risks of sulfa antibiotics including but not limited to GI upset, allergic reaction, drug rash, diarrhea, dizziness, photosensitivity, and yeast infections.  Rarely, more serious reactions can occur including but not limited to aplastic anemia, agranulocytosis, methemoglobinemia, blood dyscrasias, liver or kidney failure, lung infiltrates or desquamative/blistering drug rashes.
Dupixent Pregnancy And Lactation Text: This medication likely crosses the placenta but the risk for the fetus is uncertain. This medication is excreted in breast milk.
Erivedge Counseling- I discussed with the patient the risks of Erivedge including but not limited to nausea, vomiting, diarrhea, constipation, weight loss, changes in the sense of taste, decreased appetite, muscle spasms, and hair loss.  The patient verbalized understanding of the proper use and possible adverse effects of Erivedge.  All of the patient's questions and concerns were addressed.
Protopic Counseling: Patient may experience a mild burning sensation during topical application. Protopic is not approved in children less than 2 years of age. There have been case reports of hematologic and skin malignancies in patients using topical calcineurin inhibitors although causality is questionable.
Winlevi Counseling:  I discussed with the patient the risks of topical clascoterone including but not limited to erythema, scaling, itching, and stinging. Patient voiced their understanding.
Methotrexate Counseling:  Patient counseled regarding adverse effects of methotrexate including but not limited to nausea, vomiting, abnormalities in liver function tests. Patients may develop mouth sores, rash, diarrhea, and abnormalities in blood counts. The patient understands that monitoring is required including LFT's and blood counts.  There is a rare possibility of scarring of the liver and lung problems that can occur when taking methotrexate. Persistent nausea, loss of appetite, pale stools, dark urine, cough, and shortness of breath should be reported immediately. Patient advised to discontinue methotrexate treatment at least three months before attempting to become pregnant.  I discussed the need for folate supplements while taking methotrexate.  These supplements can decrease side effects during methotrexate treatment. The patient verbalized understanding of the proper use and possible adverse effects of methotrexate.  All of the patient's questions and concerns were addressed.
Tranexamic Acid Pregnancy And Lactation Text: It is unknown if this medication is safe during pregnancy or breast feeding.
Rifampin Pregnancy And Lactation Text: This medication is Pregnancy Category C and it isn't know if it is safe during pregnancy. It is also excreted in breast milk and should not be used if you are breast feeding.
Doxepin Counseling:  Patient advised that the medication is sedating and not to drive a car after taking this medication. Patient informed of potential adverse effects including but not limited to dry mouth, urinary retention, and blurry vision.  The patient verbalized understanding of the proper use and possible adverse effects of doxepin.  All of the patient's questions and concerns were addressed.
Dutasteride Pregnancy And Lactation Text: This medication is absolutely contraindicated in women, especially during pregnancy and breast feeding. Feminization of male fetuses is possible if taking while pregnant.
Fluconazole Counseling:  Patient counseled regarding adverse effects of fluconazole including but not limited to headache, diarrhea, nausea, upset stomach, liver function test abnormalities, taste disturbance, and stomach pain.  There is a rare possibility of liver failure that can occur when taking fluconazole.  The patient understands that monitoring of LFTs and kidney function test may be required, especially at baseline. The patient verbalized understanding of the proper use and possible adverse effects of fluconazole.  All of the patient's questions and concerns were addressed.
Winlevi Pregnancy And Lactation Text: This medication is considered safe during pregnancy and breastfeeding.
Protopic Pregnancy And Lactation Text: This medication is Pregnancy Category C. It is unknown if this medication is excreted in breast milk when applied topically.
Azithromycin Pregnancy And Lactation Text: This medication is considered safe during pregnancy and is also secreted in breast milk.
Enbrel Counseling:  I discussed with the patient the risks of etanercept including but not limited to myelosuppression, immunosuppression, autoimmune hepatitis, demyelinating diseases, lymphoma, and infections.  The patient understands that monitoring is required including a PPD at baseline and must alert us or the primary physician if symptoms of infection or other concerning signs are noted.
Taltz Counseling: I discussed with the patient the risks of ixekizumab including but not limited to immunosuppression, serious infections, worsening of inflammatory bowel disease and drug reactions.  The patient understands that monitoring is required including a PPD at baseline and must alert us or the primary physician if symptoms of infection or other concerning signs are noted.
Methotrexate Pregnancy And Lactation Text: This medication is Pregnancy Category X and is known to cause fetal harm. This medication is excreted in breast milk.
Tranexamic Acid Counseling:  Patient advised of the small risk of bleeding problems with tranexamic acid. They were also instructed to call if they developed any nausea, vomiting or diarrhea. All of the patient's questions and concerns were addressed.
Odomzo Counseling- I discussed with the patient the risks of Odomzo including but not limited to nausea, vomiting, diarrhea, constipation, weight loss, changes in the sense of taste, decreased appetite, muscle spasms, and hair loss.  The patient verbalized understanding of the proper use and possible adverse effects of Odomzo.  All of the patient's questions and concerns were addressed.
Rifampin Counseling: I discussed with the patient the risks of rifampin including but not limited to liver damage, kidney damage, red-orange body fluids, nausea/vomiting and severe allergy.
Doxepin Pregnancy And Lactation Text: This medication is Pregnancy Category C and it isn't known if it is safe during pregnancy. It is also excreted in breast milk and breast feeding isn't recommended.
Dutasteride Male Counseling: Dustasteride Counseling:  I discussed with the patient the risks of use of dutasteride including but not limited to decreased libido, decreased ejaculate volume, and gynecomastia. Women who can become pregnant should not handle medication.  All of the patient's questions and concerns were addressed.
Elidel Counseling: Patient may experience a mild burning sensation during topical application. Elidel is not approved in children less than 2 years of age. There have been case reports of hematologic and skin malignancies in patients using topical calcineurin inhibitors although causality is questionable.
Azithromycin Counseling:  I discussed with the patient the risks of azithromycin including but not limited to GI upset, allergic reaction, drug rash, diarrhea, and yeast infections.
Zyclara Counseling:  I discussed with the patient the risks of imiquimod including but not limited to erythema, scaling, itching, weeping, crusting, and pain.  Patient understands that the inflammatory response to imiquimod is variable from person to person and was educated regarded proper titration schedule.  If flu-like symptoms develop, patient knows to discontinue the medication and contact us.
Rhofade Counseling: Rhofade is a topical medication which can decrease superficial blood flow where applied. Side effects are uncommon and include stinging, redness and allergic reactions.
Prednisone Counseling:  I discussed with the patient the risks of prolonged use of prednisone including but not limited to weight gain, insomnia, osteoporosis, mood changes, diabetes, susceptibility to infection, glaucoma and high blood pressure.  In cases where prednisone use is prolonged, patients should be monitored with blood pressure checks, serum glucose levels and an eye exam.  Additionally, the patient may need to be placed on GI prophylaxis, PCP prophylaxis, and calcium and vitamin D supplementation and/or a bisphosphonate.  The patient verbalized understanding of the proper use and the possible adverse effects of prednisone.  All of the patient's questions and concerns were addressed.
Griseofulvin Counseling:  I discussed with the patient the risks of griseofulvin including but not limited to photosensitivity, cytopenia, liver damage, nausea/vomiting and severe allergy.  The patient understands that this medication is best absorbed when taken with a fatty meal (e.g., ice cream or french fries).
Nsaids Pregnancy And Lactation Text: These medications are considered safe up to 30 weeks gestation. It is excreted in breast milk.
Hydroxyzine Counseling: Patient advised that the medication is sedating and not to drive a car after taking this medication.  Patient informed of potential adverse effects including but not limited to dry mouth, urinary retention, and blurry vision.  The patient verbalized understanding of the proper use and possible adverse effects of hydroxyzine.  All of the patient's questions and concerns were addressed.
Humira Counseling:  I discussed with the patient the risks of adalimumab including but not limited to myelosuppression, immunosuppression, autoimmune hepatitis, demyelinating diseases, lymphoma, and serious infections.  The patient understands that monitoring is required including a PPD at baseline and must alert us or the primary physician if symptoms of infection or other concerning signs are noted.
Dapsone Pregnancy And Lactation Text: This medication is Pregnancy Category C and is not considered safe during pregnancy or breast feeding.
Tremfya Counseling: I discussed with the patient the risks of guselkumab including but not limited to immunosuppression, serious infections, worsening of inflammatory bowel disease and drug reactions.  The patient understands that monitoring is required including a PPD at baseline and must alert us or the primary physician if symptoms of infection or other concerning signs are noted.
High Dose Vitamin A Pregnancy And Lactation Text: High dose vitamin A therapy is contraindicated during pregnancy and breast feeding.
Thalidomide Counseling: I discussed with the patient the risks of thalidomide including but not limited to birth defects, anxiety, weakness, chest pain, dizziness, cough and severe allergy.
Eucrisa Counseling: Patient may experience a mild burning sensation during topical application. Eucrisa is not approved in children less than 2 years of age.
Quinolones Counseling:  I discussed with the patient the risks of fluoroquinolones including but not limited to GI upset, allergic reaction, drug rash, diarrhea, dizziness, photosensitivity, yeast infections, liver function test abnormalities, tendonitis/tendon rupture.
Hydroxyzine Pregnancy And Lactation Text: This medication is not safe during pregnancy and should not be taken. It is also excreted in breast milk and breast feeding isn't recommended.
Dapsone Counseling: I discussed with the patient the risks of dapsone including but not limited to hemolytic anemia, agranulocytosis, rashes, methemoglobinemia, kidney failure, peripheral neuropathy, headaches, GI upset, and liver toxicity.  Patients who start dapsone require monitoring including baseline LFTs and weekly CBCs for the first month, then every month thereafter.  The patient verbalized understanding of the proper use and possible adverse effects of dapsone.  All of the patient's questions and concerns were addressed.
Nsaids Counseling: NSAID Counseling: I discussed with the patient that NSAIDs should be taken with food. Prolonged use of NSAIDs can result in the development of stomach ulcers.  Patient advised to stop taking NSAIDs if abdominal pain occurs.  The patient verbalized understanding of the proper use and possible adverse effects of NSAIDs.  All of the patient's questions and concerns were addressed.
Griseofulvin Pregnancy And Lactation Text: This medication is Pregnancy Category X and is known to cause serious birth defects. It is unknown if this medication is excreted in breast milk but breast feeding should be avoided.
Solaraze Counseling:  I discussed with the patient the risks of Solaraze including but not limited to erythema, scaling, itching, weeping, crusting, and pain.
Sski Pregnancy And Lactation Text: This medication is Pregnancy Category D and isn't considered safe during pregnancy. It is excreted in breast milk.
Niacinamide Pregnancy And Lactation Text: These medications are considered safe during pregnancy.
Ilumya Counseling: I discussed with the patient the risks of tildrakizumab including but not limited to immunosuppression, malignancy, posterior leukoencephalopathy syndrome, and serious infections.  The patient understands that monitoring is required including a PPD at baseline and must alert us or the primary physician if symptoms of infection or other concerning signs are noted.
Solaraze Pregnancy And Lactation Text: This medication is Pregnancy Category B and is considered safe. There is some data to suggest avoiding during the third trimester. It is unknown if this medication is excreted in breast milk.
Itraconazole Counseling:  I discussed with the patient the risks of itraconazole including but not limited to liver damage, nausea/vomiting, neuropathy, and severe allergy.  The patient understands that this medication is best absorbed when taken with acidic beverages such as non-diet cola or ginger ale.  The patient understands that monitoring is required including baseline LFTs and repeat LFTs at intervals.  The patient understands that they are to contact us or the primary physician if concerning signs are noted.
Opioid Counseling: I discussed with the patient the potential side effects of opioids including but not limited to addiction, altered mental status, and depression. I stressed avoiding alcohol, benzodiazepines, muscle relaxants and sleep aids unless specifically okayed by a physician. The patient verbalized understanding of the proper use and possible adverse effects of opioids. All of the patient's questions and concerns were addressed. They were instructed to flush the remaining pills down the toilet if they did not need them for pain.
Xeljanz Counseling: I discussed with the patient the risks of Xeljanz therapy including increased risk of infection, liver issues, headache, diarrhea, or cold symptoms. Live vaccines should be avoided. They were instructed to call if they have any problems.
SSKI Counseling:  I discussed with the patient the risks of SSKI including but not limited to thyroid abnormalities, metallic taste, GI upset, fever, headache, acne, arthralgias, paraesthesias, lymphadenopathy, easy bleeding, arrhythmias, and allergic reaction.
Niacinamide Counseling: I recommended taking niacin or niacinamide, also know as vitamin B3, twice daily. Recent evidence suggests that taking vitamin B3 (500 mg twice daily) can reduce the risk of actinic keratoses and non-melanoma skin cancers. Side effects of vitamin B3 include flushing and headache.
Minocycline Counseling: Patient advised regarding possible photosensitivity and discoloration of the teeth, skin, lips, tongue and gums.  Patient instructed to avoid sunlight, if possible.  When exposed to sunlight, patients should wear protective clothing, sunglasses, and sunscreen.  The patient was instructed to call the office immediately if the following severe adverse effects occur:  hearing changes, easy bruising/bleeding, severe headache, or vision changes.  The patient verbalized understanding of the proper use and possible adverse effects of minocycline.  All of the patient's questions and concerns were addressed.
Albendazole Counseling:  I discussed with the patient the risks of albendazole including but not limited to cytopenia, kidney damage, nausea/vomiting and severe allergy.  The patient understands that this medication is being used in an off-label manner.
Colchicine Counseling:  Patient counseled regarding adverse effects including but not limited to stomach upset (nausea, vomiting, stomach pain, or diarrhea).  Patient instructed to limit alcohol consumption while taking this medication.  Colchicine may reduce blood counts especially with prolonged use.  The patient understands that monitoring of kidney function and blood counts may be required, especially at baseline. The patient verbalized understanding of the proper use and possible adverse effects of colchicine.  All of the patient's questions and concerns were addressed.
Hydroquinone Counseling:  Patient advised that medication may result in skin irritation, lightening (hypopigmentation), dryness, and burning.  In the event of skin irritation, the patient was advised to reduce the amount of the drug applied or use it less frequently.  Rarely, spots that are treated with hydroquinone can become darker (pseudoochronosis).  Should this occur, patient instructed to stop medication and call the office. The patient verbalized understanding of the proper use and possible adverse effects of hydroquinone.  All of the patient's questions and concerns were addressed.
Acitretin Counseling:  I discussed with the patient the risks of acitretin including but not limited to hair loss, dry lips/skin/eyes, liver damage, hyperlipidemia, depression/suicidal ideation, photosensitivity.  Serious rare side effects can include but are not limited to pancreatitis, pseudotumor cerebri, bony changes, clot formation/stroke/heart attack.  Patient understands that alcohol is contraindicated since it can result in liver toxicity and significantly prolong the elimination of the drug by many years.
Topical Retinoid counseling:  Patient advised to apply a pea-sized amount only at bedtime and wait 30 minutes after washing their face before applying.  If too drying, patient may add a non-comedogenic moisturizer. The patient verbalized understanding of the proper use and possible adverse effects of retinoids.  All of the patient's questions and concerns were addressed.
Xelraeganz Pregnancy And Lactation Text: This medication is Pregnancy Category D and is not considered safe during pregnancy.  The risk during breast feeding is also uncertain.
Metronidazole Pregnancy And Lactation Text: This medication is Pregnancy Category B and considered safe during pregnancy.  It is also excreted in breast milk.
Spironolactone Pregnancy And Lactation Text: This medication can cause feminization of the male fetus and should be avoided during pregnancy. The active metabolite is also found in breast milk.
Libtayo Pregnancy And Lactation Text: This medication is contraindicated in pregnancy and when breast feeding.

## 2022-03-18 PROBLEM — Z48.02 ENCOUNTER FOR REMOVAL OF SUTURES: Status: ACTIVE | Noted: 2022-01-01

## 2022-03-18 PROBLEM — Z41.9 ENCOUNTER FOR PROCEDURE FOR PURPOSES OTHER THAN REMEDYING HEALTH STATE, UNSPECIFIED: Status: ACTIVE | Noted: 2022-01-01

## 2022-03-18 NOTE — PROCEDURE: LASER RESURFACING
Energy(Mj): 5
Post-Care Instructions: I reviewed with the patient in detail post-care instructions. Patient should avoid sun until area fully healed. Pt should apply vaseline to treated areas, and remove crusts gently with water-vinegar soaks.
Treatment Number: 1
Number Of Passes: 2
Percent Coverage Per Pass (%): 0
Was A Corneal Shield Used?: No
Consent: Written consent obtained, risks reviewed including but not limited to crusting, scabbing, blistering, scarring, darker or lighter pigmentary change, incomplete improvement of dyschromia, wrinkles, prolonged erythema and facial swelling, infection and bleeding.
Detail Level: Detailed
Power (Mejia): 40
Laser Type: CO2Re laser (Deep)
Corneal Shield Text: A corneal shield was inserted after appropriate application of topical anesthesia.
Wavelength: 10,600nm

## 2022-03-18 NOTE — PROCEDURE: SUTURE REMOVAL (GLOBAL PERIOD)
Detail Level: Detailed
Add 54387 Cpt? (Important Note: In 2017 The Use Of 74121 Is Being Tracked By Cms To Determine Future Global Period Reimbursement For Global Periods): no

## 2022-04-01 PROBLEM — K74.69 OTHER CIRRHOSIS OF LIVER (HCC): Status: ACTIVE | Noted: 2022-01-01

## 2022-04-01 PROBLEM — G91.1 ACQUIRED CEREBRAL VENTRICULOMEGALY (HCC): Status: ACTIVE | Noted: 2022-01-01

## 2022-04-01 PROBLEM — C92.10 CHRONIC MYELOGENOUS LEUKEMIA (HCC): Status: ACTIVE | Noted: 2022-01-01

## 2022-04-01 NOTE — ASSESSMENT & PLAN NOTE
Chronic, he is currently seeing Dr. Rosi Thomas every 3 months where she checks his blood counts.  His most recent platelet level was finally within a normal range at 400.  He continues to take hydroxyurea 1500 mg Saturdays and Sundays, 1000 mg Monday, Tuesday, Wednesday, Thursday, Friday.

## 2022-04-01 NOTE — LETTER
[facility]  ARASH Inman  [address]  [place] [address]  Fax: [phone]   Authorization for Release/Disclosure of   Protected Health Information   Name: RAFIQ MERRILL : 1935 SSN: [id number]   Address: [address] [place] [address] [place] [address] Phone:    [phone] (home)    I authorize the entity listed below to release/disclose the PHI below to:   [facility]/ARASH Inman and ARASH Inman   Provider or Entity Name:  [facility]   Address   [facility], [facility], Zip            [address] [place] [address] Phone:  [phone]      Fax:       [phone]          Reason for request: continuity of care   Information to be released:    [ X ] LAST COLONOSCOPY,  including any PATH REPORT and follow-up  [ X ] LAST FIT/COLOGUARD RESULT [  ] LAST DEXA  [  ] LAST MAMMOGRAM  [  ] LAST PAP  [  ] LAST LABS [  ] RETINA EXAM REPORT  [  ] IMMUNIZATION RECORDS  [ x ] Release all info      [  ] Check here and initial the line next to each item to release ALL health information INCLUDING  _____ Care and treatment for drug and / or alcohol abuse  _____ HIV testing, infection status, or AIDS  _____ Genetic Testing    DATES OF SERVICE OR TIME PERIOD TO BE DISCLOSED: _____________  I understand and acknowledge that:  * This Authorization may be revoked at any time by you in writing, except if your health information has already been used or disclosed.  * Your health information that will be used or disclosed as a result of you signing this authorization could be re-disclosed by the recipient. If this occurs, your re-disclosed health information may no longer be protected by State or Federal laws.  * You may refuse to sign this Authorization. Your refusal will not affect your ability to obtain treatment.  * This Authorization becomes effective upon signing and will  on (date) __________.      If no date is indicated, this Authorization will   one (1) year from the signature date.    Name: Han Matt Bi    Continuity of Care    Date:     4/1/2022       PLEASE FAX REQUESTED RECORDS BACK TO: [phone]

## 2022-04-01 NOTE — PROGRESS NOTES
Subjective:     CC:   Chief Complaint   Patient presents with   • Establish Care     New Pt        HISTORY OF THE PRESENT ILLNESS: Patient is a 87 y.o. male. This pleasant patient is here today to establish care and discuss . His prior PCP was Dinorah Thompson.  He is accompanied by his wife and he is retired from being an  for a telephone company.  Denies any recent falls, last fall 2-3 years ago   Dr. Palacios neurosurgeon consult where they recommended 3 days inpatient for removal of CSF to decrease the hydrocephalus and then shunt placement which they opted out for.   Wife says they have a good support system,   1 son in [place] and 1 in [place]  Wife has 1 daughter [place] and 1 son in [place].     Acquired cerebral ventriculomegaly (HCC)  Chronic, he has been seen by neurology Dr. Corrales and Dr. Christine.  This started when he was having trouble with his memory, weakness and falls.  He was seen by neurology who did an MRI and it was found that he had cerebral ventriculomegaly.  Dr. Christine did a large volume CSF removal to see if this improved his gait however directly after the procedure they did not see improvement.  Wife states that she does feel she is on improvement with this procedure.  They were then directed to see Dr. Palacios for neurosurgery consult who recommended he have 3 days inpatient for removal of CSF fluid to decrease the fluid and then have a shunt placed.  Due to his age and other chronic health conditions he felt that he did not want to go through with this procedure and opted out.  He states that overall he is feeling well and is not having any vertigo, weakness, or falls.  He states that he does have some memory problems with short-term memory and does defer to his wife to answer a lot of his questions and to do his medication management.    Chronic myelogenous leukemia (HCC)  Chronic, he is currently seeing Dr. Rosi Thomas every 3 months where she checks his blood counts.  His most recent  platelet level was finally within a normal range at 400.  He continues to take hydroxyurea 1500 mg Saturdays and Sundays, 1000 mg Monday, Tuesday, Wednesday, Thursday, Friday.    Obstructive sleep apnea  Chronic, patient wears a CPAP nightly and is followed by pulmonary.    Other cirrhosis of liver (HCC)  Chronic, wife states that he has been diagnosed with cirrhosis of the liver by his GI doctor.  Most recent CT scan done also indicates that he does have cirrhosis of the liver and splenomegaly, no ascites present.  Is followed by GI.  Denies right upper quadrant pain and coloration normal in clinic today.      Current Outpatient Medications Ordered in Epic   Medication Sig Dispense Refill   • fluorouracil (EFUDEX) 5 % cream APPLY TOPICALLY TO PRE CANCERS ON RIGHT EAR AND RIGHT CHEEK TWICE A DAY FOR 2 WEEKS     • lansoprazole (PREVACID) 30 MG CAPSULE DELAYED RELEASE TAKE 1 CAPSULE BY MOUTH EVERYDAY AT BEDTIME 100 Capsule 3   • montelukast (SINGULAIR) 10 MG Tab TAKE 1 TAB BY MOUTH EVERY EVENING. INDICATIONS: HAYFEVER 90 Tablet 3   • Folic Acid-Vit B6-Vit B12 0.8-10-0.115 MG Tab Take 1 tablet by mouth every day. 90 tablet 3   • vitamin D 4000 units Tab Take 4,000 Units by mouth every day. (Patient taking differently: Take 4,000 Units by mouth at bedtime.) 1200 Tab 3   • Multiple Vitamins-Minerals (OCUVITE PO) Take 2 Caps by mouth every evening.     • hydroxyurea (HYDREA) 500 MG Cap Take 1,000-1,500 mg by mouth see administration instructions. Pt takes 1500MG on Sat and Sun  1000MG on Mon, Tue, Wed, Thur, and Fri  Indications: Chronic Myelogenous Leukemia     • aspirin EC (ECOTRIN) 81 MG Tablet Delayed Response Take 81 mg by mouth every evening. (Patient not taking: Reported on 4/1/2022)       No current [facility]-ordered facility-administered medications on file.       Health Maintenance: Completed    ROS:   Gen: no fevers/chills, no changes in weight  Eyes: no changes in vision  ENT: no sore throat, no hearing loss, no  bloody nose  Pulm: no sob, no cough  CV: no chest pain, no palpitations  GI: no nausea/vomiting, no diarrhea  : no dysuria  MSk: no myalgias  Skin: no rash  Neuro: no headaches, no numbness/tingling  Heme/Lymph: no easy bruising      Objective:       Exam: /70 (BP Location: Left arm, Patient Position: Sitting, BP Cuff Size: Adult)   Pulse 69   Temp 36.6 °C (97.9 °F) (Temporal)   Resp 16   Ht 1.829 m (6')   Wt 91.6 kg (202 lb)   SpO2 95%  Body mass index is 27.4 kg/m².    General: Normal appearing. No distress.  HEENT: Normocephalic. Eyes conjunctiva clear lids without ptosis, pupils equal and reactive to light accommodation, ears normal shape and contour, canals are clear bilaterally, tympanic membranes are benign, nasal mucosa benign, oropharynx is without erythema, edema or exudates.   Neck: Supple without JVD or bruit. Thyroid is not enlarged.  Pulmonary: Clear to ausculation.  Normal effort. No rales, ronchi, or wheezing.  Cardiovascular: Regular rate and rhythm without murmur. Carotid and radial pulses are intact and equal bilaterally.  Abdomen: Soft, nontender, nondistended. Normal bowel sounds. Liver and spleen are not palpable  Neurologic: Grossly nonfocal  Lymph: No cervical or supraclavicular lymph nodes are palpable  Skin: Warm and dry.  No obvious lesions.  Musculoskeletal: Normal gait. No extremity cyanosis, clubbing, or edema.  Psych: Normal mood and affect. Alert and oriented x3. Judgment and insight is normal.      A chaperone was offered to the patient during today's exam. Chaperone name: wife was present.        Assessment & Plan:   87 y.o. male with the following -    1. Healthcare maintenance  - Lipid Profile; Future  - Comp Metabolic Panel; Future  - VITAMIN D,25 HYDROXY; Future  - TSH WITH REFLEX TO FT4; Future    2. Acquired cerebral ventriculomegaly (HCC)  Chronic, currently stable.  He was previously followed by neurology and also neurosurgery however he is opted not for  surgical management and would like to let it go its course naturally.  Patient is aware of increased pressure and what can occur with this such as increased falls, weakness, memory problems.  3. Chronic myelogenous leukemia (HCC)  Chronic, is followed by Dr. Reed. Will request records   4. Obstructive sleep apnea  Chronic, wears CPAP.  Is followed by pulmonary.  5. Other cirrhosis of liver (HCC)  Chronic, is followed by GI we will request records.  Vitals stable and coloration stable in clinic no jaundice.  Other orders  - fluorouracil (EFUDEX) 5 % cream; APPLY TOPICALLY TO PRE CANCERS ON RIGHT EAR AND RIGHT CHEEK TWICE A DAY FOR 2 WEEKS       I spent a total of 55  minutes with record review, exam, communication with the patient, communication with other providers, and documentation of this encounter.    No follow-ups on file.    Please note that this dictation was created using voice recognition software. I have made every reasonable attempt to correct obvious errors, but I expect that there are errors of grammar and possibly content that I did not discover before finalizing the note.Annual Health Assessment Questions:    1.  Are you currently engaging in any exercise or physical activity? Yes    2.  How would you describe your mood or emotional well-being today? good    3.  Have you had any falls in the last year? No    4.  Have you noticed any problems with your balance or had difficulty walking? No    5.  In the last six months have you experienced any leakage of urine? No    6. DPA/Advanced Directive: Patient has Advanced Directive on file.

## 2022-04-01 NOTE — LETTER
[facility]  SUSAN Inman.  [address]  [place] [address]  Fax: [phone]   Authorization for Release/Disclosure of   Protected Health Information   Name: RAFIQ MERRILL : 1935 SSN: [id number]   Address: [address] [place] [address]  [place] [address] Phone:    [phone] (home)    I authorize the entity listed below to release/disclose the PHI below to:   [facility]/ARASH Inman and ARSAH Inman   Provider or Entity Name:  [facility]    Address   City, [facility]   Phone:      Fax:     Reason for request: continuity of care   Information to be released:    [  ] LAST COLONOSCOPY,  including any PATH REPORT and follow-up  [  ] LAST FIT/COLOGUARD RESULT [  ] LAST DEXA  [  ] LAST MAMMOGRAM  [  ] LAST PAP  [  ] LAST LABS [  ] RETINA EXAM REPORT  [  ] IMMUNIZATION RECORDS  [x  ] Release all info      [  ] Check here and initial the line next to each item to release ALL health information INCLUDING  _____ Care and treatment for drug and / or alcohol abuse  _____ HIV testing, infection status, or AIDS  _____ Genetic Testing    DATES OF SERVICE OR TIME PERIOD TO BE DISCLOSED: _____________  I understand and acknowledge that:  * This Authorization may be revoked at any time by you in writing, except if your health information has already been used or disclosed.  * Your health information that will be used or disclosed as a result of you signing this authorization could be re-disclosed by the recipient. If this occurs, your re-disclosed health information may no longer be protected by State or Federal laws.  * You may refuse to sign this Authorization. Your refusal will not affect your ability to obtain treatment.  * This Authorization becomes effective upon signing and will  on (date) __________.      If no date is indicated, this Authorization will  one (1) year from the signature date.    Name: Sheldonbrian Gutierrez  Bi    Continuity of Care    Date:     4/1/2022       PLEASE FAX REQUESTED RECORDS BACK TO: [phone]

## 2022-04-01 NOTE — ASSESSMENT & PLAN NOTE
Chronic, wife states that he has been diagnosed with cirrhosis of the liver by his GI doctor.  Most recent CT scan done also indicates that he does have cirrhosis of the liver and splenomegaly, no ascites present.  Is followed by GI.  Denies right upper quadrant pain and coloration normal in clinic today.

## 2022-04-01 NOTE — LETTER
[facility]  ARASH Inman  [address]  [place] [address]  Fax: [phone]   Authorization for Release/Disclosure of   Protected Health Information   Name: HAN PAT : 1935 SSN: [id number]   Address: [address] [place] [address]  [place] [address] Phone:    [phone] (home)    I authorize the entity listed below to release/disclose the PHI below to:   [facility]/ARASH Inman and ARASH Inman   Provider or Entity Name:  Dr.Kor[facility]    Address   City, [facility]   Phone:      Fax:     Reason for request: continuity of care   Information to be released:    [  ] LAST COLONOSCOPY,  including any PATH REPORT and follow-up  [  ] LAST FIT/COLOGUARD RESULT [  ] LAST DEXA  [  ] LAST MAMMOGRAM  [  ] LAST PAP  [  ] LAST LABS [  ] RETINA EXAM REPORT  [  ] IMMUNIZATION RECORDS  [ x ] Release all info      [  ] Check here and initial the line next to each item to release ALL health information INCLUDING  _____ Care and treatment for drug and / or alcohol abuse  _____ HIV testing, infection status, or AIDS  _____ Genetic Testing    DATES OF SERVICE OR TIME PERIOD TO BE DISCLOSED: _____________  I understand and acknowledge that:  * This Authorization may be revoked at any time by you in writing, except if your health information has already been used or disclosed.  * Your health information that will be used or disclosed as a result of you signing this authorization could be re-disclosed by the recipient. If this occurs, your re-disclosed health information may no longer be protected by State or Federal laws.  * You may refuse to sign this Authorization. Your refusal will not affect your ability to obtain treatment.  * This Authorization becomes effective upon signing and will  on (date) __________.      If no date is indicated, this Authorization will  one (1) year from the signature date.    Name: Han Pat    Continuity of Care     Date:     4/1/2022       PLEASE FAX REQUESTED RECORDS BACK TO: [phone]

## 2022-04-01 NOTE — ASSESSMENT & PLAN NOTE
Chronic, he has been seen by neurology Dr. Corrales and Dr. Christine.  This started when he was having trouble with his memory, weakness and falls.  He was seen by neurology who did an MRI and it was found that he had cerebral ventriculomegaly.  Dr. Christine did a large volume CSF removal to see if this improved his gait however directly after the procedure they did not see improvement.  Wife states that she does feel she is on improvement with this procedure.  They were then directed to see Dr. Palacios for neurosurgery consult who recommended he have 3 days inpatient for removal of CSF fluid to decrease the fluid and then have a shunt placed.  Due to his age and other chronic health conditions he felt that he did not want to go through with this procedure and opted out.  He states that overall he is feeling well and is not having any vertigo, weakness, or falls.  He states that he does have some memory problems with short-term memory and does defer to his wife to answer a lot of his questions and to do his medication management.

## 2022-04-07 PROBLEM — K22.70 BARRETT'S ESOPHAGUS WITHOUT DYSPLASIA: Status: ACTIVE | Noted: 2022-01-01

## 2022-05-19 NOTE — PROGRESS NOTES
Subjective:     CC:   Chief Complaint   Patient presents with   • Extremity Weakness     X 4 days, dramatic change, 2 steps and pt needs a pause, no swelling    • Hip Pain     L side, x 4 days,        HPI:   Han presents today with dramatic increase in his weakness, states he cannot walk past his driveway now, prior to his increased weakness a few weeks ago he was walking down the street back, wife also notes that increase in sleeping.      Acquired cerebral ventriculomegaly (HCC)  Chronic, he has been seen by neurology Dr. Corrales and Dr. Christine.  This started when he was having trouble with his memory, weakness and falls.  He was seen by neurology who did an MRI and it was found that he had cerebral ventriculomegaly.  Dr. Christine did a large volume CSF removal to see if this improved his gait however directly after the procedure they did not see improvement.  Wife states that she does feel he had improvement with this procedure.  They were then directed to see Dr. Palacios for neurosurgery consult who recommended he have 3 days inpatient for removal of CSF fluid to decrease the fluid and then have a shunt placed.  Due to his age and other chronic health conditions he felt that he did not want to go through with this procedure and opted out.  He and his wife present to clinic today now wanting to move forward with the  shunt placement, his wife notes that over the last 10 possibly more days that he has not had a dramatic change in his gait as well as extreme weakness in his lower extremities.  He denies any falls, vision changes, dizziness at this time, or increased in urinary incontinence.  His referral to Dr. Palacios office is still open and we will call there to see when the earliest time that he can be scheduled for this would be.      Current Outpatient Medications Ordered in Epic   Medication Sig Dispense Refill   • FOLTABS 800 800- MCG-MG-MCG Tab TAKE 1 TABLET BY MOUTH EVERY DAY 90 Tablet 3   •  fluorouracil (EFUDEX) 5 % cream APPLY TOPICALLY TO PRE CANCERS ON RIGHT EAR AND RIGHT CHEEK TWICE A DAY FOR 2 WEEKS     • lansoprazole (PREVACID) 30 MG CAPSULE DELAYED RELEASE TAKE 1 CAPSULE BY MOUTH EVERYDAY AT BEDTIME 100 Capsule 3   • montelukast (SINGULAIR) 10 MG Tab TAKE 1 TAB BY MOUTH EVERY EVENING. INDICATIONS: HAYFEVER 90 Tablet 3   • aspirin EC (ECOTRIN) 81 MG Tablet Delayed Response Take 81 mg by mouth every evening.     • vitamin D 4000 units Tab Take 4,000 Units by mouth every day. (Patient taking differently: Take 4,000 Units by mouth at bedtime.) 1200 Tab 3   • Multiple Vitamins-Minerals (OCUVITE PO) Take 2 Caps by mouth every evening.     • hydroxyurea (HYDREA) 500 MG Cap Take 1,000-1,500 mg by mouth see administration instructions. Pt takes 1500MG on Sat and Sun  1000MG on Mon, Tue, Wed, Thur, and Fri  Indications: Chronic Myelogenous Leukemia       No current Epic-ordered facility-administered medications on file.       Health Maintenance: Completed    Review of Systems   Constitutional: Positive for malaise/fatigue. Negative for chills and fever.   HENT: Negative.    Respiratory: Negative.    Cardiovascular: Negative.    Gastrointestinal: Negative.    Genitourinary: Negative.    Musculoskeletal:        Increase in muscle weakness   Neurological: Negative for dizziness and headaches.   Psychiatric/Behavioral: Negative.  Negative for depression, hallucinations and memory loss. The patient does not have insomnia.          Objective:     Exam:  /60 (BP Location: Left arm, Patient Position: Sitting, BP Cuff Size: Adult)   Pulse 74   Temp 36.6 °C (97.9 °F) (Temporal)   Resp 16   Ht 1.829 m (6')   Wt 94.8 kg (209 lb)   SpO2 98%   BMI 28.35 kg/m²  Body mass index is 28.35 kg/m².    Physical Exam  Vitals reviewed.   Constitutional:       General: He is not in acute distress.     Appearance: Normal appearance.   HENT:      Mouth/Throat:      Mouth: Mucous membranes are moist.      Pharynx:  Oropharynx is clear.   Eyes:      Conjunctiva/sclera: Conjunctivae normal.      Pupils: Pupils are equal, round, and reactive to light.   Cardiovascular:      Rate and Rhythm: Normal rate and regular rhythm.      Pulses: Normal pulses.      Heart sounds: Normal heart sounds. No murmur heard.  Pulmonary:      Effort: Pulmonary effort is normal. No respiratory distress.      Breath sounds: No stridor. No wheezing, rhonchi or rales.   Chest:      Chest wall: No tenderness.   Musculoskeletal:      Right lower leg: No edema.      Left lower leg: No edema.   Skin:     General: Skin is warm.   Neurological:      Mental Status: He is alert and oriented to person, place, and time.      Comments: Slower, more shuffled gait.  No neurologic deficits noted at this time.   Psychiatric:         Mood and Affect: Mood normal.         Behavior: Behavior normal.          A chaperone was offered to the patient during today's exam. Chaperone name: Wife was present.      Assessment & Plan:     87 y.o. male with the following -     1. Acquired cerebral ventriculomegaly (HCC)  Neck problem, patient had opted out having CSF lumbar puncture and  shunt placed as he felt like he just wanted this to run its course.  Now that he is experiencing increased weakness and fatigue he is now wanting to have the procedure done.  Referral was placed back in November to [facility] neurosurgery to see Dr. Palacios, we did call his office today multiple times in attempt to try to get a hold of somebody to see when the soonest appointment could be scheduled for this procedure.  We were unable to reach anybody and a voicemail was left.  Discussed with him and his wife that if he is now wanting to have this procedure and his gait and weakness is rapidly getting worse to go through the ER to get admitted inpatient so he can get consulted for this procedure and have it done while inpatient.  Wife is agreeable to go to the ER if his symptoms continue to worsen.  Can  work with [facility] to try to schedule his procedure otherwise again will defer him and his wife to the ER.    I spent a total of 25 minutes with record review, exam, communication with the patient, communication with other providers, and documentation of this encounter.      No follow-ups on file.    Please note that this dictation was created using voice recognition software. I have made every reasonable attempt to correct obvious errors, but I expect that there are errors of grammar and possibly content that I did not discover before finalizing the note.

## 2022-05-19 NOTE — ASSESSMENT & PLAN NOTE
Chronic, he has been seen by neurology Dr. Corrales and Dr. Christine.  This started when he was having trouble with his memory, weakness and falls.  He was seen by neurology who did an MRI and it was found that he had cerebral ventriculomegaly.  Dr. Christine did a large volume CSF removal to see if this improved his gait however directly after the procedure they did not see improvement.  Wife states that she does feel he had improvement with this procedure.  They were then directed to see Dr. Palacios for neurosurgery consult who recommended he have 3 days inpatient for removal of CSF fluid to decrease the fluid and then have a shunt placed.  Due to his age and other chronic health conditions he felt that he did not want to go through with this procedure and opted out.  He and his wife present to clinic today now wanting to move forward with the  shunt placement, his wife notes that over the last 10 possibly more days that he has not had a dramatic change in his gait as well as extreme weakness in his lower extremities.  He denies any falls, vision changes, dizziness at this time, or increased in urinary incontinence.  His referral to Dr. Palacios office is still open and we will call there to see when the earliest time that he can be scheduled for this would be.

## 2022-05-19 NOTE — TELEPHONE ENCOUNTER
Called and spoke to BEVERLY from [facility]. They will call the pt to follow up on procedure.

## 2022-05-23 PROBLEM — L82.1 OTHER SEBORRHEIC KERATOSIS: Status: ACTIVE | Noted: 2022-01-01

## 2022-05-23 PROBLEM — Z71.89 OTHER SPECIFIED COUNSELING: Status: ACTIVE | Noted: 2022-01-01

## 2022-05-23 PROBLEM — D22.5 MELANOCYTIC NEVI OF TRUNK: Status: ACTIVE | Noted: 2022-01-01

## 2022-05-23 PROBLEM — L57.0 ACTINIC KERATOSIS: Status: ACTIVE | Noted: 2022-01-01

## 2022-05-23 PROBLEM — Z85.828 PERSONAL HISTORY OF OTHER MALIGNANT NEOPLASM OF SKIN: Status: ACTIVE | Noted: 2022-01-01

## 2022-05-23 PROBLEM — L81.4 OTHER MELANIN HYPERPIGMENTATION: Status: ACTIVE | Noted: 2022-01-01

## 2022-06-16 NOTE — PROGRESS NOTES
Subjective:     CC:   Chief Complaint   Patient presents with   • Fall     monday         HPI:   Han presents today with his wife (she is 83 years old) with whom he lives.  He has a history of mild cognitive impairment and at baseline his wife helps with a lot of his history, at baseline he is able to walk fairly well though at times uses a cane. However his gait has been deteriorating over the last few months and so he and his wife decided to have him proceed with neurosurgical intervention for his ventricular megaly and he has an upcoming MRI of his brain scheduled June 20 with neurosurgery on July 11.  However, on June 13 he was taking the trash out front and got tangled up with the trash can and the trash can fell on his right leg and he laid on the driveway for about 10 minutes, his wife was in the backyard and noticed the gate was open and came to check on him.  She needed to call EMS to help him stand up and since that time she has noticed more forgetfulness, more questions about what the activities are as well as worsened mobility, he is struggling to be stay standing.  She was able to get him in the shower this morning and help him to bathe but she also needed to completely dress him which he was able to do independently prior  They have 15 stairs in their home that he needs to climb in order to enter their house from the front door, today she had a grandson come help them get out of the house.  She also cannot leave him alone since the fall.  She is hoping he can have his surgery earlier than July 11 as she will not be able to take care of him at home between now and then.  She has had to lift him from a sitting position each time he sits, she is fully independent but is only 110 pounds herself and is not able to support him.  She is a retired nurse.    No problems updated.    Current Outpatient Medications Ordered in Epic   Medication Sig Dispense Refill   • FOLTABS 800 800- MCG-MG-MCG Tab TAKE 1  TABLET BY MOUTH EVERY DAY 90 Tablet 3   • fluorouracil (EFUDEX) 5 % cream APPLY TOPICALLY TO PRE CANCERS ON RIGHT EAR AND RIGHT CHEEK TWICE A DAY FOR 2 WEEKS     • lansoprazole (PREVACID) 30 MG CAPSULE DELAYED RELEASE TAKE 1 CAPSULE BY MOUTH EVERYDAY AT BEDTIME 100 Capsule 3   • montelukast (SINGULAIR) 10 MG Tab TAKE 1 TAB BY MOUTH EVERY EVENING. INDICATIONS: HAYFEVER 90 Tablet 3   • aspirin EC (ECOTRIN) 81 MG Tablet Delayed Response Take 81 mg by mouth every evening.     • vitamin D 4000 units Tab Take 4,000 Units by mouth every day. (Patient taking differently: Take 4,000 Units by mouth at bedtime.) 1200 Tab 3   • Multiple Vitamins-Minerals (OCUVITE PO) Take 2 Caps by mouth every evening.     • hydroxyurea (HYDREA) 500 MG Cap Take 1,000-1,500 mg by mouth see administration instructions. Pt takes 1500MG on Sat and Sun  1000MG on Mon, Tue, Wed, Thur, and Fri  Indications: Chronic Myelogenous Leukemia       No current Epic-ordered facility-administered medications on file.       Past Medical History:   Diagnosis Date   • Back pain    • Evans's esophagus without dysplasia 2022   • GERD (gastroesophageal reflux disease)    • Hyperlipidemia    • Kidney stone    • PND (post-nasal drip)    • Sleep apnea        Social History     Tobacco Use   • Smoking status: Former Smoker     Packs/day: 1.00     Years: 15.00     Pack years: 15.00     Types: Cigarettes     Quit date: 1980     Years since quittin.4   • Smokeless tobacco: Never Used   Vaping Use   • Vaping Use: Never used   Substance Use Topics   • Alcohol use: Yes     Alcohol/week: 0.0 oz     Comment: Nightly   • Drug use: No       Allergies:  Patient has no known allergies.    ROS:  Per HPI      Objective:       Exam:  /52 (BP Location: Right arm, Patient Position: Sitting, BP Cuff Size: Adult)   Pulse 78   Temp 36.9 °C (98.4 °F) (Temporal)   Resp 18   Ht 1.829 m (6')   Wt 91.2 kg (201 lb)   SpO2 94%   BMI 27.26 kg/m²   Body mass index is  "27.26 kg/m².  Wt Readings from Last 4 Encounters:   06/16/22 91.2 kg (201 lb)   05/19/22 94.8 kg (209 lb)   04/01/22 91.6 kg (202 lb)   11/05/21 90 kg (198 lb 6.6 oz)       Gen: Alert and oriented, No apparent distress. Appropriately groomed.  Neck: Neck is supple without lymphadenopathy.No thyromegaly.   Lungs: Normal effort, CTA bilaterally, no wheezes, rhonchi, or rales.  CV: Regular rate and rhythm. No murmurs, rubs, or gallops.  ABD:  Soft, nontender, nondistended, NABSx4, no HSM or RBT or guarding or masses.  Ext: No clubbing, cyanosis, edema.  Skin: Bruising on L knee and R medial thigh. Small superficial abrasion on L knee. Needs minimal assist to stand but able to walk in hallway w/ his cane, stable.   No signs of head trauma  Oriented to person, wife and that he is at the \"hospital.\" able to make superficial jokes.     Assessment & Plan:     87 y.o. male with the following -   He is DNR/DNI, she has a signed form at home  I will order HH and she'll have family assist and contact neurosurg to see if procedure can be done sooner than 7/11  I did offer option of rehab until then, she defers that for now  Will check a head ct bc of worsening cognition s/p fall, on aspirin. To be done at 630p today.   1. Fall, initial encounter  - CT-HEAD W/O; Future  - Referral to Home Health    2. Confusion  - CT-HEAD W/O; Future  - Referral to Home Health    3. Acquired cerebral ventriculomegaly (HCC)        I spent a total of 26 minutes with record review, exam, communication with the patient, communication with other providers, and documentation of this encounter.      Return if symptoms worsen or fail to improve.    Please note that this dictation was created using voice recognition software. I have made every reasonable attempt to correct obvious errors, but I expect that there are errors of grammar and possibly content that I did not discover before finalizing the note.      "

## 2022-06-23 NOTE — TELEPHONE ENCOUNTER
Phone Number Called: [phone] (home)       Call outcome: Spoke to patient regarding message below.    Message: Spoke to wife and HH reached out 06/22/22 and they were going out 06/24/22 but wife has company and she is going to R/S for next week. Pt had MRI done 6/22/22 and showed pt had a sub q stroke. Procedure has been canceled and will not be done until months later.Wife was asking about a referral to [facility]. Please Advise.

## 2022-06-23 NOTE — TELEPHONE ENCOUNTER
----- Message from ARASH Inman sent at 6/21/2022  7:23 AM PDT -----  Can you call the wife and see if home health has reached out to them, also see if they have heard if they can get a sooner appointment than 7/11/22 for his procedure date.  Thank you!

## 2022-06-23 NOTE — LETTER
[facility]  ARASH Inman  [address]  [place] [address]  Fax: [phone]   Authorization for Release/Disclosure of   Protected Health Information   Name: HAN MERRILL : 1935 SSN: [id number]   Address: [address] [place] [address]  [place] [address] Phone:    [phone] (home)    I authorize the entity listed below to release/disclose the PHI below to:   [facility]/ARASH Inman and ARASH Inman   Provider or Entity Name:  [facility]     Address   City, [facility], [facility]   Phone:      Fax:  [phone]   Reason for request: continuity of care   Information to be released:    [  ] LAST COLONOSCOPY,  including any PATH REPORT and follow-up  [  ] LAST FIT/COLOGUARD RESULT [  ] LAST DEXA  [  ] LAST MAMMOGRAM  [  ] LAST PAP  [  ] LAST LABS [  ] RETINA EXAM REPORT  [  ] IMMUNIZATION RECORDS  [ X ] Release all info MRI 22       [  ] Check here and initial the line next to each item to release ALL health information INCLUDING  _____ Care and treatment for drug and / or alcohol abuse  _____ HIV testing, infection status, or AIDS  _____ Genetic Testing    DATES OF SERVICE OR TIME PERIOD TO BE DISCLOSED: _____________  I understand and acknowledge that:  * This Authorization may be revoked at any time by you in writing, except if your health information has already been used or disclosed.  * Your health information that will be used or disclosed as a result of you signing this authorization could be re-disclosed by the recipient. If this occurs, your re-disclosed health information may no longer be protected by State or Federal laws.  * You may refuse to sign this Authorization. Your refusal will not affect your ability to obtain treatment.  * This Authorization becomes effective upon signing and will  on (date) __________.      If no date is indicated, this Authorization will  one (1) year from the signature date.    Name: Han  Matt Pat    CONTINUITY OF CARE    Date:     6/23/2022       PLEASE FAX REQUESTED RECORDS BACK TO: [phone]

## 2022-06-24 NOTE — TELEPHONE ENCOUNTER
Phone Number Called: [phone] (home)       Call outcome: Left detailed message for patient. Informed to call back with any additional questions.    Message: Left information for referral to Neuro.

## 2022-06-25 NOTE — Clinical Note
Primary DX/skilled Need: SN to observe,assess,teach,train and monitor medications and disease conditions.  SN Frequencies: 1w4  Zip Code:[id number]  Disciplines ordered: SN,PT,OT,SLP  Insurance Authorization:[facility]  Certification Period: 6/25/22-8/23/22  Special Considerations: spouse states pt has increased decline in memory, swallowing difficulties, worsening mobility

## 2022-06-27 NOTE — PROGRESS NOTES
Medication chart review for [facility] services    Received referral from [facility].   Medications reviewed  compared with discharge summary if available.    Current medication list per [facility]     Current Outpatient Medications:   •  hydroxyurea, 1,000 mg, Oral, DAILY  •  Psyllium (DAILY FIBER PO), 1 Tablet, Oral, DAILY  •  Foltabs 800, TAKE 1 TABLET BY MOUTH EVERY DAY  •  lansoprazole, TAKE 1 CAPSULE BY MOUTH EVERYDAY AT BEDTIME  •  montelukast, 10 mg, Oral, Q EVENING  •  aspirin EC, 81 mg, Oral, Q EVENING  •  vitamin D, 4,000 Units, Oral, DAILY (Patient taking differently: 5,000 Units, Oral, EVERY BEDTIME)  •  Multiple Vitamins-Minerals (OCUVITE PO), 2 Capsule, Oral, Q EVENING    Location of hospital, and discharge summary date, if applicable:   N/a      No Known Allergies        Labs     Lab Results   Component Value Date/Time    SODIUM 135 03/08/2022 10:20 AM    POTASSIUM 4.4 03/08/2022 10:20 AM    CHLORIDE 104 03/08/2022 10:20 AM    CO2 21 03/08/2022 10:20 AM    GLUCOSE 122 (H) 03/08/2022 10:20 AM    BUN 14 03/08/2022 10:20 AM    CREATININE 0.71 03/08/2022 10:20 AM     Lab Results   Component Value Date/Time    ALKPHOSPHAT 129 (H) 03/08/2022 10:20 AM    ASTSGOT 70 (H) 03/08/2022 10:20 AM    ALTSGPT 48 03/08/2022 10:20 AM    TBILIRUBIN 1.8 (H) 03/08/2022 10:20 AM    INR 1.25 (H) 03/08/2022 10:20 AM    ALBUMIN 3.9 03/08/2022 10:20 AM    ALBUMIN 3.14 (L) 03/29/2021 08:59 AM        Assessment for significant drug interactions, drug omissions/additions, duplicative therapies.            SUE Bhakta, A.P.R.N.  [address]  [address]  [place] [address]  Fax: [phone]    [facility]  Phone [phone] fax [phone]    This note was created using voice recognition software (Dragon). The accuracy of the dictation is limited by the abilities of the software. I have reviewed the note prior to signing, however some errors in grammar and context are still  possible. If you have any questions related to this note please do not hesitate to contact our office.

## 2022-06-27 NOTE — CASE COMMUNICATION
noted  ----- Message -----  From: Manuela Barrientos R.N.  Sent: 6/25/2022   5:24 PM PDT  To: Merissa Palumbo R.N., Nayeli Ayala R.N., *      Primary DX/skilled Need: SN to observe,assess,teach,train and monitor medications and disease conditions.  SN Frequencies: 1w4  Zip Code:[address]  Disciplines ordered: SN,PT,OT,SLP  Insurance Authorization:[facility]  Certification Period: 6/25/22-8/23/22  Special Considerations: spouse states pt has i ncreased decline in memory, swallowing difficulties, worsening mobility

## 2022-06-30 NOTE — Clinical Note
PT evaluation completed, requesting follow up visits with frequency of 1w1,2w1.1w2 effective as of 06/30/2022.

## 2022-07-01 NOTE — CASE COMMUNICATION
Quality Review Completed for 6/25 [facility] by ZAKIYA Subramanian RN on 7/1/2022:  Edits completed by ZAKIYA Subramanian RN:  1.  changed to B-06 lives with wife  2. Per narrative for HB status and 485 functional status,  changed to #3  3. Per narrative min level of assist, changed , 1810, 1820, 1845 to 2.  to 3  4.  changed to 3 per guidelines when ambulation is supervised  5. [id number] to #3 per narrative on B, C, E, F, G  H  6. [id number] changed to #3 per narrative on E, F, I, J, K and P  7. Added exercises prescribed to Activities Permitted, and F2F to 485 forms  8. Added DME to list

## 2022-07-01 NOTE — CASE COMMUNICATION
noted  ----- Message -----  From: Patricia Dempsey, PT  Sent: 6/30/2022  11:10 PM PDT  To: Merissa Palumbo R.N., Jeremy Perez      PT evaluation completed, requesting follow up visits with frequency of 1w1,2w1.1w2 effective as of 06/30/2022.

## 2022-07-02 NOTE — CASE COMMUNICATION
I agree with these changes  ----- Message -----  From: Kelly Subramanian R.N.  Sent: 7/1/2022   1:22 PM PDT  To: Manuela Barrientos R.N.      Quality Review Completed for 6/25 [facility]ASIS by ZAKIYA Subramanian, RN on 7/1/2022:  Edits completed by ZAKIYA Subramanian, RN:  1.  changed to B-06 lives with wife  2. Per narrative for HB status and 485 functional status,  changed to #3  3. Per narrative min level of assist, changed , 1810, 1820, 184 5 to 2.  to 3  4.  changed to 3 per guidelines when ambulation is supervised  5. [id number] to #3 per narrative on B, C, E, F, G H  6. [id number] changed to #3 per narrative on E, F, I, J, K and P  7. Added exercises prescribed to Activities Permitted, and F2F to 485 forms  8. Added DME to list

## 2022-07-14 NOTE — FLOWSHEET NOTE
04/03/19 1331   Events/Summary/Plan   Events/Summary/Plan 02 spot check.  CPAP filled with water   Education   Education Yes - Pt. / Family has been Instructed in use of Respiratory Equipment   Respiratory WDL   Respiratory (WDL) X   Chest Exam   Respiration 18   Pulse 63   Oximetry   #Pulse Oximetry (Single Determination) Yes   Oxygen   Home O2 Use Prior To Admission? Yes   Home O2 LPM Flow 2 LPM   Home O2 Delivery Method (CPAP)   Home O2 Frequency of Use At Sleep   Pulse Oximetry 94 %   O2 Daily Delivery Respiratory  Room Air with O2 Available      Xerosis Normal Treatment: I recommended application of Cetaphil or CeraVe numerous times a day and before going to bed to all dry areas.

## 2022-07-14 NOTE — Clinical Note
FYI: b/p 110/40 on right and left arm.  Denied dizziness or lightheaded.  Had drink 8 ounces of water and rechecked 20 min later and 110/46.  Still no dizziness.

## 2022-07-18 PROBLEM — I69.30 LATE EFFECT OF STROKE: Status: ACTIVE | Noted: 2022-01-01

## 2022-07-18 NOTE — CASE COMMUNICATION
noted  ----- Message -----  From: Salome Rodriguez, OT  Sent: 7/10/2022   4:38 PM PDT  To: Merissa Palumbo R.N., Patricia Dempsey, WALE      OT evaluation completed on 7/4/22. No further auth requested. Eval only.

## 2022-07-18 NOTE — PROGRESS NOTES
Subjective     HPI     Han Pat is an 87 y.o.  right handed male who presents to [facility] for evaluation of left centrum semiovale infarct.    He had not been having any new symptoms, he had a MRI scheduled before surgery, he was going to have lumbar drain with Dr. Murrieta.    He had been on ASA prior to admissin.    He was referred by neurosurgery, Dr. Murrieta.       He is here today with his wife, no new complaints.      PMH :  HLD, cognitive impairment, FREYA, GERD, COPD, hydrocephalus, polycythemia on hydroxurea.     Social History:  Quit smoking 1980, Denies alcohol or drug use.       Review of Systems   HENT: Negative for nosebleeds.    Eyes: Negative for blurred vision.   Respiratory: Negative for cough.    Cardiovascular: Negative for chest pain.   Gastrointestinal: Negative for blood in stool.   Genitourinary: Negative for hematuria.   Musculoskeletal: Positive for falls.   Neurological: Negative for dizziness, tingling, sensory change, speech change, focal weakness, weakness and headaches.   Endo/Heme/Allergies: Bruises/bleeds easily.   Psychiatric/Behavioral: Positive for memory loss. Negative for depression. The patient is not nervous/anxious.          Past Medical History:   Diagnosis Date   • Back pain    • Evans's esophagus without dysplasia 4/7/2022   • GERD (gastroesophageal reflux disease)    • Hyperlipidemia    • Kidney stone    • PND (post-nasal drip)    • Sleep apnea      Current Outpatient Medications on File Prior to Visit   Medication Sig Dispense Refill   • lansoprazole (PREVACID) 30 MG CAPSULE DELAYED RELEASE Take 1 Capsule by mouth at bedtime. 100 Capsule 3   • montelukast (SINGULAIR) 10 MG Tab Take 1 Tablet by mouth every evening. Indications: Hayfever 90 Tablet 3   • hydroxyurea (HYDREA) 500 MG Cap Take 1,000 mg by mouth every day. Indications: Polycytemia     • Psyllium (DAILY FIBER PO) Take 1 Tablet by mouth every day. Indications: suppliment     • FOLTABS  800 800- MCG-MG-MCG Tab TAKE 1 TABLET BY MOUTH EVERY DAY 90 Tablet 3   • aspirin EC (ECOTRIN) 81 MG Tablet Delayed Response Take 81 mg by mouth every evening. Indications: Temporary Stroke     • vitamin D 4000 units Tab Take 4,000 Units by mouth every day. (Patient taking differently: Take 5,000 Units by mouth at bedtime.) 1200 Tab 3   • Multiple Vitamins-Minerals (OCUVITE PO) Take 2 Caps by mouth every evening.       No current facility-administered medications on file prior to visit.       Objective      I personally reviewed imaging below and agree with the findings  CT head 6/17/2022:          1.  There is no acute intracranial hemorrhage or infarct.     2.  White matter lucencies most consistent with small vessel ischemic change versus demyelination or gliosis.     3.  There is cerebral atrophy. However, there has been slight interval increase in size of the dilated lateral ventricles and 3rd ventricle compared with the prior study which can be associated with normal pressure hydrocephalus in the appropriate   clinical setting.     4Interval improvement in chronic sinus disease.    MRI brain ([facility] 6/22/2022)  1. Focus of increased diffusion-weighted signal with ADC correlation, in left centrum semiovale, 1.5cm  2. Diffuse ventriculomegaly, proportional to degree of cortical atrophy  3. Moderate chronic small vessel ischemic disease  4. Chronic pansinusitis.         Creat. 0.74, LDL 98, platelets 400    /54 (BP Location: Right arm, Patient Position: Sitting, BP Cuff Size: Adult)   Pulse 79   Resp 14   Ht 1.829 m (6')   Wt 88.4 kg (194 lb 14.2 oz)   SpO2 93%   BMI 26.43 kg/m²       PHYSICAL ASSESSMENT  Constitutional:  Alert, no apparent distress,  Psych:   mood and affect WNL  Muskuloskeletal:  Moves all extremities equally, strength 5/5  BUE/BLE flexors/extensors, no drift  NEUROLOGICAL ASSESSMENT  Disoriented to month and age, oriented to year,  speech fluent, naming intact.    CN II: Visual fields are full to confrontation. Fundoscopic exam is normal with sharp discs and no vascular changes. Pupils are 2 mm and briskly reactive to light.   CN III: IV, VI  EOMs intact, no ptosis  CN V: Facial sensation is intact to pinprick in all 3 divisions bilaterally. Corneal responses are intact.  CN VII: Face is symmetric with normal eye closure and smile.  CN VIII Hearing is normal to rubbing fingers  CN IX, X: Palate elevates symmetrically. Phonation is normal.  CN XI: Head turning and shoulder shrug are intact  CN XII: Tongue is midline with normal movements and no atrophy.                           Sensation to PP equal bilaterally                 Limb ataxia finger to nose BUE                 Ambulates with cane, wide based gait.                             Biceps,brachioradialis, tricep, and patellar reflexes all 1+     Cardiovascular:    S1S2, no abnormal rhythm auscultated, no peripheral edema  Neck:                     No carotid bruits noted   Pulmonary:            Respirations easy, lungs clear to auscultation all fields.     Skin:                     No obvious rashes.    Iniital NIHSS  Unknown       Current NIHSS      1a. LOC: 0  1b. LOC Questions: 2  1c. LOC Commands: 0  2. Best Gaze:0  3. Visual Fields: 0  4. Facial Paresis: 0  5a. Motor arm left: 0  5b. Motor arm right: 0  6a. Motor leg left: 0  6b. Motor leg right: 0  7. Sensory: 0  8. Best Language: 0  9. Limb Ataxia: 2  10. Dysarthria: 0  11. Extinction/Inattention: 0    Total Score Current 4          Current mRS 3      Assessment & Plan     1. Late effect of stroke  Left centrum semiovale infarct, might be small vessel; however, his risk factors are well controlled.  Will need vessel imaging and Echocardiogram.      Presumed Mechanism by TOAST  __  Large Artery Atherosclerosis  __  Small Vessel (lacunar)  __   Cardioembolic  __   Other (Sickle cell, vasculitis, hypercoagulable)  __ XX  Unknown      Continue ASA 81mg PO daily for  stroke prevention, discussed signs of bleeding.    CTA head and neck    TTE        Stroke risk factors:  HLD, FREYA (compliant with CPAP),   Blood pressure goal less than 130/80, currently 100/54            2. Mixed hyperlipidemia        LDL goal < 70, current 98  Start Rosuvastatin 20mg,  discussed medication side effects, will need follow up with primary care evaluate liver function at intervals and refill  Exercise at least 30 minutes daily, avoid/minimize red meat, fried foods, butter, cheese.   Eat 5-6 servings of vegetables and fruits daily, choose lean white meat without skin (chicken, turkey, white fish)--baked, broiled or grilled.      If any new signs of stroke:  sudden weakness, numbness, speech difficulty (slurring or difficulty finding words), imbalance, incoordination, worse headache of life or vision loss occur, call 911.      Take all medications as prescribed unless instructed by your provider.        I spent a total of 50 minutes caring for patient,  my time includes counseling, review of systems, HPI and assessment, review of images, labs and testing as above.  I reviewed the hospital records, PMH, social and family history.   I have counseled patient on stroke prevention strategies, stroke symptoms and mimics.  Diet and exercise modifications.  We discussed medication side effects and instructions.       I have provided patient a written personalized stroke prevention plan,  it is filed under the media tab under ‘[facility]”.     Follow up in 2 months.

## 2022-07-19 NOTE — CASE COMMUNICATION
noted  ----- Message -----  From: Merissa Mcfadden, PT  Sent: 7/18/2022  11:56 AM PDT  To: Merissa Palumbo R.N., Carlita Kemp R.N., *      P.T. left voicemail to schedule P.T. visit for 7/19/2022. Will await return phone call.

## 2022-07-19 NOTE — CASE COMMUNICATION
noted  ----- Message -----  From: Kelsie Alcantar, SANDRO  Sent: 7/14/2022  12:48 PM PDT  To: Merissa Palumbo R.N., Sarai Treadwell M.D.      FYI: b/p 110/40 on right and left arm.  Denied dizziness or lightheaded.  Had drink 8 ounces of water and rechecked 20 min later and 110/46.  Still no dizziness.

## 2022-07-20 NOTE — TELEPHONE ENCOUNTER
Phone Number Called: [phone] (home)       Call outcome: Spoke to patient regarding message below.    Message: Spoke to wife and she will drop off polst form.Informed [facility] test. Sent refill request.

## 2022-07-21 NOTE — CASE COMMUNICATION
noted  ----- Message -----  From: Kelsie Alcantar, SLP  Sent: 7/4/2022   5:30 PM PDT  To: Merissa Palumbo R.N., Olamide Donovan, Nura Zaragoza, *      SLP eval completed 7/4/22, requesting auth 1w1, 2w2

## 2022-07-22 NOTE — CASE COMMUNICATION
reyna  ----- Message -----  From: Kelsie Alcantar, SLP  Sent: 7/20/2022   9:39 PM PDT  To: Merissa Palumbo R.N., Carlita Kemp R.N., *      SLP d/c 7/20.  Goals partially met.  Patricia you are last discipline in.

## 2022-07-25 NOTE — CASE COMMUNICATION
noted  ----- Message -----  From: Patricia Dempsey, PT  Sent: 7/24/2022   7:02 PM PDT  To: Merissa Palumbo R.N., Carlita Kemp R.N.      Patient discharged from home health agency effective 07/22/2022 as per patient's request.

## 2022-07-27 NOTE — CASE COMMUNICATION
Quality Review for [place] OASIS by DENA Gaffney, RN on  July 27, 2022      Edits completed by DENA Gaffney, CAROL:  1.  C and D are yes per the care plan

## 2022-07-27 NOTE — CASE COMMUNICATION
I agree with the change, thanks.  ----- Message -----  From: Lisa Gaffney R.N.  Sent: 7/27/2022   7:47 AM PDT  To: Patricia Dempsey, PT           Quality Review for [place] OASIS by DENA Gaffney, RN on  July 27, 2022      Edits completed by DENA Gaffney, RN:  1.  C and D are yes per the care plan

## 2022-10-12 NOTE — PROGRESS NOTES
Subjective         HPI      Han Pat is an 87 y.o.  right handed male who presents to [facility] for follow up.    I last saw him on 7/18/2022 for left centrum semiovale infarct.     He had not been having any new symptoms, he had a MRI scheduled before surgery, he was going to have lumbar drain with Dr. Murrieta, he has gait instability.      He had been on ASA prior to admissin.     He was referred by neurosurgery, Dr. Murrieta.         He is here today with his wife, no new complaints.        PMH :  HLD, cognitive impairment, FREYA, GERD, COPD, hydrocephalus, polycythemia on hydroxurea.      Social History:  Quit smoking 1980, Denies alcohol or drug use.      Review of Systems   HENT:  Negative for nosebleeds.    Eyes:  Negative for blurred vision and double vision.   Cardiovascular:  Negative for chest pain and palpitations.   Gastrointestinal:  Negative for nausea and vomiting.        Poor appetite.   Genitourinary:         No urinary incontinece.   Musculoskeletal:  Negative for falls.   Neurological:  Positive for weakness. Negative for dizziness, sensory change, speech change, focal weakness and headaches.        Generalized weakness with poor walking, no falls.   Psychiatric/Behavioral:  Negative for depression. The patient is not nervous/anxious.        Current Outpatient Medications on File Prior to Visit   Medication Sig Dispense Refill    montelukast (SINGULAIR) 10 MG Tab Take 1 Tablet by mouth every evening. Indications: Hayfever 100 Tablet 0    lansoprazole (PREVACID) 30 MG CAPSULE DELAYED RELEASE Take 1 Capsule by mouth at bedtime. 100 Capsule 0    rosuvastatin (CRESTOR) 20 MG Tab Take 1 Tablet by mouth every evening. 100 Tablet 0    hydroxyurea (HYDREA) 500 MG Cap Take 1,000 mg by mouth every day. Indications: Polycytemia      FOLTABS 800 800- MCG-MG-MCG Tab TAKE 1 TABLET BY MOUTH EVERY DAY 90 Tablet 3    aspirin EC (ECOTRIN) 81 MG Tablet Delayed Response Take 81 mg by  mouth every evening. Indications: Temporary Stroke      vitamin D 4000 units Tab Take 4,000 Units by mouth every day. (Patient taking differently: Take 5,000 Units by mouth at bedtime.) 1200 Tab 3    Multiple Vitamins-Minerals (OCUVITE PO) Take 2 Caps by mouth every evening.       No current facility-administered medications on file prior to visit.         Objective     I personally reviewed imaging below and agree with the findings  CT head 6/17/2022:           1.  There is no acute intracranial hemorrhage or infarct.     2.  White matter lucencies most consistent with small vessel ischemic change versus demyelination or gliosis.     3.  There is cerebral atrophy. However, there has been slight interval increase in size of the dilated lateral ventricles and 3rd ventricle compared with the prior study which can be associated with normal pressure hydrocephalus in the appropriate   clinical setting.     4Interval improvement in chronic sinus disease.     MRI brain ([facility] 6/22/2022)  Focus of increased diffusion-weighted signal with ADC correlation, in left centrum semiovale, 1.5cm  Diffuse ventriculomegaly, proportional to degree of cortical atrophy  Moderate chronic small vessel ischemic disease  Chronic pansinusitis.      CTA head 8/3/2022  I personally reviewed imaging below and agree with the findings  CT head 6/17/2022:           1.  There is no acute intracranial hemorrhage or infarct.     2.  White matter lucencies most consistent with small vessel ischemic change versus demyelination or gliosis.     3.  There is cerebral atrophy. However, there has been slight interval increase in size of the dilated lateral ventricles and 3rd ventricle compared with the prior study which can be associated with normal pressure hydrocephalus in the appropriate   clinical setting.     4Interval improvement in chronic sinus disease.     MRI brain ([facility] 6/22/2022)  Focus of increased diffusion-weighted  signal with ADC correlation, in left centrum semiovale, 1.5cm  Diffuse ventriculomegaly, proportional to degree of cortical atrophy  Moderate chronic small vessel ischemic disease  Chronic pansinusitis.         CTA head 8/3/2022    1.No large vessel occlusion identified.     2.  Ventricular dilatation similar to previous. No acute hemorrhage.      CTA neck   CT angiogram of the neck within normal limits. Calcified plaque carotid artery bifurcations with less than 50% stenosis at the origins of the internal carotid arteries.    TTE 10/7/2022:  LVEF 60%, severely dilated LA (stated diastolic functio difficult to assess with arrhythmia--the results did not state what rhythm he was in)    Encounter Vitals  Standard Vitals  Vitals  Blood Pressure : 124/56  Temperature: 37 °C (98.6 °F)  Temp src: Temporal  Pulse: 83  Pulse Oximetry: 94 %  Height: 182.9 cm (6')  Weight: 88.6 kg (195 lb 5.2 oz)  Encounter Vitals  Temperature: 37 °C (98.6 °F)  Temp src: Temporal  Blood Pressure : 124/56  Pulse: 83  Pulse Oximetry: 94 %  Weight: 88.6 kg (195 lb 5.2 oz)  Height: 182.9 cm (6')  BMI (Calculated): 26.49    PHYSICAL ASSESSMENT  Constitutional:  Alert, no apparent distress,  Psych:   mood and affect WNL  Muskuloskeletal:  Moves all extremities equally, strength 5/5  BUE/BLE flexors/extensors, no drift  NEUROLOGICAL ASSESSMENT  Oriented X 4, speech fluent, naming and memory intact  CN II: Visual fields are full to confrontation. Fundoscopic exam is normal with sharp discs and no vascular changes. Pupils are 3 mm and briskly reactive to light.   CN III: IV, VI  EOMs intact, no ptosis  CN V: Facial sensation is intact to pinprick in all 3 divisions bilaterally. Corneal responses are intact.  CN VII: Face is symmetric with normal eye closure and smile.  CN VIII Hearing is normal to rubbing fingers  CN IX, X: Palate elevates symmetrically. Phonation is normal.  CN XI: Head turning and shoulder shrug are intact  CN XII: Tongue is midline  with normal movements and no atrophy.                           Sensation to PP equal bilaterally                 No limb ataxia with finger to nose and heel to shin                 Ambulates with cane, wide based cautious gait..                                  Cardiovascular:    S1S2, no abnormal rhythm auscultated, no peripheral edema  Neck:                     No carotid bruits noted   Pulmonary:            Respirations easy, lungs clear to auscultation all fields.     Skin:                     No obvious rashes.      Iniital NIHSS 0      Current NIHSS    1a. LOC: 0  1b. LOC Questions: 0  1c. LOC Commands: 0  2. Best Gaze:0  3. Visual Fields: 0  4. Facial Paresis: 0  5a. Motor arm left: 0  5b. Motor arm right: 0  6a. Motor leg left: 0  6b. Motor leg right: 0  7. Sensory: 0  8. Best Language: 0  9. Limb Ataxia: 0  10. Dysarthria: 0  11. Extinction/Inattention: 0    Total Score Current 0          Current mRS 0    1    Assessment & Plan           1. Late effect of stroke  Left centrum semiovale infarct, might be small vessel; however, his risk factors are well controlled.  Echocardiogram mentioned arrhythmia , unsure what rhythm he was in during echo, he does not have any ECGs in our system.    I ordered a  ZIO patch today and referred to cardiology, he does have a severely dilated LA, suspicious for atrial fibrillation.            Presumed Mechanism by TOAST  __  Large Artery Atherosclerosis  __  Small Vessel (lacunar)  __   Cardioembolic  __   Other (Sickle cell, vasculitis, hypercoagulable)  __ XX  Unknown        Continue ASA 81mg PO daily for stroke prevention, discussed signs of bleeding.           Stroke risk factors:  HLD, FREYA (compliant with CPAP),   Blood pressure goal less than 130/80, currently 100/54                 2. Mixed hyperlipidemia           LDL goal < 70, current 98 continue Rosuvastatin 20mg,  discussed medication side effects, will need follow up with primary care evaluate liver function at  intervals and refill  Exercise at least 30 minutes daily, avoid/minimize red meat, fried foods, butter, cheese.   Eat 5-6 servings of vegetables and fruits daily, choose lean white meat without skin (chicken, turkey, white fish)--baked, broiled or grilled.        If any new signs of stroke:  sudden weakness, numbness, speech difficulty (slurring or difficulty finding words), imbalance, incoordination, worse headache of life or vision loss occur, call 911.       Take all medications as prescribed unless instructed by your provider.          I have provided patient a written personalized stroke prevention plan,  it is filed under the media tab under ‘[facility]”.     Follow up PRN

## 2022-10-20 NOTE — TELEPHONE ENCOUNTER
Caller: Vneessa with [employer]    Topic/issue: Their office was calling about the ZIOPATCH being sent back to their office. They haven't gotten it back yet but they will be mailed off to them today  Ref# [id number]    Callback Number: [phone]    Thank you    -Bharath YORK

## 2022-10-20 NOTE — TELEPHONE ENCOUNTER
Monitor has become loose and unable to reattach, monitor being shipped back to [facility] for EOS Reportpn 10/20/22.

## 2022-10-25 NOTE — TELEPHONE ENCOUNTER
Received request via: PatientPatient stated their APRN at [facility] has left and their office will not refill the rosuvastatin (CRESTOR) 20 MG Tab. They are asking if Samantha will give the refill. Please advise.     Was the patient seen in the last year in this department? Yes    Does the patient have an active prescription (recently filled or refills available) for medication(s) requested? No

## 2022-10-25 NOTE — TELEPHONE ENCOUNTER
----- Message from Sabrina Adams sent at 10/25/2022  2:01 PM PDT -----  PCP: Yony    Patient stated their APRN at [facility] has left and their office will not refill the rosuvastatin (CRESTOR) 20 MG Tab. They are asking if Samantha will give the refill. Please advise.

## 2022-11-02 PROBLEM — Z71.89 OTHER SPECIFIED COUNSELING: Status: ACTIVE | Noted: 2022-01-01

## 2022-11-02 PROBLEM — L81.4 OTHER MELANIN HYPERPIGMENTATION: Status: ACTIVE | Noted: 2022-01-01

## 2022-11-02 PROBLEM — L82.1 OTHER SEBORRHEIC KERATOSIS: Status: ACTIVE | Noted: 2022-01-01

## 2022-11-02 PROBLEM — Z85.828 PERSONAL HISTORY OF OTHER MALIGNANT NEOPLASM OF SKIN: Status: ACTIVE | Noted: 2022-01-01

## 2022-11-02 PROBLEM — L57.0 ACTINIC KERATOSIS: Status: ACTIVE | Noted: 2022-01-01

## 2022-11-02 PROBLEM — D48.5 NEOPLASM OF UNCERTAIN BEHAVIOR OF SKIN: Status: ACTIVE | Noted: 2022-01-01

## 2022-11-02 PROBLEM — D22.5 MELANOCYTIC NEVI OF TRUNK: Status: ACTIVE | Noted: 2022-01-01

## 2022-11-18 PROBLEM — J44.9 COPD (CHRONIC OBSTRUCTIVE PULMONARY DISEASE) (HCC): Status: RESOLVED | Noted: 2019-08-14 | Resolved: 2022-01-01

## 2022-11-18 PROBLEM — D45 POLYCYTHEMIA VERA (HCC): Status: ACTIVE | Noted: 2022-01-01

## 2022-11-18 NOTE — PROGRESS NOTES
Subjective:     CC:  Diagnoses of Encounter for medical examination to establish care, Polycythemia vera (HCC), Other cirrhosis of liver (HCC), Acquired cerebral ventriculomegaly (HCC), and Late effect of stroke were pertinent to this visit.    HISTORY OF THE PRESENT ILLNESS: Patient is a 87 y.o. male. This pleasant patient is here today to establish care and discuss the following    Problem   Polycythemia Vera (Hcc)    Follows with Dr. Thomas of hematology oncology, currently on hydroxyurea 1000 mg daily     Late Effect of Stroke    Stroke found on MRI scheduled for prior to shunt placement.  Currently takes a statin and aspirin.      Acquired Cerebral Ventriculomegaly (Hcc)    Chronic, he has been seen by neurology Dr. Corrales and Dr. Christine.  This started when he was having trouble with his memory, weakness and falls.  He was seen by neurology who did an MRI and it was found that he had cerebral ventriculomegaly.  Dr. Christine did a large volume CSF removal to see if this improved his gait however directly after the procedure they did not see improvement.  Wife states that she does feel she is on improvement with this procedure.  They were then directed to see Dr. Palacios for neurosurgery consult who recommended he have 3 days inpatient for removal of CSF fluid to decrease the fluid and then have a shunt placed.  Due to other health concerns he has not currently able to undergo shunt placement.  He will continue to follow-up with neurology and neurosurgery in the event that he is able to have this     Other Cirrhosis of Liver (Hcc)    Follows with Dr. Dixon, [facility]     Copd (Chronic Obstructive Pulmonary Disease) (Hcc) (Resolved)       ROS:   ROS      Objective:       Exam: /48 (BP Location: Left arm, Patient Position: Sitting, BP Cuff Size: Adult)   Pulse 73   Temp (!) 34.4 °C (94 °F) (Temporal)   Resp 14   Ht 1.829 m (6')   Wt 87.5 kg (193 lb)   SpO2 94%  Body mass index is 26.18 kg/m².    Physical  Exam  Vitals reviewed.   Constitutional:       General: He is not in acute distress.     Appearance: He is not toxic-appearing.   HENT:      Head: Normocephalic and atraumatic.      Right Ear: External ear normal.      Left Ear: External ear normal.   Eyes:      General:         Right eye: No discharge.         Left eye: No discharge.      Extraocular Movements: Extraocular movements intact.      Conjunctiva/sclera: Conjunctivae normal.   Cardiovascular:      Rate and Rhythm: Normal rate and regular rhythm.      Pulses: Normal pulses.      Heart sounds: Normal heart sounds. No murmur heard.  Pulmonary:      Effort: Pulmonary effort is normal. No respiratory distress.      Breath sounds: Normal breath sounds. No wheezing.   Abdominal:      General: Abdomen is flat. Bowel sounds are normal. There is no distension.      Palpations: Abdomen is soft.      Tenderness: There is no abdominal tenderness.   Musculoskeletal:      Cervical back: Normal range of motion.   Skin:     General: Skin is warm and dry.      Capillary Refill: Capillary refill takes less than 2 seconds.   Neurological:      Mental Status: He is alert.   Psychiatric:         Mood and Affect: Mood normal.         Behavior: Behavior normal.         Thought Content: Thought content normal.         Judgment: Judgment normal.         Assessment & Plan:   87 y.o. male with the following -    1. Encounter for medical examination to establish care  Medical history, problem list, medications and allergies reviewed    2. Polycythemia vera (HCC)  Follows with heme-onc, continue hydroxyurea    3. Other cirrhosis of liver (HCC)  Continue to follow with GI consultants, they will follow labs and imaging    4. Acquired cerebral ventriculomegaly (HCC)  No follow with neurology and neurosurgery, once other medical problems are under control he may want to proceed with shunt if neurosurgery is agreeable    5. Late effect of stroke  Continue aspirin and statin      HCC Gap  Form    Last edited 11/18/22 11:03 PST by Carmencita Herron M.D.         Return in about 6 months (around 5/18/2023) for Annual.    Please note that this dictation was created using voice recognition software. I have made every reasonable attempt to correct obvious errors, but I expect that there are errors of grammar and possibly content that I did not discover before finalizing the note.

## 2022-11-18 NOTE — LETTER
[employer] [facility]  Carmencita Herron M.D.  [address]  [place] [address]  Fax: [phone]   Authorization for Release/Disclosure of   Protected Health Information   Name: RAFIQ PAT : 1935 SSN: [id number]   Address: [address] [place] [address]  [place] [address] Phone:    [phone] (home)    I authorize the entity listed below to release/disclose the PHI below to:   [facility]/Carmencita Herron M.D. and Carmencita Herron M.D.   Provider or Entity Name:  Dr. Thomas, hem-onc   Address   City, [facility], [facility]   Phone:      Fax:     Reason for request: continuity of care   Information to be released:    [  ] LAST COLONOSCOPY,  including any PATH REPORT and follow-up  [  ] LAST FIT/COLOGUARD RESULT [  ] LAST DEXA  [  ] LAST MAMMOGRAM  [  ] LAST PAP  [  ] LAST LABS [  ] RETINA EXAM REPORT  [  ] IMMUNIZATION RECORDS  [XX  ] Release all info      [  ] Check here and initial the line next to each item to release ALL health information INCLUDING  _____ Care and treatment for drug and / or alcohol abuse  _____ HIV testing, infection status, or AIDS  _____ Genetic Testing    DATES OF SERVICE OR TIME PERIOD TO BE DISCLOSED: _____________  I understand and acknowledge that:  * This Authorization may be revoked at any time by you in writing, except if your health information has already been used or disclosed.  * Your health information that will be used or disclosed as a result of you signing this authorization could be re-disclosed by the recipient. If this occurs, your re-disclosed health information may no longer be protected by State or Federal laws.  * You may refuse to sign this Authorization. Your refusal will not affect your ability to obtain treatment.  * This Authorization becomes effective upon signing and will  on (date) __________.      If no date is indicated, this Authorization will  one (1) year from the signature date.    Name: Rafiq Pat    Signature:   Date:      11/18/2022       PLEASE FAX REQUESTED RECORDS BACK TO: [phone]

## 2022-11-18 NOTE — LETTER
[employer] [facility]  Carmencita Herron M.D.  [address]  [place] [address]  Fax: [phone]   Authorization for Release/Disclosure of   Protected Health Information   Name: RAFIQ PAT : 1935 SSN: [id number]   Address: [address] [place] [address]  [place] [address] Phone:    [phone] (home)    I authorize the entity listed below to release/disclose the PHI below to:   [facility]/Carmencita Herron M.D. and Carmencita Herron M.D.   Provider or Entity Name:  Dr. Dixon, [facility]   Address   City, State, [facility]   Phone:      Fax:     Reason for request: continuity of care   Information to be released:    [  ] LAST COLONOSCOPY,  including any PATH REPORT and follow-up  [  ] LAST FIT/COLOGUARD RESULT [  ] LAST DEXA  [  ] LAST MAMMOGRAM  [  ] LAST PAP  [  ] LAST LABS [  ] RETINA EXAM REPORT  [  ] IMMUNIZATION RECORDS  [XX ] Release all info      [  ] Check here and initial the line next to each item to release ALL health information INCLUDING  _____ Care and treatment for drug and / or alcohol abuse  _____ HIV testing, infection status, or AIDS  _____ Genetic Testing    DATES OF SERVICE OR TIME PERIOD TO BE DISCLOSED: _____________  I understand and acknowledge that:  * This Authorization may be revoked at any time by you in writing, except if your health information has already been used or disclosed.  * Your health information that will be used or disclosed as a result of you signing this authorization could be re-disclosed by the recipient. If this occurs, your re-disclosed health information may no longer be protected by State or Federal laws.  * You may refuse to sign this Authorization. Your refusal will not affect your ability to obtain treatment.  * This Authorization becomes effective upon signing and will  on (date) __________.      If no date is indicated, this Authorization will  one (1) year from the signature date.    Name: Rafiq Pat    Signature:   Date:      11/18/2022       PLEASE FAX REQUESTED RECORDS BACK TO: [phone]

## 2022-11-18 NOTE — LETTER
[facility]  Carmencita Herron M.D.  [address]  [place] [address]  Fax: [phone]   Authorization for Release/Disclosure of   Protected Health Information   Name: RAFIQ PAT : 1935 SSN: [id number]   Address: [address] [place] [address]  [place] [address] Phone:    [phone] (home)    I authorize the entity listed below to release/disclose the PHI below to:   [facility]/Carmencita Herron M.D. and Carmencita Herron M.D.   Provider or Entity Name:  Dr. Dixon, [facility]   Address   City, State, [facility]   Phone:      Fax:     Reason for request: continuity of care   Information to be released:    [  ] LAST COLONOSCOPY,  including any PATH REPORT and follow-up  [  ] LAST FIT/COLOGUARD RESULT [  ] LAST DEXA  [  ] LAST MAMMOGRAM  [  ] LAST PAP  [  ] LAST LABS [  ] RETINA EXAM REPORT  [  ] IMMUNIZATION RECORDS  [XX  ] Release all info      [  ] Check here and initial the line next to each item to release ALL health information INCLUDING  _____ Care and treatment for drug and / or alcohol abuse  _____ HIV testing, infection status, or AIDS  _____ Genetic Testing    DATES OF SERVICE OR TIME PERIOD TO BE DISCLOSED: _____________  I understand and acknowledge that:  * This Authorization may be revoked at any time by you in writing, except if your health information has already been used or disclosed.  * Your health information that will be used or disclosed as a result of you signing this authorization could be re-disclosed by the recipient. If this occurs, your re-disclosed health information may no longer be protected by State or Federal laws.  * You may refuse to sign this Authorization. Your refusal will not affect your ability to obtain treatment.  * This Authorization becomes effective upon signing and will  on (date) __________.      If no date is indicated, this Authorization will  one (1) year from the signature date.    Name: Rafiq Pat    Signature:   Date:      11/18/2022       PLEASE FAX REQUESTED RECORDS BACK TO: [phone]

## 2022-11-21 PROBLEM — L57.0 ACTINIC KERATOSIS: Status: ACTIVE | Noted: 2022-01-01

## 2022-11-21 NOTE — PROCEDURE: LIQUID NITROGEN
Number Of Freeze-Thaw Cycles: 1 freeze-thaw cycle
Render Post-Care Instructions In Note?: no
Consent: The patient's consent was obtained including but not limited to risks of crusting, scabbing, blistering, scarring, darker or lighter pigmentary change, recurrence, incomplete removal and infection.
Show Aperture Variable?: Yes
Detail Level: Detailed
Post-Care Instructions: I reviewed with the patient in detail post-care instructions. Patient is to wear sunprotection, and avoid picking at any of the treated lesions. Pt may apply Vaseline to crusted or scabbing areas.
Duration Of Freeze Thaw-Cycle (Seconds): 10

## 2022-11-28 PROBLEM — Z20.822 CLOSE EXPOSURE TO COVID-19 VIRUS: Status: ACTIVE | Noted: 2022-01-01

## 2022-11-29 NOTE — PROGRESS NOTES
Telemedicine Visit: Established Patient     This evaluation was conducted via [facility] using secure and encrypted videoconferencing technology. The patient was in their home in the state of [place].    The patient's identity was confirmed and verbal consent was obtained for this virtual visit.     Subjective:   No chief complaint on file.      Han Pat is a 87 y.o. male presenting for evaluation and management of:    HPI:  Problem   Close Exposure to Covid-19 Virus    Wife and grandson tested positive for COVID.  There altogether at [facility].  He currently denies any symptoms.  Does have increased risk factors associated with age, polycythemia, cirrhosis of his liver.  He is up-to-date with his COVID-vaccine, requesting to be initiated on medication to provide an additional protection.     Chronic Myelogenous Leukemia (Hcc)    Chronic, he is currently seeing Dr. Rosi Thomas every 3 months where she checks his blood counts.  He continues to take hydroxyurea 1500 mg Saturdays and Sundays, 1000 mg Monday, Tuesday, Wednesday, Thursday, Friday.     Mixed Hyperlipidemia    Rosuvastatin 20 mg every evening.          Review of Systems   Constitutional:  Negative for chills, fever, malaise/fatigue and weight loss.   Respiratory:  Negative for cough and shortness of breath.    Cardiovascular:  Negative for chest pain and palpitations.   Gastrointestinal:  Negative for abdominal pain.   Genitourinary: Negative.    Musculoskeletal:  Negative for myalgias.   Skin:  Negative for rash.   Neurological:  Negative for dizziness and weakness.   Psychiatric/Behavioral:  Negative for depression.       No Known Allergies    Current medicines (including changes today)  Current Outpatient Medications   Medication Sig Dispense Refill    molnupiravir 200 MG capsule Take 4 Capsules by mouth 2 times a day for 5 days. 40 Capsule 0    clobetasol (TEMOVATE) 0.05 % external solution APPLY A SMALL AMOUNT OF SOLUTION TO ENTIRE SCALP  ONCE A DAY FOR 2-3 WEEKS.      rosuvastatin (CRESTOR) 20 MG Tab Take 1 Tablet by mouth every evening. 100 Tablet 2    montelukast (SINGULAIR) 10 MG Tab Take 1 Tablet by mouth every evening. Indications: Hayfever 100 Tablet 0    lansoprazole (PREVACID) 30 MG CAPSULE DELAYED RELEASE Take 1 Capsule by mouth at bedtime. 100 Capsule 0    hydroxyurea (HYDREA) 500 MG Cap Take 2 Capsules by mouth every day. Indications: Polycytemia      FOLTABS 800 800- MCG-MG-MCG Tab TAKE 1 TABLET BY MOUTH EVERY DAY 90 Tablet 3    aspirin EC (ECOTRIN) 81 MG Tablet Delayed Response Take 1 Tablet by mouth every evening. Indications: Temporary Stroke      Multiple Vitamins-Minerals (OCUVITE PO) Take 2 Caps by mouth every evening.       No current facility-administered medications for this visit.       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    Thrombocytosis 02/05/2019    Benign prostatic hyperplasia without lower urinary tract symptoms 02/05/2019    Mixed hyperlipidemia 02/05/2019    Seasonal allergies 02/05/2019    Vertigo 02/05/2019    Obstructive sleep apnea 08/14/2017    Gastroesophageal reflux disease 08/14/2017       Family History   Problem Relation Age of Onset    Other Mother         Aneurism    Other Father         Heart problems; unspecified    Sleep Apnea Father     Sleep Apnea Brother     Other Brother         Heart problems; unspecified    Sleep Apnea Son     Sleep Apnea Son     Breast Cancer Neg  Hx     Colorectal Cancer Neg Hx     Peritoneal Cancer Neg Hx     Tubal Cancer Neg Hx     Ovarian Cancer Neg Hx        He  has a past medical history of Back pain, Evans's esophagus without dysplasia (4/7/2022), GERD (gastroesophageal reflux disease), Hyperlipidemia, Kidney stone, PND (post-nasal drip), and Sleep apnea.  He  has a past surgical history that includes other orthopedic surgery (lumbar disectomy 2000); cataract extraction with iol; and other.       Objective:   Ht 1.829 m (6')   Wt 87.5 kg (193 lb)   BMI 26.18 kg/m²     Physical Exam:  Constitutional: Alert, no distress, well-groomed.  Skin: No rashes in visible areas.  Eye: Round. Conjunctiva clear, lids normal. No icterus.   ENMT: Lips pink without lesions, good dentition, moist mucous membranes. Phonation normal.  Neck: No masses, no thyromegaly. Moves freely without pain.  CV: Pulse as reported by patient  Respiratory: Unlabored respiratory effort, no cough or audible wheeze  Psych: Alert and oriented x3, normal affect and mood.     Labs:  Most recent labs from 06/2022 reviewed.     Assessment and Plan:   The following treatment plan was discussed:     HCC Gap Form    Last edited 11/28/22 16:29 PST by SUSAN Herrmann.        Problem List Items Addressed This Visit       Mixed hyperlipidemia     Chronic, stable.  Reviewing patient's medications and considering COVID medication, with rosuvastatin we will send out for Paxlovid         Chronic myelogenous leukemia (HCC)     Chronic, Stable.  Reviewed this medications with the wife, No known interaction Between Hydroxyrea COVID medication drugs.  No interaction with Molnupiravir.           Close exposure to COVID-19 virus     Wife positive for COVID and they live close proximity with each other.  Has multiple high risk diagnoses.  We will go ahead and treat patient with molnupiravir 800 mg BID x 5 days.  Reviewed patient's labs, good kidney function.  Reviewed this medication with the other  medications that he is currently taking, no interaction seen.  Provided with a list of potential side effects associated with taking this medication.    ED precautions provided         Relevant Medications    molnupiravir 200 MG capsule        Follow-up: Return if symptoms worsen or fail to improve.    Advised to set up an appointment with me sooner if symptoms continue to worsen and/or do not improve.  Discussed with the patient on when to seek out treatment at the emergency department.

## 2022-11-29 NOTE — ASSESSMENT & PLAN NOTE
Wife positive for COVID and they live close proximity with each other.  Has multiple high risk diagnoses.  We will go ahead and treat patient with molnupiravir 800 mg BID x 5 days.  Reviewed patient's labs, good kidney function.  Reviewed this medication with the other medications that he is currently taking, no interaction seen.  Provided with a list of potential side effects associated with taking this medication.    ED precautions provided

## 2022-11-29 NOTE — ASSESSMENT & PLAN NOTE
Chronic, stable.  Reviewing patient's medications and considering COVID medication, with rosuvastatin we will send out for Paxlovid

## 2022-11-29 NOTE — ASSESSMENT & PLAN NOTE
Chronic, Stable.  Reviewed this medications with the wife, No known interaction Between Hydroxyrea COVID medication drugs.  No interaction with Molnupiravir.

## 2022-12-12 PROBLEM — J12.82 PNEUMONIA DUE TO COVID-19 VIRUS: Status: ACTIVE | Noted: 2022-01-01

## 2022-12-12 PROBLEM — Z66 DNR (DO NOT RESUSCITATE): Status: ACTIVE | Noted: 2022-01-01

## 2022-12-12 PROBLEM — U07.1 PNEUMONIA DUE TO COVID-19 VIRUS: Status: ACTIVE | Noted: 2022-01-01

## 2022-12-13 PROBLEM — R50.9 FEVER: Status: ACTIVE | Noted: 2022-01-01

## 2022-12-13 NOTE — ED TRIAGE NOTES
.  Chief Complaint   Patient presents with    ALOC    Weakness     Pt BIB EMS from home. Per report family is noting and increase in confusion and weakness. Pt is AAO x3-4. Family is requesting a consult for home health.   Recent Hx of COVID + w/in last 10 days.

## 2022-12-13 NOTE — ASSESSMENT & PLAN NOTE
- Continue 10-day course of dexamethasone (to be completed on 12/22/2022)  -Continue oxygen supplementation with goal of comfort in mind.

## 2022-12-13 NOTE — H&P
Hospital Medicine History & Physical Note    Date of Service  12/12/2022    Primary Care Physician  Carmencita Herron M.D.    Consultants  none    Code Status  DNAR/DNI    Chief Complaint  Chief Complaint   Patient presents with    ALOC    Weakness       History of Presenting Illness  Han Pat is a 87 y.o. male who presented 12/12/2022 with weakness.  Mr. Pat has a past medical history of severe cognitive impairment that may be due to dementia and/or hydrocephalus.  He has a very abnormal MRI and underwent a work-up by neurology including a therapeutic 38 mL lumbar puncture that did not change his symptoms.  He had a referral to Dr. Palacios neurosurgery who scheduled him for an inpatient lumbar drain for 3 days to see if he was a good candidate for a  shunt.  Unfortunately the MRI revealed subacute stroke therefore he has been worked up in the [facility] then referral to cardiology because of the dilated left atrium and he still has not received the medical clearance for the lumbar drain.  While he has been waiting for the drain he has the misfortune developing COVID-19 infection and was positive approximately 12 days ago.  He was treated with a 5-day course of molnupiravir.  His wife brought him in because of weakness to the point where he can hardly get up and has shuffling has been worse, he has been sleeping much more with worsening confusion and not eating.  Here in emergency room he was found to be hypoxic down to the 80s.   I discussed the plan of care with his wife, Sally at bedside.    Review of Systems  Review of Systems   Unable to perform ROS: Dementia     Past Medical History   has a past medical history of Back pain, Evans's esophagus without dysplasia (4/7/2022), GERD (gastroesophageal reflux disease), Hyperlipidemia, Kidney stone, PND (post-nasal drip), and Sleep apnea.    Surgical History   has a past surgical history that includes other orthopedic surgery (lumbar  disectomy 2000); cataract extraction with iol; and other.     Family History  family history includes Other in his brother, father, and mother; Sleep Apnea in his brother, father, son, and son.   Family history reviewed with patient. There is no family history that is pertinent to the chief complaint.     Social History   reports that he quit smoking about 42 years ago. His smoking use included cigarettes. He has a 15.00 pack-year smoking history. He has never used smokeless tobacco. He reports current alcohol use of about 21.6 oz per week. He reports that he does not use drugs.    Allergies  No Known Allergies    Medications  Prior to Admission Medications   Prescriptions Last Dose Informant Patient Reported? Taking?   FOLTABS 800 800- MCG-MG-MCG Tab   No No   Sig: TAKE 1 TABLET BY MOUTH EVERY DAY   Multiple Vitamins-Minerals (OCUVITE PO)  Significant Other Yes No   Sig: Take 2 Caps by mouth every evening.   aspirin EC (ECOTRIN) 81 MG Tablet Delayed Response   Yes No   Sig: Take 1 Tablet by mouth every evening. Indications: Temporary Stroke   clobetasol (TEMOVATE) 0.05 % external solution   Yes No   Sig: APPLY A SMALL AMOUNT OF SOLUTION TO ENTIRE SCALP ONCE A DAY FOR 2-3 WEEKS.   hydroxyurea (HYDREA) 500 MG Cap   Yes No   Sig: Take 2 Capsules by mouth every day. Indications: Polycytemia   lansoprazole (PREVACID) 30 MG CAPSULE DELAYED RELEASE   No No   Sig: Take 1 Capsule by mouth at bedtime.   montelukast (SINGULAIR) 10 MG Tab   No No   Sig: Take 1 Tablet by mouth every evening. Indications: Hayfever   rosuvastatin (CRESTOR) 20 MG Tab   No No   Sig: Take 1 Tablet by mouth every evening.      Facility-Administered Medications: None       Physical Exam  Temp:  [38.1 °C (100.6 °F)] 38.1 °C (100.6 °F)  Pulse:  [83-85] 85  Resp:  [15-24] 24  BP: (135-139)/(61-62) 135/61  SpO2:  [88 %-94 %] 94 %  Blood Pressure : 135/61   Temperature: (!) 38.1 °C (100.6 °F)   Pulse: 85   Respiration: (!) 24   Pulse Oximetry: 94 %        Physical Exam  Vitals and nursing note reviewed.   Constitutional:       Comments: Chronically ill appearing   HENT:      Mouth/Throat:      Mouth: Mucous membranes are dry.      Pharynx: Oropharynx is clear.   Eyes:      General: No scleral icterus.     Conjunctiva/sclera: Conjunctivae normal.   Cardiovascular:      Rate and Rhythm: Normal rate and regular rhythm.   Pulmonary:      Effort: Pulmonary effort is normal.      Breath sounds: Normal breath sounds.      Comments: Nasal cannula oxygen  Abdominal:      General: There is no distension.      Tenderness: There is no abdominal tenderness.   Musculoskeletal:      Cervical back: Normal range of motion and neck supple.      Right lower leg: No edema.      Left lower leg: No edema.   Skin:     General: Skin is warm and dry.   Neurological:      Mental Status: He is alert.      Comments: Pleasantly confused  He moves his extremities equally   Psychiatric:         Mood and Affect: Mood normal.         Behavior: Behavior normal.       Laboratory:  Recent Labs     12/12/22  1742   WBC 7.5   RBC 3.63*   HEMOGLOBIN 13.5*   HEMATOCRIT 40.5*   .6*   MCH 37.2*   MCHC 33.3*   RDW 74.8*   PLATELETCT 412   MPV 10.1     Recent Labs     12/12/22  1742   SODIUM 137   POTASSIUM 4.2   CHLORIDE 104   CO2 22   GLUCOSE 163*   BUN 13   CREATININE 0.90   CALCIUM 9.0     Recent Labs     12/12/22  1742   ALTSGPT 75*   ASTSGOT 126*   ALKPHOSPHAT 227*   TBILIRUBIN 1.7*   GLUCOSE 163*         No results for input(s): NTPROBNP in the last 72 hours.      No results for input(s): TROPONINT in the last 72 hours.    Imaging:  DX-CHEST-PORTABLE (1 VIEW)   Final Result      Patchy bilateral pulmonary infiltrates. Consideration should be given for Covid pneumonia.      CT-HEAD W/O    (Results Pending)           Assessment/Plan:  Justification for Admission Status  I anticipate this patient will require at least two midnights for appropriate medical management, necessitating inpatient  admission because supplemental oxygen and steroids    Patient will need a Med/Surg bed on MEDICAL service .  The need is secondary to as above.    * Pneumonia due to COVID-19 virus- (present on admission)  Assessment & Plan  Status post antiviral 5 day course of molnupiravir outpatient  Now with hypoxia  Supplemental oxygen and initiate steroids while he is hypoxic.   Isolation precautions    Acquired cerebral ventriculomegaly (HCC)- (present on admission)  Assessment & Plan  He has marked ventriculomegaly on MRI and has been seen by neurology though after a therapeutic lumbar puncture of 38 mL of fluid he did not have provement in his symptoms.  He saw Dr. Palacios neurosurgery who is scheduling him for an inpatient lumbar drain for 3 days to see if enough difference to warrant a  shunt.  Unfortunately this has not been able to be re-scheduled since July.    DNR (do not resuscitate)- (present on admission)  Assessment & Plan  Per his wishes    Polycythemia vera (HCC)- (present on admission)  Assessment & Plan  Followed by Dr. Thomas heme/onc   Continue hydroxyurea     Chronic myelogenous leukemia (HCC)- (present on admission)  Assessment & Plan  Followed by Dr. Thomas     Cognitive impairment- (present on admission)  Assessment & Plan  This remains unclear if it is due to dementia and/or from hydrocephalus        VTE prophylaxis: Xarelto 10 mg daily as prophylaxis

## 2022-12-13 NOTE — PROGRESS NOTES
4 Eyes Skin Assessment Completed by Allyson RN and Yana RN.    Head WDL  Ears WDL  Nose WDL  Mouth WDL  Neck WDL  Breast/Chest WDL  Shoulder Blades WDL  Spine WDL  (R) Arm/Elbow/Hand WDL  (L) Arm/Elbow/Hand WDL  Abdomen WDL  Groin Redness  Scrotum/Coccyx/Buttocks WDL  (R) Leg WDL  (L) Leg WDL  (R) Heel/Foot/Toe WDL  (L) Heel/Foot/Toe WDL          Devices In Places Pulse Ox and Nasal Cannula      Interventions In Place NC W/Ear Foams, Pillows, and Pressure Redistribution Mattress    Possible Skin Injury No    Pictures Uploaded Into Epic N/A  Wound Consult Placed N/A  RN Wound Prevention Protocol Ordered No
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
## 2022-12-13 NOTE — PROGRESS NOTES
[facility] Internal Medicine Daily Progress Note    Date of Service  12/13/2022    [facility] Team: R IM Green Team   Attending: Dominique Butler M.d.  Senior Resident: Dr. Prado  Intern:  Dr. Clark  Contact Number: [phone]    Chief Complaint  Chief Complaint   Patient presents with    ALOC    Weakness       HPI:  Mr. Han Pat is a 87 year old male with past medical history of cognitive impairment, chronic myelogenous leukemia, polycythemia vera, obstructive sleep apnea on 2 L home oxygen CPAP at night, gastroesophageal reflux disease, who presented to the emergency department on 12/12/2022 with progressive worsening weakness for 1 week, increased sleep, worsening confusion, and not eating.  His wife is present and helps provide a history, she reports that the patient was able to move around without difficulty and able to care for himself with minimal assistance prior to his increased weakness.    He has a past medical history of severe cognitive impairment that may be due to dementia and/or hydrocephalus.  He had an MRI in April 2021 that showed moderate to severe dilation of the lateral and third ventricles that was more prominent than associated cortical atrophy, per her report.      He underwent a work-up by neurology including a therapeutic 38 mL lumbar puncture that did not change his symptoms.  He had a referral to Dr. Palacios neurosurgery who scheduled him for an inpatient lumbar drain for 3 days to see if he was a good candidate for a  shunt.  Unfortunately, the MRI revealed subacute stroke therefore he has been worked up in the [facility], then referral to cardiology for evaluation dilated left atrium.  He still has not received medical clearance for the lumbar drain.  While he has been waiting for the drain he has the misfortune developing COVID-19 infection and was positive approximately 12 days ago on home tests and again positive this admission.  He was treated with a 5-day course of  molnupiravir.   Here in emergency room he was found to be hypoxic down to the 80s.     Hospital Course  No notes on file    Interval Problem Update  Today, the patient is only oriented to person and place.  He is not aware of the month or year.  History is difficult to obtain given acute mental changes.  However, his wife is present who helps provide history, which is summarized above.  The patient does not have any complaints at this time.  However, again, review of systems might not be accurate given acute mental change.    I have discussed this patient's plan of care and discharge plan at IDT rounds today with Case Management, Nursing, Nursing leadership, and other members of the IDT team.    Consultants/Specialty  neurology    Code Status  DNAR/DNI    Disposition  Patient is not medically cleared for discharge.   Anticipate discharge to to home with close outpatient follow-up.  I have placed the appropriate orders for post-discharge needs.    Review of Systems  Review of Systems   Unable to perform ROS: Dementia   Constitutional:  Negative for chills, fever and weight loss.   HENT:  Negative for congestion, ear pain and sore throat.    Eyes:  Negative for blurred vision and pain.   Respiratory:  Negative for cough, hemoptysis, sputum production and shortness of breath.    Cardiovascular:  Negative for chest pain, palpitations, orthopnea, leg swelling and PND.   Gastrointestinal:  Negative for abdominal pain, blood in stool, constipation, diarrhea, heartburn, nausea and vomiting.   Genitourinary:  Negative for dysuria and hematuria.   Musculoskeletal:  Negative for back pain and neck pain.   Neurological:  Positive for weakness. Negative for dizziness and headaches.   Psychiatric/Behavioral:  Positive for memory loss. Negative for depression and suicidal ideas. The patient does not have insomnia.       Physical Exam  Temp:  [37.1 °C (98.7 °F)-38.1 °C (100.6 °F)] 37.1 °C (98.7 °F)  Pulse:  [65-85] 71  Resp:   [15-38] 19  BP: ()/(47-66) 119/47  SpO2:  [88 %-95 %] 93 %    Physical Exam  Vitals and nursing note reviewed. Exam conducted with a chaperone present.   Constitutional:       General: He is not in acute distress.     Appearance: Normal appearance. He is not ill-appearing.   HENT:      Nose: No congestion or rhinorrhea.      Mouth/Throat:      Mouth: Mucous membranes are moist.      Pharynx: Oropharynx is clear. No oropharyngeal exudate or posterior oropharyngeal erythema.   Eyes:      General: No scleral icterus.     Extraocular Movements: Extraocular movements intact.      Conjunctiva/sclera: Conjunctivae normal.      Pupils: Pupils are equal, round, and reactive to light.   Neck:      Vascular: No carotid bruit.   Cardiovascular:      Rate and Rhythm: Normal rate and regular rhythm.      Heart sounds: No murmur heard.    No friction rub.   Pulmonary:      Effort: No respiratory distress.      Breath sounds: No stridor. Rhonchi (throughout) present. No wheezing.      Comments: Left lower lobe crackles  Chest:      Chest wall: No tenderness.   Abdominal:      General: Bowel sounds are normal. There is no distension.      Palpations: Abdomen is soft. There is no mass.      Tenderness: There is no abdominal tenderness. There is no right CVA tenderness, left CVA tenderness, guarding or rebound.      Hernia: No hernia is present.   Musculoskeletal:         General: No swelling, tenderness, deformity or signs of injury.      Cervical back: No rigidity or tenderness.      Right lower leg: No edema.      Left lower leg: No edema.   Skin:     Coloration: Skin is not jaundiced or pale.      Findings: No bruising, erythema, lesion or rash.   Neurological:      General: No focal deficit present.      Mental Status: He is alert and oriented to person, place, and time.      Cranial Nerves: No cranial nerve deficit.      Sensory: No sensory deficit.      Motor: Weakness present.      Coordination: Coordination normal.       Gait: Gait normal.      Deep Tendon Reflexes: Reflexes normal.      Comments: 2+/5 Knee flexion, hip flexion, and plantar flexion  4/5 upper and lower extremity extension     Psychiatric:         Mood and Affect: Mood normal.         Behavior: Behavior normal.         Thought Content: Thought content normal.         Judgment: Judgment normal.       Fluids    Intake/Output Summary (Last 24 hours) at 12/13/2022 1549  Last data filed at 12/13/2022 0500  Gross per 24 hour   Intake 50 ml   Output --   Net 50 ml       Laboratory  Recent Labs     12/12/22  1742   WBC 7.5   RBC 3.63*   HEMOGLOBIN 13.5*   HEMATOCRIT 40.5*   .6*   MCH 37.2*   MCHC 33.3*   RDW 74.8*   PLATELETCT 412   MPV 10.1     Recent Labs     12/12/22  1742   SODIUM 137   POTASSIUM 4.2   CHLORIDE 104   CO2 22   GLUCOSE 163*   BUN 13   CREATININE 0.90   CALCIUM 9.0                   Imaging  DX-CHEST-PORTABLE (1 VIEW)   Final Result      Stable patchy bilateral pulmonary opacities and basilar atelectasis. Underlying infection is not excluded.      CT-HEAD W/O   Final Result      1.  No evidence of acute intracranial process.      2.  Cerebral atrophy as well as periventricular chronic small vessel ischemic change.      3.  Pansinusitis.         DX-CHEST-PORTABLE (1 VIEW)   Final Result      Patchy bilateral pulmonary infiltrates. Consideration should be given for Covid pneumonia.           Assessment/Plan: 87 year old male with past medical history of cognitive impairment, chronic myelogenous leukemia, polycythemia vera, obstructive sleep apnea on 2 L home oxygen CPAP at night, gastroesophageal reflux disease, who was admitted for increased generalized weakness, COVID infection, and potential normal pressure hydrocephalus    * Pneumonia due to COVID-19 virus- (present on admission)  Assessment & Plan  Status post antiviral 5 day course of molnupiravir outpatient  Now with hypoxia, improved with supplemental oxygen  - Continue supplemental oxygen and  wean as tolerated with saturation goal greater than 90%  - Continue 10-day course of dexamethasone (to be completed on 12/22/2022)  Isolation precautions    Acquired cerebral ventriculomegaly (HCC)- (present on admission)  Assessment & Plan  He has marked ventriculomegaly on MRI and has been seen by neurology though after a therapeutic lumbar puncture of 38 mL of fluid he did not have provement in his symptoms.    -Discussed with neurology, Dr. Whatley, recommended waiting least 6 months before considering placing a shunt, given COVID infection.  We will plan for patient to follow-up outpatient after discharge.  Appreciate recommendations.    Fever  Assessment & Plan  Temperature 100.6 on admission  - Chest x-ray on 12/13/2022 showed stable patchy bilateral pulmonary opacities and basilar atelectasis.   -Procalcitonin within normal limits  - Blood cultures order.  Pending results.      DNR (do not resuscitate)- (present on admission)  Assessment & Plan  Per his wishes    Polycythemia vera (HCC)- (present on admission)  Assessment & Plan  Followed by Dr. Thomas heme/onc   Continue hydroxyurea     Chronic myelogenous leukemia (HCC)- (present on admission)  Assessment & Plan  Followed by Dr. Thomas     Cognitive impairment- (present on admission)  Assessment & Plan  This remains unclear if it is due to dementia and/or from hydrocephalus      Weakness- (present on admission)  Assessment & Plan  - PT/OT consult and evaluation       VTE prophylaxis: Rivaroxaban     I have performed a physical exam and reviewed and updated ROS and Plan today (12/13/2022). In review of yesterday's note (12/12/2022), there are no changes except as documented above.

## 2022-12-13 NOTE — ASSESSMENT & PLAN NOTE
This remains unclear if it is due to dementia and/or from hydrocephalus  -Patient now on comfort care. Plan for group home hospice, if possible.

## 2022-12-13 NOTE — ASSESSMENT & PLAN NOTE
- Given patient's wife's decision to pursue comfort care, this should be readdressed in the future should the patient improve and family decides to pursue treatment, although very unlikely.

## 2022-12-13 NOTE — ED NOTES
Med Rec Complete per patient's spouse at bedside  Allergies Reviewed with patient's spouse  No antibiotics within the last 30 days  Patient's Preferred Pharmacy:  [facility] on [address].

## 2022-12-13 NOTE — PROGRESS NOTES
Patient arrived to unit on [facility] with transport.  Wife at bedside.  Patient transferred over to hospital bed by sliding.  Currently on 3L nasal cannula, maintaining adequate oxygenation.  No acute distress noted.  Wife went home after patient got settled in room and took all belongings home with her except patient's cane which is at bedside.  Strip alarm plugged in, wife said patient has history of being impulsive.  Wife stated that patient has had a poor appetite over the past couple weeks, patient will show little to no interest in food or water.  Call light placed within reach.  X3 bedrails up, bed locked and in lowest position.  Patient Aox1 to self.  Patient stated he was in a hotel.

## 2022-12-13 NOTE — ED PROVIDER NOTES
ED Provider Note    CHIEF COMPLAINT  Chief Complaint   Patient presents with    ALOC    Weakness       HPI  Edbrian Pat is a 87 y.o. male who presents with progressively worsening diffuse weakness and malaise.  Patient developed a fever approximately 8 days ago.  Wife had recently gotten over COVID-19 so she assumed this is likely what he had.  She checked a COVID-19 test on him and it was positive.  Since that time his fevers have seemed to improve but his weakness is progressively worsened.  He is also more confused than normal.  Patient does have a very complex medical history including hydrocephalus, dementia, cirrhosis and Evans's esophagus.  Patient is on CPAP at night but wife reports he has not been tolerating this lately.  He is not on oxygen at home outside of when he sleeps.    REVIEW OF SYSTEMS  ROS    See HPI for further details. All other systems are negative.     PAST MEDICAL HISTORY   has a past medical history of Back pain, Evans's esophagus without dysplasia (2022), GERD (gastroesophageal reflux disease), Hyperlipidemia, Kidney stone, PND (post-nasal drip), and Sleep apnea.    SOCIAL HISTORY  Social History     Tobacco Use    Smoking status: Former     Packs/day: 1.00     Years: 15.00     Pack years: 15.00     Types: Cigarettes     Quit date: 1980     Years since quittin.9    Smokeless tobacco: Never   Vaping Use    Vaping Use: Never used   Substance and Sexual Activity    Alcohol use: Yes     Alcohol/week: 21.6 oz     Types: 36 Glasses of wine per week     Comment: Nightly about 3 drinks per night    Drug use: No    Sexual activity: Not on file       SURGICAL HISTORY   has a past surgical history that includes other orthopedic surgery (lumbar disectomy ); cataract extraction with iol; and other.    CURRENT MEDICATIONS  Home Medications       Reviewed by Park Bragg R.N. (Registered Nurse) on 22 at 173  Med List Status: Not Addressed     Medication Last  Dose Status   aspirin EC (ECOTRIN) 81 MG Tablet Delayed Response  Active   clobetasol (TEMOVATE) 0.05 % external solution  Active   FOLTABS 800 800- MCG-MG-MCG Tab  Active   hydroxyurea (HYDREA) 500 MG Cap  Active   lansoprazole (PREVACID) 30 MG CAPSULE DELAYED RELEASE  Active   montelukast (SINGULAIR) 10 MG Tab  Active   Multiple Vitamins-Minerals (OCUVITE PO)  Active   rosuvastatin (CRESTOR) 20 MG Tab  Active                    ALLERGIES  No Known Allergies    PHYSICAL EXAM  Vitals:    12/12/22 1801   BP: 135/61   Pulse: 85   Resp:    Temp:    SpO2: 94%       Physical Exam  Constitutional:       Appearance: He is well-developed.   HENT:      Head: Normocephalic and atraumatic.   Eyes:      Conjunctiva/sclera: Conjunctivae normal.      Pupils: Pupils are equal, round, and reactive to light.   Cardiovascular:      Rate and Rhythm: Normal rate and regular rhythm.      Heart sounds: No murmur heard.    No friction rub. No gallop.   Pulmonary:      Effort: Pulmonary effort is normal. No respiratory distress.      Breath sounds: Normal breath sounds. No wheezing.   Abdominal:      General: Bowel sounds are normal. There is no distension.      Palpations: Abdomen is soft.      Tenderness: There is no abdominal tenderness. There is no rebound.   Musculoskeletal:      Cervical back: Normal range of motion and neck supple.   Skin:     General: Skin is warm and dry.   Neurological:      Mental Status: He is alert and oriented to person, place, and time.   Psychiatric:         Behavior: Behavior normal.         DIAGNOSTIC STUDIES / PROCEDURES      LABS  Results for orders placed or performed during the hospital encounter of 12/12/22   CBC with Differential   Result Value Ref Range    WBC 7.5 4.8 - 10.8 K/uL    RBC 3.63 (L) 4.70 - 6.10 M/uL    Hemoglobin 13.5 (L) 14.0 - 18.0 g/dL    Hematocrit 40.5 (L) 42.0 - 52.0 %    .6 (H) 81.4 - 97.8 fL    MCH 37.2 (H) 27.0 - 33.0 pg    MCHC 33.3 (L) 33.7 - 35.3 g/dL    RDW  74.8 (H) 35.9 - 50.0 fL    Platelet Count 412 164 - 446 K/uL    MPV 10.1 9.0 - 12.9 fL    Neutrophils-Polys 74.30 (H) 44.00 - 72.00 %    Lymphocytes 6.20 (L) 22.00 - 41.00 %    Monocytes 15.00 (H) 0.00 - 13.40 %    Eosinophils 0.90 0.00 - 6.90 %    Basophils 0.00 0.00 - 1.80 %    Nucleated RBC 0.00 /100 WBC    Neutrophils (Absolute) 5.64 1.82 - 7.42 K/uL    Lymphs (Absolute) 0.47 (L) 1.00 - 4.80 K/uL    Monos (Absolute) 1.13 (H) 0.00 - 0.85 K/uL    Eos (Absolute) 0.07 0.00 - 0.51 K/uL    Baso (Absolute) 0.00 0.00 - 0.12 K/uL    NRBC (Absolute) 0.00 K/uL    Anisocytosis 1+     Macrocytosis 1+    Comp Metabolic Panel   Result Value Ref Range    Sodium 137 135 - 145 mmol/L    Potassium 4.2 3.6 - 5.5 mmol/L    Chloride 104 96 - 112 mmol/L    Co2 22 20 - 33 mmol/L    Anion Gap 11.0 7.0 - 16.0    Glucose 163 (H) 65 - 99 mg/dL    Bun 13 8 - 22 mg/dL    Creatinine 0.90 0.50 - 1.40 mg/dL    Calcium 9.0 8.5 - 10.5 mg/dL    AST(SGOT) 126 (H) 12 - 45 U/L    ALT(SGPT) 75 (H) 2 - 50 U/L    Alkaline Phosphatase 227 (H) 30 - 99 U/L    Total Bilirubin 1.7 (H) 0.1 - 1.5 mg/dL    Albumin 3.4 3.2 - 4.9 g/dL    Total Protein 7.5 6.0 - 8.2 g/dL    Globulin 4.1 (H) 1.9 - 3.5 g/dL    A-G Ratio 0.8 g/dL   Diagnostic Alcohol   Result Value Ref Range    Diagnostic Alcohol <10.1 <10.1 mg/dL   Urine Drug Screen (Triage)   Result Value Ref Range    Amphetamines Urine Negative Negative    Barbiturates Negative Negative    Benzodiazepines Negative Negative    Cocaine Metabolite Negative Negative    Methadone Negative Negative    Opiates Negative Negative    Oxycodone Negative Negative    Phencyclidine -Pcp Negative Negative    Propoxyphene Negative Negative    Cannabinoid Metab Negative Negative   CoV-2, FLU A/B, and RSV by PCR (2-4 Hours CEPHEID) : Collect NP swab in VTM    Specimen: Respirate   Result Value Ref Range    SARS-CoV-2 Source NP Swab    AMMONIA   Result Value Ref Range    Ammonia 28 11 - 45 umol/L   ESTIMATED GFR   Result Value Ref  Range    GFR (CKD-EPI) 83 >60 mL/min/1.73 m 2   DIFFERENTIAL MANUAL   Result Value Ref Range    Bands-Stabs 0.90 0.00 - 10.00 %    Myelocytes 1.80 %    Progranulocytes 0.90 %    Manual Diff Status PERFORMED    PERIPHERAL SMEAR REVIEW   Result Value Ref Range    Peripheral Smear Review see below    PLATELET ESTIMATE   Result Value Ref Range    Plt Estimation Normal    MORPHOLOGY   Result Value Ref Range    RBC Morphology Present     Polychromia 1+          RADIOLOGY  CT-HEAD W/O   Final Result      1.  No evidence of acute intracranial process.      2.  Cerebral atrophy as well as periventricular chronic small vessel ischemic change.      3.  Pansinusitis.         DX-CHEST-PORTABLE (1 VIEW)   Final Result      Patchy bilateral pulmonary infiltrates. Consideration should be given for Covid pneumonia.                COURSE & MEDICAL DECISION MAKING  Pertinent Labs & Imaging studies reviewed. (See chart for details)    Patient here with symptoms that appear most consistent with COVID-19 associated malaise, this is likely secondary to his hypoxia and his confusion is likely secondary to some delirium from the acute infection.  Will check basic labs including blood cultures for alternative cause though my suspicion of bacteremia here is low especially given patient's positive COVID test.  We will also check ammonia given patient's history of cirrhosis.  And check CT to evaluate for any ICH or significant worsening of hydrocephalus.  CT is unchanged.  Patient case discussed with hospitalist who has agreed to admit for ongoing oxygen therapy.  Patient started on dexamethasone.      FINAL IMPRESSION  1.  Hypoxia respiratory failure, COVID-19 pneumonia         Electronically signed by: Janak Mendez M.D., 12/12/2022 7:22 PM

## 2022-12-13 NOTE — ASSESSMENT & PLAN NOTE
Followed by Dr. William warren/onc   Patient's wife would not like the patient to pursue hydroxyurea treatment at this time given comfort care

## 2022-12-14 PROBLEM — D53.9 MACROCYTIC ANEMIA: Status: ACTIVE | Noted: 2019-03-25

## 2022-12-14 PROBLEM — R82.90 ABNORMAL URINALYSIS: Status: ACTIVE | Noted: 2022-01-01

## 2022-12-14 NOTE — CARE PLAN
The patient is Stable - Low risk of patient condition declining or worsening    Shift Goals: monitor oxygen levels; increase mobility and nutrition  Clinical Goals: reorient, monitor oxygen  Patient Goals: rest, comfort  Family Goals: LADAN    Progress made toward(s) clinical / shift goals:  see above    Problem: Skin Integrity  Goal: Skin integrity is maintained or improved  Outcome: Progressing       Patient is not progressing towards the following goals:      Problem: Knowledge Deficit - Standard  Goal: Patient and family/care givers will demonstrate understanding of plan of care, disease process/condition, diagnostic tests and medications  Outcome: Not Progressing     Problem: Fall Risk  Goal: Patient will remain free from falls  Outcome: Not Progressing     Problem: Nutrition  Goal: Patient's nutritional and fluid intake will be adequate or improve  Outcome: Not Progressing  Goal: Enteral nutrition will be maintained or improve  Outcome: Not Progressing  Goal: Enteral nutrition will be maintained or improve  Outcome: Not Progressing

## 2022-12-14 NOTE — CARE PLAN
The patient is Stable - Low risk of patient condition declining or worsening    Shift Goals  Clinical Goals: monitor oxygen  Patient Goals: rest, comfort  Family Goals: patient to behave overnight    Progress made toward(s) clinical / shift goals:  Patient remains on 2.5L O2 saturating 91-93%. Patient remained comfortable for duration of the shift.       Problem: Mobility  Goal: Patient's capacity to carry out activities will improve  Outcome: Progressing  Note: Patient was up to chair for a couple of hours with help from RN, CNA, and walking stick.      Problem: Self Care  Goal: Patient will have the ability to perform ADLs independently or with assistance (bathe, groom, dress, toilet and feed)  Outcome: Progressing  Note: Patient brushed teeth by himself.       Patient is not progressing towards the following goals:

## 2022-12-14 NOTE — DISCHARGE PLANNING
HTH/[facility] TCN chart review completed. Collaborated with CM prior to meeting with the pt. The most current review of medical record, knowledge of pt's PLOF and social support, LACE+ score of 72 and 6 clicks scores (none available) were considered.      Pt seen at bedside with wife present. Introduced TCN program. Provided education regarding post acute levels of care. Discussed HTH/SCP plan benefits (Meds to Beds, medical uber and [facility] transitional care). Pt and wife verbalize understanding.     Patient has hx of dementia and hydrocephalus. He was scheduled for lumbar drain to see if he is a good candidate for  shunt, however MRI at that time revealed subacute stroke. He still has not received medical clearance for lumbar drain. While waiting for drain, he has developed Covid and PNA. Wife Sally is his caregiver and a good historian. Prior to a few weeks ago, patient was able to walk, dress himself, use toilet, and take shower by himself. He did not need a can or walker. Wife endorses he is far below his baseline currently. He does use [employer] at night with [employer] [facility]. Choice obtained for resumption of DME (O2) and HH, faxed to [facility], and saved in media.     PT eval pending.    TCN will continue to follow and collaborate with discharge planning team as additional post acute needs arise. Thank you.     Completed:  PT consult pending  Choice obtained: HH (resumption) and DME (O2) resumption  [facility] referral sent 12/13/22

## 2022-12-14 NOTE — ASSESSMENT & PLAN NOTE
Patient's wife reports that the patient has a history of alcohol use, with roughly 2 to 3 glasses of wine per day.  This can be due to his macrocytic anemia.  He also has suggestion of liver injury, due to transaminitis and elevated alk phosphatase.  Patient also has a history of CML.

## 2022-12-14 NOTE — DIETARY
Nutrition services: Day 2 of admit.  Han Pat is an 87 y.o. male with admitting DX of pneumonia due to COVID-19 virus.    Consult received for unsure wt loss, poor PO per admit screen. Unable to speak with pt at bedside as per department guidelines dietary staff is not entering COVID isolation rooms. Currently on a Regular diet, poor intake likely 2' COVID infection.  Pt may benefit from the addition of oral nutrition supplements to bolster nutrition. RD will add supplement order and adjust menu accordingly.     Assessment:  Weight: 87 kg (191 lb 12.8 oz)  Body mass index is 26.01 kg/m²., BMI classification: overweight  Diet/Intake: Regular; no PO recorded in chart to assess    Evaluation:   Admitted with ALOC and weakness.  Wt per chart review: 193 lbs 11/18/22, 195 lbs 10/12/22, 194 lbs 7/18/22. No significant wt loss noted.    Malnutrition Risk: Does not meet criteria per ASPEN guidelines at this time.    Recommendations/Plan:  Boost Plus BID.  Encourage intake of meals and supplements.  Document intake of all meals and supplements as % taken in ADLs to provide interdisciplinary communication across all shifts.   Monitor weight.  Nutrition rep will continue to see patient for ongoing meal and snack preferences.     RD will follow per dept guidelines.

## 2022-12-14 NOTE — DISCHARGE PLANNING
Case Management Discharge Planning    Admission Date: 12/12/2022  GMLOS: 5.2  ALOS: 2    6-Clicks ADL Score: 14  6-Clicks Mobility Score: 13  PT and/or OT Eval ordered: Yes  Post-acute Referrals Ordered: Yes  Post-acute Choice Obtained: Yes  Has referral(s) been sent to post-acute provider:  Yes      Anticipated Discharge Dispo: Discharge Disposition: D/T to SNF with Medicare cert in anticipation of skilled care (03)    DME Needed: No    Action(s) Taken: LSW notified spouse wanting to talk to this LSW. LSW called spouse who reports patient needing SNF. LSW provided her with choice form to look over.     Escalations Completed: None    Medically Clear: No    Next Steps: f/u on choice and medical clearance    Barriers to Discharge: Medical clearance and Pending Placement    Is the patient up for discharge tomorrow: No

## 2022-12-14 NOTE — CARE PLAN
The patient is Stable - Low risk of patient condition declining or worsening    Shift Goals  Clinical Goals: reorient, monitor oxygen  Patient Goals: rest, comfort  Family Goals: LADAN    Progress made toward(s) clinical / shift goals:    Problem: Fall Risk  Goal: Patient will remain free from falls  Outcome: Progressing  Note: Patient remained free from falls this shift. Call light in reach, educated patient on calling if needing assistance. Patient belongings in reach. Equipment out of sight. Bed alarm functioning.          Patient is not progressing towards the following goals:      Problem: Respiratory  Goal: Patient will achieve/maintain optimum respiratory ventilation and gas exchange  Outcome: Not Progressing  Note: Patient requires frequent reorienting. Often disconnects pulse ox and removes NC.

## 2022-12-14 NOTE — DISCHARGE PLANNING
HTH/SCP TCN chart review completed. Collaborated with PT via [employer], who is recommending post-acute placement. Attempted to call patient's spouse given his ALOC, however there was no answer. Will attempt to reach her asap to obtain SNF choice. Patient is not medically clear to DC per MD. Patient is COVID+. TCN will continue to follow and collaborate with discharge planning team as additional post acute needs arise. Thank you.    Completed:  PT and OT consults pending  Spoke to PT Meghan via Voal[employer], and she is recommending placement.  Choice obtained: HH (resumption) and DME (O2) resumption  [facility] referral sent 12/13/22

## 2022-12-14 NOTE — PROGRESS NOTES
[facility] Internal Medicine Daily Progress Note    Date of Service  12/14/2022    [facility] Team: R IM Green Team   Attending: Dominique Butler M.d.  Senior Resident: Dr. Prado  Intern:  Dr. Clark  Contact Number: [phone]    Chief Complaint  Chief Complaint   Patient presents with    ALOC    Weakness       HPI:  Mr. Han Pat is a 87 year old male with past medical history of cognitive impairment, chronic myelogenous leukemia, polycythemia vera, obstructive sleep apnea on 2 L home oxygen CPAP at night, gastroesophageal reflux disease, who presented to the emergency department on 12/12/2022 with progressive worsening weakness for 1 week, increased sleep, worsening confusion, and not eating.  His wife is present and helps provide a history, she reports that the patient was able to move around without difficulty and able to care for himself with minimal assistance prior to his increased weakness.    He has a past medical history of severe cognitive impairment that may be due to dementia and/or hydrocephalus.  He had an MRI in April 2021 that showed moderate to severe dilation of the lateral and third ventricles that was more prominent than associated cortical atrophy, per her report.      He underwent a work-up by neurology including a therapeutic 38 mL lumbar puncture that did not change his symptoms.  He had a referral to Dr. Palacios neurosurgery who scheduled him for an inpatient lumbar drain for 3 days to see if he was a good candidate for a  shunt.  Unfortunately, the MRI revealed subacute stroke therefore he has been worked up in the [facility], then referral to cardiology for evaluation dilated left atrium.  He still has not received medical clearance for the lumbar drain.  While he has been waiting for the drain he has the misfortune developing COVID-19 infection and was positive approximately 12 days ago on home tests and again positive this admission.  He was treated with a 5-day course of  molnupiravir.   Here in emergency room he was found to be hypoxic down to the 80s.     Hospital Course  Patient found to have marked ventriculomegaly on MRI.  He was seen by neurology, Dr. Whatley, who recommended waiting least 6 months before considering placing a shunt, given COVID infection.      Interval Problem Update  Today, the patient is only oriented to person.  History is limited due to patient's memory loss.  When asked about urinary symptoms he denies any currently, but cannot recall if he has had any dysuria or other symptoms in the past.  He is pleasant and has no major complaints at this time.  However, he has a continued cough without any sputum production. Denies any shortness of breath, but still requires 2-3 liters of oxygen. No acute events over night. Denies chest pain, nausea, vomiting, diarrhea, fevers, chills, night sweats, or dysuria.     I have discussed this patient's plan of care and discharge plan at IDT rounds today with Case Management, Nursing, Nursing leadership, and other members of the IDT team.    Consultants/Specialty  neurology    Code Status  DNAR/DNI    Disposition  Patient is not medically cleared for discharge.   Anticipate discharge to to home with close outpatient follow-up.  I have placed the appropriate orders for post-discharge needs.    Review of Systems  Review of Systems   Unable to perform ROS: Dementia   Constitutional:  Negative for chills, fever and weight loss.   HENT:  Negative for congestion, ear pain and sore throat.    Eyes:  Negative for blurred vision and pain.   Respiratory:  Positive for cough. Negative for hemoptysis, sputum production and shortness of breath.    Cardiovascular:  Negative for chest pain, palpitations, orthopnea, leg swelling and PND.   Gastrointestinal:  Negative for abdominal pain, blood in stool, constipation, diarrhea, heartburn, nausea and vomiting.   Genitourinary:  Negative for dysuria and hematuria.   Musculoskeletal:  Negative for  back pain and neck pain.   Neurological:  Positive for weakness (Lower extremity weakness). Negative for dizziness and headaches.   Psychiatric/Behavioral:  Positive for memory loss. Negative for depression and suicidal ideas. The patient does not have insomnia.       Physical Exam  Temp:  [36.8 °C (98.3 °F)-37.6 °C (99.6 °F)] 37.6 °C (99.6 °F)  Pulse:  [66-80] 78  Resp:  [17-20] 18  BP: ()/(22-49) 112/49  SpO2:  [91 %-93 %] 91 %    Physical Exam  Vitals and nursing note reviewed. Exam conducted with a chaperone present.   Constitutional:       General: He is not in acute distress.     Appearance: Normal appearance. He is not ill-appearing.   HENT:      Nose: No congestion or rhinorrhea.      Mouth/Throat:      Mouth: Mucous membranes are moist.      Pharynx: Oropharynx is clear. No oropharyngeal exudate or posterior oropharyngeal erythema.   Eyes:      General: No scleral icterus.     Extraocular Movements: Extraocular movements intact.      Conjunctiva/sclera: Conjunctivae normal.      Pupils: Pupils are equal, round, and reactive to light.   Neck:      Vascular: No carotid bruit.   Cardiovascular:      Rate and Rhythm: Normal rate and regular rhythm.      Heart sounds: No murmur heard.    No friction rub.   Pulmonary:      Breath sounds: Rhonchi: throughout.      Comments: Lung exam limited due to patient weakness and inability to sit up on his own  Chest:      Chest wall: No tenderness.   Abdominal:      General: Bowel sounds are normal. There is no distension.      Palpations: Abdomen is soft. There is no mass.      Tenderness: There is no abdominal tenderness. There is no right CVA tenderness, left CVA tenderness, guarding or rebound.      Hernia: No hernia is present.   Musculoskeletal:         General: No swelling, tenderness, deformity or signs of injury.      Cervical back: No rigidity or tenderness.      Right lower leg: No edema.      Left lower leg: No edema.   Skin:     Coloration: Skin is not  jaundiced or pale.      Findings: No bruising, erythema, lesion or rash.   Neurological:      General: No focal deficit present.      Mental Status: He is alert. He is disoriented.      Cranial Nerves: No cranial nerve deficit.      Sensory: No sensory deficit.      Motor: Weakness present.      Coordination: Coordination normal.      Deep Tendon Reflexes: Reflexes normal.      Comments: 2+/5 Knee flexion, hip flexion, and plantar flexion  4/5 upper and lower extremity extension  Gait cannot be assessed due to weakness. Nurse reports patient had difficulty getting up from chair to bed.   Psychiatric:         Mood and Affect: Mood normal.         Behavior: Behavior normal.         Thought Content: Thought content normal.         Judgment: Judgment normal.       Fluids  No intake or output data in the 24 hours ending 12/14/22 1209      Laboratory  Recent Labs     12/12/22  1742 12/14/22  0110   WBC 7.5 7.2   RBC 3.63* 3.15*   HEMOGLOBIN 13.5* 11.6*   HEMATOCRIT 40.5* 35.5*   .6* 112.7*   MCH 37.2* 36.8*   MCHC 33.3* 32.7*   RDW 74.8* 73.5*   PLATELETCT 412 358   MPV 10.1 10.3     Recent Labs     12/12/22  1742 12/14/22  0110   SODIUM 137 136   POTASSIUM 4.2 4.1   CHLORIDE 104 106   CO2 22 21   GLUCOSE 163* 176*   BUN 13 22   CREATININE 0.90 0.79   CALCIUM 9.0 8.8                   Imaging  DX-CHEST-PORTABLE (1 VIEW)   Final Result      Stable patchy bilateral pulmonary opacities and basilar atelectasis. Underlying infection is not excluded.      CT-HEAD W/O   Final Result      1.  No evidence of acute intracranial process.      2.  Cerebral atrophy as well as periventricular chronic small vessel ischemic change.      3.  Pansinusitis.         DX-CHEST-PORTABLE (1 VIEW)   Final Result      Patchy bilateral pulmonary infiltrates. Consideration should be given for Covid pneumonia.           Assessment/Plan: 87 year old male with past medical history of cognitive impairment, chronic myelogenous leukemia,  polycythemia vera, obstructive sleep apnea on 2 L home oxygen CPAP at night, gastroesophageal reflux disease, who was admitted for increased generalized weakness, COVID infection, and potential normal pressure hydrocephalus    * Pneumonia due to COVID-19 virus- (present on admission)  Assessment & Plan  Status post antiviral 5 day course of molnupiravir outpatient  Now with hypoxia, improved with supplemental oxygen  - Continue supplemental oxygen and wean as tolerated with saturation goal greater than 90%  - Continue 10-day course of dexamethasone (to be completed on 12/22/2022)  Isolation precautions    Acquired cerebral ventriculomegaly (HCC)- (present on admission)  Assessment & Plan  He has marked ventriculomegaly on MRI and has been seen by neurology though after a therapeutic lumbar puncture of 38 mL of fluid he did not have provement in his symptoms.    -Discussed with neurology, Dr. Whatley, recommended waiting least 6 months before considering placing a shunt, given COVID infection.  We will plan for patient to follow-up outpatient after discharge.  Appreciate recommendations.    Abnormal urinalysis  Assessment & Plan  Patient had a abnormal urinalysis yesterday with glucose elevated greater than 1000, trace ketones, 30 protein, leukocyte Estrace, and occult blood.  Likely a back collection.  - Repeat urinalysis collected with negative glucose, trace ketones, 30 protein, moderate leukocyte esterase.  - Patient denies any active dysuria.  Nitrites negative, suggesting no urinary tract infection.    Fever  Assessment & Plan  Temperature 100.6 on admission  - Chest x-ray on 12/13/2022 showed stable patchy bilateral pulmonary opacities and basilar atelectasis.   -Procalcitonin within normal limits  - Blood cultures order.  Pending results.      DNR (do not resuscitate)- (present on admission)  Assessment & Plan  Per his wishes    Polycythemia vera (HCC)- (present on admission)  Assessment & Plan  Followed by   [facility] heme/onc   Continue hydroxyurea     Chronic myelogenous leukemia (HCC)- (present on admission)  Assessment & Plan  Followed by Dr. Thomas     Cognitive impairment- (present on admission)  Assessment & Plan  This remains unclear if it is due to dementia and/or from hydrocephalus      Macrocytic anemia  Assessment & Plan  Patient's wife reports that the patient has a history of alcohol use, with roughly 2 to 3 glasses of wine per day.  This can be due to his macrocytic anemia.  He also has suggestion of liver injury, due to transaminitis and elevated alk phosphatase.  Patient also has a history of CML.  - Vitamin B12  - Continue to trend hemoglobin    Weakness- (present on admission)  Assessment & Plan  - PT/OT consult and evaluation  - Discussed with patient's wife about the possibility of SNF.  They would like to proceed with home health instead of SNF.       VTE prophylaxis: Rivaroxaban     I have performed a physical exam and reviewed and updated ROS and Plan today (12/14/2022). In review of yesterday's note (12/13/2022), there are no changes except as documented above.

## 2022-12-14 NOTE — HOSPITAL COURSE
86 y/o M with PMH dementia, CML, polycythemia vera, FREYA (CPAP with 2L O2), GERD, NPH admitted 12/12/2022 with progressive weakness last month, worsening confusion, loss of appetite. Had LP without significant improvement. Supposed to get LP drain but unfortunately tested positive for COVID around 12/1 - treated with Molnupravir starting on 11/29. Admitted with acute respiratory failure and started on Decadron.  He had fever 1 time on admission.  Procalcitonin normal.  Chest x-ray patchy bilateral pulmonary opacities. Per Dr. Palacios, no ventricular shunt after 3-6 mo post covid. Wife refuses SNF, wanted home care. UA positive for leuk esterase, WBC 20-50 but patient asymptomatic so no abx. Pt condition continued to worsen. He most likely was aspirating. Memory worsening. Also breathing condition worsen and required more oxygen up to 7L. Wife decided on comfort care which is very reasonable for his age and comorbidities. Pls see advance discussion from 12/17/2022.

## 2022-12-15 PROBLEM — E11.9 TYPE 2 DIABETES MELLITUS (HCC): Status: ACTIVE | Noted: 2022-01-01

## 2022-12-15 NOTE — FACE TO FACE
Face to Face Supporting Documentation - Home Health    The encounter with this patient was in whole or in part the primary reason for home health admission.    Date of encounter:   Patient:                    MRN:                       YOB: 2020  Han Pat  [id number]  1935     Home health to see patient for:  Physical Therapy evaluation and treatment    Skilled need for:  Comment: progressive weakness and need for physical therapy      Homebound status evidenced by:  Need the aid of supportive devices such as crutches, canes, wheelchairs or walkers or Needs the assistance of another person in order to leave the home. Leaving home requires a considerable and taxing effort. There is a normal inability to leave the home.    Community Physician to provide follow up care: Carmencita Herron M.D.     Optional Interventions? No      I certify the face to face encounter for this home health care referral meets the CMS requirements and the encounter/clinical assessment with the patient was, in whole, or in part, for the medical condition(s) listed above, which is the primary reason for home health care. Based on my clinical findings: the service(s) are medically necessary, support the need for home health care, and the homebound criteria are met.  I certify that this patient has had a face to face encounter by myself.  Samuel Clark M.D. - NPI: [id number]

## 2022-12-15 NOTE — PROGRESS NOTES
0745: Went in to see patient this morning and patient was only alert and orientated to self. Patient had  no issues or complaints of pain at this time.    0930: The patient's wife came by to see patient and was given updates from overnight.     1145: PT said that they would come by to see patient after lunch.    1345: Patient was taken to shower and given shower with a complete linen change. Patient was very weak and went back to bed and PT came and assessed patient.    1500: Case management said to check with infection control to see if patient is COVID recovered since patient was first symptomatic on December 1st and since patient is back to baseline and is no longer symptomatic.    1530: Infection control said that patient was COVID  recovered and they discontinued precautions on patient.

## 2022-12-15 NOTE — ASSESSMENT & PLAN NOTE
Hemoglobin A1c 7.1% from 12/14/2022  - Follow-up with primary care physician for further management

## 2022-12-15 NOTE — PROGRESS NOTES
Update:  Patient discussed with infection control, who believe the patient no longer needs isolation precautions, given the patient is asymptomatic and cleared from COVID, given his symptoms started around the 1st of the month. Isolation precautions no longer needed at this time.

## 2022-12-15 NOTE — THERAPY
"Physical Therapy   Initial Evaluation     Patient Name: Han Pat  Age:  87 y.o., Sex:  male  Medical Record #: [id number]  Today's Date: 12/14/2022     Precautions  Precautions: Fall Risk  Comments: Baseline dementia    Assessment  Mr. Han Pat is a 87 year old male with past medical history of cognitive impairment, chronic myelogenous leukemia, polycythemia vera, obstructive sleep apnea on 2 L home oxygen CPAP at night, gastroesophageal reflux disease, who presented to the emergency department on 12/12/2022 with progressive worsening weakness for 1 week, increased sleep, worsening confusion, and not eating.  His wife is present and helps provide a history, she reports that the patient was able to move around without difficulty and able to care for himself with minimal assistance prior to his increased weakness.      Patient seen for PT eval and presents with impaired strength, safety awareness, balance and activity tolerance. He was able to ambulate in the room with his walking stick and the FWW, with improved balance with the FWW.  He has stairs to navigate at home and his wife cannot provide physical assist.  Wife is planning to get a room set up on the main floor and eventually pay for extra help at home as needed. At this time patient will benefit from placement for further therapy. Will continue to follow while in house.       Plan    Recommend Physical Therapy 3 times per week until therapy goals are met for the following treatments:  Bed Mobility, Equipment, Gait Training, Neuro Re-Education / Balance, Stair Training, Therapeutic Activities, and Therapeutic Exercises    DC Equipment Recommendations: Unable to determine at this time  Discharge Recommendations: Recommend post-acute placement for additional physical therapy services prior to discharge home       Subjective    \"I just took a shower\"     Objective       12/14/22 1400   Precautions   Precautions Fall Risk   Comments Baseline " dementia   Pain 0 - 10 Group   Therapist Pain Assessment 0;Post Activity Pain Same as Prior to Activity   Prior Living Situation   Prior Services Continuous (24 Hour) Care Giving Family   Housing / Facility 2 Story House   Steps Into Home 8   Steps In Home 12   Rail Both Rail (Steps in Home);Both Rail (Steps into Home)   Bathroom Set up Walk In Shower  (No room for a shower chair)   Equipment Owned Front-Wheel Walker;Single Point Cane;Raised Toilet Seat With Arms;Raised Toilet Seat Without Arms   Lives with - Patient's Self Care Capacity Spouse   Comments Spouse provides assist and supervision. She doesnt not have to provide physical assist at baseline.   Prior Level of Functional Mobility   Bed Mobility Independent   Transfer Status Independent   Ambulation Independent   Distance Ambulation (Feet)   (household distances)   Assistive Devices Used Single Point Cane   Stairs Independent   Comments Patient uses a walking stick as a SPC.  She reports that the patient has had a recent decline in functional status and she cannot provide the needed level of assist.   History of Falls   History of Falls Yes   Date of Last Fall   (1 year ago)   Cognition    Cognition / Consciousness X   Orientation Level Not Oriented to Year;Not Oriented to Day  (reports Aldo is the president)   Level of Consciousness Alert   Safety Awareness Impaired;Impulsive   Attention Impaired   Comments follows commands, but is [place] and stubborn   Active ROM Lower Body    Active ROM Lower Body  WDL   Strength Lower Body   Lower Body Strength  X   Gross Strength Generalized Weakness, Equal Bilaterally   Sensation Lower Body   Lower Extremity Sensation   WDL   Strength Upper Body   Upper Body Strength  X   Gross Strength Generalized Weakness, Equal Bilaterally.    Balance Assessment   Sitting Balance (Static) Fair   Sitting Balance (Dynamic) Fair -   Standing Balance (Static) Poor +   Standing Balance (Dynamic) Poor   Weight Shift Sitting Fair   Weight  Shift Standing Fair   Comments w/FWW and walking stick   Gait Analysis   Gait Level Of Assist Minimal Assist   Assistive Device Other (Comments);Front Wheel Walker  (walking stick)   Distance (Feet) 25   # of Times Distance was Traveled 2   Deviation Bradykinetic;Shuffled Gait;Increased Base Of Support   Comments patient's wife reports he won't use a FWW at home. However patient with much improved gait with FWW vs walking stick. 2 posterior LOB with walking stick   Bed Mobility    Supine to Sit Minimal Assist   Sit to Supine Minimal Assist   Scooting Minimal Assist   Rolling Moderate Assist to Rt.;Minimum Assist to Lt.   Functional Mobility   Sit to Stand Minimal Assist   Bed, Chair, Wheelchair Transfer Minimal Assist   How much difficulty does the patient currently have...   Turning over in bed (including adjusting bedclothes, sheets and blankets)? 1   Sitting down on and standing up from a chair with arms (e.g., wheelchair, bedside commode, etc.) 1   Moving from lying on back to sitting on the side of the bed? 1   How much help from another person does the patient currently need...   Moving to and from a bed to a chair (including a wheelchair)? 2   Need to walk in a hospital room? 2   Climbing 3-5 steps with a railing? 2   6 clicks Mobility Score 9   Activity Tolerance   Sitting in Chair deferred, as he was up in the chair all morning   Sitting Edge of Bed 10 min   Standing 5 min   Edema / Skin Assessment   Comments at risk for skin breakdown. Reports new urinary incontinence   Patient / Family Goals    Patient / Family Goal #1 to go home   Short Term Goals    Short Term Goal # 1 in 6 visits patient will demo all functional transfers with sup and LRAD for safe DC   Short Term Goal # 2 in 6 visits patient will ambualte 200' with sup and LRAD for safe DC   Short Term Goal # 3 in 6 visits patient will navigate 12 stairs with B rails and sup w/LRAD for safe DC   Education Group   Education Provided Gait Training;Role  of Physical Therapist;Use of Assistive Device   Role of Physical Therapist Patient Response Patient;Acceptance;Explanation;Demonstration;Verbal Demonstration   Gait Training Patient Response Patient;Acceptance;Explanation;Demonstration;Verbal Demonstration;Action Demonstration   Use of Assistive Device Patient Response Patient;Acceptance;Demonstration;Explanation;Verbal Demonstration;Action Demonstration   Problem List    Problems Impaired Bed Mobility;Decreased Activity Tolerance;Impaired Transfers;Impaired Ambulation;Functional Strength Deficit;Impaired Coordination;Safety Awareness Deficits / Cognition;Motor Planning / Sequencing;Impaired Balance   Anticipated Discharge Equipment and Recommendations   DC Equipment Recommendations Unable to determine at this time   Discharge Recommendations Recommend post-acute placement for additional physical therapy services prior to discharge home     Meghan Crowe, PT, DPT, GCS

## 2022-12-15 NOTE — DISCHARGE PLANNING
HTH/SCP TCN chart review completed. Collaborated with LINDSEY Amin. Patient seen at bedside with wife, son, and daughter-in-law present. Per MD, patient has been medically cleared for discharge.    PT consult completed with recommendation for post acute placement. TCN has discussion with spouse and family regarding SNF options. Wife definitely did not want him to go to [facility] or [facility]. She wanted time to research other SNFs, but gave verbal consent for choice and DPA to send SNF referrals. Spouse would like to be notified when a SNF accepts patient and to make final call where he goes if there is more than one option. Wife agrees that [facility] is not an option for patient due to debility.    TCN will continue to follow and collaborate with discharge planning team as additional post acute needs arise. Thank you.    Completed:  PT and OT consults pending  Spoke to PT Meghan via Vo[employer], and she is recommending placement.  Choice obtained: HH (resumption) and DME (O2) resumption 12/13/22; SNF choice 12/15/22  [facility] referral sent 12/13/22

## 2022-12-15 NOTE — PROGRESS NOTES
[facility] Internal Medicine Daily Progress Note    Date of Service  12/15/2022    R Team: R IM Green Team   Attending: Dominique Butler M.d.  Senior Resident: Dr. Prado  Intern:  Dr. Clark  Contact Number: [phone]    Chief Complaint  Chief Complaint   Patient presents with    ALOC    Weakness       HPI:  Mr. Han Pat is a 87 year old male with past medical history of cognitive impairment, chronic myelogenous leukemia, polycythemia vera, obstructive sleep apnea on 2 L home oxygen CPAP at night, gastroesophageal reflux disease, who presented to the emergency department on 12/12/2022 with progressive worsening weakness for 1 week, increased sleep, worsening confusion, and not eating.  His wife is present and helps provide a history, she reports that the patient was able to move around without difficulty and able to care for himself with minimal assistance prior to his increased weakness.    He has a past medical history of severe cognitive impairment that may be due to dementia and/or hydrocephalus.  He had an MRI in April 2021 that showed moderate to severe dilation of the lateral and third ventricles that was more prominent than associated cortical atrophy, per her report.      He underwent a work-up by neurology including a therapeutic 38 mL lumbar puncture that did not change his symptoms.  He had a referral to Dr. Palacios neurosurgery who scheduled him for an inpatient lumbar drain for 3 days to see if he was a good candidate for a  shunt.  Unfortunately, the MRI revealed subacute stroke therefore he has been worked up in the [facility], then referral to cardiology for evaluation dilated left atrium.  He still has not received medical clearance for the lumbar drain.  While he has been waiting for the drain he has the misfortune developing COVID-19 infection and was positive approximately 12 days ago on home tests and again positive this admission.  He was treated with a 5-day course of  molnupiravir.   Here in emergency room he was found to be hypoxic down to the 80s.     Hospital Course  Patient found to have marked ventriculomegaly on MRI.  He was seen by neurology, Dr. Whatley, who recommended waiting least 6 months before considering placing a shunt, given COVID infection.      Interval Problem Update  Today, the patient is only oriented to person.  History is limited due to patient's memory loss.  When asked about urinary symptoms he denies any currently, but cannot recall if he has had any dysuria or other symptoms in the past.  He is pleasant and has no major complaints at this time.  However, he has a continued cough without any sputum production. Denies any shortness of breath, but still requires 2-3 liters of oxygen. No acute events over night. Denies chest pain, nausea, vomiting, diarrhea, fevers, chills, night sweats, or dysuria.     The patient's wife is at bedside and helps provide additional information.  She clarifies that she would like the patient to go to skilled nursing facility rather than home health, because she is not able to handle caring for him on her own.    I have discussed this patient's plan of care and discharge plan at IDT rounds today with Case Management, Nursing, Nursing leadership, and other members of the IDT team.    Consultants/Specialty  neurology    Code Status  DNAR/DNI    Disposition  Patient is not medically cleared for discharge.   Anticipate discharge to to home with close outpatient follow-up.  I have placed the appropriate orders for post-discharge needs.    Review of Systems  Review of Systems   Unable to perform ROS: Dementia   Constitutional:  Negative for chills, fever and weight loss.   HENT:  Negative for congestion, ear pain and sore throat.    Eyes:  Negative for blurred vision and pain.   Respiratory:  Positive for cough. Negative for hemoptysis, sputum production and shortness of breath.    Cardiovascular:  Negative for chest pain,  palpitations, orthopnea, leg swelling and PND.   Gastrointestinal:  Negative for abdominal pain, blood in stool, constipation, diarrhea, heartburn, nausea and vomiting.   Genitourinary:  Negative for dysuria and hematuria.   Musculoskeletal:  Negative for back pain and neck pain.   Neurological:  Positive for weakness (Lower extremity weakness). Negative for dizziness and headaches.   Psychiatric/Behavioral:  Positive for memory loss. Negative for depression and suicidal ideas. The patient does not have insomnia.       Physical Exam  Temp:  [36.4 °C (97.5 °F)-37.5 °C (99.5 °F)] 36.4 °C (97.5 °F)  Pulse:  [] 91  Resp:  [16-20] 18  BP: (116-129)/(48-75) 123/60  SpO2:  [89 %-100 %] 89 %    Physical Exam  Vitals and nursing note reviewed. Exam conducted with a chaperone present.   Constitutional:       General: He is not in acute distress.     Appearance: Normal appearance. He is not ill-appearing.   HENT:      Nose: No congestion or rhinorrhea.      Mouth/Throat:      Mouth: Mucous membranes are moist.      Pharynx: Oropharynx is clear. No oropharyngeal exudate or posterior oropharyngeal erythema.   Eyes:      General: No scleral icterus.     Extraocular Movements: Extraocular movements intact.      Conjunctiva/sclera: Conjunctivae normal.      Pupils: Pupils are equal, round, and reactive to light.   Neck:      Vascular: No carotid bruit.   Cardiovascular:      Rate and Rhythm: Normal rate and regular rhythm.      Heart sounds: No murmur heard.    No friction rub.   Pulmonary:      Breath sounds: Rhonchi: throughout.      Comments: Lung exam limited due to patient weakness and inability to sit up on his own  Chest:      Chest wall: No tenderness.   Abdominal:      General: Bowel sounds are normal. There is no distension.      Palpations: Abdomen is soft. There is no mass.      Tenderness: There is no abdominal tenderness. There is no right CVA tenderness, left CVA tenderness, guarding or rebound.      Hernia: No  hernia is present.   Musculoskeletal:         General: No swelling, tenderness, deformity or signs of injury.      Cervical back: No rigidity or tenderness.      Right lower leg: No edema.      Left lower leg: No edema.   Skin:     Coloration: Skin is not jaundiced or pale.      Findings: No bruising, erythema, lesion or rash.   Neurological:      General: No focal deficit present.      Mental Status: He is alert. He is disoriented.      Cranial Nerves: No cranial nerve deficit.      Sensory: No sensory deficit.      Motor: Weakness present.      Coordination: Coordination normal.      Deep Tendon Reflexes: Reflexes normal.      Comments: 2+/5 Knee flexion, hip flexion, and plantar flexion  4/5 upper and lower extremity extension  Gait cannot be assessed due to weakness. Nurse reports patient had difficulty getting up from chair to bed.   Psychiatric:         Mood and Affect: Mood normal.         Behavior: Behavior normal.         Thought Content: Thought content normal.         Judgment: Judgment normal.       Fluids  No intake or output data in the 24 hours ending 12/15/22 1236      Laboratory  Recent Labs     12/12/22 1742 12/14/22 0110 12/15/22  0038   WBC 7.5 7.2 12.3*   RBC 3.63* 3.15* 3.29*   HEMOGLOBIN 13.5* 11.6* 12.1*   HEMATOCRIT 40.5* 35.5* 36.5*   .6* 112.7* 110.9*   MCH 37.2* 36.8* 36.8*   MCHC 33.3* 32.7* 33.2*   RDW 74.8* 73.5* 72.5*   PLATELETCT 412 358 494*   MPV 10.1 10.3 10.0     Recent Labs     12/12/22 1742 12/14/22  0110 12/15/22  0038   SODIUM 137 136 137   POTASSIUM 4.2 4.1 4.0   CHLORIDE 104 106 104   CO2 22 21 23   GLUCOSE 163* 176* 197*   BUN 13 22 37*   CREATININE 0.90 0.79 0.86   CALCIUM 9.0 8.8 8.9                   Imaging  DX-CHEST-PORTABLE (1 VIEW)   Final Result      Stable patchy bilateral pulmonary opacities and basilar atelectasis. Underlying infection is not excluded.      CT-HEAD W/O   Final Result      1.  No evidence of acute intracranial process.      2.  Cerebral  atrophy as well as periventricular chronic small vessel ischemic change.      3.  Pansinusitis.         DX-CHEST-PORTABLE (1 VIEW)   Final Result      Patchy bilateral pulmonary infiltrates. Consideration should be given for Covid pneumonia.           Assessment/Plan: 87 year old male with past medical history of cognitive impairment, chronic myelogenous leukemia, polycythemia vera, obstructive sleep apnea on 2 L home oxygen CPAP at night, gastroesophageal reflux disease, who was admitted for increased generalized weakness, COVID infection, and potential normal pressure hydrocephalus    * Pneumonia due to COVID-19 virus- (present on admission)  Assessment & Plan  Status post antiviral 5 day course of molnupiravir outpatient  Now with hypoxia, improved with supplemental oxygen  - Continue supplemental oxygen and wean as tolerated with saturation goal greater than 90%  - Continue 10-day course of dexamethasone (to be completed on 12/22/2022)  Isolation precautions    Acquired cerebral ventriculomegaly (HCC)- (present on admission)  Assessment & Plan  He has marked ventriculomegaly on MRI and has been seen by neurology though after a therapeutic lumbar puncture of 38 mL of fluid he did not have provement in his symptoms.    -Discussed with neurology, Dr. Whatley, recommended waiting least 6 months before considering placing a shunt, given COVID infection.  We will plan for patient to follow-up outpatient after discharge.  Appreciate recommendations.    Type 2 diabetes mellitus (HCC)  Assessment & Plan  Hemoglobin A1c 7.1% from 12/14/2022  - Follow-up with primary care physician for further management    Abnormal urinalysis  Assessment & Plan  Patient had a abnormal urinalysis yesterday with glucose elevated greater than 1000, trace ketones, 30 protein, leukocyte Estrace, and occult blood.  Likely a back collection.  - Repeat urinalysis collected with negative glucose, trace ketones, 30 protein, moderate leukocyte  esterase.  - Patient denies any active dysuria.  Nitrites negative, suggesting no urinary tract infection.    Fever  Assessment & Plan  Temperature 100.6 on admission  - Chest x-ray on 12/13/2022 showed stable patchy bilateral pulmonary opacities and basilar atelectasis.   -Procalcitonin within normal limits  - Blood cultures order.  Pending results.      DNR (do not resuscitate)- (present on admission)  Assessment & Plan  Per his wishes    Polycythemia vera (HCC)- (present on admission)  Assessment & Plan  Followed by Dr. Thomas heme/onc   Continue hydroxyurea     Chronic myelogenous leukemia (HCC)- (present on admission)  Assessment & Plan  Followed by Dr. Thomas     Cognitive impairment- (present on admission)  Assessment & Plan  This remains unclear if it is due to dementia and/or from hydrocephalus      Macrocytic anemia  Assessment & Plan  Patient's wife reports that the patient has a history of alcohol use, with roughly 2 to 3 glasses of wine per day.  This can be due to his macrocytic anemia.  He also has suggestion of liver injury, due to transaminitis and elevated alk phosphatase.  Patient also has a history of CML.  - Vitamin B12 1762  - Continue to trend hemoglobin    Weakness- (present on admission)  Assessment & Plan  - PT/OT consult and evaluation  - Discussed with patient's wife about the possibility of SNF.  They would like to proceed with home health instead of SNF.       VTE prophylaxis: Rivaroxaban     I have performed a physical exam and reviewed and updated ROS and Plan today (12/15/2022). In review of yesterday's note (12/14/2022), there are no changes except as documented above.

## 2022-12-15 NOTE — THERAPY
"Speech Language Pathology   Clinical Swallow Evaluation     Patient Name: Han Pat  AGE:  87 y.o., SEX:  male  Medical Record #: [id number]  Today's Date: 12/15/2022     Precautions  Precautions: Fall Risk, Swallow Precautions ( See Comments)  Comments: Baseline dementia    HPI: Pt is 86 y/o male presenting  with weakness. Recent COVID infection per family, hypoxic. No hx SLP in Epic.     CMHx: COVID PNA, weakness, fever  PMHx: Cognitive impairment (dementia vs. Hydrocephalus), GERD, HLD, hx falls, CVA, Evans's esophagus, DJD of lumbar spine    CXR :  \"Stable patchy bilateral pulmonary opacities and basilar atelectasis. Underlying infection is not excluded.\"    CT Head w/o :  \"1.  No evidence of acute intracranial process.  2.  Cerebral atrophy as well as periventricular chronic small vessel ischemic change.  3.  Pansinusitis.\"    Level of Consciousness: Alert, Awake  Affect/Behavior: Calm, Confused  Follows Directives: Yes - simple commands only  Orientation: Self, , General place  Hearing: Functional hearing  Vision: Functional vision      Prior Living Situation & Level of Function:  Endorses RG/TN diet at baseline. Hx reflux per pt and EMR. Hx Evans's esophagus per EMR.       Oral Mechanism Evaluation  Facial Symmetry: Equal  Facial Sensation: Equal  Labial Observations: WFL  Lingual Observations: Midline  Dentition: Good  Comments:      Voice  Quality: WFL, Presbyphonic  Resonance: WFL  Intensity: Appropriate  Cough: WFL  Comments:      Current Method of Nutrition   Oral diet (RG/TN)      Subjective  Pt agreeable and pleasantly confused throughout. Stated \"I've never had so many choices\" as SLP attempted to assist with tray set up.       Assessment  Positioning: Weinstein's (60-90 degrees)  Bolus Administration: SLP  Oxygen Requirements:  2.5 L Nasal Cannula  Factor(s) Affecting Performance: Impaired mental status    Swallowing Trials  Thin Liquid (TN0): WFL  Soft & Bite-Sized (SB6): " WFL  Regular (RG7): WFL    Comments:  Pt w/ appropriate self-feeding. Appropriate oral bolus stripping and containment. Good lingual manipulation of bolus w/o residue appreciated across consistencies trialed, presumed total AP transit and effective bolus formation. Prolonged but complete mastication of solid consistencies. No change in mastication time with SB6 vs. RG7. Slightly increased WOB with mastication of RG7 trials. No overt s/sx of aspiration noted w/ single and sequential sips TN0 via cup and straw. No increase in WOB no change in vocal or breath quality. Congested cough appreciated prior to PO, no cough appreciated during or after PO. Intermittent second swallow, concerning for shallow airway invasion or pharyngeal inefficiency.      Clinical Impressions  Despite s/sx of oropharyngeal dysphagia, including prolonged mastication and intermittent second swallow, pt appears to be appropriate for a EC7/TN0 diet at this time. Would recommend reflux precautions in the presence of hx GERD. Please provide assistance with tray set up and monitor during meals. If pt demonstrates s/sx of aspiration, would recommend MBSS over FEES in the presence of esophageal dysphagia.        Recommendations  1.  Easy to chew solids and thin liquids  2.  Instrumentation: None indicated at this time, May benefit from MBSS if lung status worsens or s/sx of aspiration occur  3.  Swallowing Instructions & Precautions:   Supervision: Assist with meal tray set up, Close supervision - patient may be left alone for less than 5 minutes at a time  Positioning: Fully upright and midline during oral intake, Remain upright for 15-30 minutes after oral intake  Medication: Whole with liquid, One pill at a time, As tolerated  Strategies: Small bites/sips, Alternate bites and sips, Slow rate of intake, Multiple swallows (x 2) per bite/sip   Oral Care: Q6h  4.  SLP will follow      Plan    Recommend Speech Therapy 3 times per week until therapy goals  "are met for the following treatments:  Dysphagia Training and Patient / Family / Caregiver Education.    Discharge Recommendations: Anticipate that the patient will have no further speech therapy needs after discharge from the hospital       Objective   12/15/22 0902   Initial Contact Note    Initial Contact Note  Order Received and Verified, Speech Therapy Evaluation in Progress with Full Report to Follow.   Precautions   Precautions Fall Risk;Swallow Precautions ( See Comments)   Vitals   Pulse Oximetry 89 %   O2 (LPM) 2.5   O2 Delivery Device Nasal Cannula   Prior Living Situation   Prior Services Continuous (24 Hour) Care Giving Family   Lives with - Patient's Self Care Capacity Spouse   Comments Spouse provides assist and supervision. She doesnt not have to provide physical assist at baseline.   Prior Level Of Function   Communication Within Functional Limits   Swallow Within Functional Limits   Dentition Intact   Dentures None   Patient's Primary Language English   Dysphagia Rating   Nutritional Liquid Intake Rating Scale Non thickened beverages   Nutritional Food Intake Rating Scale Total oral diet with no restrictions   Patient / Family Goals   Patient / Family Goal #1 \"I've never had so many choices\"   Short Term Goals   Short Term Goal # 1 Pt will consume diet of EC/TN with no overt s/sx of aspiration and no worsening of lung status.   Education Group   Education Provided Dysphagia   Problem List   Problem List Dysphagia   Interdisciplinary Plan of Care Collaboration   Collaboration Comments RN updated. Swallow precautions posted in room.     "

## 2022-12-15 NOTE — DISCHARGE PLANNING
Received Choice Form @: 4552  Agency/ Facility Name: [facility]   Referral Sent per Choice Form @: [phone]    Agency/Facility Name: [facility]  Spoke To: Vasiliy  Outcome: DPA received call from facility. Per Vasiliy, pt will be kept pending as pt just tested positive for Covid. Once the 10 days are over facility will re look at referral. 10 days from the date of positive Covid test is 12/22/22

## 2022-12-15 NOTE — CARE PLAN
*All times are approximate. Please refer to MAR for medication administration details.    0700: Received report from CAROL Allison and assumed care. Patient sitting up in bed with no signs of acute distress. A+OxSelf, pleasant. Denies pain at this time.    0800: Pt sitting up in bed with no signs of acute distress drinking Ensure but refusing breakfast.    0900: Wife at bedside. Pt sitting up in bed with no signs of acute distress.    1100: Pt incontinent. Angely pad and perineal care given. Denies pain at this time.    1300: Pt incontinent of urine. Complete bed change, attempted to have patient transfer from bed to chair but was unsuccessful with 2 persons assisting. Partial bed bath. Condom cath removed and replaced with purwick.    1500: Pt sitting up in bed with no signs of acute distress, sleeping.    1600: Pt sitting up in bed, no signs of acute distress. Refuses turn. Denies pain.    1900: Report given to CAROL Allison to assume care. Patient sitting up in bed with no signs of acute distress.      The patient is Stable - Low risk of patient condition declining or worsening    Shift Goals  Clinical Goals: O2 monitoring / Safety  Patient Goals: Rest / Comfort  Family Goals: LADAN    Progress made toward(s) clinical / shift goals:      Problem: Knowledge Deficit - Standard  Goal: Patient and family/care givers will demonstrate understanding of plan of care, disease process/condition, diagnostic tests and medications  Outcome: Progressing     Problem: Skin Integrity  Goal: Skin integrity is maintained or improved  Outcome: Progressing     Problem: Fall Risk  Goal: Patient will remain free from falls  Outcome: Progressing     Problem: Respiratory  Goal: Patient will achieve/maintain optimum respiratory ventilation and gas exchange  Outcome: Progressing     Problem: Nutrition  Goal: Patient's nutritional and fluid intake will be adequate or improve  Outcome: Progressing  Goal: Enteral nutrition will be maintained or  improve  Outcome: Progressing  Goal: Enteral nutrition will be maintained or improve  Outcome: Progressing     Problem: Psychosocial  Goal: Patient's level of anxiety will decrease  Outcome: Progressing  Goal: Patient's ability to verbalize feelings about condition will improve  Outcome: Progressing  Goal: Patient's ability to re-evaluate and adapt role responsibilities will improve  Outcome: Progressing  Goal: Patient and family will demonstrate ability to cope with life altering diagnosis and/or procedure  Outcome: Progressing  Goal: Spiritual and cultural needs incorporated into hospitalization  Outcome: Progressing     Problem: Communication  Goal: The ability to communicate needs accurately and effectively will improve  Outcome: Progressing     Problem: Discharge Barriers/Planning  Goal: Patient's continuum of care needs are met  Outcome: Progressing     Problem: Hemodynamics  Goal: Patient's hemodynamics, fluid balance and neurologic status will be stable or improve  Outcome: Progressing     Problem: Chest Tube Management  Goal: Complications related to chest tube will be avoided or minimized  Outcome: Progressing     Problem: Fluid Volume  Goal: Fluid volume balance will be maintained  Outcome: Progressing     Problem: Mechanical Ventilation  Goal: Safe management of artificial airway and ventilation  Outcome: Progressing  Goal: Successful weaning off mechanical ventilator, spontaneously maintains adequate gas exchange  Outcome: Progressing  Goal: Patient will be able to express needs and understand communication  Outcome: Progressing     Problem: Dysphagia  Goal: Dysphagia will improve  Outcome: Progressing     Problem: Risk for Aspiration  Goal: Patient's risk for aspiration will be absent or decrease  Outcome: Progressing     Problem: Urinary Elimination  Goal: Establish and maintain regular urinary output  Outcome: Progressing     Problem: Bowel Elimination  Goal: Establish and maintain regular bowel  function  Outcome: Progressing     Problem: Gastrointestinal Irritability  Goal: Nausea and vomiting will be absent or improve  Outcome: Progressing  Goal: Diarrhea will be absent or improved  Outcome: Progressing     Problem: Rectal Tube  Goal: Fecal output will be contained and skin will remain free from irritation  Outcome: Progressing     Problem: Mobility  Goal: Patient's capacity to carry out activities will improve  Outcome: Progressing     Problem: Self Care  Goal: Patient will have the ability to perform ADLs independently or with assistance (bathe, groom, dress, toilet and feed)  Outcome: Progressing     Problem: Infection - Standard  Goal: Patient will remain free from infection  Outcome: Progressing     Problem: Wound/ / Incision Healing  Goal: Patient's wound/surgical incision will decrease in size and heals properly  Outcome: Progressing       Patient is not progressing towards the following goals:

## 2022-12-15 NOTE — CARE PLAN
The patient is The patient is Stable - Low risk of patient condition declining or worsening    Shift Goals  Clinical Goals: monitor oxygen  Patient Goals: rest, comfort  Family Goals: charlotte    Progress made toward(s) clinical / shift goals:    Problem: Skin Integrity  Goal: Skin integrity is maintained or improved  Outcome: Progressing  Note: Skin remains intact this shift. No new abrasions or wounds observed.        Problem: Fall Risk  Goal: Patient will remain free from falls  Outcome: Progressing  Note: Patient remained free from falls this shift. Call light in reach, educated patient on calling if needing assistance. Patient belongings in reach. Equipment out of sight.          Patient is not progressing towards the following goals:

## 2022-12-16 NOTE — CARE PLAN
Received report from CAROL Allison and assumed care. Patient is A+Ox1-2 (self/place) with moderate generalized weakness. Unable to transfer safely with 2 persons to chair. Incontinent. Condom cath is unsuccessful. Female wick in place, working 50% of time. Wife is wanting to speak with Hospice/Palliative - consult ordered.     Safety: Bed alarm on. Non-skid socks in place. Bed low and locked. Belongings / Bedside table / Call light within reach. Educated family on fall risk and protocols in place to prevent. Wife at bedside assisting with care and safety. Patient does not attempt to get out of bed by himself.    Skin: Angely area red/excoriated due to incontinence. Barrier cream applied and offloading. Purwick used in attempt to keep patient dry since condom cath is unsuccessful.     Plan for palliative care consult with wife. Wife aware there is a consult, awaiting speaking with reps.          The patient is Stable - Low risk of patient condition declining or worsening    Shift Goals  Clinical Goals: O2 Monitor / Safety / Maintain Skin Integrity c incontinence care / Nutrition  Patient Goals: Rest / Comfort  Family Goals: LADAN    Progress made toward(s) clinical / shift goals:    Problem: Knowledge Deficit - Standard  Goal: Patient and family/care givers will demonstrate understanding of plan of care, disease process/condition, diagnostic tests and medications  Outcome: Progressing     Problem: Skin Integrity  Goal: Skin integrity is maintained or improved  Outcome: Progressing     Problem: Fall Risk  Goal: Patient will remain free from falls  Outcome: Progressing     Problem: Respiratory  Goal: Patient will achieve/maintain optimum respiratory ventilation and gas exchange  Outcome: Progressing     Problem: Nutrition  Goal: Patient's nutritional and fluid intake will be adequate or improve  Outcome: Progressing  Goal: Enteral nutrition will be maintained or improve  Outcome: Progressing  Goal: Enteral nutrition will be  maintained or improve  Outcome: Progressing     Problem: Psychosocial  Goal: Patient's level of anxiety will decrease  Outcome: Progressing  Goal: Patient's ability to verbalize feelings about condition will improve  Outcome: Progressing  Goal: Patient's ability to re-evaluate and adapt role responsibilities will improve  Outcome: Progressing  Goal: Patient and family will demonstrate ability to cope with life altering diagnosis and/or procedure  Outcome: Progressing  Goal: Spiritual and cultural needs incorporated into hospitalization  Outcome: Progressing     Problem: Communication  Goal: The ability to communicate needs accurately and effectively will improve  Outcome: Progressing     Problem: Discharge Barriers/Planning  Goal: Patient's continuum of care needs are met  Outcome: Progressing     Problem: Hemodynamics  Goal: Patient's hemodynamics, fluid balance and neurologic status will be stable or improve  Outcome: Progressing     Problem: Chest Tube Management  Goal: Complications related to chest tube will be avoided or minimized  Outcome: Progressing     Problem: Fluid Volume  Goal: Fluid volume balance will be maintained  Outcome: Progressing     Problem: Mechanical Ventilation  Goal: Safe management of artificial airway and ventilation  Outcome: Progressing  Goal: Successful weaning off mechanical ventilator, spontaneously maintains adequate gas exchange  Outcome: Progressing  Goal: Patient will be able to express needs and understand communication  Outcome: Progressing     Problem: Dysphagia  Goal: Dysphagia will improve  Outcome: Progressing     Problem: Risk for Aspiration  Goal: Patient's risk for aspiration will be absent or decrease  Outcome: Progressing     Problem: Urinary Elimination  Goal: Establish and maintain regular urinary output  Outcome: Progressing     Problem: Bowel Elimination  Goal: Establish and maintain regular bowel function  Outcome: Progressing     Problem: Gastrointestinal  Irritability  Goal: Nausea and vomiting will be absent or improve  Outcome: Progressing  Goal: Diarrhea will be absent or improved  Outcome: Progressing     Problem: Rectal Tube  Goal: Fecal output will be contained and skin will remain free from irritation  Outcome: Progressing     Problem: Mobility  Goal: Patient's capacity to carry out activities will improve  Outcome: Progressing     Problem: Self Care  Goal: Patient will have the ability to perform ADLs independently or with assistance (bathe, groom, dress, toilet and feed)  Outcome: Progressing     Problem: Infection - Standard  Goal: Patient will remain free from infection  Outcome: Progressing     Problem: Wound/ / Incision Healing  Goal: Patient's wound/surgical incision will decrease in size and heals properly  Outcome: Progressing       Patient is not progressing towards the following goals:

## 2022-12-16 NOTE — PROGRESS NOTES
[facility] Internal Medicine Daily Progress Note    Date of Service  12/16/2022    [facility] Team: R IM Green Team   Attending: Dominique Butler M.d.  Senior Resident: Dr. Prado  Intern:  Dr. Clark  Contact Number: [phone]    Patient ID  [id number] [id number] with GERD, CML, sleep apnea, HLD presenting to the ED on 12/12 for weakness. Has severe cognitive impairment - unclear if related to dementia vs hydrocephalus/NPH. MRI shows subacute infarct as well as ventriculomegaly. Had LP without significant improvement. Supposed to get LP drain but unfortunately tested positive for COVID around 12/1 - treated with Molnupravir starting on 11/29.     Hospital Course  Patient found to have marked ventriculomegaly on MRI.  He was seen by neurology, Dr. Whatley, who recommended waiting least 6 months before considering placing a shunt, given COVID infection.      Interval Problem Update  NAEON. Pt remains pleasantly confused. He was A&O x1 this morning (only to self). He continues to have difficulty taking care of himself and complains of weakness. His oxygen requirement was slightly increased this morning to 5L, he remains on steroids.     I have discussed this patient's plan of care and discharge plan at IDT rounds today with Case Management, Nursing, Nursing leadership, and other members of the IDT team.    Consultants/Specialty  None    Code Status  DNAR/DNI    Disposition  Patient is not medically cleared for discharge.   Anticipate discharge to to skilled nursing facility.  I have placed the appropriate orders for post-discharge needs.    Review of Systems  Review of Systems   Unable to perform ROS: Dementia      Physical Exam  Temp:  [37.3 °C (99.1 °F)-37.9 °C (100.3 °F)] 37.3 °C (99.2 °F)  Pulse:  [80-95] 80  Resp:  [16-20] 16  BP: (118-142)/(51-64) 121/51  SpO2:  [90 %-92 %] 91 %    Physical Exam  Constitutional:       General: He is not in acute distress.     Appearance: Normal appearance. He is not ill-appearing.   HENT:      Head:  Normocephalic and atraumatic.      Nose: No congestion or rhinorrhea.      Mouth/Throat:      Mouth: Mucous membranes are moist.      Pharynx: Oropharynx is clear. No oropharyngeal exudate or posterior oropharyngeal erythema.   Eyes:      General: No scleral icterus.     Extraocular Movements: Extraocular movements intact.      Conjunctiva/sclera: Conjunctivae normal.      Pupils: Pupils are equal, round, and reactive to light.   Neck:      Vascular: No carotid bruit.   Cardiovascular:      Rate and Rhythm: Normal rate and regular rhythm.      Heart sounds: No murmur heard.    No friction rub.   Pulmonary:      Breath sounds: Rhonchi: throughout.      Comments: Lung exam limited due to patient weakness and inability to sit up on his own  Chest:      Chest wall: No tenderness.   Abdominal:      General: Bowel sounds are normal. There is no distension.      Palpations: Abdomen is soft. There is no mass.      Tenderness: There is no abdominal tenderness. There is no right CVA tenderness, left CVA tenderness, guarding or rebound.      Hernia: No hernia is present.   Musculoskeletal:         General: No swelling, tenderness, deformity or signs of injury.      Cervical back: No rigidity or tenderness.      Right lower leg: No edema.      Left lower leg: No edema.   Skin:     Coloration: Skin is not jaundiced or pale.      Findings: No bruising, erythema, lesion or rash.   Neurological:      General: No focal deficit present.      Mental Status: He is alert. He is disoriented.      Cranial Nerves: No cranial nerve deficit.      Sensory: No sensory deficit.      Motor: Weakness (3/5 strength BLE, 4/5 strength in BUE) present.      Coordination: Coordination normal.      Deep Tendon Reflexes: Reflexes normal.      Comments: A&O x 1 (self). Gait cannot be assessed due to weakness. Nurse reports patient had difficulty getting up from chair to bed.   Psychiatric:         Mood and Affect: Mood normal.         Behavior: Behavior  normal.         Thought Content: Thought content normal.         Judgment: Judgment normal.       Fluids  No intake or output data in the 24 hours ending 12/16/22 1229    Laboratory  Recent Labs     12/14/22  0110 12/15/22  0038   WBC 7.2 12.3*   RBC 3.15* 3.29*   HEMOGLOBIN 11.6* 12.1*   HEMATOCRIT 35.5* 36.5*   .7* 110.9*   MCH 36.8* 36.8*   MCHC 32.7* 33.2*   RDW 73.5* 72.5*   PLATELETCT 358 494*   MPV 10.3 10.0     Recent Labs     12/14/22  0110 12/15/22  0038   SODIUM 136 137   POTASSIUM 4.1 4.0   CHLORIDE 106 104   CO2 21 23   GLUCOSE 176* 197*   BUN 22 37*   CREATININE 0.79 0.86   CALCIUM 8.8 8.9                   Imaging  DX-CHEST-PORTABLE (1 VIEW)   Final Result      Stable patchy bilateral pulmonary opacities and basilar atelectasis. Underlying infection is not excluded.      CT-HEAD W/O   Final Result      1.  No evidence of acute intracranial process.      2.  Cerebral atrophy as well as periventricular chronic small vessel ischemic change.      3.  Pansinusitis.         DX-CHEST-PORTABLE (1 VIEW)   Final Result      Patchy bilateral pulmonary infiltrates. Consideration should be given for Covid pneumonia.           Assessment/Plan  Problem Representation:    * Pneumonia due to COVID-19 virus- (present on admission)  Assessment & Plan  Status post antiviral 5 day course of molnupiravir outpatient (started on 11/29, tested positive on 12/1)  Now with hypoxia, improved with supplemental oxygen  - Continue supplemental oxygen and wean as tolerated with saturation goal greater than 90%    - Increased O2 requirements on 12/16  - Continue 10-day course of dexamethasone (to be completed on 12/22/2022)  Isolation precautions    Weakness- (present on admission)  Assessment & Plan  - PT/OT consult and evaluation  - Discussed with patient's wife, now agreeable to SNF, currently working on placement    Fever  Assessment & Plan  Temperature 100.6 on admission  - Chest x-ray on 12/13/2022 showed stable patchy  bilateral pulmonary opacities and basilar atelectasis.   - Procalcitonin within normal limits  - Blood cultures x2 NGTD    Acquired cerebral ventriculomegaly (HCC)- (present on admission)  Assessment & Plan  He has marked ventriculomegaly on MRI and has been seen by neurology though after a therapeutic lumbar puncture of 38 mL of fluid he did not have provement in his symptoms.    -Discussed with NSGY, Dr. Whatley, recommended waiting least 6 months before considering placing a shunt, given COVID infection.  We will plan for patient to follow-up outpatient after discharge.  Appreciate recommendations.    Cognitive impairment- (present on admission)  Assessment & Plan  This remains unclear if it is due to dementia and/or from hydrocephalus  - Outpatient follow-up with NSGY regarding possible LP drain to evaluate for NPH    Macrocytic anemia  Assessment & Plan  Patient's wife reports that the patient has a history of alcohol use, with roughly 2 to 3 glasses of wine per day.  This can be due to his macrocytic anemia.  He also has suggestion of liver injury, due to transaminitis and elevated alk phosphatase.  Patient also has a history of CML.  - Vitamin B12 1762  - Continue to trend hemoglobin    Type 2 diabetes mellitus (HCC)  Assessment & Plan  Hemoglobin A1c 7.1% from 12/14/2022  - Follow-up with primary care physician for further management    Abnormal urinalysis  Assessment & Plan  Patient had a abnormal urinalysis yesterday with glucose elevated greater than 1000, trace ketones, 30 protein, leukocyte Estrace, and occult blood.  Likely a back collection.  - Repeat urinalysis collected with negative glucose, trace ketones, 30 protein, moderate leukocyte esterase.  - Patient denies any active dysuria.  Nitrites negative, suggesting no urinary tract infection.    DNR (do not resuscitate)- (present on admission)  Assessment & Plan  Per his wishes    Polycythemia vera (HCC)- (present on admission)  Assessment &  Plan  Followed by Dr. Thomas heme/onc   Continue hydroxyurea     Chronic myelogenous leukemia (HCC)- (present on admission)  Assessment & Plan  Followed by Dr. Thomas        VTE prophylaxis: Xarelto 10 mg daily as prophylaxis    I have performed a physical exam and reviewed and updated ROS and Plan today (12/16/2022). In review of yesterday's note (12/15/2022), there are no changes except as documented above.

## 2022-12-16 NOTE — CARE PLAN
The patient is Watcher - Medium risk of patient condition declining or worsening    Shift Goals  Clinical Goals: monitor o2, skin and linen check d/t incontinence, safety, nutrition  Patient Goals: Rest  Family Goals: LADAN    Progress made toward(s) clinical / shift goals:    Problem: Skin Integrity  Goal: Skin integrity is maintained or improved  Outcome: Progressing  Note: Skin remains intact this shift. No new abrasions or wounds observed. Incontinence dermatitis protocol in place.        Problem: Fall Risk  Goal: Patient will remain free from falls  Outcome: Progressing  Note: Patient remained free from falls this shift. Call light in reach, educated patient on calling if needing assistance. Patient belongings in reach. Equipment out of sight.          Patient is not progressing towards the following goals:      Problem: Self Care  Goal: Patient will have the ability to perform ADLs independently or with assistance (bathe, groom, dress, toilet and feed)  Outcome: Not Progressing  Note: Patient able to preform some ADLs. Offered feeding, patient reports not having an appetite. Has not eaten dinner x2 nights.

## 2022-12-16 NOTE — PROGRESS NOTES
Bedside report received.  Assumed Care.  Pt in bed, resting. Offered nutrition, denies. Pt incontinent, partial linen change and bed bath performed. AOx1, responds appropriately, follows commands.   Plan of care discussed.  Explained importance of calling before getting OOB and pt verbalizes understanding.  Reviewed POC, pt oriented to room, call light and belongings within reach, treaded slipper socks on, bed alarm on, bed in lowest position.  Will monitor frequently.

## 2022-12-17 PROBLEM — Z71.89 COUNSELING REGARDING ADVANCE CARE PLANNING AND GOALS OF CARE: Status: ACTIVE | Noted: 2022-01-01

## 2022-12-17 NOTE — CARE PLAN
The patient is Stable - Low risk of patient condition declining or worsening  Pt is confused and disoriented x 4. He is incontinent . Breathing is labored     Pt has no appetite. Communicates very little. Did not get out of bed but did sleep well during shift      Shift Goals  Clinical Goals: Placement , Re orient  Patient Goals: unable to respond, mumbles  Family Goals: LADAN    Progress made toward(s) clinical / shift goals:    Problem: Knowledge Deficit - Standard  Goal: Patient and family/care givers will demonstrate understanding of plan of care, disease process/condition, diagnostic tests and medications  Outcome: Not Progressing     Problem: Respiratory  Goal: Patient will achieve/maintain optimum respiratory ventilation and gas exchange  Outcome: Not Progressing     Problem: Nutrition  Goal: Patient's nutritional and fluid intake will be adequate or improve  Outcome: Not Progressing     Problem: Psychosocial  Goal: Patient's ability to re-evaluate and adapt role responsibilities will improve  Outcome: Not Progressing     Problem: Communication  Goal: The ability to communicate needs accurately and effectively will improve  Outcome: Not Progressing     Problem: Urinary Elimination  Goal: Establish and maintain regular urinary output  Outcome: Not Progressing     Problem: Mobility  Goal: Patient's capacity to carry out activities will improve  Outcome: Not Progressing     Problem: Self Care  Goal: Patient will have the ability to perform ADLs independently or with assistance (bathe, groom, dress, toilet and feed)  Outcome: Not Progressing     Problem: Skin Integrity  Goal: Skin integrity is maintained or improved  Outcome: Progressing     Problem: Fall Risk  Goal: Patient will remain free from falls  Outcome: Progressing     Problem: Psychosocial  Goal: Patient's level of anxiety will decrease  Outcome: Progressing  Goal: Patient's ability to verbalize feelings about condition will improve  Outcome:  Progressing  Goal: Patient and family will demonstrate ability to cope with life altering diagnosis and/or procedure  Outcome: Progressing  Goal: Spiritual and cultural needs incorporated into hospitalization  Outcome: Progressing     Problem: Discharge Barriers/Planning  Goal: Patient's continuum of care needs are met  Outcome: Progressing     Problem: Hemodynamics  Goal: Patient's hemodynamics, fluid balance and neurologic status will be stable or improve  Outcome: Progressing     Problem: Fluid Volume  Goal: Fluid volume balance will be maintained  Outcome: Progressing     Problem: Dysphagia  Goal: Dysphagia will improve  Outcome: Progressing     Problem: Risk for Aspiration  Goal: Patient's risk for aspiration will be absent or decrease  Outcome: Progressing     Problem: Bowel Elimination  Goal: Establish and maintain regular bowel function  Outcome: Progressing     Problem: Gastrointestinal Irritability  Goal: Nausea and vomiting will be absent or improve  Outcome: Progressing  Goal: Diarrhea will be absent or improved  Outcome: Progressing     Problem: Infection - Standard  Goal: Patient will remain free from infection  Outcome: Progressing     Problem: Wound/ / Incision Healing  Goal: Patient's wound/surgical incision will decrease in size and heals properly  Outcome: Progressing       Patient is not progressing towards the following goals:      Problem: Knowledge Deficit - Standard  Goal: Patient and family/care givers will demonstrate understanding of plan of care, disease process/condition, diagnostic tests and medications  Outcome: Not Progressing     Problem: Respiratory  Goal: Patient will achieve/maintain optimum respiratory ventilation and gas exchange  Outcome: Not Progressing     Problem: Nutrition  Goal: Patient's nutritional and fluid intake will be adequate or improve  Outcome: Not Progressing     Problem: Psychosocial  Goal: Patient's ability to re-evaluate and adapt role responsibilities will  improve  Outcome: Not Progressing     Problem: Communication  Goal: The ability to communicate needs accurately and effectively will improve  Outcome: Not Progressing     Problem: Urinary Elimination  Goal: Establish and maintain regular urinary output  Outcome: Not Progressing     Problem: Mobility  Goal: Patient's capacity to carry out activities will improve  Outcome: Not Progressing     Problem: Self Care  Goal: Patient will have the ability to perform ADLs independently or with assistance (bathe, groom, dress, toilet and feed)  Outcome: Not Progressing

## 2022-12-17 NOTE — DISCHARGE PLANNING
"H[facility] TCN chart review completed. Collaborated with LINDSEY Feldman. Patient seen at bedside with wife Sally. Per MD, patient not medically cleared for discharge. Per chart review, patient tested Covid + on 12/10 and will be cleared for d/c to SNF on 12/22/22.    PT/OT consults completed with recommendation for post-acute placement. TCN had obtained SNF choice 12/15/22, but today wife clarified she wants [facility] (1st choice), [facility] (2nd [facility]), and [facility] (3rd choice). As of the time of writing this note, [facility] has accepted per [facility].    As previously noted, patient has hx of dementia and hydrocephalus. He was scheduled for lumbar drain to see if he is a good candidate for  shunt, however MRI at that time revealed subacute stroke. He still has not received medical clearance for lumbar drain. While waiting for drain, he has developed Covid and PNA. Wife Sally is his caregiver and a good historian. Prior to a few weeks ago, patient was able to walk, dress himself, use toilet, and take shower by himself. He did not need a can or walker. Wife endorses he is far below his baseline currently. He does use [employer]ing at night with [employer].     wife clarified she wants [facility] (1st choice), [facility] (2nd choice), and [facility] (3rd choice).TCN asked if she wanted MD to order speech therapy swallow evaluation, but wife declined saying \"no it won't change anything.\" He is DNAR/DNI. Wife Sally also stated she is considering hospice and would like more information. TCN sent voalte text to MD, who placed Palliative consult.     TCN will continue to follow and collaborate with discharge planning team as additional post acute needs arise. Thank you.    Completed:  Palliative consult placment  PT and OT consults completed with recs for post acute placement  Choice obtained: HH (resumption) and DME (O2) resumption 12/13/22; SNF choice 12/15/22 ; today 12/16/22 wife clarified she wants " [facility] (1st choice), [facility] (2nd choice), and [facility] (3rd choice).  [facility] referral sent 12/13/22

## 2022-12-17 NOTE — PROGRESS NOTES
[facility] Internal Medicine Daily Progress Note    Date of Service  12/17/2022    [facility] Team: R IM Green Team   Attending: Dominique Butler M.d.  Senior Resident: Dr. Prado  Intern:  Dr. Clark  Contact Number: [phone]    Patient ID  87 YOM with GERD, CML, sleep apnea, HLD presenting to the ED on 12/12 for weakness. Has severe cognitive impairment - unclear if related to dementia vs hydrocephalus/NPH. MRI shows subacute infarct as well as ventriculomegaly. Had LP without significant improvement. Supposed to get LP drain but unfortunately tested positive for COVID around 12/1 - treated with Molnupravir starting on 11/29.     Hospital Course  Patient found to have marked ventriculomegaly on MRI.  He was seen by neurology, Dr. Whatley, who recommended waiting least 6 months before considering placing a shunt, given COVID infection.      Interval Problem Update  He was A&O x1 this morning (only to self). His oxygen requirement increased this morning with oxygen saturation of 83% on 5L, which was changed to oxymask. An extensive conversation was had with the patient's wife,who is at bedside. She reports that the patient has had decreased bowel movements.  She reports that the patient has had recent difficulty with swallowing and even difficulty with using a straw.  She reports that he coughs and chokes on water.  She acknowledges the patient is continuing to decline and would like to proceed with comfort care.  She states that she no longer wants the patient to have any blood draws or medications with the exception of those that will help with comfort.    I have discussed this patient's plan of care and discharge plan at IDT rounds today with Case Management, Nursing, Nursing leadership, and other members of the IDT team.    Consultants/Specialty  None    Code Status  Comfort Care/DNR    Disposition  Patient is not medically cleared for discharge.   Anticipate discharge to to skilled nursing facility.  I have placed the  appropriate orders for post-discharge needs.    Review of Systems  Review of Systems   Unable to perform ROS: Dementia      Physical Exam  Temp:  [36.7 °C (98 °F)-37.4 °C (99.3 °F)] 36.8 °C (98.3 °F)  Pulse:  [] 124  Resp:  [16-20] 20  BP: (118-138)/(52-67) 126/66  SpO2:  [89 %-94 %] 91 %    Physical Exam  Constitutional:       Appearance: He is ill-appearing.      Comments: Unable to full perform exam due to patient weakness and inability to cooperate.   HENT:      Head: Normocephalic and atraumatic.      Mouth/Throat:      Mouth: Mucous membranes are moist.      Pharynx: Oropharynx is clear.   Eyes:      General: No scleral icterus.     Conjunctiva/sclera: Conjunctivae normal.   Neck:      Vascular: No carotid bruit.   Cardiovascular:      Rate and Rhythm: Normal rate and regular rhythm.      Heart sounds: No murmur heard.  Pulmonary:      Breath sounds: Rhonchi: throughout.   Chest:      Chest wall: No tenderness.   Abdominal:      General: Bowel sounds are normal.      Palpations: Abdomen is soft.      Tenderness: There is no abdominal tenderness. There is no right CVA tenderness, left CVA tenderness, guarding or rebound.   Musculoskeletal:         General: No swelling or tenderness.      Right lower leg: No edema.      Left lower leg: No edema.   Skin:     Coloration: Skin is not jaundiced or pale.      Findings: No bruising, erythema, lesion or rash.   Neurological:      Mental Status: He is disoriented.      Motor: Weakness: 3/5 strength BLE, 4/5 strength in BUE.      Comments: A&O x 1 (self). Gait cannot be assessed due to weakness. Nurse reports patient had difficulty getting up from chair to bed.       Fluids    Intake/Output Summary (Last 24 hours) at 12/17/2022 1139  Last data filed at 12/16/2022 2200  Gross per 24 hour   Intake --   Output 300 ml   Net -300 ml       Laboratory  Recent Labs     12/15/22  0038   WBC 12.3*   RBC 3.29*   HEMOGLOBIN 12.1*   HEMATOCRIT 36.5*   .9*   MCH 36.8*    MCHC 33.2*   RDW 72.5*   PLATELETCT 494*   MPV 10.0     Recent Labs     12/15/22  0038   SODIUM 137   POTASSIUM 4.0   CHLORIDE 104   CO2 23   GLUCOSE 197*   BUN 37*   CREATININE 0.86   CALCIUM 8.9                   Imaging  DX-CHEST-PORTABLE (1 VIEW)   Final Result      Stable patchy bilateral pulmonary opacities and basilar atelectasis. Underlying infection is not excluded.      CT-HEAD W/O   Final Result      1.  No evidence of acute intracranial process.      2.  Cerebral atrophy as well as periventricular chronic small vessel ischemic change.      3.  Pansinusitis.         DX-CHEST-PORTABLE (1 VIEW)   Final Result      Patchy bilateral pulmonary infiltrates. Consideration should be given for Covid pneumonia.           Assessment/Plan  Problem Representation:    * Pneumonia due to COVID-19 virus- (present on admission)  Assessment & Plan  Status post antiviral 5 day course of molnupiravir outpatient (started on 11/29, tested positive on 12/1)  -Patient has had continued worsening of hypoxia down to 83% on 5 L nasal cannula, requiring oxygen mask placement on 12/17/2022  - Continue supplemental oxygen and wean as tolerated with saturation goal greater than 90%  - Continue 10-day course of dexamethasone (to be completed on 12/22/2022)      Acquired cerebral ventriculomegaly (HCC)- (present on admission)  Assessment & Plan  -Discussed with NSGY, Dr. Whatley, recommended waiting 3-6 months before considering placing a shunt, given COVID infection.    - Given patient's wife's decision to pursue comfort care, this should be readdressed in the future.  Patient will follow up with neurology outpatient if the decision is made to pursue shunt placement    Counseling regarding advance care planning and goals of care  Assessment & Plan  An extensive conversation was had with the patient's wife, who is at bedside.  - Patient's wife like to proceed with comfort care.  She states she no longer wants the patient to have any lab  work, imaging, oral medications with exception of those involving comfort care.    Type 2 diabetes mellitus (HCC)  Assessment & Plan  Hemoglobin A1c 7.1% from 12/14/2022  - Follow-up with primary care physician for further management    Fever  Assessment & Plan  Temperature 100.6 on admission  - Chest x-ray on 12/13/2022 showed stable patchy bilateral pulmonary opacities and basilar atelectasis.   - Procalcitonin within normal limits  - Blood cultures x2 NGTD    DNR (do not resuscitate)- (present on admission)  Assessment & Plan  Per his wishes    Polycythemia vera (HCC)- (present on admission)  Assessment & Plan  Followed by Dr. Thomas heme/onc   Patient's wife would not like the patient to pursue hydroxyurea treatment at this time given comfort care    Chronic myelogenous leukemia (HCC)- (present on admission)  Assessment & Plan  Followed by Dr. Thomas     Cognitive impairment- (present on admission)  Assessment & Plan  This remains unclear if it is due to dementia and/or from hydrocephalus  - Outpatient follow-up with NSGY regarding possible LP drain to evaluate for NPH, if the patient chooses to pursue this option.  Currently we will pursue with comfort care without any intervention.    Macrocytic anemia  Assessment & Plan  Patient's wife reports that the patient has a history of alcohol use, with roughly 2 to 3 glasses of wine per day.  This can be due to his macrocytic anemia.  He also has suggestion of liver injury, due to transaminitis and elevated alk phosphatase.  Patient also has a history of CML.  - Vitamin B12 1762      Weakness- (present on admission)  Assessment & Plan  - PT/OT consult and evaluation  - Discussed with patient's wife, now agreeable to SNF, currently working on placement    Abnormal urinalysis  Assessment & Plan  Resolved       VTE prophylaxis: Discontinued, given patient comfort care    I have performed a physical exam and reviewed and updated ROS and Plan today (12/17/2022). In review  of yesterday's note (12/16/2022), there are no changes except as documented above.

## 2022-12-17 NOTE — THERAPY
Occupational Therapy   Initial Evaluation     Patient Name: Han Pat  Age:  87 y.o., Sex:  male  Medical Record #: [id number]  Today's Date: 12/16/2022     Precautions  Precautions: Fall Risk, Swallow Precautions ( See Comments)  Comments: Baseline dementia    Assessment  Patient is an 87 y.o. male who presented to the hospital with weakness. He tested positive for Covid approximately 12 days prior to admit. Pt was recently evaluated for possible hydrocephalus and a recent MRI revealed a subacute stroke. Pt was admitted to the hospital 12/12 with pneumonia due to Covid 19. During OT eval, pt presented with significant weakness, impaired balance and poor activity tolerance impacting his safety and independence with ADLs and ADL transfers. He would benefit from a trial of OT services.      Plan    Recommend Occupational Therapy 3 times per week until therapy goals are met for the following treatments:  Adaptive Equipment, Self Care/Activities of Daily Living, Therapeutic Activities, and Therapeutic Exercises.    DC Equipment Recommendations: Unable to determine at this time  Discharge Recommendations: Recommend post-acute placement for additional occupational therapy services prior to discharge home     Subjective    Agreeable to therapy session      Objective       12/16/22 5467   Prior Living Situation   Prior Services Intermittent Physical Support for ADL Per Family   Housing / Facility 2 Story House   Steps Into Home 8   Steps In Home 12   Bathroom Set up Walk In Shower;Grab Bars   Equipment Owned Front-Wheel Walker;Single Point Cane;Raised Toilet Seat With Arms;Grab Bar(s) In Tub / Shower   Lives with - Patient's Self Care Capacity Spouse   Comments Pt's spouse at bedside and very supportive   Prior Level of ADL Function   Self Feeding Independent   Grooming / Hygiene Independent   Bathing Independent   Dressing Independent   Toileting Independent   Prior Level of IADL Function   Medication Management  Requires Assist   Laundry Requires Assist   Kitchen Mobility Requires Assist   Home Management Requires Assist   Shopping Requires Assist   Prior Level Of Mobility Independent Without Device in Home   Driving / Transportation Relatives / Others Provide Transportation   Precautions   Precautions Fall Risk;Swallow Precautions ( See Comments)   Vitals   O2 Delivery Device Silicone Nasal Cannula   Pain 0 - 10 Group   Therapist Pain Assessment During Activity;Nurse Notified;0   Cognition    Cognition / Consciousness X   Level of Consciousness Alert   New Learning Impaired   Comments Very pleasant and cooperative. Pt with h/o dementia. Wife reports that his dementia has worsened significantly since his Covid infection   Active ROM Upper Body   Active ROM Upper Body  X   Dominant Hand Right   Comments limitations to B shoulders, approximately 80 degress of abduction   Strength Upper Body   Upper Body Strength  X   Gross Strength Generalized Weakness, Equal Bilaterally.    Upper Body Muscle Tone   Upper Body Muscle Tone  WDL   Balance Assessment   Sitting Balance (Static) Fair -   Sitting Balance (Dynamic) Poor +   Standing Balance (Static) Poor -   Standing Balance (Dynamic) Trace +   Weight Shift Sitting Fair   Weight Shift Standing Poor   Comments initially stood with FWW but did better with HHA   Bed Mobility    Supine to Sit Moderate Assist   Sit to Supine Moderate Assist   Scooting Moderate Assist   ADL Assessment   Grooming Moderate Assist  (spouse assisted pt to brush teeth)   Lower Body Dressing Maximal Assist   Toileting Maximal Assist  (incontinent of urine and stool, stood for hygiene)   6 Clicks Daily Activity Score 13   Functional Mobility   Sit to Stand Moderate Assist   Bed, Chair, Wheelchair Transfer Maximal Assist   Transfer Method Stand Step   Activity Tolerance   Sitting in Chair pt sat up in chair but was flexed forward and looked uncomfortable, assisted pt BTB   Patient / Family Goals   Patient /  Family Goal #1 spouse is considering hospice level of care   Short Term Goals   Short Term Goal # 1 Pt will transfer to/from [facility] with min assist   Short Term Goal # 2 Pt will complete LB dressing with min assist   Short Term Goal # 3 Pt will complete toileting with min assist for hygiene and clothing management   Education Group   Education Provided Role of Occupational Therapist   Role of Occupational Therapist Patient Response Patient;Family;Acceptance;Explanation;Reinforcement Needed   Problem List   Problem List Decreased Active Daily Living Skills;Decreased Upper Extremity Strength Right;Decreased Upper Extremity Strength Left;Decreased Upper Extremity AROM Right;Decreased Upper Extremity AROM Left;Decreased Functional Mobility;Decreased Activity Tolerance;Safety Awareness Deficits / Cognition;Impaired Cognitive Function;Impaired Postural Control / Balance

## 2022-12-17 NOTE — DISCHARGE PLANNING
TCN following. HTH/SCP chart review completed. Collaborated with discharge planning team, CAREY Varela via voalte. Confirmed that patient is now on comfort care. Noted order for palliative consult present 12/16/2022. TCN remains available to further collaborate should needs arise necessitating TCN involvement; although TCN will no longer actively follow as patient has transitioned to comfort care measures. Thank you.

## 2022-12-17 NOTE — THERAPY
"Physical Therapy   Daily Treatment     Patient Name: Han Pat  Age:  87 y.o., Sex:  male  Medical Record #: [id number]  Today's Date: 12/16/2022     Precautions  Precautions: Fall Risk;Swallow Precautions ( See Comments)  Comments: Baseline dementia    Assessment    Pt received in bed and agreeable to PT session. Pt presents with worsening weakness, impaired balance, and decline in functional mobility compared to evaluation on 12/14. Pt required 2P assist for transfer from bed to chair, where he sat for a few minutes with elbows on knees in a tripod position due to fatigue. Pt assisted back to bed for safety. Discussed with pt's wife, who reported she is considering hospice for him and does not feel she can care for him at this level, so would likely require a group home setting. PTA pt was fully independent with mobility and self-care. Will continue to follow for acute PT to progress as able.    Plan    Continue current treatment plan.    DC Equipment Recommendations: Unable to determine at this time  Discharge Recommendations: Recommend post-acute placement for additional physical therapy services prior to discharge home      Subjective    \"Like a slice of heaven\" when asked how his legs are feeling.     Objective       12/16/22 1431   Precautions   Precautions Fall Risk;Swallow Precautions ( See Comments)   Comments Baseline dementia   Vitals   Vitals Comments 3L throughout, occasionally desats to 89% with return to 90's with rest.   Pain 0 - 10 Group   Therapist Pain Assessment Post Activity Pain Same as Prior to Activity;Nurse Notified;0  (denied pain)   Cognition    Level of Consciousness Alert   Comments Pt very pleasant and cooperative. Wife present throughout. Pt is mildly [place] and confused as well.   Balance   Sitting Balance (Static) Fair -   Sitting Balance (Dynamic) Poor +   Standing Balance (Static) Poor -   Standing Balance (Dynamic) Trace +   Weight Shift Sitting Fair   Weight Shift Standing " Poor   Skilled Intervention Verbal Cuing;Tactile Cuing;Facilitation;Postural Facilitation   Comments Facilitation required to maintain standing with BUE support. Started with a FWW and pt was unable to successfully transfer, but did better with BUE HHA.   Gait Analysis   Gait Level Of Assist Unable to Participate   Comments Pt with posterior lean in standing and exhausted after just one transfer. Unable to tolerate ambulation. Decline from prior session.   Bed Mobility    Supine to Sit Moderate Assist   Sit to Supine Moderate Assist   Scooting Moderate Assist   Skilled Intervention Verbal Cuing;Tactile Cuing;Sequencing;Facilitation   Functional Mobility   Sit to Stand Moderate Assist   Bed, Chair, Wheelchair Transfer Maximal Assist   Transfer Method Stand Step   Mobility bed<>chair   Skilled Intervention Verbal Cuing;Tactile Cuing;Sequencing   Comments Attempted transfer initially with FWW and pt unable to sequence moving it and stepping. Trialed with B HHA with better success. Pt fatigued once in the chair and returned to bed for safety reasons.   How much difficulty does the patient currently have...   Turning over in bed (including adjusting bedclothes, sheets and blankets)? 1   Sitting down on and standing up from a chair with arms (e.g., wheelchair, bedside commode, etc.) 1   Moving from lying on back to sitting on the side of the bed? 1   How much help from another person does the patient currently need...   Moving to and from a bed to a chair (including a wheelchair)? 2   Need to walk in a hospital room? 2   Climbing 3-5 steps with a railing? 1   6 clicks Mobility Score 8   Short Term Goals    Short Term Goal # 1 in 6 visits patient will demo all functional transfers with sup and LRAD for safe DC   Goal Outcome # 1 goal not met   Short Term Goal # 2 in 6 visits patient will ambualte 200' with sup and LRAD for safe DC   Goal Outcome # 2 Goal not met   Short Term Goal # 3 in 6 visits patient will navigate 12  stairs with B rails and sup w/LRAD for safe DC   Goal Outcome # 3 Goal not met   Anticipated Discharge Equipment and Recommendations   DC Equipment Recommendations Unable to determine at this time   Discharge Recommendations Recommend post-acute placement for additional physical therapy services prior to discharge home   Interdisciplinary Plan of Care Collaboration   IDT Collaboration with  Nursing;Family / Caregiver   Patient Position at End of Therapy In Bed;Call Light within Reach;Tray Table within Reach;Phone within Reach;Family / Friend in Room   Collaboration Comments RN updated. Wife present throughout the session and reports she is considering hospice and is aware she is unable to care for him in current state.   Session Information   Date / Session Number  12/16-2(2/3, 12/20)

## 2022-12-17 NOTE — CARE PLAN
The patient is Unstable - High likelihood or risk of patient condition declining or worsening    Shift Goals  Clinical Goals: no SOB within shift, no aspiration within shift, no falls  Patient Goals: comfort from pain, adequate rest  Family Goals: LADAN    Progress made toward(s) clinical / shift goals:  pt now on comfort care , no aspiration/ no SOB , no falls within shift    Patient is not progressing towards the following goals:      Problem: Knowledge Deficit - Standard  Goal: Patient and family/care givers will demonstrate understanding of plan of care, disease process/condition, diagnostic tests and medications  Outcome: Not Progressing     Problem: Skin Integrity  Goal: Skin integrity is maintained or improved  Outcome: Not Progressing     Problem: Respiratory  Goal: Patient will achieve/maintain optimum respiratory ventilation and gas exchange  Outcome: Not Progressing     Problem: Nutrition  Goal: Patient's nutritional and fluid intake will be adequate or improve  Outcome: Not Progressing     Problem: Psychosocial  Goal: Patient's level of anxiety will decrease  Outcome: Not Progressing     Problem: Communication  Goal: The ability to communicate needs accurately and effectively will improve  Outcome: Not Progressing     Problem: Bowel Elimination  Goal: Establish and maintain regular bowel function  Outcome: Not Progressing     Problem: Mobility  Goal: Patient's capacity to carry out activities will improve  Outcome: Not Progressing     Problem: Self Care  Goal: Patient will have the ability to perform ADLs independently or with assistance (bathe, groom, dress, toilet and feed)  Outcome: Not Progressing     Problem: Infection - Standard  Goal: Patient will remain free from infection  Outcome: Not Progressing     Problem: Wound/ / Incision Healing  Goal: Patient's wound/surgical incision will decrease in size and heals properly  Outcome: Not Progressing

## 2022-12-17 NOTE — ASSESSMENT & PLAN NOTE
An extensive conversation was had with the patient's wife, who is at bedside.  - Patient's wife like to proceed with comfort care.  She states she no longer wants the patient to have any lab work, imaging, oral medications with exception of those involving comfort care.  -Should would like to pursue group home hospice, if possible.

## 2022-12-17 NOTE — RESPIRATORY CARE
COPD EDUCATION by COPD CLINICAL EDUCATOR  12/17/2022 at 6:37 AM by Rosi Woodard, RRT     Patient reviewed by COPD education team. Patient does not have a history or diagnosis of COPD and is a non-smoker.  Therefore, patient does not qualify for the COPD program.

## 2022-12-18 NOTE — PROGRESS NOTES
Pt not keeping OXY mask on . 02 at 86 % . Teaching done. Attempt to put melendez in but was unable to .

## 2022-12-18 NOTE — CARE PLAN
The patient is Watcher - Medium risk of patient condition declining or worsening    Shift Goals  Clinical Goals: Comfort care , 02 maintained  Patient Goals: comfort from pain, adequate rest  Family Goals:  to be comfortable    Pt 02 was between 88-94. Pt comfortable, no signs of pain. Pt ate no dinner and would not drink. He is incontinent , he was cleaned and bathed. Oral care done.Unable to communicate     Progress made toward(s) clinical / shift goals:    Problem: Knowledge Deficit - Standard  Goal: Patient and family/care givers will demonstrate understanding of plan of care, disease process/condition, diagnostic tests and medications  Outcome: Not Progressing     Problem: Respiratory  Goal: Patient will achieve/maintain optimum respiratory ventilation and gas exchange  Outcome: Not Progressing     Problem: Nutrition  Goal: Patient's nutritional and fluid intake will be adequate or improve  Outcome: Not Progressing  Goal: Enteral nutrition will be maintained or improve  Outcome: Not Progressing  Goal: Enteral nutrition will be maintained or improve  Outcome: Not Progressing     Problem: Psychosocial  Goal: Patient's level of anxiety will decrease  Outcome: Not Progressing  Goal: Patient's ability to verbalize feelings about condition will improve  Outcome: Not Progressing  Goal: Patient's ability to re-evaluate and adapt role responsibilities will improve  Outcome: Not Progressing  Goal: Patient and family will demonstrate ability to cope with life altering diagnosis and/or procedure  Outcome: Not Progressing  Goal: Spiritual and cultural needs incorporated into hospitalization  Outcome: Not Progressing     Problem: Communication  Goal: The ability to communicate needs accurately and effectively will improve  Outcome: Not Progressing     Problem: Discharge Barriers/Planning  Goal: Patient's continuum of care needs are met  Outcome: Not Progressing     Problem: Hemodynamics  Goal: Patient's hemodynamics,  fluid balance and neurologic status will be stable or improve  Outcome: Not Progressing     Problem: Fluid Volume  Goal: Fluid volume balance will be maintained  Outcome: Not Progressing     Problem: Mechanical Ventilation  Goal: Safe management of artificial airway and ventilation  Outcome: Not Progressing  Goal: Successful weaning off mechanical ventilator, spontaneously maintains adequate gas exchange  Outcome: Not Progressing  Goal: Patient will be able to express needs and understand communication  Outcome: Not Progressing     Problem: Dysphagia  Goal: Dysphagia will improve  Outcome: Not Progressing     Problem: Risk for Aspiration  Goal: Patient's risk for aspiration will be absent or decrease  Outcome: Not Progressing     Problem: Urinary Elimination  Goal: Establish and maintain regular urinary output  Outcome: Not Progressing     Problem: Bowel Elimination  Goal: Establish and maintain regular bowel function  Outcome: Not Progressing     Problem: Gastrointestinal Irritability  Goal: Nausea and vomiting will be absent or improve  Outcome: Not Progressing  Goal: Diarrhea will be absent or improved  Outcome: Not Progressing     Problem: Mobility  Goal: Patient's capacity to carry out activities will improve  Outcome: Not Progressing     Problem: Self Care  Goal: Patient will have the ability to perform ADLs independently or with assistance (bathe, groom, dress, toilet and feed)  Outcome: Not Progressing     Problem: Infection - Standard  Goal: Patient will remain free from infection  Outcome: Not Progressing     Problem: Skin Integrity  Goal: Skin integrity is maintained or improved  Outcome: Progressing     Problem: Fall Risk  Goal: Patient will remain free from falls  Outcome: Progressing     Problem: Wound/ / Incision Healing  Goal: Patient's wound/surgical incision will decrease in size and heals properly  Outcome: Progressing       Patient is not progressing towards the following goals:      Problem:  Knowledge Deficit - Standard  Goal: Patient and family/care givers will demonstrate understanding of plan of care, disease process/condition, diagnostic tests and medications  Outcome: Not Progressing     Problem: Respiratory  Goal: Patient will achieve/maintain optimum respiratory ventilation and gas exchange  Outcome: Not Progressing     Problem: Nutrition  Goal: Patient's nutritional and fluid intake will be adequate or improve  Outcome: Not Progressing  Goal: Enteral nutrition will be maintained or improve  Outcome: Not Progressing  Goal: Enteral nutrition will be maintained or improve  Outcome: Not Progressing     Problem: Psychosocial  Goal: Patient's level of anxiety will decrease  Outcome: Not Progressing  Goal: Patient's ability to verbalize feelings about condition will improve  Outcome: Not Progressing  Goal: Patient's ability to re-evaluate and adapt role responsibilities will improve  Outcome: Not Progressing  Goal: Patient and family will demonstrate ability to cope with life altering diagnosis and/or procedure  Outcome: Not Progressing  Goal: Spiritual and cultural needs incorporated into hospitalization  Outcome: Not Progressing     Problem: Communication  Goal: The ability to communicate needs accurately and effectively will improve  Outcome: Not Progressing     Problem: Discharge Barriers/Planning  Goal: Patient's continuum of care needs are met  Outcome: Not Progressing     Problem: Hemodynamics  Goal: Patient's hemodynamics, fluid balance and neurologic status will be stable or improve  Outcome: Not Progressing     Problem: Fluid Volume  Goal: Fluid volume balance will be maintained  Outcome: Not Progressing     Problem: Mechanical Ventilation  Goal: Safe management of artificial airway and ventilation  Outcome: Not Progressing  Goal: Successful weaning off mechanical ventilator, spontaneously maintains adequate gas exchange  Outcome: Not Progressing  Goal: Patient will be able to express needs  and understand communication  Outcome: Not Progressing     Problem: Dysphagia  Goal: Dysphagia will improve  Outcome: Not Progressing     Problem: Risk for Aspiration  Goal: Patient's risk for aspiration will be absent or decrease  Outcome: Not Progressing     Problem: Urinary Elimination  Goal: Establish and maintain regular urinary output  Outcome: Not Progressing     Problem: Bowel Elimination  Goal: Establish and maintain regular bowel function  Outcome: Not Progressing     Problem: Gastrointestinal Irritability  Goal: Nausea and vomiting will be absent or improve  Outcome: Not Progressing  Goal: Diarrhea will be absent or improved  Outcome: Not Progressing     Problem: Mobility  Goal: Patient's capacity to carry out activities will improve  Outcome: Not Progressing     Problem: Self Care  Goal: Patient will have the ability to perform ADLs independently or with assistance (bathe, groom, dress, toilet and feed)  Outcome: Not Progressing     Problem: Infection - Standard  Goal: Patient will remain free from infection  Outcome: Not Progressing

## 2022-12-18 NOTE — PROGRESS NOTES
[facility] Internal Medicine Daily Progress Note    Date of Service  12/18/2022    R Team: R IM Green Team   Attending: Dominique Butler M.d.  Senior Resident: Dr. Prado  Intern:  Dr. Clark  Contact Number: [phone]    Patient ID  [id number] [id number] with GERD, CML, sleep apnea, HLD presenting to the ED on 12/12 for weakness. Has severe cognitive impairment - unclear if related to dementia vs hydrocephalus/NPH. MRI shows subacute infarct as well as ventriculomegaly. Had LP without significant improvement. Supposed to get LP drain but unfortunately tested positive for COVID around 12/1 - treated with Molnupravir starting on 11/29.     Hospital Course  Patient found to have marked ventriculomegaly on MRI.  He was seen by neurology, Dr. Whatley, who recommended waiting least 6 months before considering placing a shunt, given COVID infection.      Interval Problem Update  Patient is very somnolent and nonresponsive today.  He is able to intermittently open his eyes when being spoken to.  He is not fully arousable.  He is currently on 7 L oxygen mask and appears to be tachypneic with increased respiratory effort. Per his wife, he has had decreased ability to control his urine and has been incontinent, requiring frequent pad changes.  A Medeiros catheter was attempted twice yesterday for comfort measures, but not successful given anatomy difficulties, per nurse.      The patient's wife is at bedside and understands the patient's condition very well.  She would like to pursue the option of group home hospice if it is a possibility, since she has had pleasant experiences with this in the past with her mother.    I have discussed this patient's plan of care and discharge plan at IDT rounds today with Case Management, Nursing, Nursing leadership, and other members of the IDT team.    Consultants/Specialty  None    Code Status  Comfort Care/DNR    Disposition  Patient is not medically cleared for discharge.   Anticipate discharge to to skilled  nursing facility.  I have placed the appropriate orders for post-discharge needs.    Review of Systems  Review of Systems   Unable to perform ROS: Dementia      Physical Exam  Temp:  [36.6 °C (97.8 °F)-37.3 °C (99.1 °F)] 36.6 °C (97.8 °F)  Pulse:  [115-124] 124  Resp:  [19-20] 20  BP: (135-138)/(69-77) 135/69  SpO2:  [89 %-92 %] 89 %    Physical Exam  Constitutional:       Appearance: He is ill-appearing.      Comments: Unable to perform full exam. Patient not able to follow commands.  On 7L Oxymask     HENT:      Head: Normocephalic and atraumatic.   Neck:      Vascular: No carotid bruit.   Cardiovascular:      Rate and Rhythm: Normal rate.      Heart sounds: No murmur heard.  Pulmonary:      Breath sounds: Rhonchi: throughout.      Comments: Increased respiratory effort. Tachypnea   Musculoskeletal:      Right lower leg: No edema.      Left lower leg: No edema.   Neurological:      Motor: Weakness: 3/5 strength BLE, 4/5 strength in BUE.      Comments: A&O x 1 (self). Gait cannot be assessed due to weakness. Nurse reports patient had difficulty getting up from chair to bed.       Fluids    Intake/Output Summary (Last 24 hours) at 12/18/2022 1142  Last data filed at 12/17/2022 2000  Gross per 24 hour   Intake 0 ml   Output 0 ml   Net 0 ml       Laboratory                            Imaging  DX-CHEST-PORTABLE (1 VIEW)   Final Result      Stable patchy bilateral pulmonary opacities and basilar atelectasis. Underlying infection is not excluded.      CT-HEAD W/O   Final Result      1.  No evidence of acute intracranial process.      2.  Cerebral atrophy as well as periventricular chronic small vessel ischemic change.      3.  Pansinusitis.         DX-CHEST-PORTABLE (1 VIEW)   Final Result      Patchy bilateral pulmonary infiltrates. Consideration should be given for Covid pneumonia.           Assessment/Plan: 87 year old male with past medical history of cognitive impairment, chronic myelogenous leukemia, polycythemia  vera, obstructive sleep apnea on 2 L home oxygen CPAP at night, gastroesophageal reflux disease, who was admitted for increased generalized weakness, COVID infection, and potential normal pressure hydrocephalus. Now placed on comfort care.    * Pneumonia due to COVID-19 virus- (present on admission)  Assessment & Plan  - Continue 10-day course of dexamethasone (to be completed on 12/22/2022)  -Continue oxygen supplementation with goal of comfort in mind.    Counseling regarding advance care planning and goals of care  Assessment & Plan  An extensive conversation was had with the patient's wife, who is at bedside.  - Patient's wife like to proceed with comfort care.  She states she no longer wants the patient to have any lab work, imaging, oral medications with exception of those involving comfort care.  -Should would like to pursue group home hospice, if possible.    Acquired cerebral ventriculomegaly (HCC)- (present on admission)  Assessment & Plan  - Given patient's wife's decision to pursue comfort care, this should be readdressed in the future should the patient improve and family decides to pursue treatment, although very unlikely.    Type 2 diabetes mellitus (HCC)  Assessment & Plan  Hemoglobin A1c 7.1% from 12/14/2022  - Follow-up with primary care physician for further management    DNR (do not resuscitate)- (present on admission)  Assessment & Plan  Per his wishes    Polycythemia vera (HCC)- (present on admission)  Assessment & Plan  Followed by Dr. Thomas heme/onc   Patient's wife would not like the patient to pursue hydroxyurea treatment at this time given comfort care    Chronic myelogenous leukemia (HCC)- (present on admission)  Assessment & Plan  Followed by Dr. Thomas     Cognitive impairment- (present on admission)  Assessment & Plan  This remains unclear if it is due to dementia and/or from hydrocephalus  -Patient now on comfort care. Plan for group home hospice, if possible.       Macrocytic  anemia  Assessment & Plan  Patient's wife reports that the patient has a history of alcohol use, with roughly 2 to 3 glasses of wine per day.  This can be due to his macrocytic anemia.  He also has suggestion of liver injury, due to transaminitis and elevated alk phosphatase.  Patient also has a history of CML.      Weakness- (present on admission)  Assessment & Plan  -Patient now on comfort care. Plan for group home hospice, if possible.    Abnormal urinalysis  Assessment & Plan  Resolved    Fever  Assessment & Plan  Resolved      VTE prophylaxis: Discontinued, given patient comfort care    I have performed a physical exam and reviewed and updated ROS and Plan today (12/18/2022). In review of yesterday's note (12/17/2022), there are no changes except as documented above.

## 2022-12-18 NOTE — PROGRESS NOTES
New order was put for placing melendez. Tried 2 times but it is anatomically impossible/hard. MD aware. Pt on brief and pad as of this time. Will  have night shift try it'

## 2022-12-18 NOTE — CARE PLAN
The patient is Stable - Low risk of patient condition declining or worsening    Shift Goals  Clinical Goals: Comfort Care, follow Wife's choices  Patient Goals: comfort from pain, adequate rest  Family Goals:  to be comfortable    Progress made toward(s) clinical / shift goals:    Problem: Knowledge Deficit - Standard  Goal: Patient and family/care givers will demonstrate understanding of plan of care, disease process/condition, diagnostic tests and medications  Outcome: Progressing  Note: Pt educated regarding plan of care and medications. All questions answered.       Problem: Skin Integrity  Goal: Skin integrity is maintained or improved  Outcome: Progressing  Note: Pt turned and positioned every two hours. Incontinence care provided and barrier paste applied as needed.       Problem: Respiratory  Goal: Patient will achieve/maintain optimum respiratory ventilation and gas exchange  Outcome: Progressing  Flowsheets (Taken 12/18/2022 1413)  O2 Delivery Device: Oxymask     Problem: Discharge Barriers/Planning  Goal: Patient's continuum of care needs are met  Outcome: Progressing  Flowsheets (Taken 12/18/2022 1413)  Continuum of Care Needs:   Assessed for discharge barriers   Communicated discharge barriers to interdisciplinary tream   Collaborated with Case Management/  Note: Placement with hospice     Problem: Urinary Elimination  Goal: Establish and maintain regular urinary output  Outcome: Progressing  Note: Incontinence checks         Patient is not progressing towards the following goals:

## 2022-12-18 NOTE — DISCHARGE PLANNING
LMSW participated in interdisciplinary team rounds. Per attending, patient is not actively dying and does not currently meet criteria for GIP. LMSW requested to follow up with family on discharge with home hospice.    LMSW met with patient's wife, daughter, son-in-law, and granddaughter at bedside. LMSW discussed discharge on hospice to group home. Patient's wife/Sally confirmed that they have the means to pay for cost of group home. LMSW answered additional questions about hospice services. LMSW left hospice choice form with Sally to review. Family asked about option of patient passing on hospice at hospital. LMSW reviewed GIP level of care but stated that, per report from medical team, patient does not currently meet criteria for that level of care.    After meeting, patient's daughter spoke to LMSW in hallway and expressed her understanding that patient would pass today and remain in hospital. LMSW apologized for any confusion and stated that she would speak to medical team.    LMSW updated attending and resident on conversation. Attending confirmed update from IDT rounds, that patient is not imminent at this time. Plan to monitor patient over next 24 hours to see if he continues to decompensate and continue plan of care discussion from that point.

## 2022-12-18 NOTE — PALLIATIVE CARE
Palliative Care follow-up  Consult received and EMR reviewed. Case discussed with Dr. Byrne and Rosi PATINO, updates appreciated.   Per Dr. Byrne ok to cancel Palliative Care order, no further assistance required from PC at this time.     Thank you for allowing Palliative Care to support this patient and family. Contact [phone] for additional assistance, change in patient status, or with any questions/concerns.

## 2022-12-19 NOTE — HOSPICE
Spoke to wife and son.   Patient doesn't respond to verbal or tactile stimuli. Patient is tachypneic RR 28. No furrow brow or moaning noted at this time.     Spoke to family about rationale of using medication to help control symptoms.     Patients family decided to start to titrate oxygen and take O2 sensor off.     Spoke to RN she will medicate with Morphine and Ativan now.     Patient approved for community hospice by Manuela Stoddard to see patient today

## 2022-12-19 NOTE — THERAPY
12/19/22 0806   Interdisciplinary Plan of Care Collaboration   Collaboration Comments Pt transitioned to Comfort Care. DC acute OT due to no likely benefit.

## 2022-12-19 NOTE — CARE PLAN
The patient is Watcher - Medium risk of patient condition declining or worsening    Shift Goals  Clinical Goals: Comfort Care, follow Wife's choices  Patient Goals: comfort from pain, adequate rest  Family Goals:  to be comfortable    Progress made toward(s) clinical / shift goals:    Problem: Respiratory  Goal: Patient will achieve/maintain optimum respiratory ventilation and gas exchange  Outcome: Progressing   Pt is breathing easier , keeping mask on    Patient is not progressing towards the following goals:

## 2022-12-19 NOTE — PROGRESS NOTES
[facility] Internal Medicine Daily Progress Note    Date of Service  12/19/2022    [facility] Team: R IM Green Team   Attending: Dominique Butler M.d.  Senior Resident: Dr. Jimenez  Intern:  Dr. Leiva  Contact Number: [phone]    Chief Complaint  Han Pat is a 87 y.o. male admitted 12/12/2022 with increased generalized weakness, COVID infection, and potential normal pressure hydrocephalus. Now placed on comfort care.    Hospital Course  88 y/o M with PMH dementia, CML, polycythemia vera, FREYA (CPAP with 2L O2), GERD, NPH admitted 12/12/2022 with progressive weakness last month, worsening confusion, loss of appetite. Had LP without significant improvement. Supposed to get LP drain but unfortunately tested positive for COVID around 12/1 - treated with Molnupravir starting on 11/29. Admitted with acute respiratory failure and started on Decadron.  He had fever 1 time on admission.  Procalcitonin normal.  Chest x-ray patchy bilateral pulmonary opacities. Per Dr. Palacios, no ventricular shunt after 3-6 mo post covid. Wife refuses SNF, wanted home care. UA positive for leuk esterase, WBC 20-50 but patient asymptomatic so no abx. Pt condition continued to worsen. He most likely was aspirating. Memory worsening. Also breathing condition worsen and required more oxygen up to 7L. Wife decided on comfort care which is very reasonable for his age and comorbidities. Pls see advance discussion from 12/17/2022.     Interval Problem Update    No acute events overnight. Patient on comfort care. Patient's son and wife at bedside. Patient is very somnolent and nonresponsive, not fully arousable. Currently on 7L oxygen mask, tachypneic with increased respiratory effort. Wife thinks he is doing worse today compared to prior. Continuing to monitor. Ativan given this morning 0917.    1100: Discussed with palliative this morning regarding patient's treatment plan for comfort care. Recommends regimen with IV morphine 10mg prn for severe pain, IV  morphine 5mg prn for moderate pain and/or air hunger while titrating down oxygen (currently on 7L oxymask, however wife wanting him off). Talked with family. Communicated to nursing.    I have discussed this patient's plan of care and discharge plan at IDT rounds today with Case Management, Nursing, Nursing leadership, and other members of the IDT team.    Consultants/Specialty  None    Code Status  Comfort Care/DNR    Disposition  Patient is not medically cleared for discharge.   Anticipate discharge to hospice/comfort care.  I have placed the appropriate orders for post-discharge needs.    Review of Systems  Review of Systems   Neurological:  Negative for speech change.      Physical Exam  Temp:  [36.5 °C (97.7 °F)-36.8 °C (98.3 °F)] 36.5 °C (97.7 °F)  Pulse:  [] 132  Resp:  [20-22] 20  BP: (113-121)/(62-69) 113/62  SpO2:  [88 %-92 %] 92 %    Physical Exam  Constitutional:       Appearance: He is ill-appearing.      Comments: Unable to perform full exam. Patient not able to follow commands.   HENT:      Head: Normocephalic and atraumatic.   Neck:      Vascular: No carotid bruit.   Cardiovascular:      Rate and Rhythm: Normal rate.      Heart sounds: No murmur heard.  Pulmonary:      Breath sounds: Rhonchi: throughout.      Comments: Increased respiratory effort. Tachypnea . On 7L oxymask.  Musculoskeletal:      Right lower leg: No edema.      Left lower leg: No edema.   Neurological:      Motor: Weakness: 3/5 strength BLE, 4/5 strength in BUE.      Comments: A&O x 1 (self). Gait cannot be assessed due to weakness. Nurse reports patient had difficulty getting up from chair to bed.       Fluids  No intake or output data in the 24 hours ending 12/19/22 1004    Laboratory                        Imaging  DX-CHEST-PORTABLE (1 VIEW)   Final Result      Stable patchy bilateral pulmonary opacities and basilar atelectasis. Underlying infection is not excluded.      CT-HEAD W/O   Final Result      1.  No evidence of  acute intracranial process.      2.  Cerebral atrophy as well as periventricular chronic small vessel ischemic change.      3.  Pansinusitis.         DX-CHEST-PORTABLE (1 VIEW)   Final Result      Patchy bilateral pulmonary infiltrates. Consideration should be given for Covid pneumonia.           Assessment/Plan  Problem Representation:    * Pneumonia due to COVID-19 virus- (present on admission)  Assessment & Plan  - Continue 10-day course of dexamethasone (to be completed on 12/22/2022)  -Continue oxygen supplementation with goal of comfort in mind.    Counseling regarding advance care planning and goals of care  Assessment & Plan  An extensive conversation was had with the patient's wife, who is at bedside.  - Patient's wife like to proceed with comfort care.  She states she no longer wants the patient to have any lab work, imaging, oral medications with exception of those involving comfort care.  -Should would like to pursue group home hospice, if possible.    Type 2 diabetes mellitus (HCC)  Assessment & Plan  Hemoglobin A1c 7.1% from 12/14/2022  - Follow-up with primary care physician for further management    Abnormal urinalysis  Assessment & Plan  Resolved    Fever  Assessment & Plan  Resolved    DNR (do not resuscitate)- (present on admission)  Assessment & Plan  Per his wishes    Polycythemia vera (HCC)- (present on admission)  Assessment & Plan  Followed by Dr. Thomas heme/onc   Patient's wife would not like the patient to pursue hydroxyurea treatment at this time given comfort care    Chronic myelogenous leukemia (HCC)- (present on admission)  Assessment & Plan  Followed by Dr. Thomas     Acquired cerebral ventriculomegaly (HCC)- (present on admission)  Assessment & Plan  - Given patient's wife's decision to pursue comfort care, this should be readdressed in the future should the patient improve and family decides to pursue treatment, although very unlikely.    Cognitive impairment- (present on  admission)  Assessment & Plan  This remains unclear if it is due to dementia and/or from hydrocephalus  -Patient now on comfort care. Plan for group home hospice, if possible.     Macrocytic anemia  Assessment & Plan  Patient's wife reports that the patient has a history of alcohol use, with roughly 2 to 3 glasses of wine per day.  This can be due to his macrocytic anemia.  He also has suggestion of liver injury, due to transaminitis and elevated alk phosphatase.  Patient also has a history of CML.    Weakness- (present on admission)  Assessment & Plan  -Patient now on comfort care. Plan for group home hospice, if possible.       VTE prophylaxis: SCDs/TEDs    I have performed a physical exam and reviewed and updated ROS and Plan today (12/19/2022). In review of yesterday's note (12/18/2022), there are no changes except as documented above.

## 2022-12-20 ENCOUNTER — TELEPHONE (OUTPATIENT)
Dept: CARDIOLOGY | Facility: MEDICAL CENTER | Age: 87
End: 2022-12-20
Payer: MEDICARE

## 2022-12-20 NOTE — CARE PLAN
The patient is Unstable - High likelihood or risk of patient condition declining or worsening    Shift Goals: Comfort care; pain management  Clinical Goals: Comfort Care, follow Wife's choices  Patient Goals: comfort from pain, adequate rest  Family Goals:  to be comfortable    Progress made toward(s) clinical / shift goals:  see above    Patient is not progressing towards the following goals:      Problem: Knowledge Deficit - Standard  Goal: Patient and family/care givers will demonstrate understanding of plan of care, disease process/condition, diagnostic tests and medications  Outcome: Not Progressing     Problem: Skin Integrity  Goal: Skin integrity is maintained or improved  Outcome: Not Progressing     Problem: Fall Risk  Goal: Patient will remain free from falls  Outcome: Not Progressing     Problem: Respiratory  Goal: Patient will achieve/maintain optimum respiratory ventilation and gas exchange  Outcome: Not Progressing     Problem: Nutrition  Goal: Patient's nutritional and fluid intake will be adequate or improve  Outcome: Not Progressing

## 2022-12-20 NOTE — DISCHARGE SUMMARY
[facility] Internal Medicine Death Summary      Attending: Dominique Butler M.d.  Senior Resident: Dr. Jimenez  Intern:  Dr. Leiva  Call Back Contact Number: [phone]    Admission Date  12/12/2022    Cause of Death  Acute respiratory failure with hypoxia due to COVID-19 infection.     Comorbid Conditions at the Time of Death  Principal Problem:    Pneumonia due to COVID-19 virus POA: Yes  Active Problems:    Weakness POA: Yes    Macrocytic anemia POA: Unknown    Cognitive impairment POA: Yes    Acquired cerebral ventriculomegaly (HCC) POA: Yes      Overview: Chronic, he has been seen by neurology Dr. Corrales and Dr. Christine.  This       started when he was having trouble with his memory, weakness and falls.        He was seen by neurology who did an MRI and it was found that he had       cerebral ventriculomegaly.  Dr. Christine did a large volume CSF removal to see       if this improved his gait however directly after the procedure they did       not see improvement.  Wife states that she does feel she is on improvement       with this procedure.  They were then directed to see Dr. Palacios for       neurosurgery consult who recommended he have 3 days inpatient for removal       of CSF fluid to decrease the fluid and then have a shunt placed.  Due to       other health concerns he has not currently able to undergo shunt       placement.  He will continue to follow-up with neurology and neurosurgery       in the event that he is able to have this    Chronic myelogenous leukemia (HCC) POA: Yes      Overview: Chronic, he is currently seeing Dr. Rosi Thomas every 3 months where she       checks his blood counts.  He continues to take hydroxyurea 1500 mg       Saturdays and Sundays, 1000 mg Monday, Tuesday, Wednesday, Thursday,       Friday.    Polycythemia vera (HCC) POA: Yes      Overview: Follows with Dr. Thomas of hematology oncology, currently on hydroxyurea       1000 mg daily    DNR (do not resuscitate) POA: Yes    Fever POA: Unknown     Abnormal urinalysis POA: Unknown    Type 2 diabetes mellitus (HCC) POA: Unknown    Counseling regarding advance care planning and goals of care POA: Unknown  Resolved Problems:    * No resolved hospital problems. *      History of Presenting Illness and Hospital Course  This is a 87 y.o. male admitted 12/12/2022 with about a month duration of progressive weakness, worsening confusion and loss of appetite. Tested positive for COVID-19 around 12/1 for which he was treated with Molnupravir starting on 11/29. Admitted with acute respiratory failure secondary to COVID-19 and started on Decadron on 12/13. Patient's chest x-ray was consistent with patchy bilateral pulmonary opacities. Patient's mental and clinical condition continued to worsen throughout hospitalization, he most likely was aspirating. His oxygen requirements increased to 7 liters on oxymask on 12/18. Patient was placed on comfort care on 12/18 after discussion with family and palliative care, which is very reasonable given his age, comorbidities and current condition. On morning of 12/19, patient was unresponsive to verbal or tactile stimuli and was tachypneic with respiratory rate above 30. Family decided to start titrating down oxygen and taking off oxygen sensor. Patient was started on IV morphine for air hunger and given ativan. Oxygen was titrated down. At 1546 today 12/19, patient passed away with family at bedside.    Consultants  Palliative care    Procedures  None    Death Date: 12/19/22   Death Time: 1546         Pronounced By (RN1): April  Pronounced By (RN2): Lauren

## 2022-12-20 NOTE — PROGRESS NOTES
0730: Patient was only responsive to pain during this morning's assessment but patient has been transitioned to comfort care.    0800: [facility] resident rounded on patient and said that palliative care should be following patient and that the patient most likely doesn't need to be on 7liters on an oxymask. She said she would discuss with attending.    0910: Patient's IV was leaking to new IV was placed. [facility] medical team came in and saw patient and family. Patient's spouse was okay with oxygen being taken off patient and for us to give morphine and ativan to make patient comfortable. [facility] attending wanted us to keep on oxygen until palliative or hospice saw the patient and spoke with the family.    1120: Hospice NP came by and discussed plan with family. They were both in agreement that oxygen should be weaned down and then taken off per the family's comfort level. Morphine and ativan were.     1450: Went in to see family and patient's breathing was very labored at this time even after ativan. Family requested for oxygen to be taken off and for ativan and morphine to be given at the same time to make the patient comfortable.    [id number]: Family came out to the nurses station and said that the patient had passed. Went in to see patient and patient was no longer breathing. The charge nurse was called and time of death was called at 1546. Family said that they didn't need any addition resources and that a mortuary was already picked out for patient at [facility] in [place]. Attending MD and medical team were notified of time of death for patient. Corner's office and the donor network for notified and no autopsy or donor services were required for this situation.     1650: Patient was cleaned and postmortem care was performed and the inpatient morgue was contacted and transport and notified to  the patient.

## 2022-12-23 ENCOUNTER — APPOINTMENT (OUTPATIENT)
Dept: CARDIOLOGY | Facility: MEDICAL CENTER | Age: 87
End: 2022-12-23
Attending: NURSE PRACTITIONER
Payer: MEDICARE

## 2023-06-02 NOTE — OP THERAPY DAILY TREATMENT
"  Outpatient Physical Therapy  DAILY TREATMENT     [facility] Outpatient Physical Therapy  [address]  [place] [address]  Phone:  [phone]  Fax:  [phone]    Date: 04/06/2021    Patient: Han Pat  YOB: 1935  MRN: [id number]     Time Calculation    Start time: 1018  Stop time: 1108 Time Calculation (min): 50 minutes         Chief Complaint: Difficulty Walking and Loss Of Balance    Visit #: 3    SUBJECTIVE:  No new complaints today.     Pt present with wife Sally; she reports, \"we have not gotten all of the lab tests back yet, we see the neurologist on Thursday.\"    OBJECTIVE:    Posterior chain weakness <30 sec bridging on ball  Difficulty with tandem stance, SLS and narrow PREMA requiring frequent UE use  Muscle juddering with mini squats    Therapeutic Exercises (CPT 13901):     1. Nustep L3 7 minutes, cardiovascular training    2. Sit to stand, 2x6, VC's for sit at edge with feet behind knees and head forward during transition to stand; chair in front for safety with SBA    3. Hamstring curls with ball, 1x15; 1x10, reviewed     4. Bridging on swissball , 5x, reviewed; 3/4 AROM, <30 sec     5. Mini squats with UE support, 10x, muscle juddering noted with descent, added to hep    6. Pt education, re: increasing bouts of short walks for cardiovascular training    Therapeutic Treatments and Modalities:     1. Neuromuscular Re-education (CPT 51587), see below    Therapeutic Treatment and Modalities Summary: Neuro re-ed:  Balance in corner x5 min ea:  1. Single leg stance; difficulty bilaterally with intermittent and frequent UE use for balance  2. Standing feet together, increased difficulty with head turns B; mod sway with addition of head movements  3. Modified partial tandem standing; intermittent and frequent UE use, difficulty Bilaterally  VC's to stand close to corner without touching, chair in front for safety; added to hep  VC's to keep hands " Readmission Assessment  Number of Days since last admission?: 8-30 days  Previous Disposition: Home with Family  Who is being Interviewed: (P) Patient (chart review)  What was the patient's/caregiver's perception as to why they think they needed to return back to the hospital?: Other (Comment) (continued syncopal incident)  Did you visit your Primary Care Physician after you left the hospital, before you returned this time?: Yes  Did you see a specialist, such as Cardiac, Pulmonary, Orthopedic Physician, etc. after you left the hospital?: No  Who advised the patient to return to the hospital?: Self-referral  Does the patient report anything that got in the way of taking their medications?: No  In our efforts to provide the best possible care to you and others like you, can you think of anything that we could have done to help you after you left the hospital the first time, so that you might not have needed to return so soon?: Additional Community resources available for illness support, Identify patient's health literacy needs, Teach back instructions regarding management of illness, Teaching during hospitalization regarding your illness out for safety in case you need to grab chair    Time-based treatments/modalities:    Physical Therapy Timed Treatment Charges  Neuromusc re-ed, balance, coor, post minutes (CPT 81560): 28 minutes  Therapeutic exercise minutes (CPT 04621): 22 minutes    ASSESSMENT:   Difficulty with tandem, SLS, and narrow PREMA with posterior chain weakness noted. Requires frequent UE support for balance activities. Updated hep with balance hep using chair and corner for safety. Several VC's required to keep hands up for safety in case of needing external support for balance. Frequent cues needed to review mechanics for sit>stand; pt may continue to benefit from continued repetitions to ensure safety and correct set up of ex.    PLAN/RECOMMENDATIONS:   Plan for treatment: therapy treatment to continue next visit.  Planned interventions for next visit: continue with current treatment, gait training (CPT 55351), neuromuscular re-education (CPT 23594) and therapeutic exercise (CPT 24983).  Assess response to new balance hep; review sit to stand techniques

## 2023-08-31 NOTE — CARE PLAN
Per Lynda, NP - Discontinue previous script for Oxycodone 10 mg TID PRN that starts on 9/4 and keep patient at QID PRN for 2 weeks, then decrease to TID PRN thereafter.    Problem: Safety  Goal: Will remain free from injury  Outcome: PROGRESSING AS EXPECTED  Pt educated to use call light consistently and appropriately, will reinforce education. Educated to wait for assistance so that he does not attempt self transfer this shift. Able to verbalize needs, will reinforce and monitor.